# Patient Record
Sex: FEMALE | Race: OTHER | Employment: OTHER | URBAN - METROPOLITAN AREA
[De-identification: names, ages, dates, MRNs, and addresses within clinical notes are randomized per-mention and may not be internally consistent; named-entity substitution may affect disease eponyms.]

---

## 2017-01-11 ENCOUNTER — OFFICE VISIT (OUTPATIENT)
Dept: INTERNAL MEDICINE CLINIC | Age: 62
End: 2017-01-11

## 2017-01-11 VITALS
TEMPERATURE: 98.2 F | DIASTOLIC BLOOD PRESSURE: 66 MMHG | WEIGHT: 162 LBS | BODY MASS INDEX: 26.99 KG/M2 | OXYGEN SATURATION: 95 % | HEIGHT: 65 IN | SYSTOLIC BLOOD PRESSURE: 110 MMHG | RESPIRATION RATE: 16 BRPM | HEART RATE: 62 BPM

## 2017-01-11 DIAGNOSIS — R51.9 FRONTAL HEADACHE: Primary | ICD-10-CM
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
## 2017-01-11 DIAGNOSIS — R73.03 PRE-DIABETES: ICD-10-CM

## 2017-01-11 DIAGNOSIS — D50.9 IRON DEFICIENCY ANEMIA, UNSPECIFIED IRON DEFICIENCY ANEMIA TYPE: ICD-10-CM

## 2017-01-11 DIAGNOSIS — J45.20 MILD INTERMITTENT ASTHMA WITHOUT COMPLICATION: ICD-10-CM

## 2017-01-11 RX ORDER — IBUPROFEN 600 MG/1
600 TABLET ORAL
Qty: 20 TAB | Refills: 0 | Status: SHIPPED | OUTPATIENT
Start: 2017-01-11 | End: 2017-02-06 | Stop reason: SDUPTHER

## 2017-01-11 RX ORDER — ALBUTEROL SULFATE 90 UG/1
1 AEROSOL, METERED RESPIRATORY (INHALATION)
Qty: 1 INHALER | Refills: 0 | Status: SHIPPED | OUTPATIENT
Start: 2017-01-11 | End: 2017-02-16 | Stop reason: SDUPTHER

## 2017-01-11 NOTE — MR AVS SNAPSHOT
Visit Information Date & Time Provider Department Dept. Phone Encounter #  
 1/11/2017 11:45 AM Lise Darnell MD [facility] [phone] [id number] Your Appointments 1/20/2017  9:30 AM  
COMPLETE PHYSICAL with Lise Darnell MD  
[facility] [address]) Appt Note: fasting physical  
 [facility] [address] [facility] [address] [facility] [address] [facility] [address] [place] [address]  
  
    
 2/28/2017 11:00 AM  
ROUTINE CARE with Balta Bender MD  
[facility] [address] [facility]) Appt Note: 3mo fu dm  
 [address] [facility] [address]  
[phone]  
  
   
 [address] [place] [id number] Upcoming Health Maintenance Date Due INFLUENZA AGE 9 TO ADULT 8/1/2016 PAP AKA CERVICAL CYTOLOGY 6/10/2017 BREAST CANCER SCRN MAMMOGRAM 10/20/2017 GLAUCOMA SCREENING Q2Y 9/20/2018 COLONOSCOPY 6/16/2020 DTaP/Tdap/Td series (2 - Td) 7/7/2020 Allergies as of 1/11/2017  Review Complete On: 1/11/2017 By: Lise Darnell MD  
  
 Severity Noted Reaction Type Reactions Pcn [Penicillins] Medium 07/18/2011   Systemic Itching Percocet [Oxycodone-acetaminophen]  06/16/2015    Other (comments) Passe out, feels dizzy Current Immunizations  Never Reviewed Name Date TDAP Vaccine 7/7/2010 Not reviewed this visit You Were Diagnosed With   
  
 Codes Comments Frontal headache    -  Primary ICD-10-CM: K10 ICD-9-CM: 784.0 Fall, initial encounter     ICD-10-CM: W19. Court Garza ICD-9-CM: E888.9 Mild intermittent asthma without complication     EOT-80-QT: J45.20 ICD-9-CM: 493.90 Iron deficiency anemia, unspecified iron deficiency anemia type     ICD-10-CM: D50.9 ICD-9-CM: 280.9 Pre-diabetes     ICD-10-CM: R73.03 
ICD-9-CM: 790.29 Vitals BP Pulse Temp Resp Height(growth percentile) Weight(growth percentile) 110/66 (BP 1 Location: Right arm, BP Patient Position: Sitting) 62 98.2 °F (36.8 °C) (Oral) 16 5' 5\" (1.651 m) 162 lb (73.5 kg) SpO2 BMI OB Status Smoking Status 95% 26.96 kg/m2 Postmenopausal Never Smoker BMI and BSA Data Body Mass Index Body Surface Area  
 26.96 kg/m 2 1.84 m 2 Preferred Pharmacy Pharmacy Name Phone [address], [facility] [address] [phone] Your Updated Medication List  
  
   
This list is accurate as of: 1/11/17  4:33 PM.  Always use your most recent med list.  
  
  
  
  
 albuterol 90 mcg/actuation inhaler Commonly known as:  PROVENTIL HFA, VENTOLIN HFA, PROAIR HFA Take 1 Puff by inhalation every six (6) hours as needed for Wheezing for up to 30 doses. AMBIEN 10 mg tablet Generic drug:  zolpidem Take  by mouth nightly as needed for Sleep. aspirin delayed-release 81 mg tablet Take 162 mg by mouth nightly. CALCIUM 600 + D 600-125 mg-unit Tab Generic drug:  calcium-cholecalciferol (d3) Take  by mouth. chlorthalidone 25 mg tablet Commonly known as:  Nola Loupe Take 1 tablet by mouth  daily CHROMIMIN PO Take  by mouth. Chromium, Vanadium, Corosolic Acid, Alpha Lopoic Acid, etc..  
  
 clonazePAM 0.5 mg tablet Commonly known as:  Ines Kava Take 0.5 mg by mouth three (3) times daily. ENZYME DIGEST Cap Generic drug:  digestive enzymes Take  by mouth.  
  
 escitalopram oxalate 20 mg tablet Commonly known as:  [place] Andrew Take 20 mg by mouth daily. GARCINIA CAMBOGIA PO Take  by mouth. GINGER ROOT (BULK) Take 550 mg by mouth. glucose blood VI test strips strip Commonly known as:  ONETOUCH ULTRA TEST Test up to 2 times daily. Dx code E11.65 GREEN  mg Cap Generic drug:  green tea leaf extract Take  by mouth. hydroCHLOROthiazide 12.5 mg tablet Commonly known as:  HYDRODIURIL  
 Take 1 tablet by mouth  daily  
  
 ibuprofen 600 mg tablet Commonly known as:  MOTRIN Take 1 Tab by mouth every eight (8) hours as needed for Pain for up to 20 doses. lancets 33 gauge Misc Commonly known as: One Touch Fallentimber Copping Test up to 2 times daily. Dx code E11.65 LECITHIN PO Take  by mouth.  
  
 metFORMIN  mg tablet Commonly known as:  GLUCOPHAGE XR Take 1 Tab by mouth two (2) times daily (with meals). SLEEP CAPS PO Take  by mouth. VITAMIN B-12 1,000 mcg tablet Generic drug:  cyanocobalamin Take 1,000 mcg by mouth daily. VITAMIN C 500 mg tablet Generic drug:  ascorbic acid (vitamin C) Take  by mouth. VITAMIN D3 2,000 unit Tab Generic drug:  cholecalciferol (vitamin D3) Take 5,000 Units by mouth. Prescriptions Sent to Pharmacy Refills  
 albuterol (PROVENTIL HFA, VENTOLIN HFA, PROAIR HFA) 90 mcg/actuation inhaler 0 Sig: Take 1 Puff by inhalation every six (6) hours as needed for Wheezing for up to 30 doses. Class: Normal  
 [address] Ph #: [phone] Route: Inhalation  
 ibuprofen (MOTRIN) 600 mg tablet 0 Sig: Take 1 Tab by mouth every eight (8) hours as needed for Pain for up to 20 doses. Class: Normal  
 [address] Ph #: [phone] Route: Oral  
  
To-Do List   
 01/12/2017 1:30 PM  
  Appointment with [facility] 6 at [address] [place] [address] ([phone]) Shower or bathe using soap and water. Do not use deodorant, powder, perfumes, or lotion the day of your exam.  If your prior mammograms were not performed at [place] [address] please bring films with you or forward prior images 2 days before your procedure.   Check in at registration 15min before your appointment time unless you were instructed to do otherwise. A script is not necessary, but if you have one, please bring it on the day of the mammogram or have it faxed to the department. [facility] [phone] [facility]  [phone] [facility] [phone] [facility] [address] [phone] [facility] [address] [phone] [facility] [phone] Amanda Prater [phone]  
  
 01/12/2017 2:00 PM  
  Appointment with [address] at [facility] ([phone]) GENERAL INSTRUCTIONS  1. Bring outside films ([employer]) pertaining to the affected area with you on the day of appointment. 2. A written order with a valid diagnosis and Physicians signature is required for all scheduled tests. 3. Check in at registration 30 minutes before your appointment time unless you were instructed to do otherwise. 01/16/2017 Lab:  CBC W/O DIFF   
  
 01/16/2017 Lab:  HEMOGLOBIN A1C WITH EAG   
  
 01/16/2017 Lab:  LIPID PANEL   
  
 01/16/2017 Lab:  METABOLIC PANEL, COMPREHENSIVE Introducing [facility] SERVICES! Luc Goff introduces [employer] patient portal. Now you can access parts of your medical record, email your doctor's office, and request medication refills online. 1. In your internet browser, go to [email or web address] 2. Click on the First Time User? Click Here link in the Sign In box. You will see the New Member Sign Up page. 3. Enter your [employer] Access Code exactly as it appears below. You will not need to use this code after youve completed the sign-up process. If you do not sign up before the expiration date, you must request a new code. · [employer] Access Code: [email or web address] Expires: 4/10/2017 12:18 PM 
 
4. Enter the last four digits of your Social Security Number (xxxx) and Date of Birth (mm/dd/yyyy) as indicated and click Submit. You will be taken to the next sign-up page. 5. Create a [employer] ID. This will be your [employer] login ID and cannot be changed, so think of one that is secure and easy to remember. 6. Create a [facility] password. You can change your password at any time. 7. Enter your Password Reset Question and Answer. This can be used at a later time if you forget your password. 8. Enter your e-mail address. You will receive e-mail notification when new information is available in [address] 9. Click Sign Up. You can now view and download portions of your medical record. 10. Click the Download Summary menu link to download a portable copy of your medical information. If you have questions, please visit the Frequently Asked Questions section of the [employer] website. Remember, [facility] is NOT to be used for urgent needs. For medical emergencies, dial 911. Now available from your iPhone and Android! Please provide this summary of care documentation to your next provider. Your primary care clinician is listed as Rene Stone. If you have any questions after today's visit, please call [phone].

## 2017-01-11 NOTE — PROGRESS NOTES
Written by Amy Schmidt, as dictated by Dr. Parish Rogers MD.    Ciara Segura is a 59 y.o. female. HPI  The patient comes in today because she fell on Monday  and hit her head. She slipped on the ice and she landed on her back and hit the back of her head, but her head hurts in the front. Her back hurts and her side is sore. She does have blurred vision, but no loss of vision. She did not go to the ED or urgent care because she was scared to drive. She has not taken any medication for this. She has nausea, but she is unsure if it is due to the metformin. She is following with Dr. Jas Bradshaw (endo). She declines the flu shot at this time. She will wake up in the middle of the night gasping for breath. She has not gone to the cardiologist.     Patient Active Problem List   Diagnosis Code    Iron (Fe) deficiency anemia D50.9    Anxiety F41.9    Disc disorder M51.9    Seasonal allergic rhinitis J30.2    Osteopenia M85.80    Vertigo R42    ACP (advance care planning) Z71.89    Palpitation R00.2        Current Outpatient Prescriptions on File Prior to Visit   Medication Sig Dispense Refill    glucose blood VI test strips (ONETOUCH ULTRA TEST) strip Test up to 2 times daily. Dx code E11.65 100 Strip 6    lancets (ONE TOUCH DELICA) 33 gauge misc Test up to 2 times daily. Dx code E11.65 100 Lancet 6    metFORMIN ER (GLUCOPHAGE XR) 500 mg tablet Take 1 Tab by mouth two (2) times daily (with meals). 60 Tab 6    hydroCHLOROthiazide (HYDRODIURIL) 12.5 mg tablet Take 1 tablet by mouth  daily 90 Tab 0    chlorthalidone (HYGROTEN) 25 mg tablet Take 1 tablet by mouth  daily 90 Tab 0    escitalopram oxalate (LEXAPRO) 20 mg tablet Take 20 mg by mouth daily.  AMINO ACIDS/CHROMIUM (CHROMIMIN PO) Take  by mouth. Chromium, Vanadium, Corosolic Acid, Alpha Lopoic Acid, etc..      green tea leaf extract (GREEN TEA) 315 mg cap Take  by mouth.       ascorbic acid, vitamin C, (VITAMIN C) 500 mg tablet Take  by mouth.  GINGER ROOT, BULK, Take 550 mg by mouth.  CHROM KAYA/BRINDAL BERRY (GARCINIA CAMBOGIA PO) Take  by mouth.  MELATONIN/HERBAL COMPLEX #184 (SLEEP CAPS PO) Take  by mouth.  LECITHIN PO Take  by mouth.  calcium-cholecalciferol, d3, (CALCIUM 600 + D) 600-125 mg-unit tab Take  by mouth.  cholecalciferol, vitamin D3, (VITAMIN D3) 2,000 unit tab Take 5,000 Units by mouth.  cyanocobalamin (VITAMIN B-12) 1,000 mcg tablet Take 1,000 mcg by mouth daily.  digestive enzymes (ENZYME DIGEST) cap Take  by mouth.  clonazePAM (KLONOPIN) 0.5 mg tablet Take 0.5 mg by mouth three (3) times daily.  aspirin delayed-release 81 mg tablet Take 162 mg by mouth nightly.  zolpidem (AMBIEN) 10 mg tablet Take  by mouth nightly as needed for Sleep. No current facility-administered medications on file prior to visit. Allergies   Allergen Reactions    Pcn [Penicillins] Itching    Percocet [Oxycodone-Acetaminophen] Other (comments)     Passe out, feels dizzy       Past Medical History   Diagnosis Date    Anxiety 6/4/2009    BV (bacterial vaginosis)     Candidal vaginitis     Concussion 09/12    Disc disorder 6/4/2009    Iron (Fe) deficiency anemia 6/4/2009    Nausea & vomiting     Osteopenia 6/4/2009    Seasonal allergic rhinitis 6/4/2009    Vertigo        Past Surgical History   Procedure Laterality Date    Hx orthopaedic       left foot       Family History   Problem Relation Age of Onset    Arthritis-osteo Mother     Heart Disease Father     Cancer Sister        Social History     Social History    Marital status: LEGALLY      Spouse name: N/A    Number of children: N/A    Years of education: N/A     Occupational History    Not on file.      Social History Main Topics    Smoking status: Never Smoker    Smokeless tobacco: Never Used    Alcohol use No    Drug use: No    Sexual activity: Not Currently Other Topics Concern    Not on file     Social History Narrative       Review of Systems   Constitutional: Negative for malaise/fatigue. HENT: Negative for congestion. Eyes: Positive for blurred vision. Negative for discharge. Respiratory: Negative for cough and wheezing. Cardiovascular: Negative for chest pain and palpitations. Musculoskeletal: Positive for falls. Negative for joint pain and myalgias. Neurological: Positive for headaches. Negative for weakness. Visit Vitals    /66 (BP 1 Location: Right arm, BP Patient Position: Sitting)    Pulse 62    Temp 98.2 °F (36.8 °C) (Oral)    Resp 16    Ht 5' 5\" (1.651 m)    Wt 162 lb (73.5 kg)    SpO2 95%    BMI 26.96 kg/m2     Physical Exam   Constitutional: She is oriented to person, place, and time. She appears well-nourished. No distress. HENT:   Right Ear: External ear normal.   Left Ear: External ear normal.   Mouth/Throat: Oropharynx is clear and moist.   Eyes: Conjunctivae and EOM are normal. Right eye exhibits no discharge. Left eye exhibits no discharge. Neck: Normal range of motion. Neck supple. Cardiovascular: Normal rate and regular rhythm. Pulmonary/Chest: Effort normal. She has wheezes (R side). Abdominal: Soft. Bowel sounds are normal. She exhibits no distension. Musculoskeletal:   BL upper extremity strength normal   Neurological: She is alert and oriented to person, place, and time. No cranial nerve deficit. Skin: Skin is intact. Psychiatric: She has a normal mood and affect. Nursing note and vitals reviewed. ASSESSMENT and PLAN    ICD-10-CM ICD-9-CM    1. Frontal headache R51 784.0 ibuprofen (MOTRIN) 600 mg tablet sent to pharmacy. No neuro deficit on exam   2. Fall, initial encounter [address] Alen Kidd S692.8 I want her to take ibuprofen BID and I want her to take the medication with food for 4-5 days. If she gets worse then I want to send her for CT.     3. Mild intermittent asthma without complication R68.37 876.44 albuterol (PROVENTIL HFA, VENTOLIN HFA, PROAIR HFA) 90 mcg/actuation inhaler sent to pharmacy    I am going to send her an inhaler to the pharmacy. If she is using this more than 3-4 times a week then she needs to rtc and I will give her a long acting medication. 4. Iron deficiency anemia, unspecified iron deficiency anemia type D50.9 280.9 CBC W/O DIFF      METABOLIC PANEL, COMPREHENSIVE    We hugo check her levels when she comes back in to the office. 5. Pre-diabetes R73.03 790.29 HEMOGLOBIN A1C WITH EAG      LIPID PANEL    We will check these levels when she comes in next week fasting. This plan was reviewed with the patient and patient agrees. All questions were answered. This scribe documentation was reviewed by me and accurately reflects the examination and decisions made by me. This note will not be viewable in [address]

## 2017-01-11 NOTE — PROGRESS NOTES
Chief Complaint   Patient presents with   Alejandra Bonilla     states she fell monday on the ice and hit head.  states that it hurts on the front and at the left temple. but patient hit the back of the head. did not black out.

## 2017-01-12 ENCOUNTER — HOSPITAL ENCOUNTER (OUTPATIENT)
Dept: MAMMOGRAPHY | Age: 62
Discharge: HOME | End: 2017-01-12
Attending: SPECIALIST
Payer: MEDICARE

## 2017-01-12 ENCOUNTER — HOSPITAL ENCOUNTER (OUTPATIENT)
Dept: ULTRASOUND IMAGING | Age: 62
Discharge: HOME | End: 2017-01-12
Attending: INTERNAL MEDICINE
Payer: MEDICARE

## 2017-01-12 DIAGNOSIS — E04.1 THYROID NODULE: ICD-10-CM

## 2017-01-12 DIAGNOSIS — Z12.31 VISIT FOR SCREENING MAMMOGRAM: ICD-10-CM

## 2017-01-12 PROCEDURE — 76536 US EXAM OF HEAD AND NECK: CPT

## 2017-01-12 PROCEDURE — 77067 SCR MAMMO BI INCL CAD: CPT

## 2017-01-16 DIAGNOSIS — D50.9 IRON DEFICIENCY ANEMIA, UNSPECIFIED IRON DEFICIENCY ANEMIA TYPE: ICD-10-CM

## 2017-01-16 DIAGNOSIS — R73.03 PRE-DIABETES: ICD-10-CM

## 2017-01-17 LAB
ALBUMIN SERPL-MCNC: 4.6 G/DL (ref 3.6–4.8)
ALBUMIN/GLOB SERPL: 2.3 {RATIO} (ref 1.1–2.5)
ALP SERPL-CCNC: 55 IU/L (ref 39–117)
ALT SERPL-CCNC: 25 IU/L (ref 0–32)
AST SERPL-CCNC: 24 IU/L (ref 0–40)
BILIRUB SERPL-MCNC: 0.3 MG/DL (ref 0–1.2)
BUN SERPL-MCNC: 16 MG/DL (ref 8–27)
BUN/CREAT SERPL: 23 (ref 11–26)
CALCIUM SERPL-MCNC: 9.9 MG/DL (ref 8.7–10.3)
CHLORIDE SERPL-SCNC: 100 MMOL/L (ref 96–106)
CHOLEST SERPL-MCNC: 227 MG/DL (ref 100–199)
CO2 SERPL-SCNC: 27 MMOL/L (ref 18–29)
CREAT SERPL-MCNC: 0.71 MG/DL (ref 0.57–1)
ERYTHROCYTE [DISTWIDTH] IN BLOOD BY AUTOMATED COUNT: 13.1 % (ref 12.3–15.4)
EST. AVERAGE GLUCOSE BLD GHB EST-MCNC: 126 MG/DL
GLOBULIN SER CALC-MCNC: 2 G/DL (ref 1.5–4.5)
GLUCOSE SERPL-MCNC: 111 MG/DL (ref 65–99)
HBA1C MFR BLD: 6 % (ref 4.8–5.6)
HCT VFR BLD AUTO: 36 % (ref 34–46.6)
HDLC SERPL-MCNC: 77 MG/DL
HGB BLD-MCNC: 12.1 G/DL (ref 11.1–15.9)
INTERPRETATION, 910389: NORMAL
LDLC SERPL CALC-MCNC: 134 MG/DL (ref 0–99)
MCH RBC QN AUTO: 29.3 PG (ref 26.6–33)
MCHC RBC AUTO-ENTMCNC: 33.6 G/DL (ref 31.5–35.7)
MCV RBC AUTO: 87 FL (ref 79–97)
PLATELET # BLD AUTO: 246 X10E3/UL (ref 150–379)
POTASSIUM SERPL-SCNC: 4 MMOL/L (ref 3.5–5.2)
PROT SERPL-MCNC: 6.6 G/DL (ref 6–8.5)
RBC # BLD AUTO: 4.13 X10E6/UL (ref 3.77–5.28)
SODIUM SERPL-SCNC: 142 MMOL/L (ref 134–144)
TRIGL SERPL-MCNC: 79 MG/DL (ref 0–149)
VLDLC SERPL CALC-MCNC: 16 MG/DL (ref 5–40)
WBC # BLD AUTO: 2.7 X10E3/UL (ref 3.4–10.8)

## 2017-01-17 RX ORDER — LANCETS 33 GAUGE
EACH MISCELLANEOUS
Qty: 200 LANCET | Refills: 4 | Status: SHIPPED | OUTPATIENT
Start: 2017-01-17 | End: 2018-03-29 | Stop reason: SDUPTHER

## 2017-01-17 NOTE — TELEPHONE ENCOUNTER
Patient [facility] so that she can get a 90 day supply. She also said that sh got some labs drawn for her physical yesterday and said that they are the same things Dr. Emily Morales ordered; she asked if she will still need to do the labs that Dr. Emily Morales ordered for next month.  SHe said the only test that wasn't done was the urine test.

## 2017-01-20 ENCOUNTER — OFFICE VISIT (OUTPATIENT)
Dept: INTERNAL MEDICINE CLINIC | Age: 62
End: 2017-01-20

## 2017-01-20 VITALS
TEMPERATURE: 98 F | BODY MASS INDEX: 26.99 KG/M2 | HEIGHT: 65 IN | SYSTOLIC BLOOD PRESSURE: 102 MMHG | HEART RATE: 68 BPM | DIASTOLIC BLOOD PRESSURE: 78 MMHG | OXYGEN SATURATION: 96 % | WEIGHT: 162 LBS | RESPIRATION RATE: 16 BRPM

## 2017-01-20 DIAGNOSIS — D50.9 IRON DEFICIENCY ANEMIA, UNSPECIFIED IRON DEFICIENCY ANEMIA TYPE: ICD-10-CM

## 2017-01-20 DIAGNOSIS — D72.819 LEUKOPENIA, UNSPECIFIED TYPE: ICD-10-CM

## 2017-01-20 DIAGNOSIS — I10 ESSENTIAL HYPERTENSION: ICD-10-CM

## 2017-01-20 DIAGNOSIS — E11.9 CONTROLLED TYPE 2 DIABETES MELLITUS WITHOUT COMPLICATION, WITHOUT LONG-TERM CURRENT USE OF INSULIN (HCC): Primary | ICD-10-CM

## 2017-01-20 NOTE — PROGRESS NOTES
Written by Rah Martínez, as dictated by Dr. Shankar Alberto MD.    Rebecca Li is a 59 y.o. female. HPI  The patient comes in today for a follow up. She denies any dizziness and she does have headaches that are a lot better than before. Her headaches are only on the R side of the head now. The motrin helped her a lot as well. Her A1c was 6.0% which came down from 6.3%. She follows with endocrinologist. Her BS was high at 135 this morning and she had soup with chicken and white beans for dinner and she did not have any sweets. She has seen readings at 135-155. She walks 4 miles 3-4 times and she does water aerobics. She has some constipation due to eating meat. She followed with Dr. Monica Gayle (GI) for a colonoscopy in 2010 but didn`t go back in 5 years as was recommended. She has been taking Aspirin daily and her iron tablets. Her Hgb came back normal on her blood work. She has never followed with a hematologist.     Patient Active Problem List   Diagnosis Code    Iron (Fe) deficiency anemia D50.9    Anxiety F41.9    Disc disorder M51.9    Seasonal allergic rhinitis J30.2    Osteopenia M85.80    Vertigo R42    ACP (advance care planning) Z71.89    Palpitation R00.2        Current Outpatient Prescriptions on File Prior to Visit   Medication Sig Dispense Refill    glucose blood VI test strips (ONETOUCH ULTRA TEST) strip Test up to 2 times daily. Dx code E11.65 200 Strip 4    lancets (ONE TOUCH DELICA) 33 gauge misc Test up to 2 times daily. Dx code E11.65 200 Lancet 4    albuterol (PROVENTIL HFA, VENTOLIN HFA, PROAIR HFA) 90 mcg/actuation inhaler Take 1 Puff by inhalation every six (6) hours as needed for Wheezing for up to 30 doses. 1 Inhaler 0    ibuprofen (MOTRIN) 600 mg tablet Take 1 Tab by mouth every eight (8) hours as needed for Pain for up to 20 doses.  20 Tab 0    metFORMIN ER (GLUCOPHAGE XR) 500 mg tablet Take 1 Tab by mouth two (2) times daily (with meals). 60 Tab 6    hydroCHLOROthiazide (HYDRODIURIL) 12.5 mg tablet Take 1 tablet by mouth  daily 90 Tab 0    chlorthalidone (HYGROTEN) 25 mg tablet Take 1 tablet by mouth  daily 90 Tab 0    escitalopram oxalate (LEXAPRO) 20 mg tablet Take 20 mg by mouth daily.  AMINO ACIDS/CHROMIUM (CHROMIMIN PO) Take  by mouth. Chromium, Vanadium, Corosolic Acid, Alpha Lopoic Acid, etc..      green tea leaf extract (GREEN TEA) 315 mg cap Take  by mouth.  ascorbic acid, vitamin C, (VITAMIN C) 500 mg tablet Take  by mouth.  GINGER ROOT, BULK, Take 550 mg by mouth.  CHROM KAYA/BRINDAL BERRY (GARCINIA CAMBOGIA PO) Take  by mouth.  MELATONIN/HERBAL COMPLEX #184 (SLEEP CAPS PO) Take  by mouth.  LECITHIN PO Take  by mouth.  calcium-cholecalciferol, d3, (CALCIUM 600 + D) 600-125 mg-unit tab Take  by mouth.  cholecalciferol, vitamin D3, (VITAMIN D3) 2,000 unit tab Take 5,000 Units by mouth.  cyanocobalamin (VITAMIN B-12) 1,000 mcg tablet Take 1,000 mcg by mouth daily.  digestive enzymes (ENZYME DIGEST) cap Take  by mouth.  clonazePAM (KLONOPIN) 0.5 mg tablet Take 0.5 mg by mouth three (3) times daily.  aspirin delayed-release 81 mg tablet Take 162 mg by mouth nightly.  zolpidem (AMBIEN) 10 mg tablet Take  by mouth nightly as needed for Sleep. No current facility-administered medications on file prior to visit.         Allergies   Allergen Reactions    Pcn [Penicillins] Itching    Percocet [Oxycodone-Acetaminophen] Other (comments)     Passe out, feels dizzy       Past Medical History   Diagnosis Date    Anxiety 6/4/2009    BV (bacterial vaginosis)     Candidal vaginitis     Concussion 09/12    Disc disorder 6/4/2009    Iron (Fe) deficiency anemia 6/4/2009    Nausea & vomiting     Osteopenia 6/4/2009    Seasonal allergic rhinitis 6/4/2009    Vertigo        Past Surgical History   Procedure Laterality Date    Hx orthopaedic       left foot Family History   Problem Relation Age of Onset   Job [place] Arthritis-osteo Mother     Heart Disease Father     Cancer Sister     Breast Cancer Sister 48       Social History     Social History    Marital status: LEGALLY      Spouse name: N/A    Number of children: N/A    Years of education: N/A     Occupational History    Not on file.      Social History Main Topics    Smoking status: Never Smoker    Smokeless tobacco: Never Used    Alcohol use No    Drug use: No    Sexual activity: Not Currently     Other Topics Concern    Not on file     Social History Narrative       Orders Only on 01/16/2017   Component Date Value Ref Range Status    Hemoglobin A1c 01/16/2017 6.0* 4.8 - 5.6 % Final    Comment:          Pre-diabetes: 5.7 - 6.4           Diabetes: >6.4           Glycemic control for adults with diabetes: <7.0      Estimated average glucose 01/16/2017 126  mg/dL Final    WBC 01/16/2017 2.7* 3.4 - 10.8 x10E3/uL Final    RBC 01/16/2017 4.13  3.77 - 5.28 x10E6/uL Final    HGB 01/16/2017 12.1  11.1 - 15.9 g/dL Final    HCT 01/16/2017 36.0  34.0 - 46.6 % Final    MCV 01/16/2017 87  79 - 97 fL Final    MCH 01/16/2017 29.3  26.6 - 33.0 pg Final    MCHC 01/16/2017 33.6  31.5 - 35.7 g/dL Final    RDW 01/16/2017 13.1  12.3 - 15.4 % Final    PLATELET 95/41/6700 360  150 - 379 x10E3/uL Final    Glucose 01/16/2017 111* 65 - 99 mg/dL Final    BUN 01/16/2017 16  8 - 27 mg/dL Final    Creatinine 01/16/2017 0.71  0.57 - 1.00 mg/dL Final    GFR est non-AA 01/16/2017 91  >59 mL/min/1.73 Final    GFR est AA 01/16/2017 105  >59 mL/min/1.73 Final    BUN/Creatinine ratio 01/16/2017 23  11 - 26 Final    Sodium 01/16/2017 142  134 - 144 mmol/L Final    Potassium 01/16/2017 4.0  3.5 - 5.2 mmol/L Final    Chloride 01/16/2017 100  96 - 106 mmol/L Final    CO2 01/16/2017 27  18 - 29 mmol/L Final    Calcium 01/16/2017 9.9  8.7 - 10.3 mg/dL Final    Protein, total 01/16/2017 6.6  6.0 - 8.5 g/dL Final    Albumin 01/16/2017 4.6  3.6 - 4.8 g/dL Final    GLOBULIN, TOTAL 01/16/2017 2.0  1.5 - 4.5 g/dL Final    A-G Ratio 01/16/2017 2.3  1.1 - 2.5 Final    Bilirubin, total 01/16/2017 0.3  0.0 - 1.2 mg/dL Final    Alk. phosphatase 01/16/2017 55  39 - 117 IU/L Final    AST 01/16/2017 24  0 - 40 IU/L Final    ALT 01/16/2017 25  0 - 32 IU/L Final    Cholesterol, total 01/16/2017 227* 100 - 199 mg/dL Final    Triglyceride 01/16/2017 79  0 - 149 mg/dL Final    HDL Cholesterol 01/16/2017 77  >39 mg/dL Final    VLDL, calculated 01/16/2017 16  5 - 40 mg/dL Final    LDL, calculated 01/16/2017 134* 0 - 99 mg/dL Final    INTERPRETATION 01/16/2017 Note   Final    Supplement report is available. Review of Systems   Constitutional: Negative for malaise/fatigue. HENT: Negative for congestion. Eyes: Negative for blurred vision and discharge. Respiratory: Negative for cough and wheezing. Cardiovascular: Negative for chest pain and palpitations. Gastrointestinal: Negative for abdominal pain and heartburn. Genitourinary: Negative for frequency and urgency. Musculoskeletal: Negative for joint pain and myalgias. Neurological: Positive for headaches. Negative for dizziness, tingling, sensory change and weakness. Psychiatric/Behavioral: Negative for depression and suicidal ideas. The patient is not nervous/anxious. Visit Vitals    /78 (BP 1 Location: Left arm, BP Patient Position: Sitting)    Pulse 68    Temp 98 °F (36.7 °C) (Oral)    Resp 16    Ht 5' 5\" (1.651 m)    Wt 162 lb (73.5 kg)    SpO2 96%    BMI 26.96 kg/m2       Physical Exam   Constitutional: She is oriented to person, place, and time. She appears well-nourished. No distress. HENT:   Right Ear: External ear normal.   Left Ear: External ear normal.   Mouth/Throat: Oropharynx is clear and moist.   Eyes: Conjunctivae and EOM are normal. Right eye exhibits no discharge. Left eye exhibits no discharge.    Neck: Normal range of motion. Neck supple. Cardiovascular: Normal rate and regular rhythm. Pulmonary/Chest: Effort normal and breath sounds normal. She has no wheezes. Abdominal: Soft. Bowel sounds are normal. She exhibits no distension. Lymphadenopathy:     She has no cervical adenopathy. Neurological: She is alert and oriented to person, place, and time. Skin: Skin is intact. Psychiatric: She has a normal mood and affect. Nursing note and vitals reviewed. ASSESSMENT and PLAN    ICD-10-CM ICD-9-CM    1. Controlled type 2 diabetes mellitus without complication, without long-term current use of insulin (HCC) E11.9 250.00 Her A1c has come down since her last visit. Continue exercise & diabetic diet. 2. Essential hypertension I10 401.9 Her BP is under control at this time. No change to her dosage. 3. Iron deficiency anemia, unspecified iron deficiency anemia type D50.9 280.9 REFERRAL TO HEMATOLOGY    Her Hgb is normal on her blood work and she is taking her iron pills. 4. Leukopenia, unspecified type D72.819 288.50 REFERRAL TO HEMATOLOGY    I want her to follow with a hematologist due to a chronic low WBC as she never had a work up. This plan was reviewed with the patient and patient agrees. All questions were answered. This scribe documentation was reviewed by me and accurately reflects the examination and decisions made by me. This note will not be viewable in [address]

## 2017-01-20 NOTE — PROGRESS NOTES
Chief Complaint   Patient presents with    Complete Physical     already did the labs. still has slight headache and she noticed she is having problems with breathing during sleep.

## 2017-01-24 RX ORDER — HYDROCHLOROTHIAZIDE 12.5 MG/1
TABLET ORAL
Qty: 90 TAB | Refills: 1 | Status: SHIPPED | OUTPATIENT
Start: 2017-01-24 | End: 2017-07-31 | Stop reason: ALTCHOICE

## 2017-02-06 DIAGNOSIS — R51.9 FRONTAL HEADACHE: ICD-10-CM

## 2017-02-06 RX ORDER — IBUPROFEN 600 MG/1
600 TABLET ORAL
Qty: 20 TAB | Refills: 0 | Status: SHIPPED | OUTPATIENT
Start: 2017-02-06 | End: 2019-02-22 | Stop reason: SDUPTHER

## 2017-02-16 DIAGNOSIS — J45.20 MILD INTERMITTENT ASTHMA WITHOUT COMPLICATION: ICD-10-CM

## 2017-02-16 RX ORDER — ALBUTEROL SULFATE 90 UG/1
1 AEROSOL, METERED RESPIRATORY (INHALATION)
Qty: 1 INHALER | Refills: 0 | Status: SHIPPED | OUTPATIENT
Start: 2017-02-16 | End: 2017-12-22 | Stop reason: SDUPTHER

## 2017-02-28 ENCOUNTER — OFFICE VISIT (OUTPATIENT)
Dept: ENDOCRINOLOGY | Age: 62
End: 2017-02-28

## 2017-02-28 VITALS
WEIGHT: 167 LBS | DIASTOLIC BLOOD PRESSURE: 72 MMHG | TEMPERATURE: 96.4 F | SYSTOLIC BLOOD PRESSURE: 128 MMHG | HEART RATE: 59 BPM | BODY MASS INDEX: 27.82 KG/M2 | OXYGEN SATURATION: 100 % | RESPIRATION RATE: 20 BRPM | HEIGHT: 65 IN

## 2017-02-28 DIAGNOSIS — E78.2 MIXED HYPERLIPIDEMIA: ICD-10-CM

## 2017-02-28 DIAGNOSIS — I10 ESSENTIAL HYPERTENSION: ICD-10-CM

## 2017-02-28 DIAGNOSIS — R73.02 GLUCOSE INTOLERANCE (IMPAIRED GLUCOSE TOLERANCE): Primary | ICD-10-CM

## 2017-02-28 RX ORDER — MELOXICAM 15 MG/1
1 TABLET ORAL DAILY
Refills: 1 | COMMUNITY
Start: 2017-02-16 | End: 2017-10-24 | Stop reason: SDUPTHER

## 2017-02-28 NOTE — MR AVS SNAPSHOT
Visit Information Date & Time Provider Department Dept. Phone Encounter #  
 2/28/2017 11:00 AM Lora Terrazas MD [facility] [phone] [id number] Follow-up Instructions Return in about 1 year (around 2/28/2018). Upcoming Health Maintenance Date Due Pneumococcal 19-64 Highest Risk (1 of 3 - PCV13) 3[birth year]1/1974 INFLUENZA AGE 9 TO ADULT 8/1/2016 PAP AKA CERVICAL CYTOLOGY 6/10/2017 BREAST CANCER SCRN MAMMOGRAM 1/12/2019 GLAUCOMA SCREENING Q2Y 1/31/2019 COLONOSCOPY 6/16/2020 DTaP/Tdap/Td series (2 - Td) 7/7/2020 Allergies as of 2/28/2017  Review Complete On: 2/28/2017 By: Lora Terrazas MD  
  
 Severity Noted Reaction Type Reactions Pcn [Penicillins] Medium 07/18/2011   Systemic Itching Percocet [Oxycodone-acetaminophen]  06/16/2015    Other (comments) Passe out, feels dizzy Current Immunizations  Never Reviewed Name Date TDAP Vaccine 7/7/2010 Not reviewed this visit You Were Diagnosed With   
  
 Codes Comments Glucose intolerance (impaired glucose tolerance)    -  Primary ICD-10-CM: R73.02 
ICD-9-CM: 790.22 Vitals BP  
  
  
  
  
  
 128/72 BMI and BSA Data Body Mass Index Body Surface Area  
 27.79 kg/m 2 1.86 m 2 Preferred Pharmacy Pharmacy Name Phone [address], [facility] [facility] [address] [phone] Your Updated Medication List  
  
   
This list is accurate as of: 2/28/17 12:40 PM.  Always use your most recent med list.  
  
  
  
  
 albuterol 90 mcg/actuation inhaler Commonly known as:  PROVENTIL HFA, VENTOLIN HFA, PROAIR HFA Take 1 Puff by inhalation every six (6) hours as needed for Wheezing for up to 30 doses. AMBIEN 10 mg tablet Generic drug:  zolpidem Take  by mouth nightly as needed for Sleep. aspirin delayed-release 81 mg tablet Take 162 mg by mouth nightly. CALCIUM 600 + D 600-125 mg-unit Tab Generic drug:  calcium-cholecalciferol (d3) Take  by mouth. chlorthalidone 25 mg tablet Commonly known as:  Carreno Dace Take 1 tablet by mouth  daily CHROMIMIN PO Take  by mouth. Chromium, Vanadium, Corosolic Acid, Alpha Lopoic Acid, etc..  
  
 clonazePAM 0.5 mg tablet Commonly known as:  Rich Aw Take 0.5 mg by mouth three (3) times daily. ENZYME DIGEST Cap Generic drug:  digestive enzymes Take  by mouth. glucose blood VI test strips strip Commonly known as:  ONETOUCH ULTRA TEST Test up to 2 times daily. Dx code E11.65 GREEN  mg Cap Generic drug:  green tea leaf extract Take  by mouth. hydroCHLOROthiazide 12.5 mg tablet Commonly known as:  HYDRODIURIL Take 1 tablet by mouth  daily  
  
 ibuprofen 600 mg tablet Commonly known as:  MOTRIN Take 1 Tab by mouth every eight (8) hours as needed for Pain for up to 20 doses. lancets 33 gauge Misc Commonly known as: One Touch Rachel Maker Test up to 2 times daily. Dx code E11.65 LECITHIN PO Take  by mouth.  
  
 meloxicam 15 mg tablet Commonly known as:  MOBIC Take 1 Tab by mouth daily. metFORMIN  mg tablet Commonly known as:  GLUCOPHAGE XR Take 1 Tab by mouth two (2) times daily (with meals). SITagliptin-metFORMIN  mg Tm24 Commonly known as:  JANUMET XR  
1 tab twice a day with meals  Stop metformin SLEEP CAPS PO Take  by mouth. VITAMIN B-12 1,000 mcg tablet Generic drug:  cyanocobalamin Take 1,000 mcg by mouth daily. VITAMIN C 500 mg tablet Generic drug:  ascorbic acid (vitamin C) Take  by mouth. VITAMIN D3 2,000 unit Tab Generic drug:  cholecalciferol (vitamin D3) Take 5,000 Units by mouth. Prescriptions Printed Refills SITagliptin-metFORMIN (JANUMET XR)  mg TM24 6 Si tab twice a day with meals  Stop metformin Class: Print We Performed the Following HEMOGLOBIN A1C WITH EAG [36421 CPT(R)] Follow-up Instructions Return in about 1 year (around 2/28/2018). To-Do List   
 08/28/2017 Lab:  HEMOGLOBIN A1C WITH EAG Patient Instructions Stay  on  Metformin Er 500 mg She wants to try  Janumet xr   50/500   Mg    Twice a day with meals Do not skip meals Do not eat in between meals Reduce carbs- pasta, rice, potatoes, bread Do not drink juices or sodas Do not eat sugar free cookies and cakes Introducing [facility]! Martín Mcdonough introduces [employer] patient portal. Now you can access parts of your medical record, email your doctor's office, and request medication refills online. 1. In your internet browser, go to [email or web address] 2. Click on the First Time User? Click Here link in the Sign In box. You will see the New Member Sign Up page. 3. Enter your Win[employer] Access Code exactly as it appears below. You will not need to use this code after youve completed the sign-up process. If you do not sign up before the expiration date, you must request a new code. · Win[employer] Access Code: [email or web address] Expires: 4/10/2017 12:18 PM 
 
4. Enter the last four digits of your Social Security Number (xxxx) and Date of Birth (mm/dd/yyyy) as indicated and click Submit. You will be taken to the next sign-up page. 5. Create a Winster ID. This will be your Winster login ID and cannot be changed, so think of one that is secure and easy to remember. 6. Create a Win[employer] password. You can change your password at any time. 7. Enter your Password Reset Question and Answer. This can be used at a later time if you forget your password. 8. Enter your e-mail address. You will receive e-mail notification when new information is available in [address] 9. Click Sign Up. You can now view and download portions of your medical record. 10. Click the Download Summary menu link to download a portable copy of your medical information. If you have questions, please visit the Frequently Asked Questions section of the [employer] website. Remember, [employer] is NOT to be used for urgent needs. For medical emergencies, dial 911. Now available from your iPhone and Android! Please provide this summary of care documentation to your next provider. Your primary care clinician is listed as Palomo Francis. If you have any questions after today's visit, please call [phone].

## 2017-02-28 NOTE — PATIENT INSTRUCTIONS
Stay  on  Metformin Er 500 mg    She wants to try  Janumet xr   50/500   Mg    Twice a day with meals       Do not skip meals  Do not eat in between meals    Reduce carbs- pasta, rice, potatoes, bread   Do not drink juices or sodas  Do not eat sugar free cookies and cakes

## 2017-02-28 NOTE — PROGRESS NOTES
HISTORY OF PRESENT ILLNESS  Gilma Snow is a 59 y.o. female. HPI  Patient here for first f/u after  initial visit of Type 2 diabetes mellitus   From nov 28 2016     Gained 5 lbs     She is immensely upset and angry that she had to wait longer   She is again appreciative also for the care I give     She is hoping to see if she could get janumet instead of metformin er         Old history . Referred : by pcp    H/o pre diabetes for several  years     Current A1C is 6.3 %  and symptoms/problems include none     Current diabetic medications include none. Current monitoring regimen: none  Home blood sugar records: trend: unknown  Any episodes of hypoglycemia? no    Weight trend: stable  Prior visit with dietician: no  Current diet: \"healthy\" diet  in general  Current exercise: aerobics    Known diabetic complications: none  Cardiovascular risk factors: dyslipidemia, diabetes mellitus    Eye exam current (within one year): no  WILLIE: no     Past Medical History:   Diagnosis Date    Anxiety 6/4/2009    BV (bacterial vaginosis)     Candidal vaginitis     Concussion 09/12    Disc disorder 6/4/2009    Iron (Fe) deficiency anemia 6/4/2009    Nausea & vomiting     Osteopenia 6/4/2009    Seasonal allergic rhinitis 6/4/2009    Vertigo      Past Surgical History:   Procedure Laterality Date    HX ORTHOPAEDIC      left foot     Current Outpatient Prescriptions   Medication Sig    albuterol (PROVENTIL HFA, VENTOLIN HFA, PROAIR HFA) 90 mcg/actuation inhaler Take 1 Puff by inhalation every six (6) hours as needed for Wheezing for up to 30 doses.  ibuprofen (MOTRIN) 600 mg tablet Take 1 Tab by mouth every eight (8) hours as needed for Pain for up to 20 doses.  hydroCHLOROthiazide (HYDRODIURIL) 12.5 mg tablet Take 1 tablet by mouth  daily    glucose blood VI test strips (ONETOUCH ULTRA TEST) strip Test up to 2 times daily. Dx code E11.65    lancets (ONE TOUCH DELICA) 33 gauge misc Test up to 2 times daily.  Dx code E11.65    metFORMIN ER (GLUCOPHAGE XR) 500 mg tablet Take 1 Tab by mouth two (2) times daily (with meals).  chlorthalidone (HYGROTEN) 25 mg tablet Take 1 tablet by mouth  daily    AMINO ACIDS/CHROMIUM (CHROMIMIN PO) Take  by mouth. Chromium, Vanadium, Corosolic Acid, Alpha Lopoic Acid, etc..    green tea leaf extract (GREEN TEA) 315 mg cap Take  by mouth.  ascorbic acid, vitamin C, (VITAMIN C) 500 mg tablet Take  by mouth.  MELATONIN/HERBAL COMPLEX #184 (SLEEP CAPS PO) Take  by mouth.  LECITHIN PO Take  by mouth.  calcium-cholecalciferol, d3, (CALCIUM 600 + D) 600-125 mg-unit tab Take  by mouth.  cholecalciferol, vitamin D3, (VITAMIN D3) 2,000 unit tab Take 5,000 Units by mouth.  cyanocobalamin (VITAMIN B-12) 1,000 mcg tablet Take 1,000 mcg by mouth daily.  digestive enzymes (ENZYME DIGEST) cap Take  by mouth.  clonazePAM (KLONOPIN) 0.5 mg tablet Take 0.5 mg by mouth three (3) times daily.  aspirin delayed-release 81 mg tablet Take 162 mg by mouth nightly.  zolpidem (AMBIEN) 10 mg tablet Take  by mouth nightly as needed for Sleep.  meloxicam (MOBIC) 15 mg tablet Take 1 Tab by mouth daily. No current facility-administered medications for this visit. Review of Systems   Constitutional: Negative. HENT: Negative. Eyes: Negative for pain and redness. Respiratory: Negative. Cardiovascular: Negative for chest pain, palpitations and leg swelling. Gastrointestinal: Negative. Negative for constipation. Genitourinary: Negative. Musculoskeletal: Negative for myalgias. Skin: Negative. Neurological: Negative. Endo/Heme/Allergies: Negative. Psychiatric/Behavioral: Negative for depression and memory loss. The patient does not have insomnia. Physical Exam   Constitutional: She is oriented to person, place, and time. She appears well-developed and well-nourished. HENT:   Head: Normocephalic.    Eyes: Conjunctivae and EOM are normal. Pupils are equal, round, and reactive to light. Neck: Normal range of motion. Neck supple. No JVD present. No tracheal deviation present. Thyromegaly present. Cardiovascular: Normal rate, regular rhythm and normal heart sounds. No murmur heard. Pulmonary/Chest: Breath sounds normal.   Abdominal: Soft. Bowel sounds are normal.   Musculoskeletal: Normal range of motion. Lymphadenopathy:     She has no cervical adenopathy. Neurological: She is alert and oriented to person, place, and time. She has normal reflexes. Skin: Skin is warm. Psychiatric: She has a normal mood and affect. Lab Results  Component Value Date/Time   Hemoglobin A1c 6.0 01/16/2017 09:37 AM   Hemoglobin A1c 6.3 09/30/2016 10:15 AM   Hemoglobin A1c 6.3 06/07/2016 10:25 AM   Glucose 111 01/16/2017 09:37 AM   LDL, calculated 134 01/16/2017 09:37 AM   Creatinine 0.71 01/16/2017 09:37 AM      Lab Results  Component Value Date/Time   Cholesterol, total 227 01/16/2017 09:37 AM   HDL Cholesterol 77 01/16/2017 09:37 AM   LDL, calculated 134 01/16/2017 09:37 AM   Triglyceride 79 01/16/2017 09:37 AM   CHOL/HDL Ratio 3.5 07/08/2010 09:11 AM       Lab Results  Component Value Date/Time   ALT (SGPT) 25 01/16/2017 09:37 AM   AST (SGOT) 24 01/16/2017 09:37 AM   Alk. phosphatase 55 01/16/2017 09:37 AM   Bilirubin, total 0.3 01/16/2017 09:37 AM       Lab Results  Component Value Date/Time   GFR est  01/16/2017 09:37 AM   GFR est non-AA 91 01/16/2017 09:37 AM   Creatinine 0.71 01/16/2017 09:37 AM   BUN 16 01/16/2017 09:37 AM   Sodium 142 01/16/2017 09:37 AM   Potassium 4.0 01/16/2017 09:37 AM   Chloride 100 01/16/2017 09:37 AM   CO2 27 01/16/2017 09:37 AM          ASSESSMENT  and PLAN     1.  Pre diabetes  : a1c is  6 %       From today    Jan 2017  Compared to 6.3 %    From  Recent labs   She is happy out of proportion for improvement on A1c   She tried "[employer] and she felt good on it   Had no GI distress like she has now on  metformin  mg bid     I explained to her that there is no evidence of use of januvia amongst prediabetic patients     Patient is advised to check blood sugars 1-2 times daily by rotation method. reviewed medications and side effects in detail  lab results and schedule of future lab studies reviewed with patient        2. Hypoglycemia :  Educated on treating the hypoglycemia. 3. HTN : continue hydrodiuril  And hygroten . Patient is educated about importance of compliance with anti-hypertensives especially ARB/ACEI    4. Dyslipidemia : not on meds. Patient is educated about benefits and adverse effects of statins and explained how benefits outweigh risk. 5. use of aspirin to prevent MI and TIA's discussed      6.  Right thyrodi nodule - nov 2016-  usg of thyroid showed a cyst at inferior pole of 1.5 cm     She is excessively worried about diabetes and she definitely needs to relax and advised her to see a closer doc to avoid the transportation burden  She is upset for the waiting time and I explained the reason  And rationale     > 50 % of time is spent on counseling

## 2017-02-28 NOTE — PROGRESS NOTES
Wt Readings from Last 3 Encounters:   02/28/17 167 lb (75.8 kg)   01/20/17 162 lb (73.5 kg)   01/11/17 162 lb (73.5 kg)     Temp Readings from Last 3 Encounters:   02/28/17 96.4 °F (35.8 °C) (Oral)   01/20/17 98 °F (36.7 °C) (Oral)   01/11/17 98.2 °F (36.8 °C) (Oral)     BP Readings from Last 3 Encounters:   02/28/17 128/72   01/20/17 102/78   01/11/17 110/66     Pulse Readings from Last 3 Encounters:   02/28/17 (!) 59   01/20/17 68   01/11/17 62     Lab Results   Component Value Date/Time    Hemoglobin A1c 6.0 01/16/2017 09:37 AM     Last Podiatry Feb 21,2017  Last Eye exam 2016

## 2017-05-08 ENCOUNTER — OFFICE VISIT (OUTPATIENT)
Dept: INTERNAL MEDICINE CLINIC | Age: 62
End: 2017-05-08

## 2017-05-08 VITALS
TEMPERATURE: 98.5 F | DIASTOLIC BLOOD PRESSURE: 52 MMHG | OXYGEN SATURATION: 97 % | BODY MASS INDEX: 26.33 KG/M2 | HEART RATE: 64 BPM | RESPIRATION RATE: 16 BRPM | SYSTOLIC BLOOD PRESSURE: 108 MMHG | HEIGHT: 65 IN | WEIGHT: 158 LBS

## 2017-05-08 DIAGNOSIS — E11.9 CONTROLLED TYPE 2 DIABETES MELLITUS WITHOUT COMPLICATION, WITHOUT LONG-TERM CURRENT USE OF INSULIN (HCC): ICD-10-CM

## 2017-05-08 DIAGNOSIS — F41.1 GENERALIZED ANXIETY DISORDER: ICD-10-CM

## 2017-05-08 DIAGNOSIS — M79.672 HEEL PAIN, CHRONIC, LEFT: ICD-10-CM

## 2017-05-08 DIAGNOSIS — Z71.89 ACP (ADVANCE CARE PLANNING): ICD-10-CM

## 2017-05-08 DIAGNOSIS — R92.2 DENSE BREAST TISSUE ON MAMMOGRAM: Primary | ICD-10-CM

## 2017-05-08 DIAGNOSIS — G89.29 HEEL PAIN, CHRONIC, LEFT: ICD-10-CM

## 2017-05-08 DIAGNOSIS — J45.20 MILD INTERMITTENT ASTHMA WITHOUT COMPLICATION: ICD-10-CM

## 2017-05-08 RX ORDER — METFORMIN HYDROCHLORIDE 500 MG/1
TABLET, EXTENDED RELEASE ORAL
Refills: 6 | COMMUNITY
Start: 2017-02-02 | End: 2017-10-24 | Stop reason: SDUPTHER

## 2017-05-08 RX ORDER — METHYLPREDNISOLONE 4 MG/1
TABLET ORAL
Qty: 1 DOSE PACK | Refills: 0 | Status: SHIPPED | OUTPATIENT
Start: 2017-05-08 | End: 2017-07-31 | Stop reason: ALTCHOICE

## 2017-05-08 NOTE — ACP (ADVANCE CARE PLANNING)
Advance Care Planning (ACP) Provider Conversation Snapshot    Date of ACP Conversation: 05/08/17  Persons included in Conversation:  patient  Length of ACP Conversation in minutes:  16 minutes              For Patients with Decision Making Capacity:   Values/Goals: Exploration of values, goals, and preferences if recovery is not expected, even with continued medical treatment in the event of:  Imminent death    Conversation Outcomes / Follow-Up Plan:   Recommended completion of Advance Directive form after review of ACP materials and conversation with prospective healthcare agent

## 2017-05-08 NOTE — PROGRESS NOTES
Written by Shantel Diaz, as dictated by Dr. Isidro Tavares MD.    Michelle Hurtado is a 57 y.o. female. HPI  The patient comes in today for a follow up. Her last mammogram was in 01/2017 and she was told that her mammogram was normal but she has very fibrous breast tissue. Her BP is good today at 108/52. Her insurance has not been covering the [place] and she is taking Metformin which makes her stomach upset. She is compliant on Mobic for her heel pain. She is using inserts for her pain as well. She is compliant on hydrochlorothiazide. She takes [place] and klonopin as needed; she follows with psychiatrist for these medications. She has been coughing for 3 weeks. She has been wheezing and she will use her inhaler as needed . The inhaler does help to open up her lungs and she has been using frequently lately. Patient Active Problem List   Diagnosis Code    Iron (Fe) deficiency anemia D50.9    Anxiety F41.9    Disc disorder M51.9    Seasonal allergic rhinitis J30.2    Osteopenia M85.80    Vertigo R42    ACP (advance care planning) Z71.89    Palpitation R00.2        Current Outpatient Prescriptions on File Prior to Visit   Medication Sig Dispense Refill    meloxicam (MOBIC) 15 mg tablet Take 1 Tab by mouth daily. 1    albuterol (PROVENTIL HFA, VENTOLIN HFA, PROAIR HFA) 90 mcg/actuation inhaler Take 1 Puff by inhalation every six (6) hours as needed for Wheezing for up to 30 doses. 1 Inhaler 0    ibuprofen (MOTRIN) 600 mg tablet Take 1 Tab by mouth every eight (8) hours as needed for Pain for up to 20 doses. 20 Tab 0    hydroCHLOROthiazide (HYDRODIURIL) 12.5 mg tablet Take 1 tablet by mouth  daily 90 Tab 1    glucose blood VI test strips (ONETOUCH ULTRA TEST) strip Test up to 2 times daily. Dx code E11.65 200 Strip 4    lancets (ONE TOUCH DELICA) 33 gauge misc Test up to 2 times daily.  Dx code E11.65 200 Lancet 4    AMINO ACIDS/CHROMIUM (Lorrine Cali PO) Take  by mouth. Chromium, Vanadium, Corosolic Acid, Alpha Lopoic Acid, etc..      ascorbic acid, vitamin C, (VITAMIN C) 500 mg tablet Take  by mouth.  LECITHIN PO Take  by mouth.  calcium-cholecalciferol, d3, (CALCIUM 600 + D) 600-125 mg-unit tab Take  by mouth.  cholecalciferol, vitamin D3, (VITAMIN D3) 2,000 unit tab Take 5,000 Units by mouth.  cyanocobalamin (VITAMIN B-12) 1,000 mcg tablet Take 1,000 mcg by mouth daily.  digestive enzymes (ENZYME DIGEST) cap Take  by mouth.  clonazePAM (KLONOPIN) 0.5 mg tablet Take 0.5 mg by mouth three (3) times daily.  aspirin delayed-release 81 mg tablet Take 162 mg by mouth nightly.  zolpidem (AMBIEN) 10 mg tablet Take  by mouth nightly as needed for Sleep. No current facility-administered medications on file prior to visit. Allergies   Allergen Reactions    Pcn [Penicillins] Itching    Percocet [Oxycodone-Acetaminophen] Other (comments)     Passe out, feels dizzy       Past Medical History:   Diagnosis Date    Anxiety 6/4/2009    BV (bacterial vaginosis)     Candidal vaginitis     Concussion 09/12    Disc disorder 6/4/2009    Iron (Fe) deficiency anemia 6/4/2009    Nausea & vomiting     Osteopenia 6/4/2009    Seasonal allergic rhinitis 6/4/2009    Vertigo        Past Surgical History:   Procedure Laterality Date    HX ORTHOPAEDIC      left foot       Family History   Problem Relation Age of Onset    Arthritis-osteo Mother     Heart Disease Father     Cancer Sister     Breast Cancer Sister 48       Social History     Social History    Marital status: LEGALLY      Spouse name: N/A    Number of children: N/A    Years of education: N/A     Occupational History    Not on file.      Social History Main Topics    Smoking status: Never Smoker    Smokeless tobacco: Never Used    Alcohol use No    Drug use: No    Sexual activity: Not Currently     Other Topics Concern    Not on file     Social History Narrative           Review of Systems   Constitutional: Negative for malaise/fatigue. HENT: Negative for congestion. Respiratory: Positive for cough and wheezing. Cardiovascular: Negative for chest pain and palpitations. Musculoskeletal: Negative for joint pain and myalgias. Neurological: Negative for weakness and headaches. Visit Vitals    /52 (BP 1 Location: Right arm, BP Patient Position: Sitting)    Pulse 64    Temp 98.5 °F (36.9 °C) (Oral)    Resp 16    Ht 5' 5\" (1.651 m)    Wt 158 lb (71.7 kg)    SpO2 97%    BMI 26.29 kg/m2     Physical Exam   Constitutional: She is oriented to person, place, and time. She appears well-nourished. No distress. HENT:   Right Ear: External ear normal.   Left Ear: External ear normal.   Mouth/Throat: Oropharynx is clear and moist.   Eyes: Conjunctivae and EOM are normal. Right eye exhibits no discharge. Left eye exhibits no discharge. Neck: Normal range of motion. Neck supple. Cardiovascular: Normal rate and regular rhythm. Pulmonary/Chest: Effort normal. She has no wheezes. Decreased breath sounds   Abdominal: Soft. Bowel sounds are normal. She exhibits no distension. Lymphadenopathy:     She has no cervical adenopathy. Neurological: She is alert and oriented to person, place, and time. Skin: Skin is intact. Psychiatric: She has a normal mood and affect. Nursing note and vitals reviewed. ASSESSMENT and PLAN    ICD-10-CM ICD-9-CM    1. Dense breast tissue on mammogram R92.2 793.89 I discussed that her mammogram next year should be a 4D mammogram.    2. Controlled type 2 diabetes mellitus without complication, without long-term current use of insulin ([facility]) E11.9 250.00 I discussed that since her A1c is down to 6.0% then Metformin is fine for her to be on.    3. Heel pain, chronic, left M79.672 729.5 She uses mobic prn for pain and orthotics. G89.29 338.29    4.  Generalized anxiety disorder F41.1 300.02 Follows with psychiatrist.    5. ACP (advance care planning) Z71.89 V65.49 I discussed that I want her to get her living will done and then she should come make an appointment with [facility]. Handouts given to read carefully before her appointment. 6. Mild intermittent asthma without complication H33.73 653.27 methylPREDNISolone (MEDROL DOSEPACK) 4 mg tablet sent to pharmacy     I discussed if she uses this with the inhaler then it should help open up her lungs. This plan was reviewed with the patient and patient agrees. All questions were answered. This scribe documentation was reviewed by me and accurately reflects the examination and decisions made by me. This note will not be viewable in [address]

## 2017-05-08 NOTE — PROGRESS NOTES
Chief Complaint   Patient presents with    Other     would like to talk about mammagram results and still has a cough and wheezing for three weeks.

## 2017-05-08 NOTE — MR AVS SNAPSHOT
Visit Information Date & Time Provider Department Dept. Phone Encounter #  
 5/8/2017 10:45 AM Bulmaro Riggs MD [facility] Internal Medicine [phone] [id number] Upcoming Health Maintenance Date Due Pneumococcal 19-64 Highest Risk (1 of 3 - PCV13) 3/1/1972 PAP AKA CERVICAL CYTOLOGY 6/10/2017 INFLUENZA AGE 9 TO ADULT 8/1/2017 BREAST CANCER SCRN MAMMOGRAM 1/12/2019 GLAUCOMA SCREENING Q2Y 1/31/2019 COLONOSCOPY 6/16/2020 DTaP/Tdap/Td series (2 - Td) 7/7/2020 Allergies as of 5/8/2017  Review Complete On: 5/8/2017 By: Bulmaro Riggs MD  
  
 Severity Noted Reaction Type Reactions Pcn [Penicillins] Medium 07/18/2011   Systemic Itching Percocet [Oxycodone-acetaminophen]  06/16/2015    Other (comments) Passe out, feels dizzy Current Immunizations  Never Reviewed Name Date TDAP Vaccine 7/7/2010 Not reviewed this visit You Were Diagnosed With   
  
 Codes Comments Dense breast tissue on mammogram    -  Primary ICD-10-CM: R92.2 ICD-9-CM: 793.89 Controlled type 2 diabetes mellitus without complication, without long-term current use of insulin ([facility]     ICD-10-CM: E11.9 ICD-9-CM: 250.00 Heel pain, chronic, left     ICD-10-CM: M79.672, G89.29 ICD-9-CM: 729.5, 338.29 Generalized anxiety disorder     ICD-10-CM: F41.1 ICD-9-CM: 300.02   
 ACP (advance care planning)     ICD-10-CM: Z71.89 ICD-9-CM: V65.49 Mild intermittent asthma without complication     [facility]-85-VN: J45.20 ICD-9-CM: 493.90 Vitals BP Pulse Temp Resp Height(growth percentile) Weight(growth percentile) 108/52 (BP 1 Location: Right arm, BP Patient Position: Sitting) 64 98.5 °F (36.9 °C) (Oral) 16 5' 5\" (1.651 m) 158 lb (71.7 kg) SpO2 BMI OB Status Smoking Status 97% 26.29 kg/m2 Postmenopausal Never Smoker BMI and BSA Data Body Mass Index Body Surface Area  
 26.29 kg/m 2 1.81 m 2 Preferred Pharmacy Pharmacy Name Phone [address] [facility] [phone] Your Updated Medication List  
  
   
This list is accurate as of: 5/8/17 11:53 AM.  Always use your most recent med list.  
  
  
  
  
 albuterol 90 mcg/actuation inhaler Commonly known as:  PROVENTIL HFA, VENTOLIN HFA, PROAIR HFA Take 1 Puff by inhalation every six (6) hours as needed for Wheezing for up to 30 doses. AMBIEN 10 mg tablet Generic drug:  zolpidem Take  by mouth nightly as needed for Sleep. aspirin delayed-release 81 mg tablet Take 162 mg by mouth nightly. CALCIUM 600 + D 600-125 mg-unit Tab Generic drug:  calcium-cholecalciferol (d3) Take  by mouth. CHROMIMIN PO Take  by mouth. Chromium, Vanadium, Corosolic Acid, Alpha Lopoic Acid, etc..  
  
 clonazePAM 0.5 mg tablet Commonly known as:  Yahir Britney Take 0.5 mg by mouth three (3) times daily. ENZYME DIGEST Cap Generic drug:  digestive enzymes Take  by mouth. glucose blood VI test strips strip Commonly known as:  ONETOUCH ULTRA TEST Test up to 2 times daily. Dx code E11.65  
  
 hydroCHLOROthiazide 12.5 mg tablet Commonly known as:  HYDRODIURIL Take 1 tablet by mouth  daily  
  
 ibuprofen 600 mg tablet Commonly known as:  MOTRIN Take 1 Tab by mouth every eight (8) hours as needed for Pain for up to 20 doses. lancets 33 gauge Misc Commonly known as: One Touch [facility] Test up to 2 times daily. Dx code E11.65  
  
 meloxicam 15 mg tablet Commonly known as:  MOBIC Take 1 Tab by mouth daily. metFORMIN  mg tablet Commonly known as:  GLUCOPHAGE XR  
TK 1 T PO BID WITH MEALS  
  
 methylPREDNISolone 4 mg tablet Commonly known as:  Marcus Sam As directed. VITAMIN B-12 1,000 mcg tablet Generic drug:  cyanocobalamin Take 1,000 mcg by mouth daily. VITAMIN C 500 mg tablet Generic drug:  ascorbic acid (vitamin C) Take  by mouth. VITAMIN D3 2,000 unit Tab Generic drug:  cholecalciferol (vitamin D3) Take 5,000 Units by mouth. Prescriptions Sent to Pharmacy Refills  
 methylPREDNISolone (MEDROL DOSEPACK) 4 mg tablet 0 Sig: As directed. Class: Normal  
 Pharmacy: [facility] [address] [place] [address] #: [phone] Introducing [facility] & HEALTH SERVICES! [employer] introduces Rico patient portal. Now you can access parts of your medical record, email your doctor's office, and request medication refills online. 1. In your internet browser, go to [email or web address] 2. Click on the First Time User? Click Here link in the Sign In box. You will see the New Member Sign Up page. 3. Enter your Rico Access Code exactly as it appears below. You will not need to use this code after youve completed the sign-up process. If you do not sign up before the expiration date, you must request a new code. · Rico Access Code: [email or web address] Expires: 8/6/2017 11:53 AM 
 
4. Enter the last four digits of your Social Security Number (xxxx) and Date of Birth (mm/dd/yyyy) as indicated and click Submit. You will be taken to the next sign-up page. 5. Create a Rico ID. This will be your Rico login ID and cannot be changed, so think of one that is secure and easy to remember. 6. Create a Rico password. You can change your password at any time. 7. Enter your Password Reset Question and Answer. This can be used at a later time if you forget your password. 8. Enter your e-mail address. You will receive e-mail notification when new information is available in [address] 9. Click Sign Up. You can now view and download portions of your medical record. 10. Click the Download Summary menu link to download a portable copy of your medical information.  
 
If you have questions, please visit the Frequently Asked Questions section of the [employer]. Remember, [email or web address] is NOT to be used for urgent needs. For medical emergencies, dial 911. Now available from your iPhone and Android! Please provide this summary of care documentation to your next provider. Your primary care clinician is listed as Samuel Wan. If you have any questions after today's visit, please call [phone].

## 2017-07-03 RX ORDER — CHLORTHALIDONE 25 MG/1
TABLET ORAL
Qty: 90 TAB | Refills: 0 | Status: SHIPPED | OUTPATIENT
Start: 2017-07-03 | End: 2017-10-24 | Stop reason: SDUPTHER

## 2017-07-31 ENCOUNTER — OFFICE VISIT (OUTPATIENT)
Dept: INTERNAL MEDICINE CLINIC | Age: 62
End: 2017-07-31

## 2017-07-31 ENCOUNTER — HOSPITAL ENCOUNTER (OUTPATIENT)
Dept: LAB | Age: 62
Discharge: HOME | End: 2017-07-31
Payer: MEDICARE

## 2017-07-31 VITALS
RESPIRATION RATE: 18 BRPM | TEMPERATURE: 97.7 F | HEART RATE: 70 BPM | SYSTOLIC BLOOD PRESSURE: 124 MMHG | BODY MASS INDEX: 26.12 KG/M2 | DIASTOLIC BLOOD PRESSURE: 84 MMHG | WEIGHT: 156.8 LBS | HEIGHT: 65 IN | OXYGEN SATURATION: 97 %

## 2017-07-31 DIAGNOSIS — R51.9 FREQUENT HEADACHES: ICD-10-CM

## 2017-07-31 DIAGNOSIS — D72.819 LEUKOPENIA, UNSPECIFIED TYPE: ICD-10-CM

## 2017-07-31 DIAGNOSIS — R42 DIZZINESS AND GIDDINESS: Primary | ICD-10-CM

## 2017-07-31 DIAGNOSIS — J45.20 ALLERGY-INDUCED ASTHMA, MILD INTERMITTENT, UNCOMPLICATED: ICD-10-CM

## 2017-07-31 DIAGNOSIS — E11.9 CONTROLLED TYPE 2 DIABETES MELLITUS WITHOUT COMPLICATION, WITHOUT LONG-TERM CURRENT USE OF INSULIN (HCC): ICD-10-CM

## 2017-07-31 PROCEDURE — 83036 HEMOGLOBIN GLYCOSYLATED A1C: CPT

## 2017-07-31 PROCEDURE — 85027 COMPLETE CBC AUTOMATED: CPT

## 2017-07-31 PROCEDURE — 80053 COMPREHEN METABOLIC PANEL: CPT

## 2017-07-31 RX ORDER — DEXAMETHASONE 4 MG/1
2 TABLET ORAL 2 TIMES DAILY
Refills: 0 | COMMUNITY
Start: 2017-07-11 | End: 2017-07-31 | Stop reason: ALTCHOICE

## 2017-07-31 RX ORDER — FLUTICASONE PROPIONATE 220 UG/1
1 AEROSOL, METERED RESPIRATORY (INHALATION) 2 TIMES DAILY
Qty: 1 INHALER | Refills: 0 | Status: SHIPPED | OUTPATIENT
Start: 2017-07-31 | End: 2017-08-30

## 2017-07-31 NOTE — MR AVS SNAPSHOT
Visit Information Date & Time Provider Department Dept. Phone Encounter #  
 7/31/2017 12:30 PM Harini Astudillo MD [facility] Internal Medicine [phone] [id number] Your Appointments 8/9/2017 10:00 AM  
Medicare Physical with MD Flynn Hebert [facility] Internal Medicine [facility]) Appt Note: Medicare Wellness Visit -   
 [facility] [address]  
[address] [facility] [address] [place] [id number] Upcoming Health Maintenance Date Due Pneumococcal 19-64 Highest Risk (1 of 3 - PCV13) 3/1/1972 PAP AKA CERVICAL CYTOLOGY 6/10/2017 INFLUENZA AGE 9 TO ADULT 8/1/2017 BREAST CANCER SCRN MAMMOGRAM 1/12/2019 GLAUCOMA SCREENING Q2Y 1/31/2019 COLONOSCOPY 6/16/2020 DTaP/Tdap/Td series (2 - Td) 7/7/2020 Allergies as of 7/31/2017  Review Complete On: 7/31/2017 By: Harini Astudillo MD  
  
 Severity Noted Reaction Type Reactions Pcn [Penicillins] Medium 07/18/2011   Systemic Itching Percocet [Oxycodone-acetaminophen]  06/16/2015    Other (comments) Passe out, feels dizzy Current Immunizations  Never Reviewed Name Date TDAP Vaccine 7/7/2010 Not reviewed this visit You Were Diagnosed With   
  
 Codes Comments Dizziness and giddiness    -  Primary ICD-10-CM: Z42 ICD-9-CM: 780.4 Frequent headaches     ICD-10-CM: R51 ICD-9-CM: 784.0 Controlled type 2 diabetes mellitus without complication, without long-term current use of insulin ([facility]     ICD-10-CM: E11.9 ICD-9-CM: 250.00 Leukopenia, unspecified type     ICD-10-CM: D72.819 ICD-9-CM: 288.50 Allergy-induced asthma, mild intermittent, uncomplicated     VVG-69-DH: J45.20 ICD-9-CM: 493.00 Vitals BP Pulse Temp Resp Height(growth percentile) Weight(growth percentile)  124/84 (BP 1 Location: Left arm, BP Patient Position: Sitting) 70 97.7 °F (36.5 °C) (Oral) 18 5' 5\" (1.651 m) 156 lb 12.8 oz (71.1 kg) SpO2 BMI OB Status Smoking Status 97% 26.09 kg/m2 Postmenopausal Never Smoker Vitals History BMI and BSA Data Body Mass Index Body Surface Area 26.09 kg/m 2 1.81 m 2 Preferred Pharmacy Pharmacy Name Phone [facility] [facility] [address] [phone] Your Updated Medication List  
  
   
This list is accurate as of: 7/31/17 12:31 PM.  Always use your most recent med list.  
  
  
  
  
 albuterol 90 mcg/actuation inhaler Commonly known as:  PROVENTIL HFA, VENTOLIN HFA, PROAIR HFA Take 1 Puff by inhalation every six (6) hours as needed for Wheezing for up to 30 doses. AMBIEN 10 mg tablet Generic drug:  zolpidem Take  by mouth nightly as needed for Sleep. aspirin delayed-release 81 mg tablet Take 162 mg by mouth nightly. CALCIUM 600 + D 600-125 mg-unit Tab Generic drug:  calcium-cholecalciferol (d3) Take  by mouth. chlorthalidone 25 mg tablet Commonly known as:  [place] Take 1 tablet by mouth  daily  
  
 clonazePAM 0.5 mg tablet Commonly known as:  Robalfonso Alderman Take 0.5 mg by mouth three (3) times daily. ENZYME DIGEST Cap Generic drug:  digestive enzymes Take  by mouth. fluticasone 220 mcg/actuation inhaler Commonly known as:  FLOVENT HFA Take 1 Puff by inhalation two (2) times a day for 30 days. glucose blood VI test strips strip Commonly known as:  ONETOUCH ULTRA TEST Test up to 2 times daily. Dx code E11.65  
  
 ibuprofen 600 mg tablet Commonly known as:  MOTRIN Take 1 Tab by mouth every eight (8) hours as needed for Pain for up to 20 doses. lancets 33 gauge Misc Commonly known as: One Touch Nyoka Leaven Test up to 2 times daily. Dx code E11.65  
  
 meloxicam 15 mg tablet Commonly known as:  MOBIC Take 1 Tab by mouth daily. metFORMIN  mg tablet Commonly known as:  GLUCOPHAGE XR  
TK 1 T PO BID WITH MEALS  
  
 VITAMIN B-12 1,000 mcg tablet Generic drug:  cyanocobalamin Take 1,000 mcg by mouth daily. VITAMIN C 500 mg tablet Generic drug:  ascorbic acid (vitamin C) Take  by mouth. VITAMIN D3 2,000 unit Tab Generic drug:  cholecalciferol (vitamin D3) Take 5,000 Units by mouth. Prescriptions Sent to Pharmacy Refills  
 fluticasone (FLOVENT HFA) 220 mcg/actuation inhaler 0 Sig: Take 1 Puff by inhalation two (2) times a day for 30 days. Class: Normal  
 Pharmacy: [facility] [address] [place] [address] [address] Ph #: [phone] Route: Inhalation We Performed the Following CBC W/O DIFF [21738 CPT(R)] HEMOGLOBIN A1C WITH EAG [48739 CPT(R)] METABOLIC PANEL, COMPREHENSIVE [30842 CPT(R)] Introducing [facility] & HEALTH SERVICES! [employer] introduces Boutir patient portal. Now you can access parts of your medical record, email your doctor's office, and request medication refills online. 1. In your internet browser, go to [email or web address] 2. Click on the First Time User? Click Here link in the Sign In box. You will see the New Member Sign Up page. 3. Enter your Boutir Access Code exactly as it appears below. You will not need to use this code after youve completed the sign-up process. If you do not sign up before the expiration date, you must request a new code. · Boutir Access Code: [email or web address] Expires: 8/6/2017 11:53 AM 
 
4. Enter the last four digits of your Social Security Number (xxxx) and Date of Birth (mm/dd/yyyy) as indicated and click Submit. You will be taken to the next sign-up page. 5. Create a Boutir ID. This will be your Boutir login ID and cannot be changed, so think of one that is secure and easy to remember. 6. Create a [employer] password. You can change your password at any time. 7. Enter your Password Reset Question and Answer. This can be used at a later time if you forget your password. 8. Enter your e-mail address. You will receive e-mail notification when new information is available in [address] 9. Click Sign Up. You can now view and download portions of your medical record. 10. Click the Download Summary menu link to download a portable copy of your medical information. If you have questions, please visit the Frequently Asked Questions section of the [employer] website. Remember, [employer] is NOT to be used for urgent needs. For medical emergencies, dial 911. Now available from your iPhone and Android! Please provide this summary of care documentation to your next provider. Your primary care clinician is listed as Hernando Brewer. If you have any questions after today's visit, please call [phone].

## 2017-07-31 NOTE — PROGRESS NOTES
Written by Libertad Mitchell, as dictated by Dr. Maliha Arredondo MD.    Júnior May is a 57 y.o. female. HPI  The patient comes in today for a follow-up. She experienced a fall while crossing the street on 07/28 in [place]. She said she felt like her back gave out on her, did not trip and was not distracted when she fell. She did not go to the hospital while she was there. She is compliant on chlorthalidone, meloxicam, metformin, but is not taking hydrochlorothiazide or iron tablets. She has been experiencing muscle cramps lately. Her BS was normal on the day she fell. She has followed with Dr. Apolinar Arreaga (hematology) for her hx of low WBC count. She has also been experiencing SOB, for which she saw Dr. Obi Ardon (physician in [place]). She has been taking Flovent, but Dr. Obi Ardon said it was not strong enough for her, and gave her another inhaler which did help but was too expensive. She also had an allergy test done with this physician that showed few food allergies. Patient Active Problem List   Diagnosis Code    Iron (Fe) deficiency anemia D50.9    Anxiety F41.9    Disc disorder M51.9    Seasonal allergic rhinitis J30.2    Osteopenia M85.80    Vertigo R42    ACP (advance care planning) Z71.89    Palpitation R00.2        Current Outpatient Prescriptions on File Prior to Visit   Medication Sig Dispense Refill    chlorthalidone (HYGROTEN) 25 mg tablet Take 1 tablet by mouth  daily 90 Tab 0    metFORMIN ER (GLUCOPHAGE XR) 500 mg tablet TK 1 T PO BID WITH MEALS  6    meloxicam (MOBIC) 15 mg tablet Take 1 Tab by mouth daily. 1    albuterol (PROVENTIL HFA, VENTOLIN HFA, PROAIR HFA) 90 mcg/actuation inhaler Take 1 Puff by inhalation every six (6) hours as needed for Wheezing for up to 30 doses. 1 Inhaler 0    glucose blood VI test strips (ONETOUCH ULTRA TEST) strip Test up to 2 times daily.  Dx code E11.65 200 Strip 4    lancets (ONE TOUCH DELICA) 33 gauge misc Test up to 2 times daily. Dx code E11.65 200 Lancet 4    cholecalciferol, vitamin D3, (VITAMIN D3) 2,000 unit tab Take 5,000 Units by mouth.  cyanocobalamin (VITAMIN B-12) 1,000 mcg tablet Take 1,000 mcg by mouth daily.  clonazePAM (KLONOPIN) 0.5 mg tablet Take 0.5 mg by mouth three (3) times daily.  aspirin delayed-release 81 mg tablet Take 162 mg by mouth nightly.  zolpidem (AMBIEN) 10 mg tablet Take  by mouth nightly as needed for Sleep.  ibuprofen (MOTRIN) 600 mg tablet Take 1 Tab by mouth every eight (8) hours as needed for Pain for up to 20 doses. 20 Tab 0    ascorbic acid, vitamin C, (VITAMIN C) 500 mg tablet Take  by mouth.  calcium-cholecalciferol, d3, (CALCIUM 600 + D) 600-125 mg-unit tab Take  by mouth.  digestive enzymes (ENZYME DIGEST) cap Take  by mouth. No current facility-administered medications on file prior to visit. Allergies   Allergen Reactions    Pcn [Penicillins] Itching    Percocet [Oxycodone-Acetaminophen] Other (comments)     Passe out, feels dizzy       Past Medical History:   Diagnosis Date    Anxiety 6/4/2009    BV (bacterial vaginosis)     Candidal vaginitis     Concussion 09/12    Disc disorder 6/4/2009    Iron (Fe) deficiency anemia 6/4/2009    Nausea & vomiting     Osteopenia 6/4/2009    Seasonal allergic rhinitis 6/4/2009    Vertigo        Past Surgical History:   Procedure Laterality Date    HX ORTHOPAEDIC      left foot       Family History   Problem Relation Age of Onset    Arthritis-osteo Mother     Heart Disease Father     Cancer Sister     Breast Cancer Sister 48       Social History     Social History    Marital status: LEGALLY      Spouse name: N/A    Number of children: N/A    Years of education: N/A     Occupational History    Not on file.      Social History Main Topics    Smoking status: Never Smoker    Smokeless tobacco: Never Used    Alcohol use No    Drug use: No    Sexual activity: Not Currently Other Topics Concern    Not on file     Social History Narrative           Review of Systems   Constitutional: Negative for malaise/fatigue. HENT: Negative for congestion. Respiratory: Positive for shortness of breath. Negative for cough. Musculoskeletal: Positive for falls. Negative for joint pain and myalgias. Neurological: Positive for headaches. Negative for weakness. Visit Vitals    /84 (BP 1 Location: Left arm, BP Patient Position: Sitting)    Pulse 70    Temp 97.7 °F (36.5 °C) (Oral)    Resp 18    Ht 5' 5\" (1.651 m)    Wt 156 lb 12.8 oz (71.1 kg)    SpO2 97%    BMI 26.09 kg/m2       Physical Exam   Constitutional: She is oriented to person, place, and time. She appears well-developed and well-nourished. No distress. HENT:   Right Ear: External ear normal.   Left Ear: External ear normal.   Eyes: Conjunctivae and EOM are normal. Right eye exhibits no discharge. Left eye exhibits no discharge. Neck: Normal range of motion. Neck supple. Cardiovascular: Normal rate and regular rhythm. Pulmonary/Chest: Effort normal and breath sounds normal. She has no wheezes. Abdominal: Soft. Bowel sounds are normal. There is no tenderness. Lymphadenopathy:     She has no cervical adenopathy. Neurological: She is alert and oriented to person, place, and time. Skin: She is not diaphoretic. Psychiatric: She has a normal mood and affect. Her behavior is normal.   Nursing note and vitals reviewed. ASSESSMENT and PLAN    ICD-10-CM ICD-9-CM    1. Dizziness and giddiness Z28 532.6 METABOLIC PANEL, COMPREHENSIVE   2. Frequent headaches R51 784.0 Will recheck her levels today. If her labs are normal, I will refer her to neurology. 3. Controlled type 2 diabetes mellitus without complication, without long-term current use of insulin (HCC) E11.9 250.00 HEMOGLOBIN A1C WITH EAG    Will recheck her HA1c today.    4. Leukopenia, unspecified type D72.819 288.50 CBC W/O DIFF    Will recheck her levels today. Pt follows with   5. Allergy-induced asthma, mild intermittent, uncomplicated S57.97 650.11 fluticasone (FLOVENT HFA) 220 mcg/actuation inhaler sent to pharmacy    I increased her dose of Flovent. This plan was reviewed with the patient and patient agrees. All questions were answered. This scribe documentation was reviewed by me and accurately reflects the examination and decisions made by me. This note will not be viewable in [address]

## 2017-07-31 NOTE — PROGRESS NOTES
Chief Complaint   Patient presents with   [facility] Fall     patient states that she fell Friday 7/28/17 when crossing the street     1. Have you been to the ER, urgent care clinic since your last visit? Hospitalized since your last visit? No    2. Have you seen or consulted any other health care providers outside of the [address] [place] [address] since your last visit? Include any pap smears or colon screening.  Yes When: July 2017 Dr. Ross Alvarez MD [address] [place] [address] [phone]

## 2017-08-01 LAB
ALBUMIN SERPL-MCNC: 4.7 G/DL (ref 3.6–4.8)
ALBUMIN/GLOB SERPL: 2.6 {RATIO} (ref 1.2–2.2)
ALP SERPL-CCNC: 58 IU/L (ref 39–117)
ALT SERPL-CCNC: 19 IU/L (ref 0–32)
AST SERPL-CCNC: 22 IU/L (ref 0–40)
BILIRUB SERPL-MCNC: 0.4 MG/DL (ref 0–1.2)
BUN SERPL-MCNC: 14 MG/DL (ref 8–27)
BUN/CREAT SERPL: 20 (ref 12–28)
CALCIUM SERPL-MCNC: 10.1 MG/DL (ref 8.7–10.3)
CHLORIDE SERPL-SCNC: 99 MMOL/L (ref 96–106)
CO2 SERPL-SCNC: 28 MMOL/L (ref 18–29)
CREAT SERPL-MCNC: 0.69 MG/DL (ref 0.57–1)
ERYTHROCYTE [DISTWIDTH] IN BLOOD BY AUTOMATED COUNT: 14.1 % (ref 12.3–15.4)
EST. AVERAGE GLUCOSE BLD GHB EST-MCNC: 123 MG/DL
GLOBULIN SER CALC-MCNC: 1.8 G/DL (ref 1.5–4.5)
GLUCOSE SERPL-MCNC: 88 MG/DL (ref 65–99)
HBA1C MFR BLD: 5.9 % (ref 4.8–5.6)
HCT VFR BLD AUTO: 34.9 % (ref 34–46.6)
HGB BLD-MCNC: 12 G/DL (ref 11.1–15.9)
MCH RBC QN AUTO: 30.5 PG (ref 26.6–33)
MCHC RBC AUTO-ENTMCNC: 34.4 G/DL (ref 31.5–35.7)
MCV RBC AUTO: 89 FL (ref 79–97)
PLATELET # BLD AUTO: 231 X10E3/UL (ref 150–379)
POTASSIUM SERPL-SCNC: 4.3 MMOL/L (ref 3.5–5.2)
PROT SERPL-MCNC: 6.5 G/DL (ref 6–8.5)
RBC # BLD AUTO: 3.94 X10E6/UL (ref 3.77–5.28)
SODIUM SERPL-SCNC: 140 MMOL/L (ref 134–144)
WBC # BLD AUTO: 2.9 X10E3/UL (ref 3.4–10.8)

## 2017-08-07 ENCOUNTER — PATIENT OUTREACH (OUTPATIENT)
Dept: INTERNAL MEDICINE CLINIC | Age: 62
End: 2017-08-07

## 2017-08-07 NOTE — PROGRESS NOTES
NNTOCED    NN noted patient on combined census list for ED visit to [facility] on 8/3/17- NN reviewed [facility] EMR. Patient had ground level fall and hit her head. Evaluated in ED. CT L spine negative, CT C- spine showed 12x14 mm hypodense thyroid nodule but was otherwise negative. Left knee xrays negative. CT head negative. Of note, patient fell earlier this month as well. WBC count noted to be low. NN d/w PCP patients CT results. Dr Toni Mcpherson already spoke with the patient earlier today regarding making an appt to come in to discuss labs. She is scheduled to see PCP on 8/9 and results will be reviewed at that time. Will be available for resources as needed.

## 2017-08-09 ENCOUNTER — OFFICE VISIT (OUTPATIENT)
Dept: INTERNAL MEDICINE CLINIC | Age: 62
End: 2017-08-09

## 2017-08-09 VITALS
BODY MASS INDEX: 25.92 KG/M2 | TEMPERATURE: 98.2 F | WEIGHT: 155.6 LBS | HEIGHT: 65 IN | RESPIRATION RATE: 17 BRPM | DIASTOLIC BLOOD PRESSURE: 69 MMHG | OXYGEN SATURATION: 98 % | SYSTOLIC BLOOD PRESSURE: 110 MMHG | HEART RATE: 66 BPM

## 2017-08-09 DIAGNOSIS — H81.93 VESTIBULAR DIZZINESS, BILATERAL: ICD-10-CM

## 2017-08-09 DIAGNOSIS — M85.89 OSTEOPENIA OF MULTIPLE SITES: ICD-10-CM

## 2017-08-09 DIAGNOSIS — D72.819 LEUKOPENIA, UNSPECIFIED TYPE: ICD-10-CM

## 2017-08-09 DIAGNOSIS — E11.9 CONTROLLED TYPE 2 DIABETES MELLITUS WITHOUT COMPLICATION, WITHOUT LONG-TERM CURRENT USE OF INSULIN (HCC): ICD-10-CM

## 2017-08-09 DIAGNOSIS — Z71.89 ACP (ADVANCE CARE PLANNING): ICD-10-CM

## 2017-08-09 DIAGNOSIS — Z00.00 MEDICARE ANNUAL WELLNESS VISIT, SUBSEQUENT: Primary | ICD-10-CM

## 2017-08-09 LAB — MICROALBUMIN UR TEST STR-MCNC: NORMAL MG/L

## 2017-08-09 RX ORDER — BISMUTH SUBSALICYLATE 262 MG
1 TABLET,CHEWABLE ORAL DAILY
COMMUNITY

## 2017-08-09 RX ORDER — ESCITALOPRAM OXALATE 20 MG/1
20 TABLET ORAL DAILY
COMMUNITY
End: 2017-12-22 | Stop reason: ALTCHOICE

## 2017-08-09 NOTE — PROGRESS NOTES
NN Medicare Wellness Visit      Jasmin Narayanan is a 57 y.o. female and presents for Annual Medicare Wellness Visit. She went to [facility] ER with syncopal episode. CT neck showed thyroid nodule otherwise unremarkable. Her thyroid nodule has been worked up by [facility] was told it is a benign cyst.    Assessment of cognitive impairment: Alert and oriented x 4. Depression Screen:   PHQ over the last two weeks 8/9/2017   Little interest or pleasure in doing things Not at all   Feeling down, depressed or hopeless Nearly every day   Total Score PHQ 2 3       Fall Risk Assessment:    Fall Risk Assessment, last 12 mths 8/9/2017   Able to walk? Yes   Fall in past 12 months? Yes   Fall with injury? Yes   Number of falls in past 12 months 2   Fall Risk Score 3       Abuse Screen:   Abuse Screening Questionnaire 8/9/2017   Do you ever feel afraid of your partner? N   Are you in a relationship with someone who physically or mentally threatens you? N   Is it safe for you to go home? Y       Activities of Daily Living:  Self-care.    Requires assistance with: no ADLs  Patient handle his/her own medications  yes Use of pill box  yes  Activities of Daily Living:   ADL Assessment 8/9/2017   Feeding yourself No Help Needed   Getting from bed to chair No Help Needed   Getting dressed No Help Needed   Bathing or showering No Help Needed   Walk across the room (includes cane/walker) No Help Needed   Using the telphone No Help Needed   Taking your medications No Help Needed   Preparing meals No Help Needed   Managing money (expenses/bills) No Help Needed   Moderately strenuous housework (laundry) No Help Needed   Shopping for personal items (toiletries/medicines) No Help Needed   Shopping for groceries No Help Needed   Driving No Help Needed   Climbing a flight of stairs No Help Needed   Getting to places beyond walking distances No Help Needed       Health Maintenance:  Daily Aspirin: yes  Bone Density: not up to date - last 2009, new order today  Glaucoma Screening: yes,1/31/17  Immunizations:    Tetanus: up to date. 7/7/10 Influenza: Not flu season. Shingles: not up to date. Will wait for hematology appointment before give shingle vaccine   PPSV-23: not up to date - Place order today. Prevnar-13: up to date. 10/27/15    Cancer screening:    Cervical: not up to date - no referral needed. Breast: up to date. 1/12/17  Colon: up to date. Alcohol Risk Screen:   On any occasion during the past 3 months, have you had more than 3 drinks(female) or 4 drinks (male) containing alcohol in one? No  Do you average more than 7 drinks (female) or 14 drinks (male) per week? No  Type and amount:n/a    Hearing Loss:  mixed    Vision Loss:   Wears glasses, contact lenses, or have any other visual impairment  yes    Adult Nutrition Screen:  No risk factors noted. Advance Care Planning:   End of Life Planning: has NO advanced directive  - add't info provided  Jaylin Calvert ACP-Facilitator appointment no      Medications/Allergies: Reviewed with patient  Prior to Admission medications    Medication Sig Start Date End Date Taking? Authorizing Provider   escitalopram oxalate (LEXAPRO) 20 mg tablet Take 20 mg by mouth daily. Yes Historical Provider   multivitamin (ONE A DAY) tablet Take 1 Tab by mouth daily. Yes Historical Provider   FERROUS SULFATE PO Take  by mouth. Yes Historical Provider   fluticasone (FLOVENT HFA) 220 mcg/actuation inhaler Take 1 Puff by inhalation two (2) times a day for 30 days. 7/31/17 8/30/17 Yes Unique Mathew MD   chlorthalidone (HYGROTEN) 25 mg tablet Take 1 tablet by mouth  daily 7/3/17  Yes Helane Skiff, NP   metFORMIN ER (GLUCOPHAGE XR) 500 mg tablet TK 1 T PO BID WITH MEALS 2/2/17  Yes Historical Provider   meloxicam (MOBIC) 15 mg tablet Take 1 Tab by mouth daily.  2/16/17  Yes Historical Provider   ibuprofen (MOTRIN) 600 mg tablet Take 1 Tab by mouth every eight (8) hours as needed for Pain for up to 20 doses. 2/6/17  Yes Sue Gottlieb NP   glucose blood VI test strips (ONETOUCH ULTRA TEST) strip Test up to 2 times daily. Dx code E11.65 1/17/17  Yes Jennifer Mak MD   lancets (ONE TOUCH DELICA) 33 gauge misc Test up to 2 times daily. Dx code E11.65 1/17/17  Yes Jennifer Mak MD   calcium-cholecalciferol, d3, (CALCIUM 600 + D) 600-125 mg-unit tab Take  by mouth. Yes Historical Provider   cholecalciferol, vitamin D3, (VITAMIN D3) 2,000 unit tab Take 5,000 Units by mouth. Yes Historical Provider   cyanocobalamin (VITAMIN B-12) 1,000 mcg tablet Take 1,000 mcg by mouth daily. Yes Historical Provider   clonazePAM (KLONOPIN) 0.5 mg tablet Take 0.5 mg by mouth three (3) times daily. Yes Historical Provider   aspirin delayed-release 81 mg tablet Take 162 mg by mouth nightly. Yes Historical Provider   albuterol (PROVENTIL HFA, VENTOLIN HFA, PROAIR HFA) 90 mcg/actuation inhaler Take 1 Puff by inhalation every six (6) hours as needed for Wheezing for up to 30 doses. 2/16/17   Sue Gottlieb NP   ascorbic acid, vitamin C, (VITAMIN C) 500 mg tablet Take  by mouth. Historical Provider   digestive enzymes (ENZYME DIGEST) cap Take  by mouth. Historical Provider   zolpidem (AMBIEN) 10 mg tablet Take  by mouth nightly as needed for Sleep. Historical Provider       Allergies   Allergen Reactions    Pcn [Penicillins] Itching    Percocet [Oxycodone-Acetaminophen] Other (comments)     Passe out, feels dizzy       Objective:  Visit Vitals    /69    Pulse 66    Temp 98.2 °F (36.8 °C) (Oral)    Resp 17    Ht 5' 5\" (1.651 m)    Wt 155 lb 9.6 oz (70.6 kg)    SpO2 98%    BMI 25.89 kg/m2    Body mass index is 25.89 kg/(m^2). Problem List: Reviewed with patient and discussed risk factors.     Patient Active Problem List   Diagnosis Code    Iron (Fe) deficiency anemia D50.9    Anxiety F41.9    Disc disorder M51.9    Seasonal allergic rhinitis J30.2    Osteopenia M85.80    Vertigo R42    ACP (advance care planning) Z71.89    Palpitation R00.2       PSH: Reviewed with patient  Past Surgical History:   Procedure Laterality Date    HX ORTHOPAEDIC      left foot        SH: Reviewed with patient  Social History   Substance Use Topics    Smoking status: Never Smoker    Smokeless tobacco: Never Used    Alcohol use No       FH: Reviewed with patient  Family History   Problem Relation Age of Onset   [facility] Arthritis-osteo Mother     Heart Disease Father     Cancer Sister     Breast Cancer Sister 48       Current medical providers:    Patient Care Team:  Edi Miranda MD as PCP - Danis Slaughter RN as [address] [place] [address]  Dr. Flores Snyder, Endocrinology. Plan:    Diagnoses and all orders for this visit:    1. Medicare annual wellness visit, subsequent    2. ACP (advance care planning)   Advanced directive discussed with her. She will discuss with her son & will make an appointment. 3. Osteopenia of multiple sites  -     DEXA BONE DENSITY STUDY AXIAL; Future    4. Leukopenia, unspecified type     Appointment with Hematology in 2 weeks.     5. Controlled type 2 diabetes mellitus without complication, without long-term current use of insulin ([facility])  -     LIPID PANEL  -     AMB POC URINE, MICROALBUMIN, SEMIQUANT (1 RESULT)  -      DIABETES FOOT EXAM    6. Vestibular dizziness, bilateral  -     REFERRAL TO ENT-OTOLARYNGOLOGY    Other orders  -     SPECIMEN STATUS REPORT  -     CVD REPORT          Orders Placed This Encounter    escitalopram oxalate (LEXAPRO) 20 mg tablet    multivitamin (ONE A DAY) tablet    FERROUS SULFATE PO       Health Maintenance   Topic Date Due    FOOT EXAM Q1  03/01/1963    MICROALBUMIN Q1  03/01/1963    Pneumococcal 19-64 Highest Risk (1 of 3 - PCV13) 03/01/1972    PAP AKA CERVICAL CYTOLOGY  06/10/2017    INFLUENZA AGE 9 TO ADULT  08/01/2017    LIPID PANEL Q1  01/16/2018    HEMOGLOBIN A1C Q6M  01/31/2018    EYE EXAM RETINAL OR DILATED Q1  01/31/2018    BREAST CANCER SCRN MAMMOGRAM  01/12/2019    GLAUCOMA SCREENING Q2Y  01/31/2019    COLONOSCOPY  06/16/2020    DTaP/Tdap/Td series (2 - Td) 07/07/2020    Hepatitis C Screening  Addressed    ZOSTER VACCINE AGE 60>  Addressed          Urinary/ Fecal Incontinence: None    Regular physical exercise: Active, swims daily. New injury to left knee so activity restriction    Patient verbalized understanding of information presented. AVS and Medicare Part B Preventive Services Table printed and given to pt and reviewed. See table for findings under Recommendation and Scheduled. All of the patient's questions were answered.

## 2017-08-09 NOTE — PATIENT INSTRUCTIONS
Today's Date -  8/9/17  Medicare Part B Preventive Services Limitations Recommendation/Date completed if known Scheduled/ Next Due   Bone Mass Measurement  (age 72 & older, biennial) Requires diagnosis related to osteoporosis or estrogen deficiency. Biennial benefit unless patient has history of long-term glucocorticoid tx or baseline is needed because initial test was by other method Completed:      Recommended every 2 years DUE: order today   Cardiovascular Screening Blood Tests (every 5 years)  Total cholesterol, HDL, Triglycerides Order as a panel if possible Completed:      Recommended annually DUE: Lipid today   Colorectal Cancer Screening  -Fecal occult blood test (annual)  -Flexible sigmoidoscopy (5y)  -Screening colonoscopy (10y)  -Barium Enema  Completed:        Recommended every 10 years DUE: f/u with GI md   Counseling to Prevent Tobacco Use (up to 8 sessions per year)  - Counseling greater than 3 and up to 10 minutes  - Counseling greater than 10 minutes Patients must be asymptomatic of tobacco-related conditions to receive as preventive service  n/a   Prevnar 13 vaccine       done     TDAP Vaccine     2026   Shingles Vaccine         done   Influenza Vaecine        Due Every Fall   Pneumonia Vaccine       Due PNA 23 today   Diabetes Screening Tests (at least every 3 years, Medicare covers annually or at 6-month intervals for prediabetic patients)    Fasting blood sugar (FBS) or glucose tolerance test (GTT)       Patient must be diagnosed with one of the following:  -Hypertension, Dyslipidemia, obesity, previous impaired FBS or GTT  Or any two of the following: overweight, FH of diabetes, age ? 72, history of gestational diabetes, birth of baby weighing more than 9 pounds Completed:        Recommended annually DUE: every 3 months   Diabetes Self-Management Training (DSMT) (no USPSTF recommendation) Requires referral by treating physician for patient with diabetes or renal disease.  10 hours of initial DSMT session of no less than 30 minutes each in a continuous 12-month period. 2 hours of follow-up DSMT in subsequent years. Glaucoma Screening (no USPSTF recommendation) Diabetes mellitus, family history, , age 48 or over,  American, age 72 or over Completed:        Recommended annually DUE: next year 8/2018   Human Immunodeficiency Virus (HIV) Screening (annually for increased risk patients)  HIV-1 and HIV-2 by EIA, ROMAN, rapid antibody test, or oral mucosa transudate Patient must be at increased risk for HIV infection per USPSTF guidelines or pregnant. Tests covered annually for patients at increased risk. Pregnant patients may receive up to 3 test during pregnancy. Medical Nutrition Therapy (MNT) (fordiabetes or renal disease not recommended schedule) Requires referral by treating physician for patient with diabetes or renal disease. Can be provided in same year as diabetes self-management training (DSMT), and CMS recommends medical nutrition therapy take place after DSMT. Up to 3 hours for initial year and 2 hours in subsequent years. Hepatitis B Vaccinations (if medium/high risk) Medium/high risk factors:  End-stage renal disease,  Hemophiliacs who received Factor VIII or IX concentrates, Clients of institutions for the mentally retarded, Persons who live in the same house as a HepB virus carrier, Homosexual men, Illicit injectable drug abusers. Screening Mammography (biennial age 54-69)?  Annually (age 36 or over) Completed:       Recommended annually DUE: next year 1/2018   Screening Pap Tests and Pelvic Examination (up to age 79 and after 79 if unknown history or abnormal study last 10 years) Every 25 months except high risk Completed:        Recommended every 2 years DUE: will f/u with OB/GYN     Ultrasound Screening for Abdominal Aortic Aneurysm (AAA) (once)   Patient must be referred through IPPE and not have had a screening for abdominal aortic aneurysm before under Medicare. Limited to patients who meet one of the following criteria:  - Men who are 73-68 years old and have smoked more than 100 cigarettes in their lifetime.  -Anyone with a FH of AAA  -Anyone recommended for screening by USPSTF   Not covered by   Medicare as preventive. Thanks for coming in today. It was nice to spend some time with you. If you have any questions about your visit today, please call [phone] and ask to speak with Ulysses Laundry.

## 2017-08-09 NOTE — ACP (ADVANCE CARE PLANNING)
Advance Care Planning (ACP) Provider Conversation Snapshot    Date of ACP Conversation: 08/09/17  Persons included in Conversation:  patient  Length of ACP Conversation in minutes:  <16 minutes (Non-Billable)              For Patients with Decision Making Capacity:   Values/Goals: Exploration of values, goals, and preferences if recovery is not expected, even with continued medical treatment in the event of:  Imminent death    Conversation Outcomes / Follow-Up Plan:   Recommended communicating the plan and making copies for the healthcare agent, personal physician, and others as appropriate (e.g., health system)

## 2017-08-10 DIAGNOSIS — R55 SYNCOPE, UNSPECIFIED SYNCOPE TYPE: Primary | ICD-10-CM

## 2017-08-10 DIAGNOSIS — R42 VERTIGO: ICD-10-CM

## 2017-08-10 LAB
CHOLEST SERPL-MCNC: 187 MG/DL (ref 100–199)
HDLC SERPL-MCNC: 67 MG/DL
INTERPRETATION, 910389: NORMAL
LDLC SERPL CALC-MCNC: 106 MG/DL (ref 0–99)
SPECIMEN STATUS REPORT, ROLRST: NORMAL
TRIGL SERPL-MCNC: 72 MG/DL (ref 0–149)
VLDLC SERPL CALC-MCNC: 14 MG/DL (ref 5–40)

## 2017-08-10 NOTE — ACP (ADVANCE CARE PLANNING)
Advance Care Planning (ACP) Provider Conversation Snapshot    Date of ACP Conversation: 08/10/17  Persons included in Conversation:  patient  Length of ACP Conversation in minutes:  <16 minutes (Non-Billable)            For Patients with Decision Making Capacity:   Values/Goals: Exploration of values, goals, and preferences if recovery is not expected, even with continued medical treatment in the event of:  Imminent death    Conversation Outcomes / Follow-Up Plan:   Recommended completion of Advance Directive form after review of ACP materials and conversation with prospective healthcare agent

## 2017-08-11 ENCOUNTER — HOSPITAL ENCOUNTER (OUTPATIENT)
Dept: GENERAL RADIOLOGY | Age: 62
Discharge: HOME | End: 2017-08-11
Attending: INTERNAL MEDICINE
Payer: MEDICARE

## 2017-08-11 DIAGNOSIS — D47.2 MONOCLONAL PARAPROTEINEMIA: ICD-10-CM

## 2017-08-11 DIAGNOSIS — D70.9 EOSINOPENIA (HCC): ICD-10-CM

## 2017-08-11 PROCEDURE — 77075 RADEX OSSEOUS SURVEY COMPL: CPT

## 2017-08-14 RX ORDER — HYDROCHLOROTHIAZIDE 12.5 MG/1
TABLET ORAL
Qty: 90 TAB | Refills: 0 | Status: SHIPPED | OUTPATIENT
Start: 2017-08-14 | End: 2017-10-24 | Stop reason: SDUPTHER

## 2017-08-15 ENCOUNTER — TELEPHONE (OUTPATIENT)
Dept: INTERNAL MEDICINE CLINIC | Age: 62
End: 2017-08-15

## 2017-08-15 RX ORDER — MECLIZINE HYDROCHLORIDE 25 MG/1
25 TABLET ORAL
Qty: 30 TAB | Refills: 0 | Status: SHIPPED | OUTPATIENT
Start: 2017-08-15 | End: 2018-08-02 | Stop reason: SDUPTHER

## 2017-08-15 RX ORDER — ONDANSETRON 4 MG/1
4 TABLET, ORALLY DISINTEGRATING ORAL
Qty: 20 TAB | Refills: 0 | Status: SHIPPED | OUTPATIENT
Start: 2017-08-15 | End: 2017-10-31 | Stop reason: SDUPTHER

## 2017-08-15 NOTE — TELEPHONE ENCOUNTER
Pt called stating having a lot of dizziness and nausea. Last time she was here she was diagnosed with vertigo and was told to f/u with ENT. She is to see ENT end of this week/ beginning of next. She has had meclizine in the past. Can we call in meclizine and zofran ( she is having nausea when her dizzy spells happen). She is going to f/u with me in am and if no better will call ENT for pt and see if they can work her in as an emergency. I can place order if needed. Thanks.

## 2017-08-15 NOTE — TELEPHONE ENCOUNTER
Yes, zofran 4 mg ODT Q8H prn, #20, 0 refill and Meclizine 25 mg Q8H PRN, #30, 0 refill.     Thank you-

## 2017-08-22 ENCOUNTER — DOCUMENTATION ONLY (OUTPATIENT)
Dept: INTERNAL MEDICINE CLINIC | Age: 62
End: 2017-08-22

## 2017-08-22 NOTE — PROGRESS NOTES
Spoke with pt as she called and wanted phone number for neurologist.  Number provided and no other concerns at this time.

## 2017-09-12 ENCOUNTER — OFFICE VISIT (OUTPATIENT)
Dept: NEUROLOGY | Age: 62
End: 2017-09-12

## 2017-09-12 VITALS — HEART RATE: 63 BPM | SYSTOLIC BLOOD PRESSURE: 132 MMHG | DIASTOLIC BLOOD PRESSURE: 69 MMHG

## 2017-09-12 DIAGNOSIS — R55 SYNCOPE, UNSPECIFIED SYNCOPE TYPE: ICD-10-CM

## 2017-09-12 DIAGNOSIS — R42 VERTIGO: ICD-10-CM

## 2017-09-12 DIAGNOSIS — R26.89 BALANCE DISORDER: Primary | ICD-10-CM

## 2017-09-12 DIAGNOSIS — R29.6 FALLS FREQUENTLY: ICD-10-CM

## 2017-09-12 NOTE — MR AVS SNAPSHOT
Visit Information Date & Time Provider Department Dept. Phone Encounter #  
 9/12/2017  9:00 AM Jeff Meza MD [facility] Neurology [facility] [phone] [id number] Your Appointments 8/10/2018 10:30 AM  
Medicare Physical with Janis Gutierres MD  
[facility] Internal Medicine [facility]) Appt Note: [facility]   
 [facility] [address] [facility] [address]  
[address] [facility] [address] [place] [id number] Upcoming Health Maintenance Date Due Pneumococcal 19-64 Highest Risk (1 of 3 - PCV13) [birth year]/1/1974 PAP AKA CERVICAL CYTOLOGY 6/10/2017 HEMOGLOBIN A1C Q6M 1/31/2018 EYE EXAM RETINAL OR DILATED Q1 1/31/2018 FOOT EXAM Q1 8/9/2018 MICROALBUMIN Q1 8/9/2018 LIPID PANEL Q1 8/9/2018 BREAST CANCER SCRN MAMMOGRAM 1/12/2019 GLAUCOMA SCREENING Q2Y 1/31/2019 COLONOSCOPY 6/16/2020 DTaP/Tdap/Td series (2 - Td) 7/7/2020 Allergies as of 9/12/2017  Review Complete On: 9/12/2017 By: Alice Rico Severity Noted Reaction Type Reactions Pcn [Penicillins] Medium 07/18/2011   Systemic Itching Percocet [Oxycodone-acetaminophen]  06/16/2015    Other (comments) Passe out, feels dizzy Current Immunizations  Never Reviewed Name Date TDAP Vaccine 7/7/2010 Not reviewed this visit You Were Diagnosed With   
  
 Codes Comments Balance disorder    -  Primary ICD-10-CM: R26.89 
ICD-9-CM: 781.99 Vertigo     ICD-10-CM: Y79 ICD-9-CM: 780.4 Falls frequently     ICD-10-CM: R29.6 ICD-9-CM: V15.88 Syncope, unspecified syncope type     ICD-10-CM: R55 
ICD-9-CM: 780. 2 Vitals BP Pulse OB Status Smoking Status 132/69 63 Postmenopausal Never Smoker Preferred Pharmacy Pharmacy Name Phone [address], [address] [facility] [phone] Your Updated Medication List  
 This list is accurate as of: 9/12/17  9:39 AM.  Always use your most recent med list.  
  
  
  
  
 albuterol 90 mcg/actuation inhaler Commonly known as:  PROVENTIL HFA, VENTOLIN HFA, PROAIR HFA Take 1 Puff by inhalation every six (6) hours as needed for Wheezing for up to 30 doses. AMBIEN 10 mg tablet Generic drug:  zolpidem Take  by mouth nightly as needed for Sleep. aspirin delayed-release 81 mg tablet Take 162 mg by mouth nightly. CALCIUM 600 + D 600-125 mg-unit Tab Generic drug:  calcium-cholecalciferol (d3) Take  by mouth. chlorthalidone 25 mg tablet Commonly known as:  Marleni Lopez Take 1 tablet by mouth  daily  
  
 clonazePAM 0.5 mg tablet Commonly known as:  Joey Ulloa Take 0.5 mg by mouth three (3) times daily. ENZYME DIGEST Cap Generic drug:  digestive enzymes Take  by mouth.  
  
 escitalopram oxalate 20 mg tablet Commonly known as:  Krysten Citron Take 20 mg by mouth daily. FERROUS SULFATE PO Take  by mouth. glucose blood VI test strips strip Commonly known as:  ONETOUCH ULTRA TEST Test up to 2 times daily. Dx code E11.65  
  
 hydroCHLOROthiazide 12.5 mg tablet Commonly known as:  HYDRODIURIL Take 1 tablet by mouth  daily  
  
 ibuprofen 600 mg tablet Commonly known as:  MOTRIN Take 1 Tab by mouth every eight (8) hours as needed for Pain for up to 20 doses. lancets 33 gauge Misc Commonly known as: One Touch Yoana  Test up to 2 times daily. Dx code E11.65  
  
 meloxicam 15 mg tablet Commonly known as:  MOBIC Take 1 Tab by mouth daily. metFORMIN  mg tablet Commonly known as:  GLUCOPHAGE XR  
TK 1 T PO BID WITH MEALS  
  
 multivitamin tablet Commonly known as:  ONE A DAY Take 1 Tab by mouth daily. ondansetron 4 mg disintegrating tablet Commonly known as:  ZOFRAN ODT Take 1 Tab by mouth every eight (8) hours as needed for Nausea. VITAMIN B-12 1,000 mcg tablet Generic drug:  cyanocobalamin Take 1,000 mcg by mouth daily. VITAMIN C 500 mg tablet Generic drug:  ascorbic acid (vitamin C) Take  by mouth. VITAMIN D3 2,000 unit Tab Generic drug:  cholecalciferol (vitamin D3) Take 5,000 Units by mouth. We Performed the Following REFERRAL TO NEUROLOGY [LPO30 Custom] Comments:  
 Please do EMG/NCS of right arm/leg to eval for neuropathy or radiculopathy To-Do List   
 09/12/2017 1:30 PM  
  Appointment with BSI DEXA 1 at [facility] ([phone]) Please, no calcium supplements or antacids that coat the stomach (ex: Tums, Mylanta) 24 hours prior to procedure. Maintain normal diet and medications. Dairy products are allowed. Wear an outfit with an elastic waistband (no zipper or metal snaps). Check in at registration 15min before your appointment time unless you were instructed to do otherwise.  
  
 09/13/2017 Imaging:  DUPLEX CAROTID BILATERAL AMB NEURO   
  
 09/13/2017 Neurology:  EEG AMB NEURO   
  
 09/13/2017 Imaging:  MRI BRAIN W WO CONT   
  
 09/13/2017 Imaging:  MRI CERV SPINE W WO CONT Referral Information Referral ID Referred By Referred To  
  
 [id number] Stew PALMER MD   
   [place] [address] [address] [place] [address] Phone: [phone] Fax: [phone] Visits Status Start Date End Date 1 New Request 9/12/17 9/12/18 If your referral has a status of pending review or denied, additional information will be sent to support the outcome of this decision. Patient Instructions A Healthy Lifestyle: Care Instructions Your Care Instructions A healthy lifestyle can help you feel good, stay at a healthy weight, and have plenty of energy for both work and play. A healthy lifestyle is something you can share with your whole family. A healthy lifestyle also can lower your risk for serious health problems, such as high blood pressure, heart disease, and diabetes. You can follow a few steps listed below to improve your health and the health of your family. Follow-up care is a key part of your treatment and safety. Be sure to make and go to all appointments, and call your doctor if you are having problems. Its also a good idea to know your test results and keep a list of the medicines you take. How can you care for yourself at home? · Do not eat too much sugar, fat, or fast foods. You can still have dessert and treats now and then. The goal is moderation. · Start small to improve your eating habits. Pay attention to portion sizes, drink less juice and soda pop, and eat more fruits and vegetables. ¨ Eat a healthy amount of food. A 3-ounce serving of meat, for example, is about the size of a deck of cards. Fill the rest of your plate with vegetables and whole grains. ¨ Limit the amount of soda and sports drinks you have every day. Drink more water when you are thirsty. ¨ Eat at least 5 servings of fruits and vegetables every day. It may seem like a lot, but it is not hard to reach this goal. A serving or helping is 1 piece of fruit, 1 cup of vegetables, or 2 cups of leafy, raw vegetables. Have an apple or some carrot sticks as an afternoon snack instead of a candy bar. Try to have fruits and/or vegetables at every meal. 
· Make exercise part of your daily routine. You may want to start with simple activities, such as walking, bicycling, or slow swimming. Try to be active 30 to 60 minutes every day. You do not need to do all 30 to 60 minutes all at once. For example, you can exercise 3 times a day for 10 or 20 minutes. Moderate exercise is safe for most people, but it is always a good idea to talk to your doctor before starting an exercise program. 
· Keep moving. Suerkha Bertrand the lawn, work in the garden, or built.io.  Take the stairs instead of the elevator at work. · If you smoke, quit. People who smoke have an increased risk for heart attack, stroke, cancer, and other lung illnesses. Quitting is hard, but there are ways to boost your chance of quitting tobacco for good. ¨ Use nicotine gum, patches, or lozenges. ¨ Ask your doctor about stop-smoking programs and medicines. ¨ Keep trying. In addition to reducing your risk of diseases in the future, you will notice some benefits soon after you stop using tobacco. If you have shortness of breath or asthma symptoms, they will likely get better within a few weeks after you quit. · Limit how much alcohol you drink. Moderate amounts of alcohol (up to 2 drinks a day for men, 1 drink a day for women) are okay. But drinking too much can lead to liver problems, high blood pressure, and other health problems. Family health If you have a family, there are many things you can do together to improve your health. · Eat meals together as a family as often as possible. · Eat healthy foods. This includes fruits, vegetables, lean meats and dairy, and whole grains. · Include your family in your fitness plan. Most people think of activities such as jogging or tennis as the way to fitness, but there are many ways you and your family can be more active. Anything that makes you breathe hard and gets your heart pumping is exercise. Here are some tips: 
¨ Walk to do errands or to take your child to school or the bus. ¨ Go for a family bike ride after dinner instead of watching TV. Where can you learn more? Go to http://megan-usman.info/. Enter J077 in the search box to learn more about \"A Healthy Lifestyle: Care Instructions. \" Current as of: July 26, 2016 Content Version: 11.3 © 4431-1415 [employer].  Care instructions adapted under license by [employer] (which disclaims liability or warranty for this information). If you have questions about a medical condition or this instruction, always ask your healthcare professional. [address] any warranty or liability for your use of this information. PRESCRIPTION REFILL POLICY [facility] Statement to Patients April 1, 2014 In an effort to ensure the large volume of patient prescription refills is processed in the most efficient and expeditious manner, we are asking our patients to assist us by calling your Pharmacy for all prescription refills, this will include also your  Mail Order Pharmacy. The pharmacy will contact our office electronically to continue the refill process. Please do not wait until the last minute to call your pharmacy. We need at least 48 hours (2days) to fill prescriptions. We also encourage you to call your pharmacy before going to  your prescription to make sure it is ready. With regard to controlled substance prescription refill requests (narcotic refills) that need to be picked up at our office, we ask your cooperation by providing us with at least 72 hours (3days) notice that you will need a refill. We will not refill narcotic prescription refill requests after 4:00pm on any weekday, Monday through Thursday, or after 2:00pm on Fridays, or on the weekends. We encourage everyone to explore another way of getting your prescription refill request processed using [employer] Scre[employer], our patient web portal through our electronic medical record system. New Screens is an efficient and effective way to communicate your medication request directly to the office and  downloadable as an adan on your smart phone . [employer] Screens also features a review functionality that allows you to view your medication list as well as leave messages for your physician. Are you ready to get connected? If so please review the attatched instructions or speak to any of our staff to get you set up right away! Thank you so much for your cooperation. Should you have any questions please contact our Practice Administrator. The Physicians and Staff,  [facility] Introducing [facility]! [facility] introduces [employer] patient portal. Now you can access parts of your medical record, email your doctor's office, and request medication refills online. 1. In your internet browser, go to [email or web address] 2. Click on the First Time User? Click Here link in the Sign In box. You will see the New Member Sign Up page. 3. Enter your [employer] System Access Code exactly as it appears below. You will not need to use this code after youve completed the sign-up process. If you do not sign up before the expiration date, you must request a new code. · [employer] System Access Code: [email or web address] Expires: 11/8/2017 11:21 AM 
 
4. Enter the last four digits of your Social Security Number (xxxx) and Date of Birth (mm/dd/yyyy) as indicated and click Submit. You will be taken to the next sign-up page. 5. Create a IKO System ID. This will be your IKO System login ID and cannot be changed, so think of one that is secure and easy to remember. 6. Create a [employer] System password. You can change your password at any time. 7. Enter your Password Reset Question and Answer. This can be used at a later time if you forget your password. 8. Enter your e-mail address. You will receive e-mail notification when new information is available in [address] 9. Click Sign Up. You can now view and download portions of your medical record. 10. Click the Download Summary menu link to download a portable copy of your medical information. If you have questions, please visit the Frequently Asked Questions section of the [employer] System website. Remember, [employer]KO System is NOT to be used for urgent needs. For medical emergencies, dial 911. Now available from your iPhone and Android! Please provide this summary of care documentation to your next provider. Your primary care clinician is listed as Logan Mcpherson. If you have any questions after today's visit, please call [phone].

## 2017-09-12 NOTE — PROGRESS NOTES
NEUROLOGY NEW PATIENT CONSULTATION    REFERRED BY:  Jaime Venegas MD    CHIEF COMPLAINT:  Falls    HISTORY OF PRESENT ILLNESS    HISTORY PROVIDED BY:  Patient      Krysta Crowe is a 57 y.o. female who I am asked to see in consultation for falls. Patient reports that she is a history of falling for many years. She then stopped having any problems until last July. She reports in July she was in [facility] taking out some trash when she fell in the middle of the street. She denies having any issue with tripping over anything. She denied having any lightheadedness or dizziness prior to the fall. She did not lose consciousness. In August she was walking in her backyard when she fell and hit her head. She did not lose consciousness either. She was unable to get up. She called 911 and they took her to [facility].  She had a CT of the head that was negative. She was told that she did have concussion and arthritis in her neck. Following this she had nausea and dizziness. She was treated for this. Patient does report a history of vertigo. She is treated with Zofran and meclizine for this. Patient reports her falls have no warning. They are not mechanical.  In between episodes she does feel off balance. She has some mild numbness in the feet and tingling in her right hand. She can shake the sensation out. She does have back surgery. She was told to return in 5 years for surgery but never went back. She has had no recent imaging of this. She does have diabetes. Hemoglobin A1c initially was 7.6 but now is 5.5. Patient has never had a nerve study done. Patient admits to some pain in the ankle and her back. Patient does get vertigo about 1 time per month. She thinks is triggered by water in her ear when she is swimming. Other times she does not have trigger. She has not tried any balance physical therapy. She has never had any MRIs of her brain or spine.   Patient is no family history of any balance issues or ataxia. Patient denies any loss of bowel or bladder. She denies any focal weakness. Patient has had syncopal episodes in the past.  She will pass out for no apparent reason. This has not been evaluated either. PMH  Past Medical History:   Diagnosis Date    Anxiety 6/4/2009    BV (bacterial vaginosis)     Candidal vaginitis     Concussion 09/12    Diabetes ([address]     Disc disorder 6/4/2009    Iron (Fe) deficiency anemia 6/4/2009    Nausea & vomiting     Osteopenia 6/4/2009    Seasonal allergic rhinitis 6/4/2009    Vertigo        SH  Social History     Social History    Marital status:      Spouse name: N/A    Number of children: N/A    Years of education: N/A     Social History Main Topics    Smoking status: Never Smoker    Smokeless tobacco: Never Used    Alcohol use No    Drug use: No    Sexual activity: Not Currently     Other Topics Concern    None     Social History Narrative       FH  Family History   Problem Relation Age of Onset    Arthritis-osteo Mother     Heart Disease Father     Cancer Sister     Breast Cancer Sister 48       ALLERGIES  Allergies   Allergen Reactions    Pcn [Penicillins] Itching    Percocet [Oxycodone-Acetaminophen] Other (comments)     Passe out, feels dizzy       CURRENT MEDS  Current Outpatient Prescriptions   Medication Sig Dispense Refill    ondansetron (ZOFRAN ODT) 4 mg disintegrating tablet Take 1 Tab by mouth every eight (8) hours as needed for Nausea. 20 Tab 0    hydroCHLOROthiazide (HYDRODIURIL) 12.5 mg tablet Take 1 tablet by mouth  daily 90 Tab 0    escitalopram oxalate (LEXAPRO) 20 mg tablet Take 20 mg by mouth daily.  FERROUS SULFATE PO Take  by mouth.  chlorthalidone (HYGROTEN) 25 mg tablet Take 1 tablet by mouth  daily 90 Tab 0    metFORMIN ER (GLUCOPHAGE XR) 500 mg tablet TK 1 T PO BID WITH MEALS  6    meloxicam (MOBIC) 15 mg tablet Take 1 Tab by mouth daily.   1    albuterol (PROVENTIL HFA, VENTOLIN HFA, PROAIR HFA) 90 mcg/actuation inhaler Take 1 Puff by inhalation every six (6) hours as needed for Wheezing for up to 30 doses. 1 Inhaler 0    ibuprofen (MOTRIN) 600 mg tablet Take 1 Tab by mouth every eight (8) hours as needed for Pain for up to 20 doses. 20 Tab 0    glucose blood VI test strips (ONETOUCH ULTRA TEST) strip Test up to 2 times daily. Dx code E11.65 200 Strip 4    lancets (ONE TOUCH DELICA) 33 gauge misc Test up to 2 times daily. Dx code E11.65 200 Lancet 4    calcium-cholecalciferol, d3, (CALCIUM 600 + D) 600-125 mg-unit tab Take  by mouth.  cholecalciferol, vitamin D3, (VITAMIN D3) 2,000 unit tab Take 5,000 Units by mouth.  digestive enzymes (ENZYME DIGEST) cap Take  by mouth.  clonazePAM (KLONOPIN) 0.5 mg tablet Take 0.5 mg by mouth three (3) times daily.  aspirin delayed-release 81 mg tablet Take 162 mg by mouth nightly.  zolpidem (AMBIEN) 10 mg tablet Take  by mouth nightly as needed for Sleep.  multivitamin (ONE A DAY) tablet Take 1 Tab by mouth daily.  ascorbic acid, vitamin C, (VITAMIN C) 500 mg tablet Take  by mouth.  cyanocobalamin (VITAMIN B-12) 1,000 mcg tablet Take 1,000 mcg by mouth daily.          REVIEW OF SYSTEMS:     Y  N       Y  N  Y  N   Y  N  [] [x] AIDS          [] [x] Falls  [] [x] Memory Loss  [] [x]  Shortness of breath  [x] [] Anxiety          [] [x] Fatigue [] [x] Muscle Pain        [] [x]  Skipped beats  [] [x] Chest Pain   [] [x] Frequent HA [x] [] Ms Weakness     [x] []  Snoring  [] [x] Constipation [] [x]Hearing loss [] [x] Nause/Vomiting  [] [x]  Stomach Pain  [] [x] Cough          [] [x]Hepatitis [] [x] Neuropathy         [x] []  Swallowing difficulty  [x] [] Depression  [] [x]Incontinence [x] [] Poor appetite      [x] []  Vertigo  [] [x] Diarrhea       [] [x] Joint Pain [] [x] Rash                   [x] []  Visual disturbances  [x] [] Fainting        [] [x] Leg Swelling [x] [] Ringing ears       [x] []  Weight changes      []Unable to obtain  ROS due to  []mental status change  []sedated   []intubated          PREVIOUS WORKUP  IMAGING: CT head negative per patient      LABS  Results for orders placed or performed in visit on 08/09/17   LIPID PANEL   Result Value Ref Range    Cholesterol, total 187 100 - 199 mg/dL    Triglyceride 72 0 - 149 mg/dL    HDL Cholesterol 67 >39 mg/dL    VLDL, calculated 14 5 - 40 mg/dL    LDL, calculated 106 (H) 0 - 99 mg/dL   SPECIMEN STATUS REPORT   Result Value Ref Range    SPECIMEN STATUS REPORT COMMENT    CVD REPORT   Result Value Ref Range    INTERPRETATION Note    AMB POC URINE, MICROALBUMIN, SEMIQUANT (1 RESULT)   Result Value Ref Range    Microalbumin urine (POC) <30 mg MG/L       PHYSICAL EXAM  Visit Vitals    /69    Pulse 63     General:  Alert, cooperative, no distress. Head:  Normocephalic, without obvious abnormality, atraumatic. Eyes:  Conjunctivae/corneas clear. Pupils equal, round, reactive to light. Extraocular movements intact, VFF, NO papilledema   Lungs:  Heart:   Non labored breathing  Regular rate and rhythm, no carotid bruits   Abdomen:   Soft, non-distended   Extremities: Extremities normal, atraumatic, no cyanosis or edema. Pulses: 2+ and symmetric all extremities. Skin: Skin color, texture, turgor normal. No rashes or lesions.    Neurologic:  Gen: Attention normal             Language: naming, repetition, fluency normal             Memory: intact recent and remote memory  Cranial Nerves:  I: smell Not tested   II: visual fields Full to confrontation   II: pupils Equal, round, reactive to light   II: optic disc No papilledema   III,VII: ptosis none   III,IV,VI: extraocular muscles  Full ROM   V: mastication normal   V: facial light touch sensation  normal   VII: facial muscle function   symmetric   VIII: hearing symmetric   IX: soft palate elevation  normal   XI: trapezius strength  5/5   XI: sternocleidomastoid strength 5/5   XI: neck flexion strength 5/5   XII: tongue  midline     Motor: normal bulk and tone, no tremor              Strength: 5/5 all four extremities  Sensory: Decreased pinprick in feet bilaterally  Coordination: FTN intact, Rhomberg negative  Gait: normal gait including tandem   Reflexes: 1+ throughout       Erika is a 57 y.o. female who presents for evaluation of falls. Patient also has vertigo. The etiology of her falls could be central versus peripheral.  She does have diabetes and could have diabetic neuropathy. Additionally she reports a history of arthritis in the neck so could have cervical disc disease. Also given her intermittent falls this could be a central etiology related to her previous concussions. Will do MRI of the brain and neck to evaluate. Will also do EMG/NCS. Also given her syncopal episodes will check an EEG and carotids. RECOMMENDATIONS  1. MRI brain  2. MRI cervical spine  3. EMG/NCS  4.  We will check carotids and EEG  5. Discussed patient should be cautious with walking and to avoid picking in the dark  6. Discussed we may do referral for vestibular rehab at follow-up  7. No meds needed at this time. Patient has meclizine to take as needed for vertigo    FU 4 weeks    Bhupendra Acevedo MD    CC: Son Beaver MD  Fax: [phone]    This note was created using voice recognition software. Despite editing, there may be syntax errors. This note will not be viewable in [address]

## 2017-09-12 NOTE — PATIENT INSTRUCTIONS
A Healthy Lifestyle: Care Instructions  Your Care Instructions  A healthy lifestyle can help you feel good, stay at a healthy weight, and have plenty of energy for both work and play. A healthy lifestyle is something you can share with your whole family. A healthy lifestyle also can lower your risk for serious health problems, such as high blood pressure, heart disease, and diabetes. You can follow a few steps listed below to improve your health and the health of your family. Follow-up care is a key part of your treatment and safety. Be sure to make and go to all appointments, and call your doctor if you are having problems. Its also a good idea to know your test results and keep a list of the medicines you take. How can you care for yourself at home? · Do not eat too much sugar, fat, or fast foods. You can still have dessert and treats now and then. The goal is moderation. · Start small to improve your eating habits. Pay attention to portion sizes, drink less juice and soda pop, and eat more fruits and vegetables. ¨ Eat a healthy amount of food. A 3-ounce serving of meat, for example, is about the size of a deck of cards. Fill the rest of your plate with vegetables and whole grains. ¨ Limit the amount of soda and sports drinks you have every day. Drink more water when you are thirsty. ¨ Eat at least 5 servings of fruits and vegetables every day. It may seem like a lot, but it is not hard to reach this goal. A serving or helping is 1 piece of fruit, 1 cup of vegetables, or 2 cups of leafy, raw vegetables. Have an apple or some carrot sticks as an afternoon snack instead of a candy bar. Try to have fruits and/or vegetables at every meal.  · Make exercise part of your daily routine. You may want to start with simple activities, such as walking, bicycling, or slow swimming. Try to be active 30 to 60 minutes every day. You do not need to do all 30 to 60 minutes all at once.  For example, you can exercise 3 times a day for 10 or 20 minutes. Moderate exercise is safe for most people, but it is always a good idea to talk to your doctor before starting an exercise program.  · Keep moving. Yeni Banlorraine the lawn, work in the garden, or TwitChat. Take the stairs instead of the elevator at work. · If you smoke, quit. People who smoke have an increased risk for heart attack, stroke, cancer, and other lung illnesses. Quitting is hard, but there are ways to boost your chance of quitting tobacco for good. ¨ Use nicotine gum, patches, or lozenges. ¨ Ask your doctor about stop-smoking programs and medicines. ¨ Keep trying. In addition to reducing your risk of diseases in the future, you will notice some benefits soon after you stop using tobacco. If you have shortness of breath or asthma symptoms, they will likely get better within a few weeks after you quit. · Limit how much alcohol you drink. Moderate amounts of alcohol (up to 2 drinks a day for men, 1 drink a day for women) are okay. But drinking too much can lead to liver problems, high blood pressure, and other health problems. Family health  If you have a family, there are many things you can do together to improve your health. · Eat meals together as a family as often as possible. · Eat healthy foods. This includes fruits, vegetables, lean meats and dairy, and whole grains. · Include your family in your fitness plan. Most people think of activities such as jogging or tennis as the way to fitness, but there are many ways you and your family can be more active. Anything that makes you breathe hard and gets your heart pumping is exercise. Here are some tips:  ¨ Walk to do errands or to take your child to school or the bus. ¨ Go for a family bike ride after dinner instead of watching TV. Where can you learn more? Go to http://megan-usman.info/. Enter C646 in the search box to learn more about \"A Healthy Lifestyle: Care Instructions. \"  Current as of: July 26, 2016  Content Version: 11.3  © 0296-8676 [employer], [employer]. Care instructions adapted under license by [employer] (which disclaims liability or warranty for this information). If you have questions about a medical condition or this instruction, always ask your healthcare professional. [address] any warranty or liability for your use of this information. [facility]   Statement to Patients  April 1, 2014      In an effort to ensure the large volume of patient prescription refills is processed in the most efficient and expeditious manner, we are asking our patients to assist us by calling your Pharmacy for all prescription refills, this will include also your  Mail Order Pharmacy. The pharmacy will contact our office electronically to continue the refill process. Please do not wait until the last minute to call your pharmacy. We need at least 48 hours (2days) to fill prescriptions. We also encourage you to call your pharmacy before going to  your prescription to make sure it is ready. With regard to controlled substance prescription refill requests (narcotic refills) that need to be picked up at our office, we ask your cooperation by providing us with at least 72 hours (3days) notice that you will need a refill. We will not refill narcotic prescription refill requests after 4:00pm on any weekday, Monday through Thursday, or after 2:00pm on Fridays, or on the weekends. We encourage everyone to explore another way of getting your prescription refill request processed using [employer], our patient web portal through our electronic medical record system. [employer] is an efficient and effective way to communicate your medication request directly to the office and  downloadable as an adan on your smart phone .  [employer] also features a review functionality that allows you to view your medication list as well as leave messages for your physician. Are you ready to get connected? If so please review the attatched instructions or speak to any of our staff to get you set up right away! Thank you so much for your cooperation. Should you have any questions please contact our Practice Administrator.     The Physicians and Staff,  [facility]

## 2017-09-12 NOTE — LETTER
Dear Odin Asencio MD, Thank you for allowing me to see your patient, Kate Deleon for a neurological consultation. Please see my impression and recommendations as outlined in my note. Sincerely, Ragini Edmonds MD 
[facility] at [facility] 
 
Patient states about 15 years ago she had trouble with falling. It went away for a few years. It returned August and she fell twice and hit her head both times. The second time she went to the E.R. CT scan revealed arthritis. NEUROLOGY NEW PATIENT CONSULTATION 
 
REFERRED BY: 
Odin Asencio MD 
 
CHIEF COMPLAINT: 
Falls HISTORY OF PRESENT ILLNESS HISTORY PROVIDED BY: 
Patient Kate Deleon is a 57 y.o. female who I am asked to see in consultation for falls. Patient reports that she is a history of falling for many years. She then stopped having any problems until last July. She reports in July she was in [place] taking out some trash when she fell in the middle of the street. She denies having any issue with tripping over anything. She denied having any lightheadedness or dizziness prior to the fall. She did not lose consciousness. In August she was walking in her backyard when she fell and hit her head. She did not lose consciousness either. She was unable to get up. She called 911 and they took her [place] [facility]  She had a CT of the head that was negative. She was told that she did have concussion and arthritis in her neck. Following this she had nausea and dizziness. She was treated for this. Patient does report a history of vertigo. She is treated with Zofran and meclizine for this. Patient reports her falls have no warning. They are not mechanical.  In between episodes she does feel off balance. She has some mild numbness in the feet and tingling in her right hand. She can shake the sensation out. She does have back surgery.   She was told to return in 5 years for surgery but never went back. She has had no recent imaging of this. She does have diabetes. Hemoglobin A1c initially was 7.6 but now is 5.5. Patient has never had a nerve study done. Patient admits to some pain in the ankle and her back. Patient does get vertigo about 1 time per month. She thinks is triggered by water in her ear when she is swimming. Other times she does not have trigger. She has not tried any balance physical therapy. She has never had any MRIs of her brain or spine. Patient is no family history of any balance issues or ataxia. Patient denies any loss of bowel or bladder. She denies any focal weakness. Patient has had syncopal episodes in the past.  She will pass out for no apparent reason. This has not been evaluated either. [facility] Past Medical History:  
Diagnosis Date  Anxiety 6/4/2009  BV (bacterial vaginosis)  Candidal vaginitis  Concussion 09/12  Diabetes ([facility] [address]  Disc disorder 6/4/2009  Iron (Fe) deficiency anemia 6/4/2009  Nausea & vomiting  Osteopenia 6/4/2009  Seasonal allergic rhinitis 6/4/2009  Vertigo [facility] Social History Social History  Marital status:  Spouse name: N/A  
 Number of children: N/A  
 Years of education: N/A Social History Main Topics  Smoking status: Never Smoker  Smokeless tobacco: Never Used  Alcohol use No  
 Drug use: No  
 Sexual activity: Not Currently Other Topics Concern  None Social History Narrative [place] Family History Problem Relation Age of Onset Tideidra Pham Arthritis-osteo Mother  Heart Disease Father  Cancer Sister  Breast Cancer Sister 48 ALLERGIES Allergies Allergen Reactions  Pcn [Penicillins] Itching  Percocet [Oxycodone-Acetaminophen] Other (comments) Passe out, feels dizzy CURRENT MEDS Current Outpatient Prescriptions Medication Sig Dispense Refill  ondansetron (ZOFRAN ODT) 4 mg disintegrating tablet Take 1 Tab by mouth every eight (8) hours as needed for Nausea. 20 Tab 0  
 hydroCHLOROthiazide (HYDRODIURIL) 12.5 mg tablet Take 1 tablet by mouth  daily 90 Tab 0  
 escitalopram oxalate (LEXAPRO) 20 mg tablet Take 20 mg by mouth daily.  FERROUS SULFATE PO Take  by mouth.  chlorthalidone (HYGROTEN) 25 mg tablet Take 1 tablet by mouth  daily 90 Tab 0  
 metFORMIN ER (GLUCOPHAGE XR) 500 mg tablet TK 1 T PO BID WITH MEALS  6  
 meloxicam (MOBIC) 15 mg tablet Take 1 Tab by mouth daily. 1  
 albuterol (PROVENTIL HFA, VENTOLIN HFA, PROAIR HFA) 90 mcg/actuation inhaler Take 1 Puff by inhalation every six (6) hours as needed for Wheezing for up to 30 doses. 1 Inhaler 0  
 ibuprofen (MOTRIN) 600 mg tablet Take 1 Tab by mouth every eight (8) hours as needed for Pain for up to 20 doses. 20 Tab 0  
 glucose blood VI test strips (ONETOUCH ULTRA TEST) strip Test up to 2 times daily. Dx code E11.65 200 Strip 4  
 lancets (ONE TOUCH DELICA) 33 gauge misc Test up to 2 times daily. Dx code E11.65 200 Lancet 4  
 calcium-cholecalciferol, d3, (CALCIUM 600 + D) 600-125 mg-unit tab Take  by mouth.  cholecalciferol, vitamin D3, (VITAMIN D3) 2,000 unit tab Take 5,000 Units by mouth.  digestive enzymes (ENZYME DIGEST) cap Take  by mouth.  clonazePAM (KLONOPIN) 0.5 mg tablet Take 0.5 mg by mouth three (3) times daily.  aspirin delayed-release 81 mg tablet Take 162 mg by mouth nightly.  zolpidem (AMBIEN) 10 mg tablet Take  by mouth nightly as needed for Sleep.  multivitamin (ONE A DAY) tablet Take 1 Tab by mouth daily.  ascorbic acid, vitamin C, (VITAMIN C) 500 mg tablet Take  by mouth.  cyanocobalamin (VITAMIN B-12) 1,000 mcg tablet Take 1,000 mcg by mouth daily. REVIEW OF SYSTEMS:  
 
Y  N       Y  N  Y  N   Y  N 
  AIDS            Falls    Memory Loss     Shortness of breath Anxiety            Fatigue   Muscle Pain           Skipped beats Chest Pain     Frequent HA   Ms Weakness        Snoring Constipation  Hearing loss   Nause/Vomiting     Stomach Pain Cough           Hepatitis   Neuropathy            Swallowing difficulty Depression   Incontinence   Poor appetite         Vertigo Diarrhea         Joint Pain   Rash                      Visual disturbances Fainting          Leg Swelling   Ringing ears          Weight changes Unable to obtain  ROS due to  mental status change  sedated   intubated PREVIOUS WORKUP IMAGING: CT head negative per patient LABS Results for orders placed or performed in visit on 08/09/17 LIPID PANEL Result Value Ref Range Cholesterol, total 187 100 - 199 mg/dL Triglyceride 72 0 - 149 mg/dL HDL Cholesterol 67 >39 mg/dL VLDL, calculated 14 5 - 40 mg/dL LDL, calculated 106 (H) 0 - 99 mg/dL SPECIMEN STATUS REPORT Result Value Ref Range SPECIMEN STATUS REPORT COMMENT   
CVD REPORT Result Value Ref Range INTERPRETATION Note AMB POC URINE, MICROALBUMIN, SEMIQUANT (1 RESULT) Result Value Ref Range Microalbumin urine (POC) <30 mg MG/L  
 
 
PHYSICAL EXAM 
Visit Vitals  /69  Pulse 63 General:  Alert, cooperative, no distress. Head:  Normocephalic, without obvious abnormality, atraumatic. Eyes:  Conjunctivae/corneas clear. Pupils equal, round, reactive to light. Extraocular movements intact, VFF, NO papilledema Lungs: 
Heart:   Non labored breathing Regular rate and rhythm, no carotid bruits Abdomen:   Soft, non-distended Extremities: Extremities normal, atraumatic, no cyanosis or edema. Pulses: 2+ and symmetric all extremities. Skin: Skin color, texture, turgor normal. No rashes or lesions. Neurologic:  Gen: Attention normal 
           Language: naming, repetition, fluency normal 
           Memory: intact recent and remote memory Cranial Nerves: I: smell Not tested II: visual fields Full to confrontation II: pupils Equal, round, reactive to light II: optic disc No papilledema III,VII: ptosis none III,IV,VI: extraocular muscles  Full ROM V: mastication normal  
V: facial light touch sensation  normal  
VII: facial muscle function   symmetric VIII: hearing symmetric IX: soft palate elevation  normal  
XI: trapezius strength  5/5 XI: sternocleidomastoid strength 5/5 XI: neck flexion strength  5/5 XII: tongue  midline Motor: normal bulk and tone, no tremor Strength: 5/5 all four extremities Sensory: Decreased pinprick in feet bilaterally Coordination: FTN intact, Rhomberg negative Gait: normal gait including tandem Reflexes: 1+ throughout IMPRESSION Jasmin Narayanan is a 57 y.o. female who presents for evaluation of falls. Patient also has vertigo. The etiology of her falls could be central versus peripheral.  She does have diabetes and could have diabetic neuropathy. Additionally she reports a history of arthritis in the neck so could have cervical disc disease. Also given her intermittent falls this could be a central etiology related to her previous concussions. Will do MRI of the brain and neck to evaluate. Will also do EMG/NCS. Also given her syncopal episodes will check an EEG and carotids. RECOMMENDATIONS 1. MRI brain 2. MRI cervical spine 3. EMG/NCS 4.  We will check carotids and EEG 5. Discussed patient should be cautious with walking and to avoid picking in the dark 6. Discussed we may do referral for vestibular rehab at follow-up 7. No meds needed at this time. Patient has meclizine to take as needed for vertigo FU 4 weeks Cesar Owens MD 
 
CC: Kallie Collins MD 
Fax: [phone] This note was created using voice recognition software. Despite editing, there may be syntax errors. This note will not be viewable in [address]

## 2017-09-12 NOTE — PROGRESS NOTES
Patient states about 15 years ago she had trouble with falling. It went away for a few years. It returned August and she fell twice and hit her head both times. The second time she went to the E.. CT scan revealed arthritis.

## 2017-09-25 ENCOUNTER — OFFICE VISIT (OUTPATIENT)
Dept: NEUROLOGY | Age: 62
End: 2017-09-25

## 2017-09-25 DIAGNOSIS — R55 SYNCOPE, UNSPECIFIED SYNCOPE TYPE: ICD-10-CM

## 2017-09-25 DIAGNOSIS — R26.89 BALANCE DISORDER: ICD-10-CM

## 2017-09-25 DIAGNOSIS — R42 VERTIGO: ICD-10-CM

## 2017-09-25 DIAGNOSIS — R20.2 PARESTHESIA: Primary | ICD-10-CM

## 2017-09-25 DIAGNOSIS — R55 SYNCOPE, UNSPECIFIED SYNCOPE TYPE: Primary | ICD-10-CM

## 2017-09-25 DIAGNOSIS — R29.6 FALLS FREQUENTLY: ICD-10-CM

## 2017-09-25 NOTE — PROCEDURES
EMG/ NCS Report  [facility], [address]  Ph: [phone]  FAX: [phone]/ [phone]  Test Date:  2017    Patient: Mayra Trotter : 1955 Physician: Marily Nam MD   Sex: Female Height: ' \" Ref Phys: Loretta Grover   ID#: [id number] Weight:  lbs. Technician: Farheen Gomez     Patient History / Exam:  Patient is complaining of off balance sensation with intermittent paresthesia of arms and legs; (+) L lower back pain that radiates down L leg. EMG & NCV Findings:  Evaluation of the right Fibular motor nerve showed normal distal onset latency (4.5 ms), normal amplitude (2.4 mV), normal conduction velocity (B Fib-Ankle, 48 m/s), and normal conduction velocity (Poplt-B Fib, 59 m/s). The right median motor nerve showed normal distal onset latency (3.4 ms), normal amplitude (9.3 mV), and normal conduction velocity (Elbow-Wrist, 56 m/s). The right tibial motor nerve showed normal distal onset latency (5.9 ms), normal amplitude (5.6 mV), and normal conduction velocity (Knee-Ankle, 41 m/s). The right ulnar motor nerve showed normal distal onset latency (2.8 ms), normal amplitude (8.3 mV), normal conduction velocity (B Elbow-Wrist, 58 m/s), and normal conduction velocity (A Elbow-B Elbow, 63 m/s). The right median sensory, the right radial sensory, the right sural sensory, and the right ulnar sensory nerves showed normal distal peak latency (R3.3, R2.1, R3.6, R3.2 ms) and normal amplitude (R45.2, R81.9, R17.4, R36.8 µV). All F Wave latencies were within normal limits. All examined muscles (as indicated in the following table) showed no evidence of electrical instability.         Impression:    Extensive electrodiagnostic examination of the right upper and right lower extremities, with additional needle examination of the left lower extremity, is normal.    Specifically, there is no evidence of a peripheral neuropathy, right cervical motor radiculopathy, or bilateral lumbosacral motor radiculopathy.           Abraham Lan MD  Diplomate, American Board of Psychiatry and Neurology  Diplomate, Neuromuscular Medicine  Diplomate, American Board of Electrodiagnostic Medicine          Nerve Conduction Studies  Anti Sensory Summary Table     Stim Site NR Peak (ms) Norm Peak (ms) P-T Amp (µV) Norm P-T Amp Site1 Site2 Dist (cm)   Right Median Anti Sensory (2nd Digit)  33°C   Wrist    3.3 <4 45.2 >13 Wrist 2nd Digit 14.0   Right Radial Anti Sensory (Base 1st Digit)  30.9°C   Wrist    2.1 <2.8 81.9 >11 Wrist Base 1st Digit 10.0   Right Sural Anti Sensory (Lat Mall)  31.1°C   Calf    3.6 <4.5 17.4 >4.0 Calf Lat Mall 14.0   Right Ulnar Anti Sensory (5th Digit)  31.7°C   Wrist    3.2 <4.0 36.8 >9 Wrist 5th Digit 14.0     Motor Summary Table     Stim Site NR Onset (ms) Norm Onset (ms) O-P Amp (mV) Norm O-P Amp Amp (Prev) (%) Site1 Site2 Dist (cm) Dhiraj (m/s) Norm Dhiraj (m/s)   Right Fibular Motor (Ext Dig Brev)  29.7°C   Ankle    4.5 <6.5 2.4 >1.1 100.0 Ankle Ext Dig Brev 8.0     B Fib    11.1  2.1  87.5 B Fib Ankle 32.0 48 >38   Poplt    12.8  2.0  95.2 Poplt B Fib 10.0 59 >42   Right Median Motor (Abd Poll Brev)  30.5°C   Wrist    3.4 <4.5 9.3 >4.1 100.0 Wrist Abd Poll Brev 8.0     Elbow    7.5  9.2  98.9 Elbow Wrist 23.0 56 >49   Right Tibial Motor (Abd Strickland Brev)  28.8°C   Ankle    5.9 <6.1 5.6 >1.1 100.0 Ankle Abd Strickland Brev 8.0     Knee    15.7  3.9  69.6 Knee Ankle 40.0 41 >39   Right Ulnar Motor (Abd Dig Minimi)  30.6°C   Wrist    2.8 <3.1 8.3 >7.0 100.0 Wrist Abd Dig Minimi 8.0  >50   B Elbow    6.6  8.1  97.6 B Elbow Wrist 22.0 58 >50   A Elbow    8.2  7.8  96.3 A Elbow B Elbow 10.0 63 >50     F Wave Studies     NR F-Lat (ms) Lat Norm (ms) L-R F-Lat (ms) L-R Lat Norm   Right Tibial (Mrkrs) (Abd Hallucis)  28.8°C      54.99 <56  <5.7   Right Ulnar (Mrkrs) (Abd Dig Min)  29.9°C      26.65 <32  <2.5     H Reflex Studies     NR H-Lat (ms) L-R H-Lat (ms) L-R Lat Norm   Right Tibial (Gastroc)  28.6°C      36.27  <2.0     EMG     Side Muscle Nerve Root Ins Act Fibs Psw Recrt Duration Amp Poly Comment   Right Ext Dig Brev Dp Br Peron L5, S1 Nml Nml Nml Nml Nml Nml Nml    Right AbdHallucis MedPlantar S1-2 Nml Nml Nml Nml Nml Nml Nml    Right AntTibialis Dp Br Peron L4-5 Nml Nml Nml Nml Nml Nml Nml    Right MedGastroc Tibial S1-2 Nml Nml Nml Nml Nml Nml Nml    Right VastusLat Femoral L2-4 Nml Nml Nml Nml Nml Nml Nml    Right 1stDorInt Ulnar C8-T1 Nml Nml Nml Nml Nml Nml Nml    Right ExtIndicis Radial (Post Int) C7-8 Nml Nml Nml Nml Nml Nml Nml    Right Abd Poll Brev Median C8-T1 Nml Nml Nml Nml Nml Nml Nml    Right Biceps Musculocut C5-6 Nml Nml Nml Nml Nml Nml Nml    Right Triceps Radial C6-7-8 Nml Nml Nml Nml Nml Nml Nml    Right Deltoid Axillary C5-6 Nml Nml Nml Nml Nml Nml Nml    Left Ext Dig Brev Dp Br Peron L5, S1 Nml Nml Nml Nml Nml Nml Nml    Left AbdHallucis MedPlantar S1-2 Nml Nml Nml Nml Nml Nml Nml    Left AntTibialis Dp Br Peron L4-5 Nml Nml Nml Nml Nml Nml Nml    Left MedGastroc Tibial S1-2 Nml Nml Nml Nml Nml Nml Nml    Left VastusLat Femoral L2-4 Nml Nml Nml Nml Nml Nml Nml    Left GluteusMed SupGluteal L4-S1 Nml Nml Nml Nml Nml Nml Nml    Left Lower Lumb Parasp Rami L5,S1 Nml Nml Nml Nml Nml Nml Nml                Nerve Conduction Studies  Anti Sensory Left/Right Comparison     Stim Site L Lat (ms) R Lat (ms) L-R Lat (ms) L Amp (µV) R Amp (µV) L-R Amp (%) Site1 Site2 L Dhiraj (m/s) R Dhiraj (m/s) L-R Dhiraj (m/s)   Median Anti Sensory (2nd Digit)  33°C   Wrist  2.8   45.2  Wrist 2nd Digit  50    Radial Anti Sensory (Base 1st Digit)  30.9°C   Wrist  1.5   81.9  Wrist Base 1st Digit  67    Sural Anti Sensory (Lat Mall)  31.1°C   Calf  2.8   17.4  Calf Lat Mall  50    Ulnar Anti Sensory (5th Digit)  31.7°C   Wrist  2.4   36.8  Wrist 5th Digit  58      Motor Left/Right Comparison     Stim Site L Lat (ms) R Lat (ms) L-R Lat (ms) L Amp (mV) R Amp (mV) L-R Amp (%) Site1 Site2 L Dhiraj (m/s) R Dhiraj (m/s) L-R Dhiraj (m/s)   Fibular Motor (Ext Dig Brev)  29.7°C   Ankle  4.5   2.4  Ankle Ext Dig Brev      B Fib  11.1   2.1  B Fib Ankle  48    Poplt  12.8   2.0  Poplt B Fib  59    Median Motor (Abd Poll Brev)  30.5°C   Wrist  3.4   9.3  Wrist Abd Poll Brev      Elbow  7.5   9.2  Elbow Wrist  56    Tibial Motor (Abd Strickland Brev)  28.8°C   Ankle  5.9   5.6  Ankle Abd Strickland Brev      Knee  15.7   3.9  Knee Ankle  41    Ulnar Motor (Abd Dig Minimi)  30.6°C   Wrist  2.8   8.3  Wrist Abd Dig Minimi      B Elbow  6.6   8.1  B Elbow Wrist  58    A Elbow  8.2   7.8  A Elbow B Elbow  63          Waveforms:

## 2017-09-26 NOTE — PROCEDURES
Patient Name: Jeff Hoffman  : 1955  Age: 57 y.o. Ordering physician: No ref. provider found  Date of EE2017  EEG procedure number: [id number]  Diagnosis:syncope  Interpreting physician: Cheryle Hendrix MD      ELECTROENCEPHALOGRAM REPORT     PROCEDURE: EEG. CLINICAL INDICATION: The patient is a 57 y.o. female with a history of   possible seizures. EEG to rule out seizures, rule out stroke, rule out   cortical abnormality. EEG CLASSIFICATION: Essentially normal    DESCRIPTION OF THE RECORD:   The background of this recording contains a posteriorly-located occipital alpha rhythm of 11 Hz that attenuates with eye opening. Throughout the recording, there were no clear areas of focal slowing nor spike or spike-and-wave discharges seen. Hyperventilationproduced no response. Photic stimulation produced no response. During the recording, the patient did enter prolonged states of sleep with K-complexes and sleep spindles seen in the central head regions. INTERPRETATION: This is a normal electroencephalogram showing no clear focal abnormalities or epileptiform activity. A normal EEG doesn't not rule out seizures. Clinical correlation recommended.     Balta Alberto MD  2017  9:13 PM

## 2017-09-29 NOTE — PROCEDURES
Carotid Doppler:     Date:  09/25/17    Requesting Physician:  Dr. Bonnie Wan     Indication:  Syncope. B-mode imaging reveals minimal plaque at the bifurcations bilaterally. Doppler spectral analysis reveals no significant velocity shifts. Vertebral artery flow antegrade bilaterally. Interpretation:  Plaque as noted. No significant stenosis.

## 2017-10-24 ENCOUNTER — OFFICE VISIT (OUTPATIENT)
Dept: NEUROLOGY | Age: 62
End: 2017-10-24

## 2017-10-24 VITALS — OXYGEN SATURATION: 97 % | HEART RATE: 62 BPM | SYSTOLIC BLOOD PRESSURE: 109 MMHG | DIASTOLIC BLOOD PRESSURE: 70 MMHG

## 2017-10-24 DIAGNOSIS — R26.89 BALANCE DISORDER: Primary | ICD-10-CM

## 2017-10-24 DIAGNOSIS — R42 VERTIGO: ICD-10-CM

## 2017-10-24 RX ORDER — HYDROCHLOROTHIAZIDE 12.5 MG/1
TABLET ORAL
Qty: 30 TAB | Refills: 0 | Status: SHIPPED | OUTPATIENT
Start: 2017-10-24 | End: 2017-10-31 | Stop reason: SDUPTHER

## 2017-10-24 RX ORDER — IBUPROFEN 600 MG/1
600 TABLET ORAL
Qty: 30 TAB | Refills: 2 | Status: SHIPPED | OUTPATIENT
Start: 2017-10-24 | End: 2017-12-07 | Stop reason: ALTCHOICE

## 2017-10-24 RX ORDER — MELOXICAM 15 MG/1
15 TABLET ORAL DAILY
Qty: 30 TAB | Refills: 0 | Status: SHIPPED | OUTPATIENT
Start: 2017-10-24 | End: 2017-10-31 | Stop reason: SDUPTHER

## 2017-10-24 RX ORDER — METFORMIN HYDROCHLORIDE 500 MG/1
TABLET, EXTENDED RELEASE ORAL
Qty: 180 TAB | Refills: 4 | Status: SHIPPED | OUTPATIENT
Start: 2017-10-24 | End: 2018-01-12 | Stop reason: SDUPTHER

## 2017-10-24 RX ORDER — CHLORTHALIDONE 25 MG/1
TABLET ORAL
Qty: 30 TAB | Refills: 0 | Status: SHIPPED | OUTPATIENT
Start: 2017-10-24 | End: 2017-12-07 | Stop reason: ALTCHOICE

## 2017-10-24 NOTE — TELEPHONE ENCOUNTER
Refill done for 30 days. Patient unknown to me. Please advise patient when you would recommend follow up for chronic care management.

## 2017-10-24 NOTE — LETTER
October 24, 2017 Yaniv Benson [phone] Dear Meghna Villalba: 
Thank you for requesting access to NowForce. Please follow the instructions below to securely access and download your online medical record. NowForce allows you to send messages to your doctor, view your test results, renew your prescriptions, schedule appointments, and more. How Do I Sign Up? 1. In your internet browser, go to [email or web address]  
2. Click on the First Time User? Click Here link in the Sign In box. You will be redirected to the New Member Sign Up page. 3. Enter your NowForce Access Code exactly as it appears below. You will not need to use this code after youve completed the sign-up process. If you do not sign up before the expiration date, you must request a new code. NowForce Access Code: [email or web address] Expires: 11/8/2017 11:21 AM  
 
4. Enter the last four digits of your Social Security Number (xxxx) and Date of Birth (mm/dd/yyyy) as indicated and click Submit. You will be taken to the next sign-up page. 5. Create a NowForce ID. This will be your NowForce login ID and cannot be changed, so think of one that is secure and easy to remember. 6. Create a NowForce password. You can change your password at any time. 7. Enter your Password Reset Question and Answer. This can be used at a later time if you forget your password. 8. Enter your e-mail address. You will receive e-mail notification when new information is available in [address]. 9. Click Sign Up. You can now view and download portions of your medical record. 10. Click the Download Summary menu link to download a portable copy of your medical information. Additional Information If you have questions, please visit the Frequently Asked Questions section of the NowForce website at [email or web address] [email or web address]. [employer]. [email or web address] [email or web address]/. Remember, NowForce is NOT to be used for urgent needs. For medical emergencies, dial 911. Now available from your iPhone and Android! Sincerely, Lori Contreras

## 2017-10-24 NOTE — MR AVS SNAPSHOT
Visit Information Date & Time Provider Department Dept. Phone Encounter #  
 10/24/2017  3:40 PM Meet Antunez MD [facility] [phone] [id number] Your Appointments 8/10/2018 10:30 AM  
Medicare Physical with Jaya Hammonds MD  
[facility] [address]) Appt Note: [facility]   
 [facility] [address]  
[address] [facility] [address] [place] [id number] Upcoming Health Maintenance Date Due Pneumococcal 19-64 Highest Risk (1 of 3 - PCV13) 3/1/1974 PAP AKA CERVICAL CYTOLOGY 6/10/2017 HEMOGLOBIN A1C Q6M 1/31/2018 EYE EXAM RETINAL OR DILATED Q1 1/31/2018 FOOT EXAM Q1 8/9/2018 MICROALBUMIN Q1 8/9/2018 LIPID PANEL Q1 8/9/2018 BREAST CANCER SCRN MAMMOGRAM 1/12/2019 GLAUCOMA SCREENING Q2Y 1/31/2019 COLONOSCOPY 6/16/2020 DTaP/Tdap/Td series (2 - Td) 7/7/2020 Allergies as of 10/24/2017  Review Complete On: 10/24/2017 By: Meet Antunez MD  
  
 Severity Noted Reaction Type Reactions Pcn [Penicillins] Medium 07/18/2011   Systemic Itching Percocet [Oxycodone-acetaminophen]  06/16/2015    Other (comments) Passe out, feels dizzy Current Immunizations  Never Reviewed Name Date TDAP Vaccine 7/7/2010 Not reviewed this visit You Were Diagnosed With   
  
 Codes Comments Vertigo    -  Primary ICD-10-CM: R06 ICD-9-CM: 780.4 Balance disorder     ICD-10-CM: R26.89 
ICD-9-CM: 781.99 Vitals BP Pulse SpO2 OB Status Smoking Status 109/70 62 97% Postmenopausal Never Smoker Vitals History Preferred Pharmacy Pharmacy Name Phone [address], [email or web address] [facility] Sebasti[facility] Wisdom [address] [phone] Your Updated Medication List  
  
   
This list is accurate as of: 10/24/17  3:52 PM.  Always use your most recent med list.  
  
  
  
 albuterol 90 mcg/actuation inhaler Commonly known as:  PROVENTIL HFA, VENTOLIN HFA, PROAIR HFA Take 1 Puff by inhalation every six (6) hours as needed for Wheezing for up to 30 doses. AMBIEN 10 mg tablet Generic drug:  zolpidem Take  by mouth nightly as needed for Sleep. aspirin delayed-release 81 mg tablet Take 162 mg by mouth nightly. CALCIUM 600 + D 600-125 mg-unit Tab Generic drug:  calcium-cholecalciferol (d3) Take  by mouth. chlorthalidone 25 mg tablet Commonly known as:  Vonzella Sky Take 1 tablet by mouth  daily  
  
 clonazePAM 0.5 mg tablet Commonly known as:  Zetta Ja Take 0.5 mg by mouth three (3) times daily. ENZYME DIGEST Cap Generic drug:  digestive enzymes Take  by mouth.  
  
 escitalopram oxalate 20 mg tablet Commonly known as:  Denisse Eleni Take 20 mg by mouth daily. FERROUS SULFATE PO Take  by mouth. glucose blood VI test strips strip Commonly known as:  ONETOUCH ULTRA TEST Test up to 2 times daily. Dx code E11.65  
  
 hydroCHLOROthiazide 12.5 mg tablet Commonly known as:  HYDRODIURIL Take 1 tablet by mouth  daily * ibuprofen 600 mg tablet Commonly known as:  MOTRIN Take 1 Tab by mouth every eight (8) hours as needed for Pain for up to 20 doses. * ibuprofen 600 mg tablet Commonly known as:  MOTRIN Take 1 Tab by mouth every six (6) hours as needed for Pain.  
  
 lancets 33 gauge Misc Commonly known as: One Touch Wilburt Mutters Test up to 2 times daily. Dx code E11.65  
  
 meloxicam 15 mg tablet Commonly known as:  MOBIC Take 1 Tab by mouth daily. metFORMIN  mg tablet Commonly known as:  GLUCOPHAGE XR  
TK 1 Tab PO BID WITH MEALS  
  
 multivitamin tablet Commonly known as:  ONE A DAY Take 1 Tab by mouth daily. ondansetron 4 mg disintegrating tablet Commonly known as:  ZOFRAN ODT Take 1 Tab by mouth every eight (8) hours as needed for Nausea. VITAMIN B-12 1,000 mcg tablet Generic drug:  cyanocobalamin Take 1,000 mcg by mouth daily. VITAMIN C 500 mg tablet Generic drug:  ascorbic acid (vitamin C) Take  by mouth. VITAMIN D3 2,000 unit Tab Generic drug:  cholecalciferol (vitamin D3) Take 5,000 Units by mouth. * Notice: This list has 2 medication(s) that are the same as other medications prescribed for you. Read the directions carefully, and ask your doctor or other care provider to review them with you. Prescriptions Sent to Pharmacy Refills  
 ibuprofen (MOTRIN) 600 mg tablet 2 Sig: Take 1 Tab by mouth every six (6) hours as needed for Pain. Class: Normal  
 Pharmacy: [facility] [address] [place] [address] Sebast[facility] #: [phone] Route: Oral  
  
We Performed the Following REFERRAL TO PHYSICAL THERAPY [WDS90 Custom] Comments:  
 [facility] rehab and balance training Referral Information Referral ID Referred By Referred To  
  
 [id number] Raysa Butcher Not Available Visits Status Start Date End Date 1 New Request 10/24/17 10/24/18 If your referral has a status of pending review or denied, additional information will be sent to support the outcome of this decision. Patient Instructions Ivon Molina 0447 What is a living will? A living will is a legal form you use to write down the kind of care you want at the end of your life. It is used by the health professionals who will treat you if you aren't able to decide for yourself. If you put your wishes in writing, your loved ones and others will know what kind of care you want. They won't need to guess. This can ease your mind and be helpful to others. A living will is not the same as an estate or property will. An estate will explains what you want to happen with your money and property after you die. Is a living will a legal document? A living will is a legal document. Each state has its own laws about living suarez. If you move to another state, make sure that your living will is legal in the state where you now live. Or you might use a universal form that has been approved by many states. This kind of form can sometimes be completed and stored online. Your electronic copy will then be available wherever you have a connection to the Internet. In most cases, doctors will respect your wishes even if you have a form from a different state. · You don't need an  to complete a living will. But legal advice can be helpful if your state's laws are unclear, your health history is complicated, or your family can't agree on what should be in your living will. · You can change your living will at any time. Some people find that their wishes about end-of-life care change as their health changes. · In addition to making a living will, think about completing a medical power of  form. This form lets you name the person you want to make end-of-life treatment decisions for you (your \"health care agent\") if you're not able to. Many hospitals and nursing homes will give you the forms you need to complete a living will and a medical power of . · Your living will is used only if you can't make or communicate decisions for yourself anymore. If you become able to make decisions again, you can accept or refuse any treatment, no matter what you wrote in your living will. · Your state may offer an online registry. This is a place where you can store your living will online so the doctors and nurses who need to treat you can find it right away. What should you think about when creating a living will? Talk about your end-of-life wishes with your family members and your doctor. Let them know what you want. That way the people making decisions for you won't be surprised by your choices. Think about these questions as you make your living will: · Do you know enough about life support methods that might be used? If not, talk to your doctor so you know what might be done if you can't breathe on your own, your heart stops, or you're unable to swallow. · What things would you still want to be able to do after you receive life-support methods? Would you want to be able to walk? To speak? To eat on your own? To live without the help of machines? · If you have a choice, where do you want to be cared for? In your home? At a hospital or nursing home? · Do you want certain Presybeterian practices performed if you become very ill? · If you have a choice at the end of your life, where would you prefer to die? At home? In a hospital or nursing home? Somewhere else? · Would you prefer to be buried or cremated? · Do you want your organs to be donated after you die? What should you do with your living will? · Make sure that your family members and your health care agent have copies of your living will. · Give your doctor a copy of your living will to keep in your medical record. If you have more than one doctor, make sure that each one has a copy. · You may want to put a copy of your living will where it can be easily found. Where can you learn more? Go to http://megan-usman.info/. Enter A669 in the search box to learn more about \"Learning About Living Chelsy. \" Current as of: August 8, 2016 Content Version: 11.3 © 7228-9542 Ezuza. Care instructions adapted under license by RelayFoods (which disclaims liability or warranty for this information). If you have questions about a medical condition or this instruction, always ask your healthcare professional. [place] [address] any warranty or liability for your use of this information. Advance Directives: Care Instructions Your Care Instructions An advance directive is a legal way to state your wishes at the end of your life. It tells your family and your doctor what to do if you can no longer say what you want. There are two main types of advance directives. You can change them any time that your wishes change. · A living will tells your family and your doctor your wishes about life support and other treatment. · A durable power of  for health care lets you name a person to make treatment decisions for you when you can't speak for yourself. This person is called a health care agent. If you do not have an advance directive, decisions about your medical care may be made by a doctor or a  who doesn't know you. It may help to think of an advance directive as a gift to the people who care for you. If you have one, they won't have to make tough decisions by themselves. Follow-up care is a key part of your treatment and safety. Be sure to make and go to all appointments, and call your doctor if you are having problems. It's also a good idea to know your test results and keep a list of the medicines you take. How can you care for yourself at home? · Discuss your wishes with your loved ones and your doctor. This way, there are no surprises. · Many states have a unique form. Or you might use a universal form that has been approved by many states. This kind of form can sometimes be completed and stored online. Your electronic copy will then be available wherever you have a connection to the Internet. In most cases, doctors will respect your wishes even if you have a form from a different state. · You don't need a  to do an advance directive. But you may want to get legal advice. · Think about these questions when you prepare an advance directive: ¨ Who do you want to make decisions about your medical care if you are not able to? Many people choose a family member or close friend. ¨ Do you know enough about life support methods that might be used? If not, talk to your doctor so you understand. ¨ What are you most afraid of that might happen? You might be afraid of having pain, losing your independence, or being kept alive by machines. ¨ Where would you prefer to die? Choices include your home, a hospital, or a nursing home. ¨ Would you like to have information about hospice care to support you and your family? ¨ Do you want to donate organs when you die? ¨ Do you want certain Christian practices performed before you die? If so, put your wishes in the advance directive. · Read your advance directive every year, and make changes as needed. When should you call for help? Be sure to contact your doctor if you have any questions. Where can you learn more? Go to http://megan-usman.info/. Enter R264 in the search box to learn more about \"Advance Directives: Care Instructions. \" Current as of: November 17, 2016 Content Version: 11.3 © 7952-6466 Dovo. Care instructions adapted under license by [employer] Nation (which disclaims liability or warranty for this information). If you have questions about a medical condition or this instruction, always ask your healthcare professional. [place] [address] any warranty or liability for your use of this information. PRESCRIPTION REFILL POLICY [facility] Statement to Patients April 1, 2014 In an effort to ensure the large volume of patient prescription refills is processed in the most efficient and expeditious manner, we are asking our patients to assist us by calling your Pharmacy for all prescription refills, this will include also your  Mail Order Pharmacy. The pharmacy will contact our office electronically to continue the refill process. Please do not wait until the last minute to call your pharmacy. We need at least 48 hours (2days) to fill prescriptions.  We also encourage you to call your pharmacy before going to  your prescription to make sure it is ready. With regard to controlled substance prescription refill requests (narcotic refills) that need to be picked up at our office, we ask your cooperation by providing us with at least 72 hours (3days) notice that you will need a refill. We will not refill narcotic prescription refill requests after 4:00pm on any weekday, Monday through Thursday, or after 2:00pm on Fridays, or on the weekends. We encourage everyone to explore another way of getting your prescription refill request processed using bright box, our patient web portal through our electronic medical record system. bright box is an efficient and effective way to communicate your medication request directly to the office and  downloadable as an adan on your smart phone . bright box also features a review functionality that allows you to view your medication list as well as leave messages for your physician. Are you ready to get connected? If so please review the attatched instructions or speak to any of our staff to get you set up right away! Thank you so much for your cooperation. Should you have any questions please contact our Practice Administrator. The Physicians and Staff,  [facility] Introducing [facility]! Charlene Card introduces bright box patient portal. Now you can access parts of your medical record, email your doctor's office, and request medication refills online. 1. In your internet browser, go to [email or web address] 2. Click on the First Time User? Click Here link in the Sign In box. You will see the New Member Sign Up page. 3. Enter your bright box Access Code exactly as it appears below. You will not need to use this code after youve completed the sign-up process. If you do not sign up before the expiration date, you must request a new code. · bright box Access Code: [email or web address] Expires: 11/8/2017 11:21 AM 
 
 4. Enter the last four digits of your Social Security Number (xxxx) and Date of Birth (mm/dd/yyyy) as indicated and click Submit. You will be taken to the next sign-up page. 5. Create a Little Quest ID. This will be your Little Quest login ID and cannot be changed, so think of one that is secure and easy to remember. 6. Create a Little Quest password. You can change your password at any time. 7. Enter your Password Reset Question and Answer. This can be used at a later time if you forget your password. 8. Enter your e-mail address. You will receive e-mail notification when new information is available in [address] 9. Click Sign Up. You can now view and download portions of your medical record. 10. Click the Download Summary menu link to download a portable copy of your medical information. If you have questions, please visit the Frequently Asked Questions section of the Little Quest website. Remember, Little Quest is NOT to be used for urgent needs. For medical emergencies, dial 911. Now available from your iPhone and Android! Please provide this summary of care documentation to your next provider. Your primary care clinician is listed as Donavan Shetty. If you have any questions after today's visit, please call [phone].

## 2017-10-24 NOTE — PATIENT INSTRUCTIONS
Learning About Living Chelsy  What is a living will? A living will is a legal form you use to write down the kind of care you want at the end of your life. It is used by the health professionals who will treat you if you aren't able to decide for yourself. If you put your wishes in writing, your loved ones and others will know what kind of care you want. They won't need to guess. This can ease your mind and be helpful to others. A living will is not the same as an estate or property will. An estate will explains what you want to happen with your money and property after you die. Is a living will a legal document? A living will is a legal document. Each state has its own laws about living su[place]. If you move to another state, make sure that your living will is legal in the state where you now live. Or you might use a universal form that has been approved by many states. This kind of form can sometimes be completed and stored online. Your electronic copy will then be available wherever you have a connection to the Internet. In most cases, doctors will respect your wishes even if you have a form from a different state. · You don't need an  to complete a living will. But legal advice can be helpful if your state's laws are unclear, your health history is complicated, or your family can't agree on what should be in your living will. · You can change your living will at any time. Some people find that their wishes about end-of-life care change as their health changes. · In addition to making a living will, think about completing a medical power of  form. This form lets you name the person you want to make end-of-life treatment decisions for you (your \"health care agent\") if you're not able to. Many hospitals and nursing homes will give you the forms you need to complete a living will and a medical power of .   · Your living will is used only if you can't make or communicate decisions for yourself anymore. If you become able to make decisions again, you can accept or refuse any treatment, no matter what you wrote in your living will. · Your state may offer an online registry. This is a place where you can store your living will online so the doctors and nurses who need to treat you can find it right away. What should you think about when creating a living will? Talk about your end-of-life wishes with your family members and your doctor. Let them know what you want. That way the people making decisions for you won't be surprised by your choices. Think about these questions as you make your living will:  · Do you know enough about life support methods that might be used? If not, talk to your doctor so you know what might be done if you can't breathe on your own, your heart stops, or you're unable to swallow. · What things would you still want to be able to do after you receive life-support methods? Would you want to be able to walk? To speak? To eat on your own? To live without the help of machines? · If you have a choice, where do you want to be cared for? In your home? At a hospital or nursing home? · Do you want certain Episcopal practices performed if you become very ill? · If you have a choice at the end of your life, where would you prefer to die? At home? In a hospital or nursing home? Somewhere else? · Would you prefer to be buried or cremated? · Do you want your organs to be donated after you die? What should you do with your living will? · Make sure that your family members and your health care agent have copies of your living will. · Give your doctor a copy of your living will to keep in your medical record. If you have more than one doctor, make sure that each one has a copy. · You may want to put a copy of your living will where it can be easily found. Where can you learn more? Go to http://megan-usman.info/.   Enter K381 in the search box to learn more about \"Learning About Living Chelsy. \"  Current as of: August 8, 2016  Content Version: 11.3  © 2276-8522 [employer]. Care instructions adapted under license by [employer] (which disclaims liability or warranty for this information). If you have questions about a medical condition or this instruction, always ask your healthcare professional. [address] any warranty or liability for your use of this information. Advance Directives: Care Instructions  Your Care Instructions  An advance directive is a legal way to state your wishes at the end of your life. It tells your family and your doctor what to do if you can no longer say what you want. There are two main types of advance directives. You can change them any time that your wishes change. · A living will tells your family and your doctor your wishes about life support and other treatment. · A durable power of  for health care lets you name a person to make treatment decisions for you when you can't speak for yourself. This person is called a health care agent. If you do not have an advance directive, decisions about your medical care may be made by a doctor or a  who doesn't know you. It may help to think of an advance directive as a gift to the people who care for you. If you have one, they won't have to make tough decisions by themselves. Follow-up care is a key part of your treatment and safety. Be sure to make and go to all appointments, and call your doctor if you are having problems. It's also a good idea to know your test results and keep a list of the medicines you take. How can you care for yourself at home? · Discuss your wishes with your loved ones and your doctor. This way, there are no surprises. · Many states have a unique form. Or you might use a universal form that has been approved by many states. This kind of form can sometimes be completed and stored online.  Your electronic copy will then be available wherever you have a connection to the Internet. In most cases, doctors will respect your wishes even if you have a form from a different state. · You don't need a  to do an advance directive. But you may want to get legal advice. · Think about these questions when you prepare an advance directive:  ¨ Who do you want to make decisions about your medical care if you are not able to? Many people choose a family member or close friend. ¨ Do you know enough about life support methods that might be used? If not, talk to your doctor so you understand. ¨ What are you most afraid of that might happen? You might be afraid of having pain, losing your independence, or being kept alive by machines. ¨ Where would you prefer to die? Choices include your home, a hospital, or a nursing home. ¨ Would you like to have information about hospice care to support you and your family? ¨ Do you want to donate organs when you die? ¨ Do you want certain Oriental orthodox practices performed before you die? If so, put your wishes in the advance directive. · Read your advance directive every year, and make changes as needed. When should you call for help? Be sure to contact your doctor if you have any questions. Where can you learn more? Go to http://megan-usman.info/. Enter R264 in the search box to learn more about \"Advance Directives: Care Instructions. \"  Current as of: November 17, 2016  Content Version: 11.3  © 1827-0595 [employer]. Care instructions adapted under license by [employer] (which disclaims liability or warranty for this information). If you have questions about a medical condition or this instruction, always ask your healthcare professional. [place] [address] any warranty or liability for your use of this information.   [facility]   Statement to Patients  April 1, 2014      In an effort to ensure the large volume of patient prescription refills is processed in the most efficient and expeditious manner, we are asking our patients to assist us by calling your Pharmacy for all prescription refills, this will include also your  Mail Order Pharmacy. The pharmacy will contact our office electronically to continue the refill process. Please do not wait until the last minute to call your pharmacy. We need at least 48 hours (2days) to fill prescriptions. We also encourage you to call your pharmacy before going to  your prescription to make sure it is ready. With regard to controlled substance prescription refill requests (narcotic refills) that need to be picked up at our office, we ask your cooperation by providing us with at least 72 hours (3days) notice that you will need a refill. We will not refill narcotic prescription refill requests after 4:00pm on any weekday, Monday through Thursday, or after 2:00pm on Fridays, or on the weekends. We encourage everyone to explore another way of getting your prescription refill request processed using [employer], our patient web portal through our electronic medical record system. [employer] is an efficient and effective way to communicate your medication request directly to the office and  downloadable as an adan on your smart phone . [employer] also features a review functionality that allows you to view your medication list as well as leave messages for your physician. Are you ready to get connected? If so please review the attatched instructions or speak to any of our staff to get you set up right away! Thank you so much for your cooperation. Should you have any questions please contact our Practice Administrator.     The Physicians and Staff,  [facility]

## 2017-10-24 NOTE — PROGRESS NOTES
Neurology Progress Note    Patient ID:  Marycarmen Nelson  [id number]  22 y.o.  1955    HISTORY PROVIDED BY:  Patient      Chief Complaint: Falls and vertigo  Subjective:    Ms. Gena Seay is here for follow up today of falls and vertigo. Since last visit patient did have some of her workup completed. This included an EEG that was done for evaluation of syncope. This was normal.  She also had carotid Dopplers done that were normal.  She also had an EMG/NCS that was negative for any sign of neuropathy or radiculopathy. Patient has not had imaging of her brain or spine done at this point. Patient reports that she did not receive a call for this. She will call today. Patient reports that she has been staying at home because she is afraid of falling. She will try to move room to room including one room the day to avoid any overexertion. She has had a DEXA scan/PET scan since last visit and was diagnosed with osteoporosis. She is told to start calcium/vitamin D but she has not started this yet because she is not sure what dose tissues. She is also following with oncology for MGUS and has been evaluated promptly. No treatment has been initiated at this point. Patient is also having some right shoulder pain today        Objective:   ROS:  Per HPI-  Otherwise 12 point ROS was negative    Meds:  Current Outpatient Prescriptions on File Prior to Visit   Medication Sig Dispense Refill    ondansetron (ZOFRAN ODT) 4 mg disintegrating tablet Take 1 Tab by mouth every eight (8) hours as needed for Nausea. 20 Tab 0    escitalopram oxalate (LEXAPRO) 20 mg tablet Take 20 mg by mouth daily.  multivitamin (ONE A DAY) tablet Take 1 Tab by mouth daily.  FERROUS SULFATE PO Take  by mouth.  albuterol (PROVENTIL HFA, VENTOLIN HFA, PROAIR HFA) 90 mcg/actuation inhaler Take 1 Puff by inhalation every six (6) hours as needed for Wheezing for up to 30 doses.  1 Inhaler 0    ibuprofen (MOTRIN) 600 mg tablet Take 1 Tab by mouth every eight (8) hours as needed for Pain for up to 20 doses. 20 Tab 0    glucose blood VI test strips (ONETOUCH ULTRA TEST) strip Test up to 2 times daily. Dx code E11.65 200 Strip 4    lancets (ONE TOUCH DELICA) 33 gauge misc Test up to 2 times daily. Dx code E11.65 200 Lancet 4    ascorbic acid, vitamin C, (VITAMIN C) 500 mg tablet Take  by mouth.  calcium-cholecalciferol, d3, (CALCIUM 600 + D) 600-125 mg-unit tab Take  by mouth.  cholecalciferol, vitamin D3, (VITAMIN D3) 2,000 unit tab Take 5,000 Units by mouth.  cyanocobalamin (VITAMIN B-12) 1,000 mcg tablet Take 1,000 mcg by mouth daily.  digestive enzymes (ENZYME DIGEST) cap Take  by mouth.  clonazePAM (KLONOPIN) 0.5 mg tablet Take 0.5 mg by mouth three (3) times daily.  aspirin delayed-release 81 mg tablet Take 162 mg by mouth nightly.  zolpidem (AMBIEN) 10 mg tablet Take  by mouth nightly as needed for Sleep. No current facility-administered medications on file prior to visit. Imaging:  No new imaging    Carotids: Negative  EEG: Negative  EMG/NCS: Impression:     Extensive electrodiagnostic examination of the right upper and right lower extremities, with additional needle examination of the left lower extremity, is normal.     Specifically, there is no evidence of a peripheral neuropathy, right cervical motor radiculopathy, or bilateral lumbosacral motor radiculopathy.   [facility] and [employer] tab today    Lab Review   Results for orders placed or performed in visit on 08/09/17   LIPID PANEL   Result Value Ref Range    Cholesterol, total 187 100 - 199 mg/dL    Triglyceride 72 0 - 149 mg/dL    HDL Cholesterol 67 >39 mg/dL    VLDL, calculated 14 5 - 40 mg/dL    LDL, calculated 106 (H) 0 - 99 mg/dL   SPECIMEN STATUS REPORT   Result Value Ref Range    SPECIMEN STATUS REPORT COMMENT    CVD REPORT   Result Value Ref Range    INTERPRETATION Note    AMB POC URINE, MICROALBUMIN, SEMIQUANT (1 RESULT)   Result Value Ref Range    Microalbumin urine (POC) <30 mg MG/L       Exam:  Visit Vitals    /70    Pulse 62    SpO2 97%     Gen: Well developed  CV: RRR  Lungs: non labored breathing  Abd: non distending  Neuro: A&O x 3, no dysarthria or aphasia  CN II-XII: PERRL, EOMI, face symmetric, tongue/palate midline  Motor: strength 5/5 all four ext  Sensory: intact to LT  Gait: normal    Assessment:   Jalyn Palomares is a 57 y.o. female who presents for follow up of falls and vertigo. Since last visit she did have some workup. This was essentially normal. Specifically she does not have any sign of neuropathy. Will get imaging. Patient will call to schedule this. I also think she would benefit from vestibular rehab so we will order this. If imaging is negative may need to do an MRI of the lumbar spine. Plan:   1. MRI brain pending  2. MRI cervical spine pending  3. EMG/NCS normal  4. Carotids and EEG neg  5. [facility]  6. Ibuprofen her right shoulder pain  7. MRI L spine if above w/u neg  8. Encouraged pt to take os lucía    FU 3 months    Signed:  Meet Antunez MD  10/24/2017    This note was created using voice recognition software. Despite editing, there may be syntax errors. This note will not be viewable in [address]

## 2017-10-24 NOTE — LETTER
Neurology Progress Note Patient ID: Romana Situ [id number] 
57 y.o. 
1955 HISTORY PROVIDED BY: 
Patient Chief Complaint: Falls and vertigo Subjective:  
 Ms. Anabela Bowen is here for follow up today of falls and vertigo. Since last visit patient did have some of her workup completed. This included an EEG that was done for evaluation of syncope. This was normal.  She also had carotid Dopplers done that were normal.  She also had an EMG/NCS that was negative for any sign of neuropathy or radiculopathy. Patient has not had imaging of her brain or spine done at this point. Patient reports that she did not receive a call for this. She will call today. Patient reports that she has been staying at home because she is afraid of falling. She will try to move room to room including one room the day to avoid any overexertion. She has had a DEXA scan/PET scan since last visit and was diagnosed with osteoporosis. She is told to start calcium/vitamin D but she has not started this yet because she is not sure what dose tissues. She is also following with oncology for MGUS and has been evaluated promptly. No treatment has been initiated at this point. Patient is also having some right shoulder pain today Objective:  
ROS: 
Per HPI- 
Otherwise 12 point ROS was negative Meds: 
Current Outpatient Prescriptions on File Prior to Visit Medication Sig Dispense Refill  ondansetron (ZOFRAN ODT) 4 mg disintegrating tablet Take 1 Tab by mouth every eight (8) hours as needed for Nausea. 20 Tab 0  
 escitalopram oxalate (LEXAPRO) 20 mg tablet Take 20 mg by mouth daily.  multivitamin (ONE A DAY) tablet Take 1 Tab by mouth daily.  FERROUS SULFATE PO Take  by mouth.  albuterol (PROVENTIL HFA, VENTOLIN HFA, PROAIR HFA) 90 mcg/actuation inhaler Take 1 Puff by inhalation every six (6) hours as needed for Wheezing for up to 30 doses.  1 Inhaler 0  
  ibuprofen (MOTRIN) 600 mg tablet Take 1 Tab by mouth every eight (8) hours as needed for Pain for up to 20 doses. 20 Tab 0  
 glucose blood VI test strips (ONETOUCH ULTRA TEST) strip Test up to 2 times daily. Dx code E11.65 200 Strip 4  
 lancets (ONE TOUCH DELICA) 33 gauge misc Test up to 2 times daily. Dx code E11.65 200 Lancet 4  
 ascorbic acid, vitamin C, (VITAMIN C) 500 mg tablet Take  by mouth.  calcium-cholecalciferol, d3, (CALCIUM 600 + D) 600-125 mg-unit tab Take  by mouth.  cholecalciferol, vitamin D3, (VITAMIN D3) 2,000 unit tab Take 5,000 Units by mouth.  cyanocobalamin (VITAMIN B-12) 1,000 mcg tablet Take 1,000 mcg by mouth daily.  digestive enzymes (ENZYME DIGEST) cap Take  by mouth.  clonazePAM (KLONOPIN) 0.5 mg tablet Take 0.5 mg by mouth three (3) times daily.  aspirin delayed-release 81 mg tablet Take 162 mg by mouth nightly.  zolpidem (AMBIEN) 10 mg tablet Take  by mouth nightly as needed for Sleep. No current facility-administered medications on file prior to visit. Imaging: No new imaging Carotids: Negative EEG: Negative EMG/NCS: Impression: 
  
Extensive electrodiagnostic examination of the right upper and right lower extremities, with additional needle examination of the left lower extremity, is normal. 
  
Specifically, there is no evidence of a peripheral neuropathy, right cervical motor radiculopathy, or bilateral lumbosacral motor radiculopathy. Reviewed records in [facility] [facility] and [employer] tab today Lab Review Results for orders placed or performed in visit on 08/09/17 LIPID PANEL Result Value Ref Range Cholesterol, total 187 100 - 199 mg/dL Triglyceride 72 0 - 149 mg/dL HDL Cholesterol 67 >39 mg/dL VLDL, calculated 14 5 - 40 mg/dL LDL, calculated 106 (H) 0 - 99 mg/dL SPECIMEN STATUS REPORT Result Value Ref Range SPECIMEN STATUS REPORT COMMENT   
CVD REPORT Result Value Ref Range INTERPRETATION Note AMB POC URINE, MICROALBUMIN, SEMIQUANT (1 RESULT) Result Value Ref Range Microalbumin urine (POC) <30 mg MG/L Exam: 
Visit Vitals  /70  Pulse 62  SpO2 97% Gen: Well developed CV: RRR Lungs: non labored breathing Abd: non distending Neuro: A&O x 3, no dysarthria or aphasia CN II-XII: PERRL, EOMI, face symmetric, tongue/palate midline Motor: strength 5/5 all four ext Sensory: intact to LT Gait: normal 
 
Assessment:  
Guerline Reza is a 57 y.o. female who presents for follow up of falls and vertigo. Since last visit she did have some workup. This was essentially normal. Specifically she does not have any sign of neuropathy. Will get imaging. Patient will call to schedule this. I also think she would benefit from vestibular rehab so we will order this. If imaging is negative may need to do an MRI of the lumbar spine. Plan: 1. MRI brain pending 2. MRI cervical spine pending 3. EMG/NCS normal 
4. Carotids and EEG neg 
5. Referral to [facility] for vestibular rehab 6. Ibuprofen her right shoulder pain 7. MRI L spine if above w/u neg 8. Encouraged pt to take os lucía 
 
FU 3 months Signed: 
Deepika Sales MD 
10/24/2017 This note was created using voice recognition software. Despite editing, there may be syntax errors. This note will not be viewable in [address]

## 2017-10-31 RX ORDER — MELOXICAM 15 MG/1
15 TABLET ORAL AS NEEDED
Qty: 30 TAB | Refills: 0 | Status: SHIPPED | OUTPATIENT
Start: 2017-10-31 | End: 2017-11-09 | Stop reason: SDUPTHER

## 2017-10-31 RX ORDER — ONDANSETRON 4 MG/1
4 TABLET, ORALLY DISINTEGRATING ORAL
Qty: 20 TAB | Refills: 0 | Status: SHIPPED | OUTPATIENT
Start: 2017-10-31 | End: 2017-11-14 | Stop reason: SDUPTHER

## 2017-10-31 RX ORDER — HYDROCHLOROTHIAZIDE 12.5 MG/1
TABLET ORAL
Qty: 90 TAB | Refills: 0 | Status: SHIPPED | OUTPATIENT
Start: 2017-10-31 | End: 2017-12-07 | Stop reason: ALTCHOICE

## 2017-10-31 NOTE — TELEPHONE ENCOUNTER
Last refill of Hydrodiuril 10/24/17  Last refill of Zofran 8/15/17  Last office visit 8/9/17  Last labs 8/9/17

## 2017-11-03 ENCOUNTER — TELEPHONE (OUTPATIENT)
Dept: NEUROLOGY | Age: 62
End: 2017-11-03

## 2017-11-03 NOTE — TELEPHONE ENCOUNTER
----- Message from Jese Estrada sent at 11/3/2017  1:15 PM EDT -----  Regarding: Dr Peoples/telephone  Pt (p) [phone],BALTAZAR was returning the nurses call from yesterday . , regarding her PT.

## 2017-11-09 ENCOUNTER — TELEPHONE (OUTPATIENT)
Dept: NEUROLOGY | Age: 62
End: 2017-11-09

## 2017-11-09 RX ORDER — MELOXICAM 15 MG/1
15 TABLET ORAL
Qty: 30 TAB | Refills: 0 | Status: SHIPPED | OUTPATIENT
Start: 2017-11-09 | End: 2017-11-13 | Stop reason: SDUPTHER

## 2017-11-09 NOTE — TELEPHONE ENCOUNTER
----- Message from Jacquie Gatica sent at 11/8/2017  3:32 PM EST -----  Regarding: Dr. Briones Filter: [phone]  Pt is requesting a callback from the office because she missed a call from \" Adrienne\". The best contact number is [phone].

## 2017-11-13 RX ORDER — MELOXICAM 15 MG/1
15 TABLET ORAL
Qty: 90 TAB | Refills: 0 | Status: SHIPPED | OUTPATIENT
Start: 2017-11-13 | End: 2017-12-22 | Stop reason: SDUPTHER

## 2017-11-14 RX ORDER — ONDANSETRON 4 MG/1
4 TABLET, ORALLY DISINTEGRATING ORAL
Qty: 20 TAB | Refills: 0 | Status: SHIPPED | OUTPATIENT
Start: 2017-11-14 | End: 2017-12-22 | Stop reason: SDUPTHER

## 2017-11-14 NOTE — TELEPHONE ENCOUNTER
Pt is requesting a refill on Zofran. Pt was just sent a refill to [address] [place] [address] on 10/31. Pt was given 20 tabs.     Last Refill- 10/31/2017  Last Lab- 8/9/2017  Last OV- 8/9/2017

## 2017-11-19 RX ORDER — HYDROCHLOROTHIAZIDE 12.5 MG/1
TABLET ORAL
Qty: 90 TAB | Refills: 0 | Status: SHIPPED | OUTPATIENT
Start: 2017-11-19 | End: 2017-12-07 | Stop reason: ALTCHOICE

## 2017-12-04 RX ORDER — CYCLOBENZAPRINE HCL 10 MG
10 TABLET ORAL
Qty: 10 TAB | Refills: 0 | Status: SHIPPED | OUTPATIENT
Start: 2017-12-04 | End: 2018-11-06 | Stop reason: ALTCHOICE

## 2017-12-07 ENCOUNTER — OFFICE VISIT (OUTPATIENT)
Dept: INTERNAL MEDICINE CLINIC | Age: 62
End: 2017-12-07

## 2017-12-07 ENCOUNTER — HOSPITAL ENCOUNTER (OUTPATIENT)
Dept: GENERAL RADIOLOGY | Age: 62
Discharge: HOME | End: 2017-12-07
Attending: INTERNAL MEDICINE
Payer: MEDICARE

## 2017-12-07 VITALS
RESPIRATION RATE: 16 BRPM | HEIGHT: 65 IN | SYSTOLIC BLOOD PRESSURE: 122 MMHG | OXYGEN SATURATION: 98 % | WEIGHT: 155.4 LBS | TEMPERATURE: 98.5 F | HEART RATE: 71 BPM | DIASTOLIC BLOOD PRESSURE: 78 MMHG | BODY MASS INDEX: 25.89 KG/M2

## 2017-12-07 DIAGNOSIS — M54.50 ACUTE MIDLINE LOW BACK PAIN WITHOUT SCIATICA: Primary | ICD-10-CM

## 2017-12-07 DIAGNOSIS — M79.89 LEG SWELLING: ICD-10-CM

## 2017-12-07 DIAGNOSIS — M54.50 ACUTE MIDLINE LOW BACK PAIN WITHOUT SCIATICA: ICD-10-CM

## 2017-12-07 DIAGNOSIS — E66.3 OVERWEIGHT (BMI 25.0-29.9): ICD-10-CM

## 2017-12-07 PROCEDURE — 72100 X-RAY EXAM L-S SPINE 2/3 VWS: CPT

## 2017-12-07 NOTE — MR AVS SNAPSHOT
Visit Information Date & Time Provider Department Dept. Phone Encounter #  
 12/7/2017  9:15 AM [facility] [phone] [id number] Your Appointments 1/25/2018  2:40 PM  
Follow Up with Leyda Cohen MD  
[facility]) Appt Note: balance [place] [address] [address] [address] [phone]  
  
   
 [place] [address] [address] [address] 8/10/2018 10:30 AM  
Medicare Physical with MD [facility]) Appt Note: [facility]   
 [facility] [address] [place] [address] [address]  
[address] [facility] [address] [address] Upcoming Health Maintenance Date Due Pneumococcal 19-64 Highest Risk (1 of 3 - PCV13) 3/1/1974 PAP AKA CERVICAL CYTOLOGY 6/10/2017 HEMOGLOBIN A1C Q6M 1/31/2018 EYE EXAM RETINAL OR DILATED Q1 1/31/2018 FOOT EXAM Q1 8/9/2018 MICROALBUMIN Q1 8/9/2018 LIPID PANEL Q1 8/9/2018 BREAST CANCER SCRN MAMMOGRAM 1/12/2019 GLAUCOMA SCREENING Q2Y 1/31/2019 COLONOSCOPY 6/16/2020 DTaP/Tdap/Td series (2 - Td) 7/7/2020 Allergies as of 12/7/2017  Review Complete On: 12/7/2017 By: Tamra Varghese LPN Severity Noted Reaction Type Reactions Pcn [Penicillins] Medium 07/18/2011   Systemic Itching Percocet [Oxycodone-acetaminophen]  06/16/2015    Other (comments) Passe out, feels dizzy Current Immunizations  Never Reviewed Name Date TDAP Vaccine 7/7/2010 Not reviewed this visit You Were Diagnosed With   
  
 Codes Comments Acute midline low back pain without sciatica    -  Primary ICD-10-CM: M54.5 ICD-9-CM: 724.2 Overweight (BMI 25.0-29. 9)     ICD-10-CM: H29.1 ICD-9-CM: 278.02 Leg swelling     ICD-10-CM: M79.89 ICD-9-CM: 729.81 Vitals BP Pulse Temp Resp Height(growth percentile) Weight(growth percentile) 122/78 (BP 1 Location: Right arm, BP Patient Position: Sitting) 71 98.5 °F (36.9 °C) (Oral) 16 5' 5\" (1.651 m) 155 lb 6.4 oz (70.5 kg) SpO2 BMI OB Status Smoking Status 98% 25.86 kg/m2 Postmenopausal Never Smoker BMI and BSA Data Body Mass Index Body Surface Area  
 25.86 kg/m 2 1.8 m 2 Preferred Pharmacy Pharmacy Name Phone [address] [facility] [address] [facility] [phone] Your Updated Medication List  
  
   
This list is accurate as of: 12/7/17 10:05 AM.  Always use your most recent med list.  
  
  
  
  
 albuterol 90 mcg/actuation inhaler Commonly known as:  PROVENTIL HFA, VENTOLIN HFA, PROAIR HFA Take 1 Puff by inhalation every six (6) hours as needed for Wheezing for up to 30 doses. AMBIEN 10 mg tablet Generic drug:  zolpidem Take  by mouth nightly as needed for Sleep. aspirin delayed-release 81 mg tablet Take 162 mg by mouth nightly. CALCIUM 600 + D 600-125 mg-unit Tab Generic drug:  calcium-cholecalciferol (d3) Take  by mouth.  
  
 clonazePAM 0.5 mg tablet Commonly known as:  Audelia Patch Take 0.5 mg by mouth three (3) times daily. cyclobenzaprine 10 mg tablet Commonly known as:  FLEXERIL Take 1 Tab by mouth daily as needed for Muscle Spasm(s). Indications: Muscle Spasm ENZYME DIGEST Cap Generic drug:  digestive enzymes Take  by mouth.  
  
 escitalopram oxalate 20 mg tablet Commonly known as:  Arty Douglas Take 20 mg by mouth daily. FERROUS SULFATE PO Take  by mouth. glucose blood VI test strips strip Commonly known as:  ONETOUCH ULTRA TEST Test up to 2 times daily. Dx code E11.65  
  
 ibuprofen 600 mg tablet Commonly known as:  MOTRIN Take 1 Tab by mouth every eight (8) hours as needed for Pain for up to 20 doses. lancets 33 gauge Misc Commonly known as: One Touch Gerldine [place] Test up to 2 times daily. Dx code E11.65  
  
 meloxicam 15 mg tablet Commonly known as:  MOBIC Take 1 Tab by mouth daily as needed for Pain for up to 90 days. metFORMIN  mg tablet Commonly known as:  GLUCOPHAGE XR  
TK 1 Tab PO BID WITH MEALS  
  
 multivitamin tablet Commonly known as:  ONE A DAY Take 1 Tab by mouth daily. ondansetron 4 mg disintegrating tablet Commonly known as:  ZOFRAN ODT Take 1 Tab by mouth every eight (8) hours as needed for Nausea. VITAMIN B-12 1,000 mcg tablet Generic drug:  cyanocobalamin Take 1,000 mcg by mouth daily. VITAMIN C 500 mg tablet Generic drug:  ascorbic acid (vitamin C) Take  by mouth. VITAMIN D3 2,000 unit Tab Generic drug:  cholecalciferol (vitamin D3) Take 5,000 Units by mouth. We Performed the Following METABOLIC PANEL, COMPREHENSIVE [46327 CPT(R)] To-Do List   
 12/07/2017 Imaging:  XR SPINE LUMB 2 OR 3 V Introducing [facility]! [employer] introduces [employer] patient portal. Now you can access parts of your medical record, email your doctor's office, and request medication refills online. 1. In your internet browser, go to [email or web address] 2. Click on the First Time User? Click Here link in the Sign In box. You will see the New Member Sign Up page. 3. Enter your [employer] Access Code exactly as it appears below. You will not need to use this code after youve completed the sign-up process. If you do not sign up before the expiration date, you must request a new code. · [employer] Access Code: [email or web address] Expires: 2/18/2018  2:22 PM 
 
4. Enter the last four digits of your Social Security Number (xxxx) and Date of Birth (mm/dd/yyyy) as indicated and click Submit. You will be taken to the next sign-up page. 5. Create a [employer] ID.  This will be your [employer] login ID and cannot be changed, so think of one that is secure and easy to remember. 6. Create a [employer] password. You can change your password at any time. 7. Enter your Password Reset Question and Answer. This can be used at a later time if you forget your password. 8. Enter your e-mail address. You will receive e-mail notification when new information is available in [address] 9. Click Sign Up. You can now view and download portions of your medical record. 10. Click the Download Summary menu link to download a portable copy of your medical information. If you have questions, please visit the Frequently Asked Questions section of the [employer] website. Remember, [employer] is NOT to be used for urgent needs. For medical emergencies, dial 911. Now available from your iPhone and Android! Please provide this summary of care documentation to your next provider. Your primary care clinician is listed as Werner Lara. If you have any questions after today's visit, please call [phone].

## 2017-12-07 NOTE — PROGRESS NOTES
Chief Complaint   Patient presents with    Back Pain     was getting ready to go to [facility] in [address] and could not even stand up.  states that she had to crawl.   medication that was sent is not really helping. States just went to  phone and had pain shoot through back and buttucks.   States that her mother told her that she did not walk until she was 3

## 2017-12-07 NOTE — LETTER
12/8/2017 2:45 PM 
 
Ms. Marleni Ramos 
[address] 
[address] [address] Dear Marleni Ramos: 
 
Please find your most recent results below. Resulted Orders METABOLIC PANEL, COMPREHENSIVE Result Value Ref Range Glucose 100 (H) 65 - 99 mg/dL BUN 15 8 - 27 mg/dL Creatinine 0.80 0.57 - 1.00 mg/dL GFR est non-AA 78 >59 mL/min/1.73 GFR est AA 90 >59 mL/min/1.73  
 BUN/Creatinine ratio 19 12 - 28 Sodium 135 134 - 144 mmol/L Potassium 4.6 3.5 - 5.2 mmol/L Chloride 98 96 - 106 mmol/L  
 CO2 26 18 - 29 mmol/L Calcium 9.5 8.7 - 10.3 mg/dL Protein, total 6.6 6.0 - 8.5 g/dL Albumin 4.5 3.6 - 4.8 g/dL GLOBULIN, TOTAL 2.1 1.5 - 4.5 g/dL A-G Ratio 2.1 1.2 - 2.2 Bilirubin, total <0.2 0.0 - 1.2 mg/dL Alk. phosphatase 82 39 - 117 IU/L  
 AST (SGOT) 22 0 - 40 IU/L  
 ALT (SGPT) 17 0 - 32 IU/L Narrative Performed at:  [address]  [id number] : Sunny Zamora MD, Phone:  [phone] RECOMMENDATIONS: 
None. Keep up the good work! Please call me if you have any questions: [phone] Sincerely, Mitul Muro MD

## 2017-12-07 NOTE — PROGRESS NOTES
Written by Radha Rg, as dictated by Dr. Bette Perdomo MD.    Mookie Martino is a 57 y.o. female. HPI  The patient comes in today c/o lower back pain. She explained that she bent over to  her phone and her back \"gave out\" while she was in [facility], and has been in pain since. She is currently wearing a back brace and has been taking Flexeril which we called in on 12/4, but it has not been helping at all. She also took Advil as she left all her medications at home. The pain has also been radiating down both her legs, but is centered in her lower back. She denies falling or lifting anything heavy. The pain has been improving. She is osteopenic and has been taking Ca and vitamin D. She has lost weight, from 162 lbs in 01/2017 to 155 lbs today. Her BP is normal today at 122/78. She finds that chlorthalidone works better for her than hctz (which she d/c'ed), but chlorthalidone was denied by her pharmacy so she has not been taking any medication for her BP. Patient Active Problem List   Diagnosis Code    Iron (Fe) deficiency anemia D50.9    Anxiety F41.9    Disc disorder M51.9    Seasonal allergic rhinitis J30.2    Osteopenia M85.80    Vertigo R42    ACP (advance care planning) Z71.89    Palpitation R00.2        Current Outpatient Prescriptions on File Prior to Visit   Medication Sig Dispense Refill    cyclobenzaprine (FLEXERIL) 10 mg tablet Take 1 Tab by mouth daily as needed for Muscle Spasm(s). Indications: Muscle Spasm 10 Tab 0    ondansetron (ZOFRAN ODT) 4 mg disintegrating tablet Take 1 Tab by mouth every eight (8) hours as needed for Nausea. 20 Tab 0    meloxicam (MOBIC) 15 mg tablet Take 1 Tab by mouth daily as needed for Pain for up to 90 days. 90 Tab 0    metFORMIN ER (GLUCOPHAGE XR) 500 mg tablet TK 1 Tab PO BID WITH MEALS 180 Tab 4    escitalopram oxalate (LEXAPRO) 20 mg tablet Take 20 mg by mouth daily.       multivitamin (ONE A DAY) tablet Take 1 Tab by mouth daily.  FERROUS SULFATE PO Take  by mouth.  ibuprofen (MOTRIN) 600 mg tablet Take 1 Tab by mouth every eight (8) hours as needed for Pain for up to 20 doses. 20 Tab 0    glucose blood VI test strips (ONETOUCH ULTRA TEST) strip Test up to 2 times daily. Dx code E11.65 200 Strip 4    lancets (ONE TOUCH DELICA) 33 gauge misc Test up to 2 times daily. Dx code E11.65 200 Lancet 4    ascorbic acid, vitamin C, (VITAMIN C) 500 mg tablet Take  by mouth.  calcium-cholecalciferol, d3, (CALCIUM 600 + D) 600-125 mg-unit tab Take  by mouth.  cholecalciferol, vitamin D3, (VITAMIN D3) 2,000 unit tab Take 5,000 Units by mouth.  cyanocobalamin (VITAMIN B-12) 1,000 mcg tablet Take 1,000 mcg by mouth daily.  digestive enzymes (ENZYME DIGEST) cap Take  by mouth.  clonazePAM (KLONOPIN) 0.5 mg tablet Take 0.5 mg by mouth three (3) times daily.  aspirin delayed-release 81 mg tablet Take 162 mg by mouth nightly.  zolpidem (AMBIEN) 10 mg tablet Take  by mouth nightly as needed for Sleep.  albuterol (PROVENTIL HFA, VENTOLIN HFA, PROAIR HFA) 90 mcg/actuation inhaler Take 1 Puff by inhalation every six (6) hours as needed for Wheezing for up to 30 doses. 1 Inhaler 0     No current facility-administered medications on file prior to visit.         Allergies   Allergen Reactions    Pcn [Penicillins] Itching    Percocet [Oxycodone-Acetaminophen] Other (comments)     Passe out, feels dizzy       Past Medical History:   Diagnosis Date    Anxiety 6/4/2009    BV (bacterial vaginosis)     Candidal vaginitis     Concussion 09/12    Diabetes ([address]     Disc disorder 6/4/2009    Iron (Fe) deficiency anemia 6/4/2009    Nausea & vomiting     Osteopenia 6/4/2009    Seasonal allergic rhinitis 6/4/2009    Vertigo        Past Surgical History:   Procedure Laterality Date    HX ORTHOPAEDIC      left foot       Family History   Problem Relation Age of Onset    Arthritis-osteo Mother     Heart Disease Father     Cancer Sister     Breast Cancer Sister 48       Social History     Social History    Marital status:      Spouse name: N/A    Number of children: N/A    Years of education: N/A     Occupational History    Not on file. Social History Main Topics    Smoking status: Never Smoker    Smokeless tobacco: Never Used    Alcohol use No    Drug use: No    Sexual activity: Not Currently     Other Topics Concern    Not on file     Social History Narrative       Review of Systems   Constitutional: Negative for malaise/fatigue. HENT: Negative for congestion. Respiratory: Negative for cough and shortness of breath. Musculoskeletal: Positive for back pain. Negative for joint pain and myalgias. Neurological: Negative for weakness. Visit Vitals    /78 (BP 1 Location: Right arm, BP Patient Position: Sitting)    Pulse 71    Temp 98.5 °F (36.9 °C) (Oral)    Resp 16    Ht 5' 5\" (1.651 m)    Wt 155 lb 6.4 oz (70.5 kg)    SpO2 98%    BMI 25.86 kg/m2       Physical Exam   Constitutional: She is oriented to person, place, and time. She appears well-developed and well-nourished. No distress. HENT:   Right Ear: External ear normal.   Left Ear: External ear normal.   Eyes: Conjunctivae and EOM are normal.   Neck: Normal range of motion. Neck supple. Cardiovascular: Normal rate and regular rhythm. Pulmonary/Chest: Effort normal and breath sounds normal.   Abdominal: Soft. Bowel sounds are normal.   Musculoskeletal: She exhibits no edema or tenderness. Lymphadenopathy:     She has no cervical adenopathy. Neurological: She is alert and oriented to person, place, and time. Psychiatric: She has a normal mood and affect. Nursing note and vitals reviewed. ASSESSMENT and PLAN    ICD-10-CM ICD-9-CM    1.  Acute midline low back pain without sciatica M54.5 724.2 XR SPINE LUMB 2 OR 3 V    She should take meloxicam with food qd and Flexeril at night. Lumbar spine XR ordered. Continue taking vitamin D and Ca. 2. Overweight (BMI 25.0-29. 9) E66.3 278.02 Commended her on her weight loss, as it has likely been improving her BP. 3. Leg swelling J70.50 911.92 METABOLIC PANEL, COMPREHENSIVE    Will recheck K today. This plan was reviewed with the patient and patient agrees. All questions were answered. This scribe documentation was reviewed by me and accurately reflects the examination and decisions made by me. This note will not be viewable in [address]

## 2017-12-08 DIAGNOSIS — M47.9 OSTEOARTHRITIS OF SPINE, UNSPECIFIED SPINAL OSTEOARTHRITIS COMPLICATION STATUS, UNSPECIFIED SPINAL REGION: Primary | ICD-10-CM

## 2017-12-08 LAB
ALBUMIN SERPL-MCNC: 4.5 G/DL (ref 3.6–4.8)
ALBUMIN/GLOB SERPL: 2.1 {RATIO} (ref 1.2–2.2)
ALP SERPL-CCNC: 82 IU/L (ref 39–117)
ALT SERPL-CCNC: 17 IU/L (ref 0–32)
AST SERPL-CCNC: 22 IU/L (ref 0–40)
BILIRUB SERPL-MCNC: <0.2 MG/DL (ref 0–1.2)
BUN SERPL-MCNC: 15 MG/DL (ref 8–27)
BUN/CREAT SERPL: 19 (ref 12–28)
CALCIUM SERPL-MCNC: 9.5 MG/DL (ref 8.7–10.3)
CHLORIDE SERPL-SCNC: 98 MMOL/L (ref 96–106)
CO2 SERPL-SCNC: 26 MMOL/L (ref 18–29)
CREAT SERPL-MCNC: 0.8 MG/DL (ref 0.57–1)
GFR SERPLBLD CREATININE-BSD FMLA CKD-EPI: 78 ML/MIN/1.73
GFR SERPLBLD CREATININE-BSD FMLA CKD-EPI: 90 ML/MIN/1.73
GLOBULIN SER CALC-MCNC: 2.1 G/DL (ref 1.5–4.5)
GLUCOSE SERPL-MCNC: 100 MG/DL (ref 65–99)
POTASSIUM SERPL-SCNC: 4.6 MMOL/L (ref 3.5–5.2)
PROT SERPL-MCNC: 6.6 G/DL (ref 6–8.5)
SODIUM SERPL-SCNC: 135 MMOL/L (ref 134–144)

## 2017-12-08 NOTE — PROGRESS NOTES
Let her know X-ray showed spondylosis which could be congenital. I would suggest seeing Rheumatology.

## 2017-12-08 NOTE — PROGRESS NOTES
Spoke with pt and explained results with her which she voiced understanding.    Gave her Dr Ingrid Maldonado number and have placed referral.

## 2017-12-19 ENCOUNTER — PATIENT OUTREACH (OUTPATIENT)
Dept: INTERNAL MEDICINE CLINIC | Age: 62
End: 2017-12-19

## 2017-12-19 NOTE — PROGRESS NOTES
NN noted patient on combined census list. [facility] EMR reviewed. Notes per ED MD are still incomplete so full visit unable to be reviewed. It appears patient was diagnosed with closed head injury and CT of head was negative and patient was dc'd. Unable to reach patient for further detail and follow up. Left message with NN's contact info for return call.

## 2017-12-20 ENCOUNTER — TELEPHONE (OUTPATIENT)
Dept: NEUROLOGY | Age: 62
End: 2017-12-20

## 2017-12-20 NOTE — TELEPHONE ENCOUNTER
Patient was trying to give a call back about Physical Therapy. She fell again and was in the hospital [facility]) She had a head injury stated by patient. Patient would also like to schedule her MRI appt. Please give patient a call when you have a chance.  Thank you

## 2017-12-21 ENCOUNTER — PATIENT OUTREACH (OUTPATIENT)
Dept: INTERNAL MEDICINE CLINIC | Age: 62
End: 2017-12-21

## 2017-12-21 NOTE — PROGRESS NOTES
NN received voicemail from the patient left yesterday and NN returned patients call. NN unable to reach patient and left a detailed message asking that she call PCP office to schedule an ED follow up appt . NN's contact info was provided.

## 2017-12-21 NOTE — TELEPHONE ENCOUNTER
----- Message from Rosendo Metz sent at 12/21/2017 12:45 PM EST -----  Regarding: Dr Peoples/Telephone  Pt stated she is returning a call to [address]. Contact [phone] or [phone]

## 2017-12-21 NOTE — TELEPHONE ENCOUNTER
Contacted patient and informed her that she will need to contact the [facility] dept. For MRI scheduling. Phone number provided. Patient also expresses she would like to go to [facility].

## 2017-12-21 NOTE — PROGRESS NOTES
NNTOCED    NN spoke with patient for ED follow up. Patient requesting results of bone test, upset stating nobody every called her. No bone density test noted on file in results or media. NN explained that recent xray showed spondylosis and that per EMR Antonio Lucas did call her on 12/7 and suggest she call Dr Vero Zapata with rheumatology to make an appt. She did not recall this but took down his number and reports she will call him today to schedule. Patient reports she just keeps falling. She says she cannot tell when it will happen but she often loses her balance and falls. She doesn't get dizzy or faint just just loses her footing and feels \"my back cant support my body\". She has fallen 4 times this year. On most recent fall hit her head and went to [facility] ED and had head CT which was WNL. She will also see Dr Jada Barfield PCP tomorrow for follow up. Patient is tearful and says she is just \"so tired\" of falling. Provided encouragement. Patient questions and concerns answered. Goals      patient would like to stop having falls (pt-stated)            12/21/17- Patient is not homebound and therefore [facility]. Patient was referred to Dr Vero Zapata for evaluation. States she is losing her balance. Will see PCP tomorrow to discuss further / be evaluated. LN        This note will not be viewable in MyChart.

## 2017-12-22 ENCOUNTER — OFFICE VISIT (OUTPATIENT)
Dept: INTERNAL MEDICINE CLINIC | Age: 62
End: 2017-12-22

## 2017-12-22 ENCOUNTER — DOCUMENTATION ONLY (OUTPATIENT)
Dept: INTERNAL MEDICINE CLINIC | Age: 62
End: 2017-12-22

## 2017-12-22 VITALS
RESPIRATION RATE: 18 BRPM | TEMPERATURE: 98.2 F | HEIGHT: 65 IN | WEIGHT: 152 LBS | BODY MASS INDEX: 25.33 KG/M2 | DIASTOLIC BLOOD PRESSURE: 78 MMHG | SYSTOLIC BLOOD PRESSURE: 114 MMHG | HEART RATE: 70 BPM | OXYGEN SATURATION: 100 %

## 2017-12-22 DIAGNOSIS — G89.29 CHRONIC MIDLINE LOW BACK PAIN WITHOUT SCIATICA: ICD-10-CM

## 2017-12-22 DIAGNOSIS — R60.9 SWELLING: ICD-10-CM

## 2017-12-22 DIAGNOSIS — R26.89 BALANCE PROBLEM: Primary | ICD-10-CM

## 2017-12-22 DIAGNOSIS — M54.50 CHRONIC MIDLINE LOW BACK PAIN WITHOUT SCIATICA: ICD-10-CM

## 2017-12-22 DIAGNOSIS — R29.6 MULTIPLE FALLS: ICD-10-CM

## 2017-12-22 DIAGNOSIS — R11.0 NAUSEA: ICD-10-CM

## 2017-12-22 DIAGNOSIS — F41.1 GENERALIZED ANXIETY DISORDER: ICD-10-CM

## 2017-12-22 DIAGNOSIS — J45.20 MILD INTERMITTENT ASTHMA WITHOUT COMPLICATION: ICD-10-CM

## 2017-12-22 RX ORDER — ALBUTEROL SULFATE 90 UG/1
1 AEROSOL, METERED RESPIRATORY (INHALATION)
Qty: 1 INHALER | Refills: 0 | Status: SHIPPED | OUTPATIENT
Start: 2017-12-22 | End: 2018-11-06 | Stop reason: SDUPTHER

## 2017-12-22 RX ORDER — CHLORTHALIDONE 25 MG/1
25 TABLET ORAL DAILY
Qty: 90 TAB | Refills: 0 | Status: SHIPPED | OUTPATIENT
Start: 2017-12-22 | End: 2018-03-22

## 2017-12-22 RX ORDER — MELOXICAM 15 MG/1
15 TABLET ORAL
Qty: 90 TAB | Refills: 0 | Status: SHIPPED | OUTPATIENT
Start: 2017-12-22 | End: 2018-01-02 | Stop reason: SDUPTHER

## 2017-12-22 RX ORDER — ONDANSETRON 4 MG/1
4 TABLET, ORALLY DISINTEGRATING ORAL
Qty: 20 TAB | Refills: 0 | Status: SHIPPED | OUTPATIENT
Start: 2017-12-22 | End: 2018-11-06 | Stop reason: SDUPTHER

## 2017-12-22 RX ORDER — CHLORTHALIDONE 25 MG/1
TABLET ORAL
Qty: 30 TAB | Refills: 0 | Status: CANCELLED | OUTPATIENT
Start: 2017-12-22

## 2017-12-22 NOTE — PROGRESS NOTES
Called and lm for patient to return call to office. She will need to get information given to her for her referral to psychiatry.

## 2017-12-22 NOTE — PROGRESS NOTES
Pt here to be seen for hospital stay after fall she is still experiencing headaches and neck pain. She is experiencing \"black outs\" and states that she used to get signs in the past that she would pass out and now does not get any at this time. Chief Complaint   Patient presents with    Follow-up     after hospital stay from fall.  Headache     neck pain      Visit Vitals    /78 (BP 1 Location: Right arm, BP Patient Position: Sitting)    Pulse 70    Temp 98.2 °F (36.8 °C) (Oral)    Resp 18    Ht 5' 5\" (1.651 m)    Wt 152 lb (68.9 kg)    SpO2 100%    BMI 25.29 kg/m2     1. Have you been to the ER, urgent care clinic since your last visit? Hospitalized since your last visit? Yes [address] [place] [address]   2. Have you seen or consulted any other health care providers outside of the [address] [place] [address] since your last visit? Include any pap smears or colon screening.  No

## 2017-12-22 NOTE — PROGRESS NOTES
HISTORY OF PRESENT ILLNESS  Louise Salmeron is a 57 y.o. female. Patient is seen today for emergency room follow up visit. She had a fall at home, was taken to the ER, where she had a CT scan of the head done, which was unremarkable. This is her fourth fall this year, which she is also concerned about. She was seen by neurologist, Dr. Keaton Kasper, for balance problem, she  ordered an MRI and vestibular rehab for her, which she has scheduled in the next two to three weeks. She is not complaining of any headache at this time, but she was having after a fall and feeling dizzy as well. She has a history of vertigo in the past and has been evaluated for that. She was told that she had an inner ear problem  and she would need vestibular rehab. She also wants me to refill Chlorthalidone, which we had put on hold for that. Got blood work done and her potassium and creatinine and sodium were normal.  She thinks that without that medication she's not urinating well and she gets swelling in her whole body. Her back pain has improved a little bit since her fall. She is on Mobic 15 mg, which she takes as needed. She thinks that back pain got worse after the fall. Because she is scheduled for MRI of the spine, as well as MRI of the brain, she does not want to go for any imaging at this time. She is still having off and on nausea, for which she needs a Zofran rx. She was told by her psychiatrist's office that they're not going to be seeing her anymore because her insurance does not participate with them. She wants me to put the new referral for the psychiatrist in the [facility]. She has stopped taking Lexapro on her own, but she still takes Clonazepam as needed. She wants me to prescribe her long acting inhaler and she will find out from the pharmacy if she can get that as it was denied by her insurance last time. She is still wheezing off and on and has been using a rescue inhaler as needed.     Current Outpatient Prescriptions   Medication Sig Dispense Refill    ondansetron (ZOFRAN ODT) 4 mg disintegrating tablet Take 1 Tab by mouth every eight (8) hours as needed for Nausea. 20 Tab 0    meloxicam (MOBIC) 15 mg tablet Take 1 Tab by mouth daily as needed for Pain for up to 90 days. 90 Tab 0    chlorthalidone (HYGROTEN) 25 mg tablet Take 1 Tab by mouth daily for 90 days. 90 Tab 0    albuterol (PROVENTIL HFA, VENTOLIN HFA, PROAIR HFA) 90 mcg/actuation inhaler Take 1 Puff by inhalation every six (6) hours as needed for Wheezing for up to 30 doses. 1 Inhaler 0    beclomethasone (QVAR) 80 mcg/actuation aero Take 1 Puff by inhalation two (2) times a day for 30 days. 1 Inhaler 1    cyclobenzaprine (FLEXERIL) 10 mg tablet Take 1 Tab by mouth daily as needed for Muscle Spasm(s). Indications: Muscle Spasm 10 Tab 0    metFORMIN ER (GLUCOPHAGE XR) 500 mg tablet TK 1 Tab PO BID WITH MEALS 180 Tab 4    FERROUS SULFATE PO Take  by mouth.  glucose blood VI test strips (ONETOUCH ULTRA TEST) strip Test up to 2 times daily. Dx code E11.65 200 Strip 4    lancets (ONE TOUCH DELICA) 33 gauge misc Test up to 2 times daily. Dx code E11.65 200 Lancet 4    calcium-cholecalciferol, d3, (CALCIUM 600 + D) 600-125 mg-unit tab Take  by mouth.  cholecalciferol, vitamin D3, (VITAMIN D3) 2,000 unit tab Take 5,000 Units by mouth.  clonazePAM (KLONOPIN) 0.5 mg tablet Take 0.5 mg by mouth three (3) times daily.  zolpidem (AMBIEN) 10 mg tablet Take  by mouth nightly as needed for Sleep.  multivitamin (ONE A DAY) tablet Take 1 Tab by mouth daily.  ibuprofen (MOTRIN) 600 mg tablet Take 1 Tab by mouth every eight (8) hours as needed for Pain for up to 20 doses. 20 Tab 0    ascorbic acid, vitamin C, (VITAMIN C) 500 mg tablet Take  by mouth.  cyanocobalamin (VITAMIN B-12) 1,000 mcg tablet Take 1,000 mcg by mouth daily.  digestive enzymes (ENZYME DIGEST) cap Take  by mouth.       aspirin delayed-release 81 mg tablet Take 162 mg by mouth nightly. Past Medical History:   Diagnosis Date    Anxiety 6/4/2009    BV (bacterial vaginosis)     Candidal vaginitis     Concussion 09/12    Diabetes ([address]     Disc disorder 6/4/2009    Iron (Fe) deficiency anemia 6/4/2009    Nausea & vomiting     Osteopenia 6/4/2009    Seasonal allergic rhinitis 6/4/2009    Vertigo      Past Surgical History:   Procedure Laterality Date    HX ORTHOPAEDIC      left foot     Lab Results  Component Value Date/Time   ALT (SGPT) 17 12/07/2017 10:09 AM   AST (SGOT) 22 12/07/2017 10:09 AM   Alk. phosphatase 82 12/07/2017 10:09 AM   Bilirubin, total <0.2 12/07/2017 10:09 AM   Albumin 4.5 12/07/2017 10:09 AM   Protein, total 6.6 12/07/2017 10:09 AM   INR 1.0 09/25/2015 03:36 PM   Prothrombin time 10.0 09/25/2015 03:36 PM   PLATELET 076 54/23/2910 12:17 PM       Lab Results  Component Value Date/Time   GFR est non-AA 78 12/07/2017 10:09 AM   GFR est AA 90 12/07/2017 10:09 AM   Creatinine 0.80 12/07/2017 10:09 AM   BUN 15 12/07/2017 10:09 AM   Sodium 135 12/07/2017 10:09 AM   Potassium 4.6 12/07/2017 10:09 AM   Chloride 98 12/07/2017 10:09 AM   CO2 26 12/07/2017 10:09 AM     HPI  Review of Systems   Constitutional: Positive for malaise/fatigue. Negative for fever and weight loss. HENT: Negative for congestion and tinnitus. Eyes: Negative for blurred vision and double vision. Respiratory: Negative for cough and shortness of breath. Cardiovascular: Negative for chest pain and palpitations. Gastrointestinal: Negative for abdominal pain and vomiting. Genitourinary: Negative for frequency and urgency. Musculoskeletal: Positive for back pain. Negative for myalgias. Neurological: Negative for sensory change and focal weakness. Psychiatric/Behavioral: Negative for suicidal ideas. The patient is nervous/anxious and has insomnia. Physical Exam   Constitutional: She is oriented to person, place, and time. No distress.    HENT: Mouth/Throat: Oropharynx is clear and moist. No oropharyngeal exudate. Eyes: Conjunctivae and EOM are normal.   Neck: Normal range of motion. Neck supple. Cardiovascular: Normal rate, regular rhythm and normal heart sounds. Pulmonary/Chest: Breath sounds normal. No respiratory distress. She has no wheezes. Abdominal: Soft. She exhibits no distension. Musculoskeletal: She exhibits no edema or deformity. Lymphadenopathy:     She has no cervical adenopathy. Neurological: She is alert and oriented to person, place, and time. No cranial nerve deficit. Skin: She is not diaphoretic. Psychiatric: She has a normal mood and affect. Nursing note and vitals reviewed. ASSESSMENT and PLAN    ICD-10-CM ICD-9-CM    1. Balance problem R26.89 781.99 I did recommend her going for vestibular rehab for balance issues and that will prevent her falls, and she is scheduled for the MRI of the brain and spine and will follow up with Dr. Shanika Mishra afterwards. 2. Multiple falls R29.6 V15.88    3. Nausea R11.0 787.02 ondansetron (ZOFRAN ODT) 4 mg disintegrating tablet   4. Swelling R60.9 782. 3 chlorthalidone (HYGROTEN) 25 mg tablet   5. Chronic midline low back pain without sciatica M54.5 724.2 meloxicam (MOBIC) 15 mg tablet    G89.29 338.29    6.  Generalized anxiety disorder F41.1 300.02 REFERRAL TO PSYCHIATRY   7. Mild intermittent asthma without complication I08.07 426.44 albuterol (PROVENTIL HFA, VENTOLIN HFA, PROAIR HFA) 90 mcg/actuation inhaler      beclomethasone (QVAR) 80 mcg/actuation aero

## 2017-12-27 ENCOUNTER — HOSPITAL ENCOUNTER (OUTPATIENT)
Dept: MRI IMAGING | Age: 62
Discharge: HOME | End: 2017-12-27
Attending: PSYCHIATRY & NEUROLOGY
Payer: MEDICARE

## 2017-12-27 VITALS — BODY MASS INDEX: 25.29 KG/M2 | WEIGHT: 152 LBS

## 2017-12-27 DIAGNOSIS — R42 VERTIGO: ICD-10-CM

## 2017-12-27 DIAGNOSIS — R26.89 BALANCE DISORDER: ICD-10-CM

## 2017-12-27 DIAGNOSIS — R29.6 FALLS FREQUENTLY: ICD-10-CM

## 2017-12-27 DIAGNOSIS — R55 SYNCOPE, UNSPECIFIED SYNCOPE TYPE: ICD-10-CM

## 2017-12-27 PROCEDURE — 70553 MRI BRAIN STEM W/O & W/DYE: CPT

## 2017-12-27 PROCEDURE — 72156 MRI NECK SPINE W/O & W/DYE: CPT

## 2017-12-27 PROCEDURE — 74011250636 HC RX REV CODE- 250/636: Performed by: PSYCHIATRY & NEUROLOGY

## 2017-12-27 PROCEDURE — A9576 INJ PROHANCE MULTIPACK: HCPCS | Performed by: PSYCHIATRY & NEUROLOGY

## 2017-12-27 RX ADMIN — GADOTERIDOL 14 ML: 279.3 INJECTION, SOLUTION INTRAVENOUS at 13:57

## 2018-01-02 DIAGNOSIS — G89.29 CHRONIC MIDLINE LOW BACK PAIN WITHOUT SCIATICA: ICD-10-CM

## 2018-01-02 DIAGNOSIS — M54.50 CHRONIC MIDLINE LOW BACK PAIN WITHOUT SCIATICA: ICD-10-CM

## 2018-01-02 RX ORDER — MELOXICAM 15 MG/1
TABLET ORAL
Qty: 90 TAB | Refills: 0 | Status: SHIPPED | OUTPATIENT
Start: 2018-01-02 | End: 2018-03-26 | Stop reason: SDUPTHER

## 2018-01-08 ENCOUNTER — TELEPHONE (OUTPATIENT)
Dept: ENDOCRINOLOGY | Age: 63
End: 2018-01-08

## 2018-01-08 DIAGNOSIS — E04.2 MULTINODULAR GOITER: Primary | ICD-10-CM

## 2018-01-10 ENCOUNTER — TELEPHONE (OUTPATIENT)
Dept: ENDOCRINOLOGY | Age: 63
End: 2018-01-10

## 2018-01-10 ENCOUNTER — HOSPITAL ENCOUNTER (OUTPATIENT)
Dept: ULTRASOUND IMAGING | Age: 63
Discharge: HOME | End: 2018-01-10
Attending: INTERNAL MEDICINE
Payer: MEDICARE

## 2018-01-10 DIAGNOSIS — E04.2 MULTINODULAR GOITER: ICD-10-CM

## 2018-01-10 PROCEDURE — 76536 US EXAM OF HEAD AND NECK: CPT

## 2018-01-10 RX ORDER — CHLORTHALIDONE 25 MG/1
TABLET ORAL
Qty: 30 TAB
Start: 2018-01-10

## 2018-01-10 NOTE — TELEPHONE ENCOUNTER
Spoke with patient after speaking with  and informed her  stated she will discuss this with patient at appointment on 01/12/18. Patient verbalized understanding.

## 2018-01-10 NOTE — TELEPHONE ENCOUNTER
Patient is requesting to switch from Metformin to Janumet due to GI issues with medication. Please advise.

## 2018-01-11 DIAGNOSIS — R92.2 DENSE BREAST TISSUE: Primary | ICD-10-CM

## 2018-01-12 ENCOUNTER — OFFICE VISIT (OUTPATIENT)
Dept: ENDOCRINOLOGY | Age: 63
End: 2018-01-12

## 2018-01-12 VITALS
BODY MASS INDEX: 25.66 KG/M2 | HEART RATE: 64 BPM | RESPIRATION RATE: 18 BRPM | WEIGHT: 154 LBS | SYSTOLIC BLOOD PRESSURE: 121 MMHG | DIASTOLIC BLOOD PRESSURE: 79 MMHG | OXYGEN SATURATION: 99 % | HEIGHT: 65 IN | TEMPERATURE: 97.8 F

## 2018-01-12 DIAGNOSIS — M85.89 OSTEOPENIA OF MULTIPLE SITES: ICD-10-CM

## 2018-01-12 DIAGNOSIS — R73.02 GLUCOSE INTOLERANCE (IMPAIRED GLUCOSE TOLERANCE): Primary | ICD-10-CM

## 2018-01-12 DIAGNOSIS — E04.2 MULTINODULAR GOITER: ICD-10-CM

## 2018-01-12 DIAGNOSIS — R73.03 PREDIABETES: ICD-10-CM

## 2018-01-12 LAB
GLUCOSE POC: 128 MG/DL
HBA1C MFR BLD HPLC: 5.5 %

## 2018-01-12 RX ORDER — METFORMIN HYDROCHLORIDE 500 MG/1
TABLET, EXTENDED RELEASE ORAL
Qty: 90 TAB | Refills: 4 | Status: SHIPPED | OUTPATIENT
Start: 2018-01-12 | End: 2019-03-24 | Stop reason: SDUPTHER

## 2018-01-12 NOTE — PROGRESS NOTES
Chief Complaint   Patient presents with    Thyroid Problem    Diabetes     Wt Readings from Last 3 Encounters:   01/12/18 154 lb (69.9 kg)   12/27/17 152 lb (68.9 kg)   12/22/17 152 lb (68.9 kg)     Temp Readings from Last 3 Encounters:   01/12/18 97.8 °F (36.6 °C) (Oral)   12/22/17 98.2 °F (36.8 °C) (Oral)   12/07/17 98.5 °F (36.9 °C) (Oral)     BP Readings from Last 3 Encounters:   01/12/18 121/79   12/22/17 114/78   12/07/17 122/78     Pulse Readings from Last 3 Encounters:   01/12/18 64   12/22/17 70   12/07/17 71     1. Have you been to the ER, urgent care clinic since your last visit? Hospitalized since your last visit? No    2. Have you seen or consulted any other health care providers outside of the [address] [place] [address] since your last visit? Include any pap smears or colon screening.  Yes Where: [facility]     No eye exam   No foot exam  Patient did not bring  meter or blood sugars recordings

## 2018-01-12 NOTE — PATIENT INSTRUCTIONS
Decrease  Metformin Er 500 mg to   Once a day         Do not skip meals  Do not eat in between meals    Reduce carbs- pasta, rice, potatoes, bread   Do not drink juices or sodas  Do not eat sugar free cookies and cakes

## 2018-01-12 NOTE — PROGRESS NOTES
HISTORY OF PRESENT ILLNESS  Fernanda Pelayo is a 57 y.o. female. HPI  Patient here for  f/u after last visit of prediabetic    From FEB 28 2017    Lost 13 lbs in a year     She f/u with Dr. Hemalatha Quispe - for blood dyscrasias     She has several falls / collapsed, with no LOC , with no pre-monitary symptoms     She is hoping to see if she could get janumet instead of metformin er         Old history . Referred : by pcp    H/o pre diabetes for several  years     Current A1C is 6.3 %  and symptoms/problems include none     Current diabetic medications include none. Current monitoring regimen: none  Home blood sugar records: trend: unknown  Any episodes of hypoglycemia? no    Weight trend: stable  Prior visit with dietician: no  Current diet: \"healthy\" diet  in general  Current exercise: aerobics    Known diabetic complications: none  Cardiovascular risk factors: dyslipidemia, diabetes mellitus    Eye exam current (within one year): no  WILLIE: no     Past Medical History:   Diagnosis Date    Anxiety 6/4/2009    BV (bacterial vaginosis)     Candidal vaginitis     Concussion 09/12    Diabetes ([address]     Disc disorder 6/4/2009    Iron (Fe) deficiency anemia 6/4/2009    Nausea & vomiting     Osteopenia 6/4/2009    Seasonal allergic rhinitis 6/4/2009    Vertigo      Past Surgical History:   Procedure Laterality Date    HX ORTHOPAEDIC      left foot     Current Outpatient Prescriptions   Medication Sig    meloxicam (MOBIC) 15 mg tablet TAKE 1 TABLET BY MOUTH  DAILY AS NEEDED FOR PAIN    ondansetron (ZOFRAN ODT) 4 mg disintegrating tablet Take 1 Tab by mouth every eight (8) hours as needed for Nausea.  chlorthalidone (HYGROTEN) 25 mg tablet Take 1 Tab by mouth daily for 90 days.  albuterol (PROVENTIL HFA, VENTOLIN HFA, PROAIR HFA) 90 mcg/actuation inhaler Take 1 Puff by inhalation every six (6) hours as needed for Wheezing for up to 30 doses.     beclomethasone (QVAR) 80 mcg/actuation aero Take 1 Puff by inhalation two (2) times a day for 30 days.  cyclobenzaprine (FLEXERIL) 10 mg tablet Take 1 Tab by mouth daily as needed for Muscle Spasm(s). Indications: Muscle Spasm    metFORMIN ER (GLUCOPHAGE XR) 500 mg tablet TK 1 Tab PO BID WITH MEALS    multivitamin (ONE A DAY) tablet Take 1 Tab by mouth daily.  FERROUS SULFATE PO Take  by mouth.  ibuprofen (MOTRIN) 600 mg tablet Take 1 Tab by mouth every eight (8) hours as needed for Pain for up to 20 doses.  glucose blood VI test strips (ONETOUCH ULTRA TEST) strip Test up to 2 times daily. Dx code E11.65    lancets (ONE TOUCH DELICA) 33 gauge misc Test up to 2 times daily. Dx code E11.65    ascorbic acid, vitamin C, (VITAMIN C) 500 mg tablet Take  by mouth.  calcium-cholecalciferol, d3, (CALCIUM 600 + D) 600-125 mg-unit tab Take  by mouth.  cyanocobalamin (VITAMIN B-12) 1,000 mcg tablet Take 1,000 mcg by mouth daily.  digestive enzymes (ENZYME DIGEST) cap Take  by mouth.  clonazePAM (KLONOPIN) 0.5 mg tablet Take 0.5 mg by mouth three (3) times daily.  aspirin delayed-release 81 mg tablet Take 162 mg by mouth nightly.  zolpidem (AMBIEN) 10 mg tablet Take  by mouth nightly as needed for Sleep.  cholecalciferol, vitamin D3, (VITAMIN D3) 2,000 unit tab Take 5,000 Units by mouth. No current facility-administered medications for this visit. Review of Systems   Constitutional: Negative. HENT: Negative. Eyes: Negative for pain and redness. Respiratory: Negative. Cardiovascular: Negative for chest pain, palpitations and leg swelling. Gastrointestinal: Negative. Negative for constipation. Genitourinary: Negative. Musculoskeletal: Negative for myalgias. Skin: Negative. Neurological: Negative. Endo/Heme/Allergies: Negative. Psychiatric/Behavioral: Negative for depression and memory loss. The patient does not have insomnia. Physical Exam   Constitutional: She is oriented to person, place, and time.  She appears well-developed and well-nourished. HENT:   Head: Normocephalic. Eyes: Conjunctivae and EOM are normal. Pupils are equal, round, and reactive to light. Neck: Normal range of motion. Neck supple. No JVD present. No tracheal deviation present. Thyromegaly present. Cardiovascular: Normal rate, regular rhythm and normal heart sounds. No murmur heard. Pulmonary/Chest: Breath sounds normal.   Abdominal: Soft. Bowel sounds are normal.   Musculoskeletal: Normal range of motion. Lymphadenopathy:     She has no cervical adenopathy. Neurological: She is alert and oriented to person, place, and time. She has normal reflexes. Skin: Skin is warm. Psychiatric: She has a normal mood and affect. Lab Results   Component Value Date/Time    Hemoglobin A1c 5.9 07/31/2017 12:17 PM    Hemoglobin A1c 6.0 01/16/2017 09:37 AM    Hemoglobin A1c 6.3 09/30/2016 10:15 AM    Glucose 100 12/07/2017 10:09 AM    LDL, calculated 106 08/09/2017 11:23 AM    Creatinine 0.80 12/07/2017 10:09 AM      Lab Results   Component Value Date/Time    Cholesterol, total 187 08/09/2017 11:23 AM    HDL Cholesterol 67 08/09/2017 11:23 AM    LDL, calculated 106 08/09/2017 11:23 AM    Triglyceride 72 08/09/2017 11:23 AM    CHOL/HDL Ratio 3.5 07/08/2010 09:11 AM       Lab Results   Component Value Date/Time    ALT (SGPT) 17 12/07/2017 10:09 AM    AST (SGOT) 22 12/07/2017 10:09 AM    Alk. phosphatase 82 12/07/2017 10:09 AM    Bilirubin, total <0.2 12/07/2017 10:09 AM       Lab Results   Component Value Date/Time    GFR est AA 90 12/07/2017 10:09 AM    GFR est non-AA 78 12/07/2017 10:09 AM    Creatinine 0.80 12/07/2017 10:09 AM    BUN 15 12/07/2017 10:09 AM    Sodium 135 12/07/2017 10:09 AM    Potassium 4.6 12/07/2017 10:09 AM    Chloride 98 12/07/2017 10:09 AM    CO2 26 12/07/2017 10:09 AM          ASSESSMENT  and PLAN     1.  Pre diabetes  : a1c is  5.5  %    From jan 2018     Compared to    6 %   Jan 2017  Compared to 6.3 %    From Recent labs   She lost weight   She tried Naviswiss and she felt good on it, but I explained to her that there is no evidence of use of januvia amongst prediabetic patients     Decreasing  The dose down to metformin er 500 mg once a day with weight loss   She has diarrhea with it     Patient is advised to check blood sugars 1-2 times daily by rotation method. reviewed medications and side effects in detail  lab results and schedule of future lab studies reviewed with patient    2. HTN : continue chlorthalidone . Patient is educated about importance of compliance with anti-hypertensives especially ARB/ACEI    3. Dyslipidemia : LDL over 100 , slightly not on meds. Patient is educated about benefits and adverse effects of statins and explained how benefits outweigh risk. 4. use of aspirin to prevent MI and TIA's discussed      5. Right thyrodi nodule - nov 2016-  usg of thyroid showed a cyst at inferior pole of 1.5 cm   Jan 2018   The thyroid gland remains enlarged. The thyroid is mildly heterogeneous with  normal vascularity. Bilateral thyroid cysts and nodules are again demonstrated. A hypoechoic solid nodule in the isthmus measures 5 x 5 x 7 mm, previously 5 x 5  x 6 mm. An adjacent hypoechoic nodule with several echogenic foci measures 5 x 6  x 6 mm, previously 6 x 4 x 6 mm.     A cyst with mural nodule in the lower right thyroid lobe measures 0.5 x 0.6 x  0.6 cm, previously 1.5 x 0.8 x 0.2 cm.     A cyst with mural nodule in the lower left thyroid lobe measures 9 x 7 x 7 mm. Previously 8 x 6 x 4 mm.     There is no new thyroid nodule. As she has no nodules of significant size, will not perform FNA . , in fact, the dominant nodule  On Right shrunk in size   EUTHYROID     She has hoarse voice and may be from GERD- recommending ENT check       6. new onset of falls : etiology not clear       7. F/u with Dr. Kandice Haji  For blood dyscrasias   Etiology not clear       8.  Low BMD : ordered DEXA      F/u with me PRN or in 2 years         > 50 % of time is spent on counseling   Patient voiced understanding her plan of care

## 2018-01-23 ENCOUNTER — HOSPITAL ENCOUNTER (OUTPATIENT)
Dept: MAMMOGRAPHY | Age: 63
Discharge: HOME | End: 2018-01-23
Attending: INTERNAL MEDICINE
Payer: MEDICARE

## 2018-01-23 DIAGNOSIS — E04.2 MULTINODULAR GOITER: ICD-10-CM

## 2018-01-23 DIAGNOSIS — R92.2 DENSE BREAST TISSUE: ICD-10-CM

## 2018-01-23 DIAGNOSIS — R73.02 GLUCOSE INTOLERANCE (IMPAIRED GLUCOSE TOLERANCE): ICD-10-CM

## 2018-01-23 DIAGNOSIS — R73.03 PREDIABETES: ICD-10-CM

## 2018-01-23 DIAGNOSIS — M85.89 OSTEOPENIA OF MULTIPLE SITES: ICD-10-CM

## 2018-01-23 PROCEDURE — 77080 DXA BONE DENSITY AXIAL: CPT

## 2018-01-23 PROCEDURE — 77063 BREAST TOMOSYNTHESIS BI: CPT

## 2018-02-05 RX ORDER — CHLORTHALIDONE 25 MG/1
TABLET ORAL
Qty: 30 TAB
Start: 2018-02-05

## 2018-02-09 DIAGNOSIS — J45.20 MILD INTERMITTENT ASTHMA WITHOUT COMPLICATION: ICD-10-CM

## 2018-03-02 ENCOUNTER — PATIENT OUTREACH (OUTPATIENT)
Dept: INTERNAL MEDICINE CLINIC | Age: 63
End: 2018-03-02

## 2018-03-05 ENCOUNTER — DOCUMENTATION ONLY (OUTPATIENT)
Dept: INTERNAL MEDICINE CLINIC | Age: 63
End: 2018-03-05

## 2018-03-05 ENCOUNTER — TELEPHONE (OUTPATIENT)
Dept: NEUROLOGY | Age: 63
End: 2018-03-05

## 2018-03-05 ENCOUNTER — PATIENT OUTREACH (OUTPATIENT)
Dept: INTERNAL MEDICINE CLINIC | Age: 63
End: 2018-03-05

## 2018-03-05 NOTE — PROGRESS NOTES
Spoke with patient to let her know that she should contact Dr. Lois Ryan to get results of CT scan. Also let her know to schedule a follow up appt. Here at the office with Dr. Herlinda Duncan. She stated that she will contact Dr. Lois Ryan and then give us a call back to schedule a follow up appt.  With Dr. Herlinda Duncan for pain in her leg

## 2018-03-05 NOTE — TELEPHONE ENCOUNTER
----- Message from Rachna Shoemaker sent at 3/5/2018  2:24 PM EST -----  Regarding: /Telephone  Pt called requesting a call back for the results of MRI. Pt stated she is experiencing pain in hip bone all down the leg to the toes.  Pt best contact number is [phone]

## 2018-03-05 NOTE — PROGRESS NOTES
Spoke with patient who states she is experiencing pain that radiates down her leg to her toes. Provider would like for patient to schedule an appt. To be seen in regards to the pain to order any necessary testing. Pt states that she will not schedule an appt. To be seen since she has had prior testing done and was never given any information. Resulted information from this office was given to patient from previous testing done. Will call pt back per provider for her to follow up with Dr. Benny Olvera who originally ordered pt CT of the cervical spine and brain.

## 2018-03-05 NOTE — TELEPHONE ENCOUNTER
----- Message from Erica Sessions sent at 3/5/2018  2:42 PM EST -----  Regarding: Dr Peoples/telephone  Pt p) [phone],LOKI was returning the nurses call.

## 2018-03-05 NOTE — TELEPHONE ENCOUNTER
MRI brain was fine.  MRI cervical has some small discs but nothing putting pressure on her neck or spine

## 2018-03-05 NOTE — PROGRESS NOTES
NN contacted patient for follow up prior to closing DELIA episode from December for falls. Patient reports she doesn't know why our office hasn't called her with MRI results done in December. Patient seems to be getting confused as our office did not order MRI's in December, Dr. Catherine Chamorro did . NN reviewed EMR. NN will have PCP review MRI and recommend whether patient needs ortho consult? Patient reports significant pain in left lower back down left hip and leg to her toes. Her gait is weak and she has trouble walking and falls often. She also has trouble driving. She has been working with [employer] but has gotten no relief. She would like to know if she can do yoga and whether this would help? She would like to know what medicine or what we can do to help. Patient had lumbar xrays in December and our office notified her that she had spondylosis and should follow up with rheumatology Dr. Gisel Gomez and has appt scheduled for 4/26/18. She has not had lumbar MRI. NN will d/w PCP and return her call      1:15 PM- returned call to notify patient that PCP is recommending she come in for evaluation of low back and left leg and hip pain. NN also included that her prior MRI's she is stating we did not give her results from were done by Dr. Nahomi Urena office so she should contact them for results. NN return contact info provided for questions and to verify that this message was received. This note will not be viewable in [address]

## 2018-03-06 ENCOUNTER — TELEPHONE (OUTPATIENT)
Dept: NEUROLOGY | Age: 63
End: 2018-03-06

## 2018-03-06 NOTE — TELEPHONE ENCOUNTER
----- Message from Josué Flroes sent at 3/6/2018 10:52 AM EST -----  Regarding: /Telephone  Pt called requesting a call back from a miss call from Severa Gave.  Pt best contact number is [phone]

## 2018-03-07 ENCOUNTER — TELEPHONE (OUTPATIENT)
Dept: INTERNAL MEDICINE CLINIC | Age: 63
End: 2018-03-07

## 2018-03-07 NOTE — TELEPHONE ENCOUNTER
Her Bone density scan was ordered by Endocrinology. I just checked in the [facility].

## 2018-03-07 NOTE — TELEPHONE ENCOUNTER
Spoke to patient let her know bone scan was ordered by endocrinology. patient has an appt there and will get the results then.

## 2018-03-07 NOTE — TELEPHONE ENCOUNTER
Patient is calling for her bone density results. patient states that this test was ordered by this office.  Call [phone]

## 2018-03-16 ENCOUNTER — OFFICE VISIT (OUTPATIENT)
Dept: NEUROLOGY | Age: 63
End: 2018-03-16

## 2018-03-16 VITALS
WEIGHT: 161 LBS | BODY MASS INDEX: 26.82 KG/M2 | HEIGHT: 65 IN | DIASTOLIC BLOOD PRESSURE: 82 MMHG | SYSTOLIC BLOOD PRESSURE: 122 MMHG

## 2018-03-16 DIAGNOSIS — G89.29 CHRONIC MIDLINE LOW BACK PAIN WITH LEFT-SIDED SCIATICA: ICD-10-CM

## 2018-03-16 DIAGNOSIS — R26.9 GAIT ABNORMALITY: Primary | ICD-10-CM

## 2018-03-16 DIAGNOSIS — M54.42 CHRONIC MIDLINE LOW BACK PAIN WITH LEFT-SIDED SCIATICA: ICD-10-CM

## 2018-03-16 RX ORDER — GABAPENTIN 100 MG/1
100 CAPSULE ORAL 3 TIMES DAILY
Qty: 90 CAP | Refills: 5 | Status: SHIPPED | OUTPATIENT
Start: 2018-03-16 | End: 2018-11-06 | Stop reason: ALTCHOICE

## 2018-03-16 NOTE — PROGRESS NOTES
Darryl Tripathi is a 70 y.o. female who presents with the following  Chief Complaint   Patient presents with    Results     MRI, bone density       HPI   Patient comes in for a follow up for MRI brain and MRI cervical spine to evaluate for further causes of her left leg pain, numbness, tingling, weakness and continued falling and legs giving out. She is currently doing PT for her lumbar spine pain and balance abnormalities but noticed that things are still getting worse. She falls almost daily and has noticed the pain in the lower back is constantly radiating down to the hip down the leg into the toes on the left leg. She does have some symptoms in the right leg but not to the extent of the left. She has noticed she is also having neck pain and here to go over results of this also. She is frustrated as she feels like she is getting worse and the pain is more intense. Allergies   Allergen Reactions    Pcn [Penicillins] Itching    Percocet [Oxycodone-Acetaminophen] Other (comments)     Passe out, feels dizzy       Current Outpatient Prescriptions   Medication Sig    gabapentin (NEURONTIN) 100 mg capsule Take 1 Cap by mouth three (3) times daily.  metFORMIN ER (GLUCOPHAGE XR) 500 mg tablet Once  A day with dinner  NOTE THE CHANGE    meloxicam (MOBIC) 15 mg tablet TAKE 1 TABLET BY MOUTH  DAILY AS NEEDED FOR PAIN    ondansetron (ZOFRAN ODT) 4 mg disintegrating tablet Take 1 Tab by mouth every eight (8) hours as needed for Nausea.  chlorthalidone (HYGROTEN) 25 mg tablet Take 1 Tab by mouth daily for 90 days.  albuterol (PROVENTIL HFA, VENTOLIN HFA, PROAIR HFA) 90 mcg/actuation inhaler Take 1 Puff by inhalation every six (6) hours as needed for Wheezing for up to 30 doses.  multivitamin (ONE A DAY) tablet Take 1 Tab by mouth daily.  FERROUS SULFATE PO Take  by mouth.  ibuprofen (MOTRIN) 600 mg tablet Take 1 Tab by mouth every eight (8) hours as needed for Pain for up to 20 doses.     glucose blood VI test strips (ONETOUCH ULTRA TEST) strip Test up to 2 times daily. Dx code E11.65    lancets (ONE TOUCH DELICA) 33 gauge misc Test up to 2 times daily. Dx code E11.65    ascorbic acid, vitamin C, (VITAMIN C) 500 mg tablet Take  by mouth.  calcium-cholecalciferol, d3, (CALCIUM 600 + D) 600-125 mg-unit tab Take  by mouth.  cholecalciferol, vitamin D3, (VITAMIN D3) 2,000 unit tab Take 5,000 Units by mouth.  cyanocobalamin (VITAMIN B-12) 1,000 mcg tablet Take 1,000 mcg by mouth daily.  digestive enzymes (ENZYME DIGEST) cap Take  by mouth.  clonazePAM (KLONOPIN) 0.5 mg tablet Take 0.5 mg by mouth three (3) times daily.  aspirin delayed-release 81 mg tablet Take 162 mg by mouth nightly.  zolpidem (AMBIEN) 10 mg tablet Take  by mouth nightly as needed for Sleep.  cyclobenzaprine (FLEXERIL) 10 mg tablet Take 1 Tab by mouth daily as needed for Muscle Spasm(s). Indications: Muscle Spasm     No current facility-administered medications for this visit.         History   Smoking Status    Never Smoker   Smokeless Tobacco    Never Used       Past Medical History:   Diagnosis Date    Anxiety 6/4/2009    BV (bacterial vaginosis)     Candidal vaginitis     Concussion 09/12    Diabetes ([address]     Disc disorder 6/4/2009    Iron (Fe) deficiency anemia 6/4/2009    Nausea & vomiting     Osteopenia 6/4/2009    Seasonal allergic rhinitis 6/4/2009    Vertigo        Past Surgical History:   Procedure Laterality Date    HX ORTHOPAEDIC      left foot       Family History   Problem Relation Age of Onset    Arthritis-osteo Mother     Heart Disease Father     Cancer Sister     Breast Cancer Sister 48       Social History     Social History    Marital status:      Spouse name: N/A    Number of children: N/A    Years of education: N/A     Social History Main Topics    Smoking status: Never Smoker    Smokeless tobacco: Never Used    Alcohol use No    Drug use: No    Sexual activity: Not Currently     Other Topics Concern    None     Social History Narrative       Review of Systems   Respiratory: Negative for shortness of breath and wheezing. Cardiovascular: Negative for chest pain and palpitations. Musculoskeletal: Positive for back pain, falls and neck pain. Neurological: Positive for tingling, sensory change and weakness. Remainder of comprehensive review is negative. Physical Exam :    Visit Vitals    /82    Ht 5' 5\" (1.651 m)    Wt 73 kg (161 lb)    BMI 26.79 kg/m2       General: Well defined, nourished, and groomed individual in no acute distress.    Psych: Good mood and bright affect    NEUROLOGICAL EXAMINATION:    Mental Status: Alert and oriented to person, place, and time    Cranial Nerves:    II, III, IV, VI: Visual acuity grossly intact. Visual fields are normal.    Pupils are equal, round, and reactive to light and accommodation.    Extra-ocular movements are full and fluid. Fundoscopic exam was benign, no ptosis or nystagmus.    V-XII: Hearing is grossly intact. Facial features are symmetric, with normal sensation and strength. The palate rises symmetrically and the tongue protrudes midline. Sternocleidomastoids 5/5. Motor Examination: Normal tone, bulk, and strength, 5/5 muscle strength throughout. Coordination: Finger to nose was normal. No resting or intention tremor    Gait and Station: Steady while walking. Normal arm swing. No pronator drift. No muscle wasting or fasiculations noted. Reflexes: DTRs 2+ throughout right side, 4+ RLE     Neurosensory Exam:  Stocking glove sensory loss to temperature, vibration, and pinprick to the knees .         Results for orders placed or performed in visit on 01/12/18   AMB POC GLUCOSE BLOOD, BY GLUCOSE MONITORING DEVICE   Result Value Ref Range    Glucose  mg/dL   AMB POC HEMOGLOBIN A1C   Result Value Ref Range    Hemoglobin A1c (POC) 5.5 %       Orders Placed This Encounter    MRI LUMB SPINE WO CONT     Standing Status:   Future     Standing Expiration Date:   4/16/2019     Order Specific Question:   Is Patient Allergic to Contrast Dye? Answer:   No    gabapentin (NEURONTIN) 100 mg capsule     Sig: Take 1 Cap by mouth three (3) times daily. Dispense:  90 Cap     Refill:  5       1. Gait abnormality    2. Chronic midline low back pain with left-sided sciatica        Follow-up Disposition: Not on File      Hyperreflexia in the LLE would indicated lumbar spine abnormality. We have an x ray on file with DJD and stenosis and with her getting worse with physical therapy feeling that she is falling more and the pain in the left leg is worse we need to evaluate further looking at radiculopathy vs herniation vs other etiology in the lower back which could be causing her symptoms. She has noticed the pain is at times intolerable. We will try some low dose gabapentin 100 mg TID to see if this can help her symptoms relief while investigating further. Keep with PT and see if they can work with the neck as the MRI cervical spine showed DJD with mild cord indentation. No real symptoms in the arms and neck other then some situational neck pain. If the MRI lumbar spine negative may investigate further with a skin biopsy looking at small fiber neuropathy as a cause secondary to her diabetes but she does not want to pursue yet. No other questions.  MRI brain normal.       This note will not be viewable in [employer]

## 2018-03-16 NOTE — PROGRESS NOTES
Patient is here for MRI and bone density results.  She is looking for answers to why she has pain on her left side and why she has been falling

## 2018-03-16 NOTE — PATIENT INSTRUCTIONS
[facility]   Statement to Patients  April 1, 2014      In an effort to ensure the large volume of patient prescription refills is processed in the most efficient and expeditious manner, we are asking our patients to assist us by calling your Pharmacy for all prescription refills, this will include also your  Mail Order Pharmacy. The pharmacy will contact our office electronically to continue the refill process. Please do not wait until the last minute to call your pharmacy. We need at least 48 hours (2days) to fill prescriptions. We also encourage you to call your pharmacy before going to  your prescription to make sure it is ready. With regard to controlled substance prescription refill requests (narcotic refills) that need to be picked up at our office, we ask your cooperation by providing us with at least 72 hours (3days) notice that you will need a refill. We will not refill narcotic prescription refill requests after 4:00pm on any weekday, Monday through Thursday, or after 2:00pm on Fridays, or on the weekends. We encourage everyone to explore another way of getting your prescription refill request processed using E[employer], our patient web portal through our electronic medical record system. Ef[employer]ity is an efficient and effective way to communicate your medication request directly to the office and  downloadable as an adan on your smart phone . E[employer]ity also features a review functionality that allows you to view your medication list as well as leave messages for your physician. Are you ready to get connected? If so please review the attatched instructions or speak to any of our staff to get you set up right away! Thank you so much for your cooperation. Should you have any questions please contact our Practice Administrator.     The Physicians and Staff,  [facility]

## 2018-03-16 NOTE — MR AVS SNAPSHOT
[facility] 
 
 
 [facility] [address] [facility] [address] [id number] [phone] Patient: Dylan Ramey MRN: [id number] TWE:[id number] Visit Information Date & Time Provider Department Dept. Phone Encounter #  
 3/16/2018  1:30 PM SHIRLEY Marcum Neurology [facility] [phone] [id number] Your Appointments 4/26/2018 11:00 AM  
New Patient with Madiha Hernandez MD  
[address] ([address]) Appt Note: New Pt/pt has arthritis in her spine  
 [facility] [address]  
[phone]  
  
   
 [address] [facility] [address]  
  
    
 8/10/2018 10:30 AM  
Medicare Physical with Luis Rahman MD  
[facility] [address]) Appt Note: [facility]   
 [facility] [address] [facility] [address]  
[address] [facility] [address] [place] [id number] Upcoming Health Maintenance Date Due  
 PAP AKA CERVICAL CYTOLOGY 6/10/2017 Pneumococcal 65+ High/Highest Risk (1 of 2 - PCV13) 3/1/2018 HEMOGLOBIN A1C Q6M 7/12/2018 FOOT EXAM Q1 8/9/2018 MICROALBUMIN Q1 8/9/2018 LIPID PANEL Q1 8/9/2018 MEDICARE YEARLY EXAM 8/10/2018 EYE EXAM RETINAL OR DILATED Q1 1/30/2019 BREAST CANCER SCRN MAMMOGRAM 1/23/2020 GLAUCOMA SCREENING Q2Y 1/30/2020 COLONOSCOPY 6/16/2020 DTaP/Tdap/Td series (2 - Td) 7/7/2020 Allergies as of 3/16/2018  Review Complete On: 3/16/2018 By: Rocky Tranquillity Severity Noted Reaction Type Reactions Pcn [Penicillins] Medium 07/18/2011   Systemic Itching Percocet [Oxycodone-acetaminophen]  06/16/2015    Other (comments) Passe out, feels dizzy Current Immunizations  Never Reviewed Name Date TDAP Vaccine 7/7/2010 Not reviewed this visit You Were Diagnosed With   
  
 Codes Comments Gait abnormality    -  Primary ICD-10-CM: R26.9 ICD-9-CM: 702. 2 Chronic midline low back pain with left-sided sciatica     ICD-10-CM: M54.42, G89.29 ICD-9-CM: 724.2, 724.3, 338.29 Vitals BP Height(growth percentile) Weight(growth percentile) BMI OB Status Smoking Status 122/82 5' 5\" (1.651 m) 161 lb (73 kg) 26.79 kg/m2 Postmenopausal Never Smoker Vitals History BMI and BSA Data Body Mass Index Body Surface Area  
 26.79 kg/m 2 1.83 m 2 Preferred Pharmacy Pharmacy Name Phone [address] [employer] [facility] [address] [phone] Your Updated Medication List  
  
   
This list is accurate as of 3/16/18  2:12 PM.  Always use your most recent med list.  
  
  
  
  
 albuterol 90 mcg/actuation inhaler Commonly known as:  PROVENTIL HFA, VENTOLIN HFA, PROAIR HFA Take 1 Puff by inhalation every six (6) hours as needed for Wheezing for up to 30 doses. AMBIEN 10 mg tablet Generic drug:  zolpidem Take  by mouth nightly as needed for Sleep. aspirin delayed-release 81 mg tablet Take 162 mg by mouth nightly. CALCIUM 600 + D 600-125 mg-unit Tab Generic drug:  calcium-cholecalciferol (d3) Take  by mouth. chlorthalidone 25 mg tablet Commonly known as:  Jarret Queen Take 1 Tab by mouth daily for 90 days. clonazePAM 0.5 mg tablet Commonly known as:  Suann Jamal Take 0.5 mg by mouth three (3) times daily. cyclobenzaprine 10 mg tablet Commonly known as:  FLEXERIL Take 1 Tab by mouth daily as needed for Muscle Spasm(s). Indications: Muscle Spasm ENZYME DIGEST Cap Generic drug:  digestive enzymes Take  by mouth. FERROUS SULFATE PO Take  by mouth.  
  
 gabapentin 100 mg capsule Commonly known as:  NEURONTIN Take 1 Cap by mouth three (3) times daily. glucose blood VI test strips strip Commonly known as:  ONETOUCH ULTRA TEST  
 Test up to 2 times daily. Dx code E11.65  
  
 ibuprofen 600 mg tablet Commonly known as:  MOTRIN Take 1 Tab by mouth every eight (8) hours as needed for Pain for up to 20 doses. lancets 33 gauge Misc Commonly known as: One Touch Jennye Cokedale Test up to 2 times daily. Dx code E11.65  
  
 meloxicam 15 mg tablet Commonly known as:  MOBIC  
TAKE 1 TABLET BY MOUTH  DAILY AS NEEDED FOR PAIN  
  
 metFORMIN  mg tablet Commonly known as:  GLUCOPHAGE XR Once  A day with dinner  NOTE THE CHANGE  
  
 multivitamin tablet Commonly known as:  ONE A DAY Take 1 Tab by mouth daily. ondansetron 4 mg disintegrating tablet Commonly known as:  ZOFRAN ODT Take 1 Tab by mouth every eight (8) hours as needed for Nausea. VITAMIN B-12 1,000 mcg tablet Generic drug:  cyanocobalamin Take 1,000 mcg by mouth daily. VITAMIN C 500 mg tablet Generic drug:  ascorbic acid (vitamin C) Take  by mouth. VITAMIN D3 2,000 unit Tab Generic drug:  cholecalciferol (vitamin D3) Take 5,000 Units by mouth. Prescriptions Sent to Pharmacy Refills  
 gabapentin (NEURONTIN) 100 mg capsule 5 Sig: Take 1 Cap by mouth three (3) times daily. Class: Normal  
 Pharmacy: [facility] [address] [place] [address] [address] [facility] Ph #: [phone] Route: Oral  
  
To-Do List   
 03/16/2018 Imaging:  MRI LUMB SPINE WO CONT Referral Information Referral ID Referred By Referred To  
  
 [id number] Corrine Castro Not Available Visits Status Start Date End Date 1 New Request 3/16/18 3/16/19 If your referral has a status of pending review or denied, additional information will be sent to support the outcome of this decision. Patient Instructions PRESCRIPTION REFILL POLICY [facility] Statement to Patients April 1, 2014 In an effort to ensure the large volume of patient prescription refills is processed in the most efficient and expeditious manner, we are asking our patients to assist us by calling your Pharmacy for all prescription refills, this will include also your  Mail Order Pharmacy. The pharmacy will contact our office electronically to continue the refill process. Please do not wait until the last minute to call your pharmacy. We need at least 48 hours (2days) to fill prescriptions. We also encourage you to call your pharmacy before going to  your prescription to make sure it is ready. With regard to controlled substance prescription refill requests (narcotic refills) that need to be picked up at our office, we ask your cooperation by providing us with at least 72 hours (3days) notice that you will need a refill. We will not refill narcotic prescription refill requests after 4:00pm on any weekday, Monday through Thursday, or after 2:00pm on Fridays, or on the weekends. We encourage everyone to explore another way of getting your prescription refill request processed using DecideQuick, our patient web portal through our electronic medical record system. Dec[employer]Quick is an efficient and effective way to communicate your medication request directly to the office and  downloadable as an adan on your smart phone . [employer] also features a review functionality that allows you to view your medication list as well as leave messages for your physician. Are you ready to get connected? If so please review the attatched instructions or speak to any of our staff to get you set up right away! Thank you so much for your cooperation. Should you have any questions please contact our Practice Administrator. The Physicians and Staff,  [address] [place] [address] Neurology Clinic Introducing [facility] SERVICES!    
 [address] [place] [address] introduces [employer] patient portal. Now you can access parts of your medical record, email your doctor's office, and request medication refills online. 1. In your internet browser, go to [email or web address] 2. Click on the First Time User? Click Here link in the Sign In box. You will see the New Member Sign Up page. 3. Enter your [employer] Access Code exactly as it appears below. You will not need to use this code after youve completed the sign-up process. If you do not sign up before the expiration date, you must request a new code. · [employer] Access Code: [email or web address] Expires: 6/4/2018  8:41 AM 
 
4. Enter the last four digits of your Social Security Number (xxxx) and Date of Birth (mm/dd/yyyy) as indicated and click Submit. You will be taken to the next sign-up page. 5. Create a [employer] ID. This will be your [employer] login ID and cannot be changed, so think of one that is secure and easy to remember. 6. Create a [employer] password. You can change your password at any time. 7. Enter your Password Reset Question and Answer. This can be used at a later time if you forget your password. 8. Enter your e-mail address. You will receive e-mail notification when new information is available in [address] 9. Click Sign Up. You can now view and download portions of your medical record. 10. Click the Download Summary menu link to download a portable copy of your medical information. If you have questions, please visit the Frequently Asked Questions section of the [employer] website. Remember, [employer] is NOT to be used for urgent needs. For medical emergencies, dial 911. Now available from your iPhone and Android! Please provide this summary of care documentation to your next provider. Your primary care clinician is listed as Urmila Michel. If you have any questions after today's visit, please call [phone].

## 2018-03-22 NOTE — PROGRESS NOTES
Spoke to patient. Informed patient of bone quality. Patient wants to know if bone quality is good; why has she had 3 falls within the last year. Please advise.

## 2018-03-23 NOTE — PROGRESS NOTES
Her Falls had nothing to do with bone quality   DEXA  Test only indicates the quality of her bone   If bone quality is good and she had a fall accidentally, we hope the bones do not fracture , bones can fracture by chance if trauma is major

## 2018-03-23 NOTE — PROGRESS NOTES
Spoke to patient regarding DEXA test and falls. Patient verbalized understanding. No further questions or comments voiced.

## 2018-03-26 DIAGNOSIS — G89.29 CHRONIC MIDLINE LOW BACK PAIN WITHOUT SCIATICA: ICD-10-CM

## 2018-03-26 DIAGNOSIS — M54.50 CHRONIC MIDLINE LOW BACK PAIN WITHOUT SCIATICA: ICD-10-CM

## 2018-03-26 RX ORDER — MELOXICAM 15 MG/1
TABLET ORAL
Qty: 90 TAB | Refills: 0 | Status: SHIPPED | OUTPATIENT
Start: 2018-03-26 | End: 2018-05-02 | Stop reason: SDUPTHER

## 2018-03-30 RX ORDER — LANCETS 33 GAUGE
EACH MISCELLANEOUS
Qty: 200 PACKAGE | Refills: 4 | Status: SHIPPED | OUTPATIENT
Start: 2018-03-30 | End: 2019-04-18 | Stop reason: SDUPTHER

## 2018-03-30 RX ORDER — BLOOD SUGAR DIAGNOSTIC
STRIP MISCELLANEOUS
Qty: 200 STRIP | Refills: 4 | Status: SHIPPED | OUTPATIENT
Start: 2018-03-30 | End: 2019-04-18 | Stop reason: SDUPTHER

## 2018-04-26 ENCOUNTER — OFFICE VISIT (OUTPATIENT)
Dept: RHEUMATOLOGY | Age: 63
End: 2018-04-26

## 2018-04-26 VITALS
SYSTOLIC BLOOD PRESSURE: 105 MMHG | TEMPERATURE: 98.8 F | RESPIRATION RATE: 18 BRPM | WEIGHT: 158 LBS | DIASTOLIC BLOOD PRESSURE: 68 MMHG | HEART RATE: 73 BPM | BODY MASS INDEX: 26.33 KG/M2 | HEIGHT: 65 IN

## 2018-04-26 DIAGNOSIS — M54.42 CHRONIC LEFT-SIDED LOW BACK PAIN WITH LEFT-SIDED SCIATICA: ICD-10-CM

## 2018-04-26 DIAGNOSIS — M54.9 CHRONIC BACK PAIN GREATER THAN 3 MONTHS DURATION: Primary | ICD-10-CM

## 2018-04-26 DIAGNOSIS — G89.29 CHRONIC BACK PAIN GREATER THAN 3 MONTHS DURATION: Primary | ICD-10-CM

## 2018-04-26 DIAGNOSIS — G89.29 CHRONIC LEFT-SIDED LOW BACK PAIN WITH LEFT-SIDED SCIATICA: ICD-10-CM

## 2018-04-26 RX ORDER — HYDROCHLOROTHIAZIDE 12.5 MG/1
TABLET ORAL
Qty: 30 TAB | Refills: 2 | Status: SHIPPED | OUTPATIENT
Start: 2018-04-26 | End: 2018-05-26

## 2018-04-26 NOTE — MR AVS SNAPSHOT
[address] 
[phone] Patient: Arianna Castillo MRN: [id number] BOH:[id number] Visit Information Date & Time Provider Department Dept. Phone Encounter #  
 4/26/2018 11:00 AM Mary Vital, [address] [facility] [address] of [place] [id number] Your Appointments 5/25/2018  8:40 AM  
Follow Up with Og Domingo MD  
[facility] ([address]) Appt Note: 2 month f/u leathw [place] [address] [facility] [address] [id number] [phone]  
  
   
 [place] [address] [id number] [address] 8/10/2018 10:30 AM  
Medicare Physical with Sherron Mora MD  
[facility] Internal Medicine [address] [place] [address]) Appt Note: [facility]   
 [facility] [address] [facility] [address]  
[facility] [address] [facility] [address] Upcoming Health Maintenance Date Due Pneumococcal 65+ High/Highest Risk (1 of 2 - PCV13) 3/1/2018 HEMOGLOBIN A1C Q6M 7/12/2018 FOOT EXAM Q1 8/9/2018 MICROALBUMIN Q1 8/9/2018 LIPID PANEL Q1 8/9/2018 MEDICARE YEARLY EXAM 8/10/2018 EYE EXAM RETINAL OR DILATED Q1 1/30/2019 BREAST CANCER SCRN MAMMOGRAM 1/23/2020 GLAUCOMA SCREENING Q2Y 1/30/2020 COLONOSCOPY 6/16/2020 DTaP/Tdap/Td series (2 - Td) 7/7/2020 Allergies as of 4/26/2018  Review Complete On: 4/26/2018 By: Mary Vital MD  
  
 Severity Noted Reaction Type Reactions Pcn [Penicillins] Medium 07/18/2011   Systemic Itching Percocet [Oxycodone-acetaminophen]  06/16/2015    Other (comments) Passe out, feels dizzy Current Immunizations  Never Reviewed Name Date TDAP Vaccine 7/7/2010 Not reviewed this visit You Were Diagnosed With   
  
 Codes Comments Chronic back pain greater than 3 months duration    -  Primary ICD-10-CM: M54.9, G89.29 ICD-9-CM: 724.5, 338.29 Chronic left-sided low back pain with left-sided sciatica     ICD-10-CM: M54.42, G89.29 ICD-9-CM: 724.2, 724.3, 338.29 Vitals BP Pulse Temp Resp Height(growth percentile) Weight(growth percentile) 105/68 73 98.8 °F (37.1 °C) 18 5' 5\" (1.651 m) 158 lb (71.7 kg) BMI OB Status Smoking Status 26.29 kg/m2 Postmenopausal Never Smoker BMI and BSA Data Body Mass Index Body Surface Area  
 26.29 kg/m 2 1.81 m 2 Preferred Pharmacy Pharmacy Name Phone [facility], [address] [place] [address] [address] Vitor H[address]go [address] Your Updated Medication List  
  
   
This list is accurate as of 4/26/18 11:23 AM.  Always use your most recent med list.  
  
  
  
  
 albuterol 90 mcg/actuation inhaler Commonly known as:  PROVENTIL HFA, VENTOLIN HFA, PROAIR HFA Take 1 Puff by inhalation every six (6) hours as needed for Wheezing for up to 30 doses. AMBIEN 10 mg tablet Generic drug:  zolpidem Take  by mouth nightly as needed for Sleep. CALCIUM 600 + D 600-125 mg-unit Tab Generic drug:  calcium-cholecalciferol (d3) Take  by mouth.  
  
 clonazePAM 0.5 mg tablet Commonly known as:  Nicky Mortimer Take 0.5 mg by mouth three (3) times daily. cyclobenzaprine 10 mg tablet Commonly known as:  FLEXERIL Take 1 Tab by mouth daily as needed for Muscle Spasm(s). Indications: Muscle Spasm ENZYME DIGEST Cap Generic drug:  digestive enzymes Take  by mouth. FERROUS SULFATE PO Take  by mouth.  
  
 gabapentin 100 mg capsule Commonly known as:  NEURONTIN Take 1 Cap by mouth three (3) times daily. ibuprofen 600 mg tablet Commonly known as:  MOTRIN Take 1 Tab by mouth every eight (8) hours as needed for Pain for up to 20 doses. meloxicam 15 mg tablet Commonly known as:  MOBIC  
 TAKE 1 TABLET BY MOUTH  DAILY AS NEEDED FOR PAIN  
  
 metFORMIN  mg tablet Commonly known as:  GLUCOPHAGE XR Once  A day with dinner  NOTE THE CHANGE  
  
 multivitamin tablet Commonly known as:  ONE A DAY Take 1 Tab by mouth daily. ondansetron 4 mg disintegrating tablet Commonly known as:  ZOFRAN ODT Take 1 Tab by mouth every eight (8) hours as needed for Nausea. One Touch Delica 33 gauge Misc Generic drug:  lancets TEST UP TO 2 TIMES DAILY  
  
 ONETOUCH ULTRA TEST strip Generic drug:  glucose blood VI test strips TEST UP TO 2 TIMES DAILY  
  
 VITAMIN B-12 1,000 mcg tablet Generic drug:  cyanocobalamin Take 1,000 mcg by mouth daily. VITAMIN C 500 mg tablet Generic drug:  ascorbic acid (vitamin C) Take  by mouth. VITAMIN D3 2,000 unit Tab Generic drug:  cholecalciferol (vitamin D3) Take 5,000 Units by mouth. We Performed the Following REFERRAL TO PHYSICAL THERAPY [TZM73 Custom] Comments:  
 Evaluate and treat for back pain Referral Information Referral ID Referred By Referred To  
  
 [id number] Elan Blue Not Available Visits Status Start Date End Date 1 New Request 4/26/18 4/26/19 If your referral has a status of pending review or denied, additional information will be sent to support the outcome of this decision. Introducing [facility]! Charley Pascual introduces [facility] patient portal. Now you can access parts of your medical record, email your doctor's office, and request medication refills online. 1. In your internet browser, go to https://"[employer]. "[employer]"/[employer] 2. Click on the First Time User? Click Here link in the Sign In box. You will see the New Member Sign Up page. 3. Enter your [facility] Access Code exactly as it appears below. You will not need to use this code after youve completed the sign-up process.  If you do not sign up before the expiration date, you must request a new code. · [employer] Access Code: [email or web address] Expires: 6/4/2018  8:41 AM 
 
4. Enter the last four digits of your Social Security Number (xxxx) and Date of Birth (mm/dd/yyyy) as indicated and click Submit. You will be taken to the next sign-up page. 5. Create a [employer] ID. This will be your [employer] login ID and cannot be changed, so think of one that is secure and easy to remember. 6. Create a [employer] password. You can change your password at any time. 7. Enter your Password Reset Question and Answer. This can be used at a later time if you forget your password. 8. Enter your e-mail address. You will receive e-mail notification when new information is available in [address] 9. Click Sign Up. You can now view and download portions of your medical record. 10. Click the Download Summary menu link to download a portable copy of your medical information. If you have questions, please visit the Frequently Asked Questions section of the [employer] website. Remember, [employer] is NOT to be used for urgent needs. For medical emergencies, dial 911. Now available from your iPhone and Android! Please provide this summary of care documentation to your next provider. Your primary care clinician is listed as Radha Zapien. If you have any questions after today's visit, please call [phone].

## 2018-04-26 NOTE — PROGRESS NOTES
REASON FOR VISIT    This is the initial evaluation for Ms. Sheron Garza 70 y.o. Afro-Clarence female for question of an back pain. The patient is referred to the [facility] and [address] [place] [address] at the request of Dr. Alfreda Carbajal. [facility]      HISTORY OF PRESENT ILLNESS      I have reviewed and summarized old records from [employer]    In 8/2017, she developed \"excrutiating pain on the left side all the way down\". At times, it would involved the right side. In 12/07/2017, Lumbar Spine showed mild leftward curvature of the lumbar spine. No significant listhesis. Vertebral body heights are maintained. Mild spondylosis is present at L2-3 and L3-4. Moderate spondylosis is present at L5-S1. Degenerative changes are seen in the posterior facet joints lower lumbar spine. No acute fracture or subluxation. In 12/27/2017, MRI Brain with and without contrast showed No significant abnormality or acute process. MRI Cervical Spine with and without contrast showed degenerative changes in the mid to lower cervical spine and at T1-2. Minimal cord indentation at C4-5. No significant cord compression or cord signal abnormality. Today, she complains of throbbing bony pain from her left lower back to her foot. The pain is intermittent. Cold weather makes it worse. Vacuuming makes it worse. Sometimes, it is randomly hurting her. She has taken meloxicam, ibuprofen, Flexeril which help a little. She punches the painful spots and rubs Vicks. She had done physical therapy which helped but her pain came back. She also works out. An MRI was ordered but not scheduled. REVIEW OF SYSTEMS    A 15 point review of systems was performed and summarized below. The questionnaire was reviewed with the patient and scanned into the patient's medical record. General: endorses fatigue, denies recent weight gain, recent weight loss, weakness, fever, night sweats  Musculoskeletal: endorses back pain.  denies joint pain, joint swelling, morning stiffness, muscle weakness  Ears: endorses loss of hearing, denies ringing in ears, deafness  Eyes: endorses right eye pain, redness, dryness, denies loss of vision, double vision, blurred vision, foreign body sensation  Mouth: denies sore tongue, oral ulcers, bleeding gums, loss of taste, dryness, increased dental caries  Nose: denies nosebleeds, loss of smell, nasal ulcers  Throat: denies frequent sore throats, hoarseness, difficulty in swallowing, pain in jaw while chewing  Neck: denies swollen glands, tender glands  Cardiopulmonary: endorses shortness of breath, difficulty breathing at night, wheezing, denies pain in chest, irregular heart beat, sudden changes in heart beat, swollen legs or feet, cough, coughing of blood  Gastrointestinal: endorses increasing constipation, denies nausea, heartburn, stomach pain relieved by food, vomiting of blood/\"coffee grounds\", jaundice, persistent diarrhea, blood in stools, black stools  Genitourinary: denies nocturia, difficult urination, pain or burning on urination, blood in urine, cloudy urine, pus in urine, genital discharge, frequent urination, vaginal dryness, rash/ulcers, sexual difficulties   Hematologic: endorses anemia, denies bleeding tendency, blood clots  Neurologic: endorses numbness or tingling in hands/feet, muscle weakness, denies headaches, dizziness, memory loss, fainting or loss of consciousness  Psychiatric: endorses excessive worries, denies depression  Sleep: endorses, obstructive sleep apnea, daytime somnolence, difficulty falling asleep, difficulty staying asleep, denies poor sleep (7 hours), snoring    PAST MEDICAL HISTORY    She has a past medical history of Anxiety (6/4/2009); BV (bacterial vaginosis); Candidal vaginitis; Concussion (09/12); Diabetes ([facility] [address] 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  She also has no past medical history of Difficult intubation or Unspecified adverse effect of anesthesia. FAMILY HISTORY    Her family history includes Arthritis-rheumatoid in her mother; Breast Cancer (age of onset: 48) in her sister; Cancer in her sister; Heart Disease in her father; Other in her mother and son; Thyroid Disease in her sister. SOCIAL HISTORY    She reports that she has never smoked. She has never used smokeless tobacco. She reports that she does not drink alcohol or use illicit drugs. HEALTH MAINTENANCE    Immunizations  Immunization History   Administered Date(s) Administered    TDAP Vaccine 07/07/2010     MEDICATIONS    Current Outpatient Prescriptions   Medication Sig Dispense Refill    ONETOUCH ULTRA TEST strip TEST UP TO 2 TIMES DAILY 200 Strip 4    ONE TOUCH DELICA 33 gauge misc TEST UP TO 2 TIMES DAILY 200 Package 4    meloxicam (MOBIC) 15 mg tablet TAKE 1 TABLET BY MOUTH  DAILY AS NEEDED FOR PAIN 90 Tab 0    gabapentin (NEURONTIN) 100 mg capsule Take 1 Cap by mouth three (3) times daily. (Patient taking differently: Take 100 mg by mouth as needed.) 90 Cap 5    metFORMIN ER (GLUCOPHAGE XR) 500 mg tablet Once  A day with dinner  NOTE THE CHANGE 90 Tab 4    ondansetron (ZOFRAN ODT) 4 mg disintegrating tablet Take 1 Tab by mouth every eight (8) hours as needed for Nausea. 20 Tab 0    albuterol (PROVENTIL HFA, VENTOLIN HFA, PROAIR HFA) 90 mcg/actuation inhaler Take 1 Puff by inhalation every six (6) hours as needed for Wheezing for up to 30 doses. 1 Inhaler 0    cyclobenzaprine (FLEXERIL) 10 mg tablet Take 1 Tab by mouth daily as needed for Muscle Spasm(s). Indications: Muscle Spasm 10 Tab 0    multivitamin (ONE A DAY) tablet Take 1 Tab by mouth daily.  FERROUS SULFATE PO Take  by mouth.  ibuprofen (MOTRIN) 600 mg tablet Take 1 Tab by mouth every eight (8) hours as needed for Pain for up to 20 doses. 20 Tab 0    ascorbic acid, vitamin C, (VITAMIN C) 500 mg tablet Take  by mouth.       calcium-cholecalciferol, d3, (CALCIUM 600 + D) 600-125 mg-unit tab Take  by mouth.  cholecalciferol, vitamin D3, (VITAMIN D3) 2,000 unit tab Take 5,000 Units by mouth.  cyanocobalamin (VITAMIN B-12) 1,000 mcg tablet Take 1,000 mcg by mouth daily.  digestive enzymes (ENZYME DIGEST) cap Take  by mouth.  clonazePAM (KLONOPIN) 0.5 mg tablet Take 0.5 mg by mouth three (3) times daily.  zolpidem (AMBIEN) 10 mg tablet Take  by mouth nightly as needed for Sleep. ALLERGIES    Allergies   Allergen Reactions    Pcn [Penicillins] Itching    Percocet [Oxycodone-Acetaminophen] Other (comments)     Passe out, feels dizzy       PHYSICAL EXAMINATION    Visit Vitals    /68    Pulse 73    Temp 98.8 °F (37.1 °C)    Resp 18    Ht 5' 5\" (1.651 m)    Wt 158 lb (71.7 kg)    BMI 26.29 kg/m2     Body mass index is 26.29 kg/(m^2). General: Patient is alert, oriented x 3, not in acute distress    HEENT:   Conjunctiva are not injected and appear moist, there is no alopecia, neck is supple. Chest:  Breathing comfortably at room air    Extremities:  Free of clubbing, cyanosis, edema, extremities well perfused. Neurological exam:  Muscle strength is full in upper and lower extremities. Skin exam:  There are no rashes, no tophi, no psoriasis, no active Raynaud's, no livedo reticularis, no periungual erythema. DATA REVIEW    Prior medical records were reviewed and are summarized as below:    Laboratory data: summarized in the HPI    Imaging: summarized in the HPI. ASSESSMENT AND PLAN    1) Chronic Left Sided Back Pain with Sciatica. I referred her to physical therapy. I asked her to schedule her MRI and then establish care with orthopedics or pain management. I do not manage chronic back pain or sciatica.      Today, I personally spent 20 minutes in direct face to face time with the patient, of which greater than 50% of the time was spent in patient education, counseling, and coordination of care as described above. All questions asked and answered. The patient voiced understanding of the aforementioned assessment and plan. Summary of plan was provided in the After Visit Summary patient instructions. I also provided education about MyChart setup and utility.     TODAY'S ORDERS    Orders Placed This Encounter    REFERRAL TO PHYSICAL THERAPY     Future Appointments  Date Time Provider Maico Falk   5/25/2018 8:40 AM Irma Salmeron MD [address]   8/10/2018 10:30 AM Reanna Stoevr  [facility], MD, [address] [place] [address]    Adult Rheumatology   Musculoskeletal Ultrasound Certified  [address] [place] [address], [address] [facility] [place]   Phone [phone]  Fax [phone]

## 2018-05-02 ENCOUNTER — HOSPITAL ENCOUNTER (OUTPATIENT)
Dept: MRI IMAGING | Age: 63
Discharge: HOME | End: 2018-05-02
Attending: NURSE PRACTITIONER
Payer: MEDICARE

## 2018-05-02 DIAGNOSIS — G89.29 CHRONIC MIDLINE LOW BACK PAIN WITH LEFT-SIDED SCIATICA: ICD-10-CM

## 2018-05-02 DIAGNOSIS — M54.42 CHRONIC MIDLINE LOW BACK PAIN WITH LEFT-SIDED SCIATICA: ICD-10-CM

## 2018-05-02 DIAGNOSIS — G89.29 CHRONIC MIDLINE LOW BACK PAIN WITHOUT SCIATICA: ICD-10-CM

## 2018-05-02 DIAGNOSIS — R26.9 GAIT ABNORMALITY: ICD-10-CM

## 2018-05-02 DIAGNOSIS — M54.50 CHRONIC MIDLINE LOW BACK PAIN WITHOUT SCIATICA: ICD-10-CM

## 2018-05-02 PROCEDURE — 72148 MRI LUMBAR SPINE W/O DYE: CPT

## 2018-05-02 RX ORDER — MELOXICAM 15 MG/1
TABLET ORAL
Qty: 90 TAB | Refills: 0 | Status: SHIPPED | OUTPATIENT
Start: 2018-05-02 | End: 2018-09-20 | Stop reason: SDUPTHER

## 2018-05-03 NOTE — TELEPHONE ENCOUNTER
Per [facility] - Qvar 80 mcg has been replaced with Qvar 80 mcg Redihaler and [facility] would like replacement authorized. I do not see Qvar as current med.   Qvar redihaler has been pended

## 2018-05-04 ENCOUNTER — TELEPHONE (OUTPATIENT)
Dept: RHEUMATOLOGY | Age: 63
End: 2018-05-04

## 2018-05-04 ENCOUNTER — TELEPHONE (OUTPATIENT)
Dept: NEUROLOGY | Age: 63
End: 2018-05-04

## 2018-05-04 NOTE — TELEPHONE ENCOUNTER
Spoke with pt and told her that our office was not the ones who ordered her MRI, and that she will need to call the office who ordered it to go over the results. I told her that it was ordered by Shonna Holguin NP. She stated an understanding.

## 2018-05-07 DIAGNOSIS — M79.604 PAIN IN BOTH LOWER EXTREMITIES: Primary | ICD-10-CM

## 2018-05-07 DIAGNOSIS — M79.605 PAIN IN BOTH LOWER EXTREMITIES: Primary | ICD-10-CM

## 2018-05-07 DIAGNOSIS — R26.9 GAIT ABNORMALITY: ICD-10-CM

## 2018-05-07 NOTE — TELEPHONE ENCOUNTER
Attempted to contact patient. Left message on voicemail for return call. Per NP:  Her MRI actually showed significant disc bulging in the lumbar spine which is pushing into the spinal column which is interfering (pinching) with the nerves  and routes that innervate her legs. Most likely causing her symptoms. Will want to send her to get a surgical opinion to look at a lumbar spine injection vs. Other options. Can also send to PT if she is interested for more specific back therapy. Has she ever seen a surgeon?  Thanks  (Routing comment)

## 2018-05-09 NOTE — TELEPHONE ENCOUNTER
----- Message from Ksenia Caro sent at 5/9/2018 12:03 PM EDT -----  Regarding: SHIRLEY Cho/Telephone  The patient is requesting a call back with her most recent MRI results.  [phone])[phone]

## 2018-05-11 DIAGNOSIS — M54.42 CHRONIC MIDLINE LOW BACK PAIN WITH LEFT-SIDED SCIATICA: Primary | ICD-10-CM

## 2018-05-11 DIAGNOSIS — G89.29 CHRONIC MIDLINE LOW BACK PAIN WITH LEFT-SIDED SCIATICA: Primary | ICD-10-CM

## 2018-05-11 NOTE — TELEPHONE ENCOUNTER
----- Message from Rosie Arango sent at 5/11/2018 12:07 PM EDT -----  Regarding: SHIRLEY Cho/Telephone  Pt stated she would like her MRI results.     Contact [phone]

## 2018-05-11 NOTE — TELEPHONE ENCOUNTER
Called and spoke to patient. Informed patient of results.  She would like surgeon referral and PT referral.

## 2018-05-11 NOTE — TELEPHONE ENCOUNTER
----- Message from Bradley Blaek sent at 5/11/2018  1:29 PM EDT -----  Regarding: Np Lynette/Telephone   Pt stated that she needs to get her MRI results so she can take them with her to an appointment that she has on Monday. Best contact number is [phone].

## 2018-07-15 DIAGNOSIS — I10 HYPERTENSION, UNSPECIFIED TYPE: Primary | ICD-10-CM

## 2018-07-18 RX ORDER — HYDROCHLOROTHIAZIDE 12.5 MG/1
TABLET ORAL
Qty: 90 TAB | Refills: 0 | Status: SHIPPED | OUTPATIENT
Start: 2018-07-18 | End: 2018-10-06 | Stop reason: SDUPTHER

## 2018-08-02 RX ORDER — MECLIZINE HYDROCHLORIDE 25 MG/1
25 TABLET ORAL
Qty: 30 TAB | Refills: 0 | Status: SHIPPED | OUTPATIENT
Start: 2018-08-02 | End: 2018-08-12

## 2018-08-07 RX ORDER — PROCHLORPERAZINE MALEATE 5 MG
5 TABLET ORAL
Qty: 30 TAB | Refills: 0 | Status: SHIPPED | OUTPATIENT
Start: 2018-08-07 | End: 2018-08-14

## 2018-08-10 ENCOUNTER — OFFICE VISIT (OUTPATIENT)
Dept: INTERNAL MEDICINE CLINIC | Age: 63
End: 2018-08-10

## 2018-08-10 VITALS
BODY MASS INDEX: 27.16 KG/M2 | SYSTOLIC BLOOD PRESSURE: 118 MMHG | DIASTOLIC BLOOD PRESSURE: 64 MMHG | WEIGHT: 163 LBS | RESPIRATION RATE: 16 BRPM | TEMPERATURE: 98.2 F | HEIGHT: 65 IN | HEART RATE: 61 BPM | OXYGEN SATURATION: 97 %

## 2018-08-10 DIAGNOSIS — Z00.00 MEDICARE ANNUAL WELLNESS VISIT, SUBSEQUENT: Primary | ICD-10-CM

## 2018-08-10 DIAGNOSIS — F51.01 PRIMARY INSOMNIA: ICD-10-CM

## 2018-08-10 DIAGNOSIS — E11.9 DIABETES MELLITUS TYPE 2, DIET-CONTROLLED (HCC): ICD-10-CM

## 2018-08-10 DIAGNOSIS — Z23 ENCOUNTER FOR IMMUNIZATION: ICD-10-CM

## 2018-08-10 DIAGNOSIS — R42 DIZZINESS: ICD-10-CM

## 2018-08-10 DIAGNOSIS — Z71.89 ACP (ADVANCE CARE PLANNING): ICD-10-CM

## 2018-08-10 DIAGNOSIS — F41.9 ANXIETY: ICD-10-CM

## 2018-08-10 DIAGNOSIS — R42 VERTIGO: ICD-10-CM

## 2018-08-10 NOTE — PATIENT INSTRUCTIONS
Vaccine Information Statement    Pneumococcal Polysaccharide Vaccine: What You Need to Know    Many Vaccine Information Statements are available in Polish and other languages. See www.immunize.org/vis. Hojas de información Sobre Vacunas están disponibles en español y en muchos otros idiomas. Visite Laina.si. 1. Why get vaccinated? Vaccination can protect older adults (and some children and younger adults) from pneumococcal disease. Pneumococcal disease is caused by bacteria that can spread from person to person through close contact. It can cause ear infections, and it can also lead to more serious infections of the:   Lungs (pneumonia),   Blood (bacteremia), and   Covering of the brain and spinal cord (meningitis). Meningitis can cause deafness and brain damage, and it can be fatal.      Anyone can get pneumococcal disease, but children under 3years of age, people with certain medical conditions, adults over 72years of age, and cigarette smokers are at the highest risk. About 18,000 older adults die each year from pneumococcal disease in the [place]. Treatment of pneumococcal infections with penicillin and other drugs used to be more effective. But some strains of the disease have become resistant to these drugs. This makes prevention of the disease, through vaccination, even more important. 2. Pneumococcal polysaccharide vaccine (PPSV23)    Pneumococcal polysaccharide vaccine (PPSV23) protects against 23 types of pneumococcal bacteria. It will not prevent all pneumococcal disease. PPSV23 is recommended for:   All adults 72years of age and older,   Anyone 2 through 59years of age with certain long-term health problems,   Anyone 2 through 59years of age with a weakened immune system,   Adults 23 through 59years of age who smoke cigarettes or have asthma. Most people need only one dose of PPSV.   A second dose is recommended for certain high-risk groups. People 72 and older should get a dose even if they have gotten one or more doses of the vaccine before they turned 65. Your healthcare provider can give you more information about these recommendations. Most healthy adults develop protection within 2 to 3 weeks of getting the shot. 3. Some people should not get this vaccine     Anyone who has had a life-threatening allergic reaction to PPSV should not get another dose.  Anyone who has a severe allergy to any component of PPSV should not receive it. Tell your provider if you have any severe allergies.  Anyone who is moderately or severely ill when the shot is scheduled may be asked to wait until they recover before getting the vaccine. Someone with a mild illness can usually be vaccinated.  Children less than 3years of age should not receive this vaccine.  There is no evidence that PPSV is harmful to either a pregnant woman or to her fetus. However, as a precaution, women who need the vaccine should be vaccinated before becoming pregnant, if possible. 4. Risks of a vaccine reaction    With any medicine, including vaccines, there is a chance of side effects. These are usually mild and go away on their own, but serious reactions are also possible. About half of people who get PPSV have mild side effects, such as redness or pain where the shot is given, which go away within about two days. Less than 1 out of 100 people develop a fever, muscle aches, or more severe local reactions. Problems that could happen after any vaccine:     People sometimes faint after a medical procedure, including vaccination. Sitting or lying down for about 15 minutes can help prevent fainting, and injuries caused by a fall. Tell your doctor if you feel dizzy, or have vision changes or ringing in the ears.  Some people get severe pain in the shoulder and have difficulty moving the arm where a shot was given.  This happens very rarely.  Any medication can cause a severe allergic reaction. Such reactions from a vaccine are very rare, estimated at about 1 in a million doses, and would happen within a few minutes to a few hours after the vaccination. As with any medicine, there is a very remote chance of a vaccine causing a serious injury or death. The safety of vaccines is always being monitored. For more information, visit: www.cdc.gov/vaccinesafety/     5. What if there is a serious reaction? What should I look for? Look for anything that concerns you, such as signs of a severe allergic reaction, very high fever, or unusual behavior. Signs of a severe allergic reaction can include hives, swelling of the face and throat, difficulty breathing, a fast heartbeat, dizziness, and weakness. These would usually start a few minutes to a few hours after the vaccination. What should I do? If you think it is a severe allergic reaction or other emergency that cant wait, call 9-1-1 or get to the nearest hospital. Otherwise, call your doctor. Afterward, the reaction should be reported to the Vaccine Adverse Event Reporting System (VAERS). Your doctor might file this report, or you can do it yourself through the VAERS web site at www.vaers. [facility].gov, or by calling 4-132.339.8321. Fingerprint does not give medical advice. 6. How can I learn more?  Ask your doctor. He or she can give you the vaccine package insert or suggest other sources of information.  Call your local or state health department.    Contact the Centers for Disease Control and Prevention (CDC):  - Call 9-545.745.3796 (1-800-CDC-INFO) or  - Visit CDCs website at [employer] 18 04/24/2015    Critical access hospital and [facility] for Disease Control and Prevention    Office Use Only

## 2018-08-10 NOTE — PROGRESS NOTES
NN Medicare Wellness Visit      Sin Crowley is a 70 y.o. female and presents for Annual Medicare Wellness Visit. Assessment of cognitive impairment: Alert and oriented x 3     Depression Screen:   PHQ over the last two weeks 8/10/2018   Little interest or pleasure in doing things Not at all   Feeling down, depressed, irritable, or hopeless Not at all   Total Score PHQ 2 0       Fall Risk Assessment:    Fall Risk Assessment, last 12 mths 8/10/2018   Able to walk? Yes   Fall in past 12 months? No   Fall with injury? -   Number of falls in past 12 months -   Fall Risk Score -       Abuse Screen:   Abuse Screening Questionnaire 12/22/2017   Do you ever feel afraid of your partner? N   Are you in a relationship with someone who physically or mentally threatens you? N   Is it safe for you to go home? Y       Activities of Daily Living:  Self-care. Requires assistance with: no ADLs  Patient handle his/her own medications  yes Use of pill box  yes  Activities of Daily Living:   ADL Assessment 8/9/2017   Feeding yourself No Help Needed   Getting from bed to chair No Help Needed   Getting dressed No Help Needed   Bathing or showering No Help Needed   Walk across the room (includes cane/walker) No Help Needed   Using the telphone No Help Needed   Taking your medications No Help Needed   Preparing meals No Help Needed   Managing money (expenses/bills) No Help Needed   Moderately strenuous housework (laundry) No Help Needed   Shopping for personal items (toiletries/medicines) No Help Needed   Shopping for groceries No Help Needed   Driving No Help Needed   Climbing a flight of stairs No Help Needed   Getting to places beyond walking distances No Help Needed       Health Maintenance:  Daily Aspirin: yes  Bone Density: up to date  Glaucoma Screening: yes  Immunizations:    Tetanus: up to date. Influenza: declines  Shingles declines   PPSV-23: last one in 2014 , will give second one today.  Prevnar-13: N/a    Cancer screening:    Cervical: not up to date - recommended making an appointment. Breast: up to date. Colon: not up to date - will schedule . Prostate:  n/a    Alcohol Risk Screen:   On any occasion during the past 3 months, have you had more than 3 drinks(female) or 4 drinks (male) containing alcohol in one? No  Do you average more than 7 drinks (female) or 14 drinks (male) per week? No      Hearing Loss:  Hearing is good. denies any hearing loss    Vision Loss:   Wears glasses, contact lenses, or have any other visual impairment  yes    Adult Nutrition Screen:  No risk factors noted. Advance Care Planning:   End of Life Planning: has NO advanced directive  - add't info provided  Jaylin Calvert ACP-Facilitator appointment no      Medications/Allergies: Reviewed with patient  Prior to Admission medications    Medication Sig Start Date End Date Taking? Authorizing Provider   prochlorperazine (COMPAZINE) 5 mg tablet Take 1 Tab by mouth every six (6) hours as needed for Nausea for up to 7 days. 8/7/18 8/14/18 Yes Bebeto Johnson MD   meclizine (ANTIVERT) 25 mg tablet Take 1 Tab by mouth every eight (8) hours as needed for up to 10 days. 8/2/18 8/12/18 Yes Bebeto Johnson MD   hydroCHLOROthiazide (HYDRODIURIL) 12.5 mg tablet TAKE 1 TABLET BY MOUTH  DAILY 7/18/18  Yes Amber Holm NP   meloxicam (MOBIC) 15 mg tablet TAKE 1 TABLET BY MOUTH  DAILY AS NEEDED FOR PAIN 5/2/18  Yes Bebeto Johnson MD   ONETOUCH ULTRA TEST strip TEST UP TO 2 TIMES DAILY 3/30/18  Yes Balta Bender MD   ONE TOUCH DELICA 33 gauge misc TEST UP TO 2 TIMES DAILY 3/30/18  Yes Balta Bender MD   gabapentin (NEURONTIN) 100 mg capsule Take 1 Cap by mouth three (3) times daily. Patient taking differently: Take 100 mg by mouth as needed.  3/16/18  Yes Rodrick Hopkins NP   metFORMIN ER (GLUCOPHAGE XR) 500 mg tablet Once  A day with dinner  NOTE THE CHANGE 1/12/18  Yes Balta Bender MD   ondansetron (ZOFRAN ODT) 4 mg disintegrating tablet Take 1 Tab by mouth every eight (8) hours as needed for Nausea. 12/22/17  Yes Bette Perdomo MD   albuterol (PROVENTIL HFA, VENTOLIN HFA, PROAIR HFA) 90 mcg/actuation inhaler Take 1 Puff by inhalation every six (6) hours as needed for Wheezing for up to 30 doses. 12/22/17  Yes Bette Perdomo MD   cyclobenzaprine (FLEXERIL) 10 mg tablet Take 1 Tab by mouth daily as needed for Muscle Spasm(s). Indications: Muscle Spasm 12/4/17  Yes Bette Perdomo MD   multivitamin (ONE A DAY) tablet Take 1 Tab by mouth daily. Yes Historical Provider   FERROUS SULFATE PO Take  by mouth. Yes Historical Provider   ibuprofen (MOTRIN) 600 mg tablet Take 1 Tab by mouth every eight (8) hours as needed for Pain for up to 20 doses. 2/6/17  Yes Juana Mckeon NP   ascorbic acid, vitamin C, (VITAMIN C) 500 mg tablet Take  by mouth. Yes Historical Provider   calcium-cholecalciferol, d3, (CALCIUM 600 + D) 600-125 mg-unit tab Take  by mouth. Yes Historical Provider   cyanocobalamin (VITAMIN B-12) 1,000 mcg tablet Take 1,000 mcg by mouth daily. Yes Historical Provider   clonazePAM (KLONOPIN) 0.5 mg tablet Take 0.5 mg by mouth three (3) times daily. Yes Historical Provider   zolpidem (AMBIEN) 10 mg tablet Take  by mouth nightly as needed for Sleep. Yes Historical Provider   cholecalciferol, vitamin D3, (VITAMIN D3) 2,000 unit tab Take 5,000 Units by mouth. Historical Provider   digestive enzymes (ENZYME DIGEST) cap Take  by mouth. Historical Provider       Allergies   Allergen Reactions    Pcn [Penicillins] Itching    Percocet [Oxycodone-Acetaminophen] Other (comments)     Passe out, feels dizzy       Objective:  Visit Vitals    /64 (BP 1 Location: Left arm, BP Patient Position: Sitting)    Pulse 61    Temp 98.2 °F (36.8 °C) (Oral)    Resp 16    Ht 5' 5\" (1.651 m)    Wt 163 lb (73.9 kg)    SpO2 97%    BMI 27.12 kg/m2    Body mass index is 27.12 kg/(m^2).     Problem List: Reviewed with patient and discussed risk factors. Patient Active Problem List   Diagnosis Code    Iron (Fe) deficiency anemia D50.9    Anxiety F41.9    Disc disorder M51.9    Seasonal allergic rhinitis J30.2    Osteopenia M85.80    Vertigo R42    ACP (advance care planning) Z71.89    Mild intermittent asthma without complication X93.96       PSH: Reviewed with patient  Past Surgical History:   Procedure Laterality Date    HX ORTHOPAEDIC      left foot        SH: Reviewed with patient  Social History   Substance Use Topics    Smoking status: Never Smoker    Smokeless tobacco: Never Used    Alcohol use No       FH: Reviewed with patient  Family History   Problem Relation Age of Onset   [employer] Arthritis-rheumatoid Mother     Other Mother      ulcerative colitis    Heart Disease Father     Cancer Sister     Breast Cancer Sister 48    Thyroid Disease Sister     Other Son      psoriatic arthritis       Current medical providers:    Patient Care Team:  Bette Perdomo MD as PCP - General  Trish Kaminski RN as Ambulatory Care Navigator  Bhupendra Rico MD (Neurology)  Dr. Jackie Hercules ( psychiatrist)    Plan:    Diagnoses and all orders for this visit:    1. Medicare annual wellness visit, subsequent  Immunization & health screening discussed with her. She is well aware of consequences of not getting certain immunizations. 2. Dizziness  -     AMB POC EKG ROUTINE W/ 12 LEADS, INTER & REP    3. ACP (advance care planning)   Paper work given . Recommended making an appointment with [facility]. 4. Vertigo   On as needed Meclizine & going for Rehab.    5. Anxiety   Followed by psych. Stable on medications. 6. Primary insomnia   On ambien as needed. 7. Diabetes mellitus type 2, diet-controlled ([facility] [address]  -     HEMOGLOBIN A1C WITH EAG  -     METABOLIC PANEL, COMPREHENSIVE  -     LIPID PANEL  -     CBC W/O DIFF    8.  Encounter for immunization  -     Pneumococcal Polysaccharide vaccine, 23-Valent, Adult or Immunocompromised        Health Maintenance   Topic Date Due    Pneumococcal 65+ High/Highest Risk (1 of 2 - PCV13) 03/01/2018    Influenza Age 5 to Adult  08/01/2018    MEDICARE YEARLY EXAM  08/10/2018    BREAST CANCER SCRN MAMMOGRAM  01/23/2020    GLAUCOMA SCREENING Q2Y  01/30/2020    COLONOSCOPY  06/16/2020    DTaP/Tdap/Td series (2 - Td) 07/07/2020    Hepatitis C Screening  Addressed    Bone Densitometry (Dexa) Screening  Completed    ZOSTER VACCINE AGE 60>  Addressed          Urinary/ Fecal Incontinence: none. Regular physical exercise: doesn`t walk because scare of falling, does do swimming 3 times a week      Patient verbalized understanding of information presented. AVS and Medicare Part B Preventive Services Table printed and given to pt and reviewed. See table for findings under Recommendation and Scheduled. All of the patient's questions were answered.

## 2018-08-10 NOTE — ACP (ADVANCE CARE PLANNING)
Advance Care Planning (ACP) Provider Conversation Snapshot    Date of ACP Conversation: 08/10/18  Persons included in Conversation:  patient  Length of ACP Conversation in minutes:  <16 minutes (Non-Billable)            For Patients with Decision Making Capacity:   Values/Goals: Exploration of values, goals, and preferences if recovery is not expected, even with continued medical treatment in the event of:  Imminent death    Conversation Outcomes / Follow-Up Plan:   Recommended completion of Advance Directive form after review of ACP materials and conversation with prospective healthcare agent

## 2018-08-11 LAB
ALBUMIN SERPL-MCNC: 4.3 G/DL (ref 3.6–4.8)
ALBUMIN/GLOB SERPL: 2.7 {RATIO} (ref 1.2–2.2)
ALP SERPL-CCNC: 57 IU/L (ref 39–117)
ALT SERPL-CCNC: 17 IU/L (ref 0–32)
AST SERPL-CCNC: 19 IU/L (ref 0–40)
BILIRUB SERPL-MCNC: 0.2 MG/DL (ref 0–1.2)
BUN SERPL-MCNC: 16 MG/DL (ref 8–27)
BUN/CREAT SERPL: 25 (ref 12–28)
CALCIUM SERPL-MCNC: 9.1 MG/DL (ref 8.7–10.3)
CHLORIDE SERPL-SCNC: 102 MMOL/L (ref 96–106)
CHOLEST SERPL-MCNC: 192 MG/DL (ref 100–199)
CO2 SERPL-SCNC: 25 MMOL/L (ref 20–29)
CREAT SERPL-MCNC: 0.65 MG/DL (ref 0.57–1)
ERYTHROCYTE [DISTWIDTH] IN BLOOD BY AUTOMATED COUNT: 14.7 % (ref 12.3–15.4)
EST. AVERAGE GLUCOSE BLD GHB EST-MCNC: 120 MG/DL
GLOBULIN SER CALC-MCNC: 1.6 G/DL (ref 1.5–4.5)
GLUCOSE SERPL-MCNC: 87 MG/DL (ref 65–99)
HBA1C MFR BLD: 5.8 % (ref 4.8–5.6)
HCT VFR BLD AUTO: 33.5 % (ref 34–46.6)
HDLC SERPL-MCNC: 75 MG/DL
HGB BLD-MCNC: 10.9 G/DL (ref 11.1–15.9)
INTERPRETATION, 910389: NORMAL
LDLC SERPL CALC-MCNC: 107 MG/DL (ref 0–99)
Lab: NORMAL
MCH RBC QN AUTO: 29.8 PG (ref 26.6–33)
MCHC RBC AUTO-ENTMCNC: 32.5 G/DL (ref 31.5–35.7)
MCV RBC AUTO: 92 FL (ref 79–97)
PLATELET # BLD AUTO: 226 X10E3/UL (ref 150–379)
POTASSIUM SERPL-SCNC: 4 MMOL/L (ref 3.5–5.2)
PROT SERPL-MCNC: 5.9 G/DL (ref 6–8.5)
RBC # BLD AUTO: 3.66 X10E6/UL (ref 3.77–5.28)
SODIUM SERPL-SCNC: 140 MMOL/L (ref 134–144)
TRIGL SERPL-MCNC: 48 MG/DL (ref 0–149)
VLDLC SERPL CALC-MCNC: 10 MG/DL (ref 5–40)
WBC # BLD AUTO: 2.7 X10E3/UL (ref 3.4–10.8)

## 2018-08-13 DIAGNOSIS — D50.9 IRON DEFICIENCY ANEMIA, UNSPECIFIED IRON DEFICIENCY ANEMIA TYPE: Primary | ICD-10-CM

## 2018-08-13 RX ORDER — LANOLIN ALCOHOL/MO/W.PET/CERES
325 CREAM (GRAM) TOPICAL
Qty: 30 TAB | Refills: 2 | Status: SHIPPED | OUTPATIENT
Start: 2018-08-13 | End: 2018-11-06 | Stop reason: SDUPTHER

## 2018-08-13 NOTE — PROGRESS NOTES
Labs discussed ( 2 identifier used) . Iron tablet sent to the pharmacy. Please mail her blood work report.

## 2018-09-20 DIAGNOSIS — G89.29 CHRONIC MIDLINE LOW BACK PAIN WITHOUT SCIATICA: ICD-10-CM

## 2018-09-20 DIAGNOSIS — M54.50 CHRONIC MIDLINE LOW BACK PAIN WITHOUT SCIATICA: ICD-10-CM

## 2018-09-20 RX ORDER — MELOXICAM 15 MG/1
TABLET ORAL
Qty: 90 TAB | Refills: 0 | Status: SHIPPED | OUTPATIENT
Start: 2018-09-20 | End: 2018-12-09 | Stop reason: SDUPTHER

## 2018-09-28 ENCOUNTER — DOCUMENTATION ONLY (OUTPATIENT)
Dept: FAMILY MEDICINE CLINIC | Age: 63
End: 2018-09-28

## 2018-10-06 DIAGNOSIS — I10 HYPERTENSION, UNSPECIFIED TYPE: ICD-10-CM

## 2018-10-09 RX ORDER — HYDROCHLOROTHIAZIDE 12.5 MG/1
TABLET ORAL
Qty: 90 TAB | Refills: 0 | Status: SHIPPED | OUTPATIENT
Start: 2018-10-09 | End: 2018-11-06 | Stop reason: SDUPTHER

## 2018-10-29 ENCOUNTER — HOSPITAL ENCOUNTER (OUTPATIENT)
Dept: GENERAL RADIOLOGY | Age: 63
Discharge: HOME | End: 2018-10-29
Payer: MEDICARE

## 2018-10-29 DIAGNOSIS — M25.559 HIP PAIN: ICD-10-CM

## 2018-10-29 PROCEDURE — 73502 X-RAY EXAM HIP UNI 2-3 VIEWS: CPT

## 2018-11-06 ENCOUNTER — HOSPITAL ENCOUNTER (OUTPATIENT)
Dept: LAB | Age: 63
Discharge: HOME | End: 2018-11-06
Payer: MEDICARE

## 2018-11-06 ENCOUNTER — OFFICE VISIT (OUTPATIENT)
Dept: PRIMARY CARE CLINIC | Age: 63
End: 2018-11-06

## 2018-11-06 VITALS
OXYGEN SATURATION: 98 % | RESPIRATION RATE: 18 BRPM | HEART RATE: 62 BPM | TEMPERATURE: 97.8 F | BODY MASS INDEX: 27.26 KG/M2 | WEIGHT: 163.6 LBS | HEIGHT: 65 IN | SYSTOLIC BLOOD PRESSURE: 118 MMHG | DIASTOLIC BLOOD PRESSURE: 75 MMHG

## 2018-11-06 DIAGNOSIS — Z01.419 WELL WOMAN EXAM WITH ROUTINE GYNECOLOGICAL EXAM: ICD-10-CM

## 2018-11-06 DIAGNOSIS — D50.9 IRON DEFICIENCY ANEMIA, UNSPECIFIED IRON DEFICIENCY ANEMIA TYPE: Primary | ICD-10-CM

## 2018-11-06 DIAGNOSIS — N95.1 MENOPAUSAL VAGINAL DRYNESS: ICD-10-CM

## 2018-11-06 DIAGNOSIS — R11.0 NAUSEA: ICD-10-CM

## 2018-11-06 DIAGNOSIS — E85.9 MYELOMA ASSOCIATED AMYLOIDOSIS (HCC): ICD-10-CM

## 2018-11-06 DIAGNOSIS — C90.00 MYELOMA ASSOCIATED AMYLOIDOSIS (HCC): ICD-10-CM

## 2018-11-06 DIAGNOSIS — J45.20 MILD INTERMITTENT ASTHMA WITHOUT COMPLICATION: ICD-10-CM

## 2018-11-06 DIAGNOSIS — I10 HYPERTENSION, UNSPECIFIED TYPE: ICD-10-CM

## 2018-11-06 DIAGNOSIS — I10 ESSENTIAL HYPERTENSION: ICD-10-CM

## 2018-11-06 DIAGNOSIS — D50.9 IRON DEFICIENCY ANEMIA, UNSPECIFIED IRON DEFICIENCY ANEMIA TYPE: ICD-10-CM

## 2018-11-06 PROCEDURE — 87624 HPV HI-RISK TYP POOLED RSLT: CPT

## 2018-11-06 PROCEDURE — 88175 CYTOPATH C/V AUTO FLUID REDO: CPT

## 2018-11-06 RX ORDER — ALBUTEROL SULFATE 90 UG/1
1 AEROSOL, METERED RESPIRATORY (INHALATION)
Qty: 1 INHALER | Refills: 0 | Status: SHIPPED | OUTPATIENT
Start: 2018-11-06 | End: 2019-07-15 | Stop reason: SDUPTHER

## 2018-11-06 RX ORDER — LANOLIN ALCOHOL/MO/W.PET/CERES
325 CREAM (GRAM) TOPICAL
Qty: 90 TAB | Refills: 0 | Status: SHIPPED | OUTPATIENT
Start: 2018-11-06 | End: 2018-11-06 | Stop reason: SDUPTHER

## 2018-11-06 RX ORDER — LANOLIN ALCOHOL/MO/W.PET/CERES
325 CREAM (GRAM) TOPICAL
Qty: 90 TAB | Refills: 0 | Status: SHIPPED | OUTPATIENT
Start: 2018-11-06 | End: 2019-02-04 | Stop reason: SDUPTHER

## 2018-11-06 RX ORDER — LANOLIN ALCOHOL/MO/W.PET/CERES
325 CREAM (GRAM) TOPICAL
Qty: 30 TAB | Refills: 0 | Status: SHIPPED | OUTPATIENT
Start: 2018-11-06 | End: 2018-12-06

## 2018-11-06 RX ORDER — ESTRADIOL 10 UG/1
10 INSERT VAGINAL
Qty: 8 TAB | Refills: 0 | Status: SHIPPED | OUTPATIENT
Start: 2018-11-08 | End: 2018-11-06 | Stop reason: SDUPTHER

## 2018-11-06 RX ORDER — ESTRADIOL 10 UG/1
INSERT VAGINAL
Qty: 24 TAB | Refills: 0 | Status: SHIPPED | OUTPATIENT
Start: 2018-11-06 | End: 2020-03-03

## 2018-11-06 RX ORDER — ONDANSETRON 4 MG/1
4 TABLET, ORALLY DISINTEGRATING ORAL
Qty: 20 TAB | Refills: 0 | Status: SHIPPED | OUTPATIENT
Start: 2018-11-06 | End: 2019-07-15 | Stop reason: SDUPTHER

## 2018-11-06 RX ORDER — HYDROCHLOROTHIAZIDE 12.5 MG/1
TABLET ORAL
Qty: 90 TAB | Refills: 0 | Status: SHIPPED | OUTPATIENT
Start: 2018-11-06 | End: 2019-02-27 | Stop reason: SDUPTHER

## 2018-11-06 NOTE — PROGRESS NOTES
Visit Vitals /75 (BP 1 Location: Left arm, BP Patient Position: Sitting) Pulse 62 Temp 97.8 °F (36.6 °C) (Oral) Resp 18 Ht 5' 5\" (1.651 m) Wt 163 lb 9.6 oz (74.2 kg) SpO2 98% BMI 27.22 kg/m² Chief Complaint Patient presents with  Gyn Exam  
  Annual   
 
 
1. Have you been to the ER, urgent care clinic since your last visit? Hospitalized since your last visit? Denies 2. Have you seen or consulted any other health care providers outside of the [address] [place] [address] since your last visit? Include any pap smears or colon screening. Denies

## 2018-11-06 NOTE — PROGRESS NOTES
Written by Gerry Hooks, as dictated by Dr. Greer Nageotte, MD. 
[facility] Kyra Rees is a 70 y.o. female. HPI The patient presents today for follow up & for a pap smear. Patient notes that she has been feeling bone pain in her pubic area and she feels that it has become bigger. She notes that she has noticed that the bone has become bigger since she fell. Denies vaginal bleeding, hysterectomy, IUD, and hormonal therapy. She is not pregnant and she is not sexually active. Patient reports that she has been experiencing vaginal dryness and lack of libido. BP is good today at 118/75. Compliant on HCTZ 12.5 mg. Patient notes that she is still exercising regularly. She weighs 163 lbs today and her weight has been consistent. The patient notes that she is wearing a smaller pant size. The patient notes that she is no longer going to PT as it was making her back pain worse and causing it to radiate down her L leg. She is still taking Mobic 15 mg. Followed by Dr. Tova Boxer (radiology) who ordered an XR for her back pain. 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 She notes that she she still sometimes experiences dizziness, especially after she swims in the pool. Patient is taking iron tablets and notes that if she does not take it she feels cold and her hands turn white. She is taking Ambien, Klonopin, vitamin B12, Flexeril as needed. Patient takes metformin as needed. She notes that she will stop taking gabapentin. She received a flu shot in September of this year. Patient notes that she has been trying to get the Shingrix vaccine, but she has not been able to find it. Patient Active Problem List  
Diagnosis Code  Iron (Fe) deficiency anemia D50.9  Anxiety F41.9  Disc disorder M51.9  Seasonal allergic rhinitis J30.2  Osteopenia M85.80  Vertigo R42  ACP (advance care planning) Z71.89  
  Mild intermittent asthma without complication G96.54 Current Outpatient Medications on File Prior to Visit Medication Sig Dispense Refill  hydroCHLOROthiazide (HYDRODIURIL) 12.5 mg tablet TAKE 1 TABLET BY MOUTH  DAILY 90 Tab 0  
 meloxicam (MOBIC) 15 mg tablet TAKE 1 TABLET BY MOUTH  DAILY AS NEEDED FOR PAIN 90 Tab 0  
 ONETOUCH ULTRA TEST strip TEST UP TO 2 TIMES DAILY 200 Strip 4  
 ONE TOUCH DELICA 33 gauge misc TEST UP TO 2 TIMES DAILY 200 Package 4  
 metFORMIN ER (GLUCOPHAGE XR) 500 mg tablet Once  A day with dinner  NOTE THE CHANGE 90 Tab 4  
 ondansetron (ZOFRAN ODT) 4 mg disintegrating tablet Take 1 Tab by mouth every eight (8) hours as needed for Nausea. 20 Tab 0  
 albuterol (PROVENTIL HFA, VENTOLIN HFA, PROAIR HFA) 90 mcg/actuation inhaler Take 1 Puff by inhalation every six (6) hours as needed for Wheezing for up to 30 doses. 1 Inhaler 0  
 multivitamin (ONE A DAY) tablet Take 1 Tab by mouth daily.  ibuprofen (MOTRIN) 600 mg tablet Take 1 Tab by mouth every eight (8) hours as needed for Pain for up to 20 doses. 20 Tab 0  
 ascorbic acid, vitamin C, (VITAMIN C) 500 mg tablet Take  by mouth.  calcium-cholecalciferol, d3, (CALCIUM 600 + D) 600-125 mg-unit tab Take  by mouth.  cholecalciferol, vitamin D3, (VITAMIN D3) 2,000 unit tab Take 5,000 Units by mouth.  cyanocobalamin (VITAMIN B-12) 1,000 mcg tablet Take 1,000 mcg by mouth daily.  clonazePAM (KLONOPIN) 0.5 mg tablet Take 0.5 mg by mouth three (3) times daily.  zolpidem (AMBIEN) 10 mg tablet Take  by mouth nightly as needed for Sleep.  digestive enzymes (ENZYME DIGEST) cap Take  by mouth. No current facility-administered medications on file prior to visit. Allergies Allergen Reactions  Pcn [Penicillins] Itching  Percocet [Oxycodone-Acetaminophen] Other (comments) Passe out, feels dizzy Past Medical History:  
Diagnosis Date  Anxiety 6/4/2009  BV (bacterial vaginosis)  Candidal vaginitis  Concussion 09/12  Diabetes ([address]  Disc disorder 6/4/2009  Iron (Fe) deficiency anemia 6/4/2009  Nausea & vomiting  Osteopenia 6/4/2009  Seasonal allergic rhinitis 6/4/2009  Vertigo Past Surgical History:  
Procedure Laterality Date  HX ORTHOPAEDIC    
 left foot Family History Problem Relation Age of Onset  Arthritis-rheumatoid Mother  Other Mother   
     ulcerative colitis  Heart Disease Father  Cancer Sister  Breast Cancer Sister 48  Thyroid Disease Sister  Other Son   
     psoriatic arthritis Social History Socioeconomic History  Marital status:  Spouse name: Not on file  Number of children: Not on file  Years of education: Not on file  Highest education level: Not on file Social Needs  Financial resource strain: Not on file  Food insecurity - worry: Not on file  Food insecurity - inability: Not on file  Transportation needs - medical: Not on file  Transportation needs - non-medical: Not on file Occupational History  Not on file Tobacco Use  Smoking status: Never Smoker  Smokeless tobacco: Never Used Substance and Sexual Activity  Alcohol use: No  
 Drug use: No  
 Sexual activity: Not Currently Other Topics Concern  Not on file Social History Narrative  Not on file Office Visit on 08/10/2018 Component Date Value Ref Range Status  Hemoglobin A1c 08/10/2018 5.8* 4.8 - 5.6 % Final  
 Estimated average glucose 08/10/2018 120  mg/dL Final  
 Glucose 08/10/2018 87  65 - 99 mg/dL Final  
 BUN 08/10/2018 16  8 - 27 mg/dL Final  
 Creatinine 08/10/2018 0.65  0.57 - 1.00 mg/dL Final  
 GFR est non-AA 08/10/2018 93  >59 mL/min/1.73 Final  
 GFR est AA 08/10/2018 108  >59 mL/min/1.73 Final  
 BUN/Creatinine ratio 08/10/2018 25  12 - 28 Final  
 Sodium 08/10/2018 140  134 - 144 mmol/L Final  
  Potassium 08/10/2018 4.0  3.5 - 5.2 mmol/L Final  
 Chloride 08/10/2018 102  96 - 106 mmol/L Final  
 CO2 08/10/2018 25  20 - 29 mmol/L Final  
 Calcium 08/10/2018 9.1  8.7 - 10.3 mg/dL Final  
 Protein, total 08/10/2018 5.9* 6.0 - 8.5 g/dL Final  
 Albumin 08/10/2018 4.3  3.6 - 4.8 g/dL Final  
 GLOBULIN, TOTAL 08/10/2018 1.6  1.5 - 4.5 g/dL Final  
 A-G Ratio 08/10/2018 2.7* 1.2 - 2.2 Final  
 Bilirubin, total 08/10/2018 0.2  0.0 - 1.2 mg/dL Final  
 Alk. phosphatase 08/10/2018 57  39 - 117 IU/L Final  
 AST (SGOT) 08/10/2018 19  0 - 40 IU/L Final  
 ALT (SGPT) 08/10/2018 17  0 - 32 IU/L Final  
 Cholesterol, total 08/10/2018 192  100 - 199 mg/dL Final  
 Triglyceride 08/10/2018 48  0 - 149 mg/dL Final  
 HDL Cholesterol 08/10/2018 75  >39 mg/dL Final  
 VLDL, calculated 08/10/2018 10  5 - 40 mg/dL Final  
 LDL, calculated 08/10/2018 107* 0 - 99 mg/dL Final  
 WBC 08/10/2018 2.7* 3.4 - 10.8 x10E3/uL Final  
 RBC 08/10/2018 3.66* 3.77 - 5.28 x10E6/uL Final  
 HGB 08/10/2018 10.9* 11.1 - 15.9 g/dL Final  
 HCT 08/10/2018 33.5* 34.0 - 46.6 % Final  
 MCV 08/10/2018 92  79 - 97 fL Final  
 MCH 08/10/2018 29.8  26.6 - 33.0 pg Final  
 MCHC 08/10/2018 32.5  31.5 - 35.7 g/dL Final  
 RDW 08/10/2018 14.7  12.3 - 15.4 % Final  
 PLATELET 37/50/2663 976  150 - 379 x10E3/uL Final  
 
 
Review of Systems Constitutional: Negative for malaise/fatigue. HENT: Negative for congestion. Eyes: Negative for blurred vision and pain. Respiratory: Negative for cough and shortness of breath. Cardiovascular: Negative for chest pain and palpitations. Gastrointestinal: Negative for abdominal pain and heartburn. Genitourinary: Negative for frequency and urgency. Musculoskeletal: Positive for back pain and joint pain. Negative for myalgias. Neurological: Negative for dizziness, tingling, sensory change, weakness and headaches. Psychiatric/Behavioral: Negative for depression, memory loss and substance abuse. Visit Vitals /75 (BP 1 Location: Left arm, BP Patient Position: Sitting) Pulse 62 Temp 97.8 °F (36.6 °C) (Oral) Resp 18 Ht 5' 5\" (1.651 m) Wt 163 lb 9.6 oz (74.2 kg) SpO2 98% BMI 27.22 kg/m² Physical Exam  
Constitutional: She is oriented to person, place, and time. She appears well-developed and well-nourished. No distress. HENT:  
Right Ear: External ear normal.  
Left Ear: External ear normal.  
Eyes: Conjunctivae and EOM are normal. Right eye exhibits no discharge. Left eye exhibits no discharge. Neck: Normal range of motion. Neck supple. Cardiovascular: Normal rate and regular rhythm. Pulmonary/Chest: Effort normal and breath sounds normal. She has no wheezes. Abdominal: Soft. Bowel sounds are normal. There is no tenderness. Genitourinary:  
Genitourinary Comments: Vaginal dryness Lymphadenopathy:  
  She has no cervical adenopathy. Neurological: She is alert and oriented to person, place, and time. Skin: She is not diaphoretic. Psychiatric: She has a normal mood and affect. Her behavior is normal.  
Nursing note and vitals reviewed. ASSESSMENT and PLAN 
  ICD-10-CM ICD-9-CM 1. Iron deficiency anemia, unspecified iron deficiency anemia type D50.9 280.9 ferrous sulfate 325 mg (65 mg iron) tablet sent to pharmacy. Ferrous sulfate 325 mg refilled. 2. Menopausal vaginal dryness N95.1 627.2 estradiol (VAGIFEM) 10 mcg tab vaginal tablet sent to pharmacy. Vagifem prescribed. She should place in her vagina 1-2 times weekly. Potential side effect discussed with her. 3. Well woman exam with routine gynecological exam Z01.419 V72.31 PAP IG, APTIMA HPV AND RFX 16/18,45 (709707) PAP IG, APTIMA HPV AND RFX 16/18,45 ([id number]) Pap smear performed in office. Patient tolerated well.   
4. 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  
 E85.9 203.00 CBC ordered. Followed by Dr. Isabel Lemon (radiology). 5. Essential hypertension F10 460.7 METABOLIC PANEL, COMPREHENSIVE 
 
CMP ordered. BP is well-controlled on current medication. No change to dosage at this time. This plan was reviewed with the patient and patient agrees. All questions were answered. This scribe documentation was reviewed by me and accurately reflects the examination and decisions made by me. This note will not be viewable in [address]

## 2018-11-07 LAB
ALBUMIN SERPL-MCNC: 4.7 G/DL (ref 3.6–4.8)
ALBUMIN/GLOB SERPL: 2.9 {RATIO} (ref 1.2–2.2)
ALP SERPL-CCNC: 70 IU/L (ref 39–117)
ALT SERPL-CCNC: 20 IU/L (ref 0–32)
AST SERPL-CCNC: 21 IU/L (ref 0–40)
BASOPHILS # BLD AUTO: 0.1 X10E3/UL (ref 0–0.2)
BASOPHILS NFR BLD AUTO: 2 %
BILIRUB SERPL-MCNC: 0.2 MG/DL (ref 0–1.2)
BUN SERPL-MCNC: 13 MG/DL (ref 8–27)
BUN/CREAT SERPL: 22 (ref 12–28)
CALCIUM SERPL-MCNC: 9.8 MG/DL (ref 8.7–10.3)
CHLORIDE SERPL-SCNC: 101 MMOL/L (ref 96–106)
CO2 SERPL-SCNC: 25 MMOL/L (ref 20–29)
CREAT SERPL-MCNC: 0.6 MG/DL (ref 0.57–1)
EOSINOPHIL # BLD AUTO: 0 X10E3/UL (ref 0–0.4)
EOSINOPHIL NFR BLD AUTO: 1 %
ERYTHROCYTE [DISTWIDTH] IN BLOOD BY AUTOMATED COUNT: 13.7 % (ref 12.3–15.4)
GLOBULIN SER CALC-MCNC: 1.6 G/DL (ref 1.5–4.5)
GLUCOSE SERPL-MCNC: 101 MG/DL (ref 65–99)
HCT VFR BLD AUTO: 35.6 % (ref 34–46.6)
HGB BLD-MCNC: 11.7 G/DL (ref 11.1–15.9)
IMM GRANULOCYTES # BLD: 0 X10E3/UL (ref 0–0.1)
IMM GRANULOCYTES NFR BLD: 0 %
LYMPHOCYTES # BLD AUTO: 1.5 X10E3/UL (ref 0.7–3.1)
LYMPHOCYTES NFR BLD AUTO: 55 %
MCH RBC QN AUTO: 29.7 PG (ref 26.6–33)
MCHC RBC AUTO-ENTMCNC: 32.9 G/DL (ref 31.5–35.7)
MCV RBC AUTO: 90 FL (ref 79–97)
MONOCYTES # BLD AUTO: 0.2 X10E3/UL (ref 0.1–0.9)
MONOCYTES NFR BLD AUTO: 8 %
MORPHOLOGY BLD-IMP: ABNORMAL
NEUTROPHILS # BLD AUTO: 0.9 X10E3/UL (ref 1.4–7)
NEUTROPHILS NFR BLD AUTO: 34 %
PLATELET # BLD AUTO: 224 X10E3/UL (ref 150–379)
POTASSIUM SERPL-SCNC: 4.5 MMOL/L (ref 3.5–5.2)
PROT SERPL-MCNC: 6.3 G/DL (ref 6–8.5)
RBC # BLD AUTO: 3.94 X10E6/UL (ref 3.77–5.28)
SODIUM SERPL-SCNC: 140 MMOL/L (ref 134–144)
WBC # BLD AUTO: 2.7 X10E3/UL (ref 3.4–10.8)

## 2018-11-23 NOTE — TELEPHONE ENCOUNTER
----- Message from Alycia Grove sent at 5/4/2018 12:55 PM EDT -----  Regarding: Nilda Ojeda/Telephone  Pt wanted to get her MRI results. Best contact number is [phone]. PAIN SCALE 8 OF 10.

## 2018-12-09 DIAGNOSIS — M54.50 CHRONIC MIDLINE LOW BACK PAIN WITHOUT SCIATICA: ICD-10-CM

## 2018-12-09 DIAGNOSIS — G89.29 CHRONIC MIDLINE LOW BACK PAIN WITHOUT SCIATICA: ICD-10-CM

## 2018-12-10 RX ORDER — MELOXICAM 15 MG/1
TABLET ORAL
Qty: 90 TAB | Refills: 0 | Status: SHIPPED | OUTPATIENT
Start: 2018-12-10 | End: 2019-02-21 | Stop reason: SDUPTHER

## 2019-02-01 DIAGNOSIS — D50.9 IRON DEFICIENCY ANEMIA, UNSPECIFIED IRON DEFICIENCY ANEMIA TYPE: ICD-10-CM

## 2019-02-01 RX ORDER — LANOLIN ALCOHOL/MO/W.PET/CERES
CREAM (GRAM) TOPICAL
Qty: 30 TAB | Refills: 0 | Status: SHIPPED | OUTPATIENT
Start: 2019-02-01

## 2019-02-04 DIAGNOSIS — D50.9 IRON DEFICIENCY ANEMIA, UNSPECIFIED IRON DEFICIENCY ANEMIA TYPE: ICD-10-CM

## 2019-02-04 RX ORDER — LANOLIN ALCOHOL/MO/W.PET/CERES
325 CREAM (GRAM) TOPICAL
Qty: 90 TAB | Refills: 0 | Status: SHIPPED | OUTPATIENT
Start: 2019-02-04 | End: 2019-05-05

## 2019-02-13 ENCOUNTER — TELEPHONE (OUTPATIENT)
Dept: PRIMARY CARE CLINIC | Age: 64
End: 2019-02-13

## 2019-02-13 DIAGNOSIS — Z12.11 COLON CANCER SCREENING: Primary | ICD-10-CM

## 2019-02-13 LAB — CREATININE, EXTERNAL: 0.56

## 2019-02-13 NOTE — TELEPHONE ENCOUNTER
Spoke with patient after confirming patient identifiers and asked if she wanted a particular GI specialist. Patient states she would like a recommendation from Dr. Ruthie Dennison and prefers to be seen at [facility] or somewhere down near [facility]. Advised her request would be forwarded to Dr. Ruthie Dennison and someone will call her with the referral information. Patient is agreeable to this plan. End of encounter.

## 2019-02-21 DIAGNOSIS — G89.29 CHRONIC MIDLINE LOW BACK PAIN WITHOUT SCIATICA: ICD-10-CM

## 2019-02-21 DIAGNOSIS — M54.50 CHRONIC MIDLINE LOW BACK PAIN WITHOUT SCIATICA: ICD-10-CM

## 2019-02-22 ENCOUNTER — HOSPITAL ENCOUNTER (OUTPATIENT)
Dept: GENERAL RADIOLOGY | Age: 64
Discharge: HOME | End: 2019-02-22
Attending: INTERNAL MEDICINE
Payer: MEDICARE

## 2019-02-22 DIAGNOSIS — E85.9 MYELOMA ASSOCIATED AMYLOIDOSIS (HCC): ICD-10-CM

## 2019-02-22 DIAGNOSIS — C90.00 MYELOMA ASSOCIATED AMYLOIDOSIS (HCC): ICD-10-CM

## 2019-02-22 PROCEDURE — 77075 RADEX OSSEOUS SURVEY COMPL: CPT

## 2019-02-22 RX ORDER — MELOXICAM 15 MG/1
TABLET ORAL
Qty: 90 TAB | Refills: 0 | Status: SHIPPED | OUTPATIENT
Start: 2019-02-22 | End: 2019-05-04 | Stop reason: SDUPTHER

## 2019-02-27 DIAGNOSIS — I10 HYPERTENSION, UNSPECIFIED TYPE: ICD-10-CM

## 2019-02-28 RX ORDER — HYDROCHLOROTHIAZIDE 12.5 MG/1
TABLET ORAL
Qty: 90 TAB | Refills: 0 | Status: SHIPPED | OUTPATIENT
Start: 2019-02-28 | End: 2019-05-10 | Stop reason: SDUPTHER

## 2019-03-27 RX ORDER — METFORMIN HYDROCHLORIDE 500 MG/1
TABLET, EXTENDED RELEASE ORAL
Qty: 90 TAB | Refills: 4 | Status: SHIPPED | OUTPATIENT
Start: 2019-03-27 | End: 2019-12-04 | Stop reason: SDUPTHER

## 2019-04-19 RX ORDER — LANCETS 33 GAUGE
EACH MISCELLANEOUS
Qty: 200 PACKAGE | Refills: 6 | Status: SHIPPED | OUTPATIENT
Start: 2019-04-19 | End: 2021-07-13

## 2019-05-04 DIAGNOSIS — M54.50 CHRONIC MIDLINE LOW BACK PAIN WITHOUT SCIATICA: ICD-10-CM

## 2019-05-04 DIAGNOSIS — G89.29 CHRONIC MIDLINE LOW BACK PAIN WITHOUT SCIATICA: ICD-10-CM

## 2019-05-06 RX ORDER — MELOXICAM 15 MG/1
TABLET ORAL
Qty: 90 TAB | Refills: 0 | Status: SHIPPED | OUTPATIENT
Start: 2019-05-06 | End: 2019-07-15 | Stop reason: ALTCHOICE

## 2019-05-10 DIAGNOSIS — I10 HYPERTENSION, UNSPECIFIED TYPE: ICD-10-CM

## 2019-05-11 RX ORDER — HYDROCHLOROTHIAZIDE 12.5 MG/1
TABLET ORAL
Qty: 90 TAB | Refills: 0 | Status: SHIPPED | OUTPATIENT
Start: 2019-05-11 | End: 2019-07-21 | Stop reason: SDUPTHER

## 2019-07-15 ENCOUNTER — OFFICE VISIT (OUTPATIENT)
Dept: PRIMARY CARE CLINIC | Age: 64
End: 2019-07-15

## 2019-07-15 VITALS
SYSTOLIC BLOOD PRESSURE: 118 MMHG | HEIGHT: 65 IN | WEIGHT: 175.2 LBS | DIASTOLIC BLOOD PRESSURE: 75 MMHG | BODY MASS INDEX: 29.19 KG/M2 | RESPIRATION RATE: 18 BRPM | TEMPERATURE: 97.8 F | OXYGEN SATURATION: 97 % | HEART RATE: 60 BPM

## 2019-07-15 DIAGNOSIS — M54.2 NECK PAIN: Primary | ICD-10-CM

## 2019-07-15 DIAGNOSIS — R26.89 BALANCE PROBLEM: ICD-10-CM

## 2019-07-15 DIAGNOSIS — J45.20 MILD INTERMITTENT ASTHMA WITHOUT COMPLICATION: ICD-10-CM

## 2019-07-15 DIAGNOSIS — M25.512 ACUTE PAIN OF LEFT SHOULDER: ICD-10-CM

## 2019-07-15 DIAGNOSIS — R73.02 IGT (IMPAIRED GLUCOSE TOLERANCE): ICD-10-CM

## 2019-07-15 DIAGNOSIS — R11.0 NAUSEA: ICD-10-CM

## 2019-07-15 DIAGNOSIS — D47.2 MONOCLONAL PARAPROTEINEMIA: ICD-10-CM
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
## 2019-07-15 DIAGNOSIS — E78.2 MIXED HYPERLIPIDEMIA: ICD-10-CM

## 2019-07-15 RX ORDER — ALBUTEROL SULFATE 90 UG/1
1 AEROSOL, METERED RESPIRATORY (INHALATION)
Qty: 1 INHALER | Refills: 0 | Status: SHIPPED | OUTPATIENT
Start: 2019-07-15 | End: 2019-07-15 | Stop reason: SDUPTHER

## 2019-07-15 RX ORDER — ONDANSETRON 4 MG/1
4 TABLET, ORALLY DISINTEGRATING ORAL
Qty: 20 TAB | Refills: 0 | Status: SHIPPED | OUTPATIENT
Start: 2019-07-15

## 2019-07-15 RX ORDER — NAPROXEN 500 MG/1
500 TABLET ORAL 2 TIMES DAILY WITH MEALS
Qty: 30 TAB | Refills: 0 | Status: SHIPPED | OUTPATIENT
Start: 2019-07-15 | End: 2019-07-30

## 2019-07-15 RX ORDER — NAPROXEN 500 MG/1
500 TABLET ORAL 2 TIMES DAILY WITH MEALS
COMMUNITY
End: 2019-07-15 | Stop reason: SDUPTHER

## 2019-07-15 RX ORDER — ALBUTEROL SULFATE 90 UG/1
1 AEROSOL, METERED RESPIRATORY (INHALATION)
Qty: 1 INHALER | Refills: 0 | Status: SHIPPED | OUTPATIENT
Start: 2019-07-15 | End: 2020-03-03 | Stop reason: SDUPTHER
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
## 2019-07-16 ENCOUNTER — HOSPITAL ENCOUNTER (OUTPATIENT)
Dept: MAMMOGRAPHY | Age: 64
Discharge: HOME | End: 2019-07-16
Attending: INTERNAL MEDICINE
Payer: MEDICARE

## 2019-07-16 ENCOUNTER — HOSPITAL ENCOUNTER (OUTPATIENT)
Dept: GENERAL RADIOLOGY | Age: 64
Discharge: HOME | End: 2019-07-16
Attending: INTERNAL MEDICINE
Payer: MEDICARE

## 2019-07-16 DIAGNOSIS — Z12.39 BREAST SCREENING, UNSPECIFIED: ICD-10-CM

## 2019-07-16 DIAGNOSIS — C90.00 MYELOMA ASSOCIATED AMYLOIDOSIS (HCC): ICD-10-CM

## 2019-07-16 DIAGNOSIS — D70.9 EOSINOPENIA (HCC): ICD-10-CM

## 2019-07-16 DIAGNOSIS — E85.9 MYELOMA ASSOCIATED AMYLOIDOSIS (HCC): ICD-10-CM

## 2019-07-16 DIAGNOSIS — D47.2 MONOCLONAL PARAPROTEINEMIA: ICD-10-CM

## 2019-07-16 LAB
ALBUMIN SERPL-MCNC: 4.4 G/DL (ref 3.6–4.8)
ALBUMIN/GLOB SERPL: 2.9 {RATIO} (ref 1.2–2.2)
ALP SERPL-CCNC: 73 IU/L (ref 39–117)
ALT SERPL-CCNC: 26 IU/L (ref 0–32)
AST SERPL-CCNC: 22 IU/L (ref 0–40)
BASOPHILS # BLD AUTO: 0.1 X10E3/UL (ref 0–0.2)
BASOPHILS NFR BLD AUTO: 2 %
BILIRUB SERPL-MCNC: 0.3 MG/DL (ref 0–1.2)
BUN SERPL-MCNC: 13 MG/DL (ref 8–27)
BUN/CREAT SERPL: 22 (ref 12–28)
CALCIUM SERPL-MCNC: 9.8 MG/DL (ref 8.7–10.3)
CHLORIDE SERPL-SCNC: 100 MMOL/L (ref 96–106)
CHOLEST SERPL-MCNC: 199 MG/DL (ref 100–199)
CO2 SERPL-SCNC: 23 MMOL/L (ref 20–29)
CREAT SERPL-MCNC: 0.59 MG/DL (ref 0.57–1)
EOSINOPHIL # BLD AUTO: 0 X10E3/UL (ref 0–0.4)
EOSINOPHIL NFR BLD AUTO: 1 %
ERYTHROCYTE [DISTWIDTH] IN BLOOD BY AUTOMATED COUNT: 13.5 % (ref 12.3–15.4)
EST. AVERAGE GLUCOSE BLD GHB EST-MCNC: 131 MG/DL
GLOBULIN SER CALC-MCNC: 1.5 G/DL (ref 1.5–4.5)
GLUCOSE SERPL-MCNC: 99 MG/DL (ref 65–99)
HBA1C MFR BLD: 6.2 % (ref 4.8–5.6)
HCT VFR BLD AUTO: 34.9 % (ref 34–46.6)
HDLC SERPL-MCNC: 56 MG/DL
HGB BLD-MCNC: 11.4 G/DL (ref 11.1–15.9)
IMM GRANULOCYTES # BLD AUTO: 0 X10E3/UL (ref 0–0.1)
IMM GRANULOCYTES NFR BLD AUTO: 0 %
LDLC SERPL CALC-MCNC: 123 MG/DL (ref 0–99)
LYMPHOCYTES # BLD AUTO: 1.5 X10E3/UL (ref 0.7–3.1)
LYMPHOCYTES NFR BLD AUTO: 53 %
MCH RBC QN AUTO: 29.5 PG (ref 26.6–33)
MCHC RBC AUTO-ENTMCNC: 32.7 G/DL (ref 31.5–35.7)
MCV RBC AUTO: 90 FL (ref 79–97)
MONOCYTES # BLD AUTO: 0.3 X10E3/UL (ref 0.1–0.9)
MONOCYTES NFR BLD AUTO: 11 %
MORPHOLOGY BLD-IMP: ABNORMAL
NEUTROPHILS # BLD AUTO: 0.9 X10E3/UL (ref 1.4–7)
NEUTROPHILS NFR BLD AUTO: 33 %
PLATELET # BLD AUTO: 234 X10E3/UL (ref 150–450)
POTASSIUM SERPL-SCNC: 4 MMOL/L (ref 3.5–5.2)
PROT SERPL-MCNC: 5.9 G/DL (ref 6–8.5)
RBC # BLD AUTO: 3.87 X10E6/UL (ref 3.77–5.28)
SODIUM SERPL-SCNC: 137 MMOL/L (ref 134–144)
TRIGL SERPL-MCNC: 100 MG/DL (ref 0–149)
TSH SERPL DL<=0.005 MIU/L-ACNC: 2.4 UIU/ML (ref 0.45–4.5)
VLDLC SERPL CALC-MCNC: 20 MG/DL (ref 5–40)
WBC # BLD AUTO: 2.8 X10E3/UL (ref 3.4–10.8)

## 2019-07-16 PROCEDURE — 77075 RADEX OSSEOUS SURVEY COMPL: CPT

## 2019-07-16 PROCEDURE — 77063 BREAST TOMOSYNTHESIS BI: CPT

## 2019-07-17 RX ORDER — MELOXICAM 15 MG/1
TABLET ORAL
Qty: 90 TAB | Refills: 0 | Status: SHIPPED | OUTPATIENT
Start: 2019-07-17 | End: 2019-09-16 | Stop reason: SDUPTHER

## 2019-07-17 NOTE — PROGRESS NOTES
Let her know HbA1C has gone up little. Should continue Metformin daily.  Should make an appointment with Hematologist.
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
## 2019-07-21 DIAGNOSIS — I10 HYPERTENSION, UNSPECIFIED TYPE: ICD-10-CM

## 2019-07-22 ENCOUNTER — TELEPHONE (OUTPATIENT)
Dept: PRIMARY CARE CLINIC | Age: 64
End: 2019-07-22

## 2019-07-22 RX ORDER — HYDROCHLOROTHIAZIDE 12.5 MG/1
TABLET ORAL
Qty: 90 TAB | Refills: 0 | Status: SHIPPED | OUTPATIENT
Start: 2019-07-22 | End: 2019-09-15 | Stop reason: SDUPTHER

## 2019-07-23 NOTE — TELEPHONE ENCOUNTER
Called and spoke with pharmacist, \"Zuleika,\" clarified that Naproxen was discontinued and Mobic was patient's recent order. End of encounter.

## 2019-07-25 ENCOUNTER — TELEPHONE (OUTPATIENT)
Dept: ONCOLOGY | Age: 64
End: 2019-07-25

## 2019-07-25 NOTE — TELEPHONE ENCOUNTER
Voicemail left requesting a call back. Called patient to schedule a new patient appointment with Dr. Radha Henry for monoclonal paraproteinemia at the request of Dr. Janes Tucker.

## 2019-08-02 ENCOUNTER — HOSPITAL ENCOUNTER (OUTPATIENT)
Dept: MRI IMAGING | Age: 64
Discharge: HOME | End: 2019-08-02
Attending: INTERNAL MEDICINE
Payer: MEDICARE

## 2019-08-02 VITALS — BODY MASS INDEX: 28.62 KG/M2 | WEIGHT: 172 LBS

## 2019-08-02 DIAGNOSIS — D47.2 MONOCLONAL PARAPROTEINEMIA: ICD-10-CM

## 2019-08-02 DIAGNOSIS — C90.00 MYELOMA ASSOCIATED AMYLOIDOSIS (HCC): ICD-10-CM

## 2019-08-02 DIAGNOSIS — E85.9 MYELOMA ASSOCIATED AMYLOIDOSIS (HCC): ICD-10-CM

## 2019-08-02 DIAGNOSIS — D70.9 EOSINOPENIA (HCC): ICD-10-CM

## 2019-08-02 PROCEDURE — 73723 MRI JOINT LWR EXTR W/O&W/DYE: CPT

## 2019-08-02 PROCEDURE — 74011250636 HC RX REV CODE- 250/636: Performed by: INTERNAL MEDICINE

## 2019-08-02 PROCEDURE — A9575 INJ GADOTERATE MEGLUMI 0.1ML: HCPCS | Performed by: INTERNAL MEDICINE

## 2019-08-02 RX ORDER — GADOTERATE MEGLUMINE 376.9 MG/ML
15 INJECTION INTRAVENOUS
Status: COMPLETED | OUTPATIENT
Start: 2019-08-02 | End: 2019-08-02

## 2019-08-02 RX ADMIN — GADOTERATE MEGLUMINE 15 ML: 376.9 INJECTION INTRAVENOUS at 15:00

## 2019-08-03 LAB — CREATININE, EXTERNAL: 0.62

## 2019-08-20 NOTE — TELEPHONE ENCOUNTER
Voicemail left requesting a call back. Called patient to schedule a new patient appointment with Dr. Landon Ham for monoclonal paraproteinemia at the request of Dr. Hal Hdz.

## 2019-08-21 RX ORDER — NAPROXEN 500 MG/1
500 TABLET ORAL 2 TIMES DAILY WITH MEALS
Qty: 60 TAB | Refills: 0 | Status: SHIPPED | OUTPATIENT
Start: 2019-08-21 | End: 2019-09-16 | Stop reason: SDUPTHER

## 2019-08-21 NOTE — TELEPHONE ENCOUNTER
LOV: 07/15/2019  Refill: 07/15/2019  Patient is currently out of town visiting/request it be sent to [facility] in [address]: 07/15/2019  Next Appt: 08/29/2019Nathanael

## 2019-08-21 NOTE — TELEPHONE ENCOUNTER
----- Message from [place] sent at 8/19/2019  1:00 PM EDT -----  Regarding: DR HOUSER / REFILL   Medication Refill        Best contact number(s):  [phone]      NAPROSYN  Unsure MG      Is patient out of this medication (yes/no):  Tahir listed in chart            [place]

## 2019-08-29 ENCOUNTER — OFFICE VISIT (OUTPATIENT)
Dept: PRIMARY CARE CLINIC | Age: 64
End: 2019-08-29

## 2019-08-29 VITALS
BODY MASS INDEX: 28.89 KG/M2 | RESPIRATION RATE: 16 BRPM | DIASTOLIC BLOOD PRESSURE: 75 MMHG | TEMPERATURE: 98.7 F | HEART RATE: 67 BPM | WEIGHT: 173.4 LBS | OXYGEN SATURATION: 96 % | HEIGHT: 65 IN | SYSTOLIC BLOOD PRESSURE: 135 MMHG

## 2019-08-29 DIAGNOSIS — Z23 NEED FOR VACCINATION AGAINST STREPTOCOCCUS PNEUMONIAE USING PNEUMOCOCCAL CONJUGATE VACCINE 13: ICD-10-CM

## 2019-08-29 DIAGNOSIS — Z23 NEED FOR SHINGLES VACCINE: ICD-10-CM

## 2019-08-29 DIAGNOSIS — Z00.00 MEDICARE ANNUAL WELLNESS VISIT, SUBSEQUENT: Primary | ICD-10-CM

## 2019-08-29 DIAGNOSIS — Z71.89 ACP (ADVANCE CARE PLANNING): ICD-10-CM

## 2019-08-29 NOTE — PROGRESS NOTES
Beranbe Recio is a 75 y.o. female and presents for Annual Medicare Wellness Visit. Assessment of cognitive impairment: Alert and oriented x 3. Depression Screen:   3 most recent PHQ Screens 8/29/2019   Little interest or pleasure in doing things Several days   Feeling down, depressed, irritable, or hopeless Several days   Total Score PHQ 2 2       Fall Risk Assessment:    Fall Risk Assessment, last 12 mths 8/29/2019   Able to walk? Yes   Fall in past 12 months? No   Fall with injury? -   Number of falls in past 12 months -   Fall Risk Score -       Abuse Screen:   Abuse Screening Questionnaire 7/15/2019   Do you ever feel afraid of your partner? N   Are you in a relationship with someone who physically or mentally threatens you? N   Is it safe for you to go home? Y       Activities of Daily Living:  Self-care. Requires assistance with: no ADLs  Patient handle his/her own medications  yes Use of pill box  no  Activities of Daily Living:   ADL Assessment 8/29/2019   Feeding yourself No Help Needed   Getting from bed to chair No Help Needed   Getting dressed No Help Needed   Bathing or showering No Help Needed   Walk across the room (includes cane/walker) No Help Needed   Using the telphone No Help Needed   Taking your medications No Help Needed   Preparing meals No Help Needed   Managing money (expenses/bills) No Help Needed   Moderately strenuous housework (laundry) No Help Needed   Shopping for personal items (toiletries/medicines) Help Needed   Shopping for groceries Help Needed   Driving Help Needed   Climbing a flight of stairs No Help Needed   Getting to places beyond walking distances Help Needed       Health Maintenance:  Daily Aspirin: no  Bone Density: up to date 01/23/2018  Glaucoma Screening: yes and last year, patient is due again in fall, patient states that she has small cataract, no glaucoma  Immunizations:    Tetanus: 03/17/2018. Influenza: N/A at this time. Will get from [facility] . Shingles: script sent to the pharmacy   PPSV-23:  3/17/2018  Prevnar-13: will give in the office. Cancer screening:    Cervical: 11/06/2018. Breast: 07/16/2019. Colon: Due in 2020  Prostate:  N/A    Alcohol Risk Screen:   On any occasion during the past 3 months, have you had more than 3 drinks(female) or 4 drinks (male) containing alcohol in one? No  Do you average more than 7 drinks (female) or 14 drinks (male) per week? No  Type and amount: Patient does not drink alcohol    Hearing Loss:  The patient needs further evaluation. Vision Loss:   Wears glasses, contact lenses, or have any other visual impairment  yes    Adult Nutrition Screen:  No risk factors noted. Advance Care Planning:   End of Life Planning: has NO advanced directive  - add't info requested. Referral to : yes,   Jay[facility] En[facility] [address] ACP-Facilitator appointment yes      Medications/Allergies: Reviewed with patient  Prior to Admission medications    Medication Sig Start Date End Date Taking? Authorizing Provider   naproxen (NAPROSYN) 500 mg tablet Take 1 Tab by mouth two (2) times daily (with meals). 8/21/19  Yes Zeinab Renner MD   hydroCHLOROthiazide (HYDRODIURIL) 12.5 mg tablet TAKE 1 TABLET BY MOUTH  DAILY 7/22/19  Yes Zeinab Renner MD   meloxicam (MOBIC) 15 mg tablet TAKE 1 TABLET BY MOUTH  DAILY AS NEEDED FOR PAIN 7/17/19  Yes Zeny Shelton NP   ondansetron (ZOFRAN ODT) 4 mg disintegrating tablet Take 1 Tab by mouth every eight (8) hours as needed for Nausea. 7/15/19  Yes Zeinab Renner MD   albuterol (PROVENTIL HFA, VENTOLIN HFA, PROAIR HFA) 90 mcg/actuation inhaler Take 1 Puff by inhalation every six (6) hours as needed for Wheezing for up to 30 doses.  7/15/19  Yes Zeinab Renner MD   [facility] 33 gauge misc TEST UP TO 2 TIMES DAILY 4/19/19  Yes Osbaldo Diaz MD   ONETOUCH ULTRA BLUE TEST STRIP strip TEST UP TO 2 TIMES DAILY 4/19/19  Yes Osbaldo Diaz MD   metFORMIN ER (GLUCOPHAGE XR) 500 mg tablet TAKE 1 TABLET BY MOUTH ONCE A DAY WITH DINNER 3/27/19  Yes Etta Garcia MD   ferrous sulfate 325 mg (65 mg iron) tablet TAKE 1 TABLET BY MOUTH DAILY BEFORE BREAKFAST 2/1/19  Yes David Bill NP   multivitamin (ONE A DAY) tablet Take 1 Tab by mouth daily. Yes Provider, Historical   ascorbic acid, vitamin C, (VITAMIN C) 500 mg tablet Take  by mouth. Yes Provider, Historical   calcium-cholecalciferol, d3, (CALCIUM 600 + D) 600-125 mg-unit tab Take  by mouth. Yes Provider, Historical   cholecalciferol, vitamin D3, (VITAMIN D3) 2,000 unit tab Take 5,000 Units by mouth. Yes Provider, Historical   cyanocobalamin (VITAMIN B-12) 1,000 mcg tablet Take 1,000 mcg by mouth daily. Yes Provider, Historical   digestive enzymes (ENZYME DIGEST) cap Take  by mouth. Yes Provider, Historical   clonazePAM (KLONOPIN) 0.5 mg tablet Take 0.5 mg by mouth three (3) times daily. Yes Provider, Historical   zolpidem (AMBIEN) 10 mg tablet Take  by mouth nightly as needed for Sleep. Yes Provider, Historical   estradiol (VAGIFEM) 10 mcg tab vaginal tablet INSERT 1 TABLET VAGINALLY EVERY MONDAY AND THURSDAY FOR 30 DAYS 11/6/18   Tru Delgado MD       Allergies   Allergen Reactions    Pcn [Penicillins] Itching    Percocet [Oxycodone-Acetaminophen] Other (comments)     Passe out, feels dizzy       Objective:  Visit Vitals  /75 (BP 1 Location: Left arm, BP Patient Position: Sitting)   Pulse 67   Temp 98.7 °F (37.1 °C) (Oral)   Resp 16   Ht 5' 5\" (1.651 m)   Wt 173 lb 6.4 oz (78.7 kg)   SpO2 96%   BMI 28.86 kg/m²    Body mass index is 28.86 kg/m². Problem List: Reviewed with patient and discussed risk factors.     Patient Active Problem List   Diagnosis Code    Iron (Fe) deficiency anemia D50.9    Anxiety F41.9    Disc disorder M51.9    Seasonal allergic rhinitis J30.2    Osteopenia M85.80    Vertigo R42    ACP (advance care planning) Z71.89    Mild intermittent asthma without complication E21.26       PSH: Reviewed with patient  Past Surgical History:   Procedure Laterality Date    HX ORTHOPAEDIC      left foot        SH: Reviewed with patient  Social History     Tobacco Use    Smoking status: Never Smoker    Smokeless tobacco: Never Used   Substance Use Topics    Alcohol use: No    Drug use: No       FH: Reviewed with patient  Family History   Problem Relation Age of Onset   [address] [facility] [address] Arthritis-rheumatoid Mother     Other Mother         ulcerative colitis    Heart Disease Father     Cancer Sister     Breast Cancer Sister 48    Thyroid Disease Sister     Other Son         psoriatic arthritis       Current medical providers:    Patient Care Team:  Janell Acevedo MD as PCP - General  Kimberly Lowery RN as Kosta Goins ( Endocrinology)   Dr. Blanca Scott ( psychiatrist)    Plan:      Diagnoses and all orders for this visit:    Medicare annual wellness visit, subsequent  Immunization & Health screening discussed wit her. ACP (advance care planning)  Advanced directive discussed with her. Referral placed. Need for vaccination against Streptococcus pneumoniae using pneumococcal conjugate vaccine 13  Vaccine given in the office.      Need for shingles vaccine  -     varicella-zoster recombinant, PF, (SHINGRIX, PF,) 50 mcg/0.5 mL susr injection; 0.5 mL by IntraMUSCular route once for 1 dose., Normal, Disp-0.5 mL, R-0        Health Maintenance   Topic Date Due    Shingrix Vaccine Age 49> (1 of 2) 03/01/2003    Influenza Age 5 to Adult  08/01/2019    Pneumococcal 65+ years (2 of 2 - PCV13) 08/10/2019    GLAUCOMA SCREENING Q2Y  01/30/2020    COLONOSCOPY  06/16/2020    MEDICARE YEARLY EXAM  08/29/2020    BREAST CANCER SCRN MAMMOGRAM  07/16/2021    DTaP/Tdap/Td series (3 - Td) 03/17/2028    Bone Densitometry (Dexa) Screening  Completed    Hepatitis C Screening  Addressed          Urinary/ Fecal Incontinence: Patient denies either    Regular physical exercise: Patient states that she walks daily     Patient verbalized understanding of information presented. AVS and Medicare Part B Preventive Services Table printed and given to pt and reviewed. See table for findings under Recommendation and Scheduled. All of the patient's questions were answered.

## 2019-09-03 DIAGNOSIS — J45.20 MILD INTERMITTENT ASTHMA WITHOUT COMPLICATION: ICD-10-CM

## 2019-09-03 RX ORDER — ALBUTEROL SULFATE 90 UG/1
AEROSOL, METERED RESPIRATORY (INHALATION)
Qty: 18 G | Refills: 0 | Status: SHIPPED | OUTPATIENT
Start: 2019-09-03 | End: 2019-12-04 | Stop reason: SDUPTHER

## 2019-09-15 DIAGNOSIS — I10 HYPERTENSION, UNSPECIFIED TYPE: ICD-10-CM

## 2019-09-15 RX ORDER — HYDROCHLOROTHIAZIDE 12.5 MG/1
TABLET ORAL
Qty: 90 TAB | Refills: 0 | Status: SHIPPED | OUTPATIENT
Start: 2019-09-15 | End: 2019-12-10 | Stop reason: SDUPTHER

## 2019-09-17 RX ORDER — NAPROXEN 500 MG/1
TABLET ORAL
Qty: 60 TAB | Refills: 0 | Status: SHIPPED | OUTPATIENT
Start: 2019-09-17 | End: 2019-12-10 | Stop reason: ALTCHOICE

## 2019-09-17 RX ORDER — MELOXICAM 15 MG/1
TABLET ORAL
Qty: 90 TAB | Refills: 0 | Status: SHIPPED | OUTPATIENT
Start: 2019-09-17 | End: 2019-12-10 | Stop reason: SDUPTHER

## 2019-10-02 ENCOUNTER — OFFICE VISIT (OUTPATIENT)
Dept: ONCOLOGY | Age: 64
End: 2019-10-02

## 2019-10-02 VITALS
OXYGEN SATURATION: 94 % | HEART RATE: 61 BPM | SYSTOLIC BLOOD PRESSURE: 116 MMHG | RESPIRATION RATE: 18 BRPM | TEMPERATURE: 98.4 F | HEIGHT: 65 IN | BODY MASS INDEX: 28.34 KG/M2 | WEIGHT: 170.1 LBS | DIASTOLIC BLOOD PRESSURE: 75 MMHG

## 2019-10-02 DIAGNOSIS — E08.69 DIABETES MELLITUS DUE TO UNDERLYING CONDITION WITH OTHER SPECIFIED COMPLICATION, WITHOUT LONG-TERM CURRENT USE OF INSULIN (HCC): ICD-10-CM

## 2019-10-02 DIAGNOSIS — D70.8 OTHER NEUTROPENIA (HCC): ICD-10-CM

## 2019-10-02 DIAGNOSIS — D47.2 SMOLDERING MULTIPLE MYELOMA (SMM): Primary | ICD-10-CM

## 2019-10-02 PROBLEM — E11.9 DIABETES MELLITUS (HCC): Status: ACTIVE | Noted: 2019-10-02

## 2019-10-02 NOTE — PROGRESS NOTES
Martha Cortez is a 75 y.o. female  Chief Complaint   Patient presents with    New Patient     Referral by Dr. Blayne Lazo for monoclonal paraproteinemia     1. Have you been to the ER, urgent care clinic since your last visit? Hospitalized since your last visit? No    2. Have you seen or consulted any other health care providers outside of the [address] [place] [address] since your last visit? Include any pap smears or colon screening.   [facility] - Dr. Js Sood
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
## 2019-11-29 ENCOUNTER — TELEPHONE (OUTPATIENT)
Dept: ENDOCRINOLOGY | Age: 64
End: 2019-11-29

## 2019-11-29 NOTE — TELEPHONE ENCOUNTER
----- Message from Tani York sent at 11/29/2019 10:33 AM EST -----  Regarding: Dr. Concepcion Brown Message/Vendor Calls    Caller's first and last name: Viki Drew      Reason for call:  requesting a call back in regards to her December 4,2019 appointment. Pt is wanting to know if labs for her Thyroid test and A1C can be done while she's at the office.       Callback required yes/no and why: yes      Best contact number(s): [phone]      Details to clarify the request:      Tani York

## 2019-12-04 ENCOUNTER — OFFICE VISIT (OUTPATIENT)
Dept: ENDOCRINOLOGY | Age: 64
End: 2019-12-04

## 2019-12-04 VITALS
HEART RATE: 61 BPM | BODY MASS INDEX: 28.37 KG/M2 | DIASTOLIC BLOOD PRESSURE: 66 MMHG | TEMPERATURE: 96.8 F | OXYGEN SATURATION: 96 % | WEIGHT: 170.3 LBS | SYSTOLIC BLOOD PRESSURE: 140 MMHG | RESPIRATION RATE: 18 BRPM | HEIGHT: 65 IN

## 2019-12-04 DIAGNOSIS — R73.03 PREDIABETES: Primary | ICD-10-CM

## 2019-12-04 DIAGNOSIS — E04.2 MULTINODULAR GOITER: ICD-10-CM

## 2019-12-04 LAB — HBA1C MFR BLD HPLC: 5.5 %

## 2019-12-04 RX ORDER — METFORMIN HYDROCHLORIDE 500 MG/1
TABLET, EXTENDED RELEASE ORAL
Qty: 180 TAB | Refills: 4 | Status: SHIPPED | OUTPATIENT
Start: 2019-12-04 | End: 2020-10-31

## 2019-12-04 NOTE — LETTER
12/4/19 Patient: Shelia Diaz YOB: 1955 Date of Visit: 12/4/2019 Rahul Negro MD 
[address] [place] [phone] VIA In Basket Dear Rahul Negro MD, Thank you for referring Ms. Virginia Lomeli to [facility] for evaluation. My notes for this consultation are attached. If you have questions, please do not hesitate to call me. I look forward to following your patient along with you. Sincerely, Elizabeth Sy MD
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
## 2019-12-04 NOTE — PATIENT INSTRUCTIONS
Increase metformin er 500 mg twice a day  
 
----------------------------------------------------------------------------------------------------- Start on belviq xr 20 mg a day  
 
 
----------------------------------------------------------------------------------------- Low carbs Low natural food

## 2019-12-04 NOTE — PROGRESS NOTES
1. Have you been to the ER, urgent care clinic since your last visit? Fall/[facility]/August 2018  Hospitalized since your last visit? No    2. Have you seen or consulted any other health care providers outside of the [address] [place] [address] since your last visit? Include any pap smears or colon screening.  No    Wt Readings from Last 3 Encounters:   12/04/19 170 lb 4.8 oz (77.2 kg)   10/02/19 170 lb 1.6 oz (77.2 kg)   08/29/19 173 lb 6.4 oz (78.7 kg)     Temp Readings from Last 3 Encounters:   12/04/19 96.8 °F (36 °C) (Oral)   10/02/19 98.4 °F (36.9 °C) (Oral)   08/29/19 98.7 °F (37.1 °C) (Oral)     BP Readings from Last 3 Encounters:   12/04/19 140/66   10/02/19 116/75   08/29/19 135/75     Pulse Readings from Last 3 Encounters:   12/04/19 61   10/02/19 61   08/29/19 67     Lab Results   Component Value Date/Time    Hemoglobin A1c 6.2 (H) 07/15/2019 10:05 AM    Hemoglobin A1c (POC) 5.5 01/12/2018 03:10 PM

## 2019-12-10 DIAGNOSIS — I10 HYPERTENSION, UNSPECIFIED TYPE: ICD-10-CM

## 2019-12-10 RX ORDER — MELOXICAM 15 MG/1
TABLET ORAL
Qty: 90 TAB | Refills: 0 | Status: SHIPPED | OUTPATIENT
Start: 2019-12-10 | End: 2020-03-05

## 2019-12-10 RX ORDER — HYDROCHLOROTHIAZIDE 12.5 MG/1
TABLET ORAL
Qty: 90 TAB | Refills: 0 | Status: SHIPPED | OUTPATIENT
Start: 2019-12-10 | End: 2020-03-05

## 2019-12-31 ENCOUNTER — OFFICE VISIT (OUTPATIENT)
Dept: PRIMARY CARE CLINIC | Age: 64
End: 2019-12-31

## 2019-12-31 VITALS
HEART RATE: 67 BPM | BODY MASS INDEX: 26.82 KG/M2 | TEMPERATURE: 97.9 F | OXYGEN SATURATION: 97 % | WEIGHT: 161 LBS | HEIGHT: 65 IN | RESPIRATION RATE: 16 BRPM | SYSTOLIC BLOOD PRESSURE: 101 MMHG | DIASTOLIC BLOOD PRESSURE: 67 MMHG

## 2019-12-31 DIAGNOSIS — N39.3 STRESS INCONTINENCE OF URINE: ICD-10-CM

## 2019-12-31 DIAGNOSIS — J45.31 MILD PERSISTENT ASTHMA WITH EXACERBATION: Primary | ICD-10-CM

## 2019-12-31 DIAGNOSIS — R05.9 COUGH: ICD-10-CM

## 2019-12-31 RX ORDER — METHYLPREDNISOLONE 4 MG/1
TABLET ORAL
Qty: 1 DOSE PACK | Refills: 0 | Status: SHIPPED | OUTPATIENT
Start: 2019-12-31 | End: 2020-03-03 | Stop reason: ALTCHOICE
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
## 2019-12-31 NOTE — PROGRESS NOTES
Identified pt with two pt identifiers(name and ). Reviewed record in preparation for visit and have obtained necessary documentation. Chief Complaint   Patient presents with    Cough     intermittent x 1 month         Health Maintenance Due   Topic    FOOT EXAM Q1     MICROALBUMIN Q1     Influenza Age 5 to Adult     GLAUCOMA SCREENING Q2Y     EYE EXAM RETINAL OR DILATED     COLONOSCOPY         Visit Vitals  /67 (BP 1 Location: Left arm, BP Patient Position: Sitting)   Pulse 67   Temp 97.9 °F (36.6 °C) (Oral)   Resp 16   Ht 5' 5\" (1.651 m)   Wt 161 lb (73 kg)   SpO2 97%   BMI 26.79 kg/m²     Pain Scale: 0 - No pain/10    Coordination of Care Questionnaire:  :   1. Have you been to the ER, urgent care clinic since your last visit? Hospitalized since your last visit? Yes Where: Urgent care in [facility] 2019    2. Have you seen or consulted any other health care providers outside of the [address] [place] [address] since your last visit? Include any pap smears or colon screening.  yes

## 2020-01-20 DIAGNOSIS — J40 BRONCHITIS: Primary | ICD-10-CM

## 2020-01-20 RX ORDER — AZITHROMYCIN 250 MG/1
TABLET, FILM COATED ORAL
Qty: 6 TAB | Refills: 0 | Status: SHIPPED | OUTPATIENT
Start: 2020-01-20 | End: 2020-01-25

## 2020-01-23 ENCOUNTER — HOSPITAL ENCOUNTER (OUTPATIENT)
Dept: GENERAL RADIOLOGY | Age: 65
Discharge: HOME | End: 2020-01-23
Attending: INTERNAL MEDICINE
Payer: MEDICARE

## 2020-01-23 DIAGNOSIS — R06.02 SHORTNESS OF BREATH: ICD-10-CM

## 2020-01-23 PROCEDURE — 71046 X-RAY EXAM CHEST 2 VIEWS: CPT

## 2020-01-27 ENCOUNTER — OFFICE VISIT (OUTPATIENT)
Dept: PRIMARY CARE CLINIC | Age: 65
End: 2020-01-27

## 2020-01-27 VITALS
DIASTOLIC BLOOD PRESSURE: 74 MMHG | RESPIRATION RATE: 16 BRPM | HEIGHT: 65 IN | OXYGEN SATURATION: 98 % | SYSTOLIC BLOOD PRESSURE: 115 MMHG | TEMPERATURE: 99.8 F | WEIGHT: 164 LBS | BODY MASS INDEX: 27.32 KG/M2 | HEART RATE: 82 BPM

## 2020-01-27 DIAGNOSIS — D47.2 SMOLDERING MULTIPLE MYELOMA (SMM): ICD-10-CM

## 2020-01-27 DIAGNOSIS — R05.3 COUGH, PERSISTENT: ICD-10-CM

## 2020-01-27 DIAGNOSIS — R50.9 FEVER, UNSPECIFIED FEVER CAUSE: ICD-10-CM

## 2020-01-27 DIAGNOSIS — J40 BRONCHITIS: Primary | ICD-10-CM

## 2020-01-27 DIAGNOSIS — J02.9 SORE THROAT: ICD-10-CM

## 2020-01-27 DIAGNOSIS — D50.9 IRON DEFICIENCY ANEMIA, UNSPECIFIED IRON DEFICIENCY ANEMIA TYPE: ICD-10-CM

## 2020-01-27 DIAGNOSIS — R42 VERTIGO: ICD-10-CM

## 2020-01-27 DIAGNOSIS — J45.20 MILD INTERMITTENT ASTHMA WITHOUT COMPLICATION: ICD-10-CM

## 2020-01-27 DIAGNOSIS — H66.90 EAR INFECTION: ICD-10-CM

## 2020-01-27 PROBLEM — E11.9 DIABETES MELLITUS (HCC): Status: RESOLVED | Noted: 2019-10-02 | Resolved: 2020-01-27

## 2020-01-27 PROBLEM — D70.8 OTHER NEUTROPENIA (HCC): Status: RESOLVED | Noted: 2019-10-02 | Resolved: 2020-01-27

## 2020-01-27 LAB
FLUAV+FLUBV AG NOSE QL IA.RAPID: NEGATIVE POS/NEG
FLUAV+FLUBV AG NOSE QL IA.RAPID: NEGATIVE POS/NEG
S PYO AG THROAT QL: NEGATIVE
VALID INTERNAL CONTROL?: YES
VALID INTERNAL CONTROL?: YES

## 2020-01-27 RX ORDER — AZITHROMYCIN 500 MG/1
500 TABLET, FILM COATED ORAL DAILY
Qty: 7 TAB | Refills: 0 | Status: SHIPPED | OUTPATIENT
Start: 2020-01-27 | End: 2020-02-03

## 2020-01-27 RX ORDER — BENZONATATE 200 MG/1
200 CAPSULE ORAL
Qty: 21 CAP | Refills: 0 | Status: SHIPPED | OUTPATIENT
Start: 2020-01-27 | End: 2020-02-03

## 2020-01-27 RX ORDER — MECLIZINE HYDROCHLORIDE 25 MG/1
25 TABLET ORAL
Qty: 30 TAB | Refills: 0 | Status: SHIPPED | OUTPATIENT
Start: 2020-01-27 | End: 2020-03-20 | Stop reason: SDUPTHER

## 2020-01-27 RX ORDER — CIPROFLOXACIN AND DEXAMETHASONE 3; 1 MG/ML; MG/ML
4 SUSPENSION/ DROPS AURICULAR (OTIC) 2 TIMES DAILY
Qty: 7.5 ML | Refills: 0 | Status: SHIPPED | OUTPATIENT
Start: 2020-01-27 | End: 2020-02-01

## 2020-01-27 RX ORDER — ONDANSETRON 8 MG/1
8 TABLET, ORALLY DISINTEGRATING ORAL
Qty: 10 TAB | Refills: 0 | Status: SHIPPED | OUTPATIENT
Start: 2020-01-27 | End: 2020-02-06

## 2020-01-27 NOTE — LETTER
NOTIFICATION RETURN TO WORK / SCHOOL 
 
1/27/2020 12:30 PM 
 
Ms. Rocky Lopez 
[address] 
[address] [id number] To Whom It May Concern: Rocky Lopez is currently under the care of Kadeem Portillo. Patient was seen today for bronchitis and ear infection. Medication was prescribed. Bed rest recommended. She will return to work/school on: 02/03/2020. If there are questions or concerns please have the patient contact our office. Sincerely, Dori Lloyd MD

## 2020-01-27 NOTE — PROGRESS NOTES
1. Have you been to the ER, urgent care clinic since your last visit? Hospitalized since your last visit? No    2. Have you seen or consulted any other health care providers outside of the [address] [place] [address] since your last visit? Include any pap smears or colon screening. No   Chief Complaint   Patient presents with    Cough     Patient reports pain in chest and back when coughing.     Sore Throat    Nasal Congestion     Patient states mostly chest congestion       Visit Vitals  /74 (BP 1 Location: Left arm, BP Patient Position: Sitting)   Pulse 82   Temp 99.8 °F (37.7 °C) (Oral)   Resp 16   Ht 5' 5\" (1.651 m)   Wt 164 lb (74.4 kg)   SpO2 98%   BMI 27.29 kg/m²
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
## 2020-01-29 LAB
ALBUMIN SERPL-MCNC: 4.3 G/DL (ref 3.8–4.8)
ALBUMIN/GLOB SERPL: 2.2 {RATIO} (ref 1.2–2.2)
ALP SERPL-CCNC: 64 IU/L (ref 39–117)
ALT SERPL-CCNC: 25 IU/L (ref 0–32)
AST SERPL-CCNC: 36 IU/L (ref 0–40)
BASOPHILS # BLD AUTO: 0 X10E3/UL (ref 0–0.2)
BASOPHILS NFR BLD AUTO: 1 %
BILIRUB SERPL-MCNC: <0.2 MG/DL (ref 0–1.2)
BUN SERPL-MCNC: 8 MG/DL (ref 8–27)
BUN/CREAT SERPL: 9 (ref 12–28)
CALCIUM SERPL-MCNC: 9 MG/DL (ref 8.7–10.3)
CHLORIDE SERPL-SCNC: 100 MMOL/L (ref 96–106)
CO2 SERPL-SCNC: 19 MMOL/L (ref 20–29)
CREAT SERPL-MCNC: 0.85 MG/DL (ref 0.57–1)
EOSINOPHIL # BLD AUTO: 0 X10E3/UL (ref 0–0.4)
EOSINOPHIL NFR BLD AUTO: 1 %
ERYTHROCYTE [DISTWIDTH] IN BLOOD BY AUTOMATED COUNT: 12.9 % (ref 11.7–15.4)
GLOBULIN SER CALC-MCNC: 2 G/DL (ref 1.5–4.5)
GLUCOSE SERPL-MCNC: 120 MG/DL (ref 65–99)
HCT VFR BLD AUTO: 35.7 % (ref 34–46.6)
HGB BLD-MCNC: 12.3 G/DL (ref 11.1–15.9)
IMM GRANULOCYTES # BLD AUTO: 0 X10E3/UL (ref 0–0.1)
IMM GRANULOCYTES NFR BLD AUTO: 0 %
LYMPHOCYTES # BLD AUTO: 1.8 X10E3/UL (ref 0.7–3.1)
LYMPHOCYTES NFR BLD AUTO: 76 %
MCH RBC QN AUTO: 30 PG (ref 26.6–33)
MCHC RBC AUTO-ENTMCNC: 34.5 G/DL (ref 31.5–35.7)
MCV RBC AUTO: 87 FL (ref 79–97)
MONOCYTES # BLD AUTO: 0.2 X10E3/UL (ref 0.1–0.9)
MONOCYTES NFR BLD AUTO: 7 %
MORPHOLOGY BLD-IMP: ABNORMAL
NEUTROPHILS # BLD AUTO: 0.4 X10E3/UL (ref 1.4–7)
NEUTROPHILS NFR BLD AUTO: 15 %
PLATELET # BLD AUTO: 225 X10E3/UL (ref 150–450)
POTASSIUM SERPL-SCNC: 4.3 MMOL/L (ref 3.5–5.2)
PROT SERPL-MCNC: 6.3 G/DL (ref 6–8.5)
RBC # BLD AUTO: 4.1 X10E6/UL (ref 3.77–5.28)
SODIUM SERPL-SCNC: 136 MMOL/L (ref 134–144)
WBC # BLD AUTO: 2.3 X10E3/UL (ref 3.4–10.8)

## 2020-01-30 NOTE — PROGRESS NOTES
Called and discussed above results and recommendations with patient. Lab letter and My Chart Brochure mailed per patient request. Address on file confirmed. End of encounter.

## 2020-02-19 ENCOUNTER — DOCUMENTATION ONLY (OUTPATIENT)
Dept: ENDOCRINOLOGY | Age: 65
End: 2020-02-19

## 2020-02-21 ENCOUNTER — TELEPHONE (OUTPATIENT)
Dept: PRIMARY CARE CLINIC | Age: 65
End: 2020-02-21

## 2020-02-21 DIAGNOSIS — H66.90 EAR INFECTION: Primary | ICD-10-CM

## 2020-02-21 RX ORDER — OFLOXACIN 3 MG/ML
5 SOLUTION AURICULAR (OTIC) DAILY
Qty: 5 ML | Refills: 0 | Status: SHIPPED | OUTPATIENT
Start: 2020-02-21 | End: 2020-05-20

## 2020-02-21 NOTE — TELEPHONE ENCOUNTER
Called and spoke with patient and she states that she is having a recurrence of s/s she was seen and treated for in the office in January. Patient offered and office visit, she states, \" I have no one to bring me, it is so cold and my ear (left ear) hurts so bad. Can Dr. Jeannette Fu please call in at least an ear drop? No the one from last time though, it was too expensive. \" Advised patient that an office visit may be necessary, but Dr. Jeannette Fu would be made aware. Patient is agreeable to this. End of encounter.

## 2020-02-21 NOTE — TELEPHONE ENCOUNTER
Patient is having same issues with bronchitis and ear infection, would like a refill on the meds she was given.

## 2020-03-02 ENCOUNTER — TELEPHONE (OUTPATIENT)
Dept: PRIMARY CARE CLINIC | Age: 65
End: 2020-03-02

## 2020-03-02 NOTE — TELEPHONE ENCOUNTER
Called and spoke with patient and she continues to complain of chronic cough, hoarseness and URI s/s, unresolved, recurring over last month. Scheduled for 03/03/2020 at 0930. End of encounter.

## 2020-03-02 NOTE — TELEPHONE ENCOUNTER
Patient has been having some on going issues for a few weeks and they keep coming back. Stated she has been coughing and now has lost her voice and nothing is clearing this up.  Would like to know what to do

## 2020-03-03 ENCOUNTER — OFFICE VISIT (OUTPATIENT)
Dept: PRIMARY CARE CLINIC | Age: 65
End: 2020-03-03

## 2020-03-03 VITALS
HEIGHT: 65 IN | BODY MASS INDEX: 27.36 KG/M2 | OXYGEN SATURATION: 99 % | HEART RATE: 79 BPM | TEMPERATURE: 98.5 F | RESPIRATION RATE: 16 BRPM | DIASTOLIC BLOOD PRESSURE: 79 MMHG | WEIGHT: 164.2 LBS | SYSTOLIC BLOOD PRESSURE: 142 MMHG

## 2020-03-03 DIAGNOSIS — J45.20 MILD INTERMITTENT ASTHMA WITHOUT COMPLICATION: ICD-10-CM

## 2020-03-03 DIAGNOSIS — R05.9 COUGH: ICD-10-CM

## 2020-03-03 DIAGNOSIS — J20.9 ACUTE BRONCHITIS WITH BRONCHOSPASM: Primary | ICD-10-CM

## 2020-03-03 DIAGNOSIS — I51.7 MILD CARDIOMEGALY: ICD-10-CM

## 2020-03-03 RX ORDER — METHYLPREDNISOLONE 4 MG/1
TABLET ORAL
Qty: 1 DOSE PACK | Refills: 0 | Status: SHIPPED | OUTPATIENT
Start: 2020-03-03 | End: 2020-06-03 | Stop reason: ALTCHOICE

## 2020-03-03 RX ORDER — AZITHROMYCIN 250 MG/1
TABLET, FILM COATED ORAL
Qty: 6 TAB | Refills: 0 | Status: SHIPPED | OUTPATIENT
Start: 2020-03-03 | End: 2020-03-08

## 2020-03-03 RX ORDER — ALBUTEROL SULFATE 90 UG/1
1 AEROSOL, METERED RESPIRATORY (INHALATION)
Qty: 1 INHALER | Refills: 0 | Status: SHIPPED | OUTPATIENT
Start: 2020-03-03 | End: 2020-03-19

## 2020-03-03 RX ORDER — BENZONATATE 200 MG/1
200 CAPSULE ORAL
Qty: 21 CAP | Refills: 0 | Status: SHIPPED | OUTPATIENT
Start: 2020-03-03 | End: 2020-03-10
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
## 2020-03-03 NOTE — PROGRESS NOTES
Visit Vitals  /79 (BP 1 Location: Left arm, BP Patient Position: Sitting)   Pulse 79   Temp 98.5 °F (36.9 °C) (Oral)   Resp 16   Ht 5' 5\" (1.651 m)   Wt 164 lb 3.2 oz (74.5 kg)   SpO2 99%   BMI 27.32 kg/m²             Chief Complaint   Patient presents with    URI     Congestion on-going/intermittent since October 2019    Fatigue     Low energy level since mid-January, \"feels wiped out. \"     Cough     productive on-going/intermittent cough since October 2019    Other     Hoarseness of the voice since Sunday, patient attributes to chronic cough; denies sore throat              HM Due reviewed. Will address at a later appointment as patient is feeling unwell. 1. Have you been to the ER, urgent care clinic since your last visit? Hospitalized since your last visit? Denies     2. Have you seen or consulted any other health care providers outside of the [address] [place] [address] since your last visit? Include any pap smears or colon screening.  Denies

## 2020-03-04 DIAGNOSIS — I10 HYPERTENSION, UNSPECIFIED TYPE: ICD-10-CM

## 2020-03-05 RX ORDER — MELOXICAM 15 MG/1
TABLET ORAL
Qty: 90 TAB | Refills: 0 | Status: SHIPPED | OUTPATIENT
Start: 2020-03-05 | End: 2020-05-28

## 2020-03-05 RX ORDER — HYDROCHLOROTHIAZIDE 12.5 MG/1
TABLET ORAL
Qty: 90 TAB | Refills: 0 | Status: SHIPPED | OUTPATIENT
Start: 2020-03-05 | End: 2020-05-28

## 2020-03-13 ENCOUNTER — HOSPITAL ENCOUNTER (OUTPATIENT)
Dept: NON INVASIVE DIAGNOSTICS | Age: 65
Discharge: HOME | End: 2020-03-13
Attending: INTERNAL MEDICINE
Payer: MEDICARE

## 2020-03-13 VITALS — BODY MASS INDEX: 27.32 KG/M2 | HEIGHT: 65 IN | WEIGHT: 164 LBS

## 2020-03-13 DIAGNOSIS — D47.2 MONOCLONAL PARAPROTEINEMIA: ICD-10-CM

## 2020-03-13 DIAGNOSIS — D70.9 EOSINOPENIA (HCC): ICD-10-CM

## 2020-03-13 DIAGNOSIS — I51.7 CARDIOMEGALY: ICD-10-CM

## 2020-03-13 LAB
AV VELOCITY RATIO: 0.8
ECHO AO ROOT DIAM: 3.17 CM
ECHO AV AREA PEAK VELOCITY: 2.1 CM2
ECHO AV PEAK GRADIENT: 8.3 MMHG
ECHO AV PEAK VELOCITY: 144.4 CM/S
ECHO EST RA PRESSURE: 5 MMHG
ECHO LA AREA 4C: 14.4 CM2
ECHO LA MAJOR AXIS: 2.74 CM
ECHO LA TO AORTIC ROOT RATIO: 0.87
ECHO LA VOL 2C: 37.36 ML (ref 22–52)
ECHO LA VOL 4C: 30.32 ML (ref 22–52)
ECHO LA VOL BP: 35.94 ML (ref 22–52)
ECHO LA VOL/BSA BIPLANE: 19.77 ML/M2 (ref 16–28)
ECHO LA VOLUME INDEX A2C: 20.55 ML/M2 (ref 16–28)
ECHO LA VOLUME INDEX A4C: 16.68 ML/M2 (ref 16–28)
ECHO LV E' LATERAL VELOCITY: 5.05 CM/S
ECHO LV E' SEPTAL VELOCITY: 11.91 CM/S
ECHO LV INTERNAL DIMENSION DIASTOLIC: 4.2 CM (ref 3.9–5.3)
ECHO LV INTERNAL DIMENSION SYSTOLIC: 2.85 CM
ECHO LV IVSD: 0.89 CM (ref 0.6–0.9)
ECHO LV MASS 2D: 140.1 G (ref 67–162)
ECHO LV MASS INDEX 2D: 77.1 G/M2 (ref 43–95)
ECHO LV POSTERIOR WALL DIASTOLIC: 0.96 CM (ref 0.6–0.9)
ECHO LVOT DIAM: 1.84 CM
ECHO LVOT PEAK GRADIENT: 5.3 MMHG
ECHO LVOT PEAK VELOCITY: 115.3 CM/S
ECHO MV A VELOCITY: 82.47 CM/S
ECHO MV AREA PHT: 5.2 CM2
ECHO MV E DECELERATION TIME (DT): 146.3 MS
ECHO MV E VELOCITY: 53.75 CM/S
ECHO MV E/A RATIO: 0.65
ECHO MV E/E' LATERAL: 10.64
ECHO MV E/E' RATIO (AVERAGED): 7.58
ECHO MV E/E' SEPTAL: 4.51
ECHO MV PRESSURE HALF TIME (PHT): 42.4 MS
ECHO PULMONARY ARTERY SYSTOLIC PRESSURE (PASP): 29.6 MMHG
ECHO PV MAX VELOCITY: 77.38 CM/S
ECHO PV PEAK GRADIENT: 2.4 MMHG
ECHO RIGHT VENTRICULAR SYSTOLIC PRESSURE (RVSP): 29.6 MMHG
ECHO RV INTERNAL DIMENSION: 3.34 CM
ECHO RV TAPSE: 1.64 CM (ref 1.5–2)
ECHO TV REGURGITANT MAX VELOCITY: 248.23 CM/S
ECHO TV REGURGITANT PEAK GRADIENT: 24.6 MMHG
LVFS 2D: 32.17 %
MV DEC SLOPE: 3.67

## 2020-03-13 PROCEDURE — 93306 TTE W/DOPPLER COMPLETE: CPT

## 2020-03-19 ENCOUNTER — TELEPHONE (OUTPATIENT)
Dept: PRIMARY CARE CLINIC | Age: 65
End: 2020-03-19

## 2020-03-19 DIAGNOSIS — J45.20 MILD INTERMITTENT ASTHMA WITHOUT COMPLICATION: ICD-10-CM

## 2020-03-19 RX ORDER — ALBUTEROL SULFATE 90 UG/1
AEROSOL, METERED RESPIRATORY (INHALATION)
Qty: 8.5 G | Refills: 0 | Status: CANCELLED | OUTPATIENT
Start: 2020-03-19

## 2020-03-19 RX ORDER — ALBUTEROL SULFATE 90 UG/1
AEROSOL, METERED RESPIRATORY (INHALATION)
Qty: 8.5 G | Refills: 0 | Status: SHIPPED | OUTPATIENT
Start: 2020-03-19 | End: 2020-07-20

## 2020-03-19 NOTE — TELEPHONE ENCOUNTER
Last office visit 3/3/2020  Last med refill refill for albuterol done today with confirmed receipt by pharmacy

## 2020-03-20 DIAGNOSIS — R42 VERTIGO: ICD-10-CM

## 2020-03-20 RX ORDER — MECLIZINE HYDROCHLORIDE 25 MG/1
25 TABLET ORAL
Qty: 30 TAB | Refills: 0 | Status: SHIPPED | OUTPATIENT
Start: 2020-03-20 | End: 2020-04-19

## 2020-04-08 ENCOUNTER — TELEPHONE (OUTPATIENT)
Dept: ENDOCRINOLOGY | Age: 65
End: 2020-04-08

## 2020-04-08 NOTE — TELEPHONE ENCOUNTER
----- Message from Andres Campos sent at 4/8/2020  3:57 PM EDT -----  Regarding: Dr. White Message/Vendor Calls    Caller's first and last name:  pt    Reason for call:  Issues with medication    Callback required yes/no and why:  yes    Best contact number(s):  [phone]    Details to clarify the request:  Pt stated the medication prescribed in regards to weight is not available any longer. Requesting for something close to affect of this medication to be prescribed.      Andres Campos

## 2020-05-20 DIAGNOSIS — H66.90 EAR INFECTION: ICD-10-CM

## 2020-05-20 RX ORDER — OFLOXACIN 3 MG/ML
SOLUTION AURICULAR (OTIC)
Qty: 5 ML | Refills: 0 | Status: SHIPPED | OUTPATIENT
Start: 2020-05-20 | End: 2022-03-10

## 2020-05-26 ENCOUNTER — HOSPITAL ENCOUNTER (OUTPATIENT)
Dept: ULTRASOUND IMAGING | Age: 65
Discharge: HOME | End: 2020-05-26
Attending: INTERNAL MEDICINE
Payer: MEDICARE

## 2020-05-26 DIAGNOSIS — R73.03 PREDIABETES: ICD-10-CM

## 2020-05-26 DIAGNOSIS — E04.2 MULTINODULAR GOITER: ICD-10-CM

## 2020-05-26 PROCEDURE — 76536 US EXAM OF HEAD AND NECK: CPT

## 2020-05-28 DIAGNOSIS — I10 HYPERTENSION, UNSPECIFIED TYPE: ICD-10-CM

## 2020-05-28 RX ORDER — HYDROCHLOROTHIAZIDE 12.5 MG/1
TABLET ORAL
Qty: 90 TAB | Refills: 0 | Status: SHIPPED | OUTPATIENT
Start: 2020-05-28 | End: 2020-07-20

## 2020-05-28 RX ORDER — MELOXICAM 15 MG/1
TABLET ORAL
Qty: 90 TAB | Refills: 0 | Status: SHIPPED | OUTPATIENT
Start: 2020-05-28 | End: 2020-07-20

## 2020-06-01 ENCOUNTER — TELEPHONE (OUTPATIENT)
Dept: PRIMARY CARE CLINIC | Age: 65
End: 2020-06-01

## 2020-06-01 DIAGNOSIS — E10.65 HYPERGLYCEMIA DUE TO TYPE 1 DIABETES MELLITUS (HCC): ICD-10-CM

## 2020-06-01 DIAGNOSIS — I10 HYPERTENSION, UNSPECIFIED TYPE: Primary | ICD-10-CM

## 2020-06-01 DIAGNOSIS — E78.2 MIXED HYPERLIPIDEMIA: ICD-10-CM

## 2020-06-01 RX ORDER — LANCING DEVICE/LANCETS
KIT MISCELLANEOUS
Qty: 200 KIT | Refills: 4 | Status: SHIPPED | OUTPATIENT
Start: 2020-06-01 | End: 2020-06-16 | Stop reason: SDUPTHER

## 2020-06-01 NOTE — TELEPHONE ENCOUNTER
Patient is scheduled for labs for Thursday. She needs lab orders for complete set of labs, blood sugar.

## 2020-06-03 ENCOUNTER — VIRTUAL VISIT (OUTPATIENT)
Dept: PRIMARY CARE CLINIC | Age: 65
End: 2020-06-03

## 2020-06-03 DIAGNOSIS — M25.512 CHRONIC LEFT SHOULDER PAIN: Primary | ICD-10-CM

## 2020-06-03 DIAGNOSIS — G89.29 CHRONIC LEFT SHOULDER PAIN: Primary | ICD-10-CM

## 2020-06-03 DIAGNOSIS — R42 VERTIGO: ICD-10-CM

## 2020-06-03 RX ORDER — CYCLOBENZAPRINE HCL 10 MG
10 TABLET ORAL
Qty: 15 TAB | Refills: 0 | Status: SHIPPED | OUTPATIENT
Start: 2020-06-03 | End: 2020-06-18

## 2020-06-03 RX ORDER — MECLIZINE HCL 12.5 MG 12.5 MG/1
12.5 TABLET ORAL
Qty: 30 TAB | Refills: 0 | Status: SHIPPED | OUTPATIENT
Start: 2020-06-03 | End: 2020-06-13
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
## 2020-06-05 ENCOUNTER — TELEPHONE (OUTPATIENT)
Dept: PRIMARY CARE CLINIC | Age: 65
End: 2020-06-05

## 2020-06-05 LAB
ALBUMIN SERPL-MCNC: 4.3 G/DL (ref 3.8–4.8)
ALBUMIN/GLOB SERPL: 2.4 {RATIO} (ref 1.2–2.2)
ALP SERPL-CCNC: 76 IU/L (ref 39–117)
ALT SERPL-CCNC: 18 IU/L (ref 0–32)
AST SERPL-CCNC: 19 IU/L (ref 0–40)
BASOPHILS # BLD AUTO: 0.1 X10E3/UL (ref 0–0.2)
BASOPHILS NFR BLD AUTO: 2 %
BILIRUB SERPL-MCNC: <0.2 MG/DL (ref 0–1.2)
BUN SERPL-MCNC: 15 MG/DL (ref 8–27)
BUN/CREAT SERPL: 17 (ref 12–28)
CALCIUM SERPL-MCNC: 9.6 MG/DL (ref 8.7–10.3)
CHLORIDE SERPL-SCNC: 101 MMOL/L (ref 96–106)
CO2 SERPL-SCNC: 23 MMOL/L (ref 20–29)
CREAT SERPL-MCNC: 0.87 MG/DL (ref 0.57–1)
EOSINOPHIL # BLD AUTO: 0 X10E3/UL (ref 0–0.4)
EOSINOPHIL NFR BLD AUTO: 1 %
ERYTHROCYTE [DISTWIDTH] IN BLOOD BY AUTOMATED COUNT: 13 % (ref 11.7–15.4)
EST. AVERAGE GLUCOSE BLD GHB EST-MCNC: 120 MG/DL
GLOBULIN SER CALC-MCNC: 1.8 G/DL (ref 1.5–4.5)
GLUCOSE SERPL-MCNC: 113 MG/DL (ref 65–99)
HBA1C MFR BLD: 5.8 % (ref 4.8–5.6)
HCT VFR BLD AUTO: 36 % (ref 34–46.6)
HGB BLD-MCNC: 11.6 G/DL (ref 11.1–15.9)
IMM GRANULOCYTES # BLD AUTO: 0 X10E3/UL (ref 0–0.1)
IMM GRANULOCYTES NFR BLD AUTO: 0 %
LYMPHOCYTES # BLD AUTO: 1.8 X10E3/UL (ref 0.7–3.1)
LYMPHOCYTES NFR BLD AUTO: 59 %
MCH RBC QN AUTO: 29.8 PG (ref 26.6–33)
MCHC RBC AUTO-ENTMCNC: 32.2 G/DL (ref 31.5–35.7)
MCV RBC AUTO: 93 FL (ref 79–97)
MONOCYTES # BLD AUTO: 0.2 X10E3/UL (ref 0.1–0.9)
MONOCYTES NFR BLD AUTO: 7 %
MORPHOLOGY BLD-IMP: ABNORMAL
NEUTROPHILS # BLD AUTO: 0.9 X10E3/UL (ref 1.4–7)
NEUTROPHILS NFR BLD AUTO: 31 %
PLATELET # BLD AUTO: 231 X10E3/UL (ref 150–450)
POTASSIUM SERPL-SCNC: 4.7 MMOL/L (ref 3.5–5.2)
PROT SERPL-MCNC: 6.1 G/DL (ref 6–8.5)
RBC # BLD AUTO: 3.89 X10E6/UL (ref 3.77–5.28)
SODIUM SERPL-SCNC: 139 MMOL/L (ref 134–144)
TSH SERPL DL<=0.005 MIU/L-ACNC: 2.55 UIU/ML (ref 0.45–4.5)
WBC # BLD AUTO: 3 X10E3/UL (ref 3.4–10.8)

## 2020-06-05 NOTE — TELEPHONE ENCOUNTER
----- Message from Haylie Garber MD sent at 6/5/2020  4:29 PM EDT -----  Needs to see an ortho then. If she does not have a preference then we can refer her to [facility].  ----- Message -----  From: Jonny Brown LPN  Sent: 3/3/2288  11:19 AM EDT  To: Haylie Garber MD    Call placed to patient to inform of lab results. Spoke with patient regarding shoulder pain and she stated that the medication prescribed is not working. Patient stated that she took the medication twice yesterday once after her appointment and then again last night, then took another dose this morning with not relief. Advised patient that the medication is ordered to take at bedtime and that I will inform Dr Juanjo Sesay that it is not working for her.

## 2020-06-05 NOTE — PROGRESS NOTES
Call placed to patient to inform of lab results. Spoke with patient regarding shoulder pain and she stated that the medication prescribed is not working. Patient stated that she took the medication twice yesterday once after her appointment and then again last night, then took another dose this morning with not relief. Advised patient that the medication is ordered to take at bedtime and that I will inform Dr Naty Graham that it is not working for her.

## 2020-06-05 NOTE — TELEPHONE ENCOUNTER
Contacted patient informed of Dr Patel's suggestion for her to go to ortho. Asked if she had a particular ortho place in mind.  She said that she thinks so but she couldn't think of the name, said she will call back and give us the name

## 2020-06-16 RX ORDER — LANCING DEVICE/LANCETS
KIT MISCELLANEOUS
Qty: 200 KIT | Refills: 4 | Status: SHIPPED | OUTPATIENT
Start: 2020-06-16

## 2020-06-17 ENCOUNTER — VIRTUAL VISIT (OUTPATIENT)
Dept: ENDOCRINOLOGY | Age: 65
End: 2020-06-17

## 2020-06-17 DIAGNOSIS — E04.2 MULTINODULAR GOITER: ICD-10-CM

## 2020-06-17 DIAGNOSIS — R73.03 PREDIABETES: Primary | ICD-10-CM

## 2020-06-17 DIAGNOSIS — E55.9 VITAMIN D DEFICIENCY: ICD-10-CM

## 2020-06-17 NOTE — PATIENT INSTRUCTIONS
stop metformin er if recalled Might need metformin  500 mg twice a day with meals  
 
----------------------------------------------------------------------------------------------------- Low carbs Low natural food
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
## 2020-07-09 ENCOUNTER — HOSPITAL ENCOUNTER (OUTPATIENT)
Dept: GENERAL RADIOLOGY | Age: 65
Discharge: HOME | End: 2020-07-09
Attending: INTERNAL MEDICINE
Payer: MEDICARE

## 2020-07-09 DIAGNOSIS — D70.9 EOSINOPENIA (HCC): ICD-10-CM

## 2020-07-09 DIAGNOSIS — D47.2 MONOCLONAL PARAPROTEINEMIA: ICD-10-CM

## 2020-07-09 DIAGNOSIS — C90.00 MYELOMA ASSOCIATED AMYLOIDOSIS (HCC): ICD-10-CM

## 2020-07-09 DIAGNOSIS — E85.9 MYELOMA ASSOCIATED AMYLOIDOSIS (HCC): ICD-10-CM

## 2020-07-09 PROCEDURE — 77075 RADEX OSSEOUS SURVEY COMPL: CPT

## 2020-07-20 DIAGNOSIS — I10 HYPERTENSION, UNSPECIFIED TYPE: ICD-10-CM

## 2020-07-20 DIAGNOSIS — J45.20 MILD INTERMITTENT ASTHMA WITHOUT COMPLICATION: ICD-10-CM

## 2020-07-20 RX ORDER — ALBUTEROL SULFATE 90 UG/1
AEROSOL, METERED RESPIRATORY (INHALATION)
Qty: 8.5 G | Refills: 0 | Status: SHIPPED | OUTPATIENT
Start: 2020-07-20 | End: 2020-10-22 | Stop reason: SDUPTHER

## 2020-07-20 RX ORDER — HYDROCHLOROTHIAZIDE 12.5 MG/1
TABLET ORAL
Qty: 90 TAB | Refills: 0 | Status: SHIPPED | OUTPATIENT
Start: 2020-07-20 | End: 2020-10-20

## 2020-07-20 RX ORDER — MELOXICAM 15 MG/1
TABLET ORAL
Qty: 90 TAB | Refills: 0 | Status: SHIPPED | OUTPATIENT
Start: 2020-07-20 | End: 2020-10-20

## 2020-07-22 DIAGNOSIS — Z12.11 COLON CANCER SCREENING: Primary | ICD-10-CM

## 2020-08-14 ENCOUNTER — TELEPHONE (OUTPATIENT)
Dept: PRIMARY CARE CLINIC | Age: 65
End: 2020-08-14

## 2020-08-21 ENCOUNTER — OFFICE VISIT (OUTPATIENT)
Dept: PRIMARY CARE CLINIC | Age: 65
End: 2020-08-21
Payer: MEDICARE

## 2020-08-21 VITALS
SYSTOLIC BLOOD PRESSURE: 132 MMHG | TEMPERATURE: 98.1 F | HEART RATE: 68 BPM | HEIGHT: 65 IN | RESPIRATION RATE: 18 BRPM | DIASTOLIC BLOOD PRESSURE: 85 MMHG | WEIGHT: 160.8 LBS | BODY MASS INDEX: 26.79 KG/M2 | OXYGEN SATURATION: 98 %

## 2020-08-21 DIAGNOSIS — M25.512 CHRONIC LEFT SHOULDER PAIN: ICD-10-CM

## 2020-08-21 DIAGNOSIS — K11.20 PAROTIDITIS: Primary | ICD-10-CM

## 2020-08-21 DIAGNOSIS — G89.29 CHRONIC LEFT SHOULDER PAIN: ICD-10-CM

## 2020-08-21 PROCEDURE — G8417 CALC BMI ABV UP PARAM F/U: HCPCS | Performed by: INTERNAL MEDICINE

## 2020-08-21 PROCEDURE — G8432 DEP SCR NOT DOC, RNG: HCPCS | Performed by: INTERNAL MEDICINE

## 2020-08-21 PROCEDURE — G8754 DIAS BP LESS 90: HCPCS | Performed by: INTERNAL MEDICINE

## 2020-08-21 PROCEDURE — 99213 OFFICE O/P EST LOW 20 MIN: CPT | Performed by: INTERNAL MEDICINE

## 2020-08-21 PROCEDURE — G8399 PT W/DXA RESULTS DOCUMENT: HCPCS | Performed by: INTERNAL MEDICINE

## 2020-08-21 PROCEDURE — 1090F PRES/ABSN URINE INCON ASSESS: CPT | Performed by: INTERNAL MEDICINE

## 2020-08-21 PROCEDURE — G8752 SYS BP LESS 140: HCPCS | Performed by: INTERNAL MEDICINE

## 2020-08-21 PROCEDURE — G9899 SCRN MAM PERF RSLTS DOC: HCPCS | Performed by: INTERNAL MEDICINE

## 2020-08-21 PROCEDURE — 3017F COLORECTAL CA SCREEN DOC REV: CPT | Performed by: INTERNAL MEDICINE

## 2020-08-21 PROCEDURE — G8427 DOCREV CUR MEDS BY ELIG CLIN: HCPCS | Performed by: INTERNAL MEDICINE

## 2020-08-21 PROCEDURE — G8536 NO DOC ELDER MAL SCRN: HCPCS | Performed by: INTERNAL MEDICINE

## 2020-08-21 PROCEDURE — 1101F PT FALLS ASSESS-DOCD LE1/YR: CPT | Performed by: INTERNAL MEDICINE

## 2020-08-21 RX ORDER — LEVOFLOXACIN 500 MG/1
500 TABLET, FILM COATED ORAL DAILY
Qty: 7 TAB | Refills: 0 | Status: SHIPPED | OUTPATIENT
Start: 2020-08-21 | End: 2020-08-28
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
## 2020-08-25 ENCOUNTER — TELEPHONE (OUTPATIENT)
Dept: PRIMARY CARE CLINIC | Age: 65
End: 2020-08-25

## 2020-08-25 DIAGNOSIS — R22.0 LEFT FACIAL SWELLING: Primary | ICD-10-CM

## 2020-09-10 ENCOUNTER — OFFICE VISIT (OUTPATIENT)
Dept: PRIMARY CARE CLINIC | Age: 65
End: 2020-09-10
Payer: MEDICARE

## 2020-09-10 VITALS
DIASTOLIC BLOOD PRESSURE: 75 MMHG | HEIGHT: 65 IN | BODY MASS INDEX: 26.49 KG/M2 | HEART RATE: 68 BPM | WEIGHT: 159 LBS | OXYGEN SATURATION: 97 % | RESPIRATION RATE: 16 BRPM | TEMPERATURE: 97.5 F | SYSTOLIC BLOOD PRESSURE: 114 MMHG

## 2020-09-10 DIAGNOSIS — F32.A ANXIETY AND DEPRESSION: ICD-10-CM

## 2020-09-10 DIAGNOSIS — E78.2 MIXED HYPERLIPIDEMIA: ICD-10-CM

## 2020-09-10 DIAGNOSIS — I10 ESSENTIAL HYPERTENSION: ICD-10-CM

## 2020-09-10 DIAGNOSIS — E11.9 WELL CONTROLLED DIABETES MELLITUS (HCC): ICD-10-CM

## 2020-09-10 DIAGNOSIS — F41.9 ANXIETY AND DEPRESSION: ICD-10-CM

## 2020-09-10 DIAGNOSIS — Z23 ENCOUNTER FOR IMMUNIZATION: ICD-10-CM

## 2020-09-10 DIAGNOSIS — D47.2 SMOLDERING MULTIPLE MYELOMA (SMM): ICD-10-CM

## 2020-09-10 DIAGNOSIS — Z00.00 MEDICARE ANNUAL WELLNESS VISIT, SUBSEQUENT: Primary | ICD-10-CM

## 2020-09-10 DIAGNOSIS — Z71.89 ACP (ADVANCE CARE PLANNING): ICD-10-CM

## 2020-09-10 DIAGNOSIS — R42 VERTIGO: ICD-10-CM

## 2020-09-10 LAB
ALBUMIN SERPL-MCNC: 4 G/DL (ref 3.5–5)
ALBUMIN UR QL STRIP: 150 MG/L
ALBUMIN/GLOB SERPL: 1.6 {RATIO} (ref 1.1–2.2)
ALP SERPL-CCNC: 68 U/L (ref 45–117)
ALT SERPL-CCNC: 24 U/L (ref 12–78)
ANION GAP SERPL CALC-SCNC: 3 MMOL/L (ref 5–15)
AST SERPL-CCNC: 16 U/L (ref 15–37)
BILIRUB SERPL-MCNC: 0.3 MG/DL (ref 0.2–1)
BUN SERPL-MCNC: 10 MG/DL (ref 6–20)
BUN/CREAT SERPL: 14 (ref 12–20)
CALCIUM SERPL-MCNC: 9.5 MG/DL (ref 8.5–10.1)
CHLORIDE SERPL-SCNC: 104 MMOL/L (ref 97–108)
CHOLEST SERPL-MCNC: 213 MG/DL
CO2 SERPL-SCNC: 32 MMOL/L (ref 21–32)
CREAT SERPL-MCNC: 0.73 MG/DL (ref 0.55–1.02)
CREATININE, URINE POC: 300 MG/DL
GLOBULIN SER CALC-MCNC: 2.5 G/DL (ref 2–4)
GLUCOSE SERPL-MCNC: 101 MG/DL (ref 65–100)
HDLC SERPL-MCNC: 80 MG/DL
HDLC SERPL: 2.7 {RATIO} (ref 0–5)
LDLC SERPL CALC-MCNC: 121.6 MG/DL (ref 0–100)
LIPID PROFILE,FLP: ABNORMAL
MICROALBUMIN/CREAT RATIO POC: ABNORMAL MG/G
POTASSIUM SERPL-SCNC: 4.2 MMOL/L (ref 3.5–5.1)
PROT SERPL-MCNC: 6.5 G/DL (ref 6.4–8.2)
SODIUM SERPL-SCNC: 139 MMOL/L (ref 136–145)
TRIGL SERPL-MCNC: 57 MG/DL (ref ?–150)
VLDLC SERPL CALC-MCNC: 11.4 MG/DL

## 2020-09-10 PROCEDURE — G0439 PPPS, SUBSEQ VISIT: HCPCS | Performed by: INTERNAL MEDICINE

## 2020-09-10 PROCEDURE — 82044 UR ALBUMIN SEMIQUANTITATIVE: CPT | Performed by: INTERNAL MEDICINE

## 2020-09-10 PROCEDURE — 3044F HG A1C LEVEL LT 7.0%: CPT | Performed by: INTERNAL MEDICINE

## 2020-09-10 PROCEDURE — G8417 CALC BMI ABV UP PARAM F/U: HCPCS | Performed by: INTERNAL MEDICINE

## 2020-09-10 PROCEDURE — G8427 DOCREV CUR MEDS BY ELIG CLIN: HCPCS | Performed by: INTERNAL MEDICINE

## 2020-09-10 PROCEDURE — G8752 SYS BP LESS 140: HCPCS | Performed by: INTERNAL MEDICINE

## 2020-09-10 PROCEDURE — 1101F PT FALLS ASSESS-DOCD LE1/YR: CPT | Performed by: INTERNAL MEDICINE

## 2020-09-10 PROCEDURE — G8536 NO DOC ELDER MAL SCRN: HCPCS | Performed by: INTERNAL MEDICINE

## 2020-09-10 PROCEDURE — G0008 ADMIN INFLUENZA VIRUS VAC: HCPCS | Performed by: INTERNAL MEDICINE

## 2020-09-10 PROCEDURE — G8754 DIAS BP LESS 90: HCPCS | Performed by: INTERNAL MEDICINE

## 2020-09-10 PROCEDURE — 90653 IIV ADJUVANT VACCINE IM: CPT | Performed by: INTERNAL MEDICINE

## 2020-09-10 PROCEDURE — G9899 SCRN MAM PERF RSLTS DOC: HCPCS | Performed by: INTERNAL MEDICINE

## 2020-09-10 PROCEDURE — 99214 OFFICE O/P EST MOD 30 MIN: CPT | Performed by: INTERNAL MEDICINE

## 2020-09-10 PROCEDURE — 3017F COLORECTAL CA SCREEN DOC REV: CPT | Performed by: INTERNAL MEDICINE

## 2020-09-10 PROCEDURE — 1090F PRES/ABSN URINE INCON ASSESS: CPT | Performed by: INTERNAL MEDICINE

## 2020-09-10 PROCEDURE — G8510 SCR DEP NEG, NO PLAN REQD: HCPCS | Performed by: INTERNAL MEDICINE

## 2020-09-10 PROCEDURE — 2022F DILAT RTA XM EVC RTNOPTHY: CPT | Performed by: INTERNAL MEDICINE

## 2020-09-10 PROCEDURE — G8399 PT W/DXA RESULTS DOCUMENT: HCPCS | Performed by: INTERNAL MEDICINE
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
## 2020-09-11 ENCOUNTER — HOSPITAL ENCOUNTER (OUTPATIENT)
Dept: PREADMISSION TESTING | Age: 65
Discharge: HOME | End: 2020-09-11
Payer: MEDICARE

## 2020-09-11 DIAGNOSIS — Z01.812 PRE-PROCEDURE LAB EXAM: ICD-10-CM

## 2020-09-11 PROCEDURE — 87635 SARS-COV-2 COVID-19 AMP PRB: CPT

## 2020-09-12 LAB — SARS-COV-2, COV2NT: NOT DETECTED

## 2020-09-13 NOTE — PROGRESS NOTES
Let her know cholesterol number has gone up but she does not have to worry about it as HDL( good cholesterol) has gone up as well. I would recommend continuing exercise & health diet. Will repeat in 6 months to see if numbers are improving.

## 2020-09-14 NOTE — PROGRESS NOTES
Confirmed patient id and advised of results and recommendations.   Patient gave verbal understanding

## 2020-09-15 ENCOUNTER — HOSPITAL ENCOUNTER (OUTPATIENT)
Age: 65
Setting detail: OUTPATIENT SURGERY
Discharge: HOME | End: 2020-09-15
Attending: SPECIALIST | Admitting: SPECIALIST
Payer: MEDICARE

## 2020-09-15 ENCOUNTER — ANESTHESIA (OUTPATIENT)
Dept: ENDOSCOPY | Age: 65
End: 2020-09-15
Payer: MEDICARE

## 2020-09-15 ENCOUNTER — ANESTHESIA EVENT (OUTPATIENT)
Dept: ENDOSCOPY | Age: 65
End: 2020-09-15
Payer: MEDICARE

## 2020-09-15 VITALS
WEIGHT: 159 LBS | RESPIRATION RATE: 12 BRPM | DIASTOLIC BLOOD PRESSURE: 67 MMHG | SYSTOLIC BLOOD PRESSURE: 108 MMHG | BODY MASS INDEX: 25.55 KG/M2 | HEIGHT: 66 IN | OXYGEN SATURATION: 98 % | TEMPERATURE: 97.8 F | HEART RATE: 56 BPM

## 2020-09-15 PROCEDURE — 74011000250 HC RX REV CODE- 250: Performed by: NURSE ANESTHETIST, CERTIFIED REGISTERED

## 2020-09-15 PROCEDURE — 76060000031 HC ANESTHESIA FIRST 0.5 HR: Performed by: SPECIALIST

## 2020-09-15 PROCEDURE — 76040000019: Performed by: SPECIALIST

## 2020-09-15 PROCEDURE — 74011250636 HC RX REV CODE- 250/636: Performed by: NURSE ANESTHETIST, CERTIFIED REGISTERED

## 2020-09-15 RX ORDER — SODIUM CHLORIDE 9 MG/ML
50 INJECTION, SOLUTION INTRAVENOUS CONTINUOUS
Status: DISCONTINUED | OUTPATIENT
Start: 2020-09-15 | End: 2020-09-15 | Stop reason: HOSPADM

## 2020-09-15 RX ORDER — SODIUM CHLORIDE 0.9 % (FLUSH) 0.9 %
5-40 SYRINGE (ML) INJECTION AS NEEDED
Status: DISCONTINUED | OUTPATIENT
Start: 2020-09-15 | End: 2020-09-15 | Stop reason: HOSPADM

## 2020-09-15 RX ORDER — EPINEPHRINE 0.1 MG/ML
1 INJECTION INTRACARDIAC; INTRAVENOUS
Status: DISCONTINUED | OUTPATIENT
Start: 2020-09-15 | End: 2020-09-15 | Stop reason: HOSPADM

## 2020-09-15 RX ORDER — SODIUM CHLORIDE 0.9 % (FLUSH) 0.9 %
5-40 SYRINGE (ML) INJECTION EVERY 8 HOURS
Status: DISCONTINUED | OUTPATIENT
Start: 2020-09-15 | End: 2020-09-15 | Stop reason: HOSPADM

## 2020-09-15 RX ORDER — SODIUM CHLORIDE 9 MG/ML
INJECTION, SOLUTION INTRAVENOUS
Status: DISCONTINUED | OUTPATIENT
Start: 2020-09-15 | End: 2020-09-15 | Stop reason: HOSPADM

## 2020-09-15 RX ORDER — PROPOFOL 10 MG/ML
INJECTION, EMULSION INTRAVENOUS AS NEEDED
Status: DISCONTINUED | OUTPATIENT
Start: 2020-09-15 | End: 2020-09-15 | Stop reason: HOSPADM

## 2020-09-15 RX ORDER — DEXTROMETHORPHAN/PSEUDOEPHED 2.5-7.5/.8
1.2 DROPS ORAL
Status: DISCONTINUED | OUTPATIENT
Start: 2020-09-15 | End: 2020-09-15 | Stop reason: HOSPADM

## 2020-09-15 RX ORDER — FLUMAZENIL 0.1 MG/ML
0.2 INJECTION INTRAVENOUS
Status: DISCONTINUED | OUTPATIENT
Start: 2020-09-15 | End: 2020-09-15 | Stop reason: HOSPADM

## 2020-09-15 RX ORDER — LIDOCAINE HYDROCHLORIDE 20 MG/ML
INJECTION, SOLUTION EPIDURAL; INFILTRATION; INTRACAUDAL; PERINEURAL AS NEEDED
Status: DISCONTINUED | OUTPATIENT
Start: 2020-09-15 | End: 2020-09-15 | Stop reason: HOSPADM

## 2020-09-15 RX ORDER — ATROPINE SULFATE 0.1 MG/ML
0.5 INJECTION INTRAVENOUS
Status: DISCONTINUED | OUTPATIENT
Start: 2020-09-15 | End: 2020-09-15 | Stop reason: HOSPADM

## 2020-09-15 RX ORDER — NALOXONE HYDROCHLORIDE 0.4 MG/ML
0.4 INJECTION, SOLUTION INTRAMUSCULAR; INTRAVENOUS; SUBCUTANEOUS
Status: DISCONTINUED | OUTPATIENT
Start: 2020-09-15 | End: 2020-09-15 | Stop reason: HOSPADM

## 2020-09-15 RX ORDER — ONDANSETRON 2 MG/ML
INJECTION INTRAMUSCULAR; INTRAVENOUS AS NEEDED
Status: DISCONTINUED | OUTPATIENT
Start: 2020-09-15 | End: 2020-09-15 | Stop reason: HOSPADM

## 2020-09-15 RX ADMIN — ONDANSETRON HYDROCHLORIDE 4 MG: 2 INJECTION, SOLUTION INTRAMUSCULAR; INTRAVENOUS at 11:25

## 2020-09-15 RX ADMIN — PROPOFOL 20 MG: 10 INJECTION, EMULSION INTRAVENOUS at 11:32

## 2020-09-15 RX ADMIN — PROPOFOL 80 MG: 10 INJECTION, EMULSION INTRAVENOUS at 11:25

## 2020-09-15 RX ADMIN — PROPOFOL 20 MG: 10 INJECTION, EMULSION INTRAVENOUS at 11:29

## 2020-09-15 RX ADMIN — PROPOFOL 20 MG: 10 INJECTION, EMULSION INTRAVENOUS at 11:27

## 2020-09-15 RX ADMIN — LIDOCAINE HYDROCHLORIDE 40 MG: 20 INJECTION, SOLUTION EPIDURAL; INFILTRATION; INTRACAUDAL; PERINEURAL at 11:25

## 2020-09-15 RX ADMIN — SODIUM CHLORIDE: 900 INJECTION, SOLUTION INTRAVENOUS at 11:16

## 2020-09-15 NOTE — PROGRESS NOTES

## 2020-09-15 NOTE — ANESTHESIA POSTPROCEDURE EVALUATION
Post-Anesthesia Evaluation and Assessment    Patient: Anibal Carlin MRN: [id number]  SSN: [id number]    YOB: 1955  Age: 77 y.o. Sex: female      I have evaluated the patient and they are stable and ready for discharge from the PACU. Cardiovascular Function/Vital Signs  Visit Vitals  /67   Pulse (!) 56   Temp 36.6 °C (97.8 °F)   Resp 12   Ht 5' 5.5\" (1.664 m)   Wt 72.1 kg (159 lb)   SpO2 98%   Breastfeeding No   BMI 26.06 kg/m²       Patient is status post MAC anesthesia for Procedure(s):  SIGMOIDOSCOPY FLEXIBLE. Nausea/Vomiting: None    Postoperative hydration reviewed and adequate. Pain:  Pain Scale 1: Numeric (0 - 10) (09/15/20 1158)  Pain Intensity 1: 0 (09/15/20 1158)   Managed    Neurological Status: At baseline    Mental Status, Level of Consciousness: Alert and  oriented to person, place, and time    Pulmonary Status:   O2 Device: Room air (09/15/20 1158)   Adequate oxygenation and airway patent    Complications related to anesthesia: None    Post-anesthesia assessment completed. No concerns    Signed By: Cale Nolasco MD     September 15, 2020              Procedure(s):  SIGMOIDOSCOPY FLEXIBLE.     MAC    <BSHSIANPOST>    INITIAL Post-op Vital signs:   Vitals Value Taken Time   /67 9/15/2020 11:58 AM   Temp 36.6 °C (97.8 °F) 9/15/2020 11:44 AM   Pulse 56 9/15/2020 11:58 AM   Resp 12 9/15/2020 11:58 AM   SpO2 98 % 9/15/2020 11:58 AM
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
## 2020-09-15 NOTE — DISCHARGE INSTRUCTIONS
Reinaldo Ganser  [id number]  1955    COLON DISCHARGE INSTRUCTIONS  Discomfort:  Redness at IV site- apply warm compress to area; if redness or soreness persist- contact your physician  There may be a slight amount of blood passed from the rectum  Gaseous discomfort- walking, belching will help relieve any discomfort    DIET:   High fiber diet. - however -  remember your colon is empty and a heavy meal will produce gas. Avoid these foods:  vegetables, fried / greasy foods, carbonated drinks for today. You may not drink alcoholic beverages for at least 12 hours    MEDICATIONS:   Regarding Aspirin or Nonsteroidal medications, please see below. ACTIVITY:  You may resume your normal daily activities it is recommended that you spend the remainder of the day resting -  avoid any strenuous activity. You may not operate a vehicle for 12 hours  You may not engage in an occupation involving machinery or appliances for rest of today  Avoid making any critical decisions for at least 24 hour    CALL M.D. ANY SIGN OF:  Increasing pain, nausea, vomiting  Abdominal distension (swelling)  New increased bleeding (oral or rectal)  Fever (chills)  Pain in chest area  Bloody discharge from nose or mouth  Shortness of breath    You may  take any Advil, Aspirin, Ibuprofen, Motrin, Aleve, or Goodys for 10 days, ONLY  Tylenol as needed for pain. Post procedure diagnosis: 1. inadequate prep, procedure not completed      Follow-up Instructions:   Call Dr. Abbie Kasper to schedule colon with 2 day prep  Telephone #  [phone]      DISCHARGE SUMMARY from Nurse    The following personal items collected during your admission are returned to you:   Dental Appliance: Dental Appliances: None  Vision: Visual Aid: At bedside, Glasses  Hearing Aid:    Jewelry:    Clothing:    Other Valuables:    Valuables sent to safe:      Learning About Coronavirus (COVID-19)  Coronavirus (COVID-19): Overview  What is coronavirus ([id number])?   The coronavirus disease (COVID-19) is caused by a virus. It is an illness that was [facility] [place] [facility] [place]. It has since spread worldwide. The virus can cause fever, cough, and trouble breathing. In severe cases, it can cause pneumonia and make it hard to breathe without help. It can cause death. Coronaviruses are a large group of viruses. They cause the common cold. They also cause more serious illnesses like Middle East respiratory syndrome (MERS) and severe acute respiratory syndrome (SARS). COVID-19 is caused by a novel coronavirus. That means it's a new type that has not been seen in people before. This virus spreads person-to-person through droplets from coughing and sneezing. It can also spread when you are close to someone who is infected. And it can spread when you touch something that has the virus on it, such as a doorknob or a tabletop. What can you do to protect yourself from coronavirus (COVID-19)? The best way to protect yourself from getting sick is to:  · Avoid areas where there is an outbreak. · Avoid contact with people who may be infected. · Wash your hands often with soap or alcohol-based hand sanitizers. · Avoid crowds and try to stay at least 6 feet away from other people. · Wash your hands often, especially after you cough or sneeze. Use soap and water, and scrub for at least 20 seconds. If soap and water aren't available, use an alcohol-based hand . · Avoid touching your mouth, nose, and eyes. What can you do to avoid spreading the virus to others? To help avoid spreading the virus to others:  · Cover your mouth with a tissue when you cough or sneeze. Then throw the tissue in the trash. · Use a disinfectant to clean things that you touch often. · Stay home if you are sick or have been exposed to the virus. Don't go to school, work, or public areas. And don't use public transportation.   · If you are sick:  ? Leave your home only if you need to get medical care. But call the doctor's office first so they know you're coming. And wear a face mask, if you have one.  ? If you have a face mask, wear it whenever you're around other people. It can help stop the spread of the virus when you cough or sneeze. ? Clean and disinfect your home every day. Use household  and disinfectant wipes or sprays. Take special care to clean things that you grab with your hands. These include doorknobs, remote controls, phones, and handles on your refrigerator and microwave. And don't forget countertops, tabletops, bathrooms, and computer keyboards. When to call for help  Call 911 anytime you think you may need emergency care. For example, call if:  · You have severe trouble breathing. (You can't talk at all.)  · You have constant chest pain or pressure. · You are severely dizzy or lightheaded. · You are confused or can't think clearly. · Your face and lips have a blue color. · You pass out (lose consciousness) or are very hard to wake up. Call your doctor now if you develop symptoms such as:  · Shortness of breath. · Fever. · Cough. If you need to get care, call ahead to the doctor's office for instructions before you go. Make sure you wear a face mask, if you have one, to prevent exposing other people to the virus. Where can you get the latest information? The following health organizations are tracking and studying this virus. Their websites contain the most up-to-date information. Viki Shi also learn what to do if you think you may have been exposed to the virus. · U.S. Centers for Disease Control and Prevention (CDC): The CDC provides updated news about the disease and travel advice. The website also tells you how to prevent the spread of infection. www.cdc.gov  · World Health Organization [place]): WHO offers information about the virus outbreaks.  WHO also has travel advice. www.who.int  Current as of: April 1, 2020               Content Version: 12.4  © 8988-1237 [employer]. Care instructions adapted under license by your healthcare professional. If you have questions about a medical condition or this instruction, always ask your healthcare professional. [address] any warranty or liability for your use of this information. DO NOT TAKE SLEEPING MEDICATIONS OR ANTIANXIETY MEDICATIONS WHILE TAKING NARCOTIC PAIN MEDICATIONS,  ESPECIALLY THE NIGHT OF ANESTHESIA. CPAP PATIENTS BE SURE TO WEAR MACHINE WHENEVER NAPPING OR SLEEPING. DISCHARGE SUMMARY from Nurse    The following personal items collected during your admission are returned to you:   Dental Appliance: Dental Appliances: None  Vision: Visual Aid: At bedside, Glasses  Hearing Aid:    Jewelry:    Clothing:    Other Valuables:    Valuables sent to safe:        PATIENT INSTRUCTIONS:    Anesthesia Discharge Instructions for Procedural Area requiring Sedation (MAC Anesthesia, Cath Lab, Endo and Radiology): You have been given medications during your procedure that may affect your memory and mental judgement for the next 24 hours. During this time frame for your safety, please follow the instructions listed below :    Have a responsible adult to drive you home and be with you for at least 12 hours.  Rest today and resume normal activities tomorrow.  Start with a soft bland diet and advance as tolerated to your recommended diet.  Do not drive any motor vehicle or operate mechanical or electrical equipment prior to [employer].  Avoid making critical decisions or signing legal documents prior to 6am tomorrow.  Do not drink alcohol prior to 6am tomorrow.  If you have sleep apnea and you plan to go home and take a nap, please use your CPAP machine not only at bedtime, but also while napping for 24 hours.     If you notice any redness or swelling on parts of your body where IV medications were given, place a warm wet washcloth over the area for 20 minutes at a time until the redness or swelling goes away. If you still have redness or swelling after 2-3 days, please call us. · You will receive a Post Operative Call from one of the Recovery Room Nurses on the day after your surgery to check on you. It is very important for us to know how you are recovering after your surgery. If you have an issue or need to speak with someone, please call your surgeon, do not wait for the post operative call. · You may receive an e-mail or letter in the mail from [employer] regarding your experience with us in the Ambulatory Surgery Unit. Your feedback is valuable to us and we appreciate your participation in the survey. · We wish you a speedy recovery ? What to do at Home:      *  Please give a list of your current medications to your Primary Care Provider. *  Please update this list whenever your medications are discontinued, doses are      changed, or new medications (including over-the-counter products) are added. *  Please carry medication information at all times in case of emergency situations. If you have not received your influenza and/or pneumococcal vaccine, please follow up with your primary care physician. The discharge information has been reviewed with the patient and spouse. The patient and spouse verbalized understanding.

## 2020-09-15 NOTE — ROUTINE PROCESS
Shelia Diaz 1955 [id number] Situation: 
Verbal report given from: MARIE Dinh RN Procedure: Procedure(s): SIGMOIDOSCOPY FLEXIBLE Background: 
 
Preoperative diagnosis: FAMILY HISTORY OF COLON POLYPS Postoperative diagnosis: 1. inadequate prep, procedure not completed :  Dr. Cliff England Assistant(s): Endoscopy Technician-1: Amy Carlson Endoscopy RN-1: Meghna Walker RN Specimens: * No specimens in log * Assessment: 
Intra-procedure medications Propofol  mg Anesthesia gave intra-procedure sedation and medications, see anesthesia flow sheet Intravenous fluids: Jennett Gio Vital signs stable Abdominal assessment: round and soft Recommendation: 
 
Permission to share finding with  yes All side rails up, bed in low position, wheels locked. Nurse at bedside.

## 2020-09-15 NOTE — PROCEDURES
[address]  [address]                 Colonoscopy Procedure Note    Indications:   See Preoperative Diagnosis above  Referring Physician: Francisco Salguero MD  Anesthesia/Sedation: MAC anesthesia Propofol  Endoscopist:  Dr. Anais Delvalle  Assistant:  Endoscopy Technician-1: Ahsan Molina  Endoscopy RN-1: Jessenia Phillips RN  Preoperative diagnosis: FAMILY HISTORY OF COLON POLYPS  Postoperative diagnosis: 1. inadequate prep, procedure not completed    Procedure in Detail:  Informed consent was obtained for the procedure, including sedation. Risks of perforation, hemorrhage, adverse drug reaction, and aspiration were discussed. The patient was placed in the left lateral decubitus position. Based on the pre-procedure assessment, including review of the patient's medical history, medications, allergies, and review of systems, she had been deemed to be an appropriate candidate for moderate sedation; she was therefore sedated with the medications listed above. The patient was monitored continuously with ECG tracing, pulse oximetry, blood pressure monitoring, and direct observations. A rectal examination was performed. The [id number] was inserted into the rectum and advanced under direct vision to the sigmoid colon. The quality of the colonic preparation was poor. A careful inspection was made as the colonoscope was withdrawn, including a retroflexed view of the rectum; findings and interventions are described below. Appropriate photodocumentation was not obtained. Findings:  Rectum: poor prep  Sigmoid: poor prep, procedure terminated in sigmoid colon      Specimens:    none    EBL: None    Complications: None; patient tolerated the procedure well.     Recommendations:      - Repeat colon with 2 day prep with Mag Citrate and Trilyte      Signed By: Anais Delvalle MD                        September 15, 2020

## 2020-09-15 NOTE — ANESTHESIA PREPROCEDURE EVALUATION
Relevant Problems   No relevant active problems       Anesthetic History   No history of anesthetic complications            Review of Systems / Medical History  Patient summary reviewed, nursing notes reviewed and pertinent labs reviewed    Pulmonary            Asthma        Neuro/Psych         Psychiatric history     Cardiovascular  Within defined limits                     GI/Hepatic/Renal  Within defined limits              Endo/Other    Diabetes         Other Findings              Physical Exam    Airway  Mallampati: II  TM Distance: > 6 cm  Neck ROM: normal range of motion   Mouth opening: Normal     Cardiovascular  Regular rate and rhythm,  S1 and S2 normal,  no murmur, click, rub, or gallop             Dental  No notable dental hx       Pulmonary  Breath sounds clear to auscultation               Abdominal  GI exam deferred       Other Findings            Anesthetic Plan    ASA: 2  Anesthesia type: MAC            Anesthetic plan and risks discussed with: Patient

## 2020-09-18 ENCOUNTER — HOSPITAL ENCOUNTER (OUTPATIENT)
Dept: PREADMISSION TESTING | Age: 65
Discharge: HOME | End: 2020-09-18
Payer: MEDICARE

## 2020-09-18 DIAGNOSIS — Z01.812 PRE-PROCEDURE LAB EXAM: ICD-10-CM

## 2020-09-18 PROCEDURE — 87635 SARS-COV-2 COVID-19 AMP PRB: CPT

## 2020-09-20 LAB — SARS-COV-2, COV2NT: NOT DETECTED

## 2020-09-22 ENCOUNTER — ANESTHESIA EVENT (OUTPATIENT)
Dept: ENDOSCOPY | Age: 65
End: 2020-09-22
Payer: MEDICARE

## 2020-09-22 ENCOUNTER — HOSPITAL ENCOUNTER (OUTPATIENT)
Age: 65
Setting detail: OUTPATIENT SURGERY
Discharge: HOME | End: 2020-09-22
Attending: SPECIALIST | Admitting: SPECIALIST
Payer: MEDICARE

## 2020-09-22 ENCOUNTER — ANESTHESIA (OUTPATIENT)
Dept: ENDOSCOPY | Age: 65
End: 2020-09-22
Payer: MEDICARE

## 2020-09-22 VITALS
OXYGEN SATURATION: 100 % | RESPIRATION RATE: 15 BRPM | HEIGHT: 66 IN | HEART RATE: 59 BPM | BODY MASS INDEX: 25.55 KG/M2 | TEMPERATURE: 96.8 F | DIASTOLIC BLOOD PRESSURE: 68 MMHG | SYSTOLIC BLOOD PRESSURE: 129 MMHG | WEIGHT: 159 LBS

## 2020-09-22 PROCEDURE — 74011250636 HC RX REV CODE- 250/636: Performed by: SPECIALIST

## 2020-09-22 PROCEDURE — 76060000031 HC ANESTHESIA FIRST 0.5 HR: Performed by: SPECIALIST

## 2020-09-22 PROCEDURE — 76040000019: Performed by: SPECIALIST

## 2020-09-22 PROCEDURE — 74011250636 HC RX REV CODE- 250/636: Performed by: NURSE ANESTHETIST, CERTIFIED REGISTERED

## 2020-09-22 PROCEDURE — 74011250637 HC RX REV CODE- 250/637: Performed by: SPECIALIST

## 2020-09-22 PROCEDURE — 74011000250 HC RX REV CODE- 250: Performed by: NURSE ANESTHETIST, CERTIFIED REGISTERED

## 2020-09-22 RX ORDER — SODIUM CHLORIDE 0.9 % (FLUSH) 0.9 %
5-40 SYRINGE (ML) INJECTION EVERY 8 HOURS
Status: DISCONTINUED | OUTPATIENT
Start: 2020-09-22 | End: 2020-09-22 | Stop reason: HOSPADM

## 2020-09-22 RX ORDER — DEXTROMETHORPHAN/PSEUDOEPHED 2.5-7.5/.8
1.2 DROPS ORAL
Status: DISCONTINUED | OUTPATIENT
Start: 2020-09-22 | End: 2020-09-22 | Stop reason: HOSPADM

## 2020-09-22 RX ORDER — LIDOCAINE HYDROCHLORIDE 20 MG/ML
INJECTION, SOLUTION EPIDURAL; INFILTRATION; INTRACAUDAL; PERINEURAL AS NEEDED
Status: DISCONTINUED | OUTPATIENT
Start: 2020-09-22 | End: 2020-09-22 | Stop reason: HOSPADM

## 2020-09-22 RX ORDER — FLUMAZENIL 0.1 MG/ML
0.2 INJECTION INTRAVENOUS
Status: DISCONTINUED | OUTPATIENT
Start: 2020-09-22 | End: 2020-09-22 | Stop reason: HOSPADM

## 2020-09-22 RX ORDER — ONDANSETRON 2 MG/ML
INJECTION INTRAMUSCULAR; INTRAVENOUS AS NEEDED
Status: DISCONTINUED | OUTPATIENT
Start: 2020-09-22 | End: 2020-09-22 | Stop reason: HOSPADM

## 2020-09-22 RX ORDER — NALOXONE HYDROCHLORIDE 0.4 MG/ML
0.4 INJECTION, SOLUTION INTRAMUSCULAR; INTRAVENOUS; SUBCUTANEOUS
Status: DISCONTINUED | OUTPATIENT
Start: 2020-09-22 | End: 2020-09-22 | Stop reason: HOSPADM

## 2020-09-22 RX ORDER — SODIUM CHLORIDE 9 MG/ML
50 INJECTION, SOLUTION INTRAVENOUS CONTINUOUS
Status: DISCONTINUED | OUTPATIENT
Start: 2020-09-22 | End: 2020-09-22 | Stop reason: HOSPADM

## 2020-09-22 RX ORDER — EPINEPHRINE 0.1 MG/ML
1 INJECTION INTRACARDIAC; INTRAVENOUS
Status: DISCONTINUED | OUTPATIENT
Start: 2020-09-22 | End: 2020-09-22 | Stop reason: HOSPADM

## 2020-09-22 RX ORDER — ESCITALOPRAM OXALATE 10 MG/1
10 TABLET ORAL DAILY
COMMUNITY
End: 2022-03-23 | Stop reason: SDUPTHER

## 2020-09-22 RX ORDER — PROPOFOL 10 MG/ML
INJECTION, EMULSION INTRAVENOUS AS NEEDED
Status: DISCONTINUED | OUTPATIENT
Start: 2020-09-22 | End: 2020-09-22 | Stop reason: HOSPADM

## 2020-09-22 RX ORDER — ATROPINE SULFATE 0.1 MG/ML
0.5 INJECTION INTRAVENOUS
Status: DISCONTINUED | OUTPATIENT
Start: 2020-09-22 | End: 2020-09-22 | Stop reason: HOSPADM

## 2020-09-22 RX ORDER — SODIUM CHLORIDE 0.9 % (FLUSH) 0.9 %
5-40 SYRINGE (ML) INJECTION AS NEEDED
Status: DISCONTINUED | OUTPATIENT
Start: 2020-09-22 | End: 2020-09-22 | Stop reason: HOSPADM

## 2020-09-22 RX ADMIN — ONDANSETRON HYDROCHLORIDE 4 MG: 2 INJECTION, SOLUTION INTRAMUSCULAR; INTRAVENOUS at 13:12

## 2020-09-22 RX ADMIN — PROPOFOL 40 MG: 10 INJECTION, EMULSION INTRAVENOUS at 13:16

## 2020-09-22 RX ADMIN — PROPOFOL 40 MG: 10 INJECTION, EMULSION INTRAVENOUS at 13:19

## 2020-09-22 RX ADMIN — PROPOFOL 30 MG: 10 INJECTION, EMULSION INTRAVENOUS at 13:25

## 2020-09-22 RX ADMIN — PROPOFOL 40 MG: 10 INJECTION, EMULSION INTRAVENOUS at 13:14

## 2020-09-22 RX ADMIN — SODIUM CHLORIDE: 900 INJECTION, SOLUTION INTRAVENOUS at 12:30

## 2020-09-22 RX ADMIN — PROPOFOL 60 MG: 10 INJECTION, EMULSION INTRAVENOUS at 13:13

## 2020-09-22 RX ADMIN — PROPOFOL 30 MG: 10 INJECTION, EMULSION INTRAVENOUS at 13:34

## 2020-09-22 RX ADMIN — PROPOFOL 30 MG: 10 INJECTION, EMULSION INTRAVENOUS at 13:28

## 2020-09-22 RX ADMIN — LIDOCAINE HYDROCHLORIDE 50 MG: 20 INJECTION, SOLUTION EPIDURAL; INFILTRATION; INTRACAUDAL; PERINEURAL at 13:13

## 2020-09-22 RX ADMIN — PROPOFOL 30 MG: 10 INJECTION, EMULSION INTRAVENOUS at 13:31

## 2020-09-22 RX ADMIN — PROPOFOL 30 MG: 10 INJECTION, EMULSION INTRAVENOUS at 13:22
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
## 2020-09-22 NOTE — DISCHARGE INSTRUCTIONS
Andrey Stover  [id number]  1955    COLON DISCHARGE INSTRUCTIONS  Discomfort:  Redness at IV site- apply warm compress to area; if redness or soreness persist- contact your physician  There may be a slight amount of blood passed from the rectum  Gaseous discomfort- walking, belching will help relieve any discomfort    DIET:   High fiber diet. - however -  remember your colon is empty and a heavy meal will produce gas. Avoid these foods:  vegetables, fried / greasy foods, carbonated drinks for today. You may not drink alcoholic beverages for at least 12 hours    MEDICATIONS:   Regarding Aspirin or Nonsteroidal medications, please see below. ACTIVITY:  You may resume your normal daily activities it is recommended that you spend the remainder of the day resting -  avoid any strenuous activity. You may not operate a vehicle for 12 hours  You may not engage in an occupation involving machinery or appliances for rest of today  Avoid making any critical decisions for at least 24 hour    CALL M.D. ANY SIGN OF:  Increasing pain, nausea, vomiting  Abdominal distension (swelling)  New increased bleeding (oral or rectal)  Fever (chills)  Pain in chest area  Bloody discharge from nose or mouth  Shortness of breath    You may  take any Advil, Aspirin, Ibuprofen, Motrin, Aleve, or   Tylenol as needed for pain. Post procedure diagnosis: 1. Diverticulosisi      Follow-up Instructions:   Call Dr. Yanely Hoffman office if you develop severe pain or bloody stools  Telephone #  [phone]      DISCHARGE SUMMARY from Nurse    The following personal items collected during your admission are returned to you:   Dental Appliance: Dental Appliances: None  Vision: Visual Aid: At bedside, Glasses  Hearing Aid:    Jewelry:    Clothing:    Other Valuables:    Valuables sent to safe:      Learning About Coronavirus (COVID-19)  Coronavirus (COVID-19): Overview  What is coronavirus ([id number])?   The coronavirus disease (COVID-19) is caused by a virus. It is an illness that was first found in [place], [place], in December 2019. It has since spread worldwide. The virus can cause fever, cough, and trouble breathing. In severe cases, it can cause pneumonia and make it hard to breathe without help. It can cause death. Coronaviruses are a large group of viruses. They cause the common cold. They also cause more serious illnesses like Middle East respiratory syndrome (MERS) and severe acute respiratory syndrome (SARS). COVID-19 is caused by a novel coronavirus. That means it's a new type that has not been seen in people before. This virus spreads person-to-person through droplets from coughing and sneezing. It can also spread when you are close to someone who is infected. And it can spread when you touch something that has the virus on it, such as a doorknob or a tabletop. What can you do to protect yourself from coronavirus (COVID-19)? The best way to protect yourself from getting sick is to:  · Avoid areas where there is an outbreak. · Avoid contact with people who may be infected. · Wash your hands often with soap or alcohol-based hand sanitizers. · Avoid crowds and try to stay at least 6 feet away from other people. · Wash your hands often, especially after you cough or sneeze. Use soap and water, and scrub for at least 20 seconds. If soap and water aren't available, use an alcohol-based hand . · Avoid touching your mouth, nose, and eyes. What can you do to avoid spreading the virus to others? To help avoid spreading the virus to others:  · Cover your mouth with a tissue when you cough or sneeze. Then throw the tissue in the trash. · Use a disinfectant to clean things that you touch often. · Stay home if you are sick or have been exposed to the virus. Don't go to school, work, or public areas. And don't use public transportation. · If you are sick:  ? Leave your home only if you need to get medical care.  But call the doctor's office first so they know you're coming. And wear a face mask, if you have one.  ? If you have a face mask, wear it whenever you're around other people. It can help stop the spread of the virus when you cough or sneeze. ? Clean and disinfect your home every day. Use household  and disinfectant wipes or sprays. Take special care to clean things that you grab with your hands. These include doorknobs, remote controls, phones, and handles on your refrigerator and microwave. And don't forget countertops, tabletops, bathrooms, and computer keyboards. When to call for help  Call 911 anytime you think you may need emergency care. For example, call if:  · You have severe trouble breathing. (You can't talk at all.)  · You have constant chest pain or pressure. · You are severely dizzy or lightheaded. · You are confused or can't think clearly. · Your face and lips have a blue color. · You pass out (lose consciousness) or are very hard to wake up. Call your doctor now if you develop symptoms such as:  · Shortness of breath. · Fever. · Cough. If you need to get care, call ahead to the doctor's office for instructions before you go. Make sure you wear a face mask, if you have one, to prevent exposing other people to the virus. Where can you get the latest information? The following health organizations are tracking and studying this virus. Their websites contain the most up-to-date information. [place] also learn what to do if you think you may have been exposed to the virus. · U.S. Centers for Disease Control and Prevention (CDC): The CDC provides updated news about the disease and travel advice. The website also tells you how to prevent the spread of infection. www.cdc.gov  · World Health Organization [facility]): WHO offers information about the virus outbreaks. WHO also has travel advice. www.who.int  Current as of: April 1, 2020               Content Version: 12.4  © 1052-6008 [employer], [employer].    Care instructions adapted under license by your healthcare professional. If you have questions about a medical condition or this instruction, always ask your healthcare professional. [place] [address] any warranty or liability for your use of this information. Patient Education        Diverticulosis: Care Instructions  Your Care Instructions  In diverticulosis, pouches called diverticula form in the wall of the large intestine (colon). The pouches do not cause any pain or other symptoms. Most people who have diverticulosis do not know they have it. But the pouches sometimes bleed, and if they become infected, they can cause pain and other symptoms. When this happens, it is called diverticulitis. Diverticula form when pressure pushes the wall of the colon outward at certain weak points. A diet that is too low in fiber can cause diverticula. Follow-up care is a key part of your treatment and safety. Be sure to make and go to all appointments, and call your doctor if you are having problems. It's also a good idea to know your test results and keep a list of the medicines you take. How can you care for yourself at home? · Include fruits, leafy green vegetables, beans, and whole grains in your diet each day. These foods are high in fiber. · Take a fiber supplement, such as Citrucel or Metamucil, every day if needed. Read and follow all instructions on the label. · Drink plenty of fluids, enough so that your urine is light yellow or clear like water. If you have kidney, heart, or liver disease and have to limit fluids, talk with your doctor before you increase the amount of fluids you drink. · Get at least 30 minutes of exercise on most days of the week. Walking is a good choice. You also may want to do other activities, such as running, swimming, cycling, or playing tennis or team sports. · Cut out foods that cause gas, pain, or other symptoms. When should you call for help?    Call your doctor now or seek immediate medical care if:    · You have belly pain.     · You pass maroon or very bloody stools.     · You have a fever.     · You have nausea and vomiting.     · You have unusual changes in your bowel movements or abdominal swelling.     · You have burning pain when you urinate.     · You have abnormal vaginal discharge.     · You have shoulder pain.     · You have cramping pain that does not get better when you have a bowel movement or pass gas.     · You pass gas or stool from your urethra while urinating. Watch closely for changes in your health, and be sure to contact your doctor if you have any problems. Where can you learn more? Go to http://www.gray.com/  Enter [id number] in the search box to learn more about \"Diverticulosis: Care Instructions. \"  Current as of: April 15, 2020               Content Version: 12.6  © 4851-9916 [employer]. Care instructions adapted under license by [employer] (which disclaims liability or warranty for this information). If you have questions about a medical condition or this instruction, always ask your healthcare professional. [address] any warranty or liability for your use of this information.

## 2020-09-22 NOTE — PROCEDURES
[address]  [address] [facility], [facility] [address]                 Colonoscopy Procedure Note    Indications:   See Preoperative Diagnosis above  Referring Physician: Aliyah Teran MD  Anesthesia/Sedation: MAC anesthesia Propofol  Endoscopist:  Dr. Wilson Seen  Assistant:  Endoscopy Technician-1: Moose Carr  Endoscopy RN-1: Criselda Madrid RN  Preoperative diagnosis: FAMILY HISTORY OF COLON POLYPS  Postoperative diagnosis: 1. Diverticulosisi    Procedure in Detail:  Informed consent was obtained for the procedure, including sedation. Risks of perforation, hemorrhage, adverse drug reaction, and aspiration were discussed. The patient was placed in the left lateral decubitus position. Based on the pre-procedure assessment, including review of the patient's medical history, medications, allergies, and review of systems, she had been deemed to be an appropriate candidate for moderate sedation; she was therefore sedated with the medications listed above. The patient was monitored continuously with ECG tracing, pulse oximetry, blood pressure monitoring, and direct observations. A rectal examination was performed. The [id number] was inserted into the rectum and advanced under direct vision to the cecum, which was identified by the ileocecal valve and appendiceal orifice. The quality of the colonic preparation was good. A careful inspection was made as the colonoscope was withdrawn, including a retroflexed view of the rectum; findings and interventions are described below. Appropriate photodocumentation was obtained. Findings:  Rectum: normal  Sigmoid: moderate diverticulosis; Descending Colon: moderate diverticulosis;  Transverse Colon: mild diverticulosis; Ascending Colon: mild diverticulosis; Cecum: normal      Specimens:    none    EBL: None    Complications: None; patient tolerated the procedure well.     Recommendations:     - For colon cancer screening in this average-risk patient, colonoscopy may be repeated in 10 years. - High fiber diet.         Signed By: Eriberto dEen MD                        September 22, 2020

## 2020-09-22 NOTE — ANESTHESIA PREPROCEDURE EVALUATION
Relevant Problems   No relevant active problems       Anesthetic History   No history of anesthetic complications  PONV          Review of Systems / Medical History  Patient summary reviewed, nursing notes reviewed and pertinent labs reviewed    Pulmonary  Within defined limits          Asthma        Neuro/Psych   Within defined limits           Cardiovascular  Within defined limits                     GI/Hepatic/Renal  Within defined limits              Endo/Other  Within defined limits  Diabetes: type 2         Other Findings              Physical Exam    Airway  Mallampati: II  TM Distance: > 6 cm  Neck ROM: normal range of motion   Mouth opening: Normal     Cardiovascular  Regular rate and rhythm,  S1 and S2 normal,  no murmur, click, rub, or gallop             Dental  No notable dental hx       Pulmonary  Breath sounds clear to auscultation               Abdominal  GI exam deferred       Other Findings            Anesthetic Plan    ASA: 2  Anesthesia type: MAC            Anesthetic plan and risks discussed with: Patient

## 2020-09-22 NOTE — ROUTINE PROCESS
Andrey Halo  1955  [id number]    Situation:  Verbal report received from: MARIE Hwang, RN  Procedure: Procedure(s):  COLONOSCOPY     :-    Background:    Preoperative diagnosis: FAMILY HISTORY OF COLON POLYPS  Postoperative diagnosis: 1. Diverticulosisi    :  Dr. Queta Moreno  Assistant(s): Endoscopy Technician-1: Belia Goss  Endoscopy RN-1: Edin Willis RN    Specimens: * No specimens in log *  H. Pylori  no    Assessment:  Intra-procedure medications   Anesthesia gave intra-procedure sedation and medications, see anesthesia flow sheet yes    Intravenous fluids: NS@ KVO     Vital signs stable     Abdominal assessment: round and soft     Recommendation:  Discharge patient per MD order.     Family or Friend   Permission to share finding with family or friend yes

## 2020-09-22 NOTE — ANESTHESIA POSTPROCEDURE EVALUATION
Procedure(s):  COLONOSCOPY     :-.    MAC    Anesthesia Post Evaluation      Multimodal analgesia: multimodal analgesia not used between 6 hours prior to anesthesia start to PACU discharge  Patient location during evaluation: PACU  Patient participation: complete - patient participated  Level of consciousness: awake  Pain score: 0  Pain management: adequate  Airway patency: patent  Anesthetic complications: no  Cardiovascular status: acceptable  Respiratory status: acceptable  Hydration status: acceptable  Comments: I have evaluated the patient and meets criteria for discharge from PACU. Lynn Burns MD        INITIAL Post-op Vital signs:   Vitals Value Taken Time   /66 9/22/2020  2:12 PM   Temp     Pulse 58 9/22/2020  2:13 PM   Resp 16 9/22/2020  2:13 PM   SpO2 99 % 9/22/2020  2:13 PM   Vitals shown include unvalidated device data.

## 2020-09-22 NOTE — PROGRESS NOTES

## 2020-09-25 ENCOUNTER — TELEPHONE (OUTPATIENT)
Dept: FAMILY MEDICINE CLINIC | Age: 65
End: 2020-09-25

## 2020-09-25 NOTE — TELEPHONE ENCOUNTER
ACP Specialist reached out to pt via telephone contact to initiate conversation re: Advance Care Planning and the [place] for [employer]. No answer. This is the 1st attempt to reach patient. ACP Specialist provided contact information and encouraged a call back at her convenience.      Jack Stevens, JUAN ALBERTO, LCSW  ACP Specialist  Advance Care Planning Team  (R) [phone]

## 2020-09-30 ENCOUNTER — TELEPHONE (OUTPATIENT)
Dept: FAMILY MEDICINE CLINIC | Age: 65
End: 2020-09-30

## 2020-09-30 NOTE — TELEPHONE ENCOUNTER
ACP Specialist reached out to pt via telephone contact to initiate conversation re: Advance Care Planning and the [place] for [employer]. No answer. This is the 2nd attempt to reach patient. ACP Specialist provided contact information and encouraged a call back at her convenience.      JUAN ALBERTO Barajas, LCSW  ACP Specialist  Advance Care Planning Team  (N) [phone]

## 2020-10-08 ENCOUNTER — TELEPHONE (OUTPATIENT)
Dept: FAMILY MEDICINE CLINIC | Age: 65
End: 2020-10-08

## 2020-10-08 NOTE — TELEPHONE ENCOUNTER
ACP Specialist reached out to pt via telephone contact ([phone]) to initiate conversation re: Advance Care Planning and the [place] for [employer]. No answer. This is the 3rd attempt to reach patient. ACP Specialist unable to leave a voicemail due to mail box being full. Referral closed.      JUAN ALBERTO Rojas, LCSW  ACP Specialist  Advance Care Planning Team  (M) [phone]

## 2020-10-19 DIAGNOSIS — I10 HYPERTENSION, UNSPECIFIED TYPE: ICD-10-CM

## 2020-10-20 RX ORDER — MELOXICAM 15 MG/1
TABLET ORAL
Qty: 90 TAB | Refills: 3 | Status: SHIPPED | OUTPATIENT
Start: 2020-10-20 | End: 2021-11-05

## 2020-10-20 RX ORDER — HYDROCHLOROTHIAZIDE 12.5 MG/1
TABLET ORAL
Qty: 90 TAB | Refills: 3 | Status: SHIPPED | OUTPATIENT
Start: 2020-10-20 | End: 2021-04-16 | Stop reason: SDUPTHER

## 2020-10-22 DIAGNOSIS — J45.20 MILD INTERMITTENT ASTHMA WITHOUT COMPLICATION: ICD-10-CM

## 2020-10-22 DIAGNOSIS — F32.A ANXIETY AND DEPRESSION: Primary | ICD-10-CM

## 2020-10-22 DIAGNOSIS — F41.9 ANXIETY AND DEPRESSION: Primary | ICD-10-CM

## 2020-10-26 RX ORDER — CLONAZEPAM 0.5 MG/1
0.5 TABLET ORAL 3 TIMES DAILY
Qty: 270 TAB | Refills: 0 | OUTPATIENT
Start: 2020-10-26 | End: 2021-01-24

## 2020-10-26 RX ORDER — ALBUTEROL SULFATE 90 UG/1
AEROSOL, METERED RESPIRATORY (INHALATION)
Qty: 8.5 G | Refills: 0 | Status: SHIPPED | OUTPATIENT
Start: 2020-10-26 | End: 2020-11-01

## 2020-10-27 ENCOUNTER — TELEPHONE (OUTPATIENT)
Dept: PRIMARY CARE CLINIC | Age: 65
End: 2020-10-27

## 2020-10-31 DIAGNOSIS — J45.20 MILD INTERMITTENT ASTHMA WITHOUT COMPLICATION: ICD-10-CM

## 2020-10-31 RX ORDER — METFORMIN HYDROCHLORIDE 500 MG/1
TABLET, EXTENDED RELEASE ORAL
Qty: 180 TAB | Refills: 3 | Status: SHIPPED | OUTPATIENT
Start: 2020-10-31 | End: 2021-07-22

## 2020-11-01 RX ORDER — ALBUTEROL SULFATE 90 UG/1
AEROSOL, METERED RESPIRATORY (INHALATION)
Qty: 8.5 G | Refills: 0 | Status: SHIPPED | OUTPATIENT
Start: 2020-11-01

## 2020-11-25 ENCOUNTER — TELEPHONE (OUTPATIENT)
Dept: PRIMARY CARE CLINIC | Age: 65
End: 2020-11-25

## 2020-11-25 NOTE — TELEPHONE ENCOUNTER
----- Message from Pepe Guillen sent at 11/25/2020  2:16 PM EST -----  Regarding: Dr. Sahara Shannon first and last name: Karen Love with [facility]       Reason for call: report      Callback required yes/no and why: no, unless needed      Best contact number(s): [phone]      Details to clarify the request: Karen Love  called pt and nobody was home and  she will resume as usual next.     Pepe Guillen

## 2020-12-23 ENCOUNTER — VIRTUAL VISIT (OUTPATIENT)
Dept: PRIMARY CARE CLINIC | Age: 65
End: 2020-12-23
Payer: MEDICARE

## 2020-12-23 DIAGNOSIS — K86.9 PANCREATIC LESION: ICD-10-CM

## 2020-12-23 DIAGNOSIS — J68.9 INHALATION INJURY DUE TO CHEMICAL (HCC): Primary | ICD-10-CM

## 2020-12-23 DIAGNOSIS — J45.20 MILD INTERMITTENT ASTHMA WITHOUT COMPLICATION: ICD-10-CM

## 2020-12-23 DIAGNOSIS — S27.309S: ICD-10-CM

## 2020-12-23 PROCEDURE — 99214 OFFICE O/P EST MOD 30 MIN: CPT | Performed by: INTERNAL MEDICINE
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
## 2021-01-22 ENCOUNTER — TELEPHONE (OUTPATIENT)
Dept: PRIMARY CARE CLINIC | Age: 66
End: 2021-01-22

## 2021-01-22 NOTE — TELEPHONE ENCOUNTER
Pt wants a referral for a diabetes, her doctor is too far and currently in [facility].     Please call pt back at [phone]

## 2021-01-25 DIAGNOSIS — E11.9 CONTROLLED TYPE 2 DIABETES MELLITUS WITHOUT COMPLICATION, WITHOUT LONG-TERM CURRENT USE OF INSULIN (HCC): Primary | ICD-10-CM

## 2021-01-26 NOTE — TELEPHONE ENCOUNTER
Confirmed patient id and advised of referral.  Patient states that she would like the information sent to her thru my chart.   done

## 2021-02-04 ENCOUNTER — TRANSCRIBE ORDER (OUTPATIENT)
Dept: SCHEDULING | Age: 66
End: 2021-02-04

## 2021-02-04 DIAGNOSIS — K21.9 ESOPHAGEAL REFLUX: ICD-10-CM

## 2021-02-04 DIAGNOSIS — D34 THYROID ADENOMA: Primary | ICD-10-CM

## 2021-02-08 NOTE — PROGRESS NOTES
Please call her & find out how is her shoulder pain? Her diabetes numbers look good. Have improved from 6.2 to 5.8 range. White count has improved as well. Clofazimine Counseling:  I discussed with the patient the risks of clofazimine including but not limited to skin and eye pigmentation, liver damage, nausea/vomiting, gastrointestinal bleeding and allergy.

## 2021-02-11 ENCOUNTER — HOSPITAL ENCOUNTER (OUTPATIENT)
Dept: GENERAL RADIOLOGY | Age: 66
Discharge: HOME | End: 2021-02-11
Attending: OTOLARYNGOLOGY
Payer: MEDICARE

## 2021-02-11 ENCOUNTER — HOSPITAL ENCOUNTER (OUTPATIENT)
Dept: ULTRASOUND IMAGING | Age: 66
Discharge: HOME | End: 2021-02-11
Attending: OTOLARYNGOLOGY
Payer: MEDICARE

## 2021-02-11 DIAGNOSIS — K21.9 ESOPHAGEAL REFLUX: ICD-10-CM

## 2021-02-11 DIAGNOSIS — D34 THYROID ADENOMA: ICD-10-CM

## 2021-02-11 PROCEDURE — 74220 X-RAY XM ESOPHAGUS 1CNTRST: CPT

## 2021-02-11 PROCEDURE — 76536 US EXAM OF HEAD AND NECK: CPT

## 2021-02-26 ENCOUNTER — VIRTUAL VISIT (OUTPATIENT)
Dept: PRIMARY CARE CLINIC | Age: 66
End: 2021-02-26
Payer: MEDICARE

## 2021-02-26 DIAGNOSIS — H54.3 DECREASED VISION IN BOTH EYES: ICD-10-CM

## 2021-02-26 DIAGNOSIS — S27.309S: ICD-10-CM

## 2021-02-26 DIAGNOSIS — K86.9 PANCREATIC LESION: ICD-10-CM

## 2021-02-26 DIAGNOSIS — D47.2 SMOLDERING MULTIPLE MYELOMA (SMM): ICD-10-CM

## 2021-02-26 DIAGNOSIS — I10 ESSENTIAL HYPERTENSION: Primary | ICD-10-CM

## 2021-02-26 PROCEDURE — 99214 OFFICE O/P EST MOD 30 MIN: CPT | Performed by: INTERNAL MEDICINE
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
## 2021-03-03 ENCOUNTER — TRANSCRIBE ORDER (OUTPATIENT)
Dept: SCHEDULING | Age: 66
End: 2021-03-03

## 2021-03-03 DIAGNOSIS — K86.89 MASS OF PANCREAS: Primary | ICD-10-CM

## 2021-03-09 ENCOUNTER — HOSPITAL ENCOUNTER (OUTPATIENT)
Dept: CT IMAGING | Age: 66
Discharge: HOME | End: 2021-03-09
Attending: INTERNAL MEDICINE
Payer: MEDICARE

## 2021-03-09 ENCOUNTER — TRANSCRIBE ORDER (OUTPATIENT)
Dept: GENERAL RADIOLOGY | Age: 66
End: 2021-03-09

## 2021-03-09 ENCOUNTER — HOSPITAL ENCOUNTER (OUTPATIENT)
Dept: GENERAL RADIOLOGY | Age: 66
Discharge: HOME | End: 2021-03-09
Attending: INTERNAL MEDICINE
Payer: MEDICARE

## 2021-03-09 DIAGNOSIS — R06.02 SHORTNESS OF BREATH: ICD-10-CM

## 2021-03-09 DIAGNOSIS — K86.89 MASS OF PANCREAS: ICD-10-CM

## 2021-03-09 DIAGNOSIS — R06.02 SHORTNESS OF BREATH: Primary | ICD-10-CM

## 2021-03-09 LAB — CREAT BLD-MCNC: 0.7 MG/DL (ref 0.6–1.3)

## 2021-03-09 PROCEDURE — 74170 CT ABD WO CNTRST FLWD CNTRST: CPT

## 2021-03-09 PROCEDURE — 74011000636 HC RX REV CODE- 636: Performed by: INTERNAL MEDICINE

## 2021-03-09 PROCEDURE — 82565 ASSAY OF CREATININE: CPT

## 2021-03-09 PROCEDURE — 71046 X-RAY EXAM CHEST 2 VIEWS: CPT

## 2021-03-09 RX ADMIN — IOPAMIDOL 100 ML: 755 INJECTION, SOLUTION INTRAVENOUS at 09:38

## 2021-03-23 ENCOUNTER — TRANSCRIBE ORDER (OUTPATIENT)
Dept: REGISTRATION | Age: 66
End: 2021-03-23

## 2021-03-23 ENCOUNTER — HOSPITAL ENCOUNTER (OUTPATIENT)
Dept: PREADMISSION TESTING | Age: 66
Discharge: HOME | End: 2021-03-23
Payer: MEDICARE

## 2021-03-23 DIAGNOSIS — Z01.812 PRE-PROCEDURE LAB EXAM: Primary | ICD-10-CM

## 2021-03-23 DIAGNOSIS — Z01.812 PRE-PROCEDURE LAB EXAM: ICD-10-CM

## 2021-03-23 PROCEDURE — U0003 INFECTIOUS AGENT DETECTION BY NUCLEIC ACID (DNA OR RNA); SEVERE ACUTE RESPIRATORY SYNDROME CORONAVIRUS 2 (SARS-COV-2) (CORONAVIRUS DISEASE [COVID-19]), AMPLIFIED PROBE TECHNIQUE, MAKING USE OF HIGH THROUGHPUT TECHNOLOGIES AS DESCRIBED BY CMS-2020-01-R: HCPCS

## 2021-03-24 LAB — SARS-COV-2, COV2NT: NOT DETECTED

## 2021-03-26 ENCOUNTER — HOSPITAL ENCOUNTER (OUTPATIENT)
Age: 66
Setting detail: OUTPATIENT SURGERY
Discharge: HOME | End: 2021-03-26
Attending: INTERNAL MEDICINE | Admitting: INTERNAL MEDICINE
Payer: MEDICARE

## 2021-03-26 ENCOUNTER — ANESTHESIA EVENT (OUTPATIENT)
Dept: ENDOSCOPY | Age: 66
End: 2021-03-26
Payer: MEDICARE

## 2021-03-26 ENCOUNTER — ANESTHESIA (OUTPATIENT)
Dept: ENDOSCOPY | Age: 66
End: 2021-03-26
Payer: MEDICARE

## 2021-03-26 ENCOUNTER — APPOINTMENT (OUTPATIENT)
Dept: ULTRASOUND IMAGING | Age: 66
End: 2021-03-26
Attending: INTERNAL MEDICINE
Payer: MEDICARE

## 2021-03-26 VITALS
BODY MASS INDEX: 26.66 KG/M2 | SYSTOLIC BLOOD PRESSURE: 149 MMHG | HEIGHT: 65 IN | WEIGHT: 160 LBS | OXYGEN SATURATION: 98 % | TEMPERATURE: 96.4 F | HEART RATE: 67 BPM | RESPIRATION RATE: 20 BRPM | DIASTOLIC BLOOD PRESSURE: 81 MMHG

## 2021-03-26 LAB
CEA FLD-MCNC: 0.4 NG/ML
SPECIMEN SOURCE FLD: NORMAL

## 2021-03-26 PROCEDURE — 74011000250 HC RX REV CODE- 250: Performed by: NURSE ANESTHETIST, CERTIFIED REGISTERED

## 2021-03-26 PROCEDURE — 74011000250 HC RX REV CODE- 250: Performed by: INTERNAL MEDICINE

## 2021-03-26 PROCEDURE — 76040000008: Performed by: INTERNAL MEDICINE

## 2021-03-26 PROCEDURE — 74011250636 HC RX REV CODE- 250/636: Performed by: INTERNAL MEDICINE

## 2021-03-26 PROCEDURE — 2709999900 HC NON-CHARGEABLE SUPPLY: Performed by: INTERNAL MEDICINE

## 2021-03-26 PROCEDURE — 74011250636 HC RX REV CODE- 250/636: Performed by: NURSE ANESTHETIST, CERTIFIED REGISTERED

## 2021-03-26 PROCEDURE — 77030028690 HC NDL ASPIR ULTRSND BSC -E: Performed by: INTERNAL MEDICINE

## 2021-03-26 PROCEDURE — 82378 CARCINOEMBRYONIC ANTIGEN: CPT

## 2021-03-26 PROCEDURE — 88172 CYTP DX EVAL FNA 1ST EA SITE: CPT

## 2021-03-26 PROCEDURE — 88173 CYTOPATH EVAL FNA REPORT: CPT

## 2021-03-26 PROCEDURE — 88305 TISSUE EXAM BY PATHOLOGIST: CPT

## 2021-03-26 PROCEDURE — 76060000033 HC ANESTHESIA 1 TO 1.5 HR: Performed by: INTERNAL MEDICINE

## 2021-03-26 RX ORDER — SODIUM CHLORIDE 9 MG/ML
50 INJECTION, SOLUTION INTRAVENOUS CONTINUOUS
Status: DISCONTINUED | OUTPATIENT
Start: 2021-03-26 | End: 2021-03-26 | Stop reason: HOSPADM

## 2021-03-26 RX ORDER — DEXTROMETHORPHAN/PSEUDOEPHED 2.5-7.5/.8
1.2 DROPS ORAL
Status: DISCONTINUED | OUTPATIENT
Start: 2021-03-26 | End: 2021-03-26 | Stop reason: HOSPADM

## 2021-03-26 RX ORDER — SODIUM CHLORIDE 9 MG/ML
INJECTION, SOLUTION INTRAVENOUS
Status: DISCONTINUED | OUTPATIENT
Start: 2021-03-26 | End: 2021-03-26 | Stop reason: HOSPADM

## 2021-03-26 RX ORDER — FLUMAZENIL 0.1 MG/ML
0.2 INJECTION INTRAVENOUS
Status: DISCONTINUED | OUTPATIENT
Start: 2021-03-26 | End: 2021-03-26 | Stop reason: HOSPADM

## 2021-03-26 RX ORDER — CEFAZOLIN SODIUM 1 G/3ML
INJECTION, POWDER, FOR SOLUTION INTRAMUSCULAR; INTRAVENOUS
Status: DISCONTINUED
Start: 2021-03-26 | End: 2021-03-26 | Stop reason: HOSPADM

## 2021-03-26 RX ORDER — ATROPINE SULFATE 0.1 MG/ML
0.5 INJECTION INTRAVENOUS
Status: DISCONTINUED | OUTPATIENT
Start: 2021-03-26 | End: 2021-03-26 | Stop reason: HOSPADM

## 2021-03-26 RX ORDER — MONTELUKAST SODIUM 10 MG/1
10 TABLET ORAL DAILY
COMMUNITY
End: 2022-06-21 | Stop reason: SDUPTHER

## 2021-03-26 RX ORDER — PROPOFOL 10 MG/ML
INJECTION, EMULSION INTRAVENOUS AS NEEDED
Status: DISCONTINUED | OUTPATIENT
Start: 2021-03-26 | End: 2021-03-26 | Stop reason: HOSPADM

## 2021-03-26 RX ORDER — GLYCOPYRROLATE 0.2 MG/ML
INJECTION INTRAMUSCULAR; INTRAVENOUS AS NEEDED
Status: DISCONTINUED | OUTPATIENT
Start: 2021-03-26 | End: 2021-03-26 | Stop reason: HOSPADM

## 2021-03-26 RX ORDER — ONDANSETRON 2 MG/ML
INJECTION INTRAMUSCULAR; INTRAVENOUS AS NEEDED
Status: DISCONTINUED | OUTPATIENT
Start: 2021-03-26 | End: 2021-03-26 | Stop reason: HOSPADM

## 2021-03-26 RX ORDER — LIDOCAINE HYDROCHLORIDE 20 MG/ML
INJECTION, SOLUTION EPIDURAL; INFILTRATION; INTRACAUDAL; PERINEURAL AS NEEDED
Status: DISCONTINUED | OUTPATIENT
Start: 2021-03-26 | End: 2021-03-26 | Stop reason: HOSPADM

## 2021-03-26 RX ORDER — EPINEPHRINE 0.1 MG/ML
1 INJECTION INTRACARDIAC; INTRAVENOUS
Status: DISCONTINUED | OUTPATIENT
Start: 2021-03-26 | End: 2021-03-26 | Stop reason: HOSPADM

## 2021-03-26 RX ORDER — ASPIRIN 81 MG/1
81 TABLET ORAL DAILY
COMMUNITY

## 2021-03-26 RX ORDER — SODIUM CHLORIDE 0.9 % (FLUSH) 0.9 %
5-40 SYRINGE (ML) INJECTION AS NEEDED
Status: DISCONTINUED | OUTPATIENT
Start: 2021-03-26 | End: 2021-03-26 | Stop reason: HOSPADM

## 2021-03-26 RX ORDER — NALOXONE HYDROCHLORIDE 0.4 MG/ML
0.4 INJECTION, SOLUTION INTRAMUSCULAR; INTRAVENOUS; SUBCUTANEOUS
Status: DISCONTINUED | OUTPATIENT
Start: 2021-03-26 | End: 2021-03-26 | Stop reason: HOSPADM

## 2021-03-26 RX ORDER — BUDESONIDE AND FORMOTEROL FUMARATE DIHYDRATE 160; 4.5 UG/1; UG/1
2 AEROSOL RESPIRATORY (INHALATION) DAILY
COMMUNITY

## 2021-03-26 RX ADMIN — LIDOCAINE HYDROCHLORIDE 100 MG: 20 INJECTION, SOLUTION EPIDURAL; INFILTRATION; INTRACAUDAL; PERINEURAL at 12:12

## 2021-03-26 RX ADMIN — GLYCOPYRROLATE 0.2 MG: 0.2 INJECTION, SOLUTION INTRAMUSCULAR; INTRAVENOUS at 12:01

## 2021-03-26 RX ADMIN — PROPOFOL 50 MG: 10 INJECTION, EMULSION INTRAVENOUS at 12:15

## 2021-03-26 RX ADMIN — PROPOFOL 50 MG: 10 INJECTION, EMULSION INTRAVENOUS at 12:18

## 2021-03-26 RX ADMIN — SODIUM CHLORIDE: 900 INJECTION, SOLUTION INTRAVENOUS at 11:57

## 2021-03-26 RX ADMIN — ONDANSETRON HYDROCHLORIDE 4 MG: 2 INJECTION, SOLUTION INTRAMUSCULAR; INTRAVENOUS at 12:00

## 2021-03-26 RX ADMIN — PROPOFOL 50 MG: 10 INJECTION, EMULSION INTRAVENOUS at 12:20

## 2021-03-26 RX ADMIN — PROPOFOL 50 MG: 10 INJECTION, EMULSION INTRAVENOUS at 12:23

## 2021-03-26 RX ADMIN — PROPOFOL 50 MG: 10 INJECTION, EMULSION INTRAVENOUS at 12:38

## 2021-03-26 RX ADMIN — PROPOFOL 50 MG: 10 INJECTION, EMULSION INTRAVENOUS at 12:25

## 2021-03-26 RX ADMIN — SODIUM CHLORIDE: 900 INJECTION, SOLUTION INTRAVENOUS at 12:27

## 2021-03-26 RX ADMIN — WATER 2 G: 1 INJECTION INTRAMUSCULAR; INTRAVENOUS; SUBCUTANEOUS at 12:30

## 2021-03-26 RX ADMIN — PROPOFOL 100 MG: 10 INJECTION, EMULSION INTRAVENOUS at 12:14

## 2021-03-26 RX ADMIN — PROPOFOL 50 MG: 10 INJECTION, EMULSION INTRAVENOUS at 12:28

## 2021-03-26 RX ADMIN — PROPOFOL 50 MG: 10 INJECTION, EMULSION INTRAVENOUS at 12:42

## 2021-03-26 RX ADMIN — PROPOFOL 50 MG: 10 INJECTION, EMULSION INTRAVENOUS at 12:31

## 2021-03-26 RX ADMIN — PROPOFOL 50 MG: 10 INJECTION, EMULSION INTRAVENOUS at 12:46

## 2021-03-26 RX ADMIN — PROPOFOL 50 MG: 10 INJECTION, EMULSION INTRAVENOUS at 12:35
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
## 2021-03-26 NOTE — PERIOP NOTES
.Patient has been evaluated by anesthesia pre-procedure. Patient alert and oriented. Vital signs will not be charted by the Endoscopy nurse. All vitals, airway, and loc are monitored by anesthesia staff throughout procedure. .Annailse Lama will be pre-cleaned at bedside immediately following procedure by Steph Elmore.

## 2021-03-26 NOTE — DISCHARGE INSTRUCTIONS
[address] [facility] [place].  [address] [facility]  [id number]  1955    DISCOMFORT:  Sore throat- throat lozenges or warm salt water gargle  redness at IV site- apply warm compress to area; if redness or soreness persist- contact your physician  Gaseous discomfort- walking, belching will help relieve any discomfort    DIET  You may eat and drink after you leave. You may resume your regular diet - however -  remember your colon is empty and a heavy meal will produce gas. Avoid these foods:  vegetables, fried / greasy foods, carbonated drinks   You may not drink alcoholic beverages for at least 12 hours    ACTIVITY  You may resume your normal daily activities   Spend the remainder of the day resting -  avoid any strenuous activity. You may not operate a vehicle for 12 hours  You may not engage in an occupation involving machinery or appliances for rest of today  Avoid making any critical decisions for at least 24 hour    CALL M.D. ANY SIGN OF   Increasing pain, nausea, vomiting  Abdominal distension (swelling)  New increased bleeding (oral or rectal)  Fever (chills)  Pain in chest area  Bloody discharge from nose or mouth  Shortness of breath    Follow-up Instructions:   Call Dr. Sabas Ku for any questions or problems. If we took a biopsy please call the office within 2 weeks to discuss your pathology results. Telephone # [phone]       ENDOSCOPY FINDINGS:   sm hiatal hernia, pancreatic tail cyst         Learning About Coronavirus (COVID-19)  Coronavirus (COVID-19): Overview  What is coronavirus (COVID-19)? The coronavirus disease (COVID-19) is caused by a virus. It is an illness that was first found in [place], [place], in December 2019. It has since spread worldwide. The virus can cause fever, cough, and trouble breathing. In severe cases, it can cause pneumonia and make it hard to breathe without help. It can cause death. Coronaviruses are a large group of viruses. They cause the common cold. They also cause more serious illnesses like Middle East respiratory syndrome (MERS) and severe acute respiratory syndrome (SARS). COVID-19 is caused by a novel coronavirus. That means it's a new type that has not been seen in people before. This virus spreads person-to-person through droplets from coughing and sneezing. It can also spread when you are close to someone who is infected. And it can spread when you touch something that has the virus on it, such as a doorknob or a tabletop. What can you do to protect yourself from coronavirus (COVID-19)? The best way to protect yourself from getting sick is to:  · Avoid areas where there is an outbreak. · Avoid contact with people who may be infected. · Wash your hands often with soap or alcohol-based hand sanitizers. · Avoid crowds and try to stay at least 6 feet away from other people. · Wash your hands often, especially after you cough or sneeze. Use soap and water, and scrub for at least 20 seconds. If soap and water aren't available, use an alcohol-based hand . · Avoid touching your mouth, nose, and eyes. What can you do to avoid spreading the virus to others? To help avoid spreading the virus to others:  · Cover your mouth with a tissue when you cough or sneeze. Then throw the tissue in the trash. · Use a disinfectant to clean things that you touch often. · Stay home if you are sick or have been exposed to the virus. Don't go to school, work, or public areas. And don't use public transportation. · If you are sick:  ? Leave your home only if you need to get medical care. But call the doctor's office first so they know you're coming. And wear a face mask, if you have one.  ? If you have a face mask, wear it whenever you're around other people. It can help stop the spread of the virus when you cough or sneeze. ? Clean and disinfect your home every day. Use household  and disinfectant wipes or sprays. Take special care to clean things that you grab with your hands. These include doorknobs, remote controls, phones, and handles on your refrigerator and microwave. And don't forget countertops, tabletops, bathrooms, and computer keyboards. When to call for help  Call 911 anytime you think you may need emergency care. For example, call if:  · You have severe trouble breathing. (You can't talk at all.)  · You have constant chest pain or pressure. · You are severely dizzy or lightheaded. · You are confused or can't think clearly. · Your face and lips have a blue color. · You pass out (lose consciousness) or are very hard to wake up. Call your doctor now if you develop symptoms such as:  · Shortness of breath. · Fever. · Cough. If you need to get care, call ahead to the doctor's office for instructions before you go. Make sure you wear a face mask, if you have one, to prevent exposing other people to the virus. Where can you get the latest information? The following health organizations are tracking and studying this virus. Their websites contain the most up-to-date information. Esdraslayla Lawson also learn what to do if you think you may have been exposed to the virus. · U.S. Centers for Disease Control and Prevention (CDC): The CDC provides updated news about the disease and travel advice. The website also tells you how to prevent the spread of infection. www.cdc.gov  · World Health Organization [facility]): WHO offers information about the virus outbreaks. WHO also has travel advice. www.who.int  Current as of: April 1, 2020               Content Version: 12.4  © 1131-9246 [employer].    Care instructions adapted under license by your healthcare professional. If you have questions about a medical condition or this instruction, always ask your healthcare professional. [address] any warranty or liability for your use of this information.

## 2021-03-26 NOTE — ANESTHESIA POSTPROCEDURE EVALUATION
Post-Anesthesia Evaluation and Assessment    Patient: Petra Winters MRN: [id number]  SSN: [id number]    YOB: 1955  Age: 74 y.o. Sex: female       Cardiovascular Function/Vital Signs  Visit Vitals  BP (!) 149/81   Pulse 67   Temp (!) 35.8 °C (96.4 °F)   Resp 20   Ht 5' 5\" (1.651 m)   Wt 72.6 kg (160 lb)   SpO2 98%   Breastfeeding No   BMI 26.63 kg/m²       Patient is status post MAC anesthesia for Procedure(s):  . ENDOSCOPIC ULTRASOUND (EUS) Linear with Path :-  ESOPHAGOGASTRODUODENOSCOPY (EGD)  FINE NEEDLE ASPIRATION. Nausea/Vomiting: None    Postoperative hydration reviewed and adequate. Pain:  Pain Scale 1: Visual (03/26/21 1320)  Pain Intensity 1: 0 (03/26/21 1320)   Managed    Neurological Status: At baseline    Mental Status and Level of Consciousness: Alert and oriented to person, place, and time    Pulmonary Status:   O2 Device: Room air (03/26/21 1320)   Adequate oxygenation and airway patent    Complications related to anesthesia: None    Post-anesthesia assessment completed. No concerns    Signed By: Allison Chapman MD     March 26, 2021              Procedure(s):  . ENDOSCOPIC ULTRASOUND (EUS) Linear with Path :-  ESOPHAGOGASTRODUODENOSCOPY (EGD)  FINE NEEDLE ASPIRATION. MAC    <[email or web address]>    INITIAL Post-op Vital signs:   Vitals Value Taken Time   /75 03/26/21 1330   Temp 35.8 °C (96.4 °F) 03/26/21 1300   Pulse 80 03/26/21 1408   Resp 24 03/26/21 1408   SpO2 100 % 03/26/21 1408   Vitals shown include unvalidated device data.

## 2021-03-26 NOTE — ANESTHESIA PREPROCEDURE EVALUATION
Relevant Problems   RESPIRATORY SYSTEM   (+) Mild intermittent asthma without complication      HEMATOLOGY   (+) Iron (Fe) deficiency anemia      PERSONAL HX & FAMILY HX OF CANCER   (+) Smoldering multiple myeloma (SMM) (HCC)       Anesthetic History   No history of anesthetic complications  PONV          Review of Systems / Medical History  Patient summary reviewed, nursing notes reviewed and pertinent labs reviewed    Pulmonary  Within defined limits          Asthma        Neuro/Psych   Within defined limits           Cardiovascular  Within defined limits                     GI/Hepatic/Renal  Within defined limits              Endo/Other  Within defined limits  Diabetes: type 2         Other Findings              Physical Exam    Airway  Mallampati: II  TM Distance: > 6 cm  Neck ROM: normal range of motion   Mouth opening: Normal     Cardiovascular  Regular rate and rhythm,  S1 and S2 normal,  no murmur, click, rub, or gallop             Dental  No notable dental hx       Pulmonary  Breath sounds clear to auscultation               Abdominal  GI exam deferred       Other Findings            Anesthetic Plan    ASA: 2  Anesthesia type: MAC            Anesthetic plan and risks discussed with: Patient

## 2021-03-26 NOTE — PROGRESS NOTES
MD at bedside to speak with pt. D/C instructions completed with friend Tammy Liu in the [address] [place] [address].

## 2021-03-26 NOTE — PROCEDURES
[address] [facility].  [address] [place], [place] [address]Mukul Funez [address], [address]  [phone]                                Endoscopic Ultrasound    NAME:  Derick Rios   :   1955   MRN:   [id number]       Date/Time:  3/26/2021   Procedure Type: Linear Upper EUS with FNB      Indications: Pancreatic cyst    Pre-operative Diagnosis: see indication above    Post-operative Diagnosis:  See findings below    : Gayle Kelly MD    Surgical Assistant: Endoscopy Technician-1: Osmany Aguilar  Endoscopy RN-1: Blanca Cross RN    Implants: none    Referring Provider: Stefania Quesada MD    Anethesia/Sedation:  MAC anesthesia      Procedure Details   After infom consent was obtained for the procedure, with all risks and benefits of procedure explained the patient was taken to the endoscopy suite and placed in the left lateral decubitus position. Following sequential administration of sedation as per above, the linear echoendoscope was inserted into the mouth and advanced under direct vision to second portion of the duodenum. A careful inspection was made as the gastroscope was withdrawn, including a retroflexed view of the proximal stomach; findings and interventions are described below. Findings:     Endoscopic:   Esophagus: Small sliding hernia was noted from 38 cm to 40 cm (diaphragmatic pinch). Normal mucosa. Stomach: normal    Duodenum/jejunum: normal    Ultrasound:   Esophagus: not examined   Stomach: not examined   Pancreas:     Areas examined: the entire gland    Parenchyma: -A multi-loculated solid cystic honeycomb lesion was noted in the tail of pancreas. Lesion measured about 55 mm X 56 mm in size. Pancreas was otherwise normal in entirety. Normal pancreatic duct was noted in the entire pancreas.     Liver:     Parenchyma: normal    Gallbladder: gallstones present    Bile Duct: the common bile duct was normal               Lymph Node: no adenopathy        Specimen Removed:  Biopsy of  the tail of the pancreas    Complications: None. EBL:  None.     Interventions: Fine needle aspirate for biopsy of  pancreas tail using a 22 gauge needle with 4 of passes with preliminary results suggesting indetermined      Recommendations:   -Await cytology and CEA results  -Continue current medications  -Refer to Dr Lou Slater for consideration of distal pancreatectomy    Diallo Mcpherson MD  3/26/2021  12:54 PM

## 2021-03-30 ENCOUNTER — TELEPHONE (OUTPATIENT)
Dept: SURGERY | Age: 66
End: 2021-03-30

## 2021-03-30 NOTE — TELEPHONE ENCOUNTER
I called patient to schedule new patient appointment with Dr. Caty Diallo. No answer, left voicemail. Pt referred by Dr. Lakshmi Collins, eval tail of pancreas cystic mass.

## 2021-04-06 ENCOUNTER — DOCUMENTATION ONLY (OUTPATIENT)
Dept: SURGERY | Age: 66
End: 2021-04-06

## 2021-04-06 NOTE — PROGRESS NOTES
Received office notes from [facility]. Forwarded to Dr. Flavio Turner for review.

## 2021-04-07 ENCOUNTER — OFFICE VISIT (OUTPATIENT)
Dept: PRIMARY CARE CLINIC | Age: 66
End: 2021-04-07
Payer: MEDICARE

## 2021-04-07 VITALS
HEART RATE: 65 BPM | DIASTOLIC BLOOD PRESSURE: 80 MMHG | WEIGHT: 163.6 LBS | TEMPERATURE: 97.5 F | SYSTOLIC BLOOD PRESSURE: 131 MMHG | RESPIRATION RATE: 16 BRPM | BODY MASS INDEX: 26.29 KG/M2 | OXYGEN SATURATION: 98 % | HEIGHT: 66 IN

## 2021-04-07 DIAGNOSIS — I10 ESSENTIAL HYPERTENSION: Primary | ICD-10-CM

## 2021-04-07 DIAGNOSIS — J45.20 MILD INTERMITTENT ASTHMA WITHOUT COMPLICATION: ICD-10-CM

## 2021-04-07 DIAGNOSIS — K86.2 PANCREATIC CYST: ICD-10-CM

## 2021-04-07 DIAGNOSIS — E78.2 MIXED HYPERLIPIDEMIA: ICD-10-CM

## 2021-04-07 DIAGNOSIS — Z12.31 ENCOUNTER FOR SCREENING MAMMOGRAM FOR MALIGNANT NEOPLASM OF BREAST: ICD-10-CM

## 2021-04-07 DIAGNOSIS — E11.9 WELL CONTROLLED DIABETES MELLITUS (HCC): ICD-10-CM

## 2021-04-07 DIAGNOSIS — I51.7 CARDIOMEGALY: ICD-10-CM

## 2021-04-07 PROCEDURE — G8427 DOCREV CUR MEDS BY ELIG CLIN: HCPCS | Performed by: INTERNAL MEDICINE

## 2021-04-07 PROCEDURE — G8536 NO DOC ELDER MAL SCRN: HCPCS | Performed by: INTERNAL MEDICINE

## 2021-04-07 PROCEDURE — G9899 SCRN MAM PERF RSLTS DOC: HCPCS | Performed by: INTERNAL MEDICINE

## 2021-04-07 PROCEDURE — G8510 SCR DEP NEG, NO PLAN REQD: HCPCS | Performed by: INTERNAL MEDICINE

## 2021-04-07 PROCEDURE — 3017F COLORECTAL CA SCREEN DOC REV: CPT | Performed by: INTERNAL MEDICINE

## 2021-04-07 PROCEDURE — 1101F PT FALLS ASSESS-DOCD LE1/YR: CPT | Performed by: INTERNAL MEDICINE

## 2021-04-07 PROCEDURE — 2022F DILAT RTA XM EVC RTNOPTHY: CPT | Performed by: INTERNAL MEDICINE

## 2021-04-07 PROCEDURE — G8419 CALC BMI OUT NRM PARAM NOF/U: HCPCS | Performed by: INTERNAL MEDICINE

## 2021-04-07 PROCEDURE — G8399 PT W/DXA RESULTS DOCUMENT: HCPCS | Performed by: INTERNAL MEDICINE

## 2021-04-07 PROCEDURE — 1090F PRES/ABSN URINE INCON ASSESS: CPT | Performed by: INTERNAL MEDICINE

## 2021-04-07 PROCEDURE — 99214 OFFICE O/P EST MOD 30 MIN: CPT | Performed by: INTERNAL MEDICINE

## 2021-04-07 PROCEDURE — G8754 DIAS BP LESS 90: HCPCS | Performed by: INTERNAL MEDICINE

## 2021-04-07 PROCEDURE — 3046F HEMOGLOBIN A1C LEVEL >9.0%: CPT | Performed by: INTERNAL MEDICINE

## 2021-04-07 PROCEDURE — G8752 SYS BP LESS 140: HCPCS | Performed by: INTERNAL MEDICINE

## 2021-04-07 NOTE — PROGRESS NOTES
Patient had cyst on pancreas, biopsied, 2 weeks ago. Patient said that she had some xrays done recently, by Dr. Grace Francisco. Chief Complaint   Patient presents with    Follow Up Chronic Condition     A1C and High Blood Pressure     1. Have you been to the ER, urgent care clinic since your last visit? Hospitalized since your last visit? No    2. Have you seen or consulted any other health care providers outside of the [address] [place] [address] since your last visit? Include any pap smears or colon screening.  No   Visit Vitals  /80 (BP 1 Location: Right upper arm, BP Patient Position: Sitting, BP Cuff Size: Adult)   Pulse 65   Temp 97.5 °F (36.4 °C) (Temporal)   Resp 16   Ht 5' 5.67\" (1.668 m)   Wt 163 lb 9.6 oz (74.2 kg)   SpO2 98%   BMI 26.67 kg/m²
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
## 2021-04-08 LAB
CHOLEST SERPL-MCNC: 210 MG/DL
ERYTHROCYTE [DISTWIDTH] IN BLOOD BY AUTOMATED COUNT: 13.3 % (ref 11.5–14.5)
EST. AVERAGE GLUCOSE BLD GHB EST-MCNC: 126 MG/DL
HBA1C MFR BLD: 6 % (ref 4–5.6)
HCT VFR BLD AUTO: 36.4 % (ref 35–47)
HDLC SERPL-MCNC: 88 MG/DL
HDLC SERPL: 2.4 {RATIO} (ref 0–5)
HGB BLD-MCNC: 11.5 G/DL (ref 11.5–16)
LDLC SERPL CALC-MCNC: 108.4 MG/DL (ref 0–100)
LIPID PROFILE,FLP: ABNORMAL
MCH RBC QN AUTO: 29.4 PG (ref 26–34)
MCHC RBC AUTO-ENTMCNC: 31.6 G/DL (ref 30–36.5)
MCV RBC AUTO: 93.1 FL (ref 80–99)
NRBC # BLD: 0 K/UL (ref 0–0.01)
NRBC BLD-RTO: 0 PER 100 WBC
PLATELET # BLD AUTO: 246 K/UL (ref 150–400)
PMV BLD AUTO: 11.1 FL (ref 8.9–12.9)
RBC # BLD AUTO: 3.91 M/UL (ref 3.8–5.2)
TRIGL SERPL-MCNC: 68 MG/DL (ref ?–150)
VLDLC SERPL CALC-MCNC: 13.6 MG/DL
WBC # BLD AUTO: 2.6 K/UL (ref 3.6–11)

## 2021-04-08 NOTE — TELEPHONE ENCOUNTER
I attempted to call patient to schedule appointment with Dr. Timur Cox. No answer, left voicemail. Patient referred by Dr. Jayne Sanchez, eval tail of pancreas cystic mass.

## 2021-04-09 ENCOUNTER — TELEPHONE (OUTPATIENT)
Dept: SURGERY | Age: 66
End: 2021-04-09

## 2021-04-11 LAB — IGE SERPL-ACNC: <2 IU/ML (ref 6–495)

## 2021-04-11 NOTE — PROGRESS NOTES
Jony Monroy, hope you are doing well. Your blood report is back and please share these results with Hematologist as well. I am happy to see cholesterol numbers are improving. Exercise and low carb diet should help. Ask the cardiologist on your upcoming visit if Lipitor or crestor would be a good choice for you?

## 2021-04-16 DIAGNOSIS — I10 HYPERTENSION, UNSPECIFIED TYPE: ICD-10-CM

## 2021-04-16 RX ORDER — HYDROCHLOROTHIAZIDE 12.5 MG/1
TABLET ORAL
Qty: 90 TAB | Refills: 3 | Status: SHIPPED | OUTPATIENT
Start: 2021-04-16 | End: 2021-04-26 | Stop reason: ALTCHOICE

## 2021-04-26 ENCOUNTER — OFFICE VISIT (OUTPATIENT)
Dept: CARDIOLOGY CLINIC | Age: 66
End: 2021-04-26
Payer: MEDICARE

## 2021-04-26 VITALS
BODY MASS INDEX: 27.74 KG/M2 | HEART RATE: 68 BPM | WEIGHT: 172.6 LBS | SYSTOLIC BLOOD PRESSURE: 110 MMHG | HEIGHT: 66 IN | OXYGEN SATURATION: 97 % | RESPIRATION RATE: 13 BRPM | DIASTOLIC BLOOD PRESSURE: 80 MMHG

## 2021-04-26 DIAGNOSIS — I51.7 CARDIOMEGALY: Primary | ICD-10-CM

## 2021-04-26 DIAGNOSIS — R07.9 CHEST PAIN, UNSPECIFIED TYPE: ICD-10-CM

## 2021-04-26 DIAGNOSIS — R06.02 SHORTNESS OF BREATH: ICD-10-CM

## 2021-04-26 DIAGNOSIS — I49.9 IRREGULAR HEARTBEAT: ICD-10-CM

## 2021-04-26 DIAGNOSIS — R60.9 SWELLING: ICD-10-CM

## 2021-04-26 PROCEDURE — G8754 DIAS BP LESS 90: HCPCS | Performed by: INTERNAL MEDICINE

## 2021-04-26 PROCEDURE — 99204 OFFICE O/P NEW MOD 45 MIN: CPT | Performed by: INTERNAL MEDICINE

## 2021-04-26 PROCEDURE — G8752 SYS BP LESS 140: HCPCS | Performed by: INTERNAL MEDICINE

## 2021-04-26 PROCEDURE — 1090F PRES/ABSN URINE INCON ASSESS: CPT | Performed by: INTERNAL MEDICINE

## 2021-04-26 PROCEDURE — 93000 ELECTROCARDIOGRAM COMPLETE: CPT | Performed by: INTERNAL MEDICINE

## 2021-04-26 RX ORDER — ROSUVASTATIN CALCIUM 5 MG/1
5 TABLET, COATED ORAL
Qty: 90 TAB | Refills: 3 | Status: SHIPPED | OUTPATIENT
Start: 2021-04-26 | End: 2022-06-17 | Stop reason: SDUPTHER

## 2021-04-26 RX ORDER — SPIRONOLACTONE 25 MG/1
25 TABLET ORAL DAILY
Qty: 90 TAB | Refills: 3 | Status: SHIPPED | OUTPATIENT
Start: 2021-04-26 | End: 2022-04-15 | Stop reason: SDUPTHER
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
## 2021-04-27 ENCOUNTER — TELEPHONE (OUTPATIENT)
Dept: CARDIOLOGY CLINIC | Age: 66
End: 2021-04-27

## 2021-04-27 NOTE — TELEPHONE ENCOUNTER
----- Message from Madina Webster sent at 2021  4:42 PM EDT -----  Regardin wk loop  Per Dr. Gracia Valle two week loop for palpitations.      Thank you,     Paniagua Bare

## 2021-04-28 ENCOUNTER — ANCILLARY PROCEDURE (OUTPATIENT)
Dept: CARDIOLOGY CLINIC | Age: 66
End: 2021-04-28
Payer: MEDICARE

## 2021-04-28 VITALS
HEIGHT: 65 IN | SYSTOLIC BLOOD PRESSURE: 110 MMHG | BODY MASS INDEX: 28.66 KG/M2 | DIASTOLIC BLOOD PRESSURE: 76 MMHG | WEIGHT: 172 LBS

## 2021-04-28 DIAGNOSIS — R06.02 SHORTNESS OF BREATH: ICD-10-CM

## 2021-04-28 DIAGNOSIS — I51.7 CARDIOMEGALY: ICD-10-CM

## 2021-04-28 DIAGNOSIS — R60.9 SWELLING: ICD-10-CM

## 2021-04-28 DIAGNOSIS — I49.9 IRREGULAR HEARTBEAT: ICD-10-CM

## 2021-04-28 DIAGNOSIS — R07.9 CHEST PAIN, UNSPECIFIED TYPE: ICD-10-CM

## 2021-04-28 PROCEDURE — 93351 STRESS TTE COMPLETE: CPT | Performed by: INTERNAL MEDICINE

## 2021-04-29 LAB
ECHO AO ASC DIAM: 2.77 CM
ECHO AO ROOT DIAM: 3.25 CM
STRESS ANGINA INDEX: 0
STRESS BASELINE DIAS BP: 76 MMHG
STRESS BASELINE HR: 75 BPM
STRESS BASELINE SYS BP: 110 MMHG
STRESS ESTIMATED WORKLOAD: 7 METS
STRESS EXERCISE DUR MIN: NORMAL
STRESS O2 SAT PEAK: 92 %
STRESS PEAK DIAS BP: 80 MMHG
STRESS PEAK SYS BP: 180 MMHG
STRESS PERCENT HR ACHIEVED: 88 %
STRESS POST PEAK HR: 134 BPM
STRESS RATE PRESSURE PRODUCT: NORMAL BPM*MMHG
STRESS ST DEPRESSION: 0 MM
STRESS ST ELEVATION: 0 MM
STRESS TARGET HR: 152 BPM

## 2021-04-29 NOTE — PROGRESS NOTES
Great news, your stress test is normal! You may follow up as needed. [facility] sent.

## 2021-05-11 ENCOUNTER — OFFICE VISIT (OUTPATIENT)
Dept: SURGERY | Age: 66
End: 2021-05-11
Payer: MEDICARE

## 2021-05-11 VITALS
WEIGHT: 169.2 LBS | HEIGHT: 65 IN | DIASTOLIC BLOOD PRESSURE: 77 MMHG | OXYGEN SATURATION: 98 % | HEART RATE: 64 BPM | BODY MASS INDEX: 28.19 KG/M2 | TEMPERATURE: 98.1 F | RESPIRATION RATE: 18 BRPM | SYSTOLIC BLOOD PRESSURE: 132 MMHG

## 2021-05-11 DIAGNOSIS — D13.6 SEROUS CYSTADENOMA OF PANCREAS: Primary | ICD-10-CM

## 2021-05-11 PROCEDURE — G8427 DOCREV CUR MEDS BY ELIG CLIN: HCPCS | Performed by: SURGERY

## 2021-05-11 PROCEDURE — G8399 PT W/DXA RESULTS DOCUMENT: HCPCS | Performed by: SURGERY

## 2021-05-11 PROCEDURE — 3017F COLORECTAL CA SCREEN DOC REV: CPT | Performed by: SURGERY

## 2021-05-11 PROCEDURE — G8752 SYS BP LESS 140: HCPCS | Performed by: SURGERY

## 2021-05-11 PROCEDURE — G8419 CALC BMI OUT NRM PARAM NOF/U: HCPCS | Performed by: SURGERY

## 2021-05-11 PROCEDURE — G9899 SCRN MAM PERF RSLTS DOC: HCPCS | Performed by: SURGERY

## 2021-05-11 PROCEDURE — 1090F PRES/ABSN URINE INCON ASSESS: CPT | Performed by: SURGERY

## 2021-05-11 PROCEDURE — G8432 DEP SCR NOT DOC, RNG: HCPCS | Performed by: SURGERY

## 2021-05-11 PROCEDURE — 99204 OFFICE O/P NEW MOD 45 MIN: CPT | Performed by: SURGERY

## 2021-05-11 PROCEDURE — G8754 DIAS BP LESS 90: HCPCS | Performed by: SURGERY

## 2021-05-11 PROCEDURE — 1101F PT FALLS ASSESS-DOCD LE1/YR: CPT | Performed by: SURGERY

## 2021-05-11 PROCEDURE — G8536 NO DOC ELDER MAL SCRN: HCPCS | Performed by: SURGERY

## 2021-05-11 NOTE — PROGRESS NOTES
New Patient. Pancreatic Mass. 1. Have you been to the ER, urgent care clinic since your last visit? Hospitalized since your last visit? No    2. Have you seen or consulted any other health care providers outside of the [address] [place] [address] since your last visit? Include any pap smears or colon screening.  No

## 2021-05-11 NOTE — LETTER
5/14/2021    Patient: Meet Aguilar   YOB: 1955   Date of Visit: 5/11/2021     Hilliard Meckel, MD  [address] [place] [address]  [place] [employer]    Dear Hilliard Meckel, MD,      Thank you for referring Ms. Gomes Angry to [facility] for evaluation. My notes for this consultation are attached. If you have questions, please do not hesitate to call me. I look forward to following your patient along with you.       Sincerely,    Kary Gasca MD

## 2021-05-14 PROBLEM — D13.6 SEROUS CYSTADENOMA OF PANCREAS: Status: ACTIVE | Noted: 2021-05-14
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
## 2021-05-18 ENCOUNTER — HOSPITAL ENCOUNTER (OUTPATIENT)
Dept: MAMMOGRAPHY | Age: 66
Discharge: HOME | End: 2021-05-18
Attending: INTERNAL MEDICINE
Payer: MEDICARE

## 2021-05-18 DIAGNOSIS — N63.0 BREAST LUMP OR MASS: ICD-10-CM

## 2021-05-18 DIAGNOSIS — N63.0 BREAST LUMP: Primary | ICD-10-CM

## 2021-05-18 DIAGNOSIS — Z12.31 ENCOUNTER FOR SCREENING MAMMOGRAM FOR MALIGNANT NEOPLASM OF BREAST: ICD-10-CM

## 2021-05-18 PROCEDURE — 77062 BREAST TOMOSYNTHESIS BI: CPT

## 2021-05-18 PROCEDURE — 76642 ULTRASOUND BREAST LIMITED: CPT

## 2021-07-21 ENCOUNTER — OFFICE VISIT (OUTPATIENT)
Dept: ENDOCRINOLOGY | Age: 66
End: 2021-07-21
Payer: MEDICARE

## 2021-07-21 VITALS
HEIGHT: 66 IN | HEART RATE: 66 BPM | DIASTOLIC BLOOD PRESSURE: 75 MMHG | BODY MASS INDEX: 28.32 KG/M2 | WEIGHT: 176.2 LBS | SYSTOLIC BLOOD PRESSURE: 114 MMHG

## 2021-07-21 DIAGNOSIS — E78.5 HYPERLIPIDEMIA LDL GOAL <100: ICD-10-CM

## 2021-07-21 DIAGNOSIS — E04.2 MULTINODULAR GOITER: ICD-10-CM

## 2021-07-21 DIAGNOSIS — E55.9 VITAMIN D DEFICIENCY: ICD-10-CM

## 2021-07-21 DIAGNOSIS — R73.02 IMPAIRED GLUCOSE TOLERANCE: Primary | ICD-10-CM

## 2021-07-21 PROCEDURE — 99214 OFFICE O/P EST MOD 30 MIN: CPT | Performed by: INTERNAL MEDICINE

## 2021-07-21 PROCEDURE — G8752 SYS BP LESS 140: HCPCS | Performed by: INTERNAL MEDICINE

## 2021-07-21 PROCEDURE — 1090F PRES/ABSN URINE INCON ASSESS: CPT | Performed by: INTERNAL MEDICINE

## 2021-07-21 PROCEDURE — G8754 DIAS BP LESS 90: HCPCS | Performed by: INTERNAL MEDICINE

## 2021-07-21 PROCEDURE — G9899 SCRN MAM PERF RSLTS DOC: HCPCS | Performed by: INTERNAL MEDICINE

## 2021-07-21 PROCEDURE — G8427 DOCREV CUR MEDS BY ELIG CLIN: HCPCS | Performed by: INTERNAL MEDICINE

## 2021-07-21 PROCEDURE — 1101F PT FALLS ASSESS-DOCD LE1/YR: CPT | Performed by: INTERNAL MEDICINE

## 2021-07-21 PROCEDURE — G8536 NO DOC ELDER MAL SCRN: HCPCS | Performed by: INTERNAL MEDICINE

## 2021-07-21 PROCEDURE — 3017F COLORECTAL CA SCREEN DOC REV: CPT | Performed by: INTERNAL MEDICINE

## 2021-07-21 PROCEDURE — G8432 DEP SCR NOT DOC, RNG: HCPCS | Performed by: INTERNAL MEDICINE

## 2021-07-21 PROCEDURE — G8399 PT W/DXA RESULTS DOCUMENT: HCPCS | Performed by: INTERNAL MEDICINE

## 2021-07-21 PROCEDURE — G8419 CALC BMI OUT NRM PARAM NOF/U: HCPCS | Performed by: INTERNAL MEDICINE

## 2021-07-21 NOTE — PATIENT INSTRUCTIONS
1) Hemoglobin A1c is a 3 month marker of your blood sugar control. Normal is less than 5.7% and diabetes is greater than 6.4%. Your Hemoglobin A1c was 6% in April which means your blood sugar was still in the borderline diabetic range as all of your values since 2016 have been under 6.5%. We will repeat your value today. Continue to work on your diet and exercise and take all your medications as directed. 2) I will check your TSH (thyroid test) to see if there is any reason for your weight gain and slow metabolism. 3) I will check your cholesterol to see the effect of the rosuvastatin (crestor) that Dr. Sara Hamilton started. 4) I will send you a message through [employer] with your lab results and a plan.

## 2021-07-22 LAB
25(OH)D3+25(OH)D2 SERPL-MCNC: 39.8 NG/ML (ref 30–100)
ALBUMIN SERPL-MCNC: 4.5 G/DL (ref 3.8–4.8)
ALBUMIN/GLOB SERPL: 2.6 {RATIO} (ref 1.2–2.2)
ALP SERPL-CCNC: 81 IU/L (ref 48–121)
ALT SERPL-CCNC: 28 IU/L (ref 0–32)
AST SERPL-CCNC: 24 IU/L (ref 0–40)
BILIRUB SERPL-MCNC: <0.2 MG/DL (ref 0–1.2)
BUN SERPL-MCNC: 12 MG/DL (ref 8–27)
BUN/CREAT SERPL: 18 (ref 12–28)
CALCIUM SERPL-MCNC: 9.4 MG/DL (ref 8.7–10.3)
CHLORIDE SERPL-SCNC: 103 MMOL/L (ref 96–106)
CHOLEST SERPL-MCNC: 174 MG/DL (ref 100–199)
CO2 SERPL-SCNC: 24 MMOL/L (ref 20–29)
CREAT SERPL-MCNC: 0.65 MG/DL (ref 0.57–1)
EST. AVERAGE GLUCOSE BLD GHB EST-MCNC: 134 MG/DL
GLOBULIN SER CALC-MCNC: 1.7 G/DL (ref 1.5–4.5)
GLUCOSE SERPL-MCNC: 112 MG/DL (ref 65–99)
HBA1C MFR BLD: 6.3 % (ref 4.8–5.6)
HDLC SERPL-MCNC: 69 MG/DL
LDLC SERPL CALC-MCNC: 95 MG/DL (ref 0–99)
POTASSIUM SERPL-SCNC: 4.7 MMOL/L (ref 3.5–5.2)
PROT SERPL-MCNC: 6.2 G/DL (ref 6–8.5)
SODIUM SERPL-SCNC: 139 MMOL/L (ref 134–144)
T4 FREE SERPL-MCNC: 1.32 NG/DL (ref 0.82–1.77)
THYROGLOB AB SERPL-ACNC: <1 IU/ML (ref 0–0.9)
THYROPEROXIDASE AB SERPL-ACNC: <8 IU/ML (ref 0–34)
TRIGL SERPL-MCNC: 51 MG/DL (ref 0–149)
TSH SERPL DL<=0.005 MIU/L-ACNC: 1.37 UIU/ML (ref 0.45–4.5)
VLDLC SERPL CALC-MCNC: 10 MG/DL (ref 5–40)

## 2021-07-22 RX ORDER — METFORMIN HYDROCHLORIDE 500 MG/1
TABLET, EXTENDED RELEASE ORAL
Qty: 270 TABLET | Refills: 3 | Status: SHIPPED | OUTPATIENT
Start: 2021-07-22 | End: 2021-07-22 | Stop reason: SDUPTHER

## 2021-07-22 RX ORDER — METFORMIN HYDROCHLORIDE 500 MG/1
TABLET, EXTENDED RELEASE ORAL
Qty: 270 TABLET | Refills: 3 | Status: SHIPPED | OUTPATIENT
Start: 2021-07-22 | End: 2021-07-23 | Stop reason: SDUPTHER

## 2021-07-22 RX ORDER — METFORMIN HYDROCHLORIDE 500 MG/1
TABLET, EXTENDED RELEASE ORAL
Qty: 270 TABLET | Refills: 3 | Status: SHIPPED | OUTPATIENT
Start: 2021-07-22 | End: 2021-07-22

## 2021-07-23 RX ORDER — METFORMIN HYDROCHLORIDE 500 MG/1
TABLET, EXTENDED RELEASE ORAL
Qty: 270 TABLET | Refills: 3 | OUTPATIENT
Start: 2021-07-23 | End: 2022-03-10

## 2021-07-23 RX ORDER — METFORMIN HYDROCHLORIDE 500 MG/1
TABLET, EXTENDED RELEASE ORAL
Qty: 270 TABLET | Refills: 3 | Status: SHIPPED | OUTPATIENT
Start: 2021-07-23 | End: 2021-07-23 | Stop reason: SDUPTHER

## 2021-07-23 NOTE — PROGRESS NOTES
Could not get her metformin to electronically send to [facility] after multiple attempts so called and spoke with the pharmacist, Meryle Gerlach, who verbally took the prescription over the phone.

## 2021-10-18 ENCOUNTER — VIRTUAL VISIT (OUTPATIENT)
Dept: PRIMARY CARE CLINIC | Age: 66
End: 2021-10-18
Payer: MEDICARE

## 2021-10-18 ENCOUNTER — NURSE TRIAGE (OUTPATIENT)
Dept: OTHER | Facility: CLINIC | Age: 66
End: 2021-10-18

## 2021-10-18 DIAGNOSIS — H66.90 EAR INFECTION: Primary | ICD-10-CM

## 2021-10-18 DIAGNOSIS — R22.0 SWELLING OF RIGHT SIDE OF FACE: ICD-10-CM

## 2021-10-18 PROCEDURE — 3017F COLORECTAL CA SCREEN DOC REV: CPT | Performed by: INTERNAL MEDICINE

## 2021-10-18 PROCEDURE — G9899 SCRN MAM PERF RSLTS DOC: HCPCS | Performed by: INTERNAL MEDICINE

## 2021-10-18 PROCEDURE — G8536 NO DOC ELDER MAL SCRN: HCPCS | Performed by: INTERNAL MEDICINE

## 2021-10-18 PROCEDURE — G8427 DOCREV CUR MEDS BY ELIG CLIN: HCPCS | Performed by: INTERNAL MEDICINE

## 2021-10-18 PROCEDURE — G8432 DEP SCR NOT DOC, RNG: HCPCS | Performed by: INTERNAL MEDICINE

## 2021-10-18 PROCEDURE — 1101F PT FALLS ASSESS-DOCD LE1/YR: CPT | Performed by: INTERNAL MEDICINE

## 2021-10-18 PROCEDURE — G8399 PT W/DXA RESULTS DOCUMENT: HCPCS | Performed by: INTERNAL MEDICINE

## 2021-10-18 PROCEDURE — 1090F PRES/ABSN URINE INCON ASSESS: CPT | Performed by: INTERNAL MEDICINE

## 2021-10-18 PROCEDURE — G8419 CALC BMI OUT NRM PARAM NOF/U: HCPCS | Performed by: INTERNAL MEDICINE

## 2021-10-18 PROCEDURE — 99213 OFFICE O/P EST LOW 20 MIN: CPT | Performed by: INTERNAL MEDICINE

## 2021-10-18 PROCEDURE — G8756 NO BP MEASURE DOC: HCPCS | Performed by: INTERNAL MEDICINE

## 2021-10-18 RX ORDER — DOXYCYCLINE 100 MG/1
100 CAPSULE ORAL 2 TIMES DAILY
Qty: 20 CAPSULE | Refills: 0 | Status: SHIPPED | OUTPATIENT
Start: 2021-10-18 | End: 2021-10-18 | Stop reason: SDUPTHER

## 2021-10-18 RX ORDER — PREDNISONE 20 MG/1
TABLET ORAL
Qty: 9 TABLET | Refills: 0 | Status: SHIPPED | OUTPATIENT
Start: 2021-10-18 | End: 2021-10-18 | Stop reason: SDUPTHER

## 2021-10-18 RX ORDER — DOXYCYCLINE 100 MG/1
100 CAPSULE ORAL 2 TIMES DAILY
Qty: 20 CAPSULE | Refills: 0 | Status: SHIPPED | OUTPATIENT
Start: 2021-10-18 | End: 2021-10-22 | Stop reason: SDUPTHER

## 2021-10-18 RX ORDER — PREDNISONE 20 MG/1
TABLET ORAL
Qty: 9 TABLET | Refills: 0 | Status: SHIPPED | OUTPATIENT
Start: 2021-10-18 | End: 2022-03-10
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
## 2021-10-18 NOTE — TELEPHONE ENCOUNTER
Received call from Joon May at [facility] with Red Flag Complaint. Brief description of triage: right ear pain and facial swelling. Triage indicates for patient to see today    Care advice provided, patient verbalizes understanding; denies any other questions or concerns; instructed to call back for any new or worsening symptoms. Writer provided warm transfer to at [facility] for appointment scheduling. Patient disconnected before transfer complete. Message sent in Teams to call patient back. Attention Provider: Thank you for allowing me to participate in the care of your patient. The patient was connected to triage in response to information provided to the [facility]. Please do not respond through this encounter as the response is not directed to a shared pool. Reason for Disposition   Diabetes mellitus or a weak immune system (e.g., HIV positive, cancer chemotherapy, transplant patient)    Answer Assessment - Initial Assessment Questions  1. LOCATION: \"Which ear is involved? \"      Right ear pain and facial swelling on that side from under ear to jaw    2. ONSET: \"When did the ear start hurting\"       10/16/21    3. SEVERITY: \"How bad is the pain? \"  (Scale 1-10; mild, moderate or severe)    - MILD (1-3): doesn't interfere with normal activities     - MODERATE (4-7): interferes with normal activities or awakens from sleep     - SEVERE (8-10): excruciating pain, unable to do any normal activities       Occasional shooting pain, started with itching. Pain is mild    4. URI SYMPTOMS: \"Do you have a runny nose or cough? \"      Denies    5. FEVER: \"Do you have a fever? \" If so, ask: \"What is your temperature, how was it measured, and when did it start? \"      Denies fever    6. CAUSE: \"Have you been swimming recently? \", \"How often do you use Q-TIPS? \", \"Have you had any recent air travel or scuba diving? \"      \"I think my throat started swelling, on the right side. \"    7.  OTHER SYMPTOMS: \"Do you have any other symptoms? \" (e.g., headache, stiff neck, dizziness, vomiting, runny nose, decreased hearing)      Right facial swelling. Ear hurts. Headache on that side off and on.     8. PREGNANCY: \"Is there any chance you are pregnant? \" \"When was your last menstrual period? \"      N/a    Protocols used: EARACHE-ADULT-[place]

## 2021-10-18 NOTE — TELEPHONE ENCOUNTER
Called patient- she has been having ear pain, and one sided facial swelling, patient did have an appointment for tomorrow but made earlier appointment via Ivon Parnell [phone]

## 2021-10-19 DIAGNOSIS — H66.90 EAR INFECTION: ICD-10-CM

## 2021-10-20 RX ORDER — DOXYCYCLINE 100 MG/1
100 CAPSULE ORAL 2 TIMES DAILY
Qty: 20 CAPSULE | Refills: 0 | OUTPATIENT
Start: 2021-10-20 | End: 2021-10-30

## 2021-10-21 DIAGNOSIS — H66.90 EAR INFECTION: ICD-10-CM

## 2021-10-21 RX ORDER — DOXYCYCLINE 100 MG/1
100 CAPSULE ORAL 2 TIMES DAILY
Qty: 20 CAPSULE | Refills: 0 | Status: CANCELLED | OUTPATIENT
Start: 2021-10-21 | End: 2021-10-31

## 2021-10-21 NOTE — TELEPHONE ENCOUNTER
Pt stats the only medication she was able to  from pharmacy was the steroid. She is asking that the other medication be called in also. Per connect care it seems the print option was selected in which it didn't go to pharmacy. Pt wants to be contacted to inform once down.

## 2021-10-22 DIAGNOSIS — H66.90 EAR INFECTION: ICD-10-CM

## 2021-10-22 RX ORDER — DOXYCYCLINE 100 MG/1
100 CAPSULE ORAL 2 TIMES DAILY
Qty: 20 CAPSULE | Refills: 0 | Status: SHIPPED | OUTPATIENT
Start: 2021-10-22 | End: 2021-11-01

## 2021-10-22 RX ORDER — DOXYCYCLINE 100 MG/1
100 CAPSULE ORAL 2 TIMES DAILY
Qty: 20 CAPSULE | Refills: 0 | Status: SHIPPED | OUTPATIENT
Start: 2021-10-22 | End: 2021-10-22 | Stop reason: SDUPTHER

## 2021-10-22 NOTE — TELEPHONE ENCOUNTER
Requested Prescriptions     Pending Prescriptions Disp Refills    doxycycline (VIBRAMYCIN) 100 mg capsule 20 Capsule 0     Sig: Take 1 Capsule by mouth two (2) times a day for 10 days. Refused Prescriptions Disp Refills    doxycycline (VIBRAMYCIN) 100 mg capsule 20 Capsule 0     Sig: Take 1 Capsule by mouth two (2) times a day for 10 days.      Refused By: Ivis Ty     Reason for Refusal: Request already responded to by other means (e.g. phone or fax)        Last Visit 10/18/21  Last Refill 10/18/21

## 2021-10-22 NOTE — TELEPHONE ENCOUNTER
Called pharmacy and gave verbal order for Doxy, called patient to notify that prescription will be ready

## 2021-11-02 RX ORDER — LANCETS 33 GAUGE
EACH MISCELLANEOUS
Qty: 200 LANCET | Refills: 3 | Status: SHIPPED | OUTPATIENT
Start: 2021-11-02 | End: 2022-06-10 | Stop reason: SDUPTHER

## 2021-11-03 RX ORDER — HYDROCHLOROTHIAZIDE 12.5 MG/1
TABLET ORAL
Qty: 90 TABLET | Refills: 3 | Status: SHIPPED | OUTPATIENT
Start: 2021-11-03 | End: 2022-10-09

## 2021-11-05 RX ORDER — MELOXICAM 15 MG/1
TABLET ORAL
Qty: 90 TABLET | Refills: 3 | Status: SHIPPED | OUTPATIENT
Start: 2021-11-05 | End: 2022-09-13

## 2021-11-16 ENCOUNTER — DOCUMENTATION ONLY (OUTPATIENT)
Dept: SURGERY | Age: 66
End: 2021-11-16

## 2021-11-16 NOTE — PROGRESS NOTES
Contacted patient to make her aware that she missed her appointment with Dr. Derick Leblanc today. Patient has been rescheduled to November 30 at 1:40pm. Will send a no show letter. Patient states she wrote it wrong on her calendar.

## 2021-11-19 ENCOUNTER — HOSPITAL ENCOUNTER (OUTPATIENT)
Dept: CT IMAGING | Age: 66
Discharge: HOME | End: 2021-11-19
Attending: SURGERY
Payer: MEDICARE

## 2021-11-19 DIAGNOSIS — D13.6 SEROUS CYSTADENOMA OF PANCREAS: ICD-10-CM

## 2021-11-19 LAB — CREAT BLD-MCNC: 0.6 MG/DL (ref 0.6–1.3)

## 2021-11-19 PROCEDURE — 74160 CT ABDOMEN W/CONTRAST: CPT

## 2021-11-19 PROCEDURE — 74011000636 HC RX REV CODE- 636: Performed by: SURGERY

## 2021-11-19 PROCEDURE — 82565 ASSAY OF CREATININE: CPT

## 2021-11-19 RX ADMIN — IOPAMIDOL 100 ML: 755 INJECTION, SOLUTION INTRAVENOUS at 08:30

## 2021-11-30 ENCOUNTER — OFFICE VISIT (OUTPATIENT)
Dept: SURGERY | Age: 66
End: 2021-11-30
Payer: MEDICARE

## 2021-11-30 VITALS
DIASTOLIC BLOOD PRESSURE: 79 MMHG | WEIGHT: 168.8 LBS | SYSTOLIC BLOOD PRESSURE: 139 MMHG | HEART RATE: 68 BPM | TEMPERATURE: 99.2 F | OXYGEN SATURATION: 95 % | HEIGHT: 66 IN | BODY MASS INDEX: 27.13 KG/M2 | RESPIRATION RATE: 18 BRPM

## 2021-11-30 DIAGNOSIS — D13.6 SEROUS CYSTADENOMA OF PANCREAS: ICD-10-CM

## 2021-11-30 DIAGNOSIS — I51.7 CARDIOMEGALY: Primary | ICD-10-CM

## 2021-11-30 PROCEDURE — G8754 DIAS BP LESS 90: HCPCS | Performed by: SURGERY

## 2021-11-30 PROCEDURE — G8536 NO DOC ELDER MAL SCRN: HCPCS | Performed by: SURGERY

## 2021-11-30 PROCEDURE — 1101F PT FALLS ASSESS-DOCD LE1/YR: CPT | Performed by: SURGERY

## 2021-11-30 PROCEDURE — G8427 DOCREV CUR MEDS BY ELIG CLIN: HCPCS | Performed by: SURGERY

## 2021-11-30 PROCEDURE — 1090F PRES/ABSN URINE INCON ASSESS: CPT | Performed by: SURGERY

## 2021-11-30 PROCEDURE — G8399 PT W/DXA RESULTS DOCUMENT: HCPCS | Performed by: SURGERY

## 2021-11-30 PROCEDURE — G8510 SCR DEP NEG, NO PLAN REQD: HCPCS | Performed by: SURGERY

## 2021-11-30 PROCEDURE — G9899 SCRN MAM PERF RSLTS DOC: HCPCS | Performed by: SURGERY

## 2021-11-30 PROCEDURE — G8419 CALC BMI OUT NRM PARAM NOF/U: HCPCS | Performed by: SURGERY

## 2021-11-30 PROCEDURE — 3017F COLORECTAL CA SCREEN DOC REV: CPT | Performed by: SURGERY

## 2021-11-30 PROCEDURE — 99214 OFFICE O/P EST MOD 30 MIN: CPT | Performed by: SURGERY

## 2021-11-30 PROCEDURE — G8752 SYS BP LESS 140: HCPCS | Performed by: SURGERY

## 2021-11-30 NOTE — LETTER
12/1/2021 3:24 PM    Patient: Jordis Goldberg   YOB: 1955  Date of Visit: 11/30/2021      Dear Asim Negrete MD  [address] [place] [address] [address]: Thank you for referring Ms. Jonel Morales to me for evaluation/treatment. Below are the relevant portions of my assessment and plan of care. If you have questions, please do not hesitate to call me. I look forward to following Ms. Alena Castellanos along with you.         Sincerely,      Ridge Avila MD

## 2021-11-30 NOTE — PROGRESS NOTES
1. Have you been to the ER, urgent care clinic since your last visit? Hospitalized since your last visit? No    2. Have you seen or consulted any other health care providers outside of the [address] [place] [address] since your last visit? Include any pap smears or colon screening.  No

## 2021-11-30 NOTE — LETTER
12/1/2021    Patient: Josue Chavarria   YOB: 1955   Date of Visit: 11/30/2021     Janes Tucker MD  [address] [place] [address] In [employer]    Dear Janes Tucker MD,      Thank you for [facility] for evaluation. My notes for this consultation are attached. If you have questions, please do not hesitate to call me. I look forward to following your patient along with you.       Sincerely,    Estelle Myrick MD

## 2021-12-01 NOTE — PROGRESS NOTES
[facility]      Clinic Note - Follow up    [address] returns for scheduled follow up today. She is known to me for a large, asymptomatic microcystic serous cystadenoma in the tail of the pancreas. The patient returns today after 6-month follow-up imaging to assess for interval growth/change. The patient states that she is doing fairly well. She does have some musculoskeletal pain on the left side that started 4 to 5 days ago. This has point tenderness overlying the ribs on the left side and is movement related. She denies any abdominal pain. She is eating well and denies any nausea/vomiting. No unintentional weight loss. No episodes of pancreatitis or other pancreatic issues. Her sister recently passed away from breast cancer. She does have diabetes and is followed by endocrinology for this. The patient denies any changes to the [facility], PSHx, SHx or FHx since their last visit except as mentioned above. ROS is negative except as mentioned in the HPI. Objective     Visit Vitals  /79 (BP 1 Location: Left upper arm, BP Patient Position: Sitting, BP Cuff Size: Adult)   Pulse 68   Temp 99.2 °F (37.3 °C) (Oral)   Resp 18   Ht 5' 5.5\" (1.664 m)   Wt 168 lb 12.8 oz (76.6 kg)   SpO2 95%   BMI 27.66 kg/m²         PE  GEN - Awake, alert, communicating appropriately. NAD  Pulm - CTAB  CV - RRR  Abd - soft, NT, ND. No palpable masses or organomegaly. Ext - warm, well perfused, no edema. All other systems negative unless indicated above. Labs  None      Imaging  CT Results (most recent):  Results from Hospital Encounter encounter on 11/19/21    CT ABD W CONT    Narrative  EXAM: CT ABD W CONT    INDICATION: serous cystadenoma of pancreas - evaluate interval change    COMPARISON: CT 3/9/2021. CONTRAST: 100 mL of Isovue-370.     TECHNIQUE: CT abdomen with contrast.  Multislice helical CT was performed from the diaphragm to the iliac crest during  uneventful rapid bolus intravenous contrast administration. Oral contrast was  not administered. Contiguous 5 mm axial images were reconstructed and lung and  soft tissue windows were generated. Coronal and sagittal reformations were  generated. CT dose reduction was achieved through use of a standardized  protocol tailored for this examination and automatic exposure control for dose  modulation. FINDINGS:    LOWER THORAX: Mild dilated cardiomegaly redemonstrated. Otherwise unremarkable. LIVER: No significant change in 8 mm cyst inferior right lobe (6/85). Otherwise  unremarkable. BILIARY TREE: Gallbladder is within normal limits. CBD is not dilated. SPLEEN: within normal limits. PANCREAS: In the tail the pancreas there is a 5.6 x 3.7 x 6.0 cm multilocular  cystic mass with coarse central calcifications and thin internal septations,  previously 5.3 x 3.6 x 5.6 cm. There is no significant change from prior with no  suspicious increasing nodular enhancing tissue. Remainder the pancreas is  unremarkable with no ductal dilation or other mass. ADRENALS: Unremarkable. KIDNEYS: No mass, calculus, or hydronephrosis. STOMACH: Unremarkable. SMALL BOWEL: No dilatation or wall thickening. COLON: No dilatation or wall thickening. APPENDIX: Unremarkable. PERITONEUM: No ascites or pneumoperitoneum. RETROPERITONEUM: No lymphadenopathy or aortic aneurysm. BONES: No destructive bone lesion. Degenerative spine change. ABDOMINAL WALL: No mass or hernia. ADDITIONAL COMMENTS: N/A    Impression  1. Slight interval enlargement of biopsy proven benign serous cystic neoplasm of  the pancreas with no CT evidence of malignant transformation. 2. Incidentals as above. Assessment     Jm Gandhi is a 74 y. o.yr old female with a large, asymptomatic serous cystadenoma of the tail of the pancreas. This appears completely benign by needle biopsy and cyst fluid analysis.     Plan     The serocystadenoma in the tail the pancreas appears essentially unchanged over interval imaging. I again discussed that these have no malignant potential and I would only recommend surgical resection if she was symptomatic from the lesion. We discussed the option of following up in the setting of symptoms or scheduling a follow-up imaging study in 1 to 2 years. She is in agreement with just following up if she develops future symptoms. I will plan to see her back as needed in the future. The patient states that she has follow-up with endocrinology who has been managing her diabetes and thyroid nodule work-up. There was a suggestion of mild dilated cardiomegaly on her CT scan and the patient has requested a cardiology referral due to this. I will help get her set up for cardiology evaluation. 30 mins of time was spent with the patient of which > 50% of the time involved face-to-face counseling of the patient regarding the proposed treatment plan. Zehra Sewell MD  11/30/2021    CC:  MD Dr. Cassidy Zhang

## 2022-01-05 DIAGNOSIS — E04.2 MULTINODULAR GOITER: ICD-10-CM

## 2022-01-05 DIAGNOSIS — R73.02 IMPAIRED GLUCOSE TOLERANCE: ICD-10-CM

## 2022-01-05 DIAGNOSIS — E55.9 VITAMIN D DEFICIENCY: ICD-10-CM

## 2022-01-05 DIAGNOSIS — E78.5 HYPERLIPIDEMIA LDL GOAL <100: ICD-10-CM

## 2022-01-13 ENCOUNTER — TELEPHONE (OUTPATIENT)
Dept: PRIMARY CARE CLINIC | Age: 67
End: 2022-01-13

## 2022-01-13 NOTE — TELEPHONE ENCOUNTER
Per call from pt, Dr. Yuki Baum (  Neuro Psych)) is no longer accepting [facility]. States that she was told by her insurance that you would have to place the referral for her.

## 2022-01-18 ENCOUNTER — OFFICE VISIT (OUTPATIENT)
Dept: CARDIOLOGY CLINIC | Age: 67
End: 2022-01-18
Payer: MEDICARE

## 2022-01-18 VITALS
BODY MASS INDEX: 27.32 KG/M2 | HEART RATE: 63 BPM | WEIGHT: 170 LBS | OXYGEN SATURATION: 97 % | RESPIRATION RATE: 16 BRPM | HEIGHT: 66 IN | DIASTOLIC BLOOD PRESSURE: 60 MMHG | SYSTOLIC BLOOD PRESSURE: 120 MMHG

## 2022-01-18 DIAGNOSIS — R06.02 SHORTNESS OF BREATH: ICD-10-CM

## 2022-01-18 DIAGNOSIS — R60.9 SWELLING: ICD-10-CM

## 2022-01-18 DIAGNOSIS — I51.7 CARDIOMEGALY: Primary | ICD-10-CM

## 2022-01-18 DIAGNOSIS — I49.9 IRREGULAR HEARTBEAT: ICD-10-CM

## 2022-01-18 PROBLEM — E11.9 TYPE 2 DIABETES MELLITUS (HCC): Status: ACTIVE | Noted: 2022-01-18

## 2022-01-18 PROCEDURE — G8536 NO DOC ELDER MAL SCRN: HCPCS | Performed by: SPECIALIST

## 2022-01-18 PROCEDURE — G8399 PT W/DXA RESULTS DOCUMENT: HCPCS | Performed by: SPECIALIST

## 2022-01-18 PROCEDURE — 3017F COLORECTAL CA SCREEN DOC REV: CPT | Performed by: SPECIALIST

## 2022-01-18 PROCEDURE — 1101F PT FALLS ASSESS-DOCD LE1/YR: CPT | Performed by: SPECIALIST

## 2022-01-18 PROCEDURE — G8419 CALC BMI OUT NRM PARAM NOF/U: HCPCS | Performed by: SPECIALIST

## 2022-01-18 PROCEDURE — 99214 OFFICE O/P EST MOD 30 MIN: CPT | Performed by: SPECIALIST

## 2022-01-18 PROCEDURE — 1090F PRES/ABSN URINE INCON ASSESS: CPT | Performed by: SPECIALIST

## 2022-01-18 PROCEDURE — G9899 SCRN MAM PERF RSLTS DOC: HCPCS | Performed by: SPECIALIST

## 2022-01-18 PROCEDURE — G8510 SCR DEP NEG, NO PLAN REQD: HCPCS | Performed by: SPECIALIST

## 2022-01-18 PROCEDURE — 93000 ELECTROCARDIOGRAM COMPLETE: CPT | Performed by: SPECIALIST

## 2022-01-18 PROCEDURE — G8427 DOCREV CUR MEDS BY ELIG CLIN: HCPCS | Performed by: SPECIALIST

## 2022-01-18 NOTE — TELEPHONE ENCOUNTER
Called and left a VM for patient to call the office- needing more information regarding referral request
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
## 2022-01-18 NOTE — PATIENT INSTRUCTIONS
You have been scheduled for an echocardiogram. We will call with results and see you back in 6 months.

## 2022-01-31 ENCOUNTER — TELEPHONE (OUTPATIENT)
Dept: PRIMARY CARE CLINIC | Age: 67
End: 2022-01-31

## 2022-01-31 DIAGNOSIS — R41.3 MEMORY IMPAIRMENT: Primary | ICD-10-CM

## 2022-01-31 NOTE — TELEPHONE ENCOUNTER
Spoke with patient on the phone- patient is not seeing Dr Miriam Heimlich anymore- he is no longer working. Patient is asking for a new referral for Neuro Psych. Told her that we can give a referral but do not know if they are covered under insurance.  Patient understood

## 2022-02-01 ENCOUNTER — TELEPHONE (OUTPATIENT)
Dept: ENDOCRINOLOGY | Age: 67
End: 2022-02-01

## 2022-02-01 NOTE — TELEPHONE ENCOUNTER
Please call pt to let her know she has an unread message in [employer]:    I will be happy to reschedule you for a virtual visit or in person visit once your labs are back so just go when you can and we'll reschedule the visit for 1-2 weeks after they are back.

## 2022-02-25 ENCOUNTER — ANCILLARY PROCEDURE (OUTPATIENT)
Dept: CARDIOLOGY CLINIC | Age: 67
End: 2022-02-25
Payer: MEDICARE

## 2022-02-25 VITALS — BODY MASS INDEX: 28.32 KG/M2 | WEIGHT: 170 LBS | HEIGHT: 65 IN

## 2022-02-25 DIAGNOSIS — R60.9 SWELLING: ICD-10-CM

## 2022-02-25 DIAGNOSIS — I51.7 CARDIOMEGALY: ICD-10-CM

## 2022-02-25 DIAGNOSIS — R06.02 SHORTNESS OF BREATH: ICD-10-CM

## 2022-02-25 DIAGNOSIS — I49.9 IRREGULAR HEARTBEAT: ICD-10-CM

## 2022-02-25 PROCEDURE — 93306 TTE W/DOPPLER COMPLETE: CPT | Performed by: SPECIALIST

## 2022-02-26 LAB
ECHO AO ASC DIAM: 3.3 CM
ECHO AO ASCENDING AORTA INDEX: 1.78 CM/M2
ECHO AO ROOT DIAM: 3.3 CM
ECHO AO ROOT INDEX: 1.78 CM/M2
ECHO AV AREA PEAK VELOCITY: 1.8 CM2
ECHO AV AREA VTI: 1.8 CM2
ECHO AV AREA/BSA PEAK VELOCITY: 1 CM2/M2
ECHO AV AREA/BSA VTI: 1 CM2/M2
ECHO AV MEAN GRADIENT: 4 MMHG
ECHO AV MEAN VELOCITY: 0.9 M/S
ECHO AV PEAK GRADIENT: 6 MMHG
ECHO AV PEAK VELOCITY: 1.3 M/S
ECHO AV VELOCITY RATIO: 0.62
ECHO AV VTI: 28.7 CM
ECHO EST RA PRESSURE: 3 MMHG
ECHO LA DIAMETER INDEX: 1.89 CM/M2
ECHO LA DIAMETER: 3.5 CM
ECHO LA TO AORTIC ROOT RATIO: 1.06
ECHO LA VOL 2C: 57 ML (ref 22–52)
ECHO LA VOL 4C: 57 ML (ref 22–52)
ECHO LA VOL BP: 57 ML (ref 22–52)
ECHO LA VOL/BSA BIPLANE: 31 ML/M2 (ref 16–34)
ECHO LA VOLUME AREA LENGTH: 60 ML
ECHO LA VOLUME INDEX A2C: 31 ML/M2 (ref 16–34)
ECHO LA VOLUME INDEX A4C: 31 ML/M2 (ref 16–34)
ECHO LA VOLUME INDEX AREA LENGTH: 32 ML/M2 (ref 16–34)
ECHO LV E' LATERAL VELOCITY: 8 CM/S
ECHO LV E' SEPTAL VELOCITY: 6 CM/S
ECHO LV EDV A2C: 61 ML
ECHO LV EDV A4C: 72 ML
ECHO LV EDV BP: 66 ML (ref 56–104)
ECHO LV EDV INDEX A4C: 39 ML/M2
ECHO LV EDV INDEX BP: 36 ML/M2
ECHO LV EDV NDEX A2C: 33 ML/M2
ECHO LV EJECTION FRACTION A2C: 57 %
ECHO LV EJECTION FRACTION A4C: 55 %
ECHO LV EJECTION FRACTION BIPLANE: 54 % (ref 55–100)
ECHO LV ESV A2C: 26 ML
ECHO LV ESV A4C: 32 ML
ECHO LV ESV BP: 31 ML (ref 19–49)
ECHO LV ESV INDEX A2C: 14 ML/M2
ECHO LV ESV INDEX A4C: 17 ML/M2
ECHO LV ESV INDEX BP: 17 ML/M2
ECHO LV FRACTIONAL SHORTENING: 30 % (ref 28–44)
ECHO LV INTERNAL DIMENSION DIASTOLE INDEX: 2.16 CM/M2
ECHO LV INTERNAL DIMENSION DIASTOLIC: 4 CM (ref 3.9–5.3)
ECHO LV INTERNAL DIMENSION SYSTOLIC INDEX: 1.51 CM/M2
ECHO LV INTERNAL DIMENSION SYSTOLIC: 2.8 CM
ECHO LV IVSD: 1.2 CM (ref 0.6–0.9)
ECHO LV MASS 2D: 175.8 G (ref 67–162)
ECHO LV MASS INDEX 2D: 95 G/M2 (ref 43–95)
ECHO LV POSTERIOR WALL DIASTOLIC: 1.3 CM (ref 0.6–0.9)
ECHO LV RELATIVE WALL THICKNESS RATIO: 0.65
ECHO LVOT AREA: 3.1 CM2
ECHO LVOT AV VTI INDEX: 0.6
ECHO LVOT DIAM: 2 CM
ECHO LVOT MEAN GRADIENT: 1 MMHG
ECHO LVOT PEAK GRADIENT: 2 MMHG
ECHO LVOT PEAK VELOCITY: 0.8 M/S
ECHO LVOT STROKE VOLUME INDEX: 29.2 ML/M2
ECHO LVOT SV: 54 ML
ECHO LVOT VTI: 17.2 CM
ECHO MV A VELOCITY: 0.65 M/S
ECHO MV E DECELERATION TIME (DT): 197.5 MS
ECHO MV E VELOCITY: 0.57 M/S
ECHO MV E/A RATIO: 0.88
ECHO MV E/E' LATERAL: 7.13
ECHO MV E/E' RATIO (AVERAGED): 8.31
ECHO MV E/E' SEPTAL: 9.5
ECHO PULMONARY ARTERY END DIASTOLIC PRESSURE: 5 MMHG
ECHO PV MAX VELOCITY: 0.8 M/S
ECHO PV PEAK GRADIENT: 2 MMHG
ECHO PV REGURGITANT MAX VELOCITY: 1.1 M/S
ECHO RIGHT VENTRICULAR SYSTOLIC PRESSURE (RVSP): 35 MMHG
ECHO RV TAPSE: 1.9 CM (ref 1.5–2)
ECHO TV REGURGITANT MAX VELOCITY: 2.81 M/S
ECHO TV REGURGITANT PEAK GRADIENT: 32 MMHG

## 2022-03-02 LAB
ALBUMIN SERPL-MCNC: 4.6 G/DL (ref 3.8–4.8)
ALBUMIN/GLOB SERPL: 3.3 {RATIO} (ref 1.2–2.2)
ALP SERPL-CCNC: 89 IU/L (ref 44–121)
ALT SERPL-CCNC: 21 IU/L (ref 0–32)
AST SERPL-CCNC: 19 IU/L (ref 0–40)
BILIRUB SERPL-MCNC: <0.2 MG/DL (ref 0–1.2)
BUN SERPL-MCNC: 18 MG/DL (ref 8–27)
BUN/CREAT SERPL: 30 (ref 12–28)
CALCIUM SERPL-MCNC: 9.5 MG/DL (ref 8.7–10.3)
CHLORIDE SERPL-SCNC: 102 MMOL/L (ref 96–106)
CHOLEST SERPL-MCNC: 210 MG/DL (ref 100–199)
CO2 SERPL-SCNC: 22 MMOL/L (ref 20–29)
CREAT SERPL-MCNC: 0.6 MG/DL (ref 0.57–1)
EGFR: 97 ML/MIN/1.73
EST. AVERAGE GLUCOSE BLD GHB EST-MCNC: 140 MG/DL
GLOBULIN SER CALC-MCNC: 1.4 G/DL (ref 1.5–4.5)
GLUCOSE SERPL-MCNC: 94 MG/DL (ref 65–99)
HBA1C MFR BLD: 6.5 % (ref 4.8–5.6)
HDLC SERPL-MCNC: 67 MG/DL
LDLC SERPL CALC-MCNC: 115 MG/DL (ref 0–99)
POTASSIUM SERPL-SCNC: 4.6 MMOL/L (ref 3.5–5.2)
PROT SERPL-MCNC: 6 G/DL (ref 6–8.5)
SODIUM SERPL-SCNC: 140 MMOL/L (ref 134–144)
TRIGL SERPL-MCNC: 162 MG/DL (ref 0–149)
TSH SERPL DL<=0.005 MIU/L-ACNC: 1.24 UIU/ML (ref 0.45–4.5)
VLDLC SERPL CALC-MCNC: 28 MG/DL (ref 5–40)

## 2022-03-04 ENCOUNTER — TRANSCRIBE ORDER (OUTPATIENT)
Dept: SCHEDULING | Age: 67
End: 2022-03-04

## 2022-03-04 DIAGNOSIS — D34 THYROID ADENOMA: Primary | ICD-10-CM

## 2022-03-07 ENCOUNTER — TELEPHONE (OUTPATIENT)
Dept: ENDOCRINOLOGY | Age: 67
End: 2022-03-07

## 2022-03-07 NOTE — TELEPHONE ENCOUNTER
Can you call her and see if she can come for a visit on Thursday 3/10/22 at 12:10 pm either in person or virtually?

## 2022-03-07 NOTE — TELEPHONE ENCOUNTER
Please call pt to let her know she has an unread message in [employer]:    From   Peace Welsh MD To   [address] [place] [address] and Delivered   3/3/2022  8:31 AM   Your A1c is 6.5% and is available in the lab section. You just didn't scroll through all the results.  Now that you have had your labs done, can you come for a visit on Thursday 3/10/22 at 12:10 pm either in person or virtually?  Let me know when you have a chance. Previous Messages    ----- Message -----        From: [facility]   Sent:3/2/2022  9:20 PM EST          To:Gaurav White MD     Subject:Question regarding METABOLIC PANEL, COMPREHENSIVE     Based on my test. Rachna Henriquez is my A1C result

## 2022-03-08 NOTE — TELEPHONE ENCOUNTER
I called and spoke with her and she accepted a VIRTUAL VISIT on 3/10/22 at 12:10pm so please put her down and verify her information. Thanks.

## 2022-03-09 NOTE — TELEPHONE ENCOUNTER
3/9/2022  10:36 AM    Good Morning, Dr. Irma Rodriguez,     I called to confirm Ms. Read appt and her information. I didn't receive an answer. I left a detailed vm. I have her scheduled for 3/10 at 12:10 pm VV.      Thanks,   Miguel Khan

## 2022-03-10 ENCOUNTER — VIRTUAL VISIT (OUTPATIENT)
Dept: ENDOCRINOLOGY | Age: 67
End: 2022-03-10
Payer: MEDICARE

## 2022-03-10 DIAGNOSIS — R73.02 IMPAIRED GLUCOSE TOLERANCE: Primary | ICD-10-CM

## 2022-03-10 DIAGNOSIS — E78.5 HYPERLIPIDEMIA LDL GOAL <100: ICD-10-CM

## 2022-03-10 DIAGNOSIS — E04.2 MULTINODULAR GOITER: ICD-10-CM

## 2022-03-10 DIAGNOSIS — E55.9 VITAMIN D DEFICIENCY: ICD-10-CM

## 2022-03-10 PROCEDURE — G9899 SCRN MAM PERF RSLTS DOC: HCPCS | Performed by: INTERNAL MEDICINE

## 2022-03-10 PROCEDURE — G8427 DOCREV CUR MEDS BY ELIG CLIN: HCPCS | Performed by: INTERNAL MEDICINE

## 2022-03-10 PROCEDURE — 1101F PT FALLS ASSESS-DOCD LE1/YR: CPT | Performed by: INTERNAL MEDICINE

## 2022-03-10 PROCEDURE — 99214 OFFICE O/P EST MOD 30 MIN: CPT | Performed by: INTERNAL MEDICINE

## 2022-03-10 PROCEDURE — G8432 DEP SCR NOT DOC, RNG: HCPCS | Performed by: INTERNAL MEDICINE

## 2022-03-10 PROCEDURE — G8399 PT W/DXA RESULTS DOCUMENT: HCPCS | Performed by: INTERNAL MEDICINE

## 2022-03-10 PROCEDURE — 1090F PRES/ABSN URINE INCON ASSESS: CPT | Performed by: INTERNAL MEDICINE

## 2022-03-10 PROCEDURE — 3017F COLORECTAL CA SCREEN DOC REV: CPT | Performed by: INTERNAL MEDICINE

## 2022-03-10 RX ORDER — METFORMIN HYDROCHLORIDE 500 MG/1
TABLET, EXTENDED RELEASE ORAL
Qty: 270 TABLET | Refills: 3
Start: 2022-03-10 | End: 2022-06-17 | Stop reason: SDUPTHER

## 2022-03-10 NOTE — PATIENT INSTRUCTIONS
1) Hemoglobin A1c is a 3 month marker of your blood sugar control. Normal is less than 5.7% and diabetes is greater than 6.4%. Your Hemoglobin A1c is 6.5% which means your blood sugar is technically in the full blown diabetic range and under worse control than last check when your value was 6.3%. Continue to work on your diet and exercise and take all your medications as directed. 2) Split the metformin to 1 tab three times daily instead of trying to break a pill in half as this will decrease the potency of the extended release tabs. 3) BUN and creatinine are markers of kidney function. Your values are normal.    4) ALT and AST are markers of liver function. Your values are normal.    5) TSH is a thyroid test.  Your level is normal so you don't have any problem with your thyroid function at this time that needs further evaluation or treatment. 6) Your LDL (bad cholesterol) was 115 up from 95 and goal is under 100 due to less exercise. Try to limit the amount of fried foods, fatty foods, butter, gravy, red meat, ice cream, cheese and eggs in your diet, which are all high in cholesterol. 7) Please come for a follow up visit on 9/13/22 at 9:50am in our [place] office. 8) I put an order directly into the [employer] system to repeat your labs in 3 months and in the 1-2 weeks prior to your next visit so just ask for the order under my name and you will receive a courtesy reminder through [email or web address] to have these drawn.

## 2022-03-10 NOTE — PROGRESS NOTES
Chief Complaint   Patient presents with   Geoff Frias Diabetes     [phone]    Other     pcp and pharmacy confirmed       **THIS IS A VIRTUAL VISIT VIA A VIDEO SYNCHRONOUS DISCUSSION. PATIENT AGREED TO HAVE THEIR CARE DELIVERED OVER A DOXY. ME VIDEO VISIT IN PLACE OF THEIR REGULARLY SCHEDULED OFFICE VISIT**    History of Present Illness: Monie Seay is a 69 y.o. female here for follow up of diabetes. Did try taking 1 tab of metformin with breakfast and 2 tabs at dinner but found this was upsetting her stomach more so she went back to 1 tab twice daily and better tolerated this. Once she saw her labs last week she started trying to go back to 3 tabs per day by breaking tabs in half but I told her this decreases the potency of the pill so she should instead try taking 3 tabs spread out throughout the day. Hasn't been walking as much during the cold but does her yoga. Has not been missing her crestor aside from on rare occasion. Not having any fried foods or red meat. Current Outpatient Medications   Medication Sig    meloxicam (MOBIC) 15 mg tablet TAKE 1 TABLET BY MOUTH  DAILY AS NEEDED FOR PAIN    hydroCHLOROthiazide (HYDRODIURIL) 12.5 mg tablet TAKE 1 TABLET BY MOUTH  DAILY    lancets (One Touch Delica) 33 gauge misc Use to check blood sugars twice daily. Dx. Code E11.65    metFORMIN ER (GLUCOPHAGE XR) 500 mg tablet TAKE 1 TABLET BY MOUTH WITH BREAKFAST AND 2 TABS WITH DINNER    OneTouch Delica Plus Lancet 33 gauge misc TEST TWICE DAILY    OneTouch Ultra Blue Test Strip strip USE WITH METER TO TEST UP  TO 2 TIMES DAILY    spironolactone (ALDACTONE) 25 mg tablet Take 1 Tab by mouth daily.  rosuvastatin (CRESTOR) 5 mg tablet Take 1 Tab by mouth nightly.  budesonide-formoteroL (SYMBICORT) 160-4.5 mcg/actuation HFAA Take 2 Puffs by inhalation daily.  montelukast (SINGULAIR) 10 mg tablet Take 10 mg by mouth daily.  aspirin delayed-release 81 mg tablet Take 81 mg by mouth daily.     albuterol (PROVENTIL HFA, VENTOLIN HFA, PROAIR HFA) 90 mcg/actuation inhaler USE 1 INHALATION BY MOUTH  EVERY 6 HOURS AS NEEDED FOR WHEEZING (Patient taking differently: every four (4) hours as needed. USE 1 INHALATION BY MOUTH  EVERY 6 HOURS AS NEEDED FOR WHEEZING)    escitalopram oxalate (LEXAPRO) 10 mg tablet Take 10 mg by mouth daily.  Lancing Device with Lancets (OneTouch Delica Plus Lanc Dev) kit Test 2 times daily. Dx code E11.65    ondansetron (ZOFRAN ODT) 4 mg disintegrating tablet Take 1 Tab by mouth every eight (8) hours as needed for Nausea.  ferrous sulfate 325 mg (65 mg iron) tablet TAKE 1 TABLET BY MOUTH DAILY BEFORE BREAKFAST    multivitamin (ONE A DAY) tablet Take 1 Tab by mouth daily.  calcium-cholecalciferol, d3, (CALCIUM 600 + D) 600-125 mg-unit tab Take  by mouth.  cholecalciferol, vitamin D3, (VITAMIN D3) 2,000 unit tab Take 1 Tablet by mouth daily. In the winter    cyanocobalamin (Vitamin B-12) 1,000 mcg tablet Take 1,000 mcg by mouth every seven (7) days.  clonazePAM (KLONOPIN) 0.5 mg tablet Take 0.5 mg by mouth three (3) times daily.  zolpidem (AMBIEN) 10 mg tablet Take  by mouth nightly as needed for Sleep. No current facility-administered medications for this visit.      Allergies   Allergen Reactions    Pcn [Penicillins] Itching    Boniva [Ibandronate] Other (comments)     Severe bone pain    Percocet [Oxycodone-Acetaminophen] Other (comments)     Passe out, feels dizzy     Review of Systems: PER HPI    Physical Examination:  - GENERAL: NCAT, Appears well nourished   - EYES: EOMI, non-icteric, no proptosis   - Ear/Nose/Throat: NCAT, no visible inflammation or masses   - CARDIOVASCULAR: no cyanosis, no visible JVD   - RESPIRATORY: respiratory effort normal without any distress or labored breathing   - MUSCULOSKELETAL: Normal ROM of neck and upper extremities observed   - SKIN: No rash on face  - NEUROLOGIC:  No facial asymmetry (Cranial nerve 7 motor function), No gaze palsy   - PSYCHIATRIC: Normal affect, Normal insight and judgement       Data Reviewed:   Component      Latest Ref Rng & Units 3/1/2022 3/1/2022 3/1/2022 3/1/2022           2:37 PM  2:37 PM  2:37 PM  2:37 PM   Glucose      65 - 99 mg/dL  94     BUN      8 - 27 mg/dL  18     Creatinine      0.57 - 1.00 mg/dL  0.60     eGFR      >59 mL/min/1.73  97     BUN/Creatinine ratio      12 - 28  30 (H)     Sodium      134 - 144 mmol/L  140     Potassium      3.5 - 5.2 mmol/L  4.6     Chloride      96 - 106 mmol/L  102     CO2      20 - 29 mmol/L  22     Calcium      8.7 - 10.3 mg/dL  9.5     Protein, total      6.0 - 8.5 g/dL  6.0     Albumin      3.8 - 4.8 g/dL  4.6     GLOBULIN, TOTAL      1.5 - 4.5 g/dL  1.4 (L)     A-G Ratio      1.2 - 2.2  3.3 (H)     Bilirubin, total      0.0 - 1.2 mg/dL  <0.2     Alk. phosphatase      44 - 121 IU/L  89     AST      0 - 40 IU/L  19     ALT      0 - 32 IU/L  21     Cholesterol, total      100 - 199 mg/dL 210 (H)      Triglyceride      0 - 149 mg/dL 162 (H)      HDL Cholesterol      >39 mg/dL 67      VLDL, calculated      5 - 40 mg/dL 28      LDL, calculated      0 - 99 mg/dL 115 (H)      Hemoglobin A1c, (calculated)      4.8 - 5.6 %   6.5 (H)    Estimated average glucose      mg/dL   140    TSH      0.450 - 4.500 uIU/mL    1.240       Assessment/Plan:     1. Impaired glucose tolerance: her most recent Hgb A1c was 6.5% in 3/22 up from 6.3% in 7/21 up from 6% in 4/21 and all of her values since 2016 have been between 5.5-6.3%. I explained that technically this was in the full blown diabetic range but we'll see if going back to 3 tabs per day spread out will be better tolerated along with more exercise to get her value back down. - take metformin  mg tid  - check A1c and cmp prior to next visit  - check A1c in 3 months        2.  Multinodular goiter: She is followed by Dr. Chantal Claire for some nodules and cysts and her TSH values have been normal in our system but have trended up from 1.2 in 7/10 to 3.5 in 6/16 and was 2.55 in 6/20 and 1.37 in 7/21 and TPO ab <8 and TG ab < 1.0 and TSH 1.24 in 3/22.  - check TSH and free T4 and thyroid ab panel today  - check TSH in 3/23       3. Vitamin D deficiency:  Did have a low vitamin D of 19 in 7/10 and was 35 in 6/16 and hdsn't been checked since and was 39 in 7/21. Doesn't tend to take vitamin D in the summer but does take 2000 units in the winter.  - check Vitamin D 25-OH level prior to next visit        4. Hyperlipidemia LDL goal <100:  in 9/20 and 108 in 4/21 and started on crestor 5 mg daily by Dr. Lissette Street and down to 95 in 7/21. Up to 115 in 3/22 with less exercise. - cont crestor 5 mg daily  - check lipids in 3 months and prior to next visit              Patient Instructions   1) Hemoglobin A1c is a 3 month marker of your blood sugar control. Normal is less than 5.7% and diabetes is greater than 6.4%. Your Hemoglobin A1c is 6.5% which means your blood sugar is technically in the full blown diabetic range and under worse control than last check when your value was 6.3%. Continue to work on your diet and exercise and take all your medications as directed. 2) Split the metformin to 1 tab three times daily instead of trying to break a pill in half as this will decrease the potency of the extended release tabs. 3) BUN and creatinine are markers of kidney function. Your values are normal.    4) ALT and AST are markers of liver function. Your values are normal.    5) TSH is a thyroid test.  Your level is normal so you don't have any problem with your thyroid function at this time that needs further evaluation or treatment. 6) Your LDL (bad cholesterol) was 115 up from 95 and goal is under 100 due to less exercise. Try to limit the amount of fried foods, fatty foods, butter, gravy, red meat, ice cream, cheese and eggs in your diet, which are all high in cholesterol.     7) Please come for a follow up visit on 9/13/22 at 9:50am in our [place] office. 8) I put an order directly into the [facility] system to repeat your labs in 3 months and in the 1-2 weeks prior to your next visit so just ask for the order under my name and you will receive a courtesy reminder through [employer] to have these drawn. Follow-up and Dispositions    · Return 9/13/22 at 9:50am.             Alma Aguayo, was evaluated through a synchronous (real-time) audio-video encounter. The patient (or guardian if applicable) is aware that this is a billable service, which includes applicable co-pays. Verbal consent to proceed has been obtained. The visit was conducted pursuant to the emergency declaration under the [address] [place] [address] authority and the [employer] Act. Patient identification was verified, and a caregiver was present when appropriate. The patient was located at home in a state where the provider was licensed to provide care. --Anjali Bowman MD on 3/10/2022 at 1:15 PM    An electronic signature was used to authenticate this note.       Copy sent to:  Hira Israel MD as PCP - [facility] Empaneled Provider  Laury Ojeda MD (Otolaryngology)  Iris Huggins MD (Hematology and Oncology)  David Rosenthal MD (General Surgery)  Wellington Hagen MD as Consulting Provider (Cardiology)

## 2022-03-18 PROBLEM — J45.20 MILD INTERMITTENT ASTHMA WITHOUT COMPLICATION: Status: ACTIVE | Noted: 2017-12-22

## 2022-03-18 PROBLEM — E11.9 TYPE 2 DIABETES MELLITUS (HCC): Status: ACTIVE | Noted: 2022-01-18

## 2022-03-19 PROBLEM — K86.2 PANCREATIC CYST: Status: ACTIVE | Noted: 2021-04-07

## 2022-03-19 PROBLEM — D47.2 SMOLDERING MULTIPLE MYELOMA (SMM): Status: ACTIVE | Noted: 2019-10-02

## 2022-03-20 PROBLEM — D13.6 SEROUS CYSTADENOMA OF PANCREAS: Status: ACTIVE | Noted: 2021-05-14

## 2022-03-23 DIAGNOSIS — F41.9 ANXIETY: Primary | ICD-10-CM

## 2022-03-23 RX ORDER — ESCITALOPRAM OXALATE 10 MG/1
10 TABLET ORAL DAILY
Qty: 90 TABLET | Refills: 0 | Status: SHIPPED | OUTPATIENT
Start: 2022-03-23 | End: 2022-06-21

## 2022-04-15 ENCOUNTER — TELEPHONE (OUTPATIENT)
Dept: CARDIOLOGY CLINIC | Age: 67
End: 2022-04-15

## 2022-04-15 RX ORDER — SPIRONOLACTONE 25 MG/1
25 TABLET ORAL DAILY
Qty: 90 TABLET | Refills: 1 | Status: SHIPPED | OUTPATIENT
Start: 2022-04-15 | End: 2022-04-15 | Stop reason: SDUPTHER

## 2022-04-15 RX ORDER — SPIRONOLACTONE 25 MG/1
25 TABLET ORAL DAILY
Qty: 90 TABLET | Refills: 1 | Status: SHIPPED | OUTPATIENT
Start: 2022-04-15

## 2022-04-15 RX ORDER — SPIRONOLACTONE 25 MG/1
25 TABLET ORAL DAILY
Qty: 90 TABLET | Refills: 1 | Status: SHIPPED | OUTPATIENT
Start: 2022-04-15 | End: 2022-04-15

## 2022-04-15 NOTE — TELEPHONE ENCOUNTER
Attempted to refill aldactone via [employer]. Transmission failed. Faxed refill to pharmacy @ [phone]. Confirmation received.

## 2022-04-15 NOTE — TELEPHONE ENCOUNTER
Requested Prescriptions     Signed Prescriptions Disp Refills    spironolactone (ALDACTONE) 25 mg tablet 90 Tablet 1     Sig: Take 1 Tablet by mouth daily.      Authorizing Provider: Mariluz Macias     Ordering User: Jarocho Ansari     Verbal order per Dr. Christie Perez.    Future Appointments   Date Time Provider Maico Falk   4/26/2022 10:30 AM Beryle Poncho, MD [facility] BS AMB   7/18/2022 10:20 AM MD YANELIS Rocha BS AMB   9/13/2022  9:50 AM Hermelinda Melton MD [facility] BS AMB

## 2022-04-26 ENCOUNTER — OFFICE VISIT (OUTPATIENT)
Dept: PRIMARY CARE CLINIC | Age: 67
End: 2022-04-26
Payer: MEDICARE

## 2022-04-26 VITALS
DIASTOLIC BLOOD PRESSURE: 74 MMHG | WEIGHT: 169 LBS | HEART RATE: 61 BPM | OXYGEN SATURATION: 97 % | SYSTOLIC BLOOD PRESSURE: 118 MMHG | BODY MASS INDEX: 28.16 KG/M2 | TEMPERATURE: 97.8 F | RESPIRATION RATE: 15 BRPM | HEIGHT: 65 IN

## 2022-04-26 DIAGNOSIS — Z71.89 ACP (ADVANCE CARE PLANNING): ICD-10-CM

## 2022-04-26 DIAGNOSIS — C90.00 MULTIPLE MYELOMA NOT HAVING ACHIEVED REMISSION (HCC): ICD-10-CM

## 2022-04-26 DIAGNOSIS — Z00.00 MEDICARE ANNUAL WELLNESS VISIT, SUBSEQUENT: Primary | ICD-10-CM

## 2022-04-26 DIAGNOSIS — M85.89 OSTEOPENIA OF MULTIPLE SITES: ICD-10-CM

## 2022-04-26 DIAGNOSIS — E78.2 MIXED HYPERLIPIDEMIA: ICD-10-CM

## 2022-04-26 DIAGNOSIS — E11.9 WELL CONTROLLED DIABETES MELLITUS (HCC): ICD-10-CM

## 2022-04-26 LAB
ALBUMIN UR QL STRIP: 150 MG/L
CREATININE, URINE POC: 300 MG/DL
MICROALBUMIN/CREAT RATIO POC: NORMAL MG/G

## 2022-04-26 PROCEDURE — G9899 SCRN MAM PERF RSLTS DOC: HCPCS | Performed by: INTERNAL MEDICINE

## 2022-04-26 PROCEDURE — 99214 OFFICE O/P EST MOD 30 MIN: CPT | Performed by: INTERNAL MEDICINE

## 2022-04-26 PROCEDURE — G8427 DOCREV CUR MEDS BY ELIG CLIN: HCPCS | Performed by: INTERNAL MEDICINE

## 2022-04-26 PROCEDURE — 82044 UR ALBUMIN SEMIQUANTITATIVE: CPT | Performed by: INTERNAL MEDICINE

## 2022-04-26 PROCEDURE — 2022F DILAT RTA XM EVC RTNOPTHY: CPT | Performed by: INTERNAL MEDICINE

## 2022-04-26 PROCEDURE — G8510 SCR DEP NEG, NO PLAN REQD: HCPCS | Performed by: INTERNAL MEDICINE

## 2022-04-26 PROCEDURE — 3017F COLORECTAL CA SCREEN DOC REV: CPT | Performed by: INTERNAL MEDICINE

## 2022-04-26 PROCEDURE — 3044F HG A1C LEVEL LT 7.0%: CPT | Performed by: INTERNAL MEDICINE

## 2022-04-26 PROCEDURE — 1101F PT FALLS ASSESS-DOCD LE1/YR: CPT | Performed by: INTERNAL MEDICINE

## 2022-04-26 PROCEDURE — G8536 NO DOC ELDER MAL SCRN: HCPCS | Performed by: INTERNAL MEDICINE

## 2022-04-26 PROCEDURE — G8419 CALC BMI OUT NRM PARAM NOF/U: HCPCS | Performed by: INTERNAL MEDICINE

## 2022-04-26 PROCEDURE — G0439 PPPS, SUBSEQ VISIT: HCPCS | Performed by: INTERNAL MEDICINE

## 2022-04-26 PROCEDURE — 1090F PRES/ABSN URINE INCON ASSESS: CPT | Performed by: INTERNAL MEDICINE

## 2022-04-26 PROCEDURE — G8399 PT W/DXA RESULTS DOCUMENT: HCPCS | Performed by: INTERNAL MEDICINE

## 2022-04-26 NOTE — PROGRESS NOTES
Chief Complaint   Patient presents with    Annual Wellness Visit       Visit Vitals  /74 (BP 1 Location: Right arm)   Pulse 61   Temp 97.8 °F (36.6 °C)   Resp 15   Ht 5' 5\" (1.651 m)   Wt 169 lb (76.7 kg)   SpO2 97%   BMI 28.12 kg/m²       1. Have you been to the ER, urgent care clinic since your last visit? Hospitalized since your last visit? No    2. Have you seen or consulted any other health care providers outside of the [address] [place] [address] since your last visit? Include any pap smears or colon screening. Yes [facility]      3. For patients aged 39-70: Has the patient had a colonoscopy / FIT/ Cologuard? Yes - no Care Gap present      If the patient is female:    4. For patients aged 41-77: Has the patient had a mammogram within the past 2 years? Yes - no Care Gap present      5. For patients aged 21-65: Has the patient had a pap smear? Yes - no Care Gap present         Herlinda Tello is a 69 y.o. female and presents for Annual Medicare Wellness Visit. Assessment of cognitive impairment: Alert and oriented x 4. Depression Screen:   3 most recent PHQ Screens 4/26/2022   Little interest or pleasure in doing things Not at all   Feeling down, depressed, irritable, or hopeless Several days   Total Score PHQ 2 1   Trouble falling or staying asleep, or sleeping too much -   Feeling tired or having little energy -   Poor appetite, weight loss, or overeating -   Feeling bad about yourself - or that you are a failure or have let yourself or your family down -   Trouble concentrating on things such as school, work, reading, or watching TV -   Moving or speaking so slowly that other people could have noticed; or the opposite being so fidgety that others notice -   Thoughts of being better off dead, or hurting yourself in some way -   PHQ 9 Score -       Fall Risk Assessment:    Fall Risk Assessment, last 12 mths 4/26/2022   Able to walk? Yes   Fall in past 12 months? 1   Do you feel unsteady?  0 Are you worried about falling 0   Is TUG test greater than 12 seconds? 0   Is the gait abnormal? 0   Number of falls in past 12 months 2   Fall with injury? 0       Abuse Screen:   Abuse Screening Questionnaire 4/26/2022   Do you ever feel afraid of your partner? N   Are you in a relationship with someone who physically or mentally threatens you? N   Is it safe for you to go home? Y       Activities of Daily Living:  Self-care. Requires assistance with: no ADLs  Patient handle his/her own medications  yes Use of pill box  no  Activities of Daily Living:   ADL Assessment 4/26/2022   Feeding yourself No Help Needed   Getting from bed to chair No Help Needed   Getting dressed No Help Needed   Bathing or showering No Help Needed   Walk across the room (includes cane/walker) No Help Needed   Using the telphone No Help Needed   Taking your medications No Help Needed   Preparing meals No Help Needed   Managing money (expenses/bills) No Help Needed   Moderately strenuous housework (laundry) No Help Needed   Shopping for personal items (toiletries/medicines) No Help Needed   Shopping for groceries No Help Needed   Driving No Help Needed   Climbing a flight of stairs No Help Needed   Getting to places beyond walking distances No Help Needed       Health Maintenance:  Daily Aspirin: yes  Bone Density: 2020  Glaucoma Screening: yes  Immunizations:    Tetanus: up to date. Influenza: not up to date - missed this year . Shingles: up to date. PPSV-23: up to date. Prevnar-13: up to date. Covid19: not up to date - J&J and does not want to get booster. Cancer screening:    Cervical: up to date. Breast: up to date. Colon: up to date. Alcohol Risk Screen:   On any occasion during the past 3 months, have you had more than 3 drinks(female) or 4 drinks (male) containing alcohol in one? No  Do you average more than 7 drinks (female) or 14 drinks (male) per week?   No  Type and amount:none      Hearing Loss:  denies any hearing loss    Vision Loss:   Wears glasses, contact lenses, or have any other visual impairment  yes    Adult Nutrition Screen:  No risk factors noted. Advance Care Planning:   End of Life Planning: has NO advanced directive  - add't info requested. Referral to : yes  KimmyMukul Rickettsjonny Calvert ACP-Facilitator appointment yes      Medications/Allergies: Reviewed with patient  Prior to Admission medications    Medication Sig Start Date End Date Taking? Authorizing Provider   spironolactone (ALDACTONE) 25 mg tablet Take 1 Tablet by mouth daily. 4/15/22  Yes Dayanna Griffin MD   escitalopram oxalate (LEXAPRO) 10 mg tablet Take 1 Tablet by mouth daily for 90 days. 3/23/22 6/21/22 Yes Bayron Campa MD   metFORMIN ER (GLUCOPHAGE XR) 500 mg tablet TAKE 1 TABLET BY MOUTH 3 TIMES DAILY--Dose change 03/10/22--updated med list--did not send prescription to the pharmacy 3/10/22  Yes Kathia Knowles MD   meloxicam (MOBIC) 15 mg tablet TAKE 1 TABLET BY MOUTH  DAILY AS NEEDED FOR PAIN 11/5/21  Yes Bayron Campa MD   hydroCHLOROthiazide (HYDRODIURIL) 12.5 mg tablet TAKE 1 TABLET BY MOUTH  DAILY 11/3/21  Yes Bayron Campa MD   lancets (One Touch Delica) 33 gauge misc Use to check blood sugars twice daily. Dx. Code E11.65 11/2/21  Yes Ky Quick MD   OneTouch Ultra Blue Test Strip strip USE WITH METER TO TEST UP  TO 2 TIMES DAILY 7/5/21  Yes Ky Quick MD   rosuvastatin (CRESTOR) 5 mg tablet Take 1 Tab by mouth nightly. 4/26/21  Yes Luís Johnson MD   budesonide-formoteroL [facility]) 160-4.5 mcg/actuation HFAA Take 2 Puffs by inhalation daily. Yes Provider, Historical   montelukast (SINGULAIR) 10 mg tablet Take 10 mg by mouth daily. Yes Provider, Historical   aspirin delayed-release 81 mg tablet Take 81 mg by mouth daily.    Yes Provider, Historical   albuterol (PROVENTIL HFA, VENTOLIN HFA, PROAIR HFA) 90 mcg/actuation inhaler USE 1 INHALATION BY MOUTH  EVERY 6 HOURS AS NEEDED FOR WHEEZING  Patient taking differently: every four (4) hours as needed. USE 1 INHALATION BY MOUTH  EVERY 6 HOURS AS NEEDED FOR WHEEZING 11/1/20  Yes Gary Trivedi MD   Lancing Device with Lancets (OneTouch Delica Plus Lanc Dev) kit Test 2 times daily. Dx code E11.65 6/16/20  Yes Gaurav Ivory MD   ondansetron (ZOFRAN ODT) 4 mg disintegrating tablet Take 1 Tab by mouth every eight (8) hours as needed for Nausea. 7/15/19  Yes Gary Trivedi MD   ferrous sulfate 325 mg (65 mg iron) tablet TAKE 1 TABLET BY MOUTH DAILY BEFORE BREAKFAST 2/1/19  Yes Eugenie Bill NP   multivitamin (ONE A DAY) tablet Take 1 Tab by mouth daily. Yes Provider, Historical   calcium-cholecalciferol, d3, (CALCIUM 600 + D) 600-125 mg-unit tab Take  by mouth. Yes Provider, Historical   cholecalciferol, vitamin D3, (VITAMIN D3) 2,000 unit tab Take 1 Tablet by mouth daily. In the winter   Yes Provider, Historical   cyanocobalamin (Vitamin B-12) 1,000 mcg tablet Take 1,000 mcg by mouth every seven (7) days. Yes Provider, Historical   clonazePAM (KLONOPIN) 0.5 mg tablet Take 0.5 mg by mouth three (3) times daily. Yes Provider, Historical   zolpidem (AMBIEN) 10 mg tablet Take  by mouth nightly as needed for Sleep. Yes Provider, Historical   OneTouch Delica Plus Lancet 33 gauge misc TEST TWICE DAILY 7/13/21   Gaurav Ivory MD       Allergies   Allergen Reactions    Pcn [Penicillins] Itching    Boniva [Ibandronate] Other (comments)     Severe bone pain    Percocet [Oxycodone-Acetaminophen] Other (comments)     Passe out, feels dizzy       Objective:  Visit Vitals  /74 (BP 1 Location: Right arm)   Pulse 61   Temp 97.8 °F (36.6 °C)   Resp 15   Ht 5' 5\" (1.651 m)   Wt 169 lb (76.7 kg)   SpO2 97%   BMI 28.12 kg/m²    Body mass index is 28.12 kg/m². Problem List: Reviewed with patient and discussed risk factors.     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    Pancreatic cyst K86.2    Serous cystadenoma of pancreas D13.6    Type 2 diabetes mellitus E11.9       PSH: Reviewed with patient  Past Surgical History:   Procedure Laterality Date    COLONOSCOPY N/A 9/22/2020    COLONOSCOPY     :- performed by Toño Hobson MD at [place] [address] N/A 9/15/2020    SIGMOIDOSCOPY FLEXIBLE performed by Toño Hobson MD at [facility] ENDOSCOPY    HX CATARACT REMOVAL      HX [address]      left foot        SH: Reviewed with patient  Social History     Tobacco Use    Smoking status: Never Smoker    Smokeless tobacco: Never Used   Vaping Use    Vaping Use: Never used   Substance Use Topics    Alcohol use: Yes     Comment: glass of wine once every 2 months    Drug use: No       FH: Reviewed with patient  Family History   Problem Relation Age of Onset   Yaneth Orts Arthritis-rheumatoid Mother     Other Mother         colon polyps    Heart Disease Father     Breast Cancer Sister 48    Other Son         psoriatic arthritis       Current medical providers:    Patient Care Team:  Gary Trivedi MD as PCP - General (Internal Medicine)  Gary Trivedi MD as PCP - [address] [place] [address] Provider  Janak Raya, [address] [place] [address] as Staff Nurse  Vivian Lion MD (Otolaryngology)  Ravin Weeks MD (Hematology and Oncology)  Jennifer Zendejas MD (General Surgery)  Mirtha Lora MD as Consulting Provider (Endocrinology)  Roverto Ruiz MD as Consulting Provider (Cardiology)    Plan:    Diagnoses and all orders for this visit:    Medicare annual wellness visit, subsequent  . Age appropriate Health screening and immunization discussed with patient.    ACP (advance care planning)  -     REFERRAL TO [address] [place] [address] Maintenance   Topic Date Due    COVID-19 Vaccine (2 - Jose risk 3-dose series) 04/30/2021    Foot Exam Q1  09/10/2021    MICROALBUMIN Q1  09/10/2021    Medicare Yearly Exam  09/11/2021    Breast Cancer Screen Mammogram  05/18/2022    Flu Vaccine (Season Ended) 09/01/2022    A1C test (Diabetic or Prediabetic)  09/01/2022    Lipid Screen  03/01/2023    Depression Monitoring  04/12/2023    Eye Exam Retinal or Dilated  12/23/2023    Colorectal Cancer Screening Combo  09/22/2025    DTaP/Tdap/Td series (3 - Td or Tdap) 03/17/2028    Bone Densitometry (Dexa) Screening  Completed    Shingrix Vaccine Age 50>  Completed    Pneumococcal 65+ years  Completed    Hepatitis C Screening  Addressed          Urinary/ Fecal Incontinence: No    Regular physical exercise: Walking daily. Patient verbalized understanding of information presented. AVS and Medicare Part B Preventive Services Table printed and given to pt and reviewed. See table for findings under Recommendation and Scheduled. All of the patient's questions were answered.
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
## 2022-04-27 ENCOUNTER — PATIENT OUTREACH (OUTPATIENT)
Dept: CASE MANAGEMENT | Age: 67
End: 2022-04-27

## 2022-04-27 NOTE — ACP (ADVANCE CARE PLANNING)
Advance Care Planning   Ambulatory ACP Specialist Patient Outreach    Date:  4/27/2022    ACP Specialist:  Rock Dumont LPN    Outreach call to patient in follow-up to ACP Specialist referral from:    [x] PCP  [] Provider   [] Ambulatory Care Management [] Other     For:                  [x] Advance Directive Assistance              [] Complete Portable DNR order              [] Complete POST/MOST              [] Code Status Discussion             [] Discuss Goals of Care             [] Early ACP Decision-Making              [] Other (Specify)    Date Referral Received: 4/27/22    Today's Outreach:  [x] First   [] Second  [] Third       Third outreach made by: [] Phone  [] Email / mail    [] [email or web address]     Intervention:  [] Spoke with Patient   [x] Left VM requesting return call      Outcome: The first attempt was made to contact pt regarding the ACP referral. No answer on mobile phone. VM left requesting a return call. Will attempt second outreach within one week. Next Step:   [] ACP scheduled conversation  [x] Outreach again in one week               [] Email / Mail ACP Info Sheets  [] Email / Mail Advance Directive   [] Closing referral.  Routing closure to referring provider/staff and to ACP Specialist . [] Closure letter mailed to patient with invitation to contact ACP Specialist if / when ready.   Thank you for this referral.

## 2022-05-11 ENCOUNTER — PATIENT OUTREACH (OUTPATIENT)
Dept: CASE MANAGEMENT | Age: 67
End: 2022-05-11

## 2022-05-11 NOTE — ACP (ADVANCE CARE PLANNING)
Advance Care Planning   Ambulatory ACP Specialist Patient Outreach    Date:  5/11/2022    ACP Specialist:  Elma Rendon LPN    Outreach call to patient in follow-up to ACP Specialist referral from:    [x] PCP  [] Provider   [] Ambulatory Care Management [] Other     For:                  [x] Advance Directive Assistance              [] Complete Portable DNR order              [] Complete POST/MOST              [] Code Status Discussion             [] Discuss Goals of Care             [] Early ACP Decision-Making              [] Other (Specify)    Date Referral Received: 4/27/22    Today's Outreach:  [] First   [x] Second  [] Third       Third outreach made by: [] Phone  [] Email / mail    [] [employer]     Intervention:  [] Spoke with Patient   [x] Left VM requesting return call      Outcome: The second attempt was made to contact pt regarding ACP referral. No answer on mobile phone. VM left requesting a return call. Will outreach again within one week. Next Step:   [] ACP scheduled conversation  [x] Outreach again in one week               [] Email / Mail ACP Info Sheets  [] Email / Mail Advance Directive   [] Closing referral.  Routing closure to referring provider/staff and to ACP Specialist . [] Closure letter mailed to patient with invitation to contact ACP Specialist if / when ready.   Thank you for this referral.

## 2022-05-16 ENCOUNTER — PATIENT OUTREACH (OUTPATIENT)
Dept: CASE MANAGEMENT | Age: 67
End: 2022-05-16

## 2022-05-23 ENCOUNTER — TELEPHONE (OUTPATIENT)
Dept: PRIMARY CARE CLINIC | Age: 67
End: 2022-05-23

## 2022-05-23 NOTE — TELEPHONE ENCOUNTER
Called and spoke with the patient, patient reports to have a cough nonproductive, sore throat, she is tired, and does have a headache, patient traveled to [address] [place] [address]. Returned last week, reports that she has taken an at home test and it was negative. She reports that she was also having so SOB and had to use inhaler several times. I advise the patient to go to Urgent care to possibly to a PCR, also advised to use warm salt water gargle for the sore throat- no fever at this time.  Told her to continue to monitor

## 2022-05-23 NOTE — TELEPHONE ENCOUNTER
Patient requested they speak to a nurse regarding a cough and a potential COVID exposure. Staff was unable to assist at the time, so patient was told we'd return their call when we could. Patient voiced understanding.     CB: [phone]

## 2022-05-27 ENCOUNTER — TELEPHONE (OUTPATIENT)
Dept: PRIMARY CARE CLINIC | Age: 67
End: 2022-05-27

## 2022-05-27 ENCOUNTER — VIRTUAL VISIT (OUTPATIENT)
Dept: PRIMARY CARE CLINIC | Age: 67
End: 2022-05-27
Payer: MEDICARE

## 2022-05-27 DIAGNOSIS — R05.8 PRODUCTIVE COUGH: ICD-10-CM

## 2022-05-27 DIAGNOSIS — J45.20 MILD INTERMITTENT ASTHMA WITHOUT COMPLICATION: ICD-10-CM

## 2022-05-27 DIAGNOSIS — J06.9 VIRAL UPPER RESPIRATORY TRACT INFECTION: Primary | ICD-10-CM

## 2022-05-27 DIAGNOSIS — C90.00 MULTIPLE MYELOMA NOT HAVING ACHIEVED REMISSION (HCC): ICD-10-CM

## 2022-05-27 DIAGNOSIS — E11.9 WELL CONTROLLED TYPE 2 DIABETES MELLITUS (HCC): ICD-10-CM

## 2022-05-27 PROCEDURE — 1123F ACP DISCUSS/DSCN MKR DOCD: CPT | Performed by: NURSE PRACTITIONER

## 2022-05-27 PROCEDURE — 3044F HG A1C LEVEL LT 7.0%: CPT | Performed by: NURSE PRACTITIONER

## 2022-05-27 PROCEDURE — 99214 OFFICE O/P EST MOD 30 MIN: CPT | Performed by: NURSE PRACTITIONER

## 2022-05-27 RX ORDER — BENZONATATE 100 MG/1
100 CAPSULE ORAL
Qty: 30 CAPSULE | Refills: 0 | Status: SHIPPED | OUTPATIENT
Start: 2022-05-27 | End: 2022-06-06

## 2022-05-27 RX ORDER — METHYLPREDNISOLONE 4 MG/1
TABLET ORAL
Qty: 1 DOSE PACK | Refills: 0 | Status: SHIPPED | OUTPATIENT
Start: 2022-05-27 | End: 2022-06-02

## 2022-05-27 RX ORDER — AZITHROMYCIN 250 MG/1
TABLET, FILM COATED ORAL
Qty: 6 TABLET | Refills: 0 | Status: SHIPPED | OUTPATIENT
Start: 2022-05-27 | End: 2022-06-01

## 2022-05-27 NOTE — TELEPHONE ENCOUNTER
Patient seen NP Peterson this morning medication wasn't sent to pharmacy. Pharmacy is asking for them to be resent to the pharmacy.

## 2022-05-27 NOTE — PROGRESS NOTES
Chief Complaint   Patient presents with    Cold Symptoms     5/24/22- pt states she took two at home COVID test both neg- no fever- no body aches- pt states that she has a cough and sneezing- thick clear and brown mucus-     Nasal Congestion    Sore Throat       Health Maintenance Due   Topic    Breast Cancer Screen Mammogram         1. Have you been to the ER, urgent care clinic since your last visit? Hospitalized since your last visit? No    2. Have you seen or consulted any other health care providers outside of the [address] [place] [address] since your last visit? Include any pap smears or colon screening. No    There were no vitals taken for this visit.
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
## 2022-06-10 ENCOUNTER — HOSPITAL ENCOUNTER (OUTPATIENT)
Dept: GENERAL RADIOLOGY | Age: 67
Discharge: HOME | End: 2022-06-10
Attending: INTERNAL MEDICINE
Payer: MEDICARE

## 2022-06-10 ENCOUNTER — PATIENT MESSAGE (OUTPATIENT)
Dept: PRIMARY CARE CLINIC | Age: 67
End: 2022-06-10

## 2022-06-10 DIAGNOSIS — R73.02 IMPAIRED GLUCOSE TOLERANCE: ICD-10-CM

## 2022-06-10 DIAGNOSIS — R05.3 COUGH, PERSISTENT: Primary | ICD-10-CM

## 2022-06-10 DIAGNOSIS — E04.2 MULTINODULAR GOITER: ICD-10-CM

## 2022-06-10 DIAGNOSIS — E55.9 VITAMIN D DEFICIENCY: ICD-10-CM

## 2022-06-10 DIAGNOSIS — R05.3 COUGH, PERSISTENT: ICD-10-CM

## 2022-06-10 DIAGNOSIS — E78.5 HYPERLIPIDEMIA LDL GOAL <100: ICD-10-CM

## 2022-06-10 PROCEDURE — 71046 X-RAY EXAM CHEST 2 VIEWS: CPT

## 2022-06-10 RX ORDER — LANCETS 33 GAUGE
EACH MISCELLANEOUS
Qty: 200 LANCET | Refills: 3 | Status: SHIPPED | OUTPATIENT
Start: 2022-06-10 | End: 2022-09-13 | Stop reason: ALTCHOICE

## 2022-06-12 NOTE — PROGRESS NOTES
Results reviewed. Release via [employer], your CXR did not show pneumonia so it`s a good news.  Have you seen a Gastroenterologist recently regarding your pancreatic cyst.

## 2022-06-14 NOTE — TELEPHONE ENCOUNTER
[facility] CLINICAL PHARMACY: ADHERENCE REVIEW  Identified care gap per [employer] fills at [facility]: Statin adherence    Last Visit: 04/26/2022    NO LIS    Patient also appears to be prescribed:   Metformin 500mg ER    Patient not found in Outcomes MTM    [place] [address]    Per [employer]:   Metformin 500mg ER sent over 7/23/2021 never picked     Lab Results   Component Value Date/Time    Hemoglobin A1c 6.5 (H) 03/01/2022 02:37 PM    Hemoglobin A1c 6.3 (H) 07/21/2021 10:31 AM    Hemoglobin A1c 6.0 (H) 04/07/2021 01:09 PM    Hemoglobin A1c (POC) 5.5 12/04/2019 11:40 AM    Hemoglobin A1c (POC) 5.5 01/12/2018 03:10 PM     NOTE A1c <9%    [address] [address] Records claims through 05/15/2022 (2021 [place] = n/a%; YTD [facility] = Filled only once; Potential Fail Date: 06/21/2022):   Rosuvastatin 5mg last filled on 01/13/2022 for 90 day supply. Next refill due: 04/13/2022    Per [facility] Portal:   Rosuvastatin 5mg last filled on 01/13/2022 for 90 day supply. Per [facility]:   Rosuvastatin 5mg last picked up on 01/18/2022 for 90 day supply. 0 refills remaining.  Billed through [facility] Value Date/Time    Cholesterol, total 210 (H) 03/01/2022 02:37 PM    HDL Cholesterol 67 03/01/2022 02:37 PM    LDL, calculated 115 (H) 03/01/2022 02:37 PM    LDL, calculated 108.4 (H) 04/07/2021 01:09 PM    VLDL, calculated 28 03/01/2022 02:37 PM    VLDL, calculated 13.6 04/07/2021 01:09 PM    Triglyceride 162 (H) 03/01/2022 02:37 PM    CHOL/HDL Ratio 2.4 04/07/2021 01:09 PM     ALT (SGPT)   Date Value Ref Range Status   03/01/2022 21 0 - 32 IU/L Final     AST (SGOT)   Date Value Ref Range Status   03/01/2022 19 0 - 40 IU/L Final     The 10-year ASCVD risk score (Adan Schmidt, et al., 2013) is: 17.5%    Values used to calculate the score:      Age: 69 years      Sex: Female      Is Non- : No      Diabetic: Yes      Tobacco smoker: No      Systolic Blood Pressure: 118 mmHg      Is BP treated: Yes      HDL Cholesterol: 67 mg/dL      Total Cholesterol: 210 mg/dL     PLAN  The following are interventions that have been identified:  - Patient overdue refilling Metformin and rosuvatatin  and active on home medication list  - Patient needs refills for Metformin and rosuvastatin    Attempting to reach patient to review.  Left message asking for return call. Called to inquire if patient had supply on hand. Per pharmacy she is out of refills. Script for Metformin has never been filled at pharmacy and is set to  next month. Left message with family member for her to call back. Please reach out to MD to determine if patient is still to be on medications, if so have new scripts written for both medications as more than likely she is out of doses.         Future Appointments   Date Time Provider Maico Falk   2022 10:20 AM MD YANELIS Rodriguez AMB   2022  9:50 AM Baltazar Cox MD [address] AMB       [address]  Direct: [phone]  Department, toll free:[phone], Option 2

## 2022-06-17 ENCOUNTER — TELEPHONE (OUTPATIENT)
Dept: PRIMARY CARE CLINIC | Age: 67
End: 2022-06-17

## 2022-06-17 DIAGNOSIS — Z12.31 ENCOUNTER FOR SCREENING MAMMOGRAM FOR MALIGNANT NEOPLASM OF BREAST: Primary | ICD-10-CM

## 2022-06-17 RX ORDER — METFORMIN HYDROCHLORIDE 500 MG/1
TABLET, EXTENDED RELEASE ORAL
Qty: 270 TABLET | Refills: 3 | Status: SHIPPED | OUTPATIENT
Start: 2022-06-17

## 2022-06-17 RX ORDER — ROSUVASTATIN CALCIUM 5 MG/1
5 TABLET, COATED ORAL
Qty: 90 TABLET | Refills: 3 | Status: SHIPPED | OUTPATIENT
Start: 2022-06-17

## 2022-06-17 NOTE — TELEPHONE ENCOUNTER
----- Message from Brenda Conley sent at 6/17/2022  3:47 PM EDT -----  Subject: Referral Request    QUESTIONS   Reason for referral request? Needs Mammogram   Has the physician seen you for this condition before? No   Preferred Specialist (if applicable)? Do you already have an appointment scheduled? No  Additional Information for Provider? Patient states that she gets a 3d   mammogram or a diagnostic mammogram and is not sure which one. Please call   to discuss and let patient know when she can come in to complete.   ---------------------------------------------------------------------------  --------------  CALL BACK INFO  What is the best way for the office to contact you? OK to leave message on   voicemail  Preferred Call Back Phone Number? [phone]  ---------------------------------------------------------------------------  --------------  SCRIPT ANSWERS  Relationship to Patient?  Self

## 2022-06-17 NOTE — TELEPHONE ENCOUNTER
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 Her prescriptions for Rosuvastatin and metformin has . I have pended a refill for you for her mail order [facility]. Please approve. Thank you! Misty Friend, PharmD, [address] [facility]   Department, toll free: [phone] Option 2    Requested Prescriptions     Pending Prescriptions Disp Refills    rosuvastatin (CRESTOR) 5 mg tablet 90 Tablet 3     Sig: Take 1 Tablet by mouth nightly.  metFORMIN ER (GLUCOPHAGE XR) 500 mg tablet 270 Tablet 3     Sig: TAKE 1 TABLET BY MOUTH 3 TIMES DAILY--Dose change 03/10/22--updated med list--did not send prescription to the pharmacy         On her last OV 2022  Metformin was listed as \"taking\"  According to [facility]- the prescription has never been filled there. Per [facility]  They did not receive a new rx on 3/10/2022- Not sure why this was not sent? We have tried to reach the patient and are unable to verify if she is to still be taking this medication (Metformin) Last A1c 6.5 3/1/2022  ================================================================================  For Pharmacy [id number] [place] [address] Road in place: No   Recommendation Provided To: Provider: 2 via Note to Provider  and Pharmacy: 2   Intervention Detail: Adherence Monitorin and New Rx: 2, reason: Improve Adherence   Gap Closed?: Yes   Intervention Accepted By: Provider: 2 and Pharmacy: 2   Time Spent (min): 15    Transmission failed will call into the pharmacy -[facility]  Requested Prescriptions     Signed Prescriptions Disp Refills    rosuvastatin (CRESTOR) 5 mg tablet 90 Tablet 3     Sig: Take 1 Tablet by mouth nightly.      Authorizing Provider: Alonzo Felix    metFORMIN ER (GLUCOPHAGE XR) 500 mg tablet 270 Tablet 3     Sig: TAKE 1 TABLET BY MOUTH 3 TIMES DAILY--Dose change 03/10/22--updated med list--did not send prescription to the pharmacy     Authorizing Provider: Arturo Melchor     [facility] at [facility] x2 repeat Phylicia VELEZ

## 2022-06-21 RX ORDER — MONTELUKAST SODIUM 10 MG/1
10 TABLET ORAL DAILY
Qty: 30 TABLET | Refills: 0 | Status: SHIPPED | OUTPATIENT
Start: 2022-06-21 | End: 2022-09-13

## 2022-06-21 NOTE — TELEPHONE ENCOUNTER
Requested Prescriptions     Pending Prescriptions Disp Refills    montelukast (SINGULAIR) 10 mg tablet 30 Tablet 0     Sig: Take 1 Tablet by mouth daily.         Last Visit 5/27/22 VV  Last Refill historical

## 2022-06-28 ENCOUNTER — HOSPITAL ENCOUNTER (OUTPATIENT)
Dept: MAMMOGRAPHY | Age: 67
Discharge: HOME | End: 2022-06-28
Attending: INTERNAL MEDICINE

## 2022-06-28 DIAGNOSIS — Z12.31 ENCOUNTER FOR SCREENING MAMMOGRAM FOR MALIGNANT NEOPLASM OF BREAST: ICD-10-CM

## 2022-06-29 DIAGNOSIS — Z12.31 ENCOUNTER FOR SCREENING MAMMOGRAM FOR MALIGNANT NEOPLASM OF BREAST: Primary | ICD-10-CM

## 2022-07-14 ENCOUNTER — TELEPHONE (OUTPATIENT)
Dept: PRIMARY CARE CLINIC | Age: 67
End: 2022-07-14

## 2022-07-14 NOTE — TELEPHONE ENCOUNTER
Called and spoke with the patient, patient received her J&J. But due to patient having health issues, she is concerned over receiving the first booster. She is wanting to check with Dr Chase Mariscal to make sure that she approves of her doing this/ that it is safe for her.  I told her I would be more than happy to send a message to Dr Chase Mariscal- however she is out of the office on Thursday's

## 2022-08-29 DIAGNOSIS — E55.9 VITAMIN D DEFICIENCY: ICD-10-CM

## 2022-08-29 DIAGNOSIS — E04.2 MULTINODULAR GOITER: ICD-10-CM

## 2022-08-29 DIAGNOSIS — E78.5 HYPERLIPIDEMIA LDL GOAL <100: ICD-10-CM

## 2022-08-29 DIAGNOSIS — R73.02 IMPAIRED GLUCOSE TOLERANCE: ICD-10-CM

## 2022-08-30 LAB
25(OH)D3+25(OH)D2 SERPL-MCNC: 53.4 NG/ML (ref 30–100)
ALBUMIN SERPL-MCNC: 4.9 G/DL (ref 3.8–4.8)
ALBUMIN/GLOB SERPL: 3.3 {RATIO} (ref 1.2–2.2)
ALP SERPL-CCNC: 73 IU/L (ref 44–121)
ALT SERPL-CCNC: 20 IU/L (ref 0–32)
AST SERPL-CCNC: 21 IU/L (ref 0–40)
BILIRUB SERPL-MCNC: 0.2 MG/DL (ref 0–1.2)
BUN SERPL-MCNC: 12 MG/DL (ref 8–27)
BUN/CREAT SERPL: 18 (ref 12–28)
CALCIUM SERPL-MCNC: 9.2 MG/DL (ref 8.7–10.3)
CHLORIDE SERPL-SCNC: 103 MMOL/L (ref 96–106)
CHOLEST SERPL-MCNC: 204 MG/DL (ref 100–199)
CO2 SERPL-SCNC: 24 MMOL/L (ref 20–29)
CREAT SERPL-MCNC: 0.65 MG/DL (ref 0.57–1)
EGFR: 95 ML/MIN/1.73
EST. AVERAGE GLUCOSE BLD GHB EST-MCNC: 128 MG/DL
GLOBULIN SER CALC-MCNC: 1.5 G/DL (ref 1.5–4.5)
GLUCOSE SERPL-MCNC: 90 MG/DL (ref 65–99)
HBA1C MFR BLD: 6.1 % (ref 4.8–5.6)
HDLC SERPL-MCNC: 71 MG/DL
IMP & REVIEW OF LAB RESULTS: NORMAL
LDLC SERPL CALC-MCNC: 121 MG/DL (ref 0–99)
POTASSIUM SERPL-SCNC: 4.3 MMOL/L (ref 3.5–5.2)
PROT SERPL-MCNC: 6.4 G/DL (ref 6–8.5)
SODIUM SERPL-SCNC: 140 MMOL/L (ref 134–144)
TRIGL SERPL-MCNC: 69 MG/DL (ref 0–149)
VLDLC SERPL CALC-MCNC: 12 MG/DL (ref 5–40)

## 2022-09-13 ENCOUNTER — TELEPHONE (OUTPATIENT)
Dept: ENDOCRINOLOGY | Age: 67
End: 2022-09-13

## 2022-09-13 ENCOUNTER — VIRTUAL VISIT (OUTPATIENT)
Dept: ENDOCRINOLOGY | Age: 67
End: 2022-09-13
Payer: MEDICARE

## 2022-09-13 DIAGNOSIS — R73.02 IMPAIRED GLUCOSE TOLERANCE: Primary | ICD-10-CM

## 2022-09-13 DIAGNOSIS — E55.9 VITAMIN D DEFICIENCY: ICD-10-CM

## 2022-09-13 DIAGNOSIS — E04.2 MULTINODULAR GOITER: ICD-10-CM

## 2022-09-13 DIAGNOSIS — E78.5 HYPERLIPIDEMIA LDL GOAL <100: ICD-10-CM

## 2022-09-13 PROCEDURE — 3017F COLORECTAL CA SCREEN DOC REV: CPT | Performed by: INTERNAL MEDICINE

## 2022-09-13 PROCEDURE — G8427 DOCREV CUR MEDS BY ELIG CLIN: HCPCS | Performed by: INTERNAL MEDICINE

## 2022-09-13 PROCEDURE — G8432 DEP SCR NOT DOC, RNG: HCPCS | Performed by: INTERNAL MEDICINE

## 2022-09-13 PROCEDURE — 99214 OFFICE O/P EST MOD 30 MIN: CPT | Performed by: INTERNAL MEDICINE

## 2022-09-13 PROCEDURE — G9899 SCRN MAM PERF RSLTS DOC: HCPCS | Performed by: INTERNAL MEDICINE

## 2022-09-13 PROCEDURE — 1123F ACP DISCUSS/DSCN MKR DOCD: CPT | Performed by: INTERNAL MEDICINE

## 2022-09-13 PROCEDURE — 1101F PT FALLS ASSESS-DOCD LE1/YR: CPT | Performed by: INTERNAL MEDICINE

## 2022-09-13 PROCEDURE — G8399 PT W/DXA RESULTS DOCUMENT: HCPCS | Performed by: INTERNAL MEDICINE

## 2022-09-13 PROCEDURE — 1090F PRES/ABSN URINE INCON ASSESS: CPT | Performed by: INTERNAL MEDICINE

## 2022-09-13 RX ORDER — BLOOD SUGAR DIAGNOSTIC
STRIP MISCELLANEOUS
Qty: 100 STRIP | Refills: 3 | Status: SHIPPED | OUTPATIENT
Start: 2022-09-13 | End: 2022-09-13 | Stop reason: SDUPTHER

## 2022-09-13 RX ORDER — LANCETS 33 GAUGE
EACH MISCELLANEOUS
Qty: 100 LANCET | Refills: 3 | Status: SHIPPED | OUTPATIENT
Start: 2022-09-13

## 2022-09-13 RX ORDER — BLOOD-GLUCOSE METER
EACH MISCELLANEOUS
Qty: 1 EACH | Refills: 0 | Status: SHIPPED | OUTPATIENT
Start: 2022-09-13

## 2022-09-13 RX ORDER — BLOOD SUGAR DIAGNOSTIC
STRIP MISCELLANEOUS
Qty: 100 STRIP | Refills: 3 | Status: SHIPPED | OUTPATIENT
Start: 2022-09-13

## 2022-09-13 RX ORDER — LANCETS 33 GAUGE
EACH MISCELLANEOUS
Qty: 100 LANCET | Refills: 3 | Status: SHIPPED | OUTPATIENT
Start: 2022-09-13 | End: 2022-09-13 | Stop reason: SDUPTHER

## 2022-09-13 RX ORDER — BLOOD-GLUCOSE METER
EACH MISCELLANEOUS
Qty: 1 EACH | Refills: 0 | Status: SHIPPED | OUTPATIENT
Start: 2022-09-13 | End: 2022-09-13 | Stop reason: SDUPTHER

## 2022-09-13 NOTE — PROGRESS NOTES
Chief Complaint   Patient presents with    Diabetes     [phone]    Other     PCP and pharmacy confirmed        **THIS IS A VIRTUAL VISIT VIA A VIDEO SYNCHRONOUS DISCUSSION. PATIENT AGREED TO HAVE THEIR CARE DELIVERED OVER A DOXY. ME VIDEO VISIT IN PLACE OF THEIR REGULARLY SCHEDULED OFFICE VISIT**    History of Present Illness: Nimisha Goodrich is a 69 y.o. female here for follow up of diabetes. Has been tolerating 1 tab tid of metformin. Walks for 45 min 4-5 times a week and eating more veggies and watching her portions of carbs more closely. Has been drinking a lot of water. Her weight was 176 lbs in 7/21 at her last in person visit and states she is 162 lbs currently. Compliant with BP regimen. Admits to only taking her crestor about once a week and I told her she needs to get this everyday. Current Outpatient Medications   Medication Sig    rosuvastatin (CRESTOR) 5 mg tablet Take 1 Tablet by mouth nightly. metFORMIN ER (GLUCOPHAGE XR) 500 mg tablet TAKE 1 TABLET BY MOUTH 3 TIMES DAILY--Dose change 03/10/22--updated med list--did not send prescription to the pharmacy    glucose blood VI test strips (OneTouch Ultra Blue Test Strip) strip Test BID Dx Code: E11.65    lancets (One Touch Delica) 33 gauge misc Use to check blood sugars twice daily. Dx. Code E11.65    spironolactone (ALDACTONE) 25 mg tablet Take 1 Tablet by mouth daily. hydroCHLOROthiazide (HYDRODIURIL) 12.5 mg tablet TAKE 1 TABLET BY MOUTH  DAILY    OneTouch Delica Plus Lancet 33 gauge misc TEST TWICE DAILY    budesonide-formoteroL (SYMBICORT) 160-4.5 mcg/actuation HFAA Take 2 Puffs by inhalation daily. aspirin delayed-release 81 mg tablet Take 81 mg by mouth daily. albuterol (PROVENTIL HFA, VENTOLIN HFA, PROAIR HFA) 90 mcg/actuation inhaler USE 1 INHALATION BY MOUTH  EVERY 6 HOURS AS NEEDED FOR WHEEZING (Patient taking differently: every four (4) hours as needed.  USE 1 INHALATION BY MOUTH  EVERY 6 HOURS AS NEEDED FOR WHEEZING) Lancing Device with Lancets (OneTouch Delica Plus Lanc Dev) kit Test 2 times daily. Dx code E11.65    ondansetron (ZOFRAN ODT) 4 mg disintegrating tablet Take 1 Tab by mouth every eight (8) hours as needed for Nausea. ferrous sulfate 325 mg (65 mg iron) tablet TAKE 1 TABLET BY MOUTH DAILY BEFORE BREAKFAST    multivitamin (ONE A DAY) tablet Take 1 Tab by mouth daily. calcium-cholecalciferol, d3, (CALCIUM 600 + D) 600-125 mg-unit tab Take  by mouth. cholecalciferol, vitamin D3, 50 mcg (2,000 unit) tab Take 1 Tablet by mouth daily. In the winter    cyanocobalamin (Vitamin B-12) 1,000 mcg tablet Take 1,000 mcg by mouth every seven (7) days. clonazePAM (KLONOPIN) 0.5 mg tablet Take 0.5 mg by mouth three (3) times daily. zolpidem (AMBIEN) 10 mg tablet Take  by mouth nightly as needed for Sleep. No current facility-administered medications for this visit.      Allergies   Allergen Reactions    Pcn [Penicillins] Itching    Boniva [Ibandronate] Other (comments)     Severe bone pain    Percocet [Oxycodone-Acetaminophen] Other (comments)     Passe out, feels dizzy     Review of Systems: PER HPI    Physical Examination:  - GENERAL: NCAT, Appears well nourished   - EYES: EOMI, non-icteric, no proptosis   - Ear/Nose/Throat: NCAT, no visible inflammation or masses   - CARDIOVASCULAR: no cyanosis, no visible JVD   - RESPIRATORY: respiratory effort normal without any distress or labored breathing   - MUSCULOSKELETAL: Normal ROM of neck and upper extremities observed   - SKIN: No rash on face  - NEUROLOGIC:  No facial asymmetry (Cranial nerve 7 motor function), No gaze palsy   - PSYCHIATRIC: Normal affect, Normal insight and judgement       Data Reviewed:   Component      Latest Ref Rng & Units 8/29/2022 8/29/2022 8/29/2022 8/29/2022          10:52 AM 10:52 AM 10:52 AM 10:52 AM   Glucose      65 - 99 mg/dL  90     BUN      8 - 27 mg/dL  12     Creatinine      0.57 - 1.00 mg/dL  0.65     eGFR      >59 mL/min/1.73  95 BUN/Creatinine ratio      12 - 28  18     Sodium      134 - 144 mmol/L  140     Potassium      3.5 - 5.2 mmol/L  4.3     Chloride      96 - 106 mmol/L  103     CO2      20 - 29 mmol/L  24     Calcium      8.7 - 10.3 mg/dL  9.2     Protein, total      6.0 - 8.5 g/dL  6.4     Albumin      3.8 - 4.8 g/dL  4.9 (H)     GLOBULIN, TOTAL      1.5 - 4.5 g/dL  1.5     A-G Ratio      1.2 - 2.2  3.3 (H)     Bilirubin, total      0.0 - 1.2 mg/dL  0.2     Alk. phosphatase      44 - 121 IU/L  73     AST      0 - 40 IU/L  21     ALT      0 - 32 IU/L  20     Cholesterol, total      100 - 199 mg/dL   204 (H)    Triglyceride      0 - 149 mg/dL   69    HDL Cholesterol      >39 mg/dL   71    VLDL, calculated      5 - 40 mg/dL   12    LDL, calculated      0 - 99 mg/dL   121 (H)    Hemoglobin A1c, (calculated)      4.8 - 5.6 % 6.1 (H)      Estimated average glucose      mg/dL 128      VITAMIN D, 25-HYDROXY      30.0 - 100.0 ng/mL    53.4       Assessment/Plan:     1. Impaired glucose tolerance: her most recent Hgb A1c was 6.1% in 8/22 down from 6.5% in 3/22 up from 6.3% in 7/21 up from 6% in 4/21 and all of her values since 2016 have been between 5.5-6.3%. Her A1c has come back down with increasing metformin and weight loss. - check bs once daily at alternating times (under DX E11.9)  - cont metformin  mg tid  - check A1c and cmp and microalbumin prior to next visit          2. Multinodular goiter: She is followed by Dr. Paul Zhong for some nodules and cysts and her TSH values have been normal in our system but have trended up from 1.2 in 7/10 to 3.5 in 6/16 and was 2.55 in 6/20 and 1.37 in 7/21 and TPO ab <8 and TG ab < 1.0 and TSH 1.24 in 3/22.  - check TSH prior to next visit       3. Vitamin D deficiency:  Did have a low vitamin D of 19 in 7/10 and was 35 in 6/16 and hdsn't been checked since and was 39 in 7/21 and 53 in 8/22.   Doesn't tend to take vitamin D in the summer but does take 2000 units in the winter.  - check Vitamin D 25-OH level in 8/23        4. Hyperlipidemia LDL goal <100:  in 9/20 and 108 in 4/21 and started on crestor 5 mg daily by Dr. Jose Bautista and down to 95 in 7/21. Up to 115 in 3/22 with less exercise and 121 in 8/22 with not taking but one day a week so will strive for compliance. - cont crestor 5 mg daily  - check lipids prior to next visit          Patient Instructions   1) Your Hemoglobin A1c (3 month test of blood sugar) has improved from 6.5% to 6.1%. Try to get your weight back under 155 lbs which will be in the normal range. 2) I sent a new meter and strips and lancets to [facility]. Check blood sugars once daily either fasting (goal is ) or 2 hours after a meal (goal is less than 160). Alternate one meal a day to check (example: one day check after breakfast, one day after lunch, one day after dinner). Write down what you ate if your blood sugar is more than 160 so you can know to cut back or cut out this food from your diet. 3) Your LDL (bad cholesterol) is 121 up from 115 and goal is under 100 because you are not taking the rosuvastatin (crestor) everyday so please start taking 1 tab every night at bedtime to get your LDL down to prevent heart attack and stroke. 4) Your liver and kidney and vitamin D are normal.    5) Please come for a follow up visit on 3/15/23 at 10:10am in our [facility] office. The address is [address] [place] [address] 202. [address] [place] [address] in the [facility] ([address] [facility])     6) I will mail you a lab slip to bring to [facility] to use to have your labs drawn in the 1-2 weeks prior to your next visit. Please make a note on your calendar at least 3-4 days prior to your visit to have your labs drawn that also reminds you to bring the lab slip in case [facility] does not have the order on file. Thanks. Follow-up and Dispositions    Return 3/15/23 at 10:10am.             Hilario Lazaro, was evaluated through a synchronous (real-time) audio-video encounter.  The patient (or guardian if applicable) is aware that this is a billable service, which includes applicable co-pays. This Virtual Visit was conducted with patient's (and/or legal guardian's) consent. The visit was conducted pursuant to the emergency declaration under the [address] [facility], [address] [place] [address] authority and the [employer] General Act. Patient identification was verified, and a caregiver was present when appropriate. The patient was located at: Home: [address]  [address] [facility] [address] [facility] [address] [facility]  The provider was located at: Facility (Appt Department): [facility] [address] [facility]        --Marisue Seip, MD on 9/13/2022 at 9:54 AM    An electronic signature was used to authenticate this note.       Copy sent to:  Holley Chambers MD as PCP - [address] [place] [address] Dr Ramirez Provider  Darren Quarles MD (Otolaryngology)  Juany Escudero MD (Hematology and Oncology)  Irina Calhoun MD (General Surgery)  Navya Bell MD as Consulting Provider (Cardiology)

## 2022-09-13 NOTE — TELEPHONE ENCOUNTER
Please fax her prescription for Testing supplies to [facility] as her electronic prescription transmission failed twice:  Fax:  [phone]

## 2022-09-13 NOTE — PATIENT INSTRUCTIONS
1) Your Hemoglobin A1c (3 month test of blood sugar) has improved from 6.5% to 6.1%. Try to get your weight back under 155 lbs which will be in the normal range. 2) I sent a new meter and strips and lancets to [facility]. Check blood sugars once daily either fasting (goal is ) or 2 hours after a meal (goal is less than 160). Alternate one meal a day to check (example: one day check after breakfast, one day after lunch, one day after dinner). Write down what you ate if your blood sugar is more than 160 so you can know to cut back or cut out this food from your diet. 3) Your LDL (bad cholesterol) is 121 up from 115 and goal is under 100 because you are not taking the rosuvastatin (crestor) everyday so please start taking 1 tab every night at bedtime to get your LDL down to prevent heart attack and stroke. 4) Your liver and kidney and vitamin D are normal.    5) Please come for a follow up visit on 3/15/23 at 10:10am in our [facility] office. The address is [address] [place] [address] in the [address] ([address] [facility])     6) I will mail you a lab slip to bring to [facility] to use to have your labs drawn in the 1-2 weeks prior to your next visit. Please make a note on your calendar at least 3-4 days prior to your visit to have your labs drawn that also reminds you to bring the lab slip in case [facility] does not have the order on file. Thanks.

## 2022-09-16 NOTE — TELEPHONE ENCOUNTER
Pharmacy called in refill. PCP: Radha Cody MD    Last appt: 5/27/2022  Future Appointments   Date Time Provider Maico Falk   3/15/2023 10:10 AM Sarah Woods MD [facility] AMB       Requested Prescriptions     Pending Prescriptions Disp Refills    montelukast (SINGULAIR) 10 mg tablet 30 Tablet 0     Sig: Take 1 Tablet by mouth daily.        Prior labs and Blood pressures:  BP Readings from Last 3 Encounters:   04/26/22 118/74   01/18/22 120/60   11/30/21 139/79     Lab Results   Component Value Date/Time    Sodium 140 08/29/2022 10:52 AM    Potassium 4.3 08/29/2022 10:52 AM    Chloride 103 08/29/2022 10:52 AM    CO2 24 08/29/2022 10:52 AM    Anion gap 3 (L) 09/10/2020 09:53 AM    Glucose 90 08/29/2022 10:52 AM    BUN 12 08/29/2022 10:52 AM    Creatinine 0.65 08/29/2022 10:52 AM    BUN/Creatinine ratio 18 08/29/2022 10:52 AM    GFR est  07/21/2021 10:31 AM    GFR est non-AA 92 07/21/2021 10:31 AM    Calcium 9.2 08/29/2022 10:52 AM

## 2022-09-19 RX ORDER — MONTELUKAST SODIUM 10 MG/1
10 TABLET ORAL DAILY
Qty: 30 TABLET | Refills: 0 | OUTPATIENT
Start: 2022-09-19

## 2022-09-28 ENCOUNTER — PATIENT MESSAGE (OUTPATIENT)
Dept: PRIMARY CARE CLINIC | Age: 67
End: 2022-09-28

## 2022-09-29 ENCOUNTER — HOSPITAL ENCOUNTER (OUTPATIENT)
Dept: MAMMOGRAPHY | Age: 67
Discharge: HOME | End: 2022-09-29
Attending: INTERNAL MEDICINE
Payer: MEDICARE

## 2022-09-29 DIAGNOSIS — Z12.31 ENCOUNTER FOR SCREENING MAMMOGRAM FOR MALIGNANT NEOPLASM OF BREAST: ICD-10-CM

## 2022-09-29 PROCEDURE — 77063 BREAST TOMOSYNTHESIS BI: CPT

## 2022-10-06 RX ORDER — MONTELUKAST SODIUM 10 MG/1
10 TABLET ORAL DAILY
Qty: 30 TABLET | Refills: 0 | Status: SHIPPED | OUTPATIENT
Start: 2022-10-06 | End: 2022-10-28 | Stop reason: SDUPTHER

## 2022-10-31 RX ORDER — MONTELUKAST SODIUM 10 MG/1
10 TABLET ORAL DAILY
Qty: 30 TABLET | Refills: 0 | Status: SHIPPED | OUTPATIENT
Start: 2022-10-31 | End: 2022-11-07 | Stop reason: SDUPTHER

## 2022-11-07 DIAGNOSIS — J45.20 MILD INTERMITTENT ASTHMA WITHOUT COMPLICATION: ICD-10-CM

## 2022-11-07 RX ORDER — ALBUTEROL SULFATE 90 UG/1
1 AEROSOL, METERED RESPIRATORY (INHALATION)
Qty: 1 EACH | Refills: 2 | Status: SHIPPED | OUTPATIENT
Start: 2022-11-07 | End: 2023-02-05

## 2022-11-07 RX ORDER — MONTELUKAST SODIUM 10 MG/1
10 TABLET ORAL DAILY
Qty: 30 TABLET | Refills: 0 | Status: SHIPPED | OUTPATIENT
Start: 2022-11-07 | End: 2022-11-21 | Stop reason: SDUPTHER

## 2022-11-07 NOTE — TELEPHONE ENCOUNTER
Requested Prescriptions     Pending Prescriptions Disp Refills    albuterol (PROVENTIL HFA, VENTOLIN HFA, PROAIR HFA) 90 mcg/actuation inhaler 8.5 g 0    montelukast (SINGULAIR) 10 mg tablet 30 Tablet 0     Sig: Take 1 Tablet by mouth daily.         Last Visit 5/27/22  Last Refill  11/1/2020  10/31/22

## 2022-11-21 RX ORDER — MONTELUKAST SODIUM 10 MG/1
10 TABLET ORAL DAILY
Qty: 30 TABLET | Refills: 0 | Status: SHIPPED | OUTPATIENT
Start: 2022-11-21

## 2022-12-02 ENCOUNTER — OFFICE VISIT (OUTPATIENT)
Dept: PRIMARY CARE CLINIC | Age: 67
End: 2022-12-02
Payer: MEDICARE

## 2022-12-02 VITALS
OXYGEN SATURATION: 99 % | RESPIRATION RATE: 16 BRPM | TEMPERATURE: 97.8 F | BODY MASS INDEX: 27.12 KG/M2 | SYSTOLIC BLOOD PRESSURE: 131 MMHG | WEIGHT: 162.8 LBS | HEART RATE: 73 BPM | HEIGHT: 65 IN | DIASTOLIC BLOOD PRESSURE: 76 MMHG

## 2022-12-02 DIAGNOSIS — D47.2 SMOLDERING MULTIPLE MYELOMA (SMM): ICD-10-CM

## 2022-12-02 DIAGNOSIS — K59.09 OTHER CONSTIPATION: ICD-10-CM

## 2022-12-02 DIAGNOSIS — H93.13 TINNITUS OF BOTH EARS: ICD-10-CM

## 2022-12-02 DIAGNOSIS — E11.9 CONTROLLED TYPE 2 DIABETES MELLITUS WITHOUT COMPLICATION, WITHOUT LONG-TERM CURRENT USE OF INSULIN (HCC): Primary | ICD-10-CM

## 2022-12-02 DIAGNOSIS — E04.1 THYROID CYST: ICD-10-CM

## 2022-12-02 DIAGNOSIS — J45.40 MODERATE PERSISTENT ASTHMA WITHOUT COMPLICATION: ICD-10-CM

## 2022-12-02 DIAGNOSIS — R10.9 RIGHT FLANK PAIN: ICD-10-CM

## 2022-12-02 DIAGNOSIS — I10 PRIMARY HYPERTENSION: ICD-10-CM

## 2022-12-02 RX ORDER — AMOXICILLIN 250 MG
2 CAPSULE ORAL DAILY
Qty: 60 TABLET | Refills: 2 | Status: SHIPPED | OUTPATIENT
Start: 2022-12-02 | End: 2023-03-02

## 2022-12-02 RX ORDER — BUDESONIDE AND FORMOTEROL FUMARATE DIHYDRATE 160; 4.5 UG/1; UG/1
1 AEROSOL RESPIRATORY (INHALATION) 2 TIMES DAILY
Qty: 1 EACH | Refills: 2 | Status: SHIPPED | OUTPATIENT
Start: 2022-12-02 | End: 2022-12-02 | Stop reason: SDUPTHER

## 2022-12-02 RX ORDER — BUDESONIDE AND FORMOTEROL FUMARATE DIHYDRATE 160; 4.5 UG/1; UG/1
1 AEROSOL RESPIRATORY (INHALATION) 2 TIMES DAILY
Qty: 1 EACH | Refills: 2 | Status: SHIPPED | OUTPATIENT
Start: 2022-12-02 | End: 2023-03-02

## 2022-12-02 NOTE — PROGRESS NOTES
Kraig Ojeda (: 1955) is a 69 y.o. female, established patient, here for evaluation of the following chief complaint(s):  Ringing in Ear and Other (Says foam is in urine)       ASSESSMENT/PLAN:  Below is the assessment and plan developed based on review of pertinent history, physical exam, labs, studies, and medications. 1. Controlled type 2 diabetes mellitus without complication, without long-term current use of insulin (HCC)  -     HEMOGLOBIN A1C WITH EAG; Future  -     METABOLIC PANEL, COMPREHENSIVE; Future  -     LIPID PANEL; Future  I ordered a CMP, lipid panel, and blood work to check her Hgb A1c. I recommend that she continue taking metformin 500 mg TID. 2. Smoldering multiple myeloma (SMM)  -     CBC W/O DIFF; Future  I ordered a CBC. 3. Thyroid cyst  -     TSH 3RD GENERATION; Future  She had a thyroid ultrasound last year which indicated that the cyst in her thyroid has shrunk. I ordered lab work to check her TSH. 4. Right flank pain  -     URINALYSIS W/ REFLEX CULTURE; Future  I ordered a urine culture. 5. Tinnitus of both ears  -     REFERRAL TO ENT-OTOLARYNGOLOGY  I referred to ENT. 6. Moderate persistent asthma without complication  -     budesonide-formoteroL (SYMBICORT) 160-4.5 mcg/actuation HFAA; Take 1 Puff by inhalation two (2) times a day for 90 days. , Normal, Disp-1 Each, R-2 sent to pharmacy. I recommend that she use Singulair 10 mg daily, Symbicort BID and uses albuterol rescue inhaler PRN. 7. Other constipation  -     senna-docusate (PERICOLACE) 8.6-50 mg per tablet; Take 2 Tablets by mouth daily for 90 days. , Normal, Disp-60 Tablet, R-2 sent to pharmacy. I prescribed  Pericolace 9.6-50 mg. She can take up to 2 tablets. Potential side effects were discussed. 8. Primary hypertension  I recommend that she try stopping HCTZ and see if she has any edema. She should continue with spironolactone 25 mg daily.          SUBJECTIVE/OBJECTIVE:  HPI  Patient presents today for an office visit. She is fasting today. She reports that her urine is foaming. She denies burning or urinary frequency. She has pain in her right mid back/flank. She also reports ringing in her ears. She states that she has been getting constipated. She is taking metformin 500 mg TID. She takes spironolactone 25 mg at night and HCTZ 12.5 mg daily. She wonders if she need both medication. She takes Ambien every night and still does not sleep well. She talked to Dr. Margaret Sumner on a virtual visit who recommended continuing Metformin. She has been using Albuterol inhaler more often lately. Taking Symbicort daily. She saw hematology last year and will make an appointment soon. She is followed by Cardiology. She plans to see Dr. Margaret Sumner (Endocrinology) in person in  January. Patient Active Problem List   Diagnosis Code    Iron (Fe) deficiency anemia D50.9    Anxiety F41.9    Disc disorder M51.9    Seasonal allergic rhinitis J30.2    Osteopenia M85.80    ACP (advance care planning) Z71.89    Smoldering multiple myeloma (SMM) D47.2    Pancreatic cyst K86.2    Serous cystadenoma of pancreas D13.6    Type 2 diabetes mellitus E11.9        Current Outpatient Medications on File Prior to Visit   Medication Sig Dispense Refill    montelukast (SINGULAIR) 10 mg tablet Take 1 Tablet by mouth daily. 30 Tablet 0    albuterol (PROVENTIL HFA, VENTOLIN HFA, PROAIR HFA) 90 mcg/actuation inhaler Take 1 Puff by inhalation every six (6) hours as needed (as needed) for up to 90 days. USE 1 INHALATION BY MOUTH  EVERY 6 HOURS AS NEEDED FOR WHEEZING 1 Each 2    OneTouch Verio test strips strip Use as directed to test once daily. DX E11.9 100 Strip 3    OneTouch Verio Flex meter misc Use as directed to test once daily. DX E11.9 1 Each 0    One Touch Delica 33 gauge misc Use as directed to test once daily. DX E11.9 100 Lancet 3    rosuvastatin (CRESTOR) 5 mg tablet Take 1 Tablet by mouth nightly.  90 Tablet 3    metFORMIN ER (GLUCOPHAGE XR) 500 mg tablet TAKE 1 TABLET BY MOUTH 3 TIMES DAILY--Dose change 03/10/22--updated med list--did not send prescription to the pharmacy 270 Tablet 3    spironolactone (ALDACTONE) 25 mg tablet Take 1 Tablet by mouth daily. 90 Tablet 1    aspirin delayed-release 81 mg tablet Take 81 mg by mouth daily. Lancing Device with Lancets (OneTouch Delica Plus Lanc Dev) kit Test 2 times daily. Dx code E11.65 200 Kit 4    ondansetron (ZOFRAN ODT) 4 mg disintegrating tablet Take 1 Tab by mouth every eight (8) hours as needed for Nausea. 20 Tab 0    ferrous sulfate 325 mg (65 mg iron) tablet TAKE 1 TABLET BY MOUTH DAILY BEFORE BREAKFAST 30 Tab 0    multivitamin (ONE A DAY) tablet Take 1 Tab by mouth daily. calcium-cholecalciferol, d3, (CALCIUM 600 + D) 600-125 mg-unit tab Take  by mouth. cholecalciferol, vitamin D3, 50 mcg (2,000 unit) tab Take 1 Tablet by mouth daily. In the winter      cyanocobalamin (Vitamin B-12) 1,000 mcg tablet Take 1,000 mcg by mouth every seven (7) days. clonazePAM (KLONOPIN) 0.5 mg tablet Take 0.5 mg by mouth three (3) times daily. zolpidem (AMBIEN) 10 mg tablet Take  by mouth nightly as needed for Sleep.      meloxicam (MOBIC) 15 mg tablet TAKE 1 TABLET BY MOUTH  DAILY AS NEEDED FOR PAIN 90 Tablet 1    [DISCONTINUED] hydroCHLOROthiazide (HYDRODIURIL) 12.5 mg tablet TAKE 1 TABLET BY MOUTH  DAILY 90 Tablet 1    [DISCONTINUED] budesonide-formoteroL (SYMBICORT) 160-4.5 mcg/actuation HFAA Take 2 Puffs by inhalation daily. No current facility-administered medications on file prior to visit.        Allergies   Allergen Reactions    Pcn [Penicillins] Itching    Boniva [Ibandronate] Other (comments)     Severe bone pain    Percocet [Oxycodone-Acetaminophen] Other (comments)     Passe out, feels dizzy       Past Medical History:   Diagnosis Date    Anxiety 6/4/2009    Blood dyscrasia     followed by Dr. Skye iGll 09/12    Disc disorder 6/4/2009 High cholesterol     Iron (Fe) deficiency anemia 6/4/2009    Multinodular goiter     Osteopenia 6/4/2009    Seasonal allergic rhinitis 6/4/2009    Vertigo        Past Surgical History:   Procedure Laterality Date    COLONOSCOPY N/A 9/22/2020    COLONOSCOPY     :- performed by Guille Amin MD at [place] [id number] N/A 9/15/2020    SIGMOIDOSCOPY FLEXIBLE performed by Guille Amin MD at [facility] ENDOSCOPY    HX CATARACT REMOVAL      HX [address]      left foot       Family History   Problem Relation Age of Onset    Arthritis-rheumatoid Mother     Other Mother         colon polyps    Heart Disease Father     Breast Cancer Sister 48    Other Son         psoriatic arthritis       Social History     Socioeconomic History    Marital status:      Spouse name: Not on file    Number of children: Not on file    Years of education: Not on file    Highest education level: Not on file   Occupational History    Not on file   Tobacco Use    Smoking status: Never    Smokeless tobacco: Never   Vaping Use    Vaping Use: Never used   Substance and Sexual Activity    Alcohol use: Yes     Comment: glass of wine once every 2 months    Drug use: No    Sexual activity: Not Currently   Other Topics Concern    Not on file   Social History Narrative    Lives in [facility] with her 27 yo son. Also has a daughter who lives in [place].  Currently . Used to work for the [employer] and then worked for [employer] as an . Originally from [place]. Social Determinants of Health     Financial Resource Strain: Not on file   Food Insecurity: Not on file   Transportation Needs: Not on file   Physical Activity: Not on file   Stress: Not on file   Social Connections: Not on file   Intimate Partner Violence: Not on file   Housing Stability: Not on file       No visits with results within 3 Month(s) from this visit.    Latest known visit with results is:   Orders Only on 08/29/2022 Component Date Value Ref Range Status    VITAMIN D, 25-HYDROXY 08/29/2022 53.4  30.0 - 100.0 ng/mL Final    Comment: Vitamin D deficiency has been defined by the [address] practice guideline as a  level of serum 25-OH vitamin D less than 20 ng/mL (1,2). The Endocrine Society went on to further define vitamin D  insufficiency as a level between 21 and 29 ng/mL (2). 1. IOM ([place] of Medicine). 2010. Dietary reference     intakes for calcium and D. [facility]: The     [employer]. 2. Pepe MF, Yenifer GARCIA, Karen CORDOVA, et al.     Evaluation, treatment, and prevention of vitamin D     deficiency: an Endocrine Society clinical practice     guideline. JCEM. 2011 Jul; 96(7):1911-30. Cholesterol, total 08/29/2022 204 (A)  100 - 199 mg/dL Final    Triglyceride 08/29/2022 69  0 - 149 mg/dL Final    HDL Cholesterol 08/29/2022 71  >39 mg/dL Final    VLDL, calculated 08/29/2022 12  5 - 40 mg/dL Final    LDL, calculated 08/29/2022 121 (A)  0 - 99 mg/dL Final    Glucose 08/29/2022 90  65 - 99 mg/dL Final    BUN 08/29/2022 12  8 - 27 mg/dL Final    Creatinine 08/29/2022 0.65  0.57 - 1.00 mg/dL Final    eGFR 08/29/2022 95  >59 mL/min/1.73 Final    BUN/Creatinine ratio 08/29/2022 18  12 - 28 Final    Sodium 08/29/2022 140  134 - 144 mmol/L Final    Potassium 08/29/2022 4.3  3.5 - 5.2 mmol/L Final    Chloride 08/29/2022 103  96 - 106 mmol/L Final    CO2 08/29/2022 24  20 - 29 mmol/L Final    Calcium 08/29/2022 9.2  8.7 - 10.3 mg/dL Final    Protein, total 08/29/2022 6.4  6.0 - 8.5 g/dL Final    Albumin 08/29/2022 4.9 (A)  3.8 - 4.8 g/dL Final    GLOBULIN, TOTAL 08/29/2022 1.5  1.5 - 4.5 g/dL Final    A-G Ratio 08/29/2022 3.3 (A)  1.2 - 2.2 Final    Bilirubin, total 08/29/2022 0.2  0.0 - 1.2 mg/dL Final    Alk.  phosphatase 08/29/2022 73  44 - 121 IU/L Final    AST (SGOT) 08/29/2022 21  0 - 40 IU/L Final    ALT (SGPT) 08/29/2022 20  0 - 32 IU/L Final Hemoglobin A1c 08/29/2022 6.1 (A)  4.8 - 5.6 % Final    Comment:          Prediabetes: 5.7 - 6.4           Diabetes: >6.4           Glycemic control for adults with diabetes: <7.0      Estimated average glucose 08/29/2022 128  mg/dL Final    INTERPRETATION 08/29/2022 Note   Final    Supplemental report is available. Review of Systems   Constitutional:  Negative for activity change, fatigue and unexpected weight change. HENT:  Positive for tinnitus. Negative for congestion, hearing loss, rhinorrhea and sore throat. Eyes:  Negative for discharge. Respiratory:  Negative for cough, chest tightness and shortness of breath. Cardiovascular:  Negative for leg swelling. Gastrointestinal:  Positive for constipation. Negative for abdominal pain and diarrhea. Genitourinary:  Negative for dysuria, flank pain, frequency and urgency. Musculoskeletal:  Negative for arthralgias, back pain and myalgias. Skin:  Negative for color change and rash. Neurological:  Negative for dizziness, light-headedness and headaches. Psychiatric/Behavioral:  Negative for dysphoric mood and sleep disturbance. The patient is not nervous/anxious. Visit Vitals  /76 (BP 1 Location: Left upper arm)   Pulse 73   Temp 97.8 °F (36.6 °C)   Resp 16   Ht 5' 5\" (1.651 m)   Wt 162 lb 12.8 oz (73.8 kg)   SpO2 99%   BMI 27.09 kg/m²       Physical Exam  Vitals and nursing note reviewed. Constitutional:       General: She is not in acute distress. Appearance: Normal appearance. She is well-developed. She is not diaphoretic. HENT:      Right Ear: External ear normal.      Left Ear: External ear normal.   Eyes:      General: No scleral icterus. Right eye: No discharge. Left eye: No discharge. Extraocular Movements: Extraocular movements intact. Conjunctiva/sclera: Conjunctivae normal.   Cardiovascular:      Rate and Rhythm: Normal rate and regular rhythm.    Pulmonary:      Effort: Pulmonary effort is normal.      Breath sounds: Normal breath sounds. No wheezing. Abdominal:      General: Bowel sounds are normal.      Palpations: Abdomen is soft. Tenderness: There is no abdominal tenderness. Musculoskeletal:      Cervical back: Normal range of motion and neck supple. Lymphadenopathy:      Cervical: No cervical adenopathy. Neurological:      Mental Status: She is alert and oriented to person, place, and time. Psychiatric:         Mood and Affect: Mood and affect normal.         I, Jakob Allen MD, personally performed the services described in this documentation as scribed by Azar Harmon in my presence, and it is both accurate and complete. An electronic signature was used to authenticate this note.   -- Azar Harmon

## 2022-12-02 NOTE — PROGRESS NOTES
Chief Complaint   Patient presents with    Ringing in Ear    Other     Says foam is in urine       Visit Vitals  /76 (BP 1 Location: Left upper arm)   Pulse 73   Temp 97.8 °F (36.6 °C)   Resp 16   Ht 5' 5\" (1.651 m)   Wt 162 lb 12.8 oz (73.8 kg)   SpO2 99%   BMI 27.09 kg/m²       1. Have you been to the ER, urgent care clinic since your last visit? Hospitalized since your last visit? No    2. Have you seen or consulted any other health care providers outside of the [address] [place] [address] since your last visit? Include any pap smears or colon screening.  No

## 2023-01-05 DIAGNOSIS — J30.89 SEASONAL ALLERGIC RHINITIS DUE TO OTHER ALLERGIC TRIGGER: Primary | ICD-10-CM

## 2023-01-05 RX ORDER — MONTELUKAST SODIUM 10 MG/1
10 TABLET ORAL DAILY
Qty: 90 TABLET | Refills: 0 | Status: SHIPPED | OUTPATIENT
Start: 2023-01-05 | End: 2023-01-05 | Stop reason: SDUPTHER

## 2023-01-05 RX ORDER — MONTELUKAST SODIUM 10 MG/1
10 TABLET ORAL DAILY
Qty: 90 TABLET | Refills: 0 | Status: SHIPPED | OUTPATIENT
Start: 2023-01-05 | End: 2023-04-05

## 2023-01-05 NOTE — TELEPHONE ENCOUNTER
Requested Prescriptions     Pending Prescriptions Disp Refills    montelukast (SINGULAIR) 10 mg tablet 30 Tablet 1     Sig: Take 1 Tablet by mouth daily.         Last Visit 12/2/22  Last Refill 12/14/22

## 2023-03-01 DIAGNOSIS — E55.9 VITAMIN D DEFICIENCY: ICD-10-CM

## 2023-03-01 DIAGNOSIS — R73.02 IMPAIRED GLUCOSE TOLERANCE: ICD-10-CM

## 2023-03-01 DIAGNOSIS — E04.2 MULTINODULAR GOITER: ICD-10-CM

## 2023-03-01 DIAGNOSIS — E78.5 HYPERLIPIDEMIA LDL GOAL <100: ICD-10-CM

## 2023-03-13 ENCOUNTER — TELEPHONE (OUTPATIENT)
Dept: ENDOCRINOLOGY | Age: 68
End: 2023-03-13

## 2023-03-13 NOTE — TELEPHONE ENCOUNTER
Pt LVM at 9:46 AM asking if Dr Quirino Fontenot had placed orders in [facility]'s system for her upcoming visit. Informed pt via voice message that there are orders in [facility]'s system and she can have them drawn before her visit.

## 2023-03-14 DIAGNOSIS — J30.89 SEASONAL ALLERGIC RHINITIS DUE TO OTHER ALLERGIC TRIGGER: ICD-10-CM

## 2023-03-14 RX ORDER — MONTELUKAST SODIUM 10 MG/1
10 TABLET ORAL DAILY
Qty: 90 TABLET | Refills: 0 | Status: SHIPPED | OUTPATIENT
Start: 2023-03-14 | End: 2023-06-12

## 2023-03-15 ENCOUNTER — OFFICE VISIT (OUTPATIENT)
Dept: ENDOCRINOLOGY | Age: 68
End: 2023-03-15
Payer: MEDICARE

## 2023-03-15 VITALS
HEIGHT: 65 IN | SYSTOLIC BLOOD PRESSURE: 112 MMHG | BODY MASS INDEX: 26.19 KG/M2 | DIASTOLIC BLOOD PRESSURE: 49 MMHG | HEART RATE: 87 BPM | WEIGHT: 157.2 LBS

## 2023-03-15 DIAGNOSIS — E04.2 MULTINODULAR GOITER: ICD-10-CM

## 2023-03-15 DIAGNOSIS — E55.9 VITAMIN D DEFICIENCY: ICD-10-CM

## 2023-03-15 DIAGNOSIS — E78.5 HYPERLIPIDEMIA LDL GOAL <100: ICD-10-CM

## 2023-03-15 DIAGNOSIS — R73.02 IMPAIRED GLUCOSE TOLERANCE: Primary | ICD-10-CM

## 2023-03-15 LAB — HBA1C MFR BLD HPLC: 6.3 %

## 2023-03-15 PROCEDURE — 1090F PRES/ABSN URINE INCON ASSESS: CPT | Performed by: INTERNAL MEDICINE

## 2023-03-15 PROCEDURE — G8432 DEP SCR NOT DOC, RNG: HCPCS | Performed by: INTERNAL MEDICINE

## 2023-03-15 PROCEDURE — 1101F PT FALLS ASSESS-DOCD LE1/YR: CPT | Performed by: INTERNAL MEDICINE

## 2023-03-15 PROCEDURE — G9899 SCRN MAM PERF RSLTS DOC: HCPCS | Performed by: INTERNAL MEDICINE

## 2023-03-15 PROCEDURE — G8536 NO DOC ELDER MAL SCRN: HCPCS | Performed by: INTERNAL MEDICINE

## 2023-03-15 PROCEDURE — 99214 OFFICE O/P EST MOD 30 MIN: CPT | Performed by: INTERNAL MEDICINE

## 2023-03-15 PROCEDURE — 1123F ACP DISCUSS/DSCN MKR DOCD: CPT | Performed by: INTERNAL MEDICINE

## 2023-03-15 PROCEDURE — 3017F COLORECTAL CA SCREEN DOC REV: CPT | Performed by: INTERNAL MEDICINE

## 2023-03-15 PROCEDURE — 83036 HEMOGLOBIN GLYCOSYLATED A1C: CPT | Performed by: INTERNAL MEDICINE

## 2023-03-15 PROCEDURE — G8399 PT W/DXA RESULTS DOCUMENT: HCPCS | Performed by: INTERNAL MEDICINE

## 2023-03-15 PROCEDURE — G8417 CALC BMI ABV UP PARAM F/U: HCPCS | Performed by: INTERNAL MEDICINE

## 2023-03-15 PROCEDURE — G8427 DOCREV CUR MEDS BY ELIG CLIN: HCPCS | Performed by: INTERNAL MEDICINE

## 2023-03-15 NOTE — PATIENT INSTRUCTIONS
1) Your Hemoglobin A1c (3 month test of blood sugar) was 6.3% up from 6% in 12/22 and we want to keep this under 6.5% which is in the full blown diabetic range and as low as possible. 2) I will send you a message through [employer] with your lab results. 3) Your urine test was normal yesterday and your blood pressure is controlled. 4) Going forward I will have you just follow up with your PCP and if anything worsens in the future, I'm happy to see you back. Feel free to send me a message through [employer] or call me at [phone] if you have any questions or concerns in the future.

## 2023-03-15 NOTE — PROGRESS NOTES
Chief Complaint   Patient presents with    Diabetes     PCP and pharmacy confirmed      History of Present Illness: Caryle Groom is a 77 y.o. female here for follow up of diabetes. Weight down 5 lbs since last visit in 9/22. Has been checking sugars in the morning and most are in the low 100s and only up to 122 at the highest.  Did see in the 70s in the afternoon once. Has been trying to take her crestor more consistently. Compliant with metformin and vitamin D. Willing to just follow up with PCP going forward. Current Outpatient Medications   Medication Sig    montelukast (SINGULAIR) 10 mg tablet Take 1 Tablet by mouth daily for 90 days. OneTouch Verio test strips strip Use as directed to test once daily. DX E11.9    OneTouch Verio Flex meter misc Use as directed to test once daily. DX E74.0    One Touch Delica 33 gauge misc Use as directed to test once daily. DX E11.9    rosuvastatin (CRESTOR) 5 mg tablet Take 1 Tablet by mouth nightly. metFORMIN ER (GLUCOPHAGE XR) 500 mg tablet TAKE 1 TABLET BY MOUTH 3 TIMES DAILY--Dose change 03/10/22--updated med list--did not send prescription to the pharmacy    spironolactone (ALDACTONE) 25 mg tablet Take 1 Tablet by mouth daily. aspirin delayed-release 81 mg tablet Take 81 mg by mouth daily. Lancing Device with Lancets (OneTouch Delica Plus Lanc Dev) kit Test 2 times daily. Dx code E11.65    ondansetron (ZOFRAN ODT) 4 mg disintegrating tablet Take 1 Tab by mouth every eight (8) hours as needed for Nausea. ferrous sulfate 325 mg (65 mg iron) tablet TAKE 1 TABLET BY MOUTH DAILY BEFORE BREAKFAST    multivitamin (ONE A DAY) tablet Take 1 Tab by mouth daily. calcium-cholecalciferol, d3, (CALCIUM 600 + D) 600-125 mg-unit tab Take  by mouth. cholecalciferol, vitamin D3, 50 mcg (2,000 unit) tab Take 1 Tablet by mouth daily. In the winter    cyanocobalamin (Vitamin B-12) 1,000 mcg tablet Take 1,000 mcg by mouth every seven (7) days.     clonazePAM (KLONOPIN) 0.5 mg tablet Take 0.5 mg by mouth three (3) times daily. zolpidem (AMBIEN) 10 mg tablet Take  by mouth nightly as needed for Sleep. No current facility-administered medications for this visit. Allergies   Allergen Reactions    Pcn [Penicillins] Itching    Boniva [Ibandronate] Other (comments)     Severe bone pain    Percocet [Oxycodone-Acetaminophen] Other (comments)     Passe out, feels dizzy     Review of Systems: PER HPI    Physical Examination:  Blood pressure (!) 112/49, pulse 87, height 5' 5\" (1.651 m), weight 157 lb 3.2 oz (71.3 kg). General: pleasant, no distress, good eye contact   Neck: no carotid bruits  Cardiovascular: regular, normal rate, nl s1 and s2, no m/r/g,   Respiratory: clear bilaterally  Integumentary: no edema,   Psychiatric: normal mood and affect    Data Reviewed:   Component      Latest Ref Rng & Units 3/14/2023 12/2/2022   Sodium      136 - 145 mmol/L  141   Potassium      3.5 - 5.1 mmol/L  4.9   Chloride      97 - 108 mmol/L  107   CO2      21 - 32 mmol/L  31   Anion gap      5 - 15 mmol/L  3 (L)   Glucose      65 - 100 mg/dL  106 (H)   BUN      6 - 20 MG/DL  12   Creatinine      0.55 - 1.02 MG/DL  0.79   BUN/Creatinine ratio      12 - 20    15   eGFR      >60 ml/min/1.73m2  >60   Calcium      8.5 - 10.1 MG/DL  9.6   Bilirubin, total      0.2 - 1.0 MG/DL  0.3   ALT      12 - 78 U/L  35   AST      15 - 37 U/L  17   Alk. phosphatase      45 - 117 U/L  81   Protein, total      6.4 - 8.2 g/dL  6.5   Albumin      3.5 - 5.0 g/dL  4.4   Globulin      2.0 - 4.0 g/dL  2.1   A-G Ratio      1.1 - 2.2    2.1   Cholesterol, total      <200 MG/DL  203 (H)   Triglyceride      <150 MG/DL  101   HDL Cholesterol      MG/DL  75   LDL, calculated      0 - 100 MG/DL  107.8 (H)   VLDL, calculated      MG/DL  20.2   CHOL/HDL Ratio      0.0 - 5.0    2.7   Microalbumin,urine random      MG/DL 0.78    Creatinine, urine random      mg/dL 133.00    Microalbumin/Creat.  Ratio      0 - 30 mg/g 6 Hemoglobin A1c, (calculated)      4.0 - 5.6 %  6.0 (H)   Est. average glucose      mg/dL  126   TSH      0.36 - 3.74 uIU/mL  3.44     Component      Latest Ref Rng & Units 3/15/2023   Hemoglobin A1c (POC)      % 6.3       Assessment/Plan:     1. Impaired glucose tolerance: her most recent Hgb A1c was 6.3% in 3/23 up from 6% in 12/22 down from 6.1% in 8/22 down from 6.5% in 3/22 up from 6.3% in 7/21 up from 6% in 4/21 and all of her values since 2016 have been between 5.5-6.3%. Her A1c is slightly higher but still in the borderline diabetic range so can just follow up with PCP going forward  - check bs once daily at alternating times (under DX E11.9)  - cont metformin  mg tid  - microalbumin nl 3/23        2. Multinodular goiter: She is followed by Dr. Faviola Trujillo for some nodules and cysts and her TSH values have been normal in our system but have trended up from 1.2 in 7/10 to 3.5 in 6/16 and was 2.55 in 6/20 and 1.37 in 7/21 and TPO ab <8 and TG ab < 1.0 and TSH 1.24 in 3/22 and 3.44 in 12/22  - check TSH annually       3. Vitamin D deficiency:  Did have a low vitamin D of 19 in 7/10 and was 35 in 6/16 and hdsn't been checked since and was 39 in 7/21 and 53 in 8/22. Doesn't tend to take vitamin D in the summer but does take 2000 units in the winter.  - check Vitamin D 25-OH level annually        4. Hyperlipidemia LDL goal <100:  in 9/20 and 108 in 4/21 and started on crestor 5 mg daily by Dr. Pretty Gasca and down to 95 in 7/21. Up to 115 in 3/22 with less exercise and 121 in 8/22 with not taking but one day a week and down to 107 in 12/22   - cont crestor 5 mg daily  - check lipids and cmp today          Patient Instructions   1) Your Hemoglobin A1c (3 month test of blood sugar) was 6.3% up from 6% in 12/22 and we want to keep this under 6.5% which is in the full blown diabetic range and as low as possible. 2) I will send you a message through [employer] with your lab results.     3) Your urine test was normal yesterday and your blood pressure is controlled. 4) Going forward I will have you just follow up with your PCP and if anything worsens in the future, I'm happy to see you back. Feel free to send me a message through [address] or call me at [phone] if you have any questions or concerns in the future. Follow-up and Dispositions    Return if symptoms worsen or fail to improve. Copy sent to:  aBrb De La Paz MD as PCP - [facility] Provider  Kelly Monique MD (Otolaryngology)  Abundio Bhat MD (Hematology and Oncology)  Peyton Oscar MD (General Surgery)  Sandrine Liz MD as Consulting Provider (Cardiology)      Lab follow up: 03/17/23  Component      Latest Ref Rng & Units 3/15/2023 3/15/2023          10:59 AM 10:59 AM   Glucose      70 - 99 mg/dL  93   BUN      8 - 27 mg/dL  11   Creatinine      0.57 - 1.00 mg/dL  0.82   eGFR      >59 mL/min/1.73  77   BUN/Creatinine ratio      12 - 28  13   Sodium      134 - 144 mmol/L  140   Potassium      3.5 - 5.2 mmol/L  5.2   Chloride      96 - 106 mmol/L  102   CO2      20 - 29 mmol/L  24   Calcium      8.7 - 10.3 mg/dL  10.1   Protein, total      6.0 - 8.5 g/dL  6.5   Albumin      3.8 - 4.8 g/dL  4.9 (H)   GLOBULIN, TOTAL      1.5 - 4.5 g/dL  1.6   A-G Ratio      1.2 - 2.2  3.1 (H)   Bilirubin, total      0.0 - 1.2 mg/dL  0.2   Alk. phosphatase      44 - 121 IU/L  80   AST      0 - 40 IU/L  20   ALT      0 - 32 IU/L  21   Cholesterol, total      100 - 199 mg/dL 166    Triglyceride      0 - 149 mg/dL 76    HDL Cholesterol      >39 mg/dL 76    VLDL, calculated      5 - 40 mg/dL 14    LDL, calculated      0 - 99 mg/dL 76      Sent her the following message through [employer]:    Total Cholesterol is the total number of cholesterol particles in your blood. Goal is less than 200. Triglycerides are the short term fats in your blood. Goal is less than 150. HDL is the good cholesterol in your blood.   Goal is more than 50 if you are a woman and 40 if you are a man. LDL is the bad cholesterol in your blood. Goal is less than 100 unless you have a history of heart disease or stroke and then goal is under 70. Your cholesterol is at goal.    Continue to follow a low cholesterol diet. Try to limit the amount of fried foods, fatty foods, butter, gravy, red meat, ice cream, cheese, and eggs in your diet, which are all high in cholesterol.  -------------------------------------------------------------------------------------------------------------------  BUN and creatinine are markers of kidney function. Your values are normal.  -------------------------------------------------------------------------------------------------------------------  ALT and AST are markers of liver function.   Your values are normal.

## 2023-03-27 RX ORDER — ROSUVASTATIN CALCIUM 5 MG/1
TABLET, COATED ORAL
Qty: 90 TABLET | Refills: 3 | Status: SHIPPED | OUTPATIENT
Start: 2023-03-27

## 2023-03-27 RX ORDER — METFORMIN HYDROCHLORIDE 500 MG/1
TABLET, EXTENDED RELEASE ORAL
Qty: 270 TABLET | Refills: 3 | Status: SHIPPED | OUTPATIENT
Start: 2023-03-27

## 2023-03-27 RX ORDER — HYDROCHLOROTHIAZIDE 12.5 MG/1
TABLET ORAL
Qty: 90 TABLET | Refills: 3 | Status: SHIPPED | OUTPATIENT
Start: 2023-03-27

## 2023-05-04 RX ORDER — SPIRONOLACTONE 25 MG/1
25 TABLET ORAL DAILY
Qty: 90 TABLET | Refills: 1 | Status: SHIPPED | OUTPATIENT
Start: 2023-05-04

## 2023-06-20 ENCOUNTER — TELEPHONE (OUTPATIENT)
Dept: PRIMARY CARE CLINIC | Facility: CLINIC | Age: 68
End: 2023-06-20

## 2023-06-26 ENCOUNTER — OFFICE VISIT (OUTPATIENT)
Dept: PRIMARY CARE CLINIC | Facility: CLINIC | Age: 68
End: 2023-06-26
Payer: MEDICARE

## 2023-06-26 VITALS
WEIGHT: 163.2 LBS | HEIGHT: 65 IN | BODY MASS INDEX: 27.19 KG/M2 | RESPIRATION RATE: 17 BRPM | OXYGEN SATURATION: 97 % | DIASTOLIC BLOOD PRESSURE: 78 MMHG | HEART RATE: 72 BPM | TEMPERATURE: 97.5 F | SYSTOLIC BLOOD PRESSURE: 138 MMHG

## 2023-06-26 DIAGNOSIS — G93.9 BRAIN LESION: ICD-10-CM

## 2023-06-26 DIAGNOSIS — J98.01 COUGH DUE TO BRONCHOSPASM: ICD-10-CM

## 2023-06-26 DIAGNOSIS — E11.9 TYPE 2 DIABETES MELLITUS WITHOUT COMPLICATION, WITHOUT LONG-TERM CURRENT USE OF INSULIN (HCC): Primary | ICD-10-CM

## 2023-06-26 DIAGNOSIS — E85.81 LIGHT CHAIN (AL) AMYLOIDOSIS (HCC): ICD-10-CM

## 2023-06-26 PROCEDURE — G8427 DOCREV CUR MEDS BY ELIG CLIN: HCPCS | Performed by: INTERNAL MEDICINE

## 2023-06-26 PROCEDURE — 1036F TOBACCO NON-USER: CPT | Performed by: INTERNAL MEDICINE

## 2023-06-26 PROCEDURE — 3046F HEMOGLOBIN A1C LEVEL >9.0%: CPT | Performed by: INTERNAL MEDICINE

## 2023-06-26 PROCEDURE — 1090F PRES/ABSN URINE INCON ASSESS: CPT | Performed by: INTERNAL MEDICINE

## 2023-06-26 PROCEDURE — 1123F ACP DISCUSS/DSCN MKR DOCD: CPT | Performed by: INTERNAL MEDICINE

## 2023-06-26 PROCEDURE — 3017F COLORECTAL CA SCREEN DOC REV: CPT | Performed by: INTERNAL MEDICINE

## 2023-06-26 PROCEDURE — 2022F DILAT RTA XM EVC RTNOPTHY: CPT | Performed by: INTERNAL MEDICINE

## 2023-06-26 PROCEDURE — 99214 OFFICE O/P EST MOD 30 MIN: CPT | Performed by: INTERNAL MEDICINE

## 2023-06-26 PROCEDURE — G8419 CALC BMI OUT NRM PARAM NOF/U: HCPCS | Performed by: INTERNAL MEDICINE

## 2023-06-26 PROCEDURE — G8399 PT W/DXA RESULTS DOCUMENT: HCPCS | Performed by: INTERNAL MEDICINE

## 2023-06-26 RX ORDER — ALBUTEROL SULFATE 90 UG/1
AEROSOL, METERED RESPIRATORY (INHALATION)
COMMUNITY
Start: 2020-06-17

## 2023-06-26 RX ORDER — FLUTICASONE PROPIONATE AND SALMETEROL 250; 50 UG/1; UG/1
1 POWDER RESPIRATORY (INHALATION) EVERY 12 HOURS
Qty: 60 EACH | Refills: 3 | Status: SHIPPED | OUTPATIENT
Start: 2023-06-26

## 2023-06-26 RX ORDER — ALBUTEROL SULFATE 2.5 MG/3ML
2.5 SOLUTION RESPIRATORY (INHALATION) 4 TIMES DAILY PRN
Qty: 120 EACH | Refills: 3 | Status: SHIPPED | OUTPATIENT
Start: 2023-06-26

## 2023-06-26 ASSESSMENT — ENCOUNTER SYMPTOMS
COLOR CHANGE: 0
ABDOMINAL PAIN: 0
CHEST TIGHTNESS: 1
CONSTIPATION: 0
SORE THROAT: 0
BACK PAIN: 0
DIARRHEA: 0
SHORTNESS OF BREATH: 1
EYE DISCHARGE: 0
COUGH: 0
RHINORRHEA: 0

## 2023-06-26 ASSESSMENT — PATIENT HEALTH QUESTIONNAIRE - PHQ9
SUM OF ALL RESPONSES TO PHQ QUESTIONS 1-9: 0
SUM OF ALL RESPONSES TO PHQ9 QUESTIONS 1 & 2: 0
SUM OF ALL RESPONSES TO PHQ QUESTIONS 1-9: 0
2. FEELING DOWN, DEPRESSED OR HOPELESS: 0
1. LITTLE INTEREST OR PLEASURE IN DOING THINGS: 0

## 2023-06-26 ASSESSMENT — LIFESTYLE VARIABLES
HOW OFTEN DO YOU HAVE A DRINK CONTAINING ALCOHOL: NEVER
HOW MANY STANDARD DRINKS CONTAINING ALCOHOL DO YOU HAVE ON A TYPICAL DAY: PATIENT DOES NOT DRINK

## 2023-06-29 RX ORDER — BLOOD SUGAR DIAGNOSTIC
STRIP MISCELLANEOUS
Qty: 100 STRIP | Refills: 3 | Status: SHIPPED | OUTPATIENT
Start: 2023-06-29

## 2023-07-05 ENCOUNTER — TELEPHONE (OUTPATIENT)
Dept: PRIMARY CARE CLINIC | Facility: CLINIC | Age: 68
End: 2023-07-05

## 2023-07-05 ENCOUNTER — OFFICE VISIT (OUTPATIENT)
Dept: PRIMARY CARE CLINIC | Facility: CLINIC | Age: 68
End: 2023-07-05
Payer: MEDICARE

## 2023-07-05 ENCOUNTER — HOSPITAL ENCOUNTER (OUTPATIENT)
Facility: HOSPITAL | Age: 68
Discharge: HOME | End: 2023-07-08
Attending: INTERNAL MEDICINE
Payer: MEDICARE

## 2023-07-05 VITALS
BODY MASS INDEX: 26.16 KG/M2 | RESPIRATION RATE: 17 BRPM | OXYGEN SATURATION: 97 % | DIASTOLIC BLOOD PRESSURE: 69 MMHG | HEART RATE: 71 BPM | TEMPERATURE: 97.5 F | SYSTOLIC BLOOD PRESSURE: 104 MMHG | HEIGHT: 65 IN | WEIGHT: 157 LBS

## 2023-07-05 DIAGNOSIS — Z11.59 NEED FOR HEPATITIS C SCREENING TEST: ICD-10-CM

## 2023-07-05 DIAGNOSIS — R07.89 PRESSURE IN CHEST: ICD-10-CM

## 2023-07-05 DIAGNOSIS — C90.01 MULTIPLE MYELOMA IN REMISSION (HCC): ICD-10-CM

## 2023-07-05 DIAGNOSIS — J40 BRONCHITIS: ICD-10-CM

## 2023-07-05 DIAGNOSIS — Z00.00 MEDICARE ANNUAL WELLNESS VISIT, SUBSEQUENT: Primary | ICD-10-CM

## 2023-07-05 DIAGNOSIS — E85.81 LIGHT CHAIN (AL) AMYLOIDOSIS (HCC): ICD-10-CM

## 2023-07-05 DIAGNOSIS — Z71.89 ACP (ADVANCE CARE PLANNING): ICD-10-CM

## 2023-07-05 DIAGNOSIS — Z91.81 AT HIGH RISK FOR FALLS: ICD-10-CM

## 2023-07-05 LAB
ERYTHROCYTE [DISTWIDTH] IN BLOOD BY AUTOMATED COUNT: 14.9 % (ref 11.5–14.5)
HCT VFR BLD AUTO: 30.7 % (ref 35–47)
HCV AB SERPL QL IA: NONREACTIVE
HGB BLD-MCNC: 9.6 G/DL (ref 11.5–16)
MCH RBC QN AUTO: 30.5 PG (ref 26–34)
MCHC RBC AUTO-ENTMCNC: 31.3 G/DL (ref 30–36.5)
MCV RBC AUTO: 97.5 FL (ref 80–99)
NRBC # BLD: 0 K/UL (ref 0–0.01)
NRBC BLD-RTO: 0 PER 100 WBC
PLATELET # BLD AUTO: 174 K/UL (ref 150–400)
PMV BLD AUTO: 12.3 FL (ref 8.9–12.9)
RBC # BLD AUTO: 3.15 M/UL (ref 3.8–5.2)
WBC # BLD AUTO: 7.2 K/UL (ref 3.6–11)

## 2023-07-05 PROCEDURE — G8419 CALC BMI OUT NRM PARAM NOF/U: HCPCS | Performed by: INTERNAL MEDICINE

## 2023-07-05 PROCEDURE — 1123F ACP DISCUSS/DSCN MKR DOCD: CPT | Performed by: INTERNAL MEDICINE

## 2023-07-05 PROCEDURE — G8399 PT W/DXA RESULTS DOCUMENT: HCPCS | Performed by: INTERNAL MEDICINE

## 2023-07-05 PROCEDURE — G0439 PPPS, SUBSEQ VISIT: HCPCS | Performed by: INTERNAL MEDICINE

## 2023-07-05 PROCEDURE — 1090F PRES/ABSN URINE INCON ASSESS: CPT | Performed by: INTERNAL MEDICINE

## 2023-07-05 PROCEDURE — 99214 OFFICE O/P EST MOD 30 MIN: CPT | Performed by: INTERNAL MEDICINE

## 2023-07-05 PROCEDURE — 71046 X-RAY EXAM CHEST 2 VIEWS: CPT

## 2023-07-05 PROCEDURE — 3017F COLORECTAL CA SCREEN DOC REV: CPT | Performed by: INTERNAL MEDICINE

## 2023-07-05 PROCEDURE — G8427 DOCREV CUR MEDS BY ELIG CLIN: HCPCS | Performed by: INTERNAL MEDICINE

## 2023-07-05 PROCEDURE — 1036F TOBACCO NON-USER: CPT | Performed by: INTERNAL MEDICINE

## 2023-07-05 RX ORDER — METHYLPREDNISOLONE 4 MG/1
TABLET ORAL
Qty: 1 KIT | Refills: 0 | Status: SHIPPED | OUTPATIENT
Start: 2023-07-05 | End: 2023-07-11

## 2023-07-05 SDOH — ECONOMIC STABILITY: HOUSING INSECURITY
IN THE LAST 12 MONTHS, WAS THERE A TIME WHEN YOU DID NOT HAVE A STEADY PLACE TO SLEEP OR SLEPT IN A SHELTER (INCLUDING NOW)?: PATIENT REFUSED

## 2023-07-05 SDOH — ECONOMIC STABILITY: FOOD INSECURITY: WITHIN THE PAST 12 MONTHS, YOU WORRIED THAT YOUR FOOD WOULD RUN OUT BEFORE YOU GOT MONEY TO BUY MORE.: PATIENT DECLINED

## 2023-07-05 SDOH — ECONOMIC STABILITY: INCOME INSECURITY: HOW HARD IS IT FOR YOU TO PAY FOR THE VERY BASICS LIKE FOOD, HOUSING, MEDICAL CARE, AND HEATING?: PATIENT DECLINED

## 2023-07-05 SDOH — ECONOMIC STABILITY: FOOD INSECURITY: WITHIN THE PAST 12 MONTHS, THE FOOD YOU BOUGHT JUST DIDN'T LAST AND YOU DIDN'T HAVE MONEY TO GET MORE.: PATIENT DECLINED

## 2023-07-05 ASSESSMENT — PATIENT HEALTH QUESTIONNAIRE - PHQ9
SUM OF ALL RESPONSES TO PHQ QUESTIONS 1-9: 2
SUM OF ALL RESPONSES TO PHQ QUESTIONS 1-9: 2
2. FEELING DOWN, DEPRESSED OR HOPELESS: 1
1. LITTLE INTEREST OR PLEASURE IN DOING THINGS: 1
SUM OF ALL RESPONSES TO PHQ9 QUESTIONS 1 & 2: 2
SUM OF ALL RESPONSES TO PHQ QUESTIONS 1-9: 2
SUM OF ALL RESPONSES TO PHQ QUESTIONS 1-9: 2

## 2023-07-05 ASSESSMENT — ENCOUNTER SYMPTOMS
COLOR CHANGE: 0
RHINORRHEA: 0
CHEST TIGHTNESS: 1
EYE DISCHARGE: 0
CONSTIPATION: 0
BACK PAIN: 0
COUGH: 1
SORE THROAT: 0
SHORTNESS OF BREATH: 1
DIARRHEA: 0
ABDOMINAL PAIN: 0

## 2023-07-06 ENCOUNTER — CLINICAL DOCUMENTATION (OUTPATIENT)
Facility: HOSPITAL | Age: 68
End: 2023-07-06

## 2023-07-06 NOTE — ACP (ADVANCE CARE PLANNING)
Advance Care Planning   Ambulatory ACP Specialist Patient Outreach    Date:  7/6/2023    ACP Specialist:  Colletta Beal    Outreach call to patient in follow-up to ACP Specialist referral Sae Geiger MD    [x] PCP  [] Provider   [] Ambulatory Care Management [] Other     For:                  [x] Advance Directive Assistance              [] Complete Portable DNR order              [] Complete POST/POLST/MOST              [] Code Status Discussion             [] Discuss Goals of Care             [] Early ACP Decision-Making              [] Other (Specify)    Date Referral Received: 7/5/2023    Next Step:   [] ACP scheduled conversation  [x] Outreach again in one week               [] Email / Mail [address]  [] Email / Mail Advance Directive   [] Closing referral.  Routing closure to referring provider/staff and to ACP Specialist . [] Closure letter mailed to patient with invitation to contact ACP Specialist if / when ready. [] Other (Specify here): Outreaches:       [x] 1st -  Date:  7/6/2023               Intervention:  [] Spoke with Patient   [x] Left Voice mail [] Email / Mail    [] [facility]  [] Other [id number]) : Outcomes: Attempted ACP outreach - no answer. Left VM requesting return call regarding referral received by ACP team from physician's office. Will attempt outreach again in one week if return call not received. [] 2nd -  Date:                 Intervention:  [] Spoke with Patient  [] Left Voice mail [] Email / Mail    [] [facility]  [] Other [id number]) : Outcomes:                [] 3rd -  Date:                Intervention:  [] Spoke with Patient   [] Left Voice mail [] Email / Mail    [] [facility]  [] Other [id number]) : Outcomes:           []  Additional Outreach -  Date:     (Specify Dates & special circumstances): Outcomes:          Thank you for this referral.

## 2023-07-10 ENCOUNTER — TELEPHONE (OUTPATIENT)
Dept: PRIMARY CARE CLINIC | Facility: CLINIC | Age: 68
End: 2023-07-10

## 2023-07-10 NOTE — TELEPHONE ENCOUNTER
Patient called back- she was asking about labs, the GAMMOPATHY is not back yet, called and spoke with Miracle Tamez for the Lab Dept. This has to go through [address] [place] [address] and takes about a week for the results to come back. She has been notified. Asked if she has made an appointment for the Hematologist. She said no, that she has not. That the doctor is out of office and they would not let her schedule until she is back, stated she will call again tomorrow because that is when the Dr returns. Chris Pitt

## 2023-07-14 LAB
ALBUMIN SERPL ELPH-MCNC: 4.2 G/DL (ref 2.9–4.4)
ALBUMIN/GLOB SERPL: 1.7 (ref 0.7–1.7)
ALPHA1 GLOB SERPL ELPH-MCNC: 0.3 G/DL (ref 0–0.4)
ALPHA2 GLOB SERPL ELPH-MCNC: 0.9 G/DL (ref 0.4–1)
B-GLOBULIN SERPL ELPH-MCNC: 1 G/DL (ref 0.7–1.3)
GAMMA GLOB SERPL ELPH-MCNC: 0.3 G/DL (ref 0.4–1.8)
GLOBULIN SER-MCNC: 2.5 G/DL (ref 2.2–3.9)
IGA SERPL-MCNC: 7 MG/DL (ref 87–352)
IGG SERPL-MCNC: 380 MG/DL (ref 586–1602)
IGM SERPL-MCNC: <5 MG/DL (ref 26–217)
INTERPRETATION SERPL IEP-IMP: ABNORMAL
KAPPA LC FREE SER-MCNC: 1593.1 MG/L (ref 3.3–19.4)
KAPPA LC FREE/LAMBDA FREE SER: 157.73 (ref 0.26–1.65)
LAMBDA LC FREE SERPL-MCNC: 10.1 MG/L (ref 5.7–26.3)
M PROTEIN SERPL ELPH-MCNC: ABNORMAL G/DL
PROT SERPL-MCNC: 6.7 G/DL (ref 6–8.5)

## 2023-07-18 RX ORDER — LANCETS 33 GAUGE
EACH MISCELLANEOUS
Qty: 100 EACH | Refills: 3 | Status: SHIPPED | OUTPATIENT
Start: 2023-07-18

## 2023-08-23 ENCOUNTER — OFFICE VISIT (OUTPATIENT)
Dept: PRIMARY CARE CLINIC | Facility: CLINIC | Age: 68
End: 2023-08-23
Payer: MEDICARE

## 2023-08-23 VITALS
OXYGEN SATURATION: 99 % | SYSTOLIC BLOOD PRESSURE: 123 MMHG | HEART RATE: 69 BPM | DIASTOLIC BLOOD PRESSURE: 74 MMHG | RESPIRATION RATE: 17 BRPM | TEMPERATURE: 97.3 F | HEIGHT: 65 IN | BODY MASS INDEX: 26.59 KG/M2 | WEIGHT: 159.6 LBS

## 2023-08-23 DIAGNOSIS — G93.9 BRAIN LESION: ICD-10-CM

## 2023-08-23 DIAGNOSIS — G56.01 CARPAL TUNNEL SYNDROME OF RIGHT WRIST: ICD-10-CM

## 2023-08-23 DIAGNOSIS — J45.40 MODERATE PERSISTENT ASTHMA WITHOUT COMPLICATION: ICD-10-CM

## 2023-08-23 DIAGNOSIS — M25.641 FINGER STIFFNESS, RIGHT: Primary | ICD-10-CM

## 2023-08-23 PROCEDURE — G8427 DOCREV CUR MEDS BY ELIG CLIN: HCPCS | Performed by: INTERNAL MEDICINE

## 2023-08-23 PROCEDURE — 99213 OFFICE O/P EST LOW 20 MIN: CPT | Performed by: INTERNAL MEDICINE

## 2023-08-23 PROCEDURE — 1090F PRES/ABSN URINE INCON ASSESS: CPT | Performed by: INTERNAL MEDICINE

## 2023-08-23 PROCEDURE — 1036F TOBACCO NON-USER: CPT | Performed by: INTERNAL MEDICINE

## 2023-08-23 PROCEDURE — 1123F ACP DISCUSS/DSCN MKR DOCD: CPT | Performed by: INTERNAL MEDICINE

## 2023-08-23 PROCEDURE — G8399 PT W/DXA RESULTS DOCUMENT: HCPCS | Performed by: INTERNAL MEDICINE

## 2023-08-23 PROCEDURE — 3017F COLORECTAL CA SCREEN DOC REV: CPT | Performed by: INTERNAL MEDICINE

## 2023-08-23 PROCEDURE — G8419 CALC BMI OUT NRM PARAM NOF/U: HCPCS | Performed by: INTERNAL MEDICINE

## 2023-08-23 RX ORDER — ALBUTEROL SULFATE 90 UG/1
AEROSOL, METERED RESPIRATORY (INHALATION)
Qty: 18 G | Status: CANCELLED | OUTPATIENT
Start: 2023-08-23

## 2023-08-23 RX ORDER — FLUTICASONE PROPIONATE AND SALMETEROL 250; 50 UG/1; UG/1
1 POWDER RESPIRATORY (INHALATION) EVERY 12 HOURS
Qty: 60 EACH | Refills: 3 | Status: SHIPPED | OUTPATIENT
Start: 2023-08-23

## 2023-08-23 RX ORDER — MONTELUKAST SODIUM 10 MG/1
10 TABLET ORAL DAILY
Qty: 30 TABLET | Refills: 0 | Status: CANCELLED | OUTPATIENT
Start: 2023-08-23 | End: 2024-04-09

## 2023-08-23 RX ORDER — MONTELUKAST SODIUM 10 MG/1
10 TABLET ORAL DAILY
Qty: 30 TABLET | Refills: 3 | Status: SHIPPED | OUTPATIENT
Start: 2023-08-23

## 2023-08-23 ASSESSMENT — ENCOUNTER SYMPTOMS
CONSTIPATION: 0
SORE THROAT: 0
COLOR CHANGE: 0
RHINORRHEA: 0
EYE DISCHARGE: 0
CHEST TIGHTNESS: 0
BACK PAIN: 0
COUGH: 0
ABDOMINAL PAIN: 0
DIARRHEA: 0
SHORTNESS OF BREATH: 0

## 2023-08-23 NOTE — PROGRESS NOTES
Shawn Hoffman (: 1955) is a 77 y.o. female, established patient, here for evaluation of the following chief complaint(s):  Follow-up and Finger Pain (Hurt mostly at night cannot close hand)    ASSESSMENT/PLAN:  Below is the assessment and plan developed based on review of pertinent history, physical exam, labs, studies, and medications. 1. Finger stiffness, right  -     diclofenac sodium (VOLTAREN) 1 % GEL; Apply 2 g topically 4 times daily, Topical, 4 TIMES DAILY Starting 2023, Until 2023, For 30 days, Disp-100 g, R-0, Normal sent to pharmacy. I prescribed Voltaren 1% gel to start applying 2 g on the affected areas QID PRN. Potential side effects were discussed. 2. Carpal tunnel syndrome of right wrist  I advised her to wear a wrist splint at night to prevent swelling and pain. 3. Brain lesion  . She has an appointment with Oncology on .    4. Moderate persistent asthma without complication  -     fluticasone-salmeterol (ADVAIR) 250-50 MCG/ACT AEPB diskus inhaler; Inhale 1 puff into the lungs in the morning and 1 puff in the evening., Disp-60 each, R-3Normal sent to pharmacy. -     montelukast (SINGULAIR) 10 MG tablet; Take 1 tablet by mouth daily, Disp-30 tablet, R-3Normal sent to pharmacy. I refilled her Advair diskus inhaler to continue using BID and Singulair 10 mg to continue taking daily. SUBJECTIVE/OBJECTIVE:  HPI  The patient presents today for swollen fingers. Her fingers on her right hand are becoming swollen and painful especially at night. She cannot close hands when they are swollen and painful in the morning but as the day goes on, it improves. She just noticed that it is slowly starting to affect her left hand as well. She was having trouble breathing last night and she suspects that she had an asthmatic flare up. Her appointment is next Thursday with Dr. Hyun Fuentes (Oncology).      Patient Active Problem List   Diagnosis    Anxiety    Type 2

## 2023-08-31 ENCOUNTER — OFFICE VISIT (OUTPATIENT)
Age: 68
End: 2023-08-31
Payer: MEDICARE

## 2023-08-31 VITALS
HEART RATE: 55 BPM | WEIGHT: 161.4 LBS | SYSTOLIC BLOOD PRESSURE: 115 MMHG | DIASTOLIC BLOOD PRESSURE: 73 MMHG | TEMPERATURE: 97.3 F | BODY MASS INDEX: 26.89 KG/M2 | HEIGHT: 65 IN | OXYGEN SATURATION: 93 %

## 2023-08-31 DIAGNOSIS — C90.00 MULTIPLE MYELOMA NOT HAVING ACHIEVED REMISSION (HCC): Primary | ICD-10-CM

## 2023-08-31 DIAGNOSIS — R06.02 SOB (SHORTNESS OF BREATH): ICD-10-CM

## 2023-08-31 PROCEDURE — G8427 DOCREV CUR MEDS BY ELIG CLIN: HCPCS | Performed by: INTERNAL MEDICINE

## 2023-08-31 PROCEDURE — 99205 OFFICE O/P NEW HI 60 MIN: CPT | Performed by: INTERNAL MEDICINE

## 2023-08-31 PROCEDURE — 1090F PRES/ABSN URINE INCON ASSESS: CPT | Performed by: INTERNAL MEDICINE

## 2023-08-31 PROCEDURE — 1036F TOBACCO NON-USER: CPT | Performed by: INTERNAL MEDICINE

## 2023-08-31 PROCEDURE — G8399 PT W/DXA RESULTS DOCUMENT: HCPCS | Performed by: INTERNAL MEDICINE

## 2023-08-31 PROCEDURE — G8419 CALC BMI OUT NRM PARAM NOF/U: HCPCS | Performed by: INTERNAL MEDICINE

## 2023-08-31 PROCEDURE — 1123F ACP DISCUSS/DSCN MKR DOCD: CPT | Performed by: INTERNAL MEDICINE

## 2023-08-31 PROCEDURE — 3017F COLORECTAL CA SCREEN DOC REV: CPT | Performed by: INTERNAL MEDICINE

## 2023-08-31 NOTE — PROGRESS NOTES
[facility] [place] at [address], [facility], [address] [place], [place] [address]way: [phone]  F: [phone]    Reason for Visit:   Vikas Bobby is a 77 y.o. female who is seen in consultation at the request of Dr. Santhosh Clayton for evaluation of Paraproteinemia. Treatment History:   None yet from us    History of Present Illness:   Patient is a 77 y.o. female who was dx with Smoldering Myeloma in 2017 and has been seeing [facility] Dr. Adrian Fung. A BM bx at that time showed 40% plasma cells. No end organ damage. Noted to have worsening anemia 7/5/2023 with a Hb of 9.6 g/dl. This led to further evaluation that included a gammopathy eval that showed an M spike of 0.3 g/dl. She had a Kappa LC level of 1593 g/L with a K:L ratio of 157. Sent for evaluation now. She was in the ER June 2023 with some SOB. Had a CT head and this showed lytic lesions. She states that she has some carpal tunnel. She has no headaches. She likes cold water. No infections.      Past Medical History:   Diagnosis Date    Anxiety 6/4/2009    Blood dyscrasia     followed by Dr. Opal Palomares 09/12    Disc disorder 6/4/2009    High cholesterol     Iron (Fe) deficiency anemia 6/4/2009    Multinodular goiter     Osteopenia 6/4/2009    Seasonal allergic rhinitis 6/4/2009    Vertigo       Past Surgical History:   Procedure Laterality Date    CATARACT REMOVAL      COLONOSCOPY N/A 9/22/2020    COLONOSCOPY     :- performed by Sherrie Toussaint MD at [address] [place] [address] N/A 9/15/2020    SIGMOIDOSCOPY FLEXIBLE performed by Sherrie Toussaint MD at [address] [place] [address] Rd      left foot      Social History     Tobacco Use    Smoking status: Never    Smokeless tobacco: Never   Substance Use Topics    Alcohol use: Yes      Family History   Problem Relation Age of Onset    Breast Cancer Sister 48    Other Son         psoriatic arthritis    Rheum Arthritis Mother     Other Mother         colon polyps    Heart Disease Father

## 2023-09-01 ENCOUNTER — TELEPHONE (OUTPATIENT)
Dept: PRIMARY CARE CLINIC | Facility: CLINIC | Age: 68
End: 2023-09-01

## 2023-09-01 DIAGNOSIS — M79.89 SWELLING OF BOTH HANDS: Primary | ICD-10-CM

## 2023-09-01 RX ORDER — METHYLPREDNISOLONE 4 MG/1
TABLET ORAL
Qty: 1 KIT | Refills: 0 | Status: SHIPPED | OUTPATIENT
Start: 2023-09-01 | End: 2023-09-07

## 2023-09-01 NOTE — TELEPHONE ENCOUNTER
Patient stated the pain in her wrist and fingers is getting worse. Starting to have swelling now. Pain was really intense last night. States she is using the Cream she was given but does not seem to be helping.

## 2023-09-06 DIAGNOSIS — C90.00 MULTIPLE MYELOMA NOT HAVING ACHIEVED REMISSION (HCC): ICD-10-CM

## 2023-09-06 LAB
ALBUMIN SERPL-MCNC: 4 G/DL (ref 3.5–5)
ALBUMIN/GLOB SERPL: 1.5 (ref 1.1–2.2)
ALP SERPL-CCNC: 74 U/L (ref 45–117)
ALT SERPL-CCNC: 26 U/L (ref 12–78)
ANION GAP SERPL CALC-SCNC: 6 MMOL/L (ref 5–15)
AST SERPL-CCNC: 8 U/L (ref 15–37)
BILIRUB SERPL-MCNC: 0.3 MG/DL (ref 0.2–1)
BUN SERPL-MCNC: 14 MG/DL (ref 6–20)
BUN/CREAT SERPL: 19 (ref 12–20)
CALCIUM SERPL-MCNC: 9.8 MG/DL (ref 8.5–10.1)
CHLORIDE SERPL-SCNC: 104 MMOL/L (ref 97–108)
CO2 SERPL-SCNC: 27 MMOL/L (ref 21–32)
CREAT SERPL-MCNC: 0.74 MG/DL (ref 0.55–1.02)
FERRITIN SERPL-MCNC: 81 NG/ML (ref 8–252)
GLOBULIN SER CALC-MCNC: 2.6 G/DL (ref 2–4)
GLUCOSE SERPL-MCNC: 93 MG/DL (ref 65–100)
IRON SATN MFR SERPL: 21 % (ref 20–50)
IRON SERPL-MCNC: 71 UG/DL (ref 35–150)
POTASSIUM SERPL-SCNC: 4.3 MMOL/L (ref 3.5–5.1)
PROT SERPL-MCNC: 6.6 G/DL (ref 6.4–8.2)
SODIUM SERPL-SCNC: 137 MMOL/L (ref 136–145)
TIBC SERPL-MCNC: 341 UG/DL (ref 250–450)

## 2023-09-07 LAB
BASOPHILS # BLD: 0 K/UL (ref 0–0.1)
BASOPHILS NFR BLD: 0 % (ref 0–1)
DIFFERENTIAL METHOD BLD: ABNORMAL
EOSINOPHIL # BLD: 0 K/UL (ref 0–0.4)
EOSINOPHIL NFR BLD: 0 % (ref 0–7)
ERYTHROCYTE [DISTWIDTH] IN BLOOD BY AUTOMATED COUNT: 15 % (ref 11.5–14.5)
HCT VFR BLD AUTO: 29.5 % (ref 35–47)
HGB BLD-MCNC: 9 G/DL (ref 11.5–16)
IMM GRANULOCYTES # BLD AUTO: 0 K/UL
IMM GRANULOCYTES NFR BLD AUTO: 0 %
LYMPHOCYTES # BLD: 1.4 K/UL (ref 0.8–3.5)
LYMPHOCYTES NFR BLD: 37 % (ref 12–49)
MCH RBC QN AUTO: 30.7 PG (ref 26–34)
MCHC RBC AUTO-ENTMCNC: 30.5 G/DL (ref 30–36.5)
MCV RBC AUTO: 100.7 FL (ref 80–99)
MONOCYTES # BLD: 1 K/UL (ref 0–1)
MONOCYTES NFR BLD: 27 % (ref 5–13)
NEUTS BAND NFR BLD MANUAL: 1 % (ref 0–6)
NEUTS SEG # BLD: 1.4 K/UL (ref 1.8–8)
NEUTS SEG NFR BLD: 35 % (ref 32–75)
NRBC # BLD: 0 K/UL (ref 0–0.01)
NRBC BLD-RTO: 0 PER 100 WBC
PLATELET # BLD AUTO: 194 K/UL (ref 150–400)
RBC # BLD AUTO: 2.93 M/UL (ref 3.8–5.2)
RBC MORPH BLD: ABNORMAL
WBC # BLD AUTO: 3.8 K/UL (ref 3.6–11)

## 2023-09-11 ENCOUNTER — HOSPITAL ENCOUNTER (OUTPATIENT)
Facility: HOSPITAL | Age: 68
Discharge: HOME | End: 2023-09-13
Attending: INTERNAL MEDICINE
Payer: MEDICARE

## 2023-09-11 VITALS
WEIGHT: 161 LBS | DIASTOLIC BLOOD PRESSURE: 73 MMHG | SYSTOLIC BLOOD PRESSURE: 115 MMHG | BODY MASS INDEX: 26.82 KG/M2 | HEIGHT: 65 IN

## 2023-09-11 DIAGNOSIS — R06.02 SOB (SHORTNESS OF BREATH): ICD-10-CM

## 2023-09-11 DIAGNOSIS — C90.00 MULTIPLE MYELOMA NOT HAVING ACHIEVED REMISSION (HCC): ICD-10-CM

## 2023-09-11 PROCEDURE — 93306 TTE W/DOPPLER COMPLETE: CPT

## 2023-09-12 ENCOUNTER — TELEPHONE (OUTPATIENT)
Age: 68
End: 2023-09-12

## 2023-09-12 LAB
ALBUMIN MFR UR ELPH: 1.6 %
ALPHA1 GLOB MFR UR ELPH: 1.1 %
ALPHA2 GLOB 24H MFR UR ELPH: 5.4 %
B-GLOBULIN 24H MFR UR ELPH: 90.2 %
GAMMA GLOB 24H MFR UR ELPH: 1.7 %
LABORATORY COMMENT REPORT: ABNORMAL
M PROTEIN MFR UR ELPH: 78.3 %
PROT UR-MCNC: 276.1 MG/DL

## 2023-09-12 NOTE — TELEPHONE ENCOUNTER
Returned pt's vm left requesting a call back; pt wanted to schedule CT scan. Provided pt Central Scheduling's phone number.

## 2023-09-13 LAB
ALBUMIN SERPL ELPH-MCNC: 4.1 G/DL (ref 2.9–4.4)
ALBUMIN/GLOB SERPL: 2 (ref 0.7–1.7)
ALPHA1 GLOB SERPL ELPH-MCNC: 0.2 G/DL (ref 0–0.4)
ALPHA2 GLOB SERPL ELPH-MCNC: 0.6 G/DL (ref 0.4–1)
B-GLOBULIN SERPL ELPH-MCNC: 0.9 G/DL (ref 0.7–1.3)
ECHO AV AREA PEAK VELOCITY: 2.8 CM2
ECHO AV AREA VTI: 3.1 CM2
ECHO AV AREA/BSA PEAK VELOCITY: 1.6 CM2/M2
ECHO AV AREA/BSA VTI: 1.7 CM2/M2
ECHO AV MEAN GRADIENT: 5 MMHG
ECHO AV MEAN VELOCITY: 1 M/S
ECHO AV PEAK GRADIENT: 9 MMHG
ECHO AV PEAK VELOCITY: 1.5 M/S
ECHO AV VELOCITY RATIO: 1
ECHO AV VTI: 27.4 CM
ECHO BSA: 1.83 M2
ECHO LA DIAMETER INDEX: 1.83 CM/M2
ECHO LA DIAMETER: 3.3 CM
ECHO LV E' LATERAL VELOCITY: 10 CM/S
ECHO LV E' SEPTAL VELOCITY: 8 CM/S
ECHO LV EDV A4C: 68 ML
ECHO LV EDV INDEX A4C: 38 ML/M2
ECHO LV EF PHYSICIAN: 60 %
ECHO LV EJECTION FRACTION A4C: 58 %
ECHO LV ESV A4C: 29 ML
ECHO LV ESV INDEX A4C: 16 ML/M2
ECHO LV FRACTIONAL SHORTENING: 37 % (ref 28–44)
ECHO LV GLOBAL LONGITUDINAL STRAIN (GLS): -15.5 %
ECHO LV GLOBAL LONGITUDINAL STRAIN (GLS): -16.4 %
ECHO LV GLOBAL LONGITUDINAL STRAIN (GLS): -16.8 %
ECHO LV GLOBAL LONGITUDINAL STRAIN (GLS): -18.7 %
ECHO LV INTERNAL DIMENSION DIASTOLE INDEX: 2.39 CM/M2
ECHO LV INTERNAL DIMENSION DIASTOLIC: 4.3 CM (ref 3.9–5.3)
ECHO LV INTERNAL DIMENSION SYSTOLIC INDEX: 1.5 CM/M2
ECHO LV INTERNAL DIMENSION SYSTOLIC: 2.7 CM
ECHO LV IVSD: 1 CM (ref 0.6–0.9)
ECHO LV MASS 2D: 162.9 G (ref 67–162)
ECHO LV MASS INDEX 2D: 90.5 G/M2 (ref 43–95)
ECHO LV POSTERIOR WALL DIASTOLIC: 1.2 CM (ref 0.6–0.9)
ECHO LV RELATIVE WALL THICKNESS RATIO: 0.56
ECHO LVOT AREA: 2.8 CM2
ECHO LVOT AV VTI INDEX: 1.08
ECHO LVOT DIAM: 1.9 CM
ECHO LVOT MEAN GRADIENT: 4 MMHG
ECHO LVOT PEAK GRADIENT: 9 MMHG
ECHO LVOT PEAK VELOCITY: 1.5 M/S
ECHO LVOT STROKE VOLUME INDEX: 46.6 ML/M2
ECHO LVOT SV: 83.9 ML
ECHO LVOT VTI: 29.6 CM
ECHO MV A VELOCITY: 0.82 M/S
ECHO MV AREA VTI: 4.1 CM2
ECHO MV E DECELERATION TIME (DT): 215.8 MS
ECHO MV E VELOCITY: 0.77 M/S
ECHO MV E/A RATIO: 0.94
ECHO MV E/E' LATERAL: 7.7
ECHO MV E/E' RATIO (AVERAGED): 8.66
ECHO MV E/E' SEPTAL: 9.63
ECHO MV LVOT VTI INDEX: 0.69
ECHO MV MAX VELOCITY: 1 M/S
ECHO MV MEAN GRADIENT: 2 MMHG
ECHO MV MEAN VELOCITY: 0.6 M/S
ECHO MV PEAK GRADIENT: 4 MMHG
ECHO MV VTI: 20.3 CM
ECHO PV MAX VELOCITY: 0.9 M/S
ECHO PV PEAK GRADIENT: 3 MMHG
ECHO RV FREE WALL PEAK S': 14 CM/S
ECHO RV TAPSE: 1.9 CM (ref 1.7–?)
GAMMA GLOB SERPL ELPH-MCNC: 0.3 G/DL (ref 0.4–1.8)
GLOBULIN SER-MCNC: 2.1 G/DL (ref 2.2–3.9)
IGA SERPL-MCNC: 6 MG/DL (ref 87–352)
IGG SERPL-MCNC: 353 MG/DL (ref 586–1602)
IGM SERPL-MCNC: <5 MG/DL (ref 26–217)
INTERPRETATION SERPL IEP-IMP: ABNORMAL
KAPPA LC FREE SER-MCNC: 1378.7 MG/L (ref 3.3–19.4)
KAPPA LC FREE/LAMBDA FREE SER: 626.68 (ref 0.26–1.65)
LAMBDA LC FREE SERPL-MCNC: 2.2 MG/L (ref 5.7–26.3)
M PROTEIN SERPL ELPH-MCNC: ABNORMAL G/DL
PROT SERPL-MCNC: 6.2 G/DL (ref 6–8.5)

## 2023-09-20 ENCOUNTER — TELEPHONE (OUTPATIENT)
Dept: PRIMARY CARE CLINIC | Facility: CLINIC | Age: 68
End: 2023-09-20

## 2023-09-20 DIAGNOSIS — M79.643 NON-ARTICULAR HAND PAIN, UNSPECIFIED LATERALITY: Primary | ICD-10-CM

## 2023-09-20 NOTE — TELEPHONE ENCOUNTER
Spoke to pt and relayed message. Phone number and name of doctor was given She asked for Dr. Graeme Almaraz to let them know a new pt is coming and I said you will have to call yourself. She said ok, thank you.

## 2023-09-25 ENCOUNTER — TELEPHONE (OUTPATIENT)
Age: 68
End: 2023-09-25

## 2023-09-25 NOTE — TELEPHONE ENCOUNTER
Mary Mcallister with the Authorization dept called regarding patient PET Scan. Caller states \"P2P needed [phone], Option 3, Ref [id number].

## 2023-09-25 NOTE — TELEPHONE ENCOUNTER
Call placed to [address] [place] [address] for P2P [phone], Option 1 then 3 , Ref # [id number]. Patient needs PET scan for new diagnosis MM staging. Per NCCN guidelines this is standard of care for initial staging.   Spoke with Robert Pastrana MD, approved #[id number]   Valid through 11/9/23

## 2023-09-29 ENCOUNTER — HOSPITAL ENCOUNTER (OUTPATIENT)
Facility: HOSPITAL | Age: 68
End: 2023-09-29
Attending: INTERNAL MEDICINE
Payer: MEDICARE

## 2023-09-29 ENCOUNTER — HOSPITAL ENCOUNTER (OUTPATIENT)
Facility: HOSPITAL | Age: 68
Discharge: HOME | End: 2023-09-29
Attending: INTERNAL MEDICINE
Payer: MEDICARE

## 2023-09-29 DIAGNOSIS — C90.00 MULTIPLE MYELOMA NOT HAVING ACHIEVED REMISSION (HCC): ICD-10-CM

## 2023-09-29 LAB
GLUCOSE BLD STRIP.AUTO-MCNC: 103 MG/DL (ref 65–117)
SERVICE CMNT-IMP: NORMAL

## 2023-09-29 PROCEDURE — 82962 GLUCOSE BLOOD TEST: CPT

## 2023-09-29 PROCEDURE — 3430000000 HC RX DIAGNOSTIC RADIOPHARMACEUTICAL: Performed by: INTERNAL MEDICINE

## 2023-09-29 PROCEDURE — A9552 F18 FDG: HCPCS | Performed by: INTERNAL MEDICINE

## 2023-09-29 PROCEDURE — 78816 PET IMAGE W/CT FULL BODY: CPT

## 2023-09-29 RX ORDER — FLUDEOXYGLUCOSE F-18 500 MCI/ML
10 INJECTION INTRAVENOUS ONCE
Status: COMPLETED | OUTPATIENT
Start: 2023-09-29 | End: 2023-09-29

## 2023-09-29 RX ADMIN — FLUDEOXYGLUCOSE F-18 10 MILLICURIE: 500 INJECTION INTRAVENOUS at 08:23

## 2023-10-04 ENCOUNTER — CLINICAL DOCUMENTATION (OUTPATIENT)
Age: 68
End: 2023-10-04

## 2023-10-04 ENCOUNTER — OFFICE VISIT (OUTPATIENT)
Age: 68
End: 2023-10-04
Payer: MEDICARE

## 2023-10-04 VITALS
TEMPERATURE: 98.3 F | RESPIRATION RATE: 18 BRPM | OXYGEN SATURATION: 94 % | BODY MASS INDEX: 25.46 KG/M2 | SYSTOLIC BLOOD PRESSURE: 110 MMHG | DIASTOLIC BLOOD PRESSURE: 66 MMHG | WEIGHT: 153 LBS | HEART RATE: 69 BPM

## 2023-10-04 DIAGNOSIS — C90.00 MULTIPLE MYELOMA NOT HAVING ACHIEVED REMISSION (HCC): Primary | ICD-10-CM

## 2023-10-04 DIAGNOSIS — Z11.59 ENCOUNTER FOR SCREENING FOR OTHER VIRAL DISEASES: ICD-10-CM

## 2023-10-04 PROCEDURE — G8484 FLU IMMUNIZE NO ADMIN: HCPCS | Performed by: INTERNAL MEDICINE

## 2023-10-04 PROCEDURE — 99215 OFFICE O/P EST HI 40 MIN: CPT | Performed by: INTERNAL MEDICINE

## 2023-10-04 PROCEDURE — 3017F COLORECTAL CA SCREEN DOC REV: CPT | Performed by: INTERNAL MEDICINE

## 2023-10-04 PROCEDURE — G8419 CALC BMI OUT NRM PARAM NOF/U: HCPCS | Performed by: INTERNAL MEDICINE

## 2023-10-04 PROCEDURE — 1090F PRES/ABSN URINE INCON ASSESS: CPT | Performed by: INTERNAL MEDICINE

## 2023-10-04 PROCEDURE — 1123F ACP DISCUSS/DSCN MKR DOCD: CPT | Performed by: INTERNAL MEDICINE

## 2023-10-04 PROCEDURE — G8428 CUR MEDS NOT DOCUMENT: HCPCS | Performed by: INTERNAL MEDICINE

## 2023-10-04 PROCEDURE — 1036F TOBACCO NON-USER: CPT | Performed by: INTERNAL MEDICINE

## 2023-10-04 PROCEDURE — G8399 PT W/DXA RESULTS DOCUMENT: HCPCS | Performed by: INTERNAL MEDICINE

## 2023-10-04 RX ORDER — ACYCLOVIR 400 MG/1
400 TABLET ORAL 2 TIMES DAILY
Qty: 60 TABLET | Refills: 5 | Status: SHIPPED | OUTPATIENT
Start: 2023-10-04

## 2023-10-04 RX ORDER — ONDANSETRON 8 MG/1
8 TABLET, ORALLY DISINTEGRATING ORAL EVERY 8 HOURS PRN
Qty: 30 TABLET | Refills: 2 | Status: SHIPPED | OUTPATIENT
Start: 2023-10-04

## 2023-10-04 RX ORDER — DEXAMETHASONE 4 MG/1
20 TABLET ORAL
Qty: 30 TABLET | Refills: 6 | Status: SHIPPED | OUTPATIENT
Start: 2023-10-04

## 2023-10-04 RX ORDER — LENALIDOMIDE 10 MG/1
10 CAPSULE ORAL DAILY
Qty: 14 CAPSULE | Refills: 0 | Status: ACTIVE | OUTPATIENT
Start: 2023-10-04

## 2023-10-04 NOTE — PROGRESS NOTES
Pharmacy Note- Chemotherapy Education    Tavia Aquino is a  77 y. o.female  diagnosed with multiple myeloma here today for  chemotherapy counseling and consent. Ms. Domenic Inman is being treated with Kayli-VRd. Provided education on daratumumab, bortezomib, lenalidomide, dexamethasone and premedications - acetaminophen, diphenhydramine, famotidine (C1D1) and montelukast (C1D1). Prescription sent for lenalidomide, acyclovir and dexamethasone. Patient enrolled in the [facility]lim[facility] REMS program and patient REMS guide and medication guide provided to patient. Medication name: lenalidomide  Regimen: Kayli-VRd  Dose:  10 mg   Frequency: daily  Administration schedule: for 14 days followed by 7 days off every 21 days  Ordering provider: Corby Dominguez MD  Start date: pending     REMS auth # [id number]        Side effects of chemotherapy reviewed included s/s infection, anemia, appetite changes, thrombocytopenia, fatigue, hair loss/alopecia, bone pain, skin and nail changes, diarrhea/constipation, blood clots and infusion reactions. Patient given ways to manage these side effects and when to contact office. [employer] Handout of medications provided to patient. Ms. Domenic Inman verbalized understanding of the information presented and all of the patient's questions were answered.     Shirley Mesa, PharmD, [facility], [address]    Program: Medical Group  CPA in place:  Yes  Recommendation Provided To: Patient/Caregiver: 2 via In person  Intervention Detail: New Rx: 4, reason: Needs Additional Therapy  Intervention Accepted By: Patient/Caregiver: 1    Time Spent (min): 45

## 2023-10-10 ENCOUNTER — TELEPHONE (OUTPATIENT)
Age: 68
End: 2023-10-10

## 2023-10-12 ENCOUNTER — HOSPITAL ENCOUNTER (OUTPATIENT)
Facility: HOSPITAL | Age: 68
Discharge: HOME | End: 2023-10-12
Attending: INTERNAL MEDICINE
Payer: MEDICARE

## 2023-10-12 VITALS
HEART RATE: 76 BPM | HEIGHT: 65 IN | SYSTOLIC BLOOD PRESSURE: 119 MMHG | WEIGHT: 150 LBS | TEMPERATURE: 98.5 F | OXYGEN SATURATION: 97 % | DIASTOLIC BLOOD PRESSURE: 54 MMHG | RESPIRATION RATE: 18 BRPM | BODY MASS INDEX: 24.99 KG/M2

## 2023-10-12 DIAGNOSIS — C90.00 MULTIPLE MYELOMA NOT HAVING ACHIEVED REMISSION (HCC): ICD-10-CM

## 2023-10-12 LAB
BASOPHILS # BLD: 0 K/UL (ref 0–0.1)
BASOPHILS NFR BLD: 0 % (ref 0–1)
DIFFERENTIAL METHOD BLD: ABNORMAL
EOSINOPHIL # BLD: 0 K/UL (ref 0–0.4)
EOSINOPHIL NFR BLD: 0 % (ref 0–7)
ERYTHROCYTE [DISTWIDTH] IN BLOOD BY AUTOMATED COUNT: 15 % (ref 11.5–14.5)
HCT VFR BLD AUTO: 26.5 % (ref 35–47)
HGB BLD-MCNC: 8.3 G/DL (ref 11.5–16)
IMM GRANULOCYTES # BLD AUTO: 0 K/UL
IMM GRANULOCYTES NFR BLD AUTO: 0 %
LYMPHOCYTES # BLD: 3.2 K/UL (ref 0.8–3.5)
LYMPHOCYTES NFR BLD: 73 % (ref 12–49)
MCH RBC QN AUTO: 31.3 PG (ref 26–34)
MCHC RBC AUTO-ENTMCNC: 31.3 G/DL (ref 30–36.5)
MCV RBC AUTO: 100 FL (ref 80–99)
MONOCYTES # BLD: 0.4 K/UL (ref 0–1)
MONOCYTES NFR BLD: 8 % (ref 5–13)
NEUTS SEG # BLD: 0.8 K/UL (ref 1.8–8)
NEUTS SEG NFR BLD: 19 % (ref 32–75)
NRBC # BLD: 0 K/UL (ref 0–0.01)
NRBC BLD-RTO: 0 PER 100 WBC
PLATELET # BLD AUTO: 158 K/UL (ref 150–400)
PMV BLD AUTO: 11.7 FL (ref 8.9–12.9)
RBC # BLD AUTO: 2.65 M/UL (ref 3.8–5.2)
RBC MORPH BLD: ABNORMAL
WBC # BLD AUTO: 4.4 K/UL (ref 3.6–11)
WBC MORPH BLD: ABNORMAL

## 2023-10-12 PROCEDURE — 88185 FLOWCYTOMETRY/TC ADD-ON: CPT

## 2023-10-12 PROCEDURE — 88184 FLOWCYTOMETRY/ TC 1 MARKER: CPT

## 2023-10-12 PROCEDURE — 88360 TUMOR IMMUNOHISTOCHEM/MANUAL: CPT

## 2023-10-12 PROCEDURE — 88364 INSITU HYBRIDIZATION (FISH): CPT

## 2023-10-12 PROCEDURE — 88341 IMHCHEM/IMCYTCHM EA ADD ANTB: CPT

## 2023-10-12 PROCEDURE — 38222 DX BONE MARROW BX & ASPIR: CPT

## 2023-10-12 PROCEDURE — 36415 COLL VENOUS BLD VENIPUNCTURE: CPT

## 2023-10-12 PROCEDURE — 88305 TISSUE EXAM BY PATHOLOGIST: CPT

## 2023-10-12 PROCEDURE — 88311 DECALCIFY TISSUE: CPT

## 2023-10-12 PROCEDURE — 88342 IMHCHEM/IMCYTCHM 1ST ANTB: CPT

## 2023-10-12 PROCEDURE — 88365 INSITU HYBRIDIZATION (FISH): CPT

## 2023-10-12 PROCEDURE — 85025 COMPLETE CBC W/AUTO DIFF WBC: CPT

## 2023-10-12 PROCEDURE — 6360000002 HC RX W HCPCS: Performed by: PHYSICIAN ASSISTANT

## 2023-10-12 PROCEDURE — 88313 SPECIAL STAINS GROUP 2: CPT

## 2023-10-12 RX ORDER — FENTANYL CITRATE 50 UG/ML
INJECTION, SOLUTION INTRAMUSCULAR; INTRAVENOUS PRN
Status: COMPLETED | OUTPATIENT
Start: 2023-10-12 | End: 2023-10-12

## 2023-10-12 RX ORDER — ONDANSETRON 2 MG/ML
INJECTION INTRAMUSCULAR; INTRAVENOUS PRN
Status: COMPLETED | OUTPATIENT
Start: 2023-10-12 | End: 2023-10-12

## 2023-10-12 RX ORDER — MIDAZOLAM HYDROCHLORIDE 2 MG/2ML
INJECTION, SOLUTION INTRAMUSCULAR; INTRAVENOUS PRN
Status: COMPLETED | OUTPATIENT
Start: 2023-10-12 | End: 2023-10-12

## 2023-10-12 RX ADMIN — FENTANYL CITRATE 50 MCG: 0.05 INJECTION, SOLUTION INTRAMUSCULAR; INTRAVENOUS at 13:35

## 2023-10-12 RX ADMIN — MIDAZOLAM HYDROCHLORIDE 2 MG: 1 INJECTION, SOLUTION INTRAMUSCULAR; INTRAVENOUS at 13:34

## 2023-10-12 RX ADMIN — MIDAZOLAM HYDROCHLORIDE 0.5 MG: 1 INJECTION, SOLUTION INTRAMUSCULAR; INTRAVENOUS at 13:35

## 2023-10-12 RX ADMIN — ONDANSETRON HYDROCHLORIDE 4 MG: 2 INJECTION, SOLUTION INTRAMUSCULAR; INTRAVENOUS at 11:59

## 2023-10-12 RX ADMIN — FENTANYL CITRATE 25 MCG: 0.05 INJECTION, SOLUTION INTRAMUSCULAR; INTRAVENOUS at 13:27

## 2023-10-12 RX ADMIN — FENTANYL CITRATE 50 MCG: 0.05 INJECTION, SOLUTION INTRAMUSCULAR; INTRAVENOUS at 13:15

## 2023-10-12 RX ADMIN — MIDAZOLAM HYDROCHLORIDE 0.5 MG: 1 INJECTION, SOLUTION INTRAMUSCULAR; INTRAVENOUS at 13:21

## 2023-10-12 RX ADMIN — MIDAZOLAM HYDROCHLORIDE 1 MG: 1 INJECTION, SOLUTION INTRAMUSCULAR; INTRAVENOUS at 13:15

## 2023-10-12 RX ADMIN — FENTANYL CITRATE 25 MCG: 0.05 INJECTION, SOLUTION INTRAMUSCULAR; INTRAVENOUS at 13:22

## 2023-10-12 ASSESSMENT — PAIN - FUNCTIONAL ASSESSMENT: PAIN_FUNCTIONAL_ASSESSMENT: NONE - DENIES PAIN

## 2023-10-12 NOTE — H&P
Interventional Radiology History and Physical      Patient: Tony Avila 77 y.o. female  [id number]    Consult Requested by:  Cherri Benjamin MD    Chief Complaint: multiple myeloma having not achieved remission    History of Present Illness: 78 yo female with the above cc presents for image-guided bone marrow biopsy. History:  Past Medical History:   Diagnosis Date    Anxiety 6/4/2009    Blood dyscrasia     followed by Dr. Igor Lozada 09/12    Disc disorder 6/4/2009    High cholesterol     Iron (Fe) deficiency anemia 6/4/2009    Multinodular goiter     Osteopenia 6/4/2009    Seasonal allergic rhinitis 6/4/2009    Vertigo      Family History   Problem Relation Age of Onset    Breast Cancer Sister 48    Other Son         psoriatic arthritis    Rheum Arthritis Mother     Other Mother         colon polyps    Heart Disease Father      Social History     Socioeconomic History    Marital status:      Spouse name: Not on file    Number of children: Not on file    Years of education: Not on file    Highest education level: Not on file   Occupational History    Not on file   Tobacco Use    Smoking status: Never    Smokeless tobacco: Never   Substance and Sexual Activity    Alcohol use: Yes    Drug use: No    Sexual activity: Not Currently   Other Topics Concern    Not on file   Social History Narrative    Lives in [facility] [place] [facility] with her 29 yo son. Also has a daughter who lives in [place].  Currently . Used to work for the [employer] and then worked for [employer] as an . [place] [place]        Social Determinants of Health     Financial Resource Strain: Unknown (7/5/2023)    Overall Financial Resource Strain (CARDIA)     Difficulty of Paying Living Expenses: Patient refused   Food Insecurity: Unknown (7/5/2023)    Hunger Vital Sign     Worried About Running Out of Food in the Last Year: Patient refused     [address] in the Last Year: Patient refused

## 2023-10-12 NOTE — PROGRESS NOTES
Discharge instructions explained to pt, all questions answered, and patient verbalized understanding. Copy of discharge instructions given to patient. Patient taken out via wheelchair. Patient discharged to the care of . At time of discharge pt in no acute distress, back to baseline mentation, denies any pain, and vital signs stable. PIV removed without incident, cath tip intact. Post proc site clean and dry, no evidence of bleeding or hematoma noted by this RN. See MAR/Flowsheet for post proc vitals/assessment.

## 2023-10-12 NOTE — PROGRESS NOTES
Pt arrives ambulatory, via wheelchair, via stretcher, and via bed to angio department accompanied by  for BMBX procedure. All assessments completed and consent was reviewed. Education given was regarding procedure, moderate sedation local anesthetic anesthesia, post-procedure care and  management/follow-up. Opportunity for questions was provided and all questions and concerns were addressed.

## 2023-10-17 ENCOUNTER — TELEPHONE (OUTPATIENT)
Age: 68
End: 2023-10-17

## 2023-10-17 ENCOUNTER — CLINICAL DOCUMENTATION (OUTPATIENT)
Age: 68
End: 2023-10-17

## 2023-10-17 DIAGNOSIS — Z11.59 ENCOUNTER FOR SCREENING FOR OTHER VIRAL DISEASES: Primary | ICD-10-CM

## 2023-10-17 DIAGNOSIS — C90.00 MULTIPLE MYELOMA NOT HAVING ACHIEVED REMISSION (HCC): ICD-10-CM

## 2023-10-17 RX ORDER — MONTELUKAST SODIUM 10 MG/1
10 TABLET ORAL ONCE
OUTPATIENT
Start: 2023-10-25 | End: 2023-10-18

## 2023-10-17 RX ORDER — SODIUM CHLORIDE 0.9 % (FLUSH) 0.9 %
5-40 SYRINGE (ML) INJECTION PRN
OUTPATIENT
Start: 2023-10-25

## 2023-10-17 RX ORDER — SODIUM CHLORIDE 9 MG/ML
5-250 INJECTION, SOLUTION INTRAVENOUS PRN
OUTPATIENT
Start: 2023-11-08

## 2023-10-17 RX ORDER — FAMOTIDINE 20 MG/1
20 TABLET, FILM COATED ORAL ONCE
OUTPATIENT
Start: 2023-10-25 | End: 2023-10-18

## 2023-10-17 RX ORDER — HEPARIN SODIUM (PORCINE) LOCK FLUSH IV SOLN 100 UNIT/ML 100 UNIT/ML
500 SOLUTION INTRAVENOUS PRN
OUTPATIENT
Start: 2023-11-01

## 2023-10-17 RX ORDER — ACETAMINOPHEN 325 MG/1
650 TABLET ORAL
OUTPATIENT
Start: 2023-11-08

## 2023-10-17 RX ORDER — ACETAMINOPHEN 325 MG/1
650 TABLET ORAL ONCE
OUTPATIENT
Start: 2023-10-25 | End: 2023-10-18

## 2023-10-17 RX ORDER — ONDANSETRON 2 MG/ML
8 INJECTION INTRAMUSCULAR; INTRAVENOUS
OUTPATIENT
Start: 2023-10-25

## 2023-10-17 RX ORDER — EPINEPHRINE 1 MG/ML
0.3 INJECTION, SOLUTION, CONCENTRATE INTRAVENOUS PRN
OUTPATIENT
Start: 2023-11-01

## 2023-10-17 RX ORDER — ACETAMINOPHEN 325 MG/1
650 TABLET ORAL ONCE
OUTPATIENT
Start: 2023-11-01 | End: 2023-10-25

## 2023-10-17 RX ORDER — ALBUTEROL SULFATE 90 UG/1
4 AEROSOL, METERED RESPIRATORY (INHALATION) PRN
OUTPATIENT
Start: 2023-11-08

## 2023-10-17 RX ORDER — ONDANSETRON 4 MG/1
8 TABLET, ORALLY DISINTEGRATING ORAL
OUTPATIENT
Start: 2023-11-01

## 2023-10-17 RX ORDER — EPINEPHRINE 1 MG/ML
0.3 INJECTION, SOLUTION, CONCENTRATE INTRAVENOUS PRN
OUTPATIENT
Start: 2023-11-08

## 2023-10-17 RX ORDER — HEPARIN SODIUM (PORCINE) LOCK FLUSH IV SOLN 100 UNIT/ML 100 UNIT/ML
500 SOLUTION INTRAVENOUS PRN
OUTPATIENT
Start: 2023-10-25

## 2023-10-17 RX ORDER — DIPHENHYDRAMINE HCL 25 MG
25 TABLET ORAL ONCE
OUTPATIENT
Start: 2023-11-01 | End: 2023-10-25

## 2023-10-17 RX ORDER — ONDANSETRON 4 MG/1
8 TABLET, ORALLY DISINTEGRATING ORAL
OUTPATIENT
Start: 2023-10-25

## 2023-10-17 RX ORDER — DEXAMETHASONE 0.5 MG/1
20 TABLET ORAL ONCE
OUTPATIENT
Start: 2023-11-01 | End: 2023-10-25

## 2023-10-17 RX ORDER — SODIUM CHLORIDE 9 MG/ML
INJECTION, SOLUTION INTRAVENOUS CONTINUOUS
OUTPATIENT
Start: 2023-10-25

## 2023-10-17 RX ORDER — DEXAMETHASONE 0.5 MG/1
20 TABLET ORAL ONCE
OUTPATIENT
Start: 2023-10-25 | End: 2023-10-18

## 2023-10-17 RX ORDER — DIPHENHYDRAMINE HYDROCHLORIDE 50 MG/ML
50 INJECTION INTRAMUSCULAR; INTRAVENOUS
OUTPATIENT
Start: 2023-10-25

## 2023-10-17 RX ORDER — ACETAMINOPHEN 325 MG/1
650 TABLET ORAL ONCE
OUTPATIENT
Start: 2023-11-08 | End: 2023-11-01

## 2023-10-17 RX ORDER — MEPERIDINE HYDROCHLORIDE 50 MG/ML
12.5 INJECTION INTRAMUSCULAR; INTRAVENOUS; SUBCUTANEOUS PRN
OUTPATIENT
Start: 2023-10-25

## 2023-10-17 RX ORDER — SODIUM CHLORIDE 9 MG/ML
5-250 INJECTION, SOLUTION INTRAVENOUS PRN
OUTPATIENT
Start: 2023-11-01

## 2023-10-17 RX ORDER — ACETAMINOPHEN 325 MG/1
650 TABLET ORAL
OUTPATIENT
Start: 2023-10-25

## 2023-10-17 RX ORDER — HEPARIN SODIUM (PORCINE) LOCK FLUSH IV SOLN 100 UNIT/ML 100 UNIT/ML
500 SOLUTION INTRAVENOUS PRN
OUTPATIENT
Start: 2023-11-08

## 2023-10-17 RX ORDER — FAMOTIDINE 10 MG/ML
20 INJECTION, SOLUTION INTRAVENOUS
OUTPATIENT
Start: 2023-11-08

## 2023-10-17 RX ORDER — SODIUM CHLORIDE 9 MG/ML
INJECTION, SOLUTION INTRAVENOUS CONTINUOUS
OUTPATIENT
Start: 2023-11-08

## 2023-10-17 RX ORDER — DIPHENHYDRAMINE HYDROCHLORIDE 50 MG/ML
50 INJECTION INTRAMUSCULAR; INTRAVENOUS
OUTPATIENT
Start: 2023-11-01

## 2023-10-17 RX ORDER — DIPHENHYDRAMINE HYDROCHLORIDE 50 MG/ML
50 INJECTION INTRAMUSCULAR; INTRAVENOUS
OUTPATIENT
Start: 2023-11-08

## 2023-10-17 RX ORDER — SODIUM CHLORIDE 0.9 % (FLUSH) 0.9 %
5-40 SYRINGE (ML) INJECTION PRN
OUTPATIENT
Start: 2023-11-08

## 2023-10-17 RX ORDER — DIPHENHYDRAMINE HCL 25 MG
25 TABLET ORAL ONCE
OUTPATIENT
Start: 2023-11-08 | End: 2023-11-01

## 2023-10-17 RX ORDER — ONDANSETRON 4 MG/1
8 TABLET, ORALLY DISINTEGRATING ORAL
OUTPATIENT
Start: 2023-11-08

## 2023-10-17 RX ORDER — SODIUM CHLORIDE 9 MG/ML
5-250 INJECTION, SOLUTION INTRAVENOUS PRN
OUTPATIENT
Start: 2023-10-25

## 2023-10-17 RX ORDER — ALBUTEROL SULFATE 90 UG/1
4 AEROSOL, METERED RESPIRATORY (INHALATION) PRN
OUTPATIENT
Start: 2023-10-25

## 2023-10-17 RX ORDER — DIPHENHYDRAMINE HCL 25 MG
25 TABLET ORAL ONCE
OUTPATIENT
Start: 2023-10-25 | End: 2023-10-18

## 2023-10-17 RX ORDER — FAMOTIDINE 10 MG/ML
20 INJECTION, SOLUTION INTRAVENOUS
OUTPATIENT
Start: 2023-11-01

## 2023-10-17 RX ORDER — ONDANSETRON 2 MG/ML
8 INJECTION INTRAMUSCULAR; INTRAVENOUS
OUTPATIENT
Start: 2023-11-08

## 2023-10-17 RX ORDER — SODIUM CHLORIDE 0.9 % (FLUSH) 0.9 %
5-40 SYRINGE (ML) INJECTION PRN
OUTPATIENT
Start: 2023-11-01

## 2023-10-17 RX ORDER — EPINEPHRINE 1 MG/ML
0.3 INJECTION, SOLUTION, CONCENTRATE INTRAVENOUS PRN
OUTPATIENT
Start: 2023-10-25

## 2023-10-17 RX ORDER — FAMOTIDINE 10 MG/ML
20 INJECTION, SOLUTION INTRAVENOUS
OUTPATIENT
Start: 2023-10-25

## 2023-10-17 RX ORDER — MEPERIDINE HYDROCHLORIDE 50 MG/ML
12.5 INJECTION INTRAMUSCULAR; INTRAVENOUS; SUBCUTANEOUS PRN
OUTPATIENT
Start: 2023-11-08

## 2023-10-17 RX ORDER — ALBUTEROL SULFATE 90 UG/1
4 AEROSOL, METERED RESPIRATORY (INHALATION) PRN
OUTPATIENT
Start: 2023-11-01

## 2023-10-17 RX ORDER — DEXAMETHASONE 0.5 MG/1
20 TABLET ORAL ONCE
OUTPATIENT
Start: 2023-11-08 | End: 2023-11-01

## 2023-10-17 RX ORDER — ONDANSETRON 2 MG/ML
8 INJECTION INTRAMUSCULAR; INTRAVENOUS
OUTPATIENT
Start: 2023-11-01

## 2023-10-17 RX ORDER — SODIUM CHLORIDE 9 MG/ML
INJECTION, SOLUTION INTRAVENOUS CONTINUOUS
OUTPATIENT
Start: 2023-11-01

## 2023-10-17 RX ORDER — MEPERIDINE HYDROCHLORIDE 50 MG/ML
12.5 INJECTION INTRAMUSCULAR; INTRAVENOUS; SUBCUTANEOUS PRN
OUTPATIENT
Start: 2023-11-01

## 2023-10-17 RX ORDER — ACETAMINOPHEN 325 MG/1
650 TABLET ORAL
OUTPATIENT
Start: 2023-11-01

## 2023-10-17 NOTE — PROGRESS NOTES
[facility] [place] at [address] , [facility]: [phone]  F: [phone]    Reason for Visit:   Erin Peres is a 77 y.o. female who is seen for Multiple Myeloma   Treatment History:   10/18/2023: Whit Wright    History of Present Illness:   Patient is a 77 y.o. female who was dx with Smoldering Myeloma in 2017 and has been seeing [facility] Dr. Shanae Naranjo. A BM bx at that time showed 40% plasma cells. No end organ damage. Noted to have worsening anemia 7/5/2023 with a Hb of 9.6 g/dl. This led to further evaluation that included a gammopathy eval that showed an M spike of 0.3 g/dl. She had a Kappa LC level of 1593 g/L with a K:L ratio of 157. Sent for evaluation now. She was in the ER June 2023 with some SOB. Had a CT head and this showed lytic lesions. She states that she has some carpal tunnel. Comes with labs and scans.  Comes after a BM bx and is diagnosed with Myeloma      Past Medical History:   Diagnosis Date    Anxiety 6/4/2009    Blood dyscrasia     followed by Dr. Caleb Esparza 09/12    Disc disorder 6/4/2009    High cholesterol     Iron (Fe) deficiency anemia 6/4/2009    Multinodular goiter     Osteopenia 6/4/2009    Seasonal allergic rhinitis 6/4/2009    Vertigo       Past Surgical History:   Procedure Laterality Date    CATARACT REMOVAL      COLONOSCOPY N/A 9/22/2020    COLONOSCOPY     :- performed by Colleen Gutierrez MD at [address] [place] [address] N/A 9/15/2020    SIGMOIDOSCOPY FLEXIBLE performed by Colleen Gutierrez MD at [address] [place] [address] Rd      left foot      Social History     Tobacco Use    Smoking status: Never    Smokeless tobacco: Never   Substance Use Topics    Alcohol use: Yes      Family History   Problem Relation Age of Onset    Breast Cancer Sister 48    Other Son         psoriatic arthritis    Rheum Arthritis Mother     Other Mother         colon polyps    Heart Disease Father      Current Outpatient Medications

## 2023-10-17 NOTE — TELEPHONE ENCOUNTER
Patient called and wants to talk with someone in reference to her Bone Marrow Scan. She wants someone to called her before her treatment tomorrow.          # [phone]

## 2023-10-18 ENCOUNTER — CLINICAL DOCUMENTATION (OUTPATIENT)
Age: 68
End: 2023-10-18

## 2023-10-18 ENCOUNTER — HOSPITAL ENCOUNTER (OUTPATIENT)
Facility: HOSPITAL | Age: 68
Setting detail: INFUSION SERIES
End: 2023-10-18
Payer: MEDICARE

## 2023-10-18 ENCOUNTER — OFFICE VISIT (OUTPATIENT)
Age: 68
End: 2023-10-18
Payer: MEDICARE

## 2023-10-18 VITALS
SYSTOLIC BLOOD PRESSURE: 140 MMHG | HEIGHT: 65 IN | HEART RATE: 85 BPM | RESPIRATION RATE: 20 BRPM | WEIGHT: 156 LBS | BODY MASS INDEX: 25.99 KG/M2 | DIASTOLIC BLOOD PRESSURE: 63 MMHG | TEMPERATURE: 97.6 F

## 2023-10-18 VITALS
HEART RATE: 85 BPM | TEMPERATURE: 97.6 F | SYSTOLIC BLOOD PRESSURE: 140 MMHG | OXYGEN SATURATION: 93 % | RESPIRATION RATE: 20 BRPM | BODY MASS INDEX: 25.96 KG/M2 | DIASTOLIC BLOOD PRESSURE: 63 MMHG | WEIGHT: 156 LBS

## 2023-10-18 DIAGNOSIS — C90.00 MULTIPLE MYELOMA NOT HAVING ACHIEVED REMISSION (HCC): ICD-10-CM

## 2023-10-18 DIAGNOSIS — C90.00 MULTIPLE MYELOMA NOT HAVING ACHIEVED REMISSION (HCC): Primary | ICD-10-CM

## 2023-10-18 DIAGNOSIS — Z11.59 ENCOUNTER FOR SCREENING FOR OTHER VIRAL DISEASES: ICD-10-CM

## 2023-10-18 DIAGNOSIS — Z11.59 ENCOUNTER FOR SCREENING FOR OTHER VIRAL DISEASES: Primary | ICD-10-CM

## 2023-10-18 LAB
ALBUMIN SERPL-MCNC: 3.9 G/DL (ref 3.5–5)
ALBUMIN/GLOB SERPL: 1.4 (ref 1.1–2.2)
ALP SERPL-CCNC: 59 U/L (ref 45–117)
ALT SERPL-CCNC: 24 U/L (ref 12–78)
ANION GAP SERPL CALC-SCNC: 4 MMOL/L (ref 5–15)
AST SERPL-CCNC: 7 U/L (ref 15–37)
BASOPHILS # BLD: 0 K/UL (ref 0–0.1)
BASOPHILS NFR BLD: 1 % (ref 0–1)
BILIRUB SERPL-MCNC: 0.3 MG/DL (ref 0.2–1)
BUN SERPL-MCNC: 10 MG/DL (ref 6–20)
BUN/CREAT SERPL: 12 (ref 12–20)
CALCIUM SERPL-MCNC: 10.9 MG/DL (ref 8.5–10.1)
CHLORIDE SERPL-SCNC: 106 MMOL/L (ref 97–108)
CO2 SERPL-SCNC: 28 MMOL/L (ref 21–32)
CREAT SERPL-MCNC: 0.83 MG/DL (ref 0.55–1.02)
DIFFERENTIAL METHOD BLD: ABNORMAL
EOSINOPHIL # BLD: 0.1 K/UL (ref 0–0.4)
EOSINOPHIL NFR BLD: 2 % (ref 0–7)
ERYTHROCYTE [DISTWIDTH] IN BLOOD BY AUTOMATED COUNT: 14.9 % (ref 11.5–14.5)
GLOBULIN SER CALC-MCNC: 2.7 G/DL (ref 2–4)
GLUCOSE SERPL-MCNC: 106 MG/DL (ref 65–100)
HBV SURFACE AB SER QL: NONREACTIVE
HBV SURFACE AB SER-ACNC: <3.1 MIU/ML
HBV SURFACE AG SER QL: 0.22 INDEX
HBV SURFACE AG SER QL: NEGATIVE
HCT VFR BLD AUTO: 27.3 % (ref 35–47)
HGB BLD-MCNC: 8.7 G/DL (ref 11.5–16)
IMM GRANULOCYTES # BLD AUTO: 0 K/UL
IMM GRANULOCYTES NFR BLD AUTO: 0 %
LYMPHOCYTES # BLD: 3.2 K/UL (ref 0.8–3.5)
LYMPHOCYTES NFR BLD: 74 % (ref 12–49)
MCH RBC QN AUTO: 31.5 PG (ref 26–34)
MCHC RBC AUTO-ENTMCNC: 31.9 G/DL (ref 30–36.5)
MCV RBC AUTO: 98.9 FL (ref 80–99)
MONOCYTES # BLD: 0.2 K/UL (ref 0–1)
MONOCYTES NFR BLD: 4 % (ref 5–13)
NEUTS SEG # BLD: 0.8 K/UL (ref 1.8–8)
NEUTS SEG NFR BLD: 19 % (ref 32–75)
NRBC # BLD: 0 K/UL (ref 0–0.01)
NRBC BLD-RTO: 0 PER 100 WBC
PLATELET # BLD AUTO: 159 K/UL (ref 150–400)
PLATELET COMMENT: ABNORMAL
PMV BLD AUTO: 11 FL (ref 8.9–12.9)
POTASSIUM SERPL-SCNC: 4.1 MMOL/L (ref 3.5–5.1)
PROT SERPL-MCNC: 6.6 G/DL (ref 6.4–8.2)
RBC # BLD AUTO: 2.76 M/UL (ref 3.8–5.2)
RBC MORPH BLD: ABNORMAL
SODIUM SERPL-SCNC: 138 MMOL/L (ref 136–145)
WBC # BLD AUTO: 4.3 K/UL (ref 3.6–11)
WBC MORPH BLD: ABNORMAL

## 2023-10-18 PROCEDURE — 86704 HEP B CORE ANTIBODY TOTAL: CPT

## 2023-10-18 PROCEDURE — 3017F COLORECTAL CA SCREEN DOC REV: CPT | Performed by: INTERNAL MEDICINE

## 2023-10-18 PROCEDURE — 86905 BLOOD TYPING RBC ANTIGENS: CPT

## 2023-10-18 PROCEDURE — 86901 BLOOD TYPING SEROLOGIC RH(D): CPT

## 2023-10-18 PROCEDURE — 85025 COMPLETE CBC W/AUTO DIFF WBC: CPT

## 2023-10-18 PROCEDURE — 1036F TOBACCO NON-USER: CPT | Performed by: INTERNAL MEDICINE

## 2023-10-18 PROCEDURE — 86850 RBC ANTIBODY SCREEN: CPT

## 2023-10-18 PROCEDURE — G8399 PT W/DXA RESULTS DOCUMENT: HCPCS | Performed by: INTERNAL MEDICINE

## 2023-10-18 PROCEDURE — 99215 OFFICE O/P EST HI 40 MIN: CPT | Performed by: INTERNAL MEDICINE

## 2023-10-18 PROCEDURE — 87340 HEPATITIS B SURFACE AG IA: CPT

## 2023-10-18 PROCEDURE — 36415 COLL VENOUS BLD VENIPUNCTURE: CPT

## 2023-10-18 PROCEDURE — 86900 BLOOD TYPING SEROLOGIC ABO: CPT

## 2023-10-18 PROCEDURE — 1090F PRES/ABSN URINE INCON ASSESS: CPT | Performed by: INTERNAL MEDICINE

## 2023-10-18 PROCEDURE — G8428 CUR MEDS NOT DOCUMENT: HCPCS | Performed by: INTERNAL MEDICINE

## 2023-10-18 PROCEDURE — G8484 FLU IMMUNIZE NO ADMIN: HCPCS | Performed by: INTERNAL MEDICINE

## 2023-10-18 PROCEDURE — G8419 CALC BMI OUT NRM PARAM NOF/U: HCPCS | Performed by: INTERNAL MEDICINE

## 2023-10-18 PROCEDURE — 80053 COMPREHEN METABOLIC PANEL: CPT

## 2023-10-18 PROCEDURE — 86706 HEP B SURFACE ANTIBODY: CPT

## 2023-10-18 PROCEDURE — 1123F ACP DISCUSS/DSCN MKR DOCD: CPT | Performed by: INTERNAL MEDICINE

## 2023-10-18 ASSESSMENT — PAIN SCALES - GENERAL: PAINLEVEL_OUTOF10: 0

## 2023-10-18 NOTE — PROGRESS NOTES
Shawn Hoffman is a 77 y.o. female    Chief Complaint   Patient presents with    Follow-up     paraproteinemia- Multiple Myeloma        1. Have you been to the ER, urgent care clinic since your last visit? Hospitalized since your last visit? No    2. Have you seen or consulted any other health care providers outside of the [address] [place] [address] Avenue since your last visit? Include any pap smears or colon screening.  No

## 2023-10-18 NOTE — PROGRESS NOTES
Pharmacy Note- Chemotherapy Education     Daryle Benjamin is a  77 y. o.female  diagnosed with multiple myeloma here today for  chemotherapy counseling. Ms. Denisha Mckay is being treated with Kayli-VRd. Provided education on daratumumab, bortezomib, lenalidomide, dexamethasone and premedications - acetaminophen, diphenhydramine, famotidine (C1D1) and montelukast (C1D1). Provided Ms. Denisha Mckay with a handout/calendar and reviewed home supportive care regimen and lenalidomide. Ms. Denisha Mckay is electing to delay start of cycle to 10/25/23. Medication name: lenalidomide  Regimen: Kayli-VRd  Dose:  10 mg   Frequency: daily  Administration schedule: for 14 days followed by 7 days off every 21 days  Ordering provider: Marcelino Razo MD  Start date: 10/25/23              Side effects of chemotherapy reviewed included s/s infection, anemia, appetite changes, thrombocytopenia, fatigue, hair loss/alopecia, bone pain, skin and nail changes, diarrhea/constipation, blood clots and infusion reactions. Patient given ways to manage these side effects and when to contact office. [employer] Handout of medications provided to patient. Ms. Denisha Mckay verbalized understanding of the information presented and all of the patient's questions were answered.      Leslee Ames, PharmD, [facility], [facility]    Program: Medical Group  CPA in place:  Yes  Recommendation Provided To: Patient/Caregiver: 1 via In person    Intervention Accepted By: Patient/Caregiver: 1    Time Spent (min): 20

## 2023-10-20 LAB
ABO + RH BLD: NORMAL
ANTIGENS PRESENT BLD: NORMAL
BLOOD GROUP ANTIBODIES SERPL: NORMAL
HBV CORE AB SERPL QL IA: NEGATIVE
SPECIMEN EXP DATE BLD: NORMAL

## 2023-10-25 ENCOUNTER — HOSPITAL ENCOUNTER (OUTPATIENT)
Facility: HOSPITAL | Age: 68
Setting detail: INFUSION SERIES
Discharge: HOME | End: 2023-10-25
Payer: MEDICARE

## 2023-10-25 ENCOUNTER — TELEPHONE (OUTPATIENT)
Age: 68
End: 2023-10-25

## 2023-10-25 ENCOUNTER — CLINICAL DOCUMENTATION (OUTPATIENT)
Age: 68
End: 2023-10-25

## 2023-10-25 VITALS
DIASTOLIC BLOOD PRESSURE: 65 MMHG | BODY MASS INDEX: 26.23 KG/M2 | SYSTOLIC BLOOD PRESSURE: 126 MMHG | HEIGHT: 65 IN | HEART RATE: 72 BPM | TEMPERATURE: 97.5 F | RESPIRATION RATE: 18 BRPM | WEIGHT: 157.4 LBS | OXYGEN SATURATION: 91 %

## 2023-10-25 DIAGNOSIS — C90.00 MULTIPLE MYELOMA NOT HAVING ACHIEVED REMISSION (HCC): Primary | ICD-10-CM

## 2023-10-25 DIAGNOSIS — Z11.59 ENCOUNTER FOR SCREENING FOR OTHER VIRAL DISEASES: ICD-10-CM

## 2023-10-25 LAB
ALBUMIN SERPL-MCNC: 3.9 G/DL (ref 3.5–5)
ALBUMIN/GLOB SERPL: 1.6 (ref 1.1–2.2)
ALP SERPL-CCNC: 61 U/L (ref 45–117)
ALT SERPL-CCNC: 24 U/L (ref 12–78)
ANION GAP SERPL CALC-SCNC: 5 MMOL/L (ref 5–15)
AST SERPL-CCNC: 11 U/L (ref 15–37)
BASOPHILS # BLD: 0 K/UL (ref 0–0.1)
BASOPHILS NFR BLD: 1 % (ref 0–1)
BILIRUB SERPL-MCNC: 0.3 MG/DL (ref 0.2–1)
BUN SERPL-MCNC: 11 MG/DL (ref 6–20)
BUN/CREAT SERPL: 15 (ref 12–20)
CALCIUM SERPL-MCNC: 10.7 MG/DL (ref 8.5–10.1)
CHLORIDE SERPL-SCNC: 105 MMOL/L (ref 97–108)
CO2 SERPL-SCNC: 28 MMOL/L (ref 21–32)
CREAT SERPL-MCNC: 0.73 MG/DL (ref 0.55–1.02)
DIFFERENTIAL METHOD BLD: ABNORMAL
EOSINOPHIL # BLD: 0 K/UL (ref 0–0.4)
EOSINOPHIL NFR BLD: 0 % (ref 0–7)
ERYTHROCYTE [DISTWIDTH] IN BLOOD BY AUTOMATED COUNT: 15.2 % (ref 11.5–14.5)
GLOBULIN SER CALC-MCNC: 2.5 G/DL (ref 2–4)
GLUCOSE SERPL-MCNC: 107 MG/DL (ref 65–100)
HBV SURFACE AB SER QL: NONREACTIVE
HBV SURFACE AB SER-ACNC: <3.1 MIU/ML
HBV SURFACE AG SER QL: <0.1 INDEX
HBV SURFACE AG SER QL: NEGATIVE
HCT VFR BLD AUTO: 27.2 % (ref 35–47)
HGB BLD-MCNC: 8.6 G/DL (ref 11.5–16)
IMM GRANULOCYTES # BLD AUTO: 0 K/UL
IMM GRANULOCYTES NFR BLD AUTO: 0 %
LYMPHOCYTES # BLD: 2.9 K/UL (ref 0.8–3.5)
LYMPHOCYTES NFR BLD: 66 % (ref 12–49)
MCH RBC QN AUTO: 31.2 PG (ref 26–34)
MCHC RBC AUTO-ENTMCNC: 31.6 G/DL (ref 30–36.5)
MCV RBC AUTO: 98.6 FL (ref 80–99)
MONOCYTES # BLD: 0.3 K/UL (ref 0–1)
MONOCYTES NFR BLD: 8 % (ref 5–13)
NEUTS SEG # BLD: 1.1 K/UL (ref 1.8–8)
NEUTS SEG NFR BLD: 25 % (ref 32–75)
NRBC # BLD: 0 K/UL (ref 0–0.01)
NRBC BLD-RTO: 0 PER 100 WBC
PLATELET # BLD AUTO: 170 K/UL (ref 150–400)
PMV BLD AUTO: 10.9 FL (ref 8.9–12.9)
POTASSIUM SERPL-SCNC: 4.2 MMOL/L (ref 3.5–5.1)
PROT SERPL-MCNC: 6.4 G/DL (ref 6.4–8.2)
RBC # BLD AUTO: 2.76 M/UL (ref 3.8–5.2)
RBC MORPH BLD: ABNORMAL
SODIUM SERPL-SCNC: 138 MMOL/L (ref 136–145)
WBC # BLD AUTO: 4.3 K/UL (ref 3.6–11)
WBC MORPH BLD: ABNORMAL

## 2023-10-25 PROCEDURE — 87340 HEPATITIS B SURFACE AG IA: CPT

## 2023-10-25 PROCEDURE — 85025 COMPLETE CBC W/AUTO DIFF WBC: CPT

## 2023-10-25 PROCEDURE — 6370000000 HC RX 637 (ALT 250 FOR IP): Performed by: INTERNAL MEDICINE

## 2023-10-25 PROCEDURE — 96401 CHEMO ANTI-NEOPL SQ/IM: CPT

## 2023-10-25 PROCEDURE — 80053 COMPREHEN METABOLIC PANEL: CPT

## 2023-10-25 PROCEDURE — 86706 HEP B SURFACE ANTIBODY: CPT

## 2023-10-25 PROCEDURE — 6360000002 HC RX W HCPCS: Performed by: INTERNAL MEDICINE

## 2023-10-25 PROCEDURE — 36415 COLL VENOUS BLD VENIPUNCTURE: CPT

## 2023-10-25 PROCEDURE — 86704 HEP B CORE ANTIBODY TOTAL: CPT

## 2023-10-25 PROCEDURE — 2580000003 HC RX 258: Performed by: INTERNAL MEDICINE

## 2023-10-25 PROCEDURE — A4216 STERILE WATER/SALINE, 10 ML: HCPCS | Performed by: INTERNAL MEDICINE

## 2023-10-25 RX ORDER — ONDANSETRON 2 MG/ML
8 INJECTION INTRAMUSCULAR; INTRAVENOUS
Status: DISCONTINUED | OUTPATIENT
Start: 2023-10-25 | End: 2023-10-26 | Stop reason: HOSPADM

## 2023-10-25 RX ORDER — ACETAMINOPHEN 325 MG/1
650 TABLET ORAL
Status: DISCONTINUED | OUTPATIENT
Start: 2023-10-25 | End: 2023-10-26 | Stop reason: HOSPADM

## 2023-10-25 RX ORDER — DIPHENHYDRAMINE HCL 25 MG
25 CAPSULE ORAL ONCE
Status: COMPLETED | OUTPATIENT
Start: 2023-10-25 | End: 2023-10-25

## 2023-10-25 RX ORDER — ALBUTEROL SULFATE 90 UG/1
4 AEROSOL, METERED RESPIRATORY (INHALATION) PRN
Status: DISCONTINUED | OUTPATIENT
Start: 2023-10-25 | End: 2023-10-26 | Stop reason: HOSPADM

## 2023-10-25 RX ORDER — EPINEPHRINE 1 MG/ML
0.3 INJECTION, SOLUTION, CONCENTRATE INTRAVENOUS PRN
Status: DISCONTINUED | OUTPATIENT
Start: 2023-10-25 | End: 2023-10-26 | Stop reason: HOSPADM

## 2023-10-25 RX ORDER — MONTELUKAST SODIUM 10 MG/1
10 TABLET ORAL ONCE
Status: COMPLETED | OUTPATIENT
Start: 2023-10-25 | End: 2023-10-25

## 2023-10-25 RX ORDER — DEXAMETHASONE 4 MG/1
20 TABLET ORAL ONCE
Status: COMPLETED | OUTPATIENT
Start: 2023-10-25 | End: 2023-10-25

## 2023-10-25 RX ORDER — MEPERIDINE HYDROCHLORIDE 25 MG/ML
12.5 INJECTION INTRAMUSCULAR; INTRAVENOUS; SUBCUTANEOUS PRN
Status: DISCONTINUED | OUTPATIENT
Start: 2023-10-25 | End: 2023-10-26 | Stop reason: HOSPADM

## 2023-10-25 RX ORDER — DIPHENHYDRAMINE HYDROCHLORIDE 50 MG/ML
50 INJECTION INTRAMUSCULAR; INTRAVENOUS
Status: DISCONTINUED | OUTPATIENT
Start: 2023-10-25 | End: 2023-10-26 | Stop reason: HOSPADM

## 2023-10-25 RX ORDER — ACETAMINOPHEN 325 MG/1
650 TABLET ORAL ONCE
Status: COMPLETED | OUTPATIENT
Start: 2023-10-25 | End: 2023-10-25

## 2023-10-25 RX ORDER — ONDANSETRON 4 MG/1
8 TABLET, ORALLY DISINTEGRATING ORAL
Status: COMPLETED | OUTPATIENT
Start: 2023-10-25 | End: 2023-10-25

## 2023-10-25 RX ORDER — FAMOTIDINE 20 MG/1
20 TABLET, FILM COATED ORAL ONCE
Status: COMPLETED | OUTPATIENT
Start: 2023-10-25 | End: 2023-10-25

## 2023-10-25 RX ORDER — SODIUM CHLORIDE 9 MG/ML
INJECTION, SOLUTION INTRAVENOUS CONTINUOUS
Status: DISCONTINUED | OUTPATIENT
Start: 2023-10-25 | End: 2023-10-26 | Stop reason: HOSPADM

## 2023-10-25 RX ORDER — SODIUM CHLORIDE 0.9 % (FLUSH) 0.9 %
5-40 SYRINGE (ML) INJECTION PRN
Status: DISCONTINUED | OUTPATIENT
Start: 2023-10-25 | End: 2023-10-26 | Stop reason: HOSPADM

## 2023-10-25 RX ADMIN — MONTELUKAST 10 MG: 10 TABLET, FILM COATED ORAL at 13:46

## 2023-10-25 RX ADMIN — DEXAMETHASONE 20 MG: 4 TABLET ORAL at 13:45

## 2023-10-25 RX ADMIN — FAMOTIDINE 20 MG: 20 TABLET, FILM COATED ORAL at 13:46

## 2023-10-25 RX ADMIN — ONDANSETRON 8 MG: 4 TABLET, ORALLY DISINTEGRATING ORAL at 13:49

## 2023-10-25 RX ADMIN — SODIUM CHLORIDE 2.25 MG: 9 INJECTION INTRAMUSCULAR; INTRAVENOUS; SUBCUTANEOUS at 15:11

## 2023-10-25 RX ADMIN — ACETAMINOPHEN 650 MG: 325 TABLET ORAL at 13:45

## 2023-10-25 RX ADMIN — DIPHENHYDRAMINE HYDROCHLORIDE 25 MG: 25 CAPSULE ORAL at 13:46

## 2023-10-25 RX ADMIN — DARATUMUMAB AND HYALURONIDASE-FIHJ (HUMAN RECOMBINANT) 1800 MG: 1800; 30000 INJECTION SUBCUTANEOUS at 15:13

## 2023-10-25 ASSESSMENT — PAIN - FUNCTIONAL ASSESSMENT: PAIN_FUNCTIONAL_ASSESSMENT: ACTIVITIES ARE NOT PREVENTED

## 2023-10-25 ASSESSMENT — PAIN DESCRIPTION - ORIENTATION: ORIENTATION: LEFT;RIGHT

## 2023-10-25 ASSESSMENT — PAIN DESCRIPTION - LOCATION: LOCATION: HAND

## 2023-10-25 ASSESSMENT — PAIN DESCRIPTION - DESCRIPTORS: DESCRIPTORS: NUMBNESS;TINGLING

## 2023-10-25 ASSESSMENT — PAIN SCALES - GENERAL: PAINLEVEL_OUTOF10: 8

## 2023-10-25 NOTE — PROGRESS NOTES
Pharmacy Note- Chemotherapy Education     Claire Xiong is a  77 y. o.female  diagnosed with multiple myeloma here today for  chemotherapy counseling. Ms. Mary Goyal is being treated with Kayli-VRd. Provided education on daratumumab, bortezomib, lenalidomide, dexamethasone and premedications - acetaminophen, diphenhydramine, famotidine (C1D1) and montelukast (C1D1). Reviewed with  Ms. Mary Goyal calendar and  home supportive care regimen and lenalidomide. Medication name: lenalidomide  Regimen: Kayli-VRd  Dose:  10 mg   Frequency: daily  Administration schedule: for 14 days followed by 7 days off every 21 days  Ordering provider: Elham Ng MD  Start date: 10/25/23              Reviewed side effects of chemotherapy to included s/s infection, anemia, appetite changes, thrombocytopenia, fatigue, hair loss/alopecia, bone pain, skin and nail changes, diarrhea/constipation, blood clots and infusion reactions. Patient given ways to manage these side effects and when to contact office. Ms. Mary Goyal verbalized understanding of the information presented and all of the patient's questions were answered.      Nancy GarD, [facility], [address]    Program: Medical Group  CPA in place:  Yes  Recommendation Provided To: Patient/Caregiver: 1 via In person    Intervention Accepted By: Patient/Caregiver: 1    Time Spent (min): 20

## 2023-10-25 NOTE — TELEPHONE ENCOUNTER
[address] called wanting a refill on her medication Revlimid     Fax:  [phone]        Her next cycle start on 28th       # [phone]

## 2023-10-26 DIAGNOSIS — G62.9 NEUROPATHY: ICD-10-CM

## 2023-10-26 DIAGNOSIS — C90.00 MULTIPLE MYELOMA NOT HAVING ACHIEVED REMISSION (HCC): Primary | ICD-10-CM

## 2023-10-26 RX ORDER — GABAPENTIN 100 MG/1
100 CAPSULE ORAL 3 TIMES DAILY
Qty: 90 CAPSULE | Refills: 0 | Status: SHIPPED | OUTPATIENT
Start: 2023-10-26 | End: 2023-11-25

## 2023-10-31 LAB — HBV CORE AB SERPL QL IA: NEGATIVE

## 2023-11-01 ENCOUNTER — HOSPITAL ENCOUNTER (OUTPATIENT)
Facility: HOSPITAL | Age: 68
Setting detail: INFUSION SERIES
Discharge: HOME | End: 2023-11-01
Payer: MEDICARE

## 2023-11-01 ENCOUNTER — CLINICAL DOCUMENTATION (OUTPATIENT)
Age: 68
End: 2023-11-01

## 2023-11-01 VITALS
OXYGEN SATURATION: 96 % | SYSTOLIC BLOOD PRESSURE: 106 MMHG | DIASTOLIC BLOOD PRESSURE: 43 MMHG | RESPIRATION RATE: 18 BRPM | BODY MASS INDEX: 26.12 KG/M2 | WEIGHT: 156.8 LBS | HEIGHT: 65 IN | TEMPERATURE: 97.9 F | HEART RATE: 67 BPM

## 2023-11-01 DIAGNOSIS — Z11.59 ENCOUNTER FOR SCREENING FOR OTHER VIRAL DISEASES: ICD-10-CM

## 2023-11-01 DIAGNOSIS — D70.1 NEUTROPENIA DUE TO AND NOT CONCURRENT WITH CHEMOTHERAPY (HCC): ICD-10-CM

## 2023-11-01 DIAGNOSIS — T45.1X5S NEUTROPENIA DUE TO AND NOT CONCURRENT WITH CHEMOTHERAPY (HCC): ICD-10-CM

## 2023-11-01 DIAGNOSIS — C90.00 MULTIPLE MYELOMA NOT HAVING ACHIEVED REMISSION (HCC): Primary | ICD-10-CM

## 2023-11-01 LAB
BASOPHILS # BLD: 0 K/UL (ref 0–0.1)
BASOPHILS NFR BLD: 1 % (ref 0–1)
DIFFERENTIAL METHOD BLD: ABNORMAL
EOSINOPHIL # BLD: 0.1 K/UL (ref 0–0.4)
EOSINOPHIL NFR BLD: 5 % (ref 0–7)
ERYTHROCYTE [DISTWIDTH] IN BLOOD BY AUTOMATED COUNT: 14.5 % (ref 11.5–14.5)
HCT VFR BLD AUTO: 25.3 % (ref 35–47)
HGB BLD-MCNC: 7.9 G/DL (ref 11.5–16)
IMM GRANULOCYTES # BLD AUTO: 0 K/UL (ref 0–0.04)
IMM GRANULOCYTES NFR BLD AUTO: 0 % (ref 0–0.5)
LYMPHOCYTES # BLD: 0.4 K/UL (ref 0.8–3.5)
LYMPHOCYTES NFR BLD: 40 % (ref 12–49)
MCH RBC QN AUTO: 31.6 PG (ref 26–34)
MCHC RBC AUTO-ENTMCNC: 31.2 G/DL (ref 30–36.5)
MCV RBC AUTO: 101.2 FL (ref 80–99)
MONOCYTES # BLD: 0.1 K/UL (ref 0–1)
MONOCYTES NFR BLD: 6 % (ref 5–13)
NEUTS SEG # BLD: 0.5 K/UL (ref 1.8–8)
NEUTS SEG NFR BLD: 48 % (ref 32–75)
NRBC # BLD: 0 K/UL (ref 0–0.01)
NRBC BLD-RTO: 0 PER 100 WBC
PLATELET # BLD AUTO: 137 K/UL (ref 150–400)
PLATELET COMMENT: ABNORMAL
PMV BLD AUTO: 11.9 FL (ref 8.9–12.9)
RBC # BLD AUTO: 2.5 M/UL (ref 3.8–5.2)
RBC MORPH BLD: ABNORMAL
WBC # BLD AUTO: 1.1 K/UL (ref 3.6–11)

## 2023-11-01 PROCEDURE — 96372 THER/PROPH/DIAG INJ SC/IM: CPT

## 2023-11-01 PROCEDURE — 36415 COLL VENOUS BLD VENIPUNCTURE: CPT

## 2023-11-01 PROCEDURE — 85025 COMPLETE CBC W/AUTO DIFF WBC: CPT

## 2023-11-01 PROCEDURE — 6360000002 HC RX W HCPCS: Performed by: INTERNAL MEDICINE

## 2023-11-01 RX ORDER — SODIUM CHLORIDE 9 MG/ML
INJECTION, SOLUTION INTRAVENOUS CONTINUOUS
Status: CANCELLED | OUTPATIENT
Start: 2023-11-01

## 2023-11-01 RX ORDER — ALBUTEROL SULFATE 90 UG/1
4 AEROSOL, METERED RESPIRATORY (INHALATION) PRN
Status: CANCELLED | OUTPATIENT
Start: 2023-11-02

## 2023-11-01 RX ORDER — DIPHENHYDRAMINE HYDROCHLORIDE 50 MG/ML
50 INJECTION INTRAMUSCULAR; INTRAVENOUS
Status: CANCELLED | OUTPATIENT
Start: 2023-11-02

## 2023-11-01 RX ORDER — EPINEPHRINE 1 MG/ML
0.3 INJECTION, SOLUTION, CONCENTRATE INTRAVENOUS PRN
Status: CANCELLED | OUTPATIENT
Start: 2023-11-01

## 2023-11-01 RX ORDER — DIPHENHYDRAMINE HYDROCHLORIDE 50 MG/ML
50 INJECTION INTRAMUSCULAR; INTRAVENOUS
Status: CANCELLED | OUTPATIENT
Start: 2023-11-01

## 2023-11-01 RX ORDER — ONDANSETRON 2 MG/ML
8 INJECTION INTRAMUSCULAR; INTRAVENOUS
Status: CANCELLED | OUTPATIENT
Start: 2023-11-01

## 2023-11-01 RX ORDER — ACETAMINOPHEN 325 MG/1
650 TABLET ORAL
Status: CANCELLED | OUTPATIENT
Start: 2023-11-01

## 2023-11-01 RX ORDER — EPINEPHRINE 1 MG/ML
0.3 INJECTION, SOLUTION, CONCENTRATE INTRAVENOUS PRN
Status: CANCELLED | OUTPATIENT
Start: 2023-11-02

## 2023-11-01 RX ORDER — SODIUM CHLORIDE 9 MG/ML
INJECTION, SOLUTION INTRAVENOUS CONTINUOUS
Status: CANCELLED | OUTPATIENT
Start: 2023-11-02

## 2023-11-01 RX ORDER — ALBUTEROL SULFATE 90 UG/1
4 AEROSOL, METERED RESPIRATORY (INHALATION) PRN
Status: CANCELLED | OUTPATIENT
Start: 2023-11-01

## 2023-11-01 RX ORDER — ACETAMINOPHEN 325 MG/1
650 TABLET ORAL
Status: CANCELLED | OUTPATIENT
Start: 2023-11-02

## 2023-11-01 RX ORDER — ONDANSETRON 2 MG/ML
8 INJECTION INTRAMUSCULAR; INTRAVENOUS
Status: CANCELLED | OUTPATIENT
Start: 2023-11-02

## 2023-11-01 RX ORDER — FAMOTIDINE 10 MG/ML
20 INJECTION, SOLUTION INTRAVENOUS
Status: CANCELLED | OUTPATIENT
Start: 2023-11-01

## 2023-11-01 RX ADMIN — FILGRASTIM-SNDZ 480 MCG: 480 INJECTION, SOLUTION INTRAVENOUS; SUBCUTANEOUS at 14:15

## 2023-11-01 ASSESSMENT — PAIN SCALES - GENERAL: PAINLEVEL_OUTOF10: 0

## 2023-11-01 NOTE — PROGRESS NOTES
Oral Chemotherapy Note    Nava Mendiola is a  77 y. o.female  diagnosed with multiple myeloma. Ms. Alexander Slade is being treated with Kayli-VRd. Medication name: lenalidomide  Regimen: Kayli-VRd  Dose:  10 mg   Frequency: daily  Administration schedule: for 14 days followed by 7 days off every 21 days  Ordering provider: Dean Stinson MD  Start date: 10/25/23    Day 8 - ANC 0.5 - all treatment held. Ms. Alexander Slade instructed to hold lenalidomide for rest of cycle. Started filgrastim daily for count recovery. Ms. Alexander Slade was instructed to follow-up with PCP regarding blood pressure. Ms. Alexander Slade verbalized understanding of the information presented and all of the patient's questions were answered.      Vanda Avelar, PharmD, [facility], [address]    Program: Medical Group  CPA in place:  Yes  Recommendation Provided To: Patient/Caregiver: 2 via In person  Intervention Detail: New Rx: 1, reason: Needs Additional Therapy  Intervention Accepted By: Patient/Caregiver: 2    Time Spent (min): 15

## 2023-11-02 ENCOUNTER — HOSPITAL ENCOUNTER (OUTPATIENT)
Facility: HOSPITAL | Age: 68
Setting detail: INFUSION SERIES
Discharge: HOME | End: 2023-11-02
Payer: MEDICARE

## 2023-11-02 VITALS
RESPIRATION RATE: 18 BRPM | HEART RATE: 80 BPM | TEMPERATURE: 97.9 F | SYSTOLIC BLOOD PRESSURE: 112 MMHG | DIASTOLIC BLOOD PRESSURE: 47 MMHG

## 2023-11-02 DIAGNOSIS — C90.00 MULTIPLE MYELOMA NOT HAVING ACHIEVED REMISSION (HCC): ICD-10-CM

## 2023-11-02 DIAGNOSIS — D70.1 NEUTROPENIA DUE TO AND NOT CONCURRENT WITH CHEMOTHERAPY (HCC): Primary | ICD-10-CM

## 2023-11-02 DIAGNOSIS — T45.1X5S NEUTROPENIA DUE TO AND NOT CONCURRENT WITH CHEMOTHERAPY (HCC): Primary | ICD-10-CM

## 2023-11-02 LAB
BASOPHILS # BLD: 0 K/UL (ref 0–0.1)
BASOPHILS NFR BLD: 0 % (ref 0–1)
DIFFERENTIAL METHOD BLD: ABNORMAL
EOSINOPHIL # BLD: 0.1 K/UL (ref 0–0.4)
EOSINOPHIL NFR BLD: 3 % (ref 0–7)
ERYTHROCYTE [DISTWIDTH] IN BLOOD BY AUTOMATED COUNT: 14.6 % (ref 11.5–14.5)
HCT VFR BLD AUTO: 27.2 % (ref 35–47)
HGB BLD-MCNC: 8.5 G/DL (ref 11.5–16)
IMM GRANULOCYTES # BLD AUTO: 0 K/UL
IMM GRANULOCYTES NFR BLD AUTO: 0 %
LYMPHOCYTES # BLD: 0.4 K/UL (ref 0.8–3.5)
LYMPHOCYTES NFR BLD: 11 % (ref 12–49)
MCH RBC QN AUTO: 31.1 PG (ref 26–34)
MCHC RBC AUTO-ENTMCNC: 31.3 G/DL (ref 30–36.5)
MCV RBC AUTO: 99.6 FL (ref 80–99)
MONOCYTES # BLD: 0.1 K/UL (ref 0–1)
MONOCYTES NFR BLD: 2 % (ref 5–13)
NEUTS SEG # BLD: 2.8 K/UL (ref 1.8–8)
NEUTS SEG NFR BLD: 84 % (ref 32–75)
NRBC # BLD: 0 K/UL (ref 0–0.01)
NRBC BLD-RTO: 0 PER 100 WBC
PLATELET # BLD AUTO: 174 K/UL (ref 150–400)
PLATELET COMMENT: ABNORMAL
PMV BLD AUTO: 12.1 FL (ref 8.9–12.9)
RBC # BLD AUTO: 2.73 M/UL (ref 3.8–5.2)
RBC MORPH BLD: ABNORMAL
WBC # BLD AUTO: 3.4 K/UL (ref 3.6–11)

## 2023-11-02 PROCEDURE — 96372 THER/PROPH/DIAG INJ SC/IM: CPT

## 2023-11-02 PROCEDURE — 36415 COLL VENOUS BLD VENIPUNCTURE: CPT

## 2023-11-02 PROCEDURE — 6360000002 HC RX W HCPCS: Performed by: INTERNAL MEDICINE

## 2023-11-02 PROCEDURE — 85025 COMPLETE CBC W/AUTO DIFF WBC: CPT

## 2023-11-02 RX ORDER — EPINEPHRINE 1 MG/ML
0.3 INJECTION, SOLUTION, CONCENTRATE INTRAVENOUS PRN
OUTPATIENT
Start: 2023-11-03

## 2023-11-02 RX ORDER — DIPHENHYDRAMINE HYDROCHLORIDE 50 MG/ML
50 INJECTION INTRAMUSCULAR; INTRAVENOUS
OUTPATIENT
Start: 2023-11-03

## 2023-11-02 RX ORDER — ALBUTEROL SULFATE 90 UG/1
4 AEROSOL, METERED RESPIRATORY (INHALATION) PRN
OUTPATIENT
Start: 2023-11-03

## 2023-11-02 RX ORDER — ONDANSETRON 2 MG/ML
8 INJECTION INTRAMUSCULAR; INTRAVENOUS
OUTPATIENT
Start: 2023-11-03

## 2023-11-02 RX ORDER — SODIUM CHLORIDE 9 MG/ML
INJECTION, SOLUTION INTRAVENOUS CONTINUOUS
OUTPATIENT
Start: 2023-11-03

## 2023-11-02 RX ORDER — ACETAMINOPHEN 325 MG/1
650 TABLET ORAL
OUTPATIENT
Start: 2023-11-03

## 2023-11-02 RX ADMIN — FILGRASTIM-SNDZ 480 MCG: 480 INJECTION, SOLUTION INTRAVENOUS; SUBCUTANEOUS at 14:48

## 2023-11-02 ASSESSMENT — PAIN SCALES - GENERAL: PAINLEVEL_OUTOF10: 1

## 2023-11-06 ENCOUNTER — TELEPHONE (OUTPATIENT)
Facility: HOSPITAL | Age: 68
End: 2023-11-06

## 2023-11-08 ENCOUNTER — CLINICAL DOCUMENTATION (OUTPATIENT)
Age: 68
End: 2023-11-08

## 2023-11-08 ENCOUNTER — HOSPITAL ENCOUNTER (OUTPATIENT)
Facility: HOSPITAL | Age: 68
Setting detail: INFUSION SERIES
End: 2023-11-08
Payer: MEDICARE

## 2023-11-08 ENCOUNTER — HOSPITAL ENCOUNTER (OUTPATIENT)
Facility: HOSPITAL | Age: 68
Setting detail: INFUSION SERIES
Discharge: HOME | End: 2023-11-08
Payer: MEDICARE

## 2023-11-08 VITALS
HEIGHT: 65 IN | BODY MASS INDEX: 25.39 KG/M2 | RESPIRATION RATE: 16 BRPM | WEIGHT: 152.4 LBS | TEMPERATURE: 97.5 F | SYSTOLIC BLOOD PRESSURE: 109 MMHG | HEART RATE: 70 BPM | DIASTOLIC BLOOD PRESSURE: 58 MMHG

## 2023-11-08 DIAGNOSIS — C90.00 MULTIPLE MYELOMA NOT HAVING ACHIEVED REMISSION (HCC): Primary | ICD-10-CM

## 2023-11-08 DIAGNOSIS — Z11.59 ENCOUNTER FOR SCREENING FOR OTHER VIRAL DISEASES: ICD-10-CM

## 2023-11-08 LAB
BASOPHILS # BLD: 0 K/UL (ref 0–0.1)
BASOPHILS NFR BLD: 1 % (ref 0–1)
DIFFERENTIAL METHOD BLD: ABNORMAL
EOSINOPHIL # BLD: 0 K/UL (ref 0–0.4)
EOSINOPHIL NFR BLD: 1 % (ref 0–7)
ERYTHROCYTE [DISTWIDTH] IN BLOOD BY AUTOMATED COUNT: 14.6 % (ref 11.5–14.5)
HCT VFR BLD AUTO: 25.7 % (ref 35–47)
HGB BLD-MCNC: 8.1 G/DL (ref 11.5–16)
IMM GRANULOCYTES # BLD AUTO: 0 K/UL (ref 0–0.04)
IMM GRANULOCYTES NFR BLD AUTO: 2 % (ref 0–0.5)
LYMPHOCYTES # BLD: 0.7 K/UL (ref 0.8–3.5)
LYMPHOCYTES NFR BLD: 38 % (ref 12–49)
MCH RBC QN AUTO: 31.5 PG (ref 26–34)
MCHC RBC AUTO-ENTMCNC: 31.5 G/DL (ref 30–36.5)
MCV RBC AUTO: 100 FL (ref 80–99)
MONOCYTES # BLD: 0.3 K/UL (ref 0–1)
MONOCYTES NFR BLD: 17 % (ref 5–13)
NEUTS SEG # BLD: 0.8 K/UL (ref 1.8–8)
NEUTS SEG NFR BLD: 41 % (ref 32–75)
NRBC # BLD: 0 K/UL (ref 0–0.01)
NRBC BLD-RTO: 0 PER 100 WBC
PLATELET # BLD AUTO: 188 K/UL (ref 150–400)
PMV BLD AUTO: 11 FL (ref 8.9–12.9)
RBC # BLD AUTO: 2.57 M/UL (ref 3.8–5.2)
RBC MORPH BLD: ABNORMAL
WBC # BLD AUTO: 1.8 K/UL (ref 3.6–11)

## 2023-11-08 PROCEDURE — 85025 COMPLETE CBC W/AUTO DIFF WBC: CPT

## 2023-11-08 PROCEDURE — 6360000002 HC RX W HCPCS: Performed by: INTERNAL MEDICINE

## 2023-11-08 PROCEDURE — 36415 COLL VENOUS BLD VENIPUNCTURE: CPT

## 2023-11-08 PROCEDURE — A4216 STERILE WATER/SALINE, 10 ML: HCPCS | Performed by: INTERNAL MEDICINE

## 2023-11-08 PROCEDURE — 6370000000 HC RX 637 (ALT 250 FOR IP): Performed by: INTERNAL MEDICINE

## 2023-11-08 PROCEDURE — 2580000003 HC RX 258: Performed by: INTERNAL MEDICINE

## 2023-11-08 PROCEDURE — 96401 CHEMO ANTI-NEOPL SQ/IM: CPT

## 2023-11-08 RX ORDER — ONDANSETRON 4 MG/1
8 TABLET, ORALLY DISINTEGRATING ORAL
Status: CANCELLED | OUTPATIENT
Start: 2023-11-15

## 2023-11-08 RX ORDER — HEPARIN 100 UNIT/ML
500 SYRINGE INTRAVENOUS PRN
Status: CANCELLED | OUTPATIENT
Start: 2023-11-22

## 2023-11-08 RX ORDER — MEPERIDINE HYDROCHLORIDE 25 MG/ML
12.5 INJECTION INTRAMUSCULAR; INTRAVENOUS; SUBCUTANEOUS PRN
Status: CANCELLED | OUTPATIENT
Start: 2023-11-15

## 2023-11-08 RX ORDER — DIPHENHYDRAMINE HYDROCHLORIDE 50 MG/ML
50 INJECTION INTRAMUSCULAR; INTRAVENOUS
OUTPATIENT
Start: 2023-11-29

## 2023-11-08 RX ORDER — SODIUM CHLORIDE 9 MG/ML
INJECTION, SOLUTION INTRAVENOUS CONTINUOUS
Status: CANCELLED | OUTPATIENT
Start: 2023-11-22

## 2023-11-08 RX ORDER — ONDANSETRON 2 MG/ML
8 INJECTION INTRAMUSCULAR; INTRAVENOUS
OUTPATIENT
Start: 2023-11-29

## 2023-11-08 RX ORDER — ONDANSETRON 4 MG/1
8 TABLET, ORALLY DISINTEGRATING ORAL
Status: CANCELLED | OUTPATIENT
Start: 2023-11-22

## 2023-11-08 RX ORDER — SODIUM CHLORIDE 0.9 % (FLUSH) 0.9 %
5-40 SYRINGE (ML) INJECTION PRN
Status: CANCELLED | OUTPATIENT
Start: 2023-11-22

## 2023-11-08 RX ORDER — DEXAMETHASONE 4 MG/1
20 TABLET ORAL ONCE
Status: COMPLETED | OUTPATIENT
Start: 2023-11-08 | End: 2023-11-08

## 2023-11-08 RX ORDER — ACETAMINOPHEN 325 MG/1
650 TABLET ORAL ONCE
OUTPATIENT
Start: 2023-11-29 | End: 2023-11-29

## 2023-11-08 RX ORDER — ACETAMINOPHEN 325 MG/1
650 TABLET ORAL
Status: CANCELLED | OUTPATIENT
Start: 2023-11-15

## 2023-11-08 RX ORDER — MEPERIDINE HYDROCHLORIDE 25 MG/ML
12.5 INJECTION INTRAMUSCULAR; INTRAVENOUS; SUBCUTANEOUS PRN
Status: CANCELLED | OUTPATIENT
Start: 2023-11-22

## 2023-11-08 RX ORDER — ONDANSETRON 2 MG/ML
8 INJECTION INTRAMUSCULAR; INTRAVENOUS
Status: CANCELLED | OUTPATIENT
Start: 2023-11-15

## 2023-11-08 RX ORDER — ALBUTEROL SULFATE 90 UG/1
4 AEROSOL, METERED RESPIRATORY (INHALATION) PRN
Status: CANCELLED | OUTPATIENT
Start: 2023-11-22

## 2023-11-08 RX ORDER — HEPARIN 100 UNIT/ML
500 SYRINGE INTRAVENOUS PRN
Status: CANCELLED | OUTPATIENT
Start: 2023-11-15

## 2023-11-08 RX ORDER — EPINEPHRINE 1 MG/ML
0.3 INJECTION, SOLUTION, CONCENTRATE INTRAVENOUS PRN
OUTPATIENT
Start: 2023-11-29

## 2023-11-08 RX ORDER — SODIUM CHLORIDE 9 MG/ML
5-250 INJECTION, SOLUTION INTRAVENOUS PRN
Status: CANCELLED | OUTPATIENT
Start: 2023-11-15

## 2023-11-08 RX ORDER — MEPERIDINE HYDROCHLORIDE 25 MG/ML
12.5 INJECTION INTRAMUSCULAR; INTRAVENOUS; SUBCUTANEOUS PRN
OUTPATIENT
Start: 2023-11-29

## 2023-11-08 RX ORDER — DIPHENHYDRAMINE HYDROCHLORIDE 50 MG/ML
50 INJECTION INTRAMUSCULAR; INTRAVENOUS
Status: CANCELLED | OUTPATIENT
Start: 2023-11-22

## 2023-11-08 RX ORDER — ACETAMINOPHEN 325 MG/1
650 TABLET ORAL
Status: CANCELLED | OUTPATIENT
Start: 2023-11-22

## 2023-11-08 RX ORDER — ONDANSETRON 2 MG/ML
8 INJECTION INTRAMUSCULAR; INTRAVENOUS
Status: CANCELLED | OUTPATIENT
Start: 2023-11-22

## 2023-11-08 RX ORDER — HEPARIN 100 UNIT/ML
500 SYRINGE INTRAVENOUS PRN
OUTPATIENT
Start: 2023-11-29

## 2023-11-08 RX ORDER — ALBUTEROL SULFATE 90 UG/1
4 AEROSOL, METERED RESPIRATORY (INHALATION) PRN
OUTPATIENT
Start: 2023-11-29

## 2023-11-08 RX ORDER — SODIUM CHLORIDE 0.9 % (FLUSH) 0.9 %
5-40 SYRINGE (ML) INJECTION PRN
OUTPATIENT
Start: 2023-11-29

## 2023-11-08 RX ORDER — EPINEPHRINE 1 MG/ML
0.3 INJECTION, SOLUTION, CONCENTRATE INTRAVENOUS PRN
Status: CANCELLED | OUTPATIENT
Start: 2023-11-22

## 2023-11-08 RX ORDER — DIPHENHYDRAMINE HCL 25 MG
25 CAPSULE ORAL ONCE
Status: COMPLETED | OUTPATIENT
Start: 2023-11-08 | End: 2023-11-08

## 2023-11-08 RX ORDER — ONDANSETRON 4 MG/1
8 TABLET, ORALLY DISINTEGRATING ORAL
OUTPATIENT
Start: 2023-11-29

## 2023-11-08 RX ORDER — ACETAMINOPHEN 325 MG/1
650 TABLET ORAL ONCE
Status: COMPLETED | OUTPATIENT
Start: 2023-11-08 | End: 2023-11-08

## 2023-11-08 RX ORDER — SODIUM CHLORIDE 9 MG/ML
5-250 INJECTION, SOLUTION INTRAVENOUS PRN
Status: CANCELLED | OUTPATIENT
Start: 2023-11-22

## 2023-11-08 RX ORDER — DEXAMETHASONE 4 MG/1
20 TABLET ORAL ONCE
OUTPATIENT
Start: 2023-11-29 | End: 2023-11-29

## 2023-11-08 RX ORDER — DIPHENHYDRAMINE HCL 25 MG
25 CAPSULE ORAL ONCE
Status: CANCELLED | OUTPATIENT
Start: 2023-11-22 | End: 2023-11-22

## 2023-11-08 RX ORDER — SODIUM CHLORIDE 9 MG/ML
INJECTION, SOLUTION INTRAVENOUS CONTINUOUS
Status: CANCELLED | OUTPATIENT
Start: 2023-11-15

## 2023-11-08 RX ORDER — ACETAMINOPHEN 325 MG/1
650 TABLET ORAL
OUTPATIENT
Start: 2023-11-29

## 2023-11-08 RX ORDER — SODIUM CHLORIDE 9 MG/ML
5-250 INJECTION, SOLUTION INTRAVENOUS PRN
OUTPATIENT
Start: 2023-11-29

## 2023-11-08 RX ORDER — DIPHENHYDRAMINE HYDROCHLORIDE 50 MG/ML
50 INJECTION INTRAMUSCULAR; INTRAVENOUS
Status: CANCELLED | OUTPATIENT
Start: 2023-11-15

## 2023-11-08 RX ORDER — DIPHENHYDRAMINE HCL 25 MG
25 CAPSULE ORAL ONCE
OUTPATIENT
Start: 2023-11-29 | End: 2023-11-29

## 2023-11-08 RX ORDER — SODIUM CHLORIDE 9 MG/ML
INJECTION, SOLUTION INTRAVENOUS CONTINUOUS
OUTPATIENT
Start: 2023-11-29

## 2023-11-08 RX ORDER — SODIUM CHLORIDE 0.9 % (FLUSH) 0.9 %
5-40 SYRINGE (ML) INJECTION PRN
Status: CANCELLED | OUTPATIENT
Start: 2023-11-15

## 2023-11-08 RX ORDER — EPINEPHRINE 1 MG/ML
0.3 INJECTION, SOLUTION, CONCENTRATE INTRAVENOUS PRN
Status: CANCELLED | OUTPATIENT
Start: 2023-11-15

## 2023-11-08 RX ORDER — ACETAMINOPHEN 325 MG/1
650 TABLET ORAL ONCE
Status: CANCELLED | OUTPATIENT
Start: 2023-11-22 | End: 2023-11-22

## 2023-11-08 RX ORDER — ALBUTEROL SULFATE 90 UG/1
4 AEROSOL, METERED RESPIRATORY (INHALATION) PRN
Status: CANCELLED | OUTPATIENT
Start: 2023-11-15

## 2023-11-08 RX ORDER — DEXAMETHASONE 4 MG/1
20 TABLET ORAL ONCE
Status: CANCELLED | OUTPATIENT
Start: 2023-11-22 | End: 2023-11-22

## 2023-11-08 RX ADMIN — SODIUM CHLORIDE 1.75 MG: 9 INJECTION INTRAMUSCULAR; INTRAVENOUS; SUBCUTANEOUS at 13:13

## 2023-11-08 RX ADMIN — ACETAMINOPHEN 650 MG: 325 TABLET ORAL at 11:53

## 2023-11-08 RX ADMIN — DIPHENHYDRAMINE HYDROCHLORIDE 25 MG: 25 CAPSULE ORAL at 11:51

## 2023-11-08 RX ADMIN — DEXAMETHASONE 20 MG: 4 TABLET ORAL at 11:51

## 2023-11-08 RX ADMIN — DARATUMUMAB AND HYALURONIDASE-FIHJ (HUMAN RECOMBINANT) 1800 MG: 1800; 30000 INJECTION SUBCUTANEOUS at 13:18

## 2023-11-08 ASSESSMENT — PAIN SCALES - GENERAL: PAINLEVEL_OUTOF10: 0

## 2023-11-08 NOTE — PROGRESS NOTES
Oral Chemotherapy Note     Karlene Good is a  77 y. o.female  diagnosed with multiple myeloma. Ms. Chet Betts is being treated with Kayli-VRd. Medication name: lenalidomide  Regimen: Kayli-VRd  Dose:  10 mg   Frequency: daily  Administration schedule: for 14 days followed by 7 days off every 21 days  Ordering provider: Rodri Tao MD  Start date: 10/25/23    ANC 0.8 today - [facility] to treat given by Dr. Isabela Razo for Cycle 1, Day 15 - daratumumab and bortezomib with a dose reduction of the bortezomib to 1 mg/m2 - beacon orders updated.      Fatima Najjar, PharmD, [facility], [address]    Program: Medical Group  CPA in place:  Yes  Recommendation Provided To: Provider: 1 via Verbally to provider  Intervention Detail: Dose Adjustment: 1, reason: Therapy Optimization  Intervention Accepted By: Provider: 1    Time Spent (min): 10

## 2023-11-10 ENCOUNTER — HOSPITAL ENCOUNTER (OUTPATIENT)
Facility: HOSPITAL | Age: 68
Setting detail: INFUSION SERIES
End: 2023-11-10

## 2023-11-15 ENCOUNTER — OFFICE VISIT (OUTPATIENT)
Age: 68
End: 2023-11-15
Payer: MEDICARE

## 2023-11-15 ENCOUNTER — CLINICAL DOCUMENTATION (OUTPATIENT)
Age: 68
End: 2023-11-15

## 2023-11-15 ENCOUNTER — HOSPITAL ENCOUNTER (OUTPATIENT)
Facility: HOSPITAL | Age: 68
Setting detail: INFUSION SERIES
Discharge: HOME | End: 2023-11-15
Payer: MEDICARE

## 2023-11-15 VITALS
RESPIRATION RATE: 18 BRPM | BODY MASS INDEX: 25.79 KG/M2 | DIASTOLIC BLOOD PRESSURE: 53 MMHG | SYSTOLIC BLOOD PRESSURE: 122 MMHG | OXYGEN SATURATION: 98 % | HEART RATE: 71 BPM | WEIGHT: 155 LBS | TEMPERATURE: 97.7 F

## 2023-11-15 VITALS
TEMPERATURE: 97.7 F | SYSTOLIC BLOOD PRESSURE: 122 MMHG | HEART RATE: 71 BPM | BODY MASS INDEX: 25.96 KG/M2 | WEIGHT: 155.8 LBS | DIASTOLIC BLOOD PRESSURE: 53 MMHG | HEIGHT: 65 IN | OXYGEN SATURATION: 98 %

## 2023-11-15 DIAGNOSIS — T45.1X5A CHEMOTHERAPY-INDUCED NAUSEA: ICD-10-CM

## 2023-11-15 DIAGNOSIS — D70.1 CHEMOTHERAPY INDUCED NEUTROPENIA (HCC): ICD-10-CM

## 2023-11-15 DIAGNOSIS — C90.00 MULTIPLE MYELOMA NOT HAVING ACHIEVED REMISSION (HCC): Primary | ICD-10-CM

## 2023-11-15 DIAGNOSIS — R11.0 CHEMOTHERAPY-INDUCED NAUSEA: ICD-10-CM

## 2023-11-15 DIAGNOSIS — F41.9 ANXIETY: ICD-10-CM

## 2023-11-15 DIAGNOSIS — T45.1X5A CHEMOTHERAPY INDUCED NEUTROPENIA (HCC): ICD-10-CM

## 2023-11-15 DIAGNOSIS — Z11.59 ENCOUNTER FOR SCREENING FOR OTHER VIRAL DISEASES: Primary | ICD-10-CM

## 2023-11-15 DIAGNOSIS — C90.00 MULTIPLE MYELOMA NOT HAVING ACHIEVED REMISSION (HCC): ICD-10-CM

## 2023-11-15 LAB
ALBUMIN SERPL-MCNC: 3.5 G/DL (ref 3.5–5)
ALBUMIN/GLOB SERPL: 1.7 (ref 1.1–2.2)
ALP SERPL-CCNC: 73 U/L (ref 45–117)
ALT SERPL-CCNC: 17 U/L (ref 12–78)
ANION GAP SERPL CALC-SCNC: 4 MMOL/L (ref 5–15)
AST SERPL-CCNC: <3 U/L (ref 15–37)
BASO+EOS+MONOS # BLD AUTO: 0.1 K/UL (ref 0.2–1.2)
BASO+EOS+MONOS NFR BLD AUTO: 4 % (ref 3.2–16.9)
BILIRUB SERPL-MCNC: 0.3 MG/DL (ref 0.2–1)
BUN SERPL-MCNC: 14 MG/DL (ref 6–20)
BUN/CREAT SERPL: 19 (ref 12–20)
CALCIUM SERPL-MCNC: 8.9 MG/DL (ref 8.5–10.1)
CHLORIDE SERPL-SCNC: 106 MMOL/L (ref 97–108)
CO2 SERPL-SCNC: 28 MMOL/L (ref 21–32)
CREAT SERPL-MCNC: 0.75 MG/DL (ref 0.55–1.02)
DIFFERENTIAL METHOD BLD: ABNORMAL
ERYTHROCYTE [DISTWIDTH] IN BLOOD BY AUTOMATED COUNT: 14.6 % (ref 11.8–15.8)
GLOBULIN SER CALC-MCNC: 2.1 G/DL (ref 2–4)
GLUCOSE SERPL-MCNC: 103 MG/DL (ref 65–100)
HCT VFR BLD AUTO: 25.8 % (ref 35–47)
HGB BLD-MCNC: 8.2 G/DL (ref 11.5–16)
LYMPHOCYTES # BLD: 0.8 K/UL (ref 0.8–3.5)
LYMPHOCYTES NFR BLD: 41 % (ref 12–49)
MCH RBC QN AUTO: 30.9 PG (ref 26–34)
MCHC RBC AUTO-ENTMCNC: 31.8 G/DL (ref 30–36.5)
MCV RBC AUTO: 97.4 FL (ref 80–99)
NEUTS SEG # BLD: 1.1 K/UL (ref 1.8–8)
NEUTS SEG NFR BLD: 55 % (ref 32–75)
PLATELET # BLD AUTO: 170 K/UL (ref 150–400)
POTASSIUM SERPL-SCNC: 4.3 MMOL/L (ref 3.5–5.1)
PROT SERPL-MCNC: 5.6 G/DL (ref 6.4–8.2)
RBC # BLD AUTO: 2.65 M/UL (ref 3.8–5.2)
SODIUM SERPL-SCNC: 138 MMOL/L (ref 136–145)
WBC # BLD AUTO: 2 K/UL (ref 3.6–11)

## 2023-11-15 PROCEDURE — 99215 OFFICE O/P EST HI 40 MIN: CPT | Performed by: INTERNAL MEDICINE

## 2023-11-15 PROCEDURE — G8419 CALC BMI OUT NRM PARAM NOF/U: HCPCS | Performed by: INTERNAL MEDICINE

## 2023-11-15 PROCEDURE — 6370000000 HC RX 637 (ALT 250 FOR IP): Performed by: INTERNAL MEDICINE

## 2023-11-15 PROCEDURE — A4216 STERILE WATER/SALINE, 10 ML: HCPCS | Performed by: INTERNAL MEDICINE

## 2023-11-15 PROCEDURE — G8428 CUR MEDS NOT DOCUMENT: HCPCS | Performed by: INTERNAL MEDICINE

## 2023-11-15 PROCEDURE — 1090F PRES/ABSN URINE INCON ASSESS: CPT | Performed by: INTERNAL MEDICINE

## 2023-11-15 PROCEDURE — 36415 COLL VENOUS BLD VENIPUNCTURE: CPT

## 2023-11-15 PROCEDURE — 6360000002 HC RX W HCPCS: Performed by: INTERNAL MEDICINE

## 2023-11-15 PROCEDURE — G8399 PT W/DXA RESULTS DOCUMENT: HCPCS | Performed by: INTERNAL MEDICINE

## 2023-11-15 PROCEDURE — 1036F TOBACCO NON-USER: CPT | Performed by: INTERNAL MEDICINE

## 2023-11-15 PROCEDURE — 1123F ACP DISCUSS/DSCN MKR DOCD: CPT | Performed by: INTERNAL MEDICINE

## 2023-11-15 PROCEDURE — 3017F COLORECTAL CA SCREEN DOC REV: CPT | Performed by: INTERNAL MEDICINE

## 2023-11-15 PROCEDURE — 2580000003 HC RX 258: Performed by: INTERNAL MEDICINE

## 2023-11-15 PROCEDURE — 80053 COMPREHEN METABOLIC PANEL: CPT

## 2023-11-15 PROCEDURE — 85025 COMPLETE CBC W/AUTO DIFF WBC: CPT

## 2023-11-15 PROCEDURE — G8484 FLU IMMUNIZE NO ADMIN: HCPCS | Performed by: INTERNAL MEDICINE

## 2023-11-15 PROCEDURE — 96401 CHEMO ANTI-NEOPL SQ/IM: CPT

## 2023-11-15 RX ORDER — DEXAMETHASONE 4 MG/1
20 TABLET ORAL ONCE
Status: COMPLETED | OUTPATIENT
Start: 2023-11-15 | End: 2023-11-15

## 2023-11-15 RX ORDER — ACETAMINOPHEN 325 MG/1
650 TABLET ORAL ONCE
Status: COMPLETED | OUTPATIENT
Start: 2023-11-15 | End: 2023-11-15

## 2023-11-15 RX ORDER — DIPHENHYDRAMINE HCL 25 MG
25 CAPSULE ORAL ONCE
Status: COMPLETED | OUTPATIENT
Start: 2023-11-15 | End: 2023-11-15

## 2023-11-15 RX ADMIN — DARATUMUMAB AND HYALURONIDASE-FIHJ (HUMAN RECOMBINANT) 1800 MG: 1800; 30000 INJECTION SUBCUTANEOUS at 13:38

## 2023-11-15 RX ADMIN — SODIUM CHLORIDE 1.75 MG: 9 INJECTION INTRAMUSCULAR; INTRAVENOUS; SUBCUTANEOUS at 13:37

## 2023-11-15 RX ADMIN — ACETAMINOPHEN 650 MG: 325 TABLET ORAL at 12:20

## 2023-11-15 RX ADMIN — DEXAMETHASONE 20 MG: 4 TABLET ORAL at 12:20

## 2023-11-15 RX ADMIN — DIPHENHYDRAMINE HYDROCHLORIDE 25 MG: 25 CAPSULE ORAL at 12:20

## 2023-11-15 ASSESSMENT — PAIN SCALES - GENERAL: PAINLEVEL_OUTOF10: 0

## 2023-11-15 NOTE — PROGRESS NOTES
Oral Chemotherapy Note     Julia Hutchinson is a  77 y. o.female  diagnosed with multiple myeloma. Ms. Iva Shah is being treated with Kayli-VRd. Medication name: lenalidomide  Regimen: Kayli-VRd  Dose:  10 mg   Frequency: daily  Administration schedule: for 14 days followed by 7 days off every 21 days  Ordering provider: Madhavi Goncalves MD  Start date: 11/15/23     Lenalidomide is being held with Cycle 2. Patient instructed not to take. Lab Results   Component Value Date    WBC 2.0 (L) 11/15/2023    HGB 8.2 (L) 11/15/2023    HCT 25.8 (L) 11/15/2023    MCV 97.4 11/15/2023     11/15/2023    LYMPHOPCT 41 11/15/2023    RBC 2.65 (L) 11/15/2023    MCH 30.9 11/15/2023    MCHC 31.8 11/15/2023    RDW 14.6 11/15/2023     Lab Results   Component Value Date    NEUTROABS 1.1 (L) 11/15/2023              Expected follow up date: Labs/office visit each cycle. Next cycle start on 12/6/23     Patient provided with an Oral Chemotherapy Journal.             Ms. Iva Shah verbalized understanding of the information presented and all of the patient's questions were answered.      Charla Parra, PharmD, [facility], [address]    Program: Medical Group  CPA in place:  Yes  Recommendation Provided To: Patient/Caregiver: 1 via In person    Intervention Accepted By: Patient/Caregiver: 1    Time Spent (min): 15

## 2023-11-15 NOTE — PROGRESS NOTES
Dejah Palencia is a 77 y.o. female    Chief Complaint   Patient presents with    Follow-up      Multiple Myeloma        1. Have you been to the ER, urgent care clinic since your last visit? Hospitalized since your last visit? No    2. Have you seen or consulted any other health care providers outside of the [address] [place] [address] Avenue since your last visit? Include any pap smears or colon screening.  No

## 2023-11-15 NOTE — PROGRESS NOTES
acetaminophen (TYLENOL) tablet 650 mg Admin Date  11/15/2023 Action  Given Dose  650 mg Route  Oral Administered By  Nita Juan RN        bortezomib (VELCADE) 1.75 mg in sodium chloride (PF) 0.9 % 0.7 mL chemo subcutaneous syringe Admin Date  11/15/2023 Action  Given Dose  1.75 mg Route  SubCUTAneous Administered By  Nita Juan RN        daratumumab-hyaluronidase-[facility],THE FASPRO) chemo syringe 1,800 mg Admin Date  11/15/2023 Action  Given Dose  1,800 mg Route  SubCUTAneous Administered By  Nita Juan RN        dexamethasone (DECADRON) tablet 20 mg Admin Date  11/15/2023 Action  Given Dose  20 mg Route  Oral Administered By  Nita Juan RN        diphenhydrAMINE (BENADRYL) capsule 25 mg Admin Date  11/15/2023 Action  Given Dose  25 mg Route  Oral Administered By  Nita Juan RN        Darzalex Faspro given SC RLQ  Velcade given SC LLQ    Prior to chemotherapy/immunotherapy administration the following were verified with a second chemotherapy/immunotherapy certified nurse: Patient name, Patient  or CSN, Drug name, Drug dose, Infusion/drug volume, Rate of administration, Route of administration, Expiration dates/times, Appearance and physical integrity of the drug(s)    Please see MAR for specific drug names and time of administration. Patient was monitored appropriately, and vital signs were obtained. Tolerated infusion well without issue. Pt aware of next appointment scheduled.      Future Appointments   Date Time Provider [address] [place] [address]   2023 11:30 AM B3 [facility] [place] [facility] [address]   2023 10:00 AM C1 [facility] [place] [facility] [address]   2023 10:00 AM A1 [facility] [place] [facility] [address]   2023 10:15 AM Wang Arboleda MD [address] [facility]   2023 10:00 AM E3 [facility] [place] [facility] [address]   2023 10:00 AM C1 [facility] [place] [facility] [address]   2023 10:00 AM A1 [facility] [place] [facility] [address]   1/3/2024 10:00 AM A1 [facility] [address]   1/10/2024 10:00 AM A1 [facility] [address]   2024 10:00

## 2023-11-22 ENCOUNTER — HOSPITAL ENCOUNTER (OUTPATIENT)
Facility: HOSPITAL | Age: 68
Setting detail: INFUSION SERIES
End: 2023-11-22

## 2023-11-29 ENCOUNTER — HOSPITAL ENCOUNTER (OUTPATIENT)
Facility: HOSPITAL | Age: 68
Setting detail: INFUSION SERIES
Discharge: HOME | End: 2023-11-29
Payer: MEDICARE

## 2023-11-29 VITALS
HEART RATE: 80 BPM | OXYGEN SATURATION: 93 % | TEMPERATURE: 98.2 F | BODY MASS INDEX: 26.66 KG/M2 | SYSTOLIC BLOOD PRESSURE: 127 MMHG | WEIGHT: 160 LBS | DIASTOLIC BLOOD PRESSURE: 61 MMHG | HEIGHT: 65 IN | RESPIRATION RATE: 18 BRPM

## 2023-11-29 DIAGNOSIS — C90.00 MULTIPLE MYELOMA NOT HAVING ACHIEVED REMISSION (HCC): Primary | ICD-10-CM

## 2023-11-29 DIAGNOSIS — D70.1 NEUTROPENIA DUE TO AND NOT CONCURRENT WITH CHEMOTHERAPY (HCC): ICD-10-CM

## 2023-11-29 DIAGNOSIS — C90.00 MULTIPLE MYELOMA NOT HAVING ACHIEVED REMISSION (HCC): ICD-10-CM

## 2023-11-29 DIAGNOSIS — T45.1X5S NEUTROPENIA DUE TO AND NOT CONCURRENT WITH CHEMOTHERAPY (HCC): ICD-10-CM

## 2023-11-29 LAB
BASOPHILS # BLD: 0 K/UL (ref 0–0.1)
BASOPHILS NFR BLD: 0 % (ref 0–1)
DIFFERENTIAL METHOD BLD: ABNORMAL
EOSINOPHIL # BLD: 0.1 K/UL (ref 0–0.4)
EOSINOPHIL NFR BLD: 4 % (ref 0–7)
ERYTHROCYTE [DISTWIDTH] IN BLOOD BY AUTOMATED COUNT: 15.5 % (ref 11.5–14.5)
HCT VFR BLD AUTO: 23.4 % (ref 35–47)
HGB BLD-MCNC: 7.4 G/DL (ref 11.5–16)
IMM GRANULOCYTES # BLD AUTO: 0 K/UL (ref 0–0.04)
IMM GRANULOCYTES NFR BLD AUTO: 0 % (ref 0–0.5)
LYMPHOCYTES # BLD: 0.7 K/UL (ref 0.8–3.5)
LYMPHOCYTES NFR BLD: 41 % (ref 12–49)
MCH RBC QN AUTO: 30.7 PG (ref 26–34)
MCHC RBC AUTO-ENTMCNC: 31.6 G/DL (ref 30–36.5)
MCV RBC AUTO: 97.1 FL (ref 80–99)
MONOCYTES # BLD: 0.1 K/UL (ref 0–1)
MONOCYTES NFR BLD: 6 % (ref 5–13)
NEUTS SEG # BLD: 0.7 K/UL (ref 1.8–8)
NEUTS SEG NFR BLD: 49 % (ref 32–75)
NRBC # BLD: 0 K/UL (ref 0–0.01)
NRBC BLD-RTO: 0 PER 100 WBC
PLATELET # BLD AUTO: 189 K/UL (ref 150–400)
PMV BLD AUTO: 10.6 FL (ref 8.9–12.9)
RBC # BLD AUTO: 2.41 M/UL (ref 3.8–5.2)
RBC MORPH BLD: ABNORMAL
RBC MORPH BLD: ABNORMAL
WBC # BLD AUTO: 1.6 K/UL (ref 3.6–11)

## 2023-11-29 PROCEDURE — 36415 COLL VENOUS BLD VENIPUNCTURE: CPT

## 2023-11-29 PROCEDURE — 6360000002 HC RX W HCPCS: Performed by: INTERNAL MEDICINE

## 2023-11-29 PROCEDURE — 85025 COMPLETE CBC W/AUTO DIFF WBC: CPT

## 2023-11-29 PROCEDURE — 96372 THER/PROPH/DIAG INJ SC/IM: CPT

## 2023-11-29 RX ORDER — EPINEPHRINE 1 MG/ML
0.3 INJECTION, SOLUTION INTRAMUSCULAR; SUBCUTANEOUS PRN
Status: CANCELLED | OUTPATIENT
Start: 2023-11-30

## 2023-11-29 RX ORDER — ACETAMINOPHEN 325 MG/1
650 TABLET ORAL
Status: CANCELLED | OUTPATIENT
Start: 2023-11-30

## 2023-11-29 RX ORDER — LENALIDOMIDE 10 MG/1
CAPSULE ORAL
Qty: 14 CAPSULE | Refills: 0 | OUTPATIENT
Start: 2023-11-29

## 2023-11-29 RX ORDER — SODIUM CHLORIDE 9 MG/ML
INJECTION, SOLUTION INTRAVENOUS CONTINUOUS
Status: CANCELLED | OUTPATIENT
Start: 2023-11-30

## 2023-11-29 RX ORDER — DIPHENHYDRAMINE HYDROCHLORIDE 50 MG/ML
50 INJECTION INTRAMUSCULAR; INTRAVENOUS
Status: CANCELLED | OUTPATIENT
Start: 2023-11-30

## 2023-11-29 RX ORDER — ONDANSETRON 2 MG/ML
8 INJECTION INTRAMUSCULAR; INTRAVENOUS
Status: CANCELLED | OUTPATIENT
Start: 2023-11-30

## 2023-11-29 RX ORDER — ALBUTEROL SULFATE 90 UG/1
4 AEROSOL, METERED RESPIRATORY (INHALATION) PRN
Status: CANCELLED | OUTPATIENT
Start: 2023-11-30

## 2023-11-29 RX ADMIN — FILGRASTIM-SNDZ 480 MCG: 480 INJECTION, SOLUTION INTRAVENOUS; SUBCUTANEOUS at 12:51

## 2023-11-29 ASSESSMENT — PAIN DESCRIPTION - ORIENTATION: ORIENTATION: LOWER;LEFT

## 2023-11-29 ASSESSMENT — PAIN DESCRIPTION - LOCATION: LOCATION: HIP;BACK

## 2023-11-29 ASSESSMENT — PAIN SCALES - GENERAL: PAINLEVEL_OUTOF10: 8

## 2023-11-29 NOTE — PLAN OF CARE
Patient presented ambulatory in no distress to [facility] for C2 D15 mVRD. Chemotherapy treatment held today per Dr. Wyatt Cameron. Labs not within treatment parameters. Filgrastim ordered. Medications Administered         filgrastim-sndz [facility]) injection 480 mcg Admin Date  11/29/2023 Action  Given Dose  480 mcg Route  SubCUTAneous Administered By  Eliz Bowie RN          Patient aware of change in treatment plan and educated on filgrastim. Pt verbalized understanding of information and discharged in stable condition. Patient is aware of future appointments.         Problem: Chronic Conditions and Co-morbidities  Goal: Patient's chronic conditions and co-morbidity symptoms are monitored and maintained or improved  Outcome: Progressing     Problem: Pain  Goal: Verbalizes/displays adequate comfort level or baseline comfort level  Outcome: Progressing

## 2023-11-30 ENCOUNTER — HOSPITAL ENCOUNTER (OUTPATIENT)
Facility: HOSPITAL | Age: 68
Setting detail: INFUSION SERIES
Discharge: HOME | End: 2023-11-30
Payer: MEDICARE

## 2023-11-30 VITALS
HEART RATE: 83 BPM | SYSTOLIC BLOOD PRESSURE: 119 MMHG | TEMPERATURE: 99.2 F | DIASTOLIC BLOOD PRESSURE: 56 MMHG | RESPIRATION RATE: 18 BRPM

## 2023-11-30 DIAGNOSIS — D70.1 NEUTROPENIA DUE TO AND NOT CONCURRENT WITH CHEMOTHERAPY (HCC): Primary | ICD-10-CM

## 2023-11-30 DIAGNOSIS — C90.00 MULTIPLE MYELOMA NOT HAVING ACHIEVED REMISSION (HCC): ICD-10-CM

## 2023-11-30 DIAGNOSIS — Z11.59 ENCOUNTER FOR SCREENING FOR OTHER VIRAL DISEASES: Primary | ICD-10-CM

## 2023-11-30 DIAGNOSIS — T45.1X5S NEUTROPENIA DUE TO AND NOT CONCURRENT WITH CHEMOTHERAPY (HCC): Primary | ICD-10-CM

## 2023-11-30 LAB
BASOPHILS # BLD: 0 K/UL (ref 0–0.1)
BASOPHILS NFR BLD: 0 % (ref 0–1)
DIFFERENTIAL METHOD BLD: ABNORMAL
EOSINOPHIL # BLD: 0.1 K/UL (ref 0–0.4)
EOSINOPHIL NFR BLD: 2 % (ref 0–7)
ERYTHROCYTE [DISTWIDTH] IN BLOOD BY AUTOMATED COUNT: 15.5 % (ref 11.5–14.5)
HCT VFR BLD AUTO: 23.2 % (ref 35–47)
HGB BLD-MCNC: 7.6 G/DL (ref 11.5–16)
IMM GRANULOCYTES # BLD AUTO: 0 K/UL
IMM GRANULOCYTES NFR BLD AUTO: 0 %
LYMPHOCYTES # BLD: 0.5 K/UL (ref 0.8–3.5)
LYMPHOCYTES NFR BLD: 21 % (ref 12–49)
MCH RBC QN AUTO: 31.5 PG (ref 26–34)
MCHC RBC AUTO-ENTMCNC: 32.8 G/DL (ref 30–36.5)
MCV RBC AUTO: 96.3 FL (ref 80–99)
MONOCYTES # BLD: 0.2 K/UL (ref 0–1)
MONOCYTES NFR BLD: 6 % (ref 5–13)
NEUTS BAND NFR BLD MANUAL: 1 % (ref 0–6)
NEUTS SEG # BLD: 1.8 K/UL (ref 1.8–8)
NEUTS SEG NFR BLD: 70 % (ref 32–75)
NRBC # BLD: 0 K/UL (ref 0–0.01)
NRBC BLD-RTO: 0 PER 100 WBC
PLATELET # BLD AUTO: 202 K/UL (ref 150–400)
PMV BLD AUTO: 11.8 FL (ref 8.9–12.9)
RBC # BLD AUTO: 2.41 M/UL (ref 3.8–5.2)
RBC MORPH BLD: ABNORMAL
WBC # BLD AUTO: 2.6 K/UL (ref 3.6–11)

## 2023-11-30 PROCEDURE — 85025 COMPLETE CBC W/AUTO DIFF WBC: CPT

## 2023-11-30 PROCEDURE — 36415 COLL VENOUS BLD VENIPUNCTURE: CPT

## 2023-11-30 PROCEDURE — 96372 THER/PROPH/DIAG INJ SC/IM: CPT

## 2023-11-30 PROCEDURE — 6360000002 HC RX W HCPCS: Performed by: INTERNAL MEDICINE

## 2023-11-30 RX ORDER — SODIUM CHLORIDE 9 MG/ML
5-250 INJECTION, SOLUTION INTRAVENOUS PRN
Status: CANCELLED | OUTPATIENT
Start: 2023-12-15

## 2023-11-30 RX ORDER — MEPERIDINE HYDROCHLORIDE 25 MG/ML
12.5 INJECTION INTRAMUSCULAR; INTRAVENOUS; SUBCUTANEOUS PRN
Status: CANCELLED | OUTPATIENT
Start: 2023-12-15

## 2023-11-30 RX ORDER — ALBUTEROL SULFATE 90 UG/1
4 AEROSOL, METERED RESPIRATORY (INHALATION) PRN
OUTPATIENT
Start: 2023-12-01

## 2023-11-30 RX ORDER — SODIUM CHLORIDE 0.9 % (FLUSH) 0.9 %
5-40 SYRINGE (ML) INJECTION PRN
Status: CANCELLED | OUTPATIENT
Start: 2023-12-15

## 2023-11-30 RX ORDER — ONDANSETRON 4 MG/1
8 TABLET, ORALLY DISINTEGRATING ORAL
Status: CANCELLED | OUTPATIENT
Start: 2023-12-15

## 2023-11-30 RX ORDER — DIPHENHYDRAMINE HYDROCHLORIDE 50 MG/ML
50 INJECTION INTRAMUSCULAR; INTRAVENOUS
Status: CANCELLED | OUTPATIENT
Start: 2023-12-06

## 2023-11-30 RX ORDER — SODIUM CHLORIDE 9 MG/ML
INJECTION, SOLUTION INTRAVENOUS CONTINUOUS
Status: CANCELLED | OUTPATIENT
Start: 2023-12-22

## 2023-11-30 RX ORDER — ACETAMINOPHEN 325 MG/1
650 TABLET ORAL
OUTPATIENT
Start: 2023-12-01

## 2023-11-30 RX ORDER — SODIUM CHLORIDE 9 MG/ML
INJECTION, SOLUTION INTRAVENOUS CONTINUOUS
OUTPATIENT
Start: 2023-12-01

## 2023-11-30 RX ORDER — ACETAMINOPHEN 325 MG/1
650 TABLET ORAL
Status: CANCELLED | OUTPATIENT
Start: 2023-12-15

## 2023-11-30 RX ORDER — EPINEPHRINE 1 MG/ML
0.3 INJECTION, SOLUTION, CONCENTRATE INTRAVENOUS PRN
Status: CANCELLED | OUTPATIENT
Start: 2023-12-06

## 2023-11-30 RX ORDER — SODIUM CHLORIDE 9 MG/ML
INJECTION, SOLUTION INTRAVENOUS CONTINUOUS
Status: CANCELLED | OUTPATIENT
Start: 2023-12-06

## 2023-11-30 RX ORDER — EPINEPHRINE 1 MG/ML
0.3 INJECTION, SOLUTION INTRAMUSCULAR; SUBCUTANEOUS PRN
OUTPATIENT
Start: 2023-12-01

## 2023-11-30 RX ORDER — ONDANSETRON 2 MG/ML
8 INJECTION INTRAMUSCULAR; INTRAVENOUS
OUTPATIENT
Start: 2023-12-01

## 2023-11-30 RX ORDER — ACETAMINOPHEN 325 MG/1
650 TABLET ORAL
Status: CANCELLED | OUTPATIENT
Start: 2023-12-22

## 2023-11-30 RX ORDER — SODIUM CHLORIDE 9 MG/ML
INJECTION, SOLUTION INTRAVENOUS CONTINUOUS
Status: CANCELLED | OUTPATIENT
Start: 2023-12-15

## 2023-11-30 RX ORDER — HEPARIN 100 UNIT/ML
500 SYRINGE INTRAVENOUS PRN
Status: CANCELLED | OUTPATIENT
Start: 2023-12-15

## 2023-11-30 RX ORDER — ONDANSETRON 2 MG/ML
8 INJECTION INTRAMUSCULAR; INTRAVENOUS
Status: CANCELLED | OUTPATIENT
Start: 2023-12-15

## 2023-11-30 RX ORDER — ONDANSETRON 2 MG/ML
8 INJECTION INTRAMUSCULAR; INTRAVENOUS
Status: CANCELLED | OUTPATIENT
Start: 2023-12-22

## 2023-11-30 RX ORDER — DEXAMETHASONE 4 MG/1
20 TABLET ORAL ONCE
Status: CANCELLED | OUTPATIENT
Start: 2023-12-06 | End: 2023-12-06

## 2023-11-30 RX ORDER — ACETAMINOPHEN 325 MG/1
650 TABLET ORAL ONCE
Status: CANCELLED | OUTPATIENT
Start: 2023-12-15 | End: 2023-12-13

## 2023-11-30 RX ORDER — MEPERIDINE HYDROCHLORIDE 25 MG/ML
12.5 INJECTION INTRAMUSCULAR; INTRAVENOUS; SUBCUTANEOUS PRN
Status: CANCELLED | OUTPATIENT
Start: 2023-12-06

## 2023-11-30 RX ORDER — SODIUM CHLORIDE 9 MG/ML
5-250 INJECTION, SOLUTION INTRAVENOUS PRN
Status: CANCELLED | OUTPATIENT
Start: 2023-12-22

## 2023-11-30 RX ORDER — ACETAMINOPHEN 325 MG/1
650 TABLET ORAL
Status: CANCELLED | OUTPATIENT
Start: 2023-12-06

## 2023-11-30 RX ORDER — SODIUM CHLORIDE 0.9 % (FLUSH) 0.9 %
5-40 SYRINGE (ML) INJECTION PRN
Status: CANCELLED | OUTPATIENT
Start: 2023-12-22

## 2023-11-30 RX ORDER — DIPHENHYDRAMINE HCL 25 MG
25 CAPSULE ORAL ONCE
Status: CANCELLED | OUTPATIENT
Start: 2023-12-22 | End: 2023-12-20

## 2023-11-30 RX ORDER — ONDANSETRON 4 MG/1
8 TABLET, ORALLY DISINTEGRATING ORAL
Status: CANCELLED | OUTPATIENT
Start: 2023-12-06

## 2023-11-30 RX ORDER — DIPHENHYDRAMINE HCL 25 MG
25 CAPSULE ORAL ONCE
Status: CANCELLED | OUTPATIENT
Start: 2023-12-15 | End: 2023-12-13

## 2023-11-30 RX ORDER — ONDANSETRON 2 MG/ML
8 INJECTION INTRAMUSCULAR; INTRAVENOUS
Status: CANCELLED | OUTPATIENT
Start: 2023-12-06

## 2023-11-30 RX ORDER — SODIUM CHLORIDE 9 MG/ML
5-250 INJECTION, SOLUTION INTRAVENOUS PRN
Status: CANCELLED | OUTPATIENT
Start: 2023-12-06

## 2023-11-30 RX ORDER — ACETAMINOPHEN 325 MG/1
650 TABLET ORAL ONCE
Status: CANCELLED | OUTPATIENT
Start: 2023-12-06 | End: 2023-12-06

## 2023-11-30 RX ORDER — MEPERIDINE HYDROCHLORIDE 25 MG/ML
12.5 INJECTION INTRAMUSCULAR; INTRAVENOUS; SUBCUTANEOUS PRN
Status: CANCELLED | OUTPATIENT
Start: 2023-12-22

## 2023-11-30 RX ORDER — DIPHENHYDRAMINE HCL 25 MG
25 CAPSULE ORAL ONCE
Status: CANCELLED | OUTPATIENT
Start: 2023-12-06 | End: 2023-12-06

## 2023-11-30 RX ORDER — ALBUTEROL SULFATE 90 UG/1
4 AEROSOL, METERED RESPIRATORY (INHALATION) PRN
Status: CANCELLED | OUTPATIENT
Start: 2023-12-06

## 2023-11-30 RX ORDER — DIPHENHYDRAMINE HYDROCHLORIDE 50 MG/ML
50 INJECTION INTRAMUSCULAR; INTRAVENOUS
Status: CANCELLED | OUTPATIENT
Start: 2023-12-15

## 2023-11-30 RX ORDER — SODIUM CHLORIDE 0.9 % (FLUSH) 0.9 %
5-40 SYRINGE (ML) INJECTION PRN
Status: CANCELLED | OUTPATIENT
Start: 2023-12-06

## 2023-11-30 RX ORDER — EPINEPHRINE 1 MG/ML
0.3 INJECTION, SOLUTION, CONCENTRATE INTRAVENOUS PRN
Status: CANCELLED | OUTPATIENT
Start: 2023-12-22

## 2023-11-30 RX ORDER — DIPHENHYDRAMINE HYDROCHLORIDE 50 MG/ML
50 INJECTION INTRAMUSCULAR; INTRAVENOUS
OUTPATIENT
Start: 2023-12-01

## 2023-11-30 RX ORDER — ALBUTEROL SULFATE 90 UG/1
4 AEROSOL, METERED RESPIRATORY (INHALATION) PRN
Status: CANCELLED | OUTPATIENT
Start: 2023-12-15

## 2023-11-30 RX ORDER — ACETAMINOPHEN 325 MG/1
650 TABLET ORAL ONCE
Status: CANCELLED | OUTPATIENT
Start: 2023-12-22 | End: 2023-12-20

## 2023-11-30 RX ORDER — EPINEPHRINE 1 MG/ML
0.3 INJECTION, SOLUTION, CONCENTRATE INTRAVENOUS PRN
Status: CANCELLED | OUTPATIENT
Start: 2023-12-15

## 2023-11-30 RX ORDER — HEPARIN 100 UNIT/ML
500 SYRINGE INTRAVENOUS PRN
Status: CANCELLED | OUTPATIENT
Start: 2023-12-06

## 2023-11-30 RX ORDER — ONDANSETRON 4 MG/1
8 TABLET, ORALLY DISINTEGRATING ORAL
Status: CANCELLED | OUTPATIENT
Start: 2023-12-22

## 2023-11-30 RX ORDER — HEPARIN 100 UNIT/ML
500 SYRINGE INTRAVENOUS PRN
Status: CANCELLED | OUTPATIENT
Start: 2023-12-22

## 2023-11-30 RX ORDER — DEXAMETHASONE 4 MG/1
20 TABLET ORAL ONCE
Status: CANCELLED | OUTPATIENT
Start: 2023-12-15 | End: 2023-12-13

## 2023-11-30 RX ORDER — DIPHENHYDRAMINE HYDROCHLORIDE 50 MG/ML
50 INJECTION INTRAMUSCULAR; INTRAVENOUS
Status: CANCELLED | OUTPATIENT
Start: 2023-12-22

## 2023-11-30 RX ORDER — ALBUTEROL SULFATE 90 UG/1
4 AEROSOL, METERED RESPIRATORY (INHALATION) PRN
Status: CANCELLED | OUTPATIENT
Start: 2023-12-22

## 2023-11-30 RX ORDER — DEXAMETHASONE 4 MG/1
20 TABLET ORAL ONCE
Status: CANCELLED | OUTPATIENT
Start: 2023-12-22 | End: 2023-12-20

## 2023-11-30 RX ADMIN — FILGRASTIM-SNDZ 480 MCG: 480 INJECTION, SOLUTION INTRAVENOUS; SUBCUTANEOUS at 13:25

## 2023-11-30 ASSESSMENT — PAIN SCALES - GENERAL: PAINLEVEL_OUTOF10: 6

## 2023-11-30 ASSESSMENT — PAIN DESCRIPTION - LOCATION: LOCATION: GENERALIZED

## 2023-11-30 NOTE — PLAN OF CARE
Patient presented ambulatory in no distress to [facility] for Zarxio subq Left arm. Medications Administered         filgrastim-sndz [facility]) injection 480 mcg Admin Date  11/30/2023 Action  Given Dose  480 mcg Route  SubCUTAneous Administered By  Vicky Khanna RN         Pt discharged in stable condition. Patient is aware of future appointments.

## 2023-12-06 ENCOUNTER — HOSPITAL ENCOUNTER (OUTPATIENT)
Facility: HOSPITAL | Age: 68
Setting detail: INFUSION SERIES
Discharge: HOME | End: 2023-12-06
Payer: MEDICARE

## 2023-12-06 ENCOUNTER — TELEPHONE (OUTPATIENT)
Facility: HOSPITAL | Age: 68
End: 2023-12-06

## 2023-12-06 ENCOUNTER — OFFICE VISIT (OUTPATIENT)
Age: 68
End: 2023-12-06
Payer: MEDICARE

## 2023-12-06 ENCOUNTER — CLINICAL DOCUMENTATION (OUTPATIENT)
Age: 68
End: 2023-12-06

## 2023-12-06 VITALS
WEIGHT: 153 LBS | DIASTOLIC BLOOD PRESSURE: 61 MMHG | TEMPERATURE: 97.7 F | RESPIRATION RATE: 18 BRPM | BODY MASS INDEX: 25.46 KG/M2 | SYSTOLIC BLOOD PRESSURE: 130 MMHG | HEART RATE: 91 BPM | OXYGEN SATURATION: 96 %

## 2023-12-06 VITALS
HEIGHT: 65 IN | TEMPERATURE: 97.7 F | SYSTOLIC BLOOD PRESSURE: 130 MMHG | HEART RATE: 91 BPM | RESPIRATION RATE: 18 BRPM | DIASTOLIC BLOOD PRESSURE: 61 MMHG | BODY MASS INDEX: 25.59 KG/M2 | OXYGEN SATURATION: 96 % | WEIGHT: 153.6 LBS

## 2023-12-06 DIAGNOSIS — C90.00 MULTIPLE MYELOMA NOT HAVING ACHIEVED REMISSION (HCC): Primary | ICD-10-CM

## 2023-12-06 DIAGNOSIS — I50.20 SYSTOLIC CONGESTIVE HEART FAILURE, UNSPECIFIED HF CHRONICITY (HCC): ICD-10-CM

## 2023-12-06 DIAGNOSIS — Z11.59 ENCOUNTER FOR SCREENING FOR OTHER VIRAL DISEASES: Primary | ICD-10-CM

## 2023-12-06 DIAGNOSIS — Z11.59 ENCOUNTER FOR SCREENING FOR OTHER VIRAL DISEASES: ICD-10-CM

## 2023-12-06 DIAGNOSIS — G62.9 NEUROPATHY: ICD-10-CM

## 2023-12-06 DIAGNOSIS — C90.00 MULTIPLE MYELOMA NOT HAVING ACHIEVED REMISSION (HCC): ICD-10-CM

## 2023-12-06 DIAGNOSIS — T45.1X5A CHEMOTHERAPY INDUCED NEUTROPENIA (HCC): ICD-10-CM

## 2023-12-06 DIAGNOSIS — D70.1 CHEMOTHERAPY INDUCED NEUTROPENIA (HCC): ICD-10-CM

## 2023-12-06 LAB
ALBUMIN SERPL-MCNC: 3.7 G/DL (ref 3.5–5)
ALBUMIN/GLOB SERPL: 1.5 (ref 1.1–2.2)
ALP SERPL-CCNC: 90 U/L (ref 45–117)
ALT SERPL-CCNC: 17 U/L (ref 12–78)
ANION GAP SERPL CALC-SCNC: 6 MMOL/L (ref 5–15)
AST SERPL-CCNC: 9 U/L (ref 15–37)
BASO+EOS+MONOS # BLD AUTO: 0.3 K/UL (ref 0.2–1.2)
BASO+EOS+MONOS NFR BLD AUTO: 9 % (ref 3.2–16.9)
BILIRUB SERPL-MCNC: 0.2 MG/DL (ref 0.2–1)
BUN SERPL-MCNC: 14 MG/DL (ref 6–20)
BUN/CREAT SERPL: 16 (ref 12–20)
CALCIUM SERPL-MCNC: 9.2 MG/DL (ref 8.5–10.1)
CHLORIDE SERPL-SCNC: 103 MMOL/L (ref 97–108)
CO2 SERPL-SCNC: 25 MMOL/L (ref 21–32)
CREAT SERPL-MCNC: 0.85 MG/DL (ref 0.55–1.02)
DIFFERENTIAL METHOD BLD: ABNORMAL
ERYTHROCYTE [DISTWIDTH] IN BLOOD BY AUTOMATED COUNT: 15.8 % (ref 11.8–15.8)
GLOBULIN SER CALC-MCNC: 2.5 G/DL (ref 2–4)
GLUCOSE SERPL-MCNC: 116 MG/DL (ref 65–100)
HCT VFR BLD AUTO: 25.5 % (ref 35–47)
HGB BLD-MCNC: 8 G/DL (ref 11.5–16)
LYMPHOCYTES # BLD: 1.3 K/UL (ref 0.8–3.5)
LYMPHOCYTES NFR BLD: 43 % (ref 12–49)
MCH RBC QN AUTO: 30.5 PG (ref 26–34)
MCHC RBC AUTO-ENTMCNC: 31.4 G/DL (ref 30–36.5)
MCV RBC AUTO: 97.3 FL (ref 80–99)
NEUTS SEG # BLD: 1.4 K/UL (ref 1.8–8)
NEUTS SEG NFR BLD: 48 % (ref 32–75)
PLATELET # BLD AUTO: 227 K/UL (ref 150–400)
POTASSIUM SERPL-SCNC: 4.4 MMOL/L (ref 3.5–5.1)
PROT SERPL-MCNC: 6.2 G/DL (ref 6.4–8.2)
RBC # BLD AUTO: 2.62 M/UL (ref 3.8–5.2)
SODIUM SERPL-SCNC: 134 MMOL/L (ref 136–145)
WBC # BLD AUTO: 3 K/UL (ref 3.6–11)

## 2023-12-06 PROCEDURE — 96401 CHEMO ANTI-NEOPL SQ/IM: CPT

## 2023-12-06 PROCEDURE — G8484 FLU IMMUNIZE NO ADMIN: HCPCS | Performed by: INTERNAL MEDICINE

## 2023-12-06 PROCEDURE — G8419 CALC BMI OUT NRM PARAM NOF/U: HCPCS | Performed by: INTERNAL MEDICINE

## 2023-12-06 PROCEDURE — 80053 COMPREHEN METABOLIC PANEL: CPT

## 2023-12-06 PROCEDURE — 36415 COLL VENOUS BLD VENIPUNCTURE: CPT

## 2023-12-06 PROCEDURE — 82784 ASSAY IGA/IGD/IGG/IGM EACH: CPT

## 2023-12-06 PROCEDURE — 6370000000 HC RX 637 (ALT 250 FOR IP): Performed by: INTERNAL MEDICINE

## 2023-12-06 PROCEDURE — A4216 STERILE WATER/SALINE, 10 ML: HCPCS | Performed by: INTERNAL MEDICINE

## 2023-12-06 PROCEDURE — 99215 OFFICE O/P EST HI 40 MIN: CPT | Performed by: INTERNAL MEDICINE

## 2023-12-06 PROCEDURE — 1123F ACP DISCUSS/DSCN MKR DOCD: CPT | Performed by: INTERNAL MEDICINE

## 2023-12-06 PROCEDURE — 1090F PRES/ABSN URINE INCON ASSESS: CPT | Performed by: INTERNAL MEDICINE

## 2023-12-06 PROCEDURE — 83521 IG LIGHT CHAINS FREE EACH: CPT

## 2023-12-06 PROCEDURE — 2580000003 HC RX 258: Performed by: INTERNAL MEDICINE

## 2023-12-06 PROCEDURE — 1036F TOBACCO NON-USER: CPT | Performed by: INTERNAL MEDICINE

## 2023-12-06 PROCEDURE — G8427 DOCREV CUR MEDS BY ELIG CLIN: HCPCS | Performed by: INTERNAL MEDICINE

## 2023-12-06 PROCEDURE — 84165 PROTEIN E-PHORESIS SERUM: CPT

## 2023-12-06 PROCEDURE — 6360000002 HC RX W HCPCS: Performed by: INTERNAL MEDICINE

## 2023-12-06 PROCEDURE — 85025 COMPLETE CBC W/AUTO DIFF WBC: CPT

## 2023-12-06 PROCEDURE — G8399 PT W/DXA RESULTS DOCUMENT: HCPCS | Performed by: INTERNAL MEDICINE

## 2023-12-06 PROCEDURE — 86334 IMMUNOFIX E-PHORESIS SERUM: CPT

## 2023-12-06 PROCEDURE — 3017F COLORECTAL CA SCREEN DOC REV: CPT | Performed by: INTERNAL MEDICINE

## 2023-12-06 RX ORDER — SODIUM CHLORIDE 0.9 % (FLUSH) 0.9 %
5-40 SYRINGE (ML) INJECTION PRN
Status: DISCONTINUED | OUTPATIENT
Start: 2023-12-06 | End: 2023-12-07 | Stop reason: HOSPADM

## 2023-12-06 RX ORDER — DEXAMETHASONE 4 MG/1
20 TABLET ORAL ONCE
Status: COMPLETED | OUTPATIENT
Start: 2023-12-06 | End: 2023-12-06

## 2023-12-06 RX ORDER — DULOXETIN HYDROCHLORIDE 30 MG/1
30 CAPSULE, DELAYED RELEASE ORAL DAILY
Qty: 30 CAPSULE | Refills: 5 | Status: SHIPPED | OUTPATIENT
Start: 2023-12-06

## 2023-12-06 RX ORDER — SODIUM CHLORIDE 9 MG/ML
5-250 INJECTION, SOLUTION INTRAVENOUS PRN
Status: DISCONTINUED | OUTPATIENT
Start: 2023-12-06 | End: 2023-12-07 | Stop reason: HOSPADM

## 2023-12-06 RX ORDER — ACETAMINOPHEN 325 MG/1
650 TABLET ORAL ONCE
Status: COMPLETED | OUTPATIENT
Start: 2023-12-06 | End: 2023-12-06

## 2023-12-06 RX ORDER — HEPARIN 100 UNIT/ML
500 SYRINGE INTRAVENOUS PRN
Status: DISCONTINUED | OUTPATIENT
Start: 2023-12-06 | End: 2023-12-07 | Stop reason: HOSPADM

## 2023-12-06 RX ORDER — DIPHENHYDRAMINE HCL 25 MG
25 CAPSULE ORAL ONCE
Status: COMPLETED | OUTPATIENT
Start: 2023-12-06 | End: 2023-12-06

## 2023-12-06 RX ADMIN — DARATUMUMAB AND HYALURONIDASE-FIHJ (HUMAN RECOMBINANT) 1800 MG: 1800; 30000 INJECTION SUBCUTANEOUS at 12:54

## 2023-12-06 RX ADMIN — DIPHENHYDRAMINE HYDROCHLORIDE 25 MG: 25 CAPSULE ORAL at 12:11

## 2023-12-06 RX ADMIN — SODIUM CHLORIDE 1.75 MG: 9 INJECTION INTRAMUSCULAR; INTRAVENOUS; SUBCUTANEOUS at 12:52

## 2023-12-06 RX ADMIN — ACETAMINOPHEN 650 MG: 325 TABLET ORAL at 12:11

## 2023-12-06 RX ADMIN — DEXAMETHASONE 20 MG: 4 TABLET ORAL at 12:11

## 2023-12-06 ASSESSMENT — PAIN DESCRIPTION - LOCATION: LOCATION: BACK;HIP

## 2023-12-06 ASSESSMENT — PAIN SCALES - GENERAL: PAINLEVEL_OUTOF10: 6

## 2023-12-06 NOTE — PROGRESS NOTES
Nishi Wilde is a 77 y.o. female    Chief Complaint   Patient presents with    Follow-up     Multiple Myeloma        1. Have you been to the ER, urgent care clinic since your last visit? Hospitalized since your last visit? No    2. Have you seen or consulted any other health care providers outside of the [address] [place] [address] Avenue since your last visit? Include any pap smears or colon screening.  No

## 2023-12-06 NOTE — PROGRESS NOTES
Oral Chemotherapy Note     Tin Aguilar is a  77 y. o.female  diagnosed with multiple myeloma. Ms. Trace Joseph is being treated with Kayli-VRd. Medication name: lenalidomide (ON HOLD)  Regimen: Kayli-VRd  Dose:  10 mg   Frequency: daily  Administration schedule: for 14 days followed by 7 days off every 21 days  Ordering provider: Starling Gilford, MD  Start date: 12/6/23     Lenalidomide is still being held with Cycle 3. Patient instructed not to take. Lab Results   Component Value Date    WBC 3.0 (L) 12/06/2023    HGB 8.0 (L) 12/06/2023    HCT 25.5 (L) 12/06/2023    MCV 97.3 12/06/2023     12/06/2023    LYMPHOPCT 43 12/06/2023    RBC 2.62 (L) 12/06/2023    MCH 30.5 12/06/2023    MCHC 31.4 12/06/2023    RDW 15.8 12/06/2023     Lab Results   Component Value Date    NEUTROABS 1.4 (L) 12/06/2023       Expected follow up date: Labs/office visit each cycle. Next cycle start on 12/27/23     Patient provided with an Oral Chemotherapy Journal.      Message sent to  to shift appts to Fridays when able per patient request.            Ms. Trace Joseph verbalized understanding of the information presented and all of the patient's questions were answered.      Jose Liao, PharmD, [facility], [address]    Program: Medical Group  CPA in place:  Yes  Recommendation Provided To: Patient/Caregiver: 1 via In person    Intervention Accepted By: Patient/Caregiver: 1    Time Spent (min): 15

## 2023-12-06 NOTE — PROGRESS NOTES
[facility] [place] at [address] , [address] [facility]: [phone]  F: [phone]    Reason for Visit:   Nickie Harvey is a 77 y.o. female who is seen for Multiple Myeloma   Treatment History:   10/25/2023: Justine Lobo VRD, Rev lite    History of Present Illness:   Patient is a 77 y.o. female who was dx with Smoldering Myeloma in 2017 and has been seeing [facility] Dr. Lore Rodriguez. A BM bx at that time showed 40% plasma cells. No end organ damage. Noted to have worsening anemia 7/5/2023 with a Hb of 9.6 g/dl. This led to further evaluation that included a gammopathy eval that showed an M spike of 0.3 g/dl. She had a Kappa LC level of 1593 g/L with a K:L ratio of 157. Sent for evaluation now. She was in the ER June 2023 with some SOB. Had a CT head and this showed lytic lesions. She states that she has some carpal tunnel. BM bx showed Myeloma. Here today for follow up and next cycle of Kayli-Vrd, holding Rev due to neutropenia. She reports having new onset neuropathy to hands. She also has ongoing left hip and lower back pain. She takes Tylenol as needed to help with pain relief.      Presents alone        Past Medical History:   Diagnosis Date    Anxiety 6/4/2009    Blood dyscrasia     followed by Dr. Isabelle Coto 09/12    Disc disorder 6/4/2009    High cholesterol     Iron (Fe) deficiency anemia 6/4/2009    Multinodular goiter     Osteopenia 6/4/2009    Seasonal allergic rhinitis 6/4/2009    Vertigo       Past Surgical History:   Procedure Laterality Date    CATARACT REMOVAL      COLONOSCOPY N/A 9/22/2020    COLONOSCOPY     :- performed by Lisa Ornelas MD at [facility] N/A 9/15/2020    SIGMOIDOSCOPY FLEXIBLE performed by Lisa Ornelas MD at [address] [place] [address] Rd      left foot      Social History     Tobacco Use    Smoking status: Never    Smokeless tobacco: Never   Substance Use Topics    Alcohol use: Yes      Family History

## 2023-12-11 LAB
ALBUMIN SERPL ELPH-MCNC: 3.7 G/DL (ref 2.9–4.4)
ALBUMIN/GLOB SERPL: 1.8 (ref 0.7–1.7)
ALPHA1 GLOB SERPL ELPH-MCNC: 0.3 G/DL (ref 0–0.4)
ALPHA2 GLOB SERPL ELPH-MCNC: 0.7 G/DL (ref 0.4–1)
B-GLOBULIN SERPL ELPH-MCNC: 0.9 G/DL (ref 0.7–1.3)
GAMMA GLOB SERPL ELPH-MCNC: 0.2 G/DL (ref 0.4–1.8)
GLOBULIN SER-MCNC: 2.1 G/DL (ref 2.2–3.9)
IGA SERPL-MCNC: <5 MG/DL (ref 87–352)
IGG SERPL-MCNC: 297 MG/DL (ref 586–1602)
IGM SERPL-MCNC: <5 MG/DL (ref 26–217)
INTERPRETATION SERPL IEP-IMP: ABNORMAL
KAPPA LC FREE SER-MCNC: 2178.2 MG/L (ref 3.3–19.4)
KAPPA LC FREE/LAMBDA FREE SER: 1361.38 (ref 0.26–1.65)
LAMBDA LC FREE SERPL-MCNC: 1.6 MG/L (ref 5.7–26.3)
M PROTEIN SERPL ELPH-MCNC: ABNORMAL G/DL
PROT SERPL-MCNC: 5.8 G/DL (ref 6–8.5)

## 2023-12-15 ENCOUNTER — HOSPITAL ENCOUNTER (OUTPATIENT)
Facility: HOSPITAL | Age: 68
Setting detail: INFUSION SERIES
Discharge: HOME | End: 2023-12-15
Payer: MEDICARE

## 2023-12-15 VITALS
HEART RATE: 89 BPM | HEIGHT: 65 IN | OXYGEN SATURATION: 94 % | DIASTOLIC BLOOD PRESSURE: 52 MMHG | RESPIRATION RATE: 18 BRPM | WEIGHT: 156 LBS | SYSTOLIC BLOOD PRESSURE: 130 MMHG | TEMPERATURE: 97.8 F | BODY MASS INDEX: 25.99 KG/M2

## 2023-12-15 DIAGNOSIS — C90.00 MULTIPLE MYELOMA NOT HAVING ACHIEVED REMISSION (HCC): ICD-10-CM

## 2023-12-15 DIAGNOSIS — Z11.59 ENCOUNTER FOR SCREENING FOR OTHER VIRAL DISEASES: Primary | ICD-10-CM

## 2023-12-15 LAB
BASO+EOS+MONOS # BLD AUTO: 0.1 K/UL (ref 0.2–1.2)
BASO+EOS+MONOS NFR BLD AUTO: 6 % (ref 3.2–16.9)
DIFFERENTIAL METHOD BLD: ABNORMAL
ERYTHROCYTE [DISTWIDTH] IN BLOOD BY AUTOMATED COUNT: 15.9 % (ref 11.8–15.8)
HCT VFR BLD AUTO: 24.8 % (ref 35–47)
HGB BLD-MCNC: 7.7 G/DL (ref 11.5–16)
LYMPHOCYTES # BLD: 0.8 K/UL (ref 0.8–3.5)
LYMPHOCYTES NFR BLD: 34 % (ref 12–49)
MCH RBC QN AUTO: 31 PG (ref 26–34)
MCHC RBC AUTO-ENTMCNC: 31 G/DL (ref 30–36.5)
MCV RBC AUTO: 100 FL (ref 80–99)
NEUTS SEG # BLD: 1.3 K/UL (ref 1.8–8)
NEUTS SEG NFR BLD: 60 % (ref 32–75)
PLATELET # BLD AUTO: 292 K/UL (ref 150–400)
RBC # BLD AUTO: 2.48 M/UL (ref 3.8–5.2)
WBC # BLD AUTO: 2.2 K/UL (ref 3.6–11)

## 2023-12-15 PROCEDURE — 96401 CHEMO ANTI-NEOPL SQ/IM: CPT

## 2023-12-15 PROCEDURE — 85025 COMPLETE CBC W/AUTO DIFF WBC: CPT

## 2023-12-15 PROCEDURE — 2580000003 HC RX 258: Performed by: INTERNAL MEDICINE

## 2023-12-15 PROCEDURE — A4216 STERILE WATER/SALINE, 10 ML: HCPCS | Performed by: INTERNAL MEDICINE

## 2023-12-15 PROCEDURE — 6370000000 HC RX 637 (ALT 250 FOR IP): Performed by: INTERNAL MEDICINE

## 2023-12-15 PROCEDURE — 6360000002 HC RX W HCPCS: Performed by: INTERNAL MEDICINE

## 2023-12-15 RX ORDER — DEXAMETHASONE 4 MG/1
20 TABLET ORAL ONCE
Status: COMPLETED | OUTPATIENT
Start: 2023-12-15 | End: 2023-12-15

## 2023-12-15 RX ORDER — ACETAMINOPHEN 325 MG/1
650 TABLET ORAL ONCE
Status: COMPLETED | OUTPATIENT
Start: 2023-12-15 | End: 2023-12-15

## 2023-12-15 RX ORDER — SODIUM CHLORIDE 0.9 % (FLUSH) 0.9 %
5-40 SYRINGE (ML) INJECTION PRN
Status: DISCONTINUED | OUTPATIENT
Start: 2023-12-15 | End: 2023-12-16 | Stop reason: HOSPADM

## 2023-12-15 RX ORDER — DIPHENHYDRAMINE HCL 25 MG
25 CAPSULE ORAL ONCE
Status: COMPLETED | OUTPATIENT
Start: 2023-12-15 | End: 2023-12-15

## 2023-12-15 RX ADMIN — SODIUM CHLORIDE 1.75 MG: 9 INJECTION INTRAMUSCULAR; INTRAVENOUS; SUBCUTANEOUS at 13:26

## 2023-12-15 RX ADMIN — DIPHENHYDRAMINE HYDROCHLORIDE 25 MG: 25 CAPSULE ORAL at 12:14

## 2023-12-15 RX ADMIN — DARATUMUMAB AND HYALURONIDASE-FIHJ (HUMAN RECOMBINANT) 1800 MG: 1800; 30000 INJECTION SUBCUTANEOUS at 13:30

## 2023-12-15 RX ADMIN — ACETAMINOPHEN 650 MG: 325 TABLET ORAL at 12:14

## 2023-12-15 RX ADMIN — DEXAMETHASONE 20 MG: 4 TABLET ORAL at 12:14

## 2023-12-20 ENCOUNTER — APPOINTMENT (OUTPATIENT)
Facility: HOSPITAL | Age: 68
End: 2023-12-20
Payer: MEDICARE

## 2023-12-21 ENCOUNTER — TELEPHONE (OUTPATIENT)
Age: 68
End: 2023-12-21

## 2023-12-21 RX ORDER — SODIUM CHLORIDE 9 MG/ML
5-250 INJECTION, SOLUTION INTRAVENOUS PRN
Status: CANCELLED | OUTPATIENT
Start: 2024-01-05

## 2023-12-21 RX ORDER — ACETAMINOPHEN 325 MG/1
650 TABLET ORAL ONCE
Status: CANCELLED | OUTPATIENT
Start: 2024-01-05 | End: 2024-01-05

## 2023-12-21 RX ORDER — SODIUM CHLORIDE 0.9 % (FLUSH) 0.9 %
5-40 SYRINGE (ML) INJECTION PRN
Status: CANCELLED | OUTPATIENT
Start: 2023-12-29

## 2023-12-21 RX ORDER — DEXAMETHASONE 4 MG/1
20 TABLET ORAL ONCE
Status: CANCELLED | OUTPATIENT
Start: 2024-01-05 | End: 2024-01-05

## 2023-12-21 RX ORDER — EPINEPHRINE 1 MG/ML
0.3 INJECTION, SOLUTION INTRAMUSCULAR; SUBCUTANEOUS PRN
Status: CANCELLED | OUTPATIENT
Start: 2023-12-29

## 2023-12-21 RX ORDER — DIPHENHYDRAMINE HYDROCHLORIDE 50 MG/ML
50 INJECTION INTRAMUSCULAR; INTRAVENOUS
Status: CANCELLED | OUTPATIENT
Start: 2023-12-29

## 2023-12-21 RX ORDER — EPINEPHRINE 1 MG/ML
0.3 INJECTION, SOLUTION INTRAMUSCULAR; SUBCUTANEOUS PRN
Status: CANCELLED | OUTPATIENT
Start: 2024-01-05

## 2023-12-21 RX ORDER — DIPHENHYDRAMINE HCL 25 MG
25 CAPSULE ORAL ONCE
OUTPATIENT
Start: 2024-01-12 | End: 2024-01-12

## 2023-12-21 RX ORDER — DIPHENHYDRAMINE HCL 25 MG
25 CAPSULE ORAL ONCE
Status: CANCELLED | OUTPATIENT
Start: 2024-01-05 | End: 2024-01-05

## 2023-12-21 RX ORDER — SODIUM CHLORIDE 9 MG/ML
INJECTION, SOLUTION INTRAVENOUS CONTINUOUS
Status: CANCELLED | OUTPATIENT
Start: 2024-01-05

## 2023-12-21 RX ORDER — ALBUTEROL SULFATE 90 UG/1
4 AEROSOL, METERED RESPIRATORY (INHALATION) PRN
OUTPATIENT
Start: 2024-01-12

## 2023-12-21 RX ORDER — HEPARIN 100 UNIT/ML
500 SYRINGE INTRAVENOUS PRN
OUTPATIENT
Start: 2024-01-12

## 2023-12-21 RX ORDER — ALBUTEROL SULFATE 90 UG/1
4 AEROSOL, METERED RESPIRATORY (INHALATION) PRN
Status: CANCELLED | OUTPATIENT
Start: 2023-12-29

## 2023-12-21 RX ORDER — SODIUM CHLORIDE 9 MG/ML
5-250 INJECTION, SOLUTION INTRAVENOUS PRN
Status: CANCELLED | OUTPATIENT
Start: 2023-12-29

## 2023-12-21 RX ORDER — HEPARIN 100 UNIT/ML
500 SYRINGE INTRAVENOUS PRN
Status: CANCELLED | OUTPATIENT
Start: 2023-12-29

## 2023-12-21 RX ORDER — DIPHENHYDRAMINE HYDROCHLORIDE 50 MG/ML
50 INJECTION INTRAMUSCULAR; INTRAVENOUS
OUTPATIENT
Start: 2024-01-12

## 2023-12-21 RX ORDER — ONDANSETRON 4 MG/1
8 TABLET, ORALLY DISINTEGRATING ORAL
OUTPATIENT
Start: 2024-01-12

## 2023-12-21 RX ORDER — ACETAMINOPHEN 325 MG/1
650 TABLET ORAL
OUTPATIENT
Start: 2024-01-12

## 2023-12-21 RX ORDER — HEPARIN 100 UNIT/ML
500 SYRINGE INTRAVENOUS PRN
Status: CANCELLED | OUTPATIENT
Start: 2024-01-05

## 2023-12-21 RX ORDER — ACETAMINOPHEN 325 MG/1
650 TABLET ORAL
Status: CANCELLED | OUTPATIENT
Start: 2023-12-29

## 2023-12-21 RX ORDER — EPINEPHRINE 1 MG/ML
0.3 INJECTION, SOLUTION INTRAMUSCULAR; SUBCUTANEOUS PRN
OUTPATIENT
Start: 2024-01-12

## 2023-12-21 RX ORDER — ONDANSETRON 2 MG/ML
8 INJECTION INTRAMUSCULAR; INTRAVENOUS
Status: CANCELLED | OUTPATIENT
Start: 2023-12-29

## 2023-12-21 RX ORDER — DIPHENHYDRAMINE HYDROCHLORIDE 50 MG/ML
50 INJECTION INTRAMUSCULAR; INTRAVENOUS
Status: CANCELLED | OUTPATIENT
Start: 2024-01-05

## 2023-12-21 RX ORDER — MEPERIDINE HYDROCHLORIDE 25 MG/ML
12.5 INJECTION INTRAMUSCULAR; INTRAVENOUS; SUBCUTANEOUS PRN
Status: CANCELLED | OUTPATIENT
Start: 2024-01-05

## 2023-12-21 RX ORDER — ONDANSETRON 4 MG/1
8 TABLET, ORALLY DISINTEGRATING ORAL
Status: CANCELLED | OUTPATIENT
Start: 2024-01-05

## 2023-12-21 RX ORDER — ONDANSETRON 2 MG/ML
8 INJECTION INTRAMUSCULAR; INTRAVENOUS
Status: CANCELLED | OUTPATIENT
Start: 2024-01-05

## 2023-12-21 RX ORDER — MEPERIDINE HYDROCHLORIDE 25 MG/ML
12.5 INJECTION INTRAMUSCULAR; INTRAVENOUS; SUBCUTANEOUS PRN
Status: CANCELLED | OUTPATIENT
Start: 2023-12-29

## 2023-12-21 RX ORDER — SODIUM CHLORIDE 9 MG/ML
INJECTION, SOLUTION INTRAVENOUS CONTINUOUS
Status: CANCELLED | OUTPATIENT
Start: 2023-12-29

## 2023-12-21 RX ORDER — DEXAMETHASONE 4 MG/1
20 TABLET ORAL ONCE
OUTPATIENT
Start: 2024-01-12 | End: 2024-01-12

## 2023-12-21 RX ORDER — SODIUM CHLORIDE 0.9 % (FLUSH) 0.9 %
5-40 SYRINGE (ML) INJECTION PRN
OUTPATIENT
Start: 2024-01-12

## 2023-12-21 RX ORDER — SODIUM CHLORIDE 9 MG/ML
INJECTION, SOLUTION INTRAVENOUS CONTINUOUS
OUTPATIENT
Start: 2024-01-12

## 2023-12-21 RX ORDER — MEPERIDINE HYDROCHLORIDE 25 MG/ML
12.5 INJECTION INTRAMUSCULAR; INTRAVENOUS; SUBCUTANEOUS PRN
OUTPATIENT
Start: 2024-01-12

## 2023-12-21 RX ORDER — SODIUM CHLORIDE 0.9 % (FLUSH) 0.9 %
5-40 SYRINGE (ML) INJECTION PRN
Status: CANCELLED | OUTPATIENT
Start: 2024-01-05

## 2023-12-21 RX ORDER — ONDANSETRON 2 MG/ML
8 INJECTION INTRAMUSCULAR; INTRAVENOUS
OUTPATIENT
Start: 2024-01-12

## 2023-12-21 RX ORDER — ONDANSETRON 4 MG/1
8 TABLET, ORALLY DISINTEGRATING ORAL
Status: CANCELLED | OUTPATIENT
Start: 2023-12-29

## 2023-12-21 RX ORDER — ACETAMINOPHEN 325 MG/1
650 TABLET ORAL ONCE
OUTPATIENT
Start: 2024-01-12 | End: 2024-01-12

## 2023-12-21 RX ORDER — ACETAMINOPHEN 325 MG/1
650 TABLET ORAL
Status: CANCELLED | OUTPATIENT
Start: 2024-01-05

## 2023-12-21 RX ORDER — ALBUTEROL SULFATE 90 UG/1
4 AEROSOL, METERED RESPIRATORY (INHALATION) PRN
Status: CANCELLED | OUTPATIENT
Start: 2024-01-05

## 2023-12-21 RX ORDER — SODIUM CHLORIDE 9 MG/ML
5-250 INJECTION, SOLUTION INTRAVENOUS PRN
OUTPATIENT
Start: 2024-01-12

## 2023-12-21 NOTE — TELEPHONE ENCOUNTER
Patient called and is looking to schedule an EMG at [facility]. She stated she should have a referral sent in by Dr. Aguilar. Please give her a call to schedule. Thank you!

## 2023-12-22 ENCOUNTER — HOSPITAL ENCOUNTER (OUTPATIENT)
Facility: HOSPITAL | Age: 68
Setting detail: INFUSION SERIES
Discharge: HOME | End: 2023-12-22
Payer: MEDICARE

## 2023-12-22 VITALS
HEART RATE: 74 BPM | RESPIRATION RATE: 16 BRPM | DIASTOLIC BLOOD PRESSURE: 57 MMHG | SYSTOLIC BLOOD PRESSURE: 124 MMHG | HEIGHT: 65 IN | TEMPERATURE: 98.8 F | WEIGHT: 158.3 LBS | BODY MASS INDEX: 26.37 KG/M2

## 2023-12-22 DIAGNOSIS — Z11.59 ENCOUNTER FOR SCREENING FOR OTHER VIRAL DISEASES: ICD-10-CM

## 2023-12-22 DIAGNOSIS — C90.00 MULTIPLE MYELOMA NOT HAVING ACHIEVED REMISSION (HCC): Primary | ICD-10-CM

## 2023-12-22 LAB
BASOPHILS # BLD: 0 K/UL (ref 0–0.1)
BASOPHILS NFR BLD: 1 % (ref 0–1)
DIFFERENTIAL METHOD BLD: ABNORMAL
EOSINOPHIL # BLD: 0 K/UL (ref 0–0.4)
EOSINOPHIL NFR BLD: 2 % (ref 0–7)
ERYTHROCYTE [DISTWIDTH] IN BLOOD BY AUTOMATED COUNT: 16.5 % (ref 11.5–14.5)
HCT VFR BLD AUTO: 21.7 % (ref 35–47)
HGB BLD-MCNC: 7 G/DL (ref 11.5–16)
IMM GRANULOCYTES # BLD AUTO: 0 K/UL (ref 0–0.04)
IMM GRANULOCYTES NFR BLD AUTO: 0 % (ref 0–0.5)
LYMPHOCYTES # BLD: 0.8 K/UL (ref 0.8–3.5)
LYMPHOCYTES NFR BLD: 45 % (ref 12–49)
MCH RBC QN AUTO: 32.3 PG (ref 26–34)
MCHC RBC AUTO-ENTMCNC: 32.3 G/DL (ref 30–36.5)
MCV RBC AUTO: 100 FL (ref 80–99)
MONOCYTES # BLD: 0.2 K/UL (ref 0–1)
MONOCYTES NFR BLD: 9 % (ref 5–13)
NEUTS SEG # BLD: 0.8 K/UL (ref 1.8–8)
NEUTS SEG NFR BLD: 43 % (ref 32–75)
NRBC # BLD: 0 K/UL (ref 0–0.01)
NRBC BLD-RTO: 0 PER 100 WBC
PLATELET # BLD AUTO: 223 K/UL (ref 150–400)
PMV BLD AUTO: 12.1 FL (ref 8.9–12.9)
RBC # BLD AUTO: 2.17 M/UL (ref 3.8–5.2)
RBC MORPH BLD: ABNORMAL
WBC # BLD AUTO: 1.8 K/UL (ref 3.6–11)

## 2023-12-22 PROCEDURE — 85025 COMPLETE CBC W/AUTO DIFF WBC: CPT

## 2023-12-22 PROCEDURE — 96401 CHEMO ANTI-NEOPL SQ/IM: CPT

## 2023-12-22 PROCEDURE — 6370000000 HC RX 637 (ALT 250 FOR IP): Performed by: INTERNAL MEDICINE

## 2023-12-22 PROCEDURE — A4216 STERILE WATER/SALINE, 10 ML: HCPCS | Performed by: INTERNAL MEDICINE

## 2023-12-22 PROCEDURE — 36415 COLL VENOUS BLD VENIPUNCTURE: CPT

## 2023-12-22 PROCEDURE — 6360000002 HC RX W HCPCS: Performed by: INTERNAL MEDICINE

## 2023-12-22 PROCEDURE — 2580000003 HC RX 258: Performed by: INTERNAL MEDICINE

## 2023-12-22 RX ORDER — ACETAMINOPHEN 325 MG/1
650 TABLET ORAL ONCE
Status: COMPLETED | OUTPATIENT
Start: 2023-12-22 | End: 2023-12-22

## 2023-12-22 RX ORDER — DEXAMETHASONE 4 MG/1
20 TABLET ORAL ONCE
Status: COMPLETED | OUTPATIENT
Start: 2023-12-22 | End: 2023-12-22

## 2023-12-22 RX ORDER — DIPHENHYDRAMINE HCL 25 MG
25 CAPSULE ORAL ONCE
Status: COMPLETED | OUTPATIENT
Start: 2023-12-22 | End: 2023-12-22

## 2023-12-22 RX ADMIN — SODIUM CHLORIDE 1.75 MG: 9 INJECTION INTRAMUSCULAR; INTRAVENOUS; SUBCUTANEOUS at 12:30

## 2023-12-22 RX ADMIN — DARATUMUMAB AND HYALURONIDASE-FIHJ (HUMAN RECOMBINANT) 1800 MG: 1800; 30000 INJECTION SUBCUTANEOUS at 13:08

## 2023-12-22 RX ADMIN — DIPHENHYDRAMINE HYDROCHLORIDE 25 MG: 25 CAPSULE ORAL at 12:04

## 2023-12-22 RX ADMIN — DEXAMETHASONE 20 MG: 4 TABLET ORAL at 12:04

## 2023-12-22 RX ADMIN — ACETAMINOPHEN 650 MG: 325 TABLET ORAL at 12:04

## 2023-12-27 ENCOUNTER — APPOINTMENT (OUTPATIENT)
Facility: HOSPITAL | Age: 68
End: 2023-12-27
Payer: MEDICARE

## 2023-12-28 DIAGNOSIS — C90.00 MULTIPLE MYELOMA NOT HAVING ACHIEVED REMISSION (HCC): Primary | ICD-10-CM

## 2023-12-28 RX ORDER — SODIUM CHLORIDE 9 MG/ML
5-250 INJECTION, SOLUTION INTRAVENOUS PRN
Status: CANCELLED | OUTPATIENT
Start: 2023-12-29

## 2023-12-28 RX ORDER — ACETAMINOPHEN 325 MG/1
650 TABLET ORAL
Status: CANCELLED | OUTPATIENT
Start: 2023-12-29

## 2023-12-28 RX ORDER — ALBUTEROL SULFATE 90 UG/1
4 AEROSOL, METERED RESPIRATORY (INHALATION) PRN
Status: CANCELLED | OUTPATIENT
Start: 2023-12-29

## 2023-12-28 RX ORDER — SODIUM CHLORIDE 9 MG/ML
INJECTION, SOLUTION INTRAVENOUS CONTINUOUS
Status: CANCELLED | OUTPATIENT
Start: 2023-12-29

## 2023-12-28 RX ORDER — ZOLEDRONIC ACID 0.04 MG/ML
4 INJECTION, SOLUTION INTRAVENOUS ONCE
Status: CANCELLED | OUTPATIENT
Start: 2023-12-29 | End: 2023-12-29

## 2023-12-28 RX ORDER — DIPHENHYDRAMINE HYDROCHLORIDE 50 MG/ML
50 INJECTION INTRAMUSCULAR; INTRAVENOUS
Status: CANCELLED | OUTPATIENT
Start: 2023-12-29

## 2023-12-28 RX ORDER — EPINEPHRINE 1 MG/ML
0.3 INJECTION, SOLUTION, CONCENTRATE INTRAVENOUS PRN
Status: CANCELLED | OUTPATIENT
Start: 2023-12-29

## 2023-12-28 RX ORDER — HEPARIN 100 UNIT/ML
500 SYRINGE INTRAVENOUS PRN
Status: CANCELLED | OUTPATIENT
Start: 2023-12-29

## 2023-12-28 RX ORDER — SODIUM CHLORIDE 0.9 % (FLUSH) 0.9 %
5-40 SYRINGE (ML) INJECTION PRN
Status: CANCELLED | OUTPATIENT
Start: 2023-12-29

## 2023-12-28 RX ORDER — ONDANSETRON 2 MG/ML
8 INJECTION INTRAMUSCULAR; INTRAVENOUS
Status: CANCELLED | OUTPATIENT
Start: 2023-12-29

## 2023-12-29 ENCOUNTER — HOSPITAL ENCOUNTER (OUTPATIENT)
Facility: HOSPITAL | Age: 68
Setting detail: INFUSION SERIES
Discharge: HOME | End: 2023-12-29
Payer: MEDICARE

## 2023-12-29 ENCOUNTER — CLINICAL DOCUMENTATION (OUTPATIENT)
Age: 68
End: 2023-12-29

## 2023-12-29 VITALS
TEMPERATURE: 98.4 F | SYSTOLIC BLOOD PRESSURE: 113 MMHG | HEIGHT: 65 IN | RESPIRATION RATE: 16 BRPM | HEART RATE: 88 BPM | WEIGHT: 159 LBS | BODY MASS INDEX: 26.49 KG/M2 | DIASTOLIC BLOOD PRESSURE: 62 MMHG

## 2023-12-29 DIAGNOSIS — D70.1 CHEMOTHERAPY INDUCED NEUTROPENIA (HCC): ICD-10-CM

## 2023-12-29 DIAGNOSIS — T45.1X5A CHEMOTHERAPY INDUCED NEUTROPENIA (HCC): ICD-10-CM

## 2023-12-29 DIAGNOSIS — C90.00 MULTIPLE MYELOMA NOT HAVING ACHIEVED REMISSION (HCC): Primary | ICD-10-CM

## 2023-12-29 DIAGNOSIS — Z11.59 ENCOUNTER FOR SCREENING FOR OTHER VIRAL DISEASES: ICD-10-CM

## 2023-12-29 LAB
ALBUMIN SERPL-MCNC: 3.1 G/DL (ref 3.5–5)
ALBUMIN/GLOB SERPL: 1.4 (ref 1.1–2.2)
ALP SERPL-CCNC: 75 U/L (ref 45–117)
ALT SERPL-CCNC: 17 U/L (ref 12–78)
ANION GAP SERPL CALC-SCNC: 3 MMOL/L (ref 5–15)
AST SERPL-CCNC: 8 U/L (ref 15–37)
BASO+EOS+MONOS # BLD AUTO: 0.1 K/UL (ref 0.2–1.2)
BASO+EOS+MONOS NFR BLD AUTO: 6 % (ref 3.2–16.9)
BILIRUB SERPL-MCNC: 0.2 MG/DL (ref 0.2–1)
BUN SERPL-MCNC: 12 MG/DL (ref 6–20)
BUN/CREAT SERPL: 18 (ref 12–20)
CALCIUM SERPL-MCNC: 8.2 MG/DL (ref 8.5–10.1)
CHLORIDE SERPL-SCNC: 109 MMOL/L (ref 97–108)
CO2 SERPL-SCNC: 27 MMOL/L (ref 21–32)
CREAT SERPL-MCNC: 0.67 MG/DL (ref 0.55–1.02)
DIFFERENTIAL METHOD BLD: ABNORMAL
ERYTHROCYTE [DISTWIDTH] IN BLOOD BY AUTOMATED COUNT: 17.1 % (ref 11.8–15.8)
FERRITIN SERPL-MCNC: 169 NG/ML (ref 8–252)
GLOBULIN SER CALC-MCNC: 2.2 G/DL (ref 2–4)
GLUCOSE SERPL-MCNC: 98 MG/DL (ref 65–100)
HCT VFR BLD AUTO: 22.9 % (ref 35–47)
HGB BLD-MCNC: 7 G/DL (ref 11.5–16)
IRON SATN MFR SERPL: 24 % (ref 20–50)
IRON SERPL-MCNC: 58 UG/DL (ref 35–150)
LYMPHOCYTES # BLD: 0.6 K/UL (ref 0.8–3.5)
LYMPHOCYTES NFR BLD: 34 % (ref 12–49)
MCH RBC QN AUTO: 30.8 PG (ref 26–34)
MCHC RBC AUTO-ENTMCNC: 30.6 G/DL (ref 30–36.5)
MCV RBC AUTO: 100.9 FL (ref 80–99)
NEUTS SEG # BLD: 1.2 K/UL (ref 1.8–8)
NEUTS SEG NFR BLD: 60 % (ref 32–75)
PLATELET # BLD AUTO: 164 K/UL (ref 150–400)
POTASSIUM SERPL-SCNC: 4.2 MMOL/L (ref 3.5–5.1)
PROT SERPL-MCNC: 5.3 G/DL (ref 6.4–8.2)
RBC # BLD AUTO: 2.27 M/UL (ref 3.8–5.2)
SODIUM SERPL-SCNC: 139 MMOL/L (ref 136–145)
TIBC SERPL-MCNC: 238 UG/DL (ref 250–450)
WBC # BLD AUTO: 1.9 K/UL (ref 3.6–11)

## 2023-12-29 PROCEDURE — 84165 PROTEIN E-PHORESIS SERUM: CPT

## 2023-12-29 PROCEDURE — 6360000002 HC RX W HCPCS

## 2023-12-29 PROCEDURE — 6370000000 HC RX 637 (ALT 250 FOR IP): Performed by: INTERNAL MEDICINE

## 2023-12-29 PROCEDURE — 2580000003 HC RX 258

## 2023-12-29 PROCEDURE — 83540 ASSAY OF IRON: CPT

## 2023-12-29 PROCEDURE — 36415 COLL VENOUS BLD VENIPUNCTURE: CPT

## 2023-12-29 PROCEDURE — 83550 IRON BINDING TEST: CPT

## 2023-12-29 PROCEDURE — A4216 STERILE WATER/SALINE, 10 ML: HCPCS | Performed by: INTERNAL MEDICINE

## 2023-12-29 PROCEDURE — 83521 IG LIGHT CHAINS FREE EACH: CPT

## 2023-12-29 PROCEDURE — 82728 ASSAY OF FERRITIN: CPT

## 2023-12-29 PROCEDURE — 6360000002 HC RX W HCPCS: Performed by: INTERNAL MEDICINE

## 2023-12-29 PROCEDURE — 85025 COMPLETE CBC W/AUTO DIFF WBC: CPT

## 2023-12-29 PROCEDURE — 80053 COMPREHEN METABOLIC PANEL: CPT

## 2023-12-29 PROCEDURE — 82784 ASSAY IGA/IGD/IGG/IGM EACH: CPT

## 2023-12-29 PROCEDURE — 86334 IMMUNOFIX E-PHORESIS SERUM: CPT

## 2023-12-29 PROCEDURE — 2580000003 HC RX 258: Performed by: INTERNAL MEDICINE

## 2023-12-29 PROCEDURE — 96401 CHEMO ANTI-NEOPL SQ/IM: CPT

## 2023-12-29 RX ORDER — EPINEPHRINE 1 MG/ML
0.3 INJECTION, SOLUTION INTRAMUSCULAR; SUBCUTANEOUS PRN
OUTPATIENT
Start: 2024-01-21

## 2023-12-29 RX ORDER — ZOLEDRONIC ACID 0.04 MG/ML
4 INJECTION, SOLUTION INTRAVENOUS ONCE
Status: COMPLETED | OUTPATIENT
Start: 2023-12-29 | End: 2023-12-29

## 2023-12-29 RX ORDER — SODIUM CHLORIDE 9 MG/ML
5-250 INJECTION, SOLUTION INTRAVENOUS PRN
OUTPATIENT
Start: 2024-01-21

## 2023-12-29 RX ORDER — SODIUM CHLORIDE 0.9 % (FLUSH) 0.9 %
5-40 SYRINGE (ML) INJECTION PRN
OUTPATIENT
Start: 2024-01-21

## 2023-12-29 RX ORDER — DEXAMETHASONE 4 MG/1
20 TABLET ORAL ONCE
Status: COMPLETED | OUTPATIENT
Start: 2023-12-29 | End: 2023-12-29

## 2023-12-29 RX ORDER — ACETAMINOPHEN 325 MG/1
650 TABLET ORAL ONCE
Status: COMPLETED | OUTPATIENT
Start: 2023-12-29 | End: 2023-12-29

## 2023-12-29 RX ORDER — DIPHENHYDRAMINE HCL 25 MG
25 CAPSULE ORAL ONCE
Status: COMPLETED | OUTPATIENT
Start: 2023-12-29 | End: 2023-12-29

## 2023-12-29 RX ORDER — ZOLEDRONIC ACID 0.04 MG/ML
4 INJECTION, SOLUTION INTRAVENOUS ONCE
OUTPATIENT
Start: 2024-01-21 | End: 2024-01-21

## 2023-12-29 RX ORDER — SODIUM CHLORIDE 9 MG/ML
INJECTION, SOLUTION INTRAVENOUS CONTINUOUS
OUTPATIENT
Start: 2024-01-21

## 2023-12-29 RX ORDER — SODIUM CHLORIDE 9 MG/ML
5-250 INJECTION, SOLUTION INTRAVENOUS PRN
Status: DISCONTINUED | OUTPATIENT
Start: 2023-12-29 | End: 2023-12-30 | Stop reason: HOSPADM

## 2023-12-29 RX ORDER — ONDANSETRON 2 MG/ML
8 INJECTION INTRAMUSCULAR; INTRAVENOUS
OUTPATIENT
Start: 2024-01-21

## 2023-12-29 RX ORDER — HEPARIN 100 UNIT/ML
500 SYRINGE INTRAVENOUS PRN
OUTPATIENT
Start: 2024-01-21

## 2023-12-29 RX ORDER — ALBUTEROL SULFATE 90 UG/1
4 AEROSOL, METERED RESPIRATORY (INHALATION) PRN
OUTPATIENT
Start: 2024-01-21

## 2023-12-29 RX ORDER — ACETAMINOPHEN 325 MG/1
650 TABLET ORAL
OUTPATIENT
Start: 2024-01-21

## 2023-12-29 RX ORDER — DIPHENHYDRAMINE HYDROCHLORIDE 50 MG/ML
50 INJECTION INTRAMUSCULAR; INTRAVENOUS
OUTPATIENT
Start: 2024-01-21

## 2023-12-29 RX ADMIN — SODIUM CHLORIDE 50 ML/HR: 9 INJECTION, SOLUTION INTRAVENOUS at 12:33

## 2023-12-29 RX ADMIN — DARATUMUMAB AND HYALURONIDASE-FIHJ (HUMAN RECOMBINANT) 1800 MG: 1800; 30000 INJECTION SUBCUTANEOUS at 13:26

## 2023-12-29 RX ADMIN — SODIUM CHLORIDE 1.75 MG: 9 INJECTION INTRAMUSCULAR; INTRAVENOUS; SUBCUTANEOUS at 13:26

## 2023-12-29 RX ADMIN — DEXAMETHASONE 20 MG: 4 TABLET ORAL at 12:24

## 2023-12-29 RX ADMIN — ZOLEDRONIC ACID 4 MG: 0.04 INJECTION, SOLUTION INTRAVENOUS at 12:35

## 2023-12-29 RX ADMIN — ACETAMINOPHEN 650 MG: 325 TABLET ORAL at 12:24

## 2023-12-29 RX ADMIN — DIPHENHYDRAMINE HYDROCHLORIDE 25 MG: 25 CAPSULE ORAL at 12:24

## 2023-12-29 ASSESSMENT — PAIN SCALES - GENERAL: PAINLEVEL_OUTOF10: 8

## 2023-12-29 ASSESSMENT — PAIN DESCRIPTION - LOCATION: LOCATION: BACK

## 2023-12-29 NOTE — PROGRESS NOTES
Oral Chemotherapy Note     Mike Adamson is a  77 y. o.female  diagnosed with multiple myeloma. Ms. Mariel Murillo is being treated with Kayli-VRd. Medication name: lenalidomide   Regimen: Kayli-VRd  Dose:  10 mg   Frequency:  every other day  Administration schedule: for 14 days followed by 7 days off every 21 days  Ordering provider: Alexander Nina MD  Start date: 12/29/23     Lenalidomide is restarted with Cycle 4 on 12/29/23 - every other day for the 14 days. Lab Results   Component Value Date    WBC 1.9 (L) 12/29/2023    HGB 7.0 (L) 12/29/2023    HCT 22.9 (L) 12/29/2023    .9 (H) 12/29/2023     12/29/2023    LYMPHOPCT 34 12/29/2023    RBC 2.27 (L) 12/29/2023    MCH 30.8 12/29/2023    MCHC 30.6 12/29/2023    RDW 17.1 (H) 12/29/2023     Lab Results   Component Value Date    NEUTROABS 1.2 (L) 12/29/2023                Expected follow up date: Labs/office visit each cycle. Next cycle start on 1/19/24     Patient provided with an Oral Chemotherapy Journal.            Ms. Mariel Murillo verbalized understanding of the information presented and all of the patient's questions were answered.      Edvin Rogers, PharmD, [facility], [address]    Program: Medical Group  CPA in place:  Yes  Recommendation Provided To: Patient/Caregiver: 1 via In person    Intervention Accepted By: Patient/Caregiver: 1    Time Spent (min): 10

## 2023-12-29 NOTE — PROGRESS NOTES
of drug administration. Ms. Daryl Coelho tolerated treatment well and patient was discharged from [address] [place] [address] in stable condition.      Future Appointments   Date Time Provider [address]  [facility]   1/5/2024 11:00 AM D4 [facility] [place] [facility]   1/12/2024 10:00 AM A1 [facility] [place] [facility]   1/12/2024 10:15 AM Raman, Julia Mohs, MD [facility] BS AMB   1/13/2024  8:30 AM [facility] MRI 2 SMHRMRI [facility]   1/17/2024 11:00 AM EMG CLINIC [facility] BS AMB   1/19/2024  9:00 AM F4 [facility] [place] [facility]   1/26/2024 10:30 AM C1 [facility] [place] [facility]   2/2/2024 10:00 AM E3 [facility] [place] [facility]   2/9/2024 10:30 AM B3 [facility] [place] [facility]   2/16/2024 10:30 AM D4 [facility] [place] [facility]         Marin Lennon RN  December 29, 2023

## 2024-01-03 ENCOUNTER — APPOINTMENT (OUTPATIENT)
Facility: HOSPITAL | Age: 69
End: 2024-01-03
Payer: MEDICARE

## 2024-01-05 ENCOUNTER — HOSPITAL ENCOUNTER (OUTPATIENT)
Facility: HOSPITAL | Age: 69
Setting detail: INFUSION SERIES
Discharge: HOME | End: 2024-01-05
Payer: MEDICARE

## 2024-01-05 VITALS
HEIGHT: 65 IN | SYSTOLIC BLOOD PRESSURE: 110 MMHG | TEMPERATURE: 97.7 F | RESPIRATION RATE: 18 BRPM | DIASTOLIC BLOOD PRESSURE: 59 MMHG | WEIGHT: 157.4 LBS | HEART RATE: 87 BPM | BODY MASS INDEX: 26.23 KG/M2

## 2024-01-05 DIAGNOSIS — T45.1X5A CHEMOTHERAPY INDUCED NEUTROPENIA (HCC): ICD-10-CM

## 2024-01-05 DIAGNOSIS — Z11.59 ENCOUNTER FOR SCREENING FOR OTHER VIRAL DISEASES: ICD-10-CM

## 2024-01-05 DIAGNOSIS — D70.1 CHEMOTHERAPY INDUCED NEUTROPENIA (HCC): ICD-10-CM

## 2024-01-05 DIAGNOSIS — C90.00 MULTIPLE MYELOMA NOT HAVING ACHIEVED REMISSION (HCC): Primary | ICD-10-CM

## 2024-01-05 LAB
ABO + RH BLD: NORMAL
ALBUMIN SERPL ELPH-MCNC: 3.1 G/DL (ref 2.9–4.4)
ALBUMIN/GLOB SERPL: 1.8 (ref 0.7–1.7)
ALPHA1 GLOB SERPL ELPH-MCNC: 0.2 G/DL (ref 0–0.4)
ALPHA2 GLOB SERPL ELPH-MCNC: 0.6 G/DL (ref 0.4–1)
B-GLOBULIN SERPL ELPH-MCNC: 0.8 G/DL (ref 0.7–1.3)
BASOPHILS # BLD: 0 K/UL (ref 0–0.1)
BASOPHILS NFR BLD: 0 % (ref 0–1)
BLOOD GROUP ANTIBODIES SERPL: NORMAL
BLOOD GROUP ANTIBODIES SERPL: NORMAL
DIFFERENTIAL METHOD BLD: ABNORMAL
EOSINOPHIL # BLD: 0 K/UL (ref 0–0.4)
EOSINOPHIL NFR BLD: 1 % (ref 0–7)
ERYTHROCYTE [DISTWIDTH] IN BLOOD BY AUTOMATED COUNT: 17.3 % (ref 11.5–14.5)
GAMMA GLOB SERPL ELPH-MCNC: 0.2 G/DL (ref 0.4–1.8)
GLOBULIN SER-MCNC: 1.8 G/DL (ref 2.2–3.9)
HCT VFR BLD AUTO: 21 % (ref 35–47)
HGB BLD-MCNC: 6.8 G/DL (ref 11.5–16)
HISTORY CHECK: NORMAL
IGA SERPL-MCNC: <5 MG/DL (ref 87–352)
IGG SERPL-MCNC: 277 MG/DL (ref 586–1602)
IGM SERPL-MCNC: <5 MG/DL (ref 26–217)
IMM GRANULOCYTES # BLD AUTO: 0 K/UL (ref 0–0.04)
IMM GRANULOCYTES NFR BLD AUTO: 0 % (ref 0–0.5)
INTERPRETATION SERPL IEP-IMP: ABNORMAL
KAPPA LC FREE SER-MCNC: 1378.9 MG/L (ref 3.3–19.4)
KAPPA LC FREE/LAMBDA FREE SER: 919.27 (ref 0.26–1.65)
LAMBDA LC FREE SERPL-MCNC: 1.5 MG/L (ref 5.7–26.3)
LYMPHOCYTES # BLD: 0.7 K/UL (ref 0.8–3.5)
LYMPHOCYTES NFR BLD: 29 % (ref 12–49)
M PROTEIN SERPL ELPH-MCNC: 0.1 G/DL
MCH RBC QN AUTO: 32.7 PG (ref 26–34)
MCHC RBC AUTO-ENTMCNC: 32.4 G/DL (ref 30–36.5)
MCV RBC AUTO: 101 FL (ref 80–99)
MONOCYTES # BLD: 0.1 K/UL (ref 0–1)
MONOCYTES NFR BLD: 6 % (ref 5–13)
NEUTS SEG # BLD: 1.5 K/UL (ref 1.8–8)
NEUTS SEG NFR BLD: 64 % (ref 32–75)
NRBC # BLD: 0 K/UL (ref 0–0.01)
NRBC BLD-RTO: 0 PER 100 WBC
PLATELET # BLD AUTO: 200 K/UL (ref 150–400)
PMV BLD AUTO: 12.2 FL (ref 8.9–12.9)
PROT SERPL-MCNC: 4.9 G/DL (ref 6–8.5)
RBC # BLD AUTO: 2.08 M/UL (ref 3.8–5.2)
RBC MORPH BLD: ABNORMAL
SPECIMEN EXP DATE BLD: NORMAL
WBC # BLD AUTO: 2.3 K/UL (ref 3.6–11)

## 2024-01-05 PROCEDURE — A4216 STERILE WATER/SALINE, 10 ML: HCPCS | Performed by: INTERNAL MEDICINE

## 2024-01-05 PROCEDURE — 6370000000 HC RX 637 (ALT 250 FOR IP): Performed by: INTERNAL MEDICINE

## 2024-01-05 PROCEDURE — 83521 IG LIGHT CHAINS FREE EACH: CPT

## 2024-01-05 PROCEDURE — 86920 COMPATIBILITY TEST SPIN: CPT

## 2024-01-05 PROCEDURE — 86850 RBC ANTIBODY SCREEN: CPT

## 2024-01-05 PROCEDURE — 6360000002 HC RX W HCPCS: Performed by: INTERNAL MEDICINE

## 2024-01-05 PROCEDURE — 86970 RBC PRETX INCUBATJ W/CHEMICL: CPT

## 2024-01-05 PROCEDURE — 86870 RBC ANTIBODY IDENTIFICATION: CPT

## 2024-01-05 PROCEDURE — 82784 ASSAY IGA/IGD/IGG/IGM EACH: CPT

## 2024-01-05 PROCEDURE — 2580000003 HC RX 258: Performed by: INTERNAL MEDICINE

## 2024-01-05 PROCEDURE — 84165 PROTEIN E-PHORESIS SERUM: CPT

## 2024-01-05 PROCEDURE — 86901 BLOOD TYPING SEROLOGIC RH(D): CPT

## 2024-01-05 PROCEDURE — 86880 COOMBS TEST DIRECT: CPT

## 2024-01-05 PROCEDURE — 84155 ASSAY OF PROTEIN SERUM: CPT

## 2024-01-05 PROCEDURE — 86900 BLOOD TYPING SEROLOGIC ABO: CPT

## 2024-01-05 PROCEDURE — 86334 IMMUNOFIX E-PHORESIS SERUM: CPT

## 2024-01-05 PROCEDURE — 85025 COMPLETE CBC W/AUTO DIFF WBC: CPT

## 2024-01-05 PROCEDURE — 96401 CHEMO ANTI-NEOPL SQ/IM: CPT

## 2024-01-05 PROCEDURE — 36415 COLL VENOUS BLD VENIPUNCTURE: CPT

## 2024-01-05 RX ORDER — ONDANSETRON 2 MG/ML
8 INJECTION INTRAMUSCULAR; INTRAVENOUS
OUTPATIENT
Start: 2024-01-05

## 2024-01-05 RX ORDER — SODIUM CHLORIDE 9 MG/ML
25 INJECTION, SOLUTION INTRAVENOUS PRN
Status: DISCONTINUED | OUTPATIENT
Start: 2024-01-05 | End: 2024-01-06 | Stop reason: HOSPADM

## 2024-01-05 RX ORDER — ALBUTEROL SULFATE 90 UG/1
4 AEROSOL, METERED RESPIRATORY (INHALATION) PRN
OUTPATIENT
Start: 2024-01-05

## 2024-01-05 RX ORDER — DIPHENHYDRAMINE HYDROCHLORIDE 50 MG/ML
50 INJECTION INTRAMUSCULAR; INTRAVENOUS
OUTPATIENT
Start: 2024-01-05

## 2024-01-05 RX ORDER — SODIUM CHLORIDE 0.9 % (FLUSH) 0.9 %
5-40 SYRINGE (ML) INJECTION PRN
Status: DISCONTINUED | OUTPATIENT
Start: 2024-01-05 | End: 2024-01-06 | Stop reason: HOSPADM

## 2024-01-05 RX ORDER — ACETAMINOPHEN 325 MG/1
650 TABLET ORAL
OUTPATIENT
Start: 2024-01-05

## 2024-01-05 RX ORDER — SODIUM CHLORIDE 9 MG/ML
25 INJECTION, SOLUTION INTRAVENOUS PRN
Status: CANCELLED | OUTPATIENT
Start: 2024-01-05

## 2024-01-05 RX ORDER — SODIUM CHLORIDE 9 MG/ML
20 INJECTION, SOLUTION INTRAVENOUS CONTINUOUS
Status: CANCELLED | OUTPATIENT
Start: 2024-01-05

## 2024-01-05 RX ORDER — SODIUM CHLORIDE 9 MG/ML
INJECTION, SOLUTION INTRAVENOUS CONTINUOUS
OUTPATIENT
Start: 2024-01-05

## 2024-01-05 RX ORDER — SODIUM CHLORIDE 9 MG/ML
20 INJECTION, SOLUTION INTRAVENOUS CONTINUOUS
Status: DISCONTINUED | OUTPATIENT
Start: 2024-01-05 | End: 2024-01-06 | Stop reason: HOSPADM

## 2024-01-05 RX ORDER — DEXAMETHASONE 4 MG/1
20 TABLET ORAL ONCE
Status: COMPLETED | OUTPATIENT
Start: 2024-01-05 | End: 2024-01-05

## 2024-01-05 RX ORDER — DIPHENHYDRAMINE HCL 25 MG
25 CAPSULE ORAL ONCE
Status: COMPLETED | OUTPATIENT
Start: 2024-01-05 | End: 2024-01-05

## 2024-01-05 RX ORDER — EPINEPHRINE 1 MG/ML
0.3 INJECTION, SOLUTION INTRAMUSCULAR; SUBCUTANEOUS PRN
OUTPATIENT
Start: 2024-01-05

## 2024-01-05 RX ORDER — SODIUM CHLORIDE 0.9 % (FLUSH) 0.9 %
5-40 SYRINGE (ML) INJECTION PRN
Status: CANCELLED | OUTPATIENT
Start: 2024-01-05

## 2024-01-05 RX ORDER — ACETAMINOPHEN 325 MG/1
650 TABLET ORAL ONCE
Status: COMPLETED | OUTPATIENT
Start: 2024-01-05 | End: 2024-01-05

## 2024-01-05 RX ADMIN — ACETAMINOPHEN 650 MG: 325 TABLET ORAL at 13:40

## 2024-01-05 RX ADMIN — DEXAMETHASONE 20 MG: 4 TABLET ORAL at 13:40

## 2024-01-05 RX ADMIN — DARATUMUMAB AND HYALURONIDASE-FIHJ (HUMAN RECOMBINANT) 1800 MG: 1800; 30000 INJECTION SUBCUTANEOUS at 14:45

## 2024-01-05 RX ADMIN — SODIUM CHLORIDE 1.75 MG: 9 INJECTION INTRAMUSCULAR; INTRAVENOUS; SUBCUTANEOUS at 14:40

## 2024-01-05 RX ADMIN — DIPHENHYDRAMINE HYDROCHLORIDE 25 MG: 25 CAPSULE ORAL at 13:40

## 2024-01-05 ASSESSMENT — PAIN SCALES - GENERAL: PAINLEVEL_OUTOF10: 0

## 2024-01-05 NOTE — CONSENT
I have discussed with the patient the rationale for blood component transfusion; its benefits in treating or preventing fatigue, organ damage, or death; and its risk which includes mild transfusion reactions, rare risk of blood borne infection, or more serious but rare reactions. I have discussed the alternatives to transfusion, including the risk and consequences of not receiving transfusion. The patient had an opportunity to ask questions and had agreed to proceed with transfusion of blood components.

## 2024-01-05 NOTE — PROGRESS NOTES
[facility] Chemo Progress Note    Date: January 5, 2024      1100 Ms. Weaver Arrived to [facility] for  C4 D8 Darzalex and Velcade injections ambulatory in stable condition.  Assessment was completed, no acute issues at this time, no new complaints voiced. Labs drawn peripherally from right ac and sent for processing. .  : Labs reviewed. Criteria for treatment was met.  Hgb was 6.8 and one unit of blood is on order for Monday 1/8/24 Patient was found to have antibodies and additional pink tubes were sent to blood bank for [facility] to type her blood.  Patient aware and will come back on Monday for transfusion    Ms. Weaver's vitals were reviewed.  Vitals:    01/05/24 1445   BP: (!) 110/59   Pulse: 87   Resp:    Temp:           Lab results were obtained and reviewed.      Pre-medications  were administered as ordered and chemotherapy was initiated.  Medications Administered         acetaminophen (TYLENOL) tablet 650 mg Admin Date  01/05/2024 Action  Given Dose  650 mg Route  Oral Administered By  Debra Fragoso RN        bortezomib (VELCADE) 1.75 mg in sodium chloride (PF) 0.9 % 0.7 mL chemo subcutaneous syringe Admin Date  01/05/2024 Action  Given Dose  1.75 mg Route  SubCUTAneous Administered By  Debra Fragoso RN        daratumumab-hyaluronidase-fihj (DARZALEX FASPRO) chemo syringe 1,800 mg Admin Date  01/05/2024 Action  Given Dose  1,800 mg Route  SubCUTAneous Administered By  Debra Fragoso RN        dexAMETHasone (DECADRON) tablet 20 mg Admin Date  01/05/2024 Action  Given Dose  20 mg Route  Oral Administered By  Debra Fragoso RN        diphenhydrAMINE (BENADRYL) capsule 25 mg Admin Date  01/05/2024 Action  Given Dose  25 mg Route  Oral Administered By  Debra Fragoso RN         Darzalex given sc in RLQ, Velcade given SC in LLQ    Given      Two nurses verified prior to administering: Drug name, Drug dose, Infusion volume or drug volume when prepared in a syringe, Rate of administration, Route of  administration, Expiration dates and/or times, Appearance and physical integrity of the drugs, Rate set on infusion pump, when used, and Sequencing of drug administration.    1506 Patient tolerated treatment well.  Patient was discharged in stable condition. [facility] .    Future Appointments   Date Time Provider Department Center   1/8/2024 11:30 AM B2 [facility] [place] [facility]   1/12/2024 10:00 AM A1 [facility] [place] [facility]   1/12/2024 10:15 AM Nany Arboleda MD [facility] BS AMB   1/13/2024  8:30 AM [facility] MRI 2 HRMRI [facility]   1/17/2024 11:00 AM EMG CLINIC [facility]   1/19/2024  9:00 AM F4 [facility] [place] [facility]   1/26/2024 10:30 AM C1 [facility] [place] [facility]   2/2/2024 10:00 AM E3 [facility] [place] [facility]   2/9/2024 10:30 AM B3 [facility] [place] [facility]   2/16/2024 10:30 AM D4 [facility] [place] [facility]         MÓNICA KIMBALL, RN, RN  January 5, 2024

## 2024-01-08 ENCOUNTER — HOSPITAL ENCOUNTER (OUTPATIENT)
Facility: HOSPITAL | Age: 69
Setting detail: INFUSION SERIES
Discharge: HOME | End: 2024-01-08
Payer: MEDICARE

## 2024-01-08 VITALS
DIASTOLIC BLOOD PRESSURE: 57 MMHG | TEMPERATURE: 97.1 F | HEART RATE: 80 BPM | RESPIRATION RATE: 16 BRPM | SYSTOLIC BLOOD PRESSURE: 123 MMHG

## 2024-01-08 DIAGNOSIS — C90.00 MULTIPLE MYELOMA NOT HAVING ACHIEVED REMISSION (HCC): Primary | ICD-10-CM

## 2024-01-08 PROCEDURE — P9040 RBC LEUKOREDUCED IRRADIATED: HCPCS

## 2024-01-08 PROCEDURE — 36430 TRANSFUSION BLD/BLD COMPNT: CPT

## 2024-01-08 PROCEDURE — 2580000003 HC RX 258

## 2024-01-08 PROCEDURE — 86922 COMPATIBILITY TEST ANTIGLOB: CPT

## 2024-01-08 PROCEDURE — P9016 RBC LEUKOCYTES REDUCED: HCPCS

## 2024-01-08 PROCEDURE — 86921 COMPATIBILITY TEST INCUBATE: CPT

## 2024-01-08 RX ORDER — EPINEPHRINE 1 MG/ML
0.3 INJECTION, SOLUTION INTRAMUSCULAR; SUBCUTANEOUS PRN
Status: DISCONTINUED | OUTPATIENT
Start: 2024-01-08 | End: 2024-01-09 | Stop reason: HOSPADM

## 2024-01-08 RX ORDER — ALBUTEROL SULFATE 90 UG/1
4 AEROSOL, METERED RESPIRATORY (INHALATION) PRN
Status: DISCONTINUED | OUTPATIENT
Start: 2024-01-08 | End: 2024-01-09 | Stop reason: HOSPADM

## 2024-01-08 RX ORDER — EPINEPHRINE 1 MG/ML
0.3 INJECTION, SOLUTION INTRAMUSCULAR; SUBCUTANEOUS PRN
OUTPATIENT
Start: 2024-01-08

## 2024-01-08 RX ORDER — ACETAMINOPHEN 325 MG/1
650 TABLET ORAL
OUTPATIENT
Start: 2024-01-08

## 2024-01-08 RX ORDER — SODIUM CHLORIDE 0.9 % (FLUSH) 0.9 %
5-40 SYRINGE (ML) INJECTION PRN
Status: CANCELLED | OUTPATIENT
Start: 2024-01-08

## 2024-01-08 RX ORDER — DIPHENHYDRAMINE HYDROCHLORIDE 50 MG/ML
50 INJECTION INTRAMUSCULAR; INTRAVENOUS
OUTPATIENT
Start: 2024-01-08

## 2024-01-08 RX ORDER — ACETAMINOPHEN 325 MG/1
650 TABLET ORAL
Status: DISCONTINUED | OUTPATIENT
Start: 2024-01-08 | End: 2024-01-09 | Stop reason: HOSPADM

## 2024-01-08 RX ORDER — ONDANSETRON 2 MG/ML
8 INJECTION INTRAMUSCULAR; INTRAVENOUS
Status: DISCONTINUED | OUTPATIENT
Start: 2024-01-08 | End: 2024-01-09 | Stop reason: HOSPADM

## 2024-01-08 RX ORDER — SODIUM CHLORIDE 9 MG/ML
25 INJECTION, SOLUTION INTRAVENOUS PRN
Status: DISCONTINUED | OUTPATIENT
Start: 2024-01-08 | End: 2024-01-09 | Stop reason: HOSPADM

## 2024-01-08 RX ORDER — SODIUM CHLORIDE 9 MG/ML
25 INJECTION, SOLUTION INTRAVENOUS PRN
Status: CANCELLED | OUTPATIENT
Start: 2024-01-08

## 2024-01-08 RX ORDER — ONDANSETRON 2 MG/ML
8 INJECTION INTRAMUSCULAR; INTRAVENOUS
OUTPATIENT
Start: 2024-01-08

## 2024-01-08 RX ORDER — SODIUM CHLORIDE 9 MG/ML
INJECTION, SOLUTION INTRAVENOUS CONTINUOUS
Status: DISCONTINUED | OUTPATIENT
Start: 2024-01-08 | End: 2024-01-09 | Stop reason: HOSPADM

## 2024-01-08 RX ORDER — SODIUM CHLORIDE 9 MG/ML
20 INJECTION, SOLUTION INTRAVENOUS CONTINUOUS
Status: CANCELLED | OUTPATIENT
Start: 2024-01-08

## 2024-01-08 RX ORDER — ALBUTEROL SULFATE 90 UG/1
4 AEROSOL, METERED RESPIRATORY (INHALATION) PRN
OUTPATIENT
Start: 2024-01-08

## 2024-01-08 RX ORDER — SODIUM CHLORIDE 0.9 % (FLUSH) 0.9 %
5-40 SYRINGE (ML) INJECTION PRN
Status: DISCONTINUED | OUTPATIENT
Start: 2024-01-08 | End: 2024-01-09 | Stop reason: HOSPADM

## 2024-01-08 RX ORDER — SODIUM CHLORIDE 9 MG/ML
INJECTION, SOLUTION INTRAVENOUS CONTINUOUS
OUTPATIENT
Start: 2024-01-08

## 2024-01-08 RX ORDER — DIPHENHYDRAMINE HYDROCHLORIDE 50 MG/ML
50 INJECTION INTRAMUSCULAR; INTRAVENOUS
Status: DISCONTINUED | OUTPATIENT
Start: 2024-01-08 | End: 2024-01-09 | Stop reason: HOSPADM

## 2024-01-08 RX ORDER — SODIUM CHLORIDE 9 MG/ML
20 INJECTION, SOLUTION INTRAVENOUS CONTINUOUS
Status: DISCONTINUED | OUTPATIENT
Start: 2024-01-08 | End: 2024-01-09 | Stop reason: HOSPADM

## 2024-01-08 RX ADMIN — SODIUM CHLORIDE: 9 INJECTION, SOLUTION INTRAVENOUS at 12:00

## 2024-01-08 NOTE — PROGRESS NOTES
[facility] - Chemotherapy Progress Note    1115 Pt admit to [facility] for blood transfusion (1 unit) ambulatory in stable condition. Assessment completed. No new concerns voiced. PIV access established in R arm with positive blood return. Line flushed, NS infusing.    Please see Blood Flowsheet for documentation    BP (!) 117/50   Pulse 81   Temp 97.5 °F (36.4 °C)   Resp 16     Medications Administered         0.9 % sodium chloride infusion Admin Date  01/08/2024 Action  New Bag Dose   Rate  100 mL/hr Route  IntraVENous Administered By  Brenda Zavala, RN            1510 Pt tolerated treatment well. Pt declined to remain in [facility] x1 hour post transfusion for observation. Patient education printed and given to patient; all questions answered and concerns addressed. PIV removed at discharge. D/c home ambulatory in no distress. Pt aware of next [facility] appointment scheduled for 01/12/2024.

## 2024-01-09 LAB
ABO + RH BLD: NORMAL
BLD PROD TYP BPU: NORMAL
BLOOD BANK DISPENSE STATUS: NORMAL
BLOOD GROUP ANTIBODIES SERPL: NORMAL
BLOOD GROUP ANTIBODIES SERPL: NORMAL
BPU ID: NORMAL
CROSSMATCH RESULT: NORMAL
SPECIMEN EXP DATE BLD: NORMAL
UNIT DIVISION: 0

## 2024-01-10 ENCOUNTER — APPOINTMENT (OUTPATIENT)
Facility: HOSPITAL | Age: 69
End: 2024-01-10
Payer: MEDICARE

## 2024-01-10 LAB
ALBUMIN SERPL ELPH-MCNC: 3.1 G/DL (ref 2.9–4.4)
ALBUMIN/GLOB SERPL: 1.5 (ref 0.7–1.7)
ALPHA1 GLOB SERPL ELPH-MCNC: 0.3 G/DL (ref 0–0.4)
ALPHA2 GLOB SERPL ELPH-MCNC: 0.8 G/DL (ref 0.4–1)
B-GLOBULIN SERPL ELPH-MCNC: 0.9 G/DL (ref 0.7–1.3)
GAMMA GLOB SERPL ELPH-MCNC: 0.3 G/DL (ref 0.4–1.8)
GLOBULIN SER-MCNC: 2.2 G/DL (ref 2.2–3.9)
IGA SERPL-MCNC: <5 MG/DL (ref 87–352)
IGG SERPL-MCNC: 313 MG/DL (ref 586–1602)
IGM SERPL-MCNC: 6 MG/DL (ref 26–217)
INTERPRETATION SERPL IEP-IMP: ABNORMAL
KAPPA LC FREE SER-MCNC: 1319.3 MG/L (ref 3.3–19.4)
KAPPA LC FREE/LAMBDA FREE SER: 454.93 (ref 0.26–1.65)
LAMBDA LC FREE SERPL-MCNC: 2.9 MG/L (ref 5.7–26.3)
M PROTEIN SERPL ELPH-MCNC: 0.1 G/DL
PROT SERPL-MCNC: 5.3 G/DL (ref 6–8.5)

## 2024-01-12 ENCOUNTER — CLINICAL DOCUMENTATION (OUTPATIENT)
Age: 69
End: 2024-01-12

## 2024-01-12 ENCOUNTER — OFFICE VISIT (OUTPATIENT)
Age: 69
End: 2024-01-12
Payer: MEDICARE

## 2024-01-12 ENCOUNTER — HOSPITAL ENCOUNTER (OUTPATIENT)
Facility: HOSPITAL | Age: 69
Setting detail: INFUSION SERIES
Discharge: HOME | End: 2024-01-12
Payer: MEDICARE

## 2024-01-12 VITALS
HEART RATE: 79 BPM | WEIGHT: 157 LBS | BODY MASS INDEX: 26.13 KG/M2 | TEMPERATURE: 98 F | SYSTOLIC BLOOD PRESSURE: 124 MMHG | DIASTOLIC BLOOD PRESSURE: 55 MMHG | OXYGEN SATURATION: 98 % | RESPIRATION RATE: 17 BRPM

## 2024-01-12 VITALS
HEART RATE: 79 BPM | HEIGHT: 65 IN | RESPIRATION RATE: 17 BRPM | SYSTOLIC BLOOD PRESSURE: 124 MMHG | WEIGHT: 157.8 LBS | BODY MASS INDEX: 26.29 KG/M2 | TEMPERATURE: 98 F | DIASTOLIC BLOOD PRESSURE: 55 MMHG

## 2024-01-12 DIAGNOSIS — Z11.59 ENCOUNTER FOR SCREENING FOR OTHER VIRAL DISEASES: ICD-10-CM

## 2024-01-12 DIAGNOSIS — C90.00 MULTIPLE MYELOMA NOT HAVING ACHIEVED REMISSION (HCC): Primary | ICD-10-CM

## 2024-01-12 DIAGNOSIS — C90.00 MULTIPLE MYELOMA NOT HAVING ACHIEVED REMISSION (HCC): ICD-10-CM

## 2024-01-12 LAB
BASOPHILS # BLD: 0 K/UL (ref 0–0.1)
BASOPHILS NFR BLD: 0 % (ref 0–1)
DIFFERENTIAL METHOD BLD: ABNORMAL
EOSINOPHIL # BLD: 0 K/UL (ref 0–0.4)
EOSINOPHIL NFR BLD: 2 % (ref 0–7)
ERYTHROCYTE [DISTWIDTH] IN BLOOD BY AUTOMATED COUNT: 19.4 % (ref 11.5–14.5)
HCT VFR BLD AUTO: 24.4 % (ref 35–47)
HGB BLD-MCNC: 7.8 G/DL (ref 11.5–16)
IMM GRANULOCYTES # BLD AUTO: 0 K/UL (ref 0–0.04)
IMM GRANULOCYTES NFR BLD AUTO: 0 % (ref 0–0.5)
LYMPHOCYTES # BLD: 0.8 K/UL (ref 0.8–3.5)
LYMPHOCYTES NFR BLD: 40 % (ref 12–49)
MCH RBC QN AUTO: 31.1 PG (ref 26–34)
MCHC RBC AUTO-ENTMCNC: 32 G/DL (ref 30–36.5)
MCV RBC AUTO: 97.2 FL (ref 80–99)
MONOCYTES # BLD: 0.2 K/UL (ref 0–1)
MONOCYTES NFR BLD: 9 % (ref 5–13)
NEUTS SEG # BLD: 1 K/UL (ref 1.8–8)
NEUTS SEG NFR BLD: 49 % (ref 32–75)
NRBC # BLD: 0 K/UL (ref 0–0.01)
NRBC BLD-RTO: 0 PER 100 WBC
PLATELET # BLD AUTO: 229 K/UL (ref 150–400)
PMV BLD AUTO: 11.4 FL (ref 8.9–12.9)
RBC # BLD AUTO: 2.51 M/UL (ref 3.8–5.2)
RBC MORPH BLD: ABNORMAL
WBC # BLD AUTO: 2 K/UL (ref 3.6–11)

## 2024-01-12 PROCEDURE — 96401 CHEMO ANTI-NEOPL SQ/IM: CPT

## 2024-01-12 PROCEDURE — 1036F TOBACCO NON-USER: CPT | Performed by: INTERNAL MEDICINE

## 2024-01-12 PROCEDURE — G8428 CUR MEDS NOT DOCUMENT: HCPCS | Performed by: INTERNAL MEDICINE

## 2024-01-12 PROCEDURE — 1090F PRES/ABSN URINE INCON ASSESS: CPT | Performed by: INTERNAL MEDICINE

## 2024-01-12 PROCEDURE — 85025 COMPLETE CBC W/AUTO DIFF WBC: CPT

## 2024-01-12 PROCEDURE — 99215 OFFICE O/P EST HI 40 MIN: CPT | Performed by: INTERNAL MEDICINE

## 2024-01-12 PROCEDURE — 2580000003 HC RX 258: Performed by: INTERNAL MEDICINE

## 2024-01-12 PROCEDURE — G8419 CALC BMI OUT NRM PARAM NOF/U: HCPCS | Performed by: INTERNAL MEDICINE

## 2024-01-12 PROCEDURE — A4216 STERILE WATER/SALINE, 10 ML: HCPCS | Performed by: INTERNAL MEDICINE

## 2024-01-12 PROCEDURE — 6370000000 HC RX 637 (ALT 250 FOR IP): Performed by: INTERNAL MEDICINE

## 2024-01-12 PROCEDURE — 1123F ACP DISCUSS/DSCN MKR DOCD: CPT | Performed by: INTERNAL MEDICINE

## 2024-01-12 PROCEDURE — 36415 COLL VENOUS BLD VENIPUNCTURE: CPT

## 2024-01-12 PROCEDURE — 6360000002 HC RX W HCPCS: Performed by: INTERNAL MEDICINE

## 2024-01-12 PROCEDURE — 3017F COLORECTAL CA SCREEN DOC REV: CPT | Performed by: INTERNAL MEDICINE

## 2024-01-12 PROCEDURE — G8484 FLU IMMUNIZE NO ADMIN: HCPCS | Performed by: INTERNAL MEDICINE

## 2024-01-12 PROCEDURE — G8399 PT W/DXA RESULTS DOCUMENT: HCPCS | Performed by: INTERNAL MEDICINE

## 2024-01-12 RX ORDER — DEXAMETHASONE 4 MG/1
20 TABLET ORAL ONCE
OUTPATIENT
Start: 2024-02-02 | End: 2024-02-02

## 2024-01-12 RX ORDER — DEXAMETHASONE 4 MG/1
20 TABLET ORAL ONCE
Status: COMPLETED | OUTPATIENT
Start: 2024-01-12 | End: 2024-01-12

## 2024-01-12 RX ORDER — DIPHENHYDRAMINE HCL 25 MG
25 CAPSULE ORAL ONCE
Status: COMPLETED | OUTPATIENT
Start: 2024-01-12 | End: 2024-01-12

## 2024-01-12 RX ORDER — ALBUTEROL SULFATE 90 UG/1
4 AEROSOL, METERED RESPIRATORY (INHALATION) PRN
Status: CANCELLED | OUTPATIENT
Start: 2024-01-19

## 2024-01-12 RX ORDER — ACETAMINOPHEN 325 MG/1
650 TABLET ORAL ONCE
Status: COMPLETED | OUTPATIENT
Start: 2024-01-12 | End: 2024-01-12

## 2024-01-12 RX ORDER — SODIUM CHLORIDE 0.9 % (FLUSH) 0.9 %
5-40 SYRINGE (ML) INJECTION PRN
Status: CANCELLED | OUTPATIENT
Start: 2024-01-26

## 2024-01-12 RX ORDER — ONDANSETRON 2 MG/ML
8 INJECTION INTRAMUSCULAR; INTRAVENOUS
Status: CANCELLED | OUTPATIENT
Start: 2024-01-19

## 2024-01-12 RX ORDER — MEPERIDINE HYDROCHLORIDE 25 MG/ML
12.5 INJECTION INTRAMUSCULAR; INTRAVENOUS; SUBCUTANEOUS PRN
OUTPATIENT
Start: 2024-02-02

## 2024-01-12 RX ORDER — ACETAMINOPHEN 325 MG/1
650 TABLET ORAL
Status: CANCELLED | OUTPATIENT
Start: 2024-01-19

## 2024-01-12 RX ORDER — DIPHENHYDRAMINE HYDROCHLORIDE 50 MG/ML
50 INJECTION INTRAMUSCULAR; INTRAVENOUS
OUTPATIENT
Start: 2024-02-02

## 2024-01-12 RX ORDER — DEXAMETHASONE 4 MG/1
20 TABLET ORAL ONCE
Status: CANCELLED | OUTPATIENT
Start: 2024-01-26 | End: 2024-01-26

## 2024-01-12 RX ORDER — SODIUM CHLORIDE 9 MG/ML
INJECTION, SOLUTION INTRAVENOUS CONTINUOUS
Status: CANCELLED | OUTPATIENT
Start: 2024-01-26

## 2024-01-12 RX ORDER — ONDANSETRON 4 MG/1
8 TABLET, ORALLY DISINTEGRATING ORAL
OUTPATIENT
Start: 2024-02-02

## 2024-01-12 RX ORDER — HEPARIN 100 UNIT/ML
500 SYRINGE INTRAVENOUS PRN
OUTPATIENT
Start: 2024-02-02

## 2024-01-12 RX ORDER — HEPARIN 100 UNIT/ML
500 SYRINGE INTRAVENOUS PRN
Status: CANCELLED | OUTPATIENT
Start: 2024-01-26

## 2024-01-12 RX ORDER — DIPHENHYDRAMINE HYDROCHLORIDE 50 MG/ML
50 INJECTION INTRAMUSCULAR; INTRAVENOUS
Status: CANCELLED | OUTPATIENT
Start: 2024-01-26

## 2024-01-12 RX ORDER — SODIUM CHLORIDE 9 MG/ML
5-250 INJECTION, SOLUTION INTRAVENOUS PRN
OUTPATIENT
Start: 2024-02-02

## 2024-01-12 RX ORDER — EPINEPHRINE 1 MG/ML
0.3 INJECTION, SOLUTION INTRAMUSCULAR; SUBCUTANEOUS PRN
OUTPATIENT
Start: 2024-02-02

## 2024-01-12 RX ORDER — LENALIDOMIDE 10 MG/1
10 CAPSULE ORAL EVERY OTHER DAY
Qty: 7 CAPSULE | Refills: 0 | Status: ACTIVE | OUTPATIENT
Start: 2024-01-12

## 2024-01-12 RX ORDER — ONDANSETRON 4 MG/1
8 TABLET, ORALLY DISINTEGRATING ORAL
Status: DISCONTINUED | OUTPATIENT
Start: 2024-01-12 | End: 2024-01-13 | Stop reason: HOSPADM

## 2024-01-12 RX ORDER — DIPHENHYDRAMINE HYDROCHLORIDE 50 MG/ML
50 INJECTION INTRAMUSCULAR; INTRAVENOUS
Status: CANCELLED | OUTPATIENT
Start: 2024-01-19

## 2024-01-12 RX ORDER — ONDANSETRON 2 MG/ML
8 INJECTION INTRAMUSCULAR; INTRAVENOUS
Status: CANCELLED | OUTPATIENT
Start: 2024-01-26

## 2024-01-12 RX ORDER — ACETAMINOPHEN 325 MG/1
650 TABLET ORAL
OUTPATIENT
Start: 2024-02-02

## 2024-01-12 RX ORDER — SODIUM CHLORIDE 9 MG/ML
5-250 INJECTION, SOLUTION INTRAVENOUS PRN
Status: CANCELLED | OUTPATIENT
Start: 2024-01-19

## 2024-01-12 RX ORDER — EPINEPHRINE 1 MG/ML
0.3 INJECTION, SOLUTION INTRAMUSCULAR; SUBCUTANEOUS PRN
Status: CANCELLED | OUTPATIENT
Start: 2024-01-26

## 2024-01-12 RX ORDER — MEPERIDINE HYDROCHLORIDE 25 MG/ML
12.5 INJECTION INTRAMUSCULAR; INTRAVENOUS; SUBCUTANEOUS PRN
Status: CANCELLED | OUTPATIENT
Start: 2024-01-19

## 2024-01-12 RX ORDER — EPINEPHRINE 1 MG/ML
0.3 INJECTION, SOLUTION INTRAMUSCULAR; SUBCUTANEOUS PRN
Status: CANCELLED | OUTPATIENT
Start: 2024-01-19

## 2024-01-12 RX ORDER — SODIUM CHLORIDE 0.9 % (FLUSH) 0.9 %
5-40 SYRINGE (ML) INJECTION PRN
OUTPATIENT
Start: 2024-02-02

## 2024-01-12 RX ORDER — ACETAMINOPHEN 325 MG/1
650 TABLET ORAL
Status: CANCELLED | OUTPATIENT
Start: 2024-01-26

## 2024-01-12 RX ORDER — SODIUM CHLORIDE 9 MG/ML
INJECTION, SOLUTION INTRAVENOUS CONTINUOUS
Status: CANCELLED | OUTPATIENT
Start: 2024-01-19

## 2024-01-12 RX ORDER — ALBUTEROL SULFATE 90 UG/1
4 AEROSOL, METERED RESPIRATORY (INHALATION) PRN
Status: CANCELLED | OUTPATIENT
Start: 2024-01-26

## 2024-01-12 RX ORDER — ONDANSETRON 4 MG/1
8 TABLET, ORALLY DISINTEGRATING ORAL
Status: CANCELLED | OUTPATIENT
Start: 2024-01-26

## 2024-01-12 RX ORDER — SODIUM CHLORIDE 9 MG/ML
5-250 INJECTION, SOLUTION INTRAVENOUS PRN
Status: CANCELLED | OUTPATIENT
Start: 2024-01-26

## 2024-01-12 RX ORDER — HEPARIN 100 UNIT/ML
500 SYRINGE INTRAVENOUS PRN
Status: CANCELLED | OUTPATIENT
Start: 2024-01-19

## 2024-01-12 RX ORDER — SODIUM CHLORIDE 9 MG/ML
INJECTION, SOLUTION INTRAVENOUS CONTINUOUS
OUTPATIENT
Start: 2024-02-02

## 2024-01-12 RX ORDER — MEPERIDINE HYDROCHLORIDE 25 MG/ML
12.5 INJECTION INTRAMUSCULAR; INTRAVENOUS; SUBCUTANEOUS PRN
Status: CANCELLED | OUTPATIENT
Start: 2024-01-26

## 2024-01-12 RX ORDER — SODIUM CHLORIDE 0.9 % (FLUSH) 0.9 %
5-40 SYRINGE (ML) INJECTION PRN
Status: CANCELLED | OUTPATIENT
Start: 2024-01-19

## 2024-01-12 RX ORDER — ONDANSETRON 2 MG/ML
8 INJECTION INTRAMUSCULAR; INTRAVENOUS
OUTPATIENT
Start: 2024-02-02

## 2024-01-12 RX ORDER — ALBUTEROL SULFATE 90 UG/1
4 AEROSOL, METERED RESPIRATORY (INHALATION) PRN
OUTPATIENT
Start: 2024-02-02

## 2024-01-12 RX ADMIN — ACETAMINOPHEN 650 MG: 325 TABLET ORAL at 12:41

## 2024-01-12 RX ADMIN — DARATUMUMAB AND HYALURONIDASE-FIHJ (HUMAN RECOMBINANT) 1800 MG: 1800; 30000 INJECTION SUBCUTANEOUS at 13:50

## 2024-01-12 RX ADMIN — SODIUM CHLORIDE 1.75 MG: 9 INJECTION INTRAMUSCULAR; INTRAVENOUS; SUBCUTANEOUS at 13:43

## 2024-01-12 RX ADMIN — DEXAMETHASONE 20 MG: 4 TABLET ORAL at 12:41

## 2024-01-12 RX ADMIN — DIPHENHYDRAMINE HYDROCHLORIDE 25 MG: 25 CAPSULE ORAL at 12:41

## 2024-01-12 NOTE — PROGRESS NOTES
Candida Weaver is a 68 y.o. female    Chief Complaint   Patient presents with    Follow-up     Multiple Myeloma      1. Have you been to the ER, urgent care clinic since your last visit?  Hospitalized since your last visit?No    2. Have you seen or consulted any other health care providers outside of the [facility] since your last visit?  Include any pap smears or colon screening. No

## 2024-01-12 NOTE — PROGRESS NOTES
[facility]fusion [facility] - Chemotherapy Progress Note    0945 Pt admit to hospitals for Darzalex Faspro/Velcade/C4D15 ambulatory in stable condition. Assessment completed by access RN. Labs drawn peripherally and sent for processing.       BP (!) 124/55   Pulse 79   Temp 98 °F (36.7 °C) (Temporal)   Resp 17   Ht 1.651 m (5' 5\")   Wt 71.6 kg (157 lb 12.8 oz)   BMI 26.26 kg/m²     Medications Administered         acetaminophen (TYLENOL) tablet 650 mg Admin Date  01/12/2024 Action  Given Dose  650 mg Route  Oral Administered By  Brenda Zavala RN        bortezomib (VELCADE) 1.75 mg in sodium chloride (PF) 0.9 % 0.7 mL chemo subcutaneous syringe Admin Date  01/12/2024 Action  Given Dose  1.75 mg Route  SubCUTAneous Administered By  Brenda Zavala RN        daratumumab-hyaluronidase-fihj (DARZALEX FASPRO) chemo syringe 1,800 mg Admin Date  01/12/2024 Action  Given Dose  1,800 mg Route  SubCUTAneous Administered By  Brenda Zavala RN        dexAMETHasone (DECADRON) tablet 20 mg Admin Date  01/12/2024 Action  Given Dose  20 mg Route  Oral Administered By  Brenda Zavala RN        diphenhydrAMINE (BENADRYL) capsule 25 mg Admin Date  01/12/2024 Action  Given Dose  25 mg Route  Oral Administered By  Brenda Zavala RN          (Darzalex, LLQ; Velcade, RLQ)    Two nurses verified prior to administering:, Drug name, Drug dose, Infusion volume or drug volume when prepared in a syringe, Rate of administration, Route of administration, Expiration dates and/or times, Appearance and physical integrity of the drugs, Rate set on infusion pump, when used, Sequencing of drug administration         1410 Pt tolerated treatment well. D/c home ambulatory in no distress. Pt aware of next hospitals appointment scheduled for 01/18/2024.    Recent Results (from the past 12 hour(s))   CBC With Auto Differential    Collection Time: 01/12/24 10:12 AM   Result Value Ref Range    WBC 2.0 (L) 3.6 - 11.0 K/uL    RBC 2.51 (L) 3.80 -

## 2024-01-12 NOTE — PROGRESS NOTES
[facility] [place] at [facility]  [address] [facility], [address] [place] [id number]  W: [phone]  F: [phone]    Reason for Visit:   Candida Weaver is a 68 y.o. female who is seen for Multiple Myeloma   Treatment History:   10/25/2023: Kayli VRD, Rev lite    History of Present Illness:   Patient is a 68 y.o. female who was dx with Smoldering Myeloma in 2017 and has been seeing [facility] Dr. Lopez. A BM bx at that time showed 40% plasma cells. No end organ damage. Noted to have worsening anemia 7/5/2023 with a Hb of 9.6 g/dl. This led to further evaluation that included a gammopathy eval that showed an M spike of 0.3 g/dl. She had a Kappa LC level of 1593 g/L with a K:L ratio of 157. Sent for evaluation now. She was in the ER June 2023 with some SOB. Had a CT head and this showed lytic lesions. She states that she has some carpal tunnel. BM bx showed Myeloma. She is on Kayli VRD.     Struggled to be on Revlimid due to neutropenia  Needed 1 unit of PRBC on 1/5  She is here for C4D15 which was pushed out 2 days.   She started cycle 4 on 12/29. She was on revlimid 10 mg every other day for this cycle.   She has no bleeding.   She states that when she takes revlimid she feels like her heart is fluttering and she has some feet edema.  Still has L hip pain, Still has back pain     Comes for follow up.       Presents with her .       Past Medical History:   Diagnosis Date    Anxiety 6/4/2009    Blood dyscrasia     followed by Dr. Lopez    Concussion 09/12    Disc disorder 6/4/2009    High cholesterol     Iron (Fe) deficiency anemia 6/4/2009    Multinodular goiter     Osteopenia 6/4/2009    Seasonal allergic rhinitis 6/4/2009    Vertigo       Past Surgical History:   Procedure Laterality Date    CATARACT REMOVAL      COLONOSCOPY N/A 9/22/2020    COLONOSCOPY     :- performed by Danilo Garcia MD at [facility] ENDOSCOPY    FLEXIBLE SIGMOIDOSCOPY N/A 9/15/2020    SIGMOIDOSCOPY FLEXIBLE performed by Danilo Garcia MD

## 2024-01-12 NOTE — PROGRESS NOTES
Oral Chemotherapy Note     Candida Weaver is a  68 y.o.female  diagnosed with multiple myeloma.  Ms. Weaver is being treated with Kayli-VRd.         Medication name: lenalidomide   Regimen: Kayli-VRd  Dose:  10 mg   Frequency:  every other day  Administration schedule: for 14 days followed by 7 days off every 21 days  Ordering provider: Nany Arboleda MD  Start date: 12/29/23     Lenalidomide is restarted with Cycle 4 on 12/29/23 - every other day for the 14 days.    [id number]    Rose aP, PharmD, BCPS, BCOP     For Pharmacy Admin Tracking Only    Program: Medical Group  CPA in place:  Yes  Recommendation Provided To: Patient/Caregiver: 1 via Telephone  Intervention Detail: Refill(s) Provided  Intervention Accepted By: Patient/Caregiver: 1    Time Spent (min): 15

## 2024-01-13 ENCOUNTER — HOSPITAL ENCOUNTER (OUTPATIENT)
Facility: HOSPITAL | Age: 69
End: 2024-01-13
Payer: MEDICARE

## 2024-01-13 DIAGNOSIS — M54.50 LUMBAR PAIN: ICD-10-CM

## 2024-01-13 DIAGNOSIS — J45.40 MODERATE PERSISTENT ASTHMA WITHOUT COMPLICATION: ICD-10-CM

## 2024-01-13 DIAGNOSIS — C90.00 MULTIPLE MYELOMA NOT HAVING ACHIEVED REMISSION (HCC): ICD-10-CM

## 2024-01-13 PROCEDURE — A9579 GAD-BASE MR CONTRAST NOS,1ML: HCPCS

## 2024-01-13 PROCEDURE — 72158 MRI LUMBAR SPINE W/O & W/DYE: CPT

## 2024-01-13 PROCEDURE — 6360000004 HC RX CONTRAST MEDICATION

## 2024-01-13 RX ADMIN — GADOTERIDOL 15 ML: 279.3 INJECTION, SOLUTION INTRAVENOUS at 09:36

## 2024-01-15 RX ORDER — MONTELUKAST SODIUM 10 MG/1
10 TABLET ORAL DAILY
Qty: 30 TABLET | Refills: 3 | Status: SHIPPED | OUTPATIENT
Start: 2024-01-15

## 2024-01-17 ENCOUNTER — APPOINTMENT (OUTPATIENT)
Facility: HOSPITAL | Age: 69
End: 2024-01-17
Payer: MEDICARE

## 2024-01-17 ENCOUNTER — PROCEDURE VISIT (OUTPATIENT)
Age: 69
End: 2024-01-17
Payer: MEDICARE

## 2024-01-17 ENCOUNTER — HOSPITAL ENCOUNTER (OUTPATIENT)
Facility: HOSPITAL | Age: 69
Setting detail: INFUSION SERIES
Discharge: HOME | End: 2024-01-17
Payer: MEDICARE

## 2024-01-17 VITALS
RESPIRATION RATE: 18 BRPM | TEMPERATURE: 98.2 F | HEART RATE: 83 BPM | DIASTOLIC BLOOD PRESSURE: 69 MMHG | SYSTOLIC BLOOD PRESSURE: 138 MMHG

## 2024-01-17 DIAGNOSIS — Z11.59 ENCOUNTER FOR SCREENING FOR OTHER VIRAL DISEASES: ICD-10-CM

## 2024-01-17 DIAGNOSIS — G56.01 CARPAL TUNNEL SYNDROME OF RIGHT WRIST: Primary | ICD-10-CM

## 2024-01-17 DIAGNOSIS — C90.00 MULTIPLE MYELOMA NOT HAVING ACHIEVED REMISSION (HCC): ICD-10-CM

## 2024-01-17 LAB
ALBUMIN SERPL-MCNC: 3.1 G/DL (ref 3.5–5)
ALBUMIN/GLOB SERPL: 1.1 (ref 1.1–2.2)
ALP SERPL-CCNC: 75 U/L (ref 45–117)
ALT SERPL-CCNC: 46 U/L (ref 12–78)
ANION GAP SERPL CALC-SCNC: 4 MMOL/L (ref 5–15)
AST SERPL-CCNC: 11 U/L (ref 15–37)
BASOPHILS # BLD: 0 K/UL (ref 0–0.1)
BASOPHILS NFR BLD: 1 % (ref 0–1)
BILIRUB SERPL-MCNC: 0.3 MG/DL (ref 0.2–1)
BUN SERPL-MCNC: 10 MG/DL (ref 6–20)
BUN/CREAT SERPL: 14 (ref 12–20)
CALCIUM SERPL-MCNC: 8.4 MG/DL (ref 8.5–10.1)
CHLORIDE SERPL-SCNC: 112 MMOL/L (ref 97–108)
CO2 SERPL-SCNC: 26 MMOL/L (ref 21–32)
CREAT SERPL-MCNC: 0.69 MG/DL (ref 0.55–1.02)
DIFFERENTIAL METHOD BLD: ABNORMAL
EOSINOPHIL # BLD: 0 K/UL (ref 0–0.4)
EOSINOPHIL NFR BLD: 1 % (ref 0–7)
ERYTHROCYTE [DISTWIDTH] IN BLOOD BY AUTOMATED COUNT: 19 % (ref 11.5–14.5)
GLOBULIN SER CALC-MCNC: 2.7 G/DL (ref 2–4)
GLUCOSE SERPL-MCNC: 100 MG/DL (ref 65–100)
HCT VFR BLD AUTO: 25.4 % (ref 35–47)
HGB BLD-MCNC: 8.2 G/DL (ref 11.5–16)
IMM GRANULOCYTES # BLD AUTO: 0 K/UL (ref 0–0.04)
IMM GRANULOCYTES NFR BLD AUTO: 0 % (ref 0–0.5)
LYMPHOCYTES # BLD: 0.9 K/UL (ref 0.8–3.5)
LYMPHOCYTES NFR BLD: 47 % (ref 12–49)
MCH RBC QN AUTO: 31.3 PG (ref 26–34)
MCHC RBC AUTO-ENTMCNC: 32.3 G/DL (ref 30–36.5)
MCV RBC AUTO: 96.9 FL (ref 80–99)
MONOCYTES # BLD: 0.1 K/UL (ref 0–1)
MONOCYTES NFR BLD: 6 % (ref 5–13)
NEUTS SEG # BLD: 0.9 K/UL (ref 1.8–8)
NEUTS SEG NFR BLD: 45 % (ref 32–75)
NRBC # BLD: 0 K/UL (ref 0–0.01)
NRBC BLD-RTO: 0 PER 100 WBC
PLATELET # BLD AUTO: 233 K/UL (ref 150–400)
PMV BLD AUTO: 12.9 FL (ref 8.9–12.9)
POTASSIUM SERPL-SCNC: 3.4 MMOL/L (ref 3.5–5.1)
PROT SERPL-MCNC: 5.8 G/DL (ref 6.4–8.2)
RBC # BLD AUTO: 2.62 M/UL (ref 3.8–5.2)
RBC MORPH BLD: ABNORMAL
SODIUM SERPL-SCNC: 142 MMOL/L (ref 136–145)
WBC # BLD AUTO: 1.9 K/UL (ref 3.6–11)

## 2024-01-17 PROCEDURE — 36415 COLL VENOUS BLD VENIPUNCTURE: CPT

## 2024-01-17 PROCEDURE — 86334 IMMUNOFIX E-PHORESIS SERUM: CPT

## 2024-01-17 PROCEDURE — 84165 PROTEIN E-PHORESIS SERUM: CPT

## 2024-01-17 PROCEDURE — 83521 IG LIGHT CHAINS FREE EACH: CPT

## 2024-01-17 PROCEDURE — 95909 NRV CNDJ TST 5-6 STUDIES: CPT | Performed by: PSYCHIATRY & NEUROLOGY

## 2024-01-17 PROCEDURE — 82784 ASSAY IGA/IGD/IGG/IGM EACH: CPT

## 2024-01-17 PROCEDURE — 95886 MUSC TEST DONE W/N TEST COMP: CPT | Performed by: PSYCHIATRY & NEUROLOGY

## 2024-01-17 PROCEDURE — 80053 COMPREHEN METABOLIC PANEL: CPT

## 2024-01-17 PROCEDURE — 85025 COMPLETE CBC W/AUTO DIFF WBC: CPT

## 2024-01-17 ASSESSMENT — PAIN DESCRIPTION - LOCATION: LOCATION: HAND

## 2024-01-17 ASSESSMENT — PAIN DESCRIPTION - DESCRIPTORS: DESCRIPTORS: ACHING;NUMBNESS

## 2024-01-17 ASSESSMENT — PAIN SCALES - GENERAL: PAINLEVEL_OUTOF10: 6

## 2024-01-17 ASSESSMENT — PAIN DESCRIPTION - PAIN TYPE: TYPE: CHRONIC PAIN

## 2024-01-17 ASSESSMENT — PAIN DESCRIPTION - ORIENTATION: ORIENTATION: RIGHT

## 2024-01-17 NOTE — PROGRESS NOTES
EMG completed.     
Site1 Site2 Delta-0 (ms) Dist (cm) Johnathan (m/s) Norm Johnathan (m/s)   Right Median Motor (Abd Poll Brev)  33 °C   Wrist    6.6 <4.2 6.7 >5 Elbow Wrist 4.2 26.0 62 >50   Elbow    10.8  5.7          Right Ulnar Motor (Abd Dig Minimi)  33 °C   Wrist    3.0 <4.2 6.7 >3 B Elbow Wrist 3.2 17.0 53 >53   B Elbow    6.2  6.7  A Elbow B Elbow 1.5 10.0 67 >53   A Elbow    7.7  6.4            F Wave Studies     NR F-Lat (ms) Lat Norm (ms) L-R F-Lat (ms) L-R Lat Norm   Right Ulnar (Mrkrs) (Abd Dig Min)  33 °C      26.91 <36  <2.5     EMG     Side Muscle Nerve Root Ins Act Fibs Psw Amp Dur Poly Recrt Int Pat Comment   Right 1stDorInt Ulnar C8-T1 Nml Nml Nml Nml Nml 0 Nml Nml    Right Abd Poll Brev Median C8-T1 Nml Nml Nml Nml Nml 0 Nml Nml    Right FlexPolLong Median (Ant Int) C7-8 Nml Nml Nml Nml Nml 0 Nml Nml    Right Ext Indicis Radial (Post Int) C7-8 Nml Nml Nml Nml Nml 0 Nml Nml    Right PronatorTeres Median C6-7 Nml Nml Nml Nml Nml 0 Nml Nml    Right Biceps Musculocut C5-6 Nml Nml Nml Nml Nml 0 Nml Nml    Right Triceps Radial C6-7-8 Nml Nml Nml Nml Nml 0 Nml Nml    Right Deltoid Axillary C5-6 Nml Nml Nml Nml Nml 0 Nml Nml          Waveforms:

## 2024-01-17 NOTE — PROGRESS NOTES
[facility] Peds/Adult Note                       Date: 2024    Name: Candida Weaver    MRN: [id number]         : 1955    1315 Patient arrives for Labs pre-treatment without acute problems. Please see Epic for complete assessment and education provided.    Vital signs stable throughout and prior to discharge. Patient tolerated procedure well and was discharged without incident.  Patient is aware of next [facility] appointment on 2024.  Appointment card give to the Patient.       Ms. Weaver's vitals were reviewed prior to and after treatment.   Patient Vitals for the past 12 hrs:   Temp Pulse Resp BP   24 1315 98.2 °F (36.8 °C) 83 18 138/69         Lab results were obtained and reviewed.  Labs Pending, please see EPIC for results.        Ms. Weaver tolerated the infusion, and had no complaints.    Ms. Weaver [facility] condition.     Future Appointments   Date Time Provider Department Center   2024  9:00 AM F4 [facility] [place] [facility]   2024 10:30 AM C1 [facility] MED [place] [facility]   2024 10:00 AM E3 [facility]   2024 10:30 AM B3 [facility] [place] [facility]   2024 10:45 AM Nany Arboleda MD [facility] BS [facility]   2024 10:30 AM D4 [facility] [place] [facility]       Yudith Bender RN  2024  1:54 PM

## 2024-01-19 ENCOUNTER — CLINICAL DOCUMENTATION (OUTPATIENT)
Age: 69
End: 2024-01-19

## 2024-01-19 ENCOUNTER — HOSPITAL ENCOUNTER (OUTPATIENT)
Facility: HOSPITAL | Age: 69
Setting detail: INFUSION SERIES
Discharge: HOME | End: 2024-01-19
Payer: MEDICARE

## 2024-01-19 VITALS
HEIGHT: 65 IN | HEART RATE: 80 BPM | TEMPERATURE: 98.8 F | DIASTOLIC BLOOD PRESSURE: 55 MMHG | SYSTOLIC BLOOD PRESSURE: 120 MMHG | RESPIRATION RATE: 18 BRPM | WEIGHT: 157 LBS | BODY MASS INDEX: 26.16 KG/M2

## 2024-01-19 DIAGNOSIS — Z11.59 ENCOUNTER FOR SCREENING FOR OTHER VIRAL DISEASES: ICD-10-CM

## 2024-01-19 DIAGNOSIS — C90.00 MULTIPLE MYELOMA NOT HAVING ACHIEVED REMISSION (HCC): Primary | ICD-10-CM

## 2024-01-19 DIAGNOSIS — E11.9 TYPE 2 DIABETES MELLITUS WITHOUT COMPLICATION, WITHOUT LONG-TERM CURRENT USE OF INSULIN (HCC): ICD-10-CM

## 2024-01-19 DIAGNOSIS — I10 PRIMARY HYPERTENSION: ICD-10-CM

## 2024-01-19 DIAGNOSIS — E78.2 MIXED HYPERLIPIDEMIA: Primary | ICD-10-CM

## 2024-01-19 LAB
BASO+EOS+MONOS # BLD AUTO: 0.2 K/UL (ref 0.2–1.2)
BASO+EOS+MONOS NFR BLD AUTO: 8 % (ref 3.2–16.9)
DIFFERENTIAL METHOD BLD: ABNORMAL
ERYTHROCYTE [DISTWIDTH] IN BLOOD BY AUTOMATED COUNT: 19.2 % (ref 11.8–15.8)
HCT VFR BLD AUTO: 26.6 % (ref 35–47)
HGB BLD-MCNC: 8.3 G/DL (ref 11.5–16)
LYMPHOCYTES # BLD: 0.8 K/UL (ref 0.8–3.5)
LYMPHOCYTES NFR BLD: 38 % (ref 12–49)
MCH RBC QN AUTO: 30.9 PG (ref 26–34)
MCHC RBC AUTO-ENTMCNC: 31.2 G/DL (ref 30–36.5)
MCV RBC AUTO: 98.9 FL (ref 80–99)
NEUTS SEG # BLD: 1 K/UL (ref 1.8–8)
NEUTS SEG NFR BLD: 54 % (ref 32–75)
PLATELET # BLD AUTO: 245 K/UL (ref 150–400)
RBC # BLD AUTO: 2.69 M/UL (ref 3.8–5.2)
WBC # BLD AUTO: 2 K/UL (ref 3.6–11)

## 2024-01-19 PROCEDURE — 96401 CHEMO ANTI-NEOPL SQ/IM: CPT

## 2024-01-19 PROCEDURE — 85025 COMPLETE CBC W/AUTO DIFF WBC: CPT

## 2024-01-19 PROCEDURE — 6370000000 HC RX 637 (ALT 250 FOR IP): Performed by: INTERNAL MEDICINE

## 2024-01-19 PROCEDURE — 2580000003 HC RX 258: Performed by: INTERNAL MEDICINE

## 2024-01-19 PROCEDURE — 6360000002 HC RX W HCPCS: Performed by: INTERNAL MEDICINE

## 2024-01-19 PROCEDURE — A4216 STERILE WATER/SALINE, 10 ML: HCPCS | Performed by: INTERNAL MEDICINE

## 2024-01-19 RX ORDER — ACETAMINOPHEN 325 MG/1
650 TABLET ORAL ONCE
Status: COMPLETED | OUTPATIENT
Start: 2024-01-19 | End: 2024-01-19

## 2024-01-19 RX ORDER — POTASSIUM CHLORIDE 750 MG/1
40 TABLET, FILM COATED, EXTENDED RELEASE ORAL ONCE
Status: COMPLETED | OUTPATIENT
Start: 2024-01-19 | End: 2024-01-19

## 2024-01-19 RX ORDER — DEXAMETHASONE 4 MG/1
20 TABLET ORAL ONCE
Status: COMPLETED | OUTPATIENT
Start: 2024-01-19 | End: 2024-01-19

## 2024-01-19 RX ORDER — HYDROCHLOROTHIAZIDE 12.5 MG/1
12.5 TABLET ORAL DAILY
Qty: 90 TABLET | Refills: 0 | Status: SHIPPED | OUTPATIENT
Start: 2024-01-19

## 2024-01-19 RX ORDER — BLOOD SUGAR DIAGNOSTIC
STRIP MISCELLANEOUS
Qty: 100 STRIP | Refills: 3 | Status: SHIPPED | OUTPATIENT
Start: 2024-01-19

## 2024-01-19 RX ORDER — METFORMIN HYDROCHLORIDE 500 MG/1
500 TABLET, EXTENDED RELEASE ORAL 3 TIMES DAILY
Qty: 270 TABLET | Refills: 0 | Status: SHIPPED | OUTPATIENT
Start: 2024-01-19

## 2024-01-19 RX ORDER — ONDANSETRON 4 MG/1
8 TABLET, ORALLY DISINTEGRATING ORAL
Status: DISCONTINUED | OUTPATIENT
Start: 2024-01-19 | End: 2024-01-20 | Stop reason: HOSPADM

## 2024-01-19 RX ORDER — DIPHENHYDRAMINE HCL 25 MG
25 CAPSULE ORAL ONCE
Status: COMPLETED | OUTPATIENT
Start: 2024-01-19 | End: 2024-01-19

## 2024-01-19 RX ORDER — ROSUVASTATIN CALCIUM 5 MG/1
5 TABLET, COATED ORAL NIGHTLY
Qty: 90 TABLET | Refills: 0 | Status: SHIPPED | OUTPATIENT
Start: 2024-01-19

## 2024-01-19 RX ADMIN — DARATUMUMAB AND HYALURONIDASE-FIHJ (HUMAN RECOMBINANT) 1800 MG: 1800; 30000 INJECTION SUBCUTANEOUS at 11:00

## 2024-01-19 RX ADMIN — SODIUM CHLORIDE 1.75 MG: 9 INJECTION INTRAMUSCULAR; INTRAVENOUS; SUBCUTANEOUS at 10:55

## 2024-01-19 RX ADMIN — POTASSIUM CHLORIDE 40 MEQ: 750 TABLET, FILM COATED, EXTENDED RELEASE ORAL at 09:52

## 2024-01-19 RX ADMIN — ACETAMINOPHEN 650 MG: 325 TABLET ORAL at 09:50

## 2024-01-19 RX ADMIN — DIPHENHYDRAMINE HYDROCHLORIDE 25 MG: 25 CAPSULE ORAL at 09:51

## 2024-01-19 RX ADMIN — DEXAMETHASONE 20 MG: 4 TABLET ORAL at 09:50

## 2024-01-19 ASSESSMENT — PAIN SCALES - GENERAL: PAINLEVEL_OUTOF10: 0

## 2024-01-19 NOTE — PROGRESS NOTES
Oral Chemotherapy Note     Candida Weaver is a  68 y.o.female  diagnosed with multiple myeloma.  Ms. Weaver is being treated with Kayli-VRd.         Medication name: lenalidomide   Regimen: Kayli-VRd  Dose:  10 mg   Frequency:  every other day  Administration schedule: for 14 days followed by 7 days off every 21 days  Ordering provider: Nany Arboleda MD  Start date: 1/19/24 (Cycle 5)     Lenalidomide is restarted with Cycle 4 on 12/29/23 - every other day for the 14 days.    Lab Results   Component Value Date    WBC 2.0 (L) 01/19/2024    HGB 8.3 (L) 01/19/2024    HCT 26.6 (L) 01/19/2024    MCV 98.9 01/19/2024     01/19/2024    LYMPHOPCT 38 01/19/2024    RBC 2.69 (L) 01/19/2024    MCH 30.9 01/19/2024    MCHC 31.2 01/19/2024    RDW 19.2 (H) 01/19/2024     Lab Results   Component Value Date    NEUTROABS 1.0 (L) 01/19/2024         Potassium level 3.4 mmol/L. Replacement ordered in [facility] with Potassium chloride 40 mEq oral       Expected follow up date: Labs/office visit each cycle. Next cycle start on 1/19/24     Patient provided with an Oral Chemotherapy Journal.            Ms. Weaver verbalized understanding of the information presented and all of the patient's questions were answered.     Rose Pa, PharmD, BCPS, BCOP    For Pharmacy Admin Tracking Only    Program: Medical Group  CPA in place:  Yes  Recommendation Provided To: Patient/Caregiver: 2 via In person  Intervention Detail: New Rx: 1, reason: Needs Additional Therapy  Intervention Accepted By: Patient/Caregiver: 2    Time Spent (min): 15

## 2024-01-19 NOTE — PROGRESS NOTES
[facility] Chemotherapy/Immunotherapy Progress Note    Date: 2024  Name: Candida Weaver  MRN: [id number]       : 1955    Pt arrived ambulatory and in no acute distress to [facility] for Darzalex/Velcade + Oral Potassium    Denies SOB, fever, cough, N/V. Denies Covid-like symptoms.   Labs drawn and are within treatment parameters.     Pre-medications were administered as ordered and chemotherapy was initiated. Please see MAR for specific drug names and time of administration.  Medications Administered         acetaminophen (TYLENOL) tablet 650 mg Admin Date  2024 Action  Given Dose  650 mg Route  Oral Administered By  Dacia Mccollum RN        bortezomib (VELCADE) 1.75 mg in sodium chloride (PF) 0.9 % 0.7 mL chemo subcutaneous syringe Admin Date  2024 Action  Given Dose  1.75 mg Route  SubCUTAneous Administered By  Dacia Mccollum RN        daratumumab-hyaluronidase-fihj (DARZALEX FASPRO) chemo syringe 1,800 mg Admin Date  2024 Action  Given Dose  1,800 mg Route  SubCUTAneous Administered By  Dacia Mccollum RN        dexAMETHasone (DECADRON) tablet 20 mg Admin Date  2024 Action  Given Dose  20 mg Route  Oral Administered By  Dacia Mccollum RN        diphenhydrAMINE (BENADRYL) capsule 25 mg Admin Date  2024 Action  Given Dose  25 mg Route  Oral Administered By  Dacia Mccollum RN        potassium chloride (KLOR-CON) extended release tablet 40 mEq Admin Date  2024 Action  Given Dose  40 mEq Route  Oral Administered By  Dacia Mccollum RN          Prior to chemotherapy/immunotherapy administration the following were verified with a second chemotherapy/immunotherapy certified nurse: Patient name, Patient  or CSN, Drug name, Drug dose, Infusion/drug volume, Rate of administration, Route of administration, Expiration dates/times, Appearance and physical integrity of the drug(s).     Darzalex given in Abdomen RLQ  Velcade given in Abdomen LLQ  Bandaid and Gauze applied to each

## 2024-01-22 ENCOUNTER — TELEPHONE (OUTPATIENT)
Age: 69
End: 2024-01-22

## 2024-01-22 NOTE — TELEPHONE ENCOUNTER
Nursing Dept @ [facility] called and lvm stating they received new RX Revlimid and they reached out to pt several times and haven't heard anything back.    They are wondering if there are any alt contact numbers and wanted to let our office know    Cb # [phone] ext [phone]

## 2024-01-22 NOTE — TELEPHONE ENCOUNTER
1230  Call placed to pt HIPAA verified x2  Made pt aware the pharmacy is trying to get in contact with her to schedule her medication delivery.  Pt stated she has been having a hard time getting in contact with them and that she was on hold for an hour on Friday and again today with no answer.    1230  I placed call to [facility] and spoke to Luanne who stated she has sent a message to the nurse to contact pt and they will be calling her today.    1302  Made pt aware a nurse should be contacting pt today to have medication delivery set up. If pt does not hear from them today, pt is to contact our office. Pt voiced understanding and was thankful for my call.

## 2024-01-23 ENCOUNTER — TELEPHONE (OUTPATIENT)
Age: 69
End: 2024-01-23

## 2024-01-23 LAB
ALBUMIN SERPL ELPH-MCNC: 3.2 G/DL (ref 2.9–4.4)
ALBUMIN/GLOB SERPL: 1.7 (ref 0.7–1.7)
ALPHA1 GLOB SERPL ELPH-MCNC: 0.2 G/DL (ref 0–0.4)
ALPHA2 GLOB SERPL ELPH-MCNC: 0.6 G/DL (ref 0.4–1)
B-GLOBULIN SERPL ELPH-MCNC: 0.8 G/DL (ref 0.7–1.3)
GAMMA GLOB SERPL ELPH-MCNC: 0.3 G/DL (ref 0.4–1.8)
GLOBULIN SER-MCNC: 2 G/DL (ref 2.2–3.9)
IGA SERPL-MCNC: <5 MG/DL (ref 87–352)
IGG SERPL-MCNC: 344 MG/DL (ref 586–1602)
IGM SERPL-MCNC: <5 MG/DL (ref 26–217)
INTERPRETATION SERPL IEP-IMP: ABNORMAL
KAPPA LC FREE SER-MCNC: 1193.6 MG/L (ref 3.3–19.4)
KAPPA LC FREE/LAMBDA FREE SER: ABNORMAL {RATIO} (ref 0.26–1.65)
LAMBDA LC FREE SERPL-MCNC: <1.5 MG/L (ref 5.7–26.3)
M PROTEIN SERPL ELPH-MCNC: 0.1 G/DL
PROT SERPL-MCNC: 5.2 G/DL (ref 6–8.5)

## 2024-01-23 NOTE — TELEPHONE ENCOUNTER
1451  R/t call to pt HIPAA verified x2  Pt would like to have labs done a day prior to her infusion. I advised pt that I would send a message to our infusion  to see what their availability is and she will received a call from them if they are able to add her onto the schedule.   Pt voiced understanding

## 2024-01-23 NOTE — TELEPHONE ENCOUNTER
Pt called and wanted to let the nurse know pharmacy never called back and doesn't have medication for Thursday.     # [phone]

## 2024-01-23 NOTE — TELEPHONE ENCOUNTER
Pt called and wanted to let nurses know pt got RX     Pt wanted to know if she could have labs done next Wed 1/31        # [phone]

## 2024-01-24 ENCOUNTER — APPOINTMENT (OUTPATIENT)
Facility: HOSPITAL | Age: 69
End: 2024-01-24
Payer: MEDICARE

## 2024-01-24 ENCOUNTER — HOSPITAL ENCOUNTER (OUTPATIENT)
Facility: HOSPITAL | Age: 69
Setting detail: INFUSION SERIES
Discharge: HOME | End: 2024-01-24
Payer: MEDICARE

## 2024-01-24 VITALS
TEMPERATURE: 98.5 F | DIASTOLIC BLOOD PRESSURE: 70 MMHG | SYSTOLIC BLOOD PRESSURE: 128 MMHG | RESPIRATION RATE: 18 BRPM | HEART RATE: 83 BPM

## 2024-01-24 DIAGNOSIS — T45.1X5A CHEMOTHERAPY INDUCED NEUTROPENIA (HCC): ICD-10-CM

## 2024-01-24 DIAGNOSIS — C90.00 MULTIPLE MYELOMA NOT HAVING ACHIEVED REMISSION (HCC): ICD-10-CM

## 2024-01-24 DIAGNOSIS — D70.1 CHEMOTHERAPY INDUCED NEUTROPENIA (HCC): ICD-10-CM

## 2024-01-24 DIAGNOSIS — E11.69 TYPE 2 DIABETES MELLITUS WITH OTHER SPECIFIED COMPLICATION, UNSPECIFIED WHETHER LONG TERM INSULIN USE (HCC): Primary | ICD-10-CM

## 2024-01-24 LAB
ALBUMIN SERPL-MCNC: 3.4 G/DL (ref 3.5–5)
ALBUMIN/GLOB SERPL: 1.3 (ref 1.1–2.2)
ALP SERPL-CCNC: 77 U/L (ref 45–117)
ALT SERPL-CCNC: 29 U/L (ref 12–78)
ANION GAP SERPL CALC-SCNC: 5 MMOL/L (ref 5–15)
AST SERPL-CCNC: 10 U/L (ref 15–37)
BASOPHILS # BLD: 0 K/UL (ref 0–0.1)
BASOPHILS NFR BLD: 1 % (ref 0–1)
BILIRUB SERPL-MCNC: 0.4 MG/DL (ref 0.2–1)
BUN SERPL-MCNC: 7 MG/DL (ref 6–20)
BUN/CREAT SERPL: 10 (ref 12–20)
CALCIUM SERPL-MCNC: 8.4 MG/DL (ref 8.5–10.1)
CHLORIDE SERPL-SCNC: 110 MMOL/L (ref 97–108)
CO2 SERPL-SCNC: 25 MMOL/L (ref 21–32)
CREAT SERPL-MCNC: 0.67 MG/DL (ref 0.55–1.02)
DIFFERENTIAL METHOD BLD: ABNORMAL
EOSINOPHIL # BLD: 0 K/UL (ref 0–0.4)
EOSINOPHIL NFR BLD: 1 % (ref 0–7)
ERYTHROCYTE [DISTWIDTH] IN BLOOD BY AUTOMATED COUNT: 19 % (ref 11.5–14.5)
GLOBULIN SER CALC-MCNC: 2.7 G/DL (ref 2–4)
GLUCOSE SERPL-MCNC: 97 MG/DL (ref 65–100)
HCT VFR BLD AUTO: 26.8 % (ref 35–47)
HGB BLD-MCNC: 8.5 G/DL (ref 11.5–16)
IMM GRANULOCYTES # BLD AUTO: 0 K/UL (ref 0–0.04)
IMM GRANULOCYTES NFR BLD AUTO: 0 % (ref 0–0.5)
LYMPHOCYTES # BLD: 1.1 K/UL (ref 0.8–3.5)
LYMPHOCYTES NFR BLD: 45 % (ref 12–49)
MCH RBC QN AUTO: 30.8 PG (ref 26–34)
MCHC RBC AUTO-ENTMCNC: 31.7 G/DL (ref 30–36.5)
MCV RBC AUTO: 97.1 FL (ref 80–99)
MONOCYTES # BLD: 0.2 K/UL (ref 0–1)
MONOCYTES NFR BLD: 8 % (ref 5–13)
NEUTS SEG # BLD: 1.1 K/UL (ref 1.8–8)
NEUTS SEG NFR BLD: 45 % (ref 32–75)
NRBC # BLD: 0 K/UL (ref 0–0.01)
NRBC BLD-RTO: 0 PER 100 WBC
PLATELET # BLD AUTO: 225 K/UL (ref 150–400)
PMV BLD AUTO: 13 FL (ref 8.9–12.9)
POTASSIUM SERPL-SCNC: 3.9 MMOL/L (ref 3.5–5.1)
PROT SERPL-MCNC: 6.1 G/DL (ref 6.4–8.2)
RBC # BLD AUTO: 2.76 M/UL (ref 3.8–5.2)
SODIUM SERPL-SCNC: 140 MMOL/L (ref 136–145)
WBC # BLD AUTO: 2.3 K/UL (ref 3.6–11)

## 2024-01-24 PROCEDURE — 85025 COMPLETE CBC W/AUTO DIFF WBC: CPT

## 2024-01-24 PROCEDURE — 80053 COMPREHEN METABOLIC PANEL: CPT

## 2024-01-24 PROCEDURE — 36415 COLL VENOUS BLD VENIPUNCTURE: CPT

## 2024-01-24 RX ORDER — BLOOD-GLUCOSE METER
1 EACH MISCELLANEOUS DAILY
Qty: 1 KIT | Refills: 0 | Status: SHIPPED | OUTPATIENT
Start: 2024-01-24

## 2024-01-24 ASSESSMENT — PAIN SCALES - GENERAL: PAINLEVEL_OUTOF10: 0

## 2024-01-24 NOTE — PROGRESS NOTES
[facility] Peds/Adult Note                       Date: 2024    Name: Candida Weaver    MRN: [id number]         : 1955    0940 Patient arrives for Pre-Treatment Labs without acute problems. Please see Epic for complete assessment and education provided.    Vital signs stable throughout and prior to discharge. Patient tolerated procedure well and was discharged without incident.  Patient is aware of next [facility] appointment on 2024.  Appointment card give to the Patient.       Ms. Weaver's vitals were reviewed prior to and after treatment.   Patient Vitals for the past 12 hrs:   Temp Pulse Resp BP   24 0940 98.5 °F (36.9 °C) 83 18 128/70         Lab results were obtained and reviewed.  Labs Pending, please see [facility] for results.    Recent Results (from the past 12 hour(s))   CBC with Auto Differential    Collection Time: 24  9:47 AM   Result Value Ref Range    WBC 2.3 (L) 3.6 - 11.0 K/uL    RBC 2.76 (L) 3.80 - 5.20 M/uL    Hemoglobin 8.5 (L) 11.5 - 16.0 g/dL    Hematocrit 26.8 (L) 35.0 - 47.0 %    MCV 97.1 80.0 - 99.0 FL    MCH 30.8 26.0 - 34.0 PG    MCHC 31.7 30.0 - 36.5 g/dL    RDW 19.0 (H) 11.5 - 14.5 %    Platelets 225 150 - 400 K/uL    MPV 13.0 (H) 8.9 - 12.9 FL    Nucleated RBCs 0.0 0  WBC    nRBC 0.00 0.00 - 0.01 K/uL    Neutrophils % 45 32 - 75 %    Lymphocytes % 45 12 - 49 %    Monocytes % 8 5 - 13 %    Eosinophils % 1 0 - 7 %    Basophils % 1 0 - 1 %    Immature Granulocytes 0 0.0 - 0.5 %    Neutrophils Absolute 1.1 (L) 1.8 - 8.0 K/UL    Lymphocytes Absolute 1.1 0.8 - 3.5 K/UL    Monocytes Absolute 0.2 0.0 - 1.0 K/UL    Eosinophils Absolute 0.0 0.0 - 0.4 K/UL    Basophils Absolute 0.0 0.0 - 0.1 K/UL    Absolute Immature Granulocyte 0.0 0.00 - 0.04 K/UL    Differential Type AUTOMATED             Ms. Weaver tolerated the infusion, and had no complaints.    [facility]. [facility] in [facility] condition.     Future Appointments   Date  Time Provider Department [place]   1/26/2024 10:30 AM C1 [facility] [place] [facility]   1/31/2024  9:30 AM B4 PEDS [facility]   2/2/2024 10:00 AM E3 [facility] [place] [facility]   2/7/2024  9:30 AM A3 PEDS [facility]   2/9/2024 10:30 AM B3 [facility] [place] [facility]   2/9/2024 10:45 AM Nany Arboleda MD [facility]   2/14/2024  9:30 AM B4 PEDS [facility]   2/16/2024 10:30 AM D4 [facility] [place] [facility]       Yudith Bender RN  January 24, 2024  10:47 AM

## 2024-01-26 ENCOUNTER — HOSPITAL ENCOUNTER (OUTPATIENT)
Facility: HOSPITAL | Age: 69
Setting detail: INFUSION SERIES
Discharge: HOME | End: 2024-01-26
Payer: MEDICARE

## 2024-01-26 VITALS
TEMPERATURE: 98.7 F | OXYGEN SATURATION: 95 % | DIASTOLIC BLOOD PRESSURE: 57 MMHG | SYSTOLIC BLOOD PRESSURE: 116 MMHG | WEIGHT: 159 LBS | RESPIRATION RATE: 18 BRPM | HEART RATE: 81 BPM | HEIGHT: 65 IN | BODY MASS INDEX: 26.49 KG/M2

## 2024-01-26 DIAGNOSIS — Z11.59 ENCOUNTER FOR SCREENING FOR OTHER VIRAL DISEASES: Primary | ICD-10-CM

## 2024-01-26 DIAGNOSIS — C90.00 MULTIPLE MYELOMA NOT HAVING ACHIEVED REMISSION (HCC): ICD-10-CM

## 2024-01-26 PROCEDURE — 96367 TX/PROPH/DG ADDL SEQ IV INF: CPT

## 2024-01-26 PROCEDURE — 96365 THER/PROPH/DIAG IV INF INIT: CPT

## 2024-01-26 PROCEDURE — A4216 STERILE WATER/SALINE, 10 ML: HCPCS | Performed by: INTERNAL MEDICINE

## 2024-01-26 PROCEDURE — 2580000003 HC RX 258

## 2024-01-26 PROCEDURE — 6360000002 HC RX W HCPCS: Performed by: INTERNAL MEDICINE

## 2024-01-26 PROCEDURE — 2580000003 HC RX 258: Performed by: INTERNAL MEDICINE

## 2024-01-26 PROCEDURE — 96401 CHEMO ANTI-NEOPL SQ/IM: CPT

## 2024-01-26 PROCEDURE — 6360000002 HC RX W HCPCS

## 2024-01-26 RX ORDER — SODIUM CHLORIDE 9 MG/ML
5-250 INJECTION, SOLUTION INTRAVENOUS PRN
OUTPATIENT
Start: 2024-02-21

## 2024-01-26 RX ORDER — DIPHENHYDRAMINE HYDROCHLORIDE 50 MG/ML
50 INJECTION INTRAMUSCULAR; INTRAVENOUS
OUTPATIENT
Start: 2024-02-21

## 2024-01-26 RX ORDER — SODIUM CHLORIDE 9 MG/ML
5-250 INJECTION, SOLUTION INTRAVENOUS PRN
Status: DISCONTINUED | OUTPATIENT
Start: 2024-01-26 | End: 2024-01-27 | Stop reason: HOSPADM

## 2024-01-26 RX ORDER — ONDANSETRON 2 MG/ML
8 INJECTION INTRAMUSCULAR; INTRAVENOUS
OUTPATIENT
Start: 2024-02-21

## 2024-01-26 RX ORDER — ACETAMINOPHEN 325 MG/1
650 TABLET ORAL
OUTPATIENT
Start: 2024-02-21

## 2024-01-26 RX ORDER — HEPARIN 100 UNIT/ML
500 SYRINGE INTRAVENOUS PRN
OUTPATIENT
Start: 2024-02-21

## 2024-01-26 RX ORDER — ALBUTEROL SULFATE 90 UG/1
4 AEROSOL, METERED RESPIRATORY (INHALATION) PRN
OUTPATIENT
Start: 2024-02-21

## 2024-01-26 RX ORDER — ZOLEDRONIC ACID 0.04 MG/ML
4 INJECTION, SOLUTION INTRAVENOUS ONCE
Status: COMPLETED | OUTPATIENT
Start: 2024-01-26 | End: 2024-01-26

## 2024-01-26 RX ORDER — SODIUM CHLORIDE 9 MG/ML
INJECTION, SOLUTION INTRAVENOUS CONTINUOUS
OUTPATIENT
Start: 2024-02-21

## 2024-01-26 RX ORDER — SODIUM CHLORIDE 0.9 % (FLUSH) 0.9 %
5-40 SYRINGE (ML) INJECTION PRN
OUTPATIENT
Start: 2024-02-21

## 2024-01-26 RX ORDER — ZOLEDRONIC ACID 0.04 MG/ML
4 INJECTION, SOLUTION INTRAVENOUS ONCE
OUTPATIENT
Start: 2024-02-21 | End: 2024-02-21

## 2024-01-26 RX ORDER — EPINEPHRINE 1 MG/ML
0.3 INJECTION, SOLUTION INTRAMUSCULAR; SUBCUTANEOUS PRN
OUTPATIENT
Start: 2024-02-21

## 2024-01-26 RX ORDER — DEXAMETHASONE 4 MG/1
20 TABLET ORAL ONCE
Status: COMPLETED | OUTPATIENT
Start: 2024-01-26 | End: 2024-01-26

## 2024-01-26 RX ADMIN — SODIUM CHLORIDE 1.75 MG: 9 INJECTION INTRAMUSCULAR; INTRAVENOUS; SUBCUTANEOUS at 11:56

## 2024-01-26 RX ADMIN — DEXAMETHASONE 20 MG: 4 TABLET ORAL at 11:06

## 2024-01-26 RX ADMIN — SODIUM CHLORIDE 25 ML/HR: 9 INJECTION, SOLUTION INTRAVENOUS at 11:08

## 2024-01-26 RX ADMIN — ZOLEDRONIC ACID 4 MG: 0.04 INJECTION, SOLUTION INTRAVENOUS at 11:11

## 2024-01-26 NOTE — PROGRESS NOTES
[facility] Short Note                       Date: 2024    Name: Candida Weaver    MRN: [id number]         : 1955      Pt admit to [facility] for C5D8 Velcade + zometa ambulatory in stable condition. Assessment completed and documented in flowsheets by access RN. PIV placed to left AC by access RN, labs drawn and processed on 24. Patient denies any upcoming invasive planned dental work.      Ms. Weaver's vitals were reviewed:  Patient Vitals for the past 12 hrs:   Temp Pulse Resp BP SpO2   24 1020 98.7 °F (37.1 °C) 81 18 (!) 116/57 95 %       Medications given: Velcade given in LLQ.  Medications Administered         0.9 % sodium chloride infusion Admin Date  2024 Action  New Bag Dose  25 mL/hr Rate  25 mL/hr Route  IntraVENous Administered By  Ann Moss RN        bortezomib (VELCADE) 1.75 mg in sodium chloride (PF) 0.9 % 0.7 mL chemo subcutaneous syringe Admin Date  2024 Action  Given Dose  1.75 mg Rate   Route  SubCUTAneous Administered By  Ann Moss RN        dexAMETHasone (DECADRON) tablet 20 mg Admin Date  2024 Action  Given Dose  20 mg Rate   Route  Oral Administered By  Ann Moss RN        zoledronic acid (ZOMETA) 4 mg/100 mL infusion Admin Date  2024 Action  New Bag Dose  4 mg Rate  300 mL/hr Route  IntraVENous Administered By  Ann Moss, SABRINA              Ms. Weaver tolerated the injection and was discharged [facility] in stable condition. Patient is aware if future appointments.    Future Appointments   Date Time Provider Department Center   2024  9:30 AM B4 PEDS [facility]   2024 10:00 AM E3 [facility] [place] [facility]   2024  9:30 AM A3 PEDS [facility]   2024 10:30 AM B3 [facility] MED [place] [facility]   2024 10:45 AM Nany Arboleda MD [facility] AMB   2024  9:30 AM B4 PEDS [facility]   2024 10:30 AM D4 [facility] [place] [facility]       Ann Moss RN  2024  12:46 PM

## 2024-01-31 ENCOUNTER — HOSPITAL ENCOUNTER (OUTPATIENT)
Facility: HOSPITAL | Age: 69
Setting detail: INFUSION SERIES
End: 2024-01-31

## 2024-01-31 ENCOUNTER — HOSPITAL ENCOUNTER (OUTPATIENT)
Facility: HOSPITAL | Age: 69
Setting detail: INFUSION SERIES
Discharge: HOME | End: 2024-01-31
Payer: MEDICARE

## 2024-01-31 ENCOUNTER — APPOINTMENT (OUTPATIENT)
Facility: HOSPITAL | Age: 69
End: 2024-01-31
Payer: MEDICARE

## 2024-01-31 VITALS
DIASTOLIC BLOOD PRESSURE: 73 MMHG | HEART RATE: 80 BPM | RESPIRATION RATE: 18 BRPM | TEMPERATURE: 98.1 F | SYSTOLIC BLOOD PRESSURE: 128 MMHG

## 2024-01-31 DIAGNOSIS — D70.1 CHEMOTHERAPY INDUCED NEUTROPENIA (HCC): ICD-10-CM

## 2024-01-31 DIAGNOSIS — T45.1X5A CHEMOTHERAPY INDUCED NEUTROPENIA (HCC): ICD-10-CM

## 2024-01-31 DIAGNOSIS — C90.00 MULTIPLE MYELOMA NOT HAVING ACHIEVED REMISSION (HCC): ICD-10-CM

## 2024-01-31 LAB
BASOPHILS # BLD: 0 K/UL (ref 0–0.1)
BASOPHILS NFR BLD: 0 % (ref 0–1)
DIFFERENTIAL METHOD BLD: ABNORMAL
EOSINOPHIL # BLD: 0 K/UL (ref 0–0.4)
EOSINOPHIL NFR BLD: 1 % (ref 0–7)
ERYTHROCYTE [DISTWIDTH] IN BLOOD BY AUTOMATED COUNT: 18.7 % (ref 11.5–14.5)
HCT VFR BLD AUTO: 28 % (ref 35–47)
HGB BLD-MCNC: 8.7 G/DL (ref 11.5–16)
IMM GRANULOCYTES # BLD AUTO: 0 K/UL (ref 0–0.04)
IMM GRANULOCYTES NFR BLD AUTO: 0 % (ref 0–0.5)
LYMPHOCYTES # BLD: 1.1 K/UL (ref 0.8–3.5)
LYMPHOCYTES NFR BLD: 38 % (ref 12–49)
MCH RBC QN AUTO: 31.1 PG (ref 26–34)
MCHC RBC AUTO-ENTMCNC: 31.1 G/DL (ref 30–36.5)
MCV RBC AUTO: 100 FL (ref 80–99)
MONOCYTES # BLD: 0.1 K/UL (ref 0–1)
MONOCYTES NFR BLD: 5 % (ref 5–13)
NEUTS SEG # BLD: 1.7 K/UL (ref 1.8–8)
NEUTS SEG NFR BLD: 56 % (ref 32–75)
NRBC # BLD: 0 K/UL (ref 0–0.01)
NRBC BLD-RTO: 0 PER 100 WBC
PLATELET # BLD AUTO: 191 K/UL (ref 150–400)
RBC # BLD AUTO: 2.8 M/UL (ref 3.8–5.2)
RBC MORPH BLD: ABNORMAL
RBC MORPH BLD: ABNORMAL
WBC # BLD AUTO: 2.9 K/UL (ref 3.6–11)

## 2024-01-31 PROCEDURE — 85025 COMPLETE CBC W/AUTO DIFF WBC: CPT

## 2024-01-31 PROCEDURE — 36415 COLL VENOUS BLD VENIPUNCTURE: CPT

## 2024-01-31 NOTE — PROGRESS NOTES
[facility] Short Note                   Date: 2024    Name: Candida Weaver    MRN: [id number]       : 1955      0930 Pt admit to [facility] for pretreatment labs ambulatory in stable condition. Assessment completed. No new concerns voiced.       Ms. Weaver's vitals were reviewed prior to and after treatment.   Patient Vitals for the past 12 hrs:   Temp Pulse Resp BP   24 0930 98.1 °F (36.7 °C) 80 18 128/73       Labs drawn from L AC. Pt tolerated well.       Ms. Weaver was discharged [facility] in stable condition.     Future Appointments   Date Time Provider Department Center   2024 10:00 AM E3 [facility] [place] [facility]   2024  9:30 AM A3 PEDS [facility]   2024 10:30 AM B3 [facility] [place] [facility]   2024 10:45 AM Nany Arboleda MD [facility]   2024  9:30 AM B4 PEDS [facility]   2024 10:30 AM D4 [facility] [place] [facility]       DAGOBERTO ARIAS RN  2024  10:18 AM

## 2024-02-02 ENCOUNTER — HOSPITAL ENCOUNTER (OUTPATIENT)
Facility: HOSPITAL | Age: 69
Setting detail: INFUSION SERIES
Discharge: HOME | End: 2024-02-02
Payer: MEDICARE

## 2024-02-02 VITALS
RESPIRATION RATE: 18 BRPM | BODY MASS INDEX: 25.16 KG/M2 | WEIGHT: 151 LBS | DIASTOLIC BLOOD PRESSURE: 65 MMHG | TEMPERATURE: 97.7 F | HEART RATE: 81 BPM | HEIGHT: 65 IN | SYSTOLIC BLOOD PRESSURE: 111 MMHG

## 2024-02-02 DIAGNOSIS — C90.00 MULTIPLE MYELOMA NOT HAVING ACHIEVED REMISSION (HCC): Primary | ICD-10-CM

## 2024-02-02 DIAGNOSIS — Z11.59 ENCOUNTER FOR SCREENING FOR OTHER VIRAL DISEASES: ICD-10-CM

## 2024-02-02 PROCEDURE — 6360000002 HC RX W HCPCS: Performed by: INTERNAL MEDICINE

## 2024-02-02 PROCEDURE — A4216 STERILE WATER/SALINE, 10 ML: HCPCS | Performed by: INTERNAL MEDICINE

## 2024-02-02 PROCEDURE — 2580000003 HC RX 258: Performed by: INTERNAL MEDICINE

## 2024-02-02 PROCEDURE — 96401 CHEMO ANTI-NEOPL SQ/IM: CPT

## 2024-02-02 RX ORDER — MEPERIDINE HYDROCHLORIDE 25 MG/ML
12.5 INJECTION INTRAMUSCULAR; INTRAVENOUS; SUBCUTANEOUS PRN
OUTPATIENT
Start: 2024-02-23

## 2024-02-02 RX ORDER — SODIUM CHLORIDE 9 MG/ML
INJECTION, SOLUTION INTRAVENOUS CONTINUOUS
Status: CANCELLED | OUTPATIENT
Start: 2024-02-09

## 2024-02-02 RX ORDER — HEPARIN 100 UNIT/ML
500 SYRINGE INTRAVENOUS PRN
Status: CANCELLED | OUTPATIENT
Start: 2024-02-16

## 2024-02-02 RX ORDER — ONDANSETRON 4 MG/1
8 TABLET, ORALLY DISINTEGRATING ORAL
Status: CANCELLED | OUTPATIENT
Start: 2024-02-09

## 2024-02-02 RX ORDER — DIPHENHYDRAMINE HYDROCHLORIDE 50 MG/ML
50 INJECTION INTRAMUSCULAR; INTRAVENOUS
Status: CANCELLED | OUTPATIENT
Start: 2024-02-16

## 2024-02-02 RX ORDER — SODIUM CHLORIDE 9 MG/ML
INJECTION, SOLUTION INTRAVENOUS CONTINUOUS
OUTPATIENT
Start: 2024-02-23

## 2024-02-02 RX ORDER — ALBUTEROL SULFATE 90 UG/1
4 AEROSOL, METERED RESPIRATORY (INHALATION) PRN
Status: CANCELLED | OUTPATIENT
Start: 2024-02-16

## 2024-02-02 RX ORDER — SODIUM CHLORIDE 9 MG/ML
5-250 INJECTION, SOLUTION INTRAVENOUS PRN
Status: CANCELLED | OUTPATIENT
Start: 2024-02-09

## 2024-02-02 RX ORDER — ONDANSETRON 2 MG/ML
8 INJECTION INTRAMUSCULAR; INTRAVENOUS
OUTPATIENT
Start: 2024-02-23

## 2024-02-02 RX ORDER — ONDANSETRON 4 MG/1
8 TABLET, ORALLY DISINTEGRATING ORAL
OUTPATIENT
Start: 2024-02-23

## 2024-02-02 RX ORDER — DEXAMETHASONE 4 MG/1
20 TABLET ORAL ONCE
Status: CANCELLED | OUTPATIENT
Start: 2024-02-16 | End: 2024-02-16

## 2024-02-02 RX ORDER — ACETAMINOPHEN 325 MG/1
650 TABLET ORAL
Status: CANCELLED | OUTPATIENT
Start: 2024-02-09

## 2024-02-02 RX ORDER — SODIUM CHLORIDE 0.9 % (FLUSH) 0.9 %
5-40 SYRINGE (ML) INJECTION PRN
Status: CANCELLED | OUTPATIENT
Start: 2024-02-09

## 2024-02-02 RX ORDER — MEPERIDINE HYDROCHLORIDE 25 MG/ML
12.5 INJECTION INTRAMUSCULAR; INTRAVENOUS; SUBCUTANEOUS PRN
Status: CANCELLED | OUTPATIENT
Start: 2024-02-16

## 2024-02-02 RX ORDER — ACETAMINOPHEN 325 MG/1
650 TABLET ORAL
OUTPATIENT
Start: 2024-02-23

## 2024-02-02 RX ORDER — SODIUM CHLORIDE 9 MG/ML
INJECTION, SOLUTION INTRAVENOUS CONTINUOUS
Status: CANCELLED | OUTPATIENT
Start: 2024-02-16

## 2024-02-02 RX ORDER — HEPARIN 100 UNIT/ML
500 SYRINGE INTRAVENOUS PRN
OUTPATIENT
Start: 2024-02-23

## 2024-02-02 RX ORDER — SODIUM CHLORIDE 0.9 % (FLUSH) 0.9 %
5-40 SYRINGE (ML) INJECTION PRN
OUTPATIENT
Start: 2024-02-23

## 2024-02-02 RX ORDER — ONDANSETRON 4 MG/1
8 TABLET, ORALLY DISINTEGRATING ORAL
Status: CANCELLED | OUTPATIENT
Start: 2024-02-16

## 2024-02-02 RX ORDER — EPINEPHRINE 1 MG/ML
0.3 INJECTION, SOLUTION INTRAMUSCULAR; SUBCUTANEOUS PRN
Status: CANCELLED | OUTPATIENT
Start: 2024-02-16

## 2024-02-02 RX ORDER — DIPHENHYDRAMINE HYDROCHLORIDE 50 MG/ML
50 INJECTION INTRAMUSCULAR; INTRAVENOUS
Status: CANCELLED | OUTPATIENT
Start: 2024-02-09

## 2024-02-02 RX ORDER — ONDANSETRON 2 MG/ML
8 INJECTION INTRAMUSCULAR; INTRAVENOUS
Status: CANCELLED | OUTPATIENT
Start: 2024-02-16

## 2024-02-02 RX ORDER — ACETAMINOPHEN 325 MG/1
650 TABLET ORAL
Status: CANCELLED | OUTPATIENT
Start: 2024-02-16

## 2024-02-02 RX ORDER — DEXAMETHASONE 4 MG/1
20 TABLET ORAL ONCE
OUTPATIENT
Start: 2024-02-23 | End: 2024-02-23

## 2024-02-02 RX ORDER — ALBUTEROL SULFATE 90 UG/1
4 AEROSOL, METERED RESPIRATORY (INHALATION) PRN
OUTPATIENT
Start: 2024-02-23

## 2024-02-02 RX ORDER — ONDANSETRON 2 MG/ML
8 INJECTION INTRAMUSCULAR; INTRAVENOUS
Status: CANCELLED | OUTPATIENT
Start: 2024-02-09

## 2024-02-02 RX ORDER — EPINEPHRINE 1 MG/ML
0.3 INJECTION, SOLUTION INTRAMUSCULAR; SUBCUTANEOUS PRN
OUTPATIENT
Start: 2024-02-23

## 2024-02-02 RX ORDER — HEPARIN 100 UNIT/ML
500 SYRINGE INTRAVENOUS PRN
Status: CANCELLED | OUTPATIENT
Start: 2024-02-09

## 2024-02-02 RX ORDER — SODIUM CHLORIDE 0.9 % (FLUSH) 0.9 %
5-40 SYRINGE (ML) INJECTION PRN
Status: CANCELLED | OUTPATIENT
Start: 2024-02-16

## 2024-02-02 RX ORDER — SODIUM CHLORIDE 9 MG/ML
5-250 INJECTION, SOLUTION INTRAVENOUS PRN
Status: CANCELLED | OUTPATIENT
Start: 2024-02-16

## 2024-02-02 RX ORDER — EPINEPHRINE 1 MG/ML
0.3 INJECTION, SOLUTION INTRAMUSCULAR; SUBCUTANEOUS PRN
Status: CANCELLED | OUTPATIENT
Start: 2024-02-09

## 2024-02-02 RX ORDER — ALBUTEROL SULFATE 90 UG/1
4 AEROSOL, METERED RESPIRATORY (INHALATION) PRN
Status: CANCELLED | OUTPATIENT
Start: 2024-02-09

## 2024-02-02 RX ORDER — MEPERIDINE HYDROCHLORIDE 25 MG/ML
12.5 INJECTION INTRAMUSCULAR; INTRAVENOUS; SUBCUTANEOUS PRN
Status: CANCELLED | OUTPATIENT
Start: 2024-02-09

## 2024-02-02 RX ORDER — DIPHENHYDRAMINE HYDROCHLORIDE 50 MG/ML
50 INJECTION INTRAMUSCULAR; INTRAVENOUS
OUTPATIENT
Start: 2024-02-23

## 2024-02-02 RX ORDER — DEXAMETHASONE 4 MG/1
20 TABLET ORAL ONCE
Status: COMPLETED | OUTPATIENT
Start: 2024-02-02 | End: 2024-02-02

## 2024-02-02 RX ORDER — SODIUM CHLORIDE 9 MG/ML
5-250 INJECTION, SOLUTION INTRAVENOUS PRN
OUTPATIENT
Start: 2024-02-23

## 2024-02-02 RX ADMIN — SODIUM CHLORIDE 1.75 MG: 9 INJECTION INTRAMUSCULAR; INTRAVENOUS; SUBCUTANEOUS at 10:50

## 2024-02-02 RX ADMIN — DEXAMETHASONE 20 MG: 4 TABLET ORAL at 10:11

## 2024-02-02 NOTE — PROGRESS NOTES
[facility] Chemo Progress Note    Date: February 2, 2024    0945 Ms. Weaver Arrived to [facility] for  Velcade (C5D15) ambulatory in stable condition.  Access RN completed VS/assessment. Labs drawn 1/31/24 and within treatment parameters.   Vitals:    02/02/24 0947 02/02/24 1056   BP: (!) 141/47 111/65   Pulse: 92 81   Resp: 18    Temp: 97.7 °F (36.5 °C)    Weight: 68.5 kg (151 lb)    Height: 1.651 m (5' 5\")      Pre-medications  were administered as ordered and chemotherapy was initiated.  Medications Administered         bortezomib (VELCADE) 1.75 mg in sodium chloride (PF) 0.9 % 0.7 mL chemo subcutaneous syringe Admin Date  02/02/2024 Action  Given Dose  1.75 mg Route  SubCUTAneous Administered By  Stacey White RN        dexAMETHasone (DECADRON) tablet 20 mg Admin Date  02/02/2024 Action  Given Dose  20 mg Route  Oral Administered By  Stacey White RN        Given Abdomen RLQ     Two nurses verified prior to administering: Drug name, Drug dose, Infusion volume or drug volume when prepared in a syringe, Rate of administration, Route of administration, Expiration dates and/or times, Appearance and physical integrity of the drugs, Rate set on infusion pump, when used, and Sequencing of drug administration.    1100 Patient tolerated treatment well.  Patient was discharged in stable condition. Patient is aware of next scheduled [facility] appointment.     Future Appointments   Date Time Provider Department Center   2/7/2024  9:30 AM A3 PEDS [facility]   2/9/2024 10:30 AM B3 [facility] [place] [facility]   2/9/2024 10:45 AM Nany Arboleda MD [facility] AMB   2/14/2024  9:30 AM B4 PEDS [facility]   2/16/2024 10:30 AM D4 [facility] [place] [facility]

## 2024-02-05 DIAGNOSIS — C90.00 MULTIPLE MYELOMA NOT HAVING ACHIEVED REMISSION (HCC): ICD-10-CM

## 2024-02-06 RX ORDER — LENALIDOMIDE 10 MG/1
10 CAPSULE ORAL EVERY OTHER DAY
Qty: 7 CAPSULE | Refills: 0 | Status: ACTIVE | OUTPATIENT
Start: 2024-02-06

## 2024-02-06 RX ORDER — LENALIDOMIDE 10 MG/1
10 CAPSULE ORAL DAILY
Qty: 7 CAPSULE | Refills: 0 | Status: SHIPPED | OUTPATIENT
Start: 2024-02-06 | End: 2024-02-06

## 2024-02-06 NOTE — TELEPHONE ENCOUNTER
Oral Chemotherapy Note     Candida Weaver is a  68 y.o.female  diagnosed with multiple myeloma.  Ms. Weaver is being treated with Kayli-VRd.         Medication name: lenalidomide   Regimen: Kayli-VRd  Dose:  10 mg   Frequency:  every other day  Administration schedule: for 14 days followed by 7 days off every 21 days  Ordering provider: Nany Arboleda MD  Start date: 1/19/24 (Cycle 5)    REMS auth # [id number] - Refill sent to [facility] pharmacy     Rose Pa, PharmD, BCPS, BCOP     For Pharmacy Admin Tracking Only    Program: Medical Group  CPA in place:  Yes  Recommendation Provided To: Patient/Caregiver: 1 via Telephone  Intervention Detail: Refill(s) Provided  Intervention Accepted By: Patient/Caregiver: 1    Time Spent (min): 10

## 2024-02-07 ENCOUNTER — APPOINTMENT (OUTPATIENT)
Facility: HOSPITAL | Age: 69
End: 2024-02-07
Payer: MEDICARE

## 2024-02-07 ENCOUNTER — HOSPITAL ENCOUNTER (OUTPATIENT)
Facility: HOSPITAL | Age: 69
Setting detail: INFUSION SERIES
Discharge: HOME | End: 2024-02-07
Payer: MEDICARE

## 2024-02-07 VITALS
DIASTOLIC BLOOD PRESSURE: 68 MMHG | TEMPERATURE: 98 F | RESPIRATION RATE: 18 BRPM | SYSTOLIC BLOOD PRESSURE: 105 MMHG | HEART RATE: 88 BPM

## 2024-02-07 DIAGNOSIS — Z11.59 ENCOUNTER FOR SCREENING FOR OTHER VIRAL DISEASES: Primary | ICD-10-CM

## 2024-02-07 DIAGNOSIS — C90.00 MULTIPLE MYELOMA NOT HAVING ACHIEVED REMISSION (HCC): ICD-10-CM

## 2024-02-07 LAB
ALBUMIN SERPL-MCNC: 3.5 G/DL (ref 3.5–5)
ALBUMIN/GLOB SERPL: 1.3 (ref 1.1–2.2)
ALP SERPL-CCNC: 80 U/L (ref 45–117)
ALT SERPL-CCNC: 23 U/L (ref 12–78)
ANION GAP SERPL CALC-SCNC: 3 MMOL/L (ref 5–15)
AST SERPL-CCNC: 9 U/L (ref 15–37)
BASOPHILS # BLD: 0 K/UL (ref 0–0.1)
BASOPHILS NFR BLD: 0 % (ref 0–1)
BILIRUB SERPL-MCNC: 0.4 MG/DL (ref 0.2–1)
BUN SERPL-MCNC: 7 MG/DL (ref 6–20)
BUN/CREAT SERPL: 9 (ref 12–20)
CALCIUM SERPL-MCNC: 8.9 MG/DL (ref 8.5–10.1)
CHLORIDE SERPL-SCNC: 108 MMOL/L (ref 97–108)
CO2 SERPL-SCNC: 28 MMOL/L (ref 21–32)
CREAT SERPL-MCNC: 0.81 MG/DL (ref 0.55–1.02)
DIFFERENTIAL METHOD BLD: ABNORMAL
EOSINOPHIL # BLD: 0.1 K/UL (ref 0–0.4)
EOSINOPHIL NFR BLD: 2 % (ref 0–7)
ERYTHROCYTE [DISTWIDTH] IN BLOOD BY AUTOMATED COUNT: 18.2 % (ref 11.5–14.5)
GLOBULIN SER CALC-MCNC: 2.7 G/DL (ref 2–4)
GLUCOSE SERPL-MCNC: 98 MG/DL (ref 65–100)
HCT VFR BLD AUTO: 28.1 % (ref 35–47)
HGB BLD-MCNC: 8.8 G/DL (ref 11.5–16)
IMM GRANULOCYTES # BLD AUTO: 0 K/UL (ref 0–0.04)
IMM GRANULOCYTES NFR BLD AUTO: 0 % (ref 0–0.5)
LYMPHOCYTES # BLD: 1.2 K/UL (ref 0.8–3.5)
LYMPHOCYTES NFR BLD: 44 % (ref 12–49)
MCH RBC QN AUTO: 31.3 PG (ref 26–34)
MCHC RBC AUTO-ENTMCNC: 31.3 G/DL (ref 30–36.5)
MCV RBC AUTO: 100 FL (ref 80–99)
MONOCYTES # BLD: 0.2 K/UL (ref 0–1)
MONOCYTES NFR BLD: 7 % (ref 5–13)
NEUTS SEG # BLD: 1.2 K/UL (ref 1.8–8)
NEUTS SEG NFR BLD: 47 % (ref 32–75)
NRBC # BLD: 0 K/UL (ref 0–0.01)
NRBC BLD-RTO: 0 PER 100 WBC
PLATELET # BLD AUTO: 235 K/UL (ref 150–400)
PMV BLD AUTO: 13 FL (ref 8.9–12.9)
POTASSIUM SERPL-SCNC: 3.8 MMOL/L (ref 3.5–5.1)
PROT SERPL-MCNC: 6.2 G/DL (ref 6.4–8.2)
RBC # BLD AUTO: 2.81 M/UL (ref 3.8–5.2)
SODIUM SERPL-SCNC: 139 MMOL/L (ref 136–145)
WBC # BLD AUTO: 2.7 K/UL (ref 3.6–11)

## 2024-02-07 PROCEDURE — 85025 COMPLETE CBC W/AUTO DIFF WBC: CPT

## 2024-02-07 PROCEDURE — 36415 COLL VENOUS BLD VENIPUNCTURE: CPT

## 2024-02-07 PROCEDURE — 80053 COMPREHEN METABOLIC PANEL: CPT

## 2024-02-07 ASSESSMENT — PAIN SCALES - GENERAL: PAINLEVEL_OUTOF10: 0

## 2024-02-07 NOTE — PROGRESS NOTES
[facility] Peds/Adult Note                       Date: 2024    Name: Candida Weaver    MRN: [id number]         : 1955    0930 Patient arrives for Labs Pre-Treatment without acute problems. Please see Epic for complete assessment and education provided.    Vital signs stable throughout and prior to discharge. Patient tolerated procedure well and was discharged without incident.  Patient is aware of next [facility] appointment on 2024.  Appointment card give to the Patient.       Ms. Weaver's vitals were reviewed prior to and after treatment.   Patient Vitals for the past 12 hrs:   Temp Pulse Resp BP   24 0930 98 °F (36.7 °C) 88 18 105/68       Lab results were obtained and reviewed.  Labs Pending, please see EPIC for results.        Ms. Weaver tolerated the infusion, and had no complaints.    Ms. Weaver was [facility] in stable condition.     Future Appointments   Date Time Provider Department Center   2024 10:30 AM B3 [facility] [place] [facility]   2024 10:45 AM Ashley Costa APRN - NP [facility]   2024  9:30 AM B4 PEDS [facility]   2024 10:30 [facility]       Yudith Bender RN  2024  10:10 AM

## 2024-02-08 RX ORDER — BLOOD-GLUCOSE METER
1 EACH MISCELLANEOUS DAILY
Qty: 1 KIT | Refills: 2 | Status: SHIPPED | OUTPATIENT
Start: 2024-02-08

## 2024-02-08 NOTE — PROGRESS NOTES
[facility] [place]  [address], [address] [place] [address]  W: [phone]  F: [phone]    Reason for Visit:   Candida Weaver is a 68 y.o. female who is seen for Multiple Myeloma   Treatment History:   10/25/2023: Kayli VRD, Rev lite    History of Present Illness:   Patient is a 68 y.o. female who was dx with Smoldering Myeloma in 2017 and has been seeing [facility] Dr. Lopez. A BM bx at that time showed 40% plasma cells. No end organ damage. Noted to have worsening anemia 7/5/2023 with a Hb of 9.6 g/dl. This led to further evaluation that included a gammopathy eval that showed an M spike of 0.3 g/dl. She had a Kappa LC level of 1593 g/L with a K:L ratio of 157. Sent for evaluation now. She was in the ER June 2023 with some SOB. Had a CT head and this showed lytic lesions. She states that she has some carpal tunnel. BM bx showed Myeloma. She is on Kayli VRD.     She presents for C6. Feeling well. Has ongoing lower back pain. Denies fever, chills. Tolerating Revlimid every other day well. Denies any bleeding. No other complaints.     Presents alone  Works night shift as CNA    Review of systems was obtained and pertinent findings reviewed above. Past medical history, social history, family history, medications, and allergies are located in the electronic medical record.      Physical Exam:   BP (!) 122/54 (Site: Left Upper Arm, Position: Sitting)   Pulse 77   Temp 97.5 °F (36.4 °C)   Resp 18   Wt 69.4 kg (153 lb)   SpO2 94%   BMI 25.46 kg/m²   ECOG PS: 1  General: no distress  Eyes: anicteric sclerae  HENT: oropharynx clear  Neck: supple  Respiratory: normal respiratory effort  CV: no peripheral edema  GI: nondistended  Skin: no rashes; no ecchymoses; no petechiae      Results:     Lab Results   Component Value Date/Time    WBC 2.7 02/07/2024 09:41 AM    HGB 8.8 02/07/2024 09:41 AM    HCT 28.1 02/07/2024 09:41 AM     02/07/2024 09:41 AM    .0 02/07/2024 09:41 AM

## 2024-02-09 ENCOUNTER — CLINICAL DOCUMENTATION (OUTPATIENT)
Age: 69
End: 2024-02-09

## 2024-02-09 ENCOUNTER — HOSPITAL ENCOUNTER (OUTPATIENT)
Facility: HOSPITAL | Age: 69
Setting detail: INFUSION SERIES
Discharge: HOME | End: 2024-02-09
Payer: MEDICARE

## 2024-02-09 ENCOUNTER — OFFICE VISIT (OUTPATIENT)
Age: 69
End: 2024-02-09
Payer: MEDICARE

## 2024-02-09 VITALS
RESPIRATION RATE: 18 BRPM | HEART RATE: 77 BPM | WEIGHT: 153 LBS | SYSTOLIC BLOOD PRESSURE: 122 MMHG | BODY MASS INDEX: 25.46 KG/M2 | DIASTOLIC BLOOD PRESSURE: 54 MMHG | OXYGEN SATURATION: 94 % | TEMPERATURE: 97.5 F

## 2024-02-09 VITALS
DIASTOLIC BLOOD PRESSURE: 54 MMHG | HEIGHT: 65 IN | TEMPERATURE: 97.5 F | SYSTOLIC BLOOD PRESSURE: 122 MMHG | BODY MASS INDEX: 25.59 KG/M2 | RESPIRATION RATE: 18 BRPM | HEART RATE: 77 BPM | WEIGHT: 153.6 LBS

## 2024-02-09 DIAGNOSIS — C90.00 MULTIPLE MYELOMA NOT HAVING ACHIEVED REMISSION (HCC): Primary | ICD-10-CM

## 2024-02-09 DIAGNOSIS — Z11.59 ENCOUNTER FOR SCREENING FOR OTHER VIRAL DISEASES: ICD-10-CM

## 2024-02-09 PROCEDURE — 96401 CHEMO ANTI-NEOPL SQ/IM: CPT

## 2024-02-09 PROCEDURE — 2580000003 HC RX 258: Performed by: INTERNAL MEDICINE

## 2024-02-09 PROCEDURE — 6360000002 HC RX W HCPCS

## 2024-02-09 PROCEDURE — 1036F TOBACCO NON-USER: CPT

## 2024-02-09 PROCEDURE — 6360000002 HC RX W HCPCS: Performed by: INTERNAL MEDICINE

## 2024-02-09 PROCEDURE — 3017F COLORECTAL CA SCREEN DOC REV: CPT

## 2024-02-09 PROCEDURE — 84165 PROTEIN E-PHORESIS SERUM: CPT

## 2024-02-09 PROCEDURE — G8427 DOCREV CUR MEDS BY ELIG CLIN: HCPCS

## 2024-02-09 PROCEDURE — 83521 IG LIGHT CHAINS FREE EACH: CPT

## 2024-02-09 PROCEDURE — 99215 OFFICE O/P EST HI 40 MIN: CPT

## 2024-02-09 PROCEDURE — 1090F PRES/ABSN URINE INCON ASSESS: CPT

## 2024-02-09 PROCEDURE — A4216 STERILE WATER/SALINE, 10 ML: HCPCS | Performed by: INTERNAL MEDICINE

## 2024-02-09 PROCEDURE — 82784 ASSAY IGA/IGD/IGG/IGM EACH: CPT

## 2024-02-09 PROCEDURE — G8484 FLU IMMUNIZE NO ADMIN: HCPCS

## 2024-02-09 PROCEDURE — G8399 PT W/DXA RESULTS DOCUMENT: HCPCS

## 2024-02-09 PROCEDURE — G8419 CALC BMI OUT NRM PARAM NOF/U: HCPCS

## 2024-02-09 PROCEDURE — 6370000000 HC RX 637 (ALT 250 FOR IP): Performed by: INTERNAL MEDICINE

## 2024-02-09 PROCEDURE — 1123F ACP DISCUSS/DSCN MKR DOCD: CPT

## 2024-02-09 PROCEDURE — 96411 CHEMO IV PUSH ADDL DRUG: CPT

## 2024-02-09 PROCEDURE — 36415 COLL VENOUS BLD VENIPUNCTURE: CPT

## 2024-02-09 PROCEDURE — 96409 CHEMO IV PUSH SNGL DRUG: CPT

## 2024-02-09 PROCEDURE — 86334 IMMUNOFIX E-PHORESIS SERUM: CPT

## 2024-02-09 PROCEDURE — 84155 ASSAY OF PROTEIN SERUM: CPT

## 2024-02-09 RX ORDER — DEXAMETHASONE 4 MG/1
TABLET ORAL
Status: COMPLETED
Start: 2024-02-09 | End: 2024-02-09

## 2024-02-09 RX ORDER — ACETAMINOPHEN 325 MG/1
TABLET ORAL
Status: DISCONTINUED
Start: 2024-02-09 | End: 2024-02-10 | Stop reason: HOSPADM

## 2024-02-09 RX ORDER — DIPHENHYDRAMINE HCL 25 MG
25 CAPSULE ORAL ONCE
Status: COMPLETED | OUTPATIENT
Start: 2024-02-09 | End: 2024-02-09

## 2024-02-09 RX ORDER — DEXAMETHASONE 4 MG/1
20 TABLET ORAL ONCE
Status: COMPLETED | OUTPATIENT
Start: 2024-02-09 | End: 2024-02-09

## 2024-02-09 RX ORDER — ACETAMINOPHEN 325 MG/1
650 TABLET ORAL ONCE
Status: COMPLETED | OUTPATIENT
Start: 2024-02-09 | End: 2024-02-09

## 2024-02-09 RX ADMIN — SODIUM CHLORIDE 1.75 MG: 9 INJECTION INTRAMUSCULAR; INTRAVENOUS; SUBCUTANEOUS at 12:50

## 2024-02-09 RX ADMIN — DEXAMETHASONE 20 MG: 4 TABLET ORAL at 12:04

## 2024-02-09 RX ADMIN — DIPHENHYDRAMINE HYDROCHLORIDE 25 MG: 25 CAPSULE ORAL at 12:05

## 2024-02-09 RX ADMIN — ACETAMINOPHEN 650 MG: 325 TABLET ORAL at 12:05

## 2024-02-09 RX ADMIN — DARATUMUMAB AND HYALURONIDASE-FIHJ (HUMAN RECOMBINANT) 1800 MG: 1800; 30000 INJECTION SUBCUTANEOUS at 12:55

## 2024-02-09 ASSESSMENT — PAIN SCALES - GENERAL: PAINLEVEL_OUTOF10: 0

## 2024-02-09 NOTE — PROGRESS NOTES
Oral Chemotherapy Note     Candida Weaver is a  68 y.o.female  diagnosed with multiple myeloma.  Ms. Weaver is being treated with Kayli-VRd.         Medication name: lenalidomide   Regimen: Kayli-VRd  Dose:  10 mg   Frequency:  every other day  Administration schedule: for 14 days followed by 7 days off every 21 days  Ordering provider: Nany Arboleda MD  Start date: 2/9/24 (Cycle 6)     Lenalidomide was restarted with Cycle 4 on 12/29/23 - every other day for the 14 days.     Lab Results   Component Value Date    WBC 2.7 (L) 02/07/2024    HGB 8.8 (L) 02/07/2024    HCT 28.1 (L) 02/07/2024    .0 (H) 02/07/2024     02/07/2024    LYMPHOPCT 44 02/07/2024    RBC 2.81 (L) 02/07/2024    MCH 31.3 02/07/2024    MCHC 31.3 02/07/2024    RDW 18.2 (H) 02/07/2024     Lab Results   Component Value Date    NEUTROABS 1.2 (L) 02/07/2024             Expected follow up date: Labs/office visit each cycle. Next cycle start on 3/1/24     Patient provided with an Oral Chemotherapy Journal.            Ms. Weaver verbalized understanding of the information presented and all of the patient's questions were answered.          Rose Pa, PharmD, BCPS, BCOP     For Pharmacy Admin Tracking Only    Program: Medical Group  CPA in place:  Yes  Recommendation Provided To: Patient/Caregiver: 1 via In person    Intervention Accepted By: Patient/Caregiver: 1    Time Spent (min): 20

## 2024-02-09 NOTE — PROGRESS NOTES
[facility] Progress Note    Ms. Weaver Arrived ambulatory and in no distress for Darzalex/ Veldcade regimen cycle 6 day 1..  Assessment was completed, no acute issues at this time, no new complaints voiced.   labs drawn on 2/7/2024  and results within treatment parameters..         No data to display                  Ms. Weaver's vitals were reviewed.  Patient Vitals for the past 12 hrs:   Temp Pulse Resp BP   02/09/24 1030 97.5 °F (36.4 °C) 77 18 (!) 122/54   Darzalex  given in right lower abdomen.     Velcade given in  left lower abdomen.        Pre-medications  were administered as ordered and chemotherapy was initiated.  Medications Administered         acetaminophen (TYLENOL) tablet 650 mg Admin Date  02/09/2024 Action  Given Dose  650 mg Route  Oral Administered By  Rachelle Grayson RN        bortezomib (VELCADE) 1.75 mg in sodium chloride (PF) 0.9 % 0.7 mL chemo subcutaneous syringe Admin Date  02/09/2024 Action  Given Dose  1.75 mg Route  SubCUTAneous Administered By  Rachelle Grayson RN        daratumumab-hyaluronidase-fihj (DARZALEX FASPRO) chemo syringe 1,800 mg Admin Date  02/09/2024 Action  Given Dose  1,800 mg Route  SubCUTAneous Administered By  Rachelle Grayson RN        dexAMETHasone (DECADRON) tablet 20 mg Admin Date  02/09/2024 Action  Given Dose  20 mg Route  Oral Administered By  Andreia Mina RN        diphenhydrAMINE (BENADRYL) capsule 25 mg Admin Date  02/09/2024 Action  Given Dose  25 mg Route  Oral Administered By  Rachelle Grayson RN            Two nurses verified prior to administering: Drug name, Drug dose, Infusion volume or drug volume when prepared in a syringe, Rate of administration, Route of administration, Expiration dates and/or times, Appearance and physical integrity of the drugs, Rate set on infusion pump, when used, and Sequencing of drug administration.    Ms. Weaver tolerated treatment well , patient was [facility] in stable condition at 1300.     Future

## 2024-02-09 NOTE — PROGRESS NOTES
Candida Weaver is a 68 y.o. female    Chief Complaint   Patient presents with    Follow-up      Multiple Myeloma        1. Have you been to the ER, urgent care clinic since your last visit?  Hospitalized since your last visit?No    2. Have you seen or consulted any other health care providers outside of the [facility] since your last visit?  Include any pap smears or colon screening. No

## 2024-02-14 ENCOUNTER — HOSPITAL ENCOUNTER (OUTPATIENT)
Facility: HOSPITAL | Age: 69
Setting detail: INFUSION SERIES
Discharge: HOME | End: 2024-02-14
Payer: MEDICARE

## 2024-02-14 ENCOUNTER — APPOINTMENT (OUTPATIENT)
Facility: HOSPITAL | Age: 69
End: 2024-02-14
Payer: MEDICARE

## 2024-02-14 VITALS
TEMPERATURE: 97.6 F | RESPIRATION RATE: 18 BRPM | HEART RATE: 88 BPM | DIASTOLIC BLOOD PRESSURE: 77 MMHG | SYSTOLIC BLOOD PRESSURE: 111 MMHG

## 2024-02-14 DIAGNOSIS — C90.00 MULTIPLE MYELOMA NOT HAVING ACHIEVED REMISSION (HCC): ICD-10-CM

## 2024-02-14 DIAGNOSIS — Z11.59 ENCOUNTER FOR SCREENING FOR OTHER VIRAL DISEASES: ICD-10-CM

## 2024-02-14 LAB
BASOPHILS # BLD: 0 K/UL (ref 0–0.1)
BASOPHILS NFR BLD: 0 % (ref 0–1)
DIFFERENTIAL METHOD BLD: ABNORMAL
EOSINOPHIL # BLD: 0.1 K/UL (ref 0–0.4)
EOSINOPHIL NFR BLD: 3 % (ref 0–7)
ERYTHROCYTE [DISTWIDTH] IN BLOOD BY AUTOMATED COUNT: 17.9 % (ref 11.5–14.5)
HCT VFR BLD AUTO: 28.8 % (ref 35–47)
HGB BLD-MCNC: 9.1 G/DL (ref 11.5–16)
IMM GRANULOCYTES # BLD AUTO: 0 K/UL (ref 0–0.04)
IMM GRANULOCYTES NFR BLD AUTO: 0 % (ref 0–0.5)
LYMPHOCYTES # BLD: 1.1 K/UL (ref 0.8–3.5)
LYMPHOCYTES NFR BLD: 45 % (ref 12–49)
MCH RBC QN AUTO: 31.4 PG (ref 26–34)
MCHC RBC AUTO-ENTMCNC: 31.6 G/DL (ref 30–36.5)
MCV RBC AUTO: 99.3 FL (ref 80–99)
MONOCYTES # BLD: 0.1 K/UL (ref 0–1)
MONOCYTES NFR BLD: 5 % (ref 5–13)
NEUTS SEG # BLD: 1.2 K/UL (ref 1.8–8)
NEUTS SEG NFR BLD: 47 % (ref 32–75)
NRBC # BLD: 0 K/UL (ref 0–0.01)
NRBC BLD-RTO: 0 PER 100 WBC
PLATELET # BLD AUTO: 240 K/UL (ref 150–400)
PMV BLD AUTO: 13 FL (ref 8.9–12.9)
RBC # BLD AUTO: 2.9 M/UL (ref 3.8–5.2)
WBC # BLD AUTO: 2.5 K/UL (ref 3.6–11)

## 2024-02-14 PROCEDURE — 85025 COMPLETE CBC W/AUTO DIFF WBC: CPT

## 2024-02-14 PROCEDURE — 84165 PROTEIN E-PHORESIS SERUM: CPT

## 2024-02-14 PROCEDURE — 86334 IMMUNOFIX E-PHORESIS SERUM: CPT

## 2024-02-14 PROCEDURE — 83521 IG LIGHT CHAINS FREE EACH: CPT

## 2024-02-14 PROCEDURE — 36415 COLL VENOUS BLD VENIPUNCTURE: CPT

## 2024-02-14 PROCEDURE — 82784 ASSAY IGA/IGD/IGG/IGM EACH: CPT

## 2024-02-14 PROCEDURE — 84155 ASSAY OF PROTEIN SERUM: CPT

## 2024-02-14 ASSESSMENT — PAIN SCALES - GENERAL: PAINLEVEL_OUTOF10: 0

## 2024-02-14 NOTE — PROGRESS NOTES
[facility] Peds/Adult Note                       Date: 2024    Name: Candida Weaver    MRN: [id number]         : 1955    0930 Patient arrives for Labs Pre-Treatment without acute problems. Please see Epic for complete assessment and education provided.    Vital signs stable throughout and prior to discharge. Patient tolerated procedure well and was discharged without incident. Patient is aware of next [facility] appointment on 2024.  Appointment card give to the Patient.       Ms. Weaver's vitals were reviewed prior to and after treatment.   Patient Vitals for the past 12 hrs:   Temp Pulse Resp BP   24 0930 97.6 °F (36.4 °C) 88 18 111/77         Lab results were obtained and reviewed.  Labs Pending, please see [facility] for results.    Recent Results (from the past 12 hour(s))   CBC With Auto Differential    Collection Time: 24  9:38 AM   Result Value Ref Range    WBC 2.5 (L) 3.6 - 11.0 K/uL    RBC 2.90 (L) 3.80 - 5.20 M/uL    Hemoglobin 9.1 (L) 11.5 - 16.0 g/dL    Hematocrit 28.8 (L) 35.0 - 47.0 %    MCV 99.3 (H) 80.0 - 99.0 FL    MCH 31.4 26.0 - 34.0 PG    MCHC 31.6 30.0 - 36.5 g/dL    RDW 17.9 (H) 11.5 - 14.5 %    Platelets 240 150 - 400 K/uL    MPV 13.0 (H) 8.9 - 12.9 FL    Nucleated RBCs 0.0 0  WBC    nRBC 0.00 0.00 - 0.01 K/uL    Neutrophils % 47 32 - 75 %    Lymphocytes % 45 12 - 49 %    Monocytes % 5 5 - 13 %    Eosinophils % 3 0 - 7 %    Basophils % 0 0 - 1 %    Immature Granulocytes 0 0.0 - 0.5 %    Neutrophils Absolute 1.2 (L) 1.8 - 8.0 K/UL    Lymphocytes Absolute 1.1 0.8 - 3.5 K/UL    Monocytes Absolute 0.1 0.0 - 1.0 K/UL    Eosinophils Absolute 0.1 0.0 - 0.4 K/UL    Basophils Absolute 0.0 0.0 - 0.1 K/UL    Absolute Immature Granulocyte 0.0 0.00 - 0.04 K/UL    Differential Type AUTOMATED         Ms. Weaver tolerated the infusion, and had no complaints.    Ms. Weaver was discharged from [facility] in stable condition.     Future Appointments   Date

## 2024-02-16 ENCOUNTER — HOSPITAL ENCOUNTER (OUTPATIENT)
Facility: HOSPITAL | Age: 69
Setting detail: INFUSION SERIES
Discharge: HOME | End: 2024-02-16
Payer: MEDICARE

## 2024-02-16 VITALS
RESPIRATION RATE: 18 BRPM | BODY MASS INDEX: 25.69 KG/M2 | DIASTOLIC BLOOD PRESSURE: 62 MMHG | SYSTOLIC BLOOD PRESSURE: 125 MMHG | HEIGHT: 65 IN | WEIGHT: 154.2 LBS | TEMPERATURE: 98.1 F

## 2024-02-16 DIAGNOSIS — Z11.59 ENCOUNTER FOR SCREENING FOR OTHER VIRAL DISEASES: ICD-10-CM

## 2024-02-16 DIAGNOSIS — C90.00 MULTIPLE MYELOMA NOT HAVING ACHIEVED REMISSION (HCC): Primary | ICD-10-CM

## 2024-02-16 LAB
ALBUMIN SERPL ELPH-MCNC: 3.7 G/DL (ref 2.9–4.4)
ALBUMIN/GLOB SERPL: 2.2 (ref 0.7–1.7)
ALPHA1 GLOB SERPL ELPH-MCNC: 0.2 G/DL (ref 0–0.4)
ALPHA2 GLOB SERPL ELPH-MCNC: 0.5 G/DL (ref 0.4–1)
B-GLOBULIN SERPL ELPH-MCNC: 0.7 G/DL (ref 0.7–1.3)
GAMMA GLOB SERPL ELPH-MCNC: 0.2 G/DL (ref 0.4–1.8)
GLOBULIN SER-MCNC: 1.7 G/DL (ref 2.2–3.9)
IGA SERPL-MCNC: <5 MG/DL (ref 87–352)
IGG SERPL-MCNC: 325 MG/DL (ref 586–1602)
IGM SERPL-MCNC: <5 MG/DL (ref 26–217)
INTERPRETATION SERPL IEP-IMP: ABNORMAL
KAPPA LC FREE SER-MCNC: 1198.4 MG/L (ref 3.3–19.4)
KAPPA LC FREE/LAMBDA FREE SER: 544.73 (ref 0.26–1.65)
LAMBDA LC FREE SERPL-MCNC: 2.2 MG/L (ref 5.7–26.3)
M PROTEIN SERPL ELPH-MCNC: 0.1 G/DL
PROT SERPL-MCNC: 5.4 G/DL (ref 6–8.5)

## 2024-02-16 PROCEDURE — 2580000003 HC RX 258: Performed by: INTERNAL MEDICINE

## 2024-02-16 PROCEDURE — 96401 CHEMO ANTI-NEOPL SQ/IM: CPT

## 2024-02-16 PROCEDURE — 6360000002 HC RX W HCPCS: Performed by: INTERNAL MEDICINE

## 2024-02-16 PROCEDURE — A4216 STERILE WATER/SALINE, 10 ML: HCPCS | Performed by: INTERNAL MEDICINE

## 2024-02-16 RX ORDER — DEXAMETHASONE 4 MG/1
20 TABLET ORAL ONCE
Status: COMPLETED | OUTPATIENT
Start: 2024-02-16 | End: 2024-02-16

## 2024-02-16 RX ORDER — SODIUM CHLORIDE 0.9 % (FLUSH) 0.9 %
5-40 SYRINGE (ML) INJECTION PRN
Status: DISCONTINUED | OUTPATIENT
Start: 2024-02-16 | End: 2024-02-17 | Stop reason: HOSPADM

## 2024-02-16 RX ADMIN — SODIUM CHLORIDE 1.75 MG: 9 INJECTION INTRAMUSCULAR; INTRAVENOUS; SUBCUTANEOUS at 11:25

## 2024-02-16 RX ADMIN — DEXAMETHASONE 20 MG: 4 TABLET ORAL at 10:54

## 2024-02-16 ASSESSMENT — PAIN SCALES - GENERAL: PAINLEVEL_OUTOF10: 0

## 2024-02-16 NOTE — PROGRESS NOTES
[facility] Progress Note    10:30 Ms. Weaver Arrived ambulatory and in no distress for VELCADE (C6D8) regimen.  Assessment was completed, no acute issues at this time, no new complaints voiced.         Ms. Weaver's vitals were reviewed.  Patient Vitals for the past 12 hrs:   Temp Resp BP   02/16/24 1037 98.1 °F (36.7 °C) 18 125/62         Lab results were obtained and reviewed.  No results found for this or any previous visit (from the past 12 hour(s)).    Pre-medications  were administered as ordered and chemotherapy was initiated.  Medications Administered         bortezomib (VELCADE) 1.75 mg in sodium chloride (PF) 0.9 % 0.7 mL chemo subcutaneous syringe Admin Date  02/16/2024 Action  Given Dose  1.75 mg Route  SubCUTAneous Administered By  Ruby Tabor RN        dexAMETHasone (DECADRON) tablet 20 mg Admin Date  02/16/2024 Action  Given Dose  20 mg Route  Oral Administered By  Ruby Tabor RN          VELCADE given SQ RLQ    Two nurses verified prior to administering: Drug name, Drug dose, Infusion volume or drug volume when prepared in a syringe, Rate of administration, Route of administration, Expiration dates and/or times, Appearance and physical integrity of the drugs, Rate set on infusion pump, when used, and Sequencing of drug administration.    Ms. Weaver tolerated treatment well patient [facility] in stable condition at 1125.     Future Appointments   Date Time Provider Department Center   3/6/2024  9:30 AM B4 PEDS FASTRACK [facility]   3/8/2024 10:30 AM B3 RUTH MED TX [facility]   3/8/2024 10:45 [facility]         Ruby Tabor RN  February 16, 2024

## 2024-02-19 LAB
ALBUMIN SERPL ELPH-MCNC: 3.5 G/DL (ref 2.9–4.4)
ALBUMIN/GLOB SERPL: 1.9 (ref 0.7–1.7)
ALPHA1 GLOB SERPL ELPH-MCNC: 0.2 G/DL (ref 0–0.4)
ALPHA2 GLOB SERPL ELPH-MCNC: 0.6 G/DL (ref 0.4–1)
B-GLOBULIN SERPL ELPH-MCNC: 0.8 G/DL (ref 0.7–1.3)
GAMMA GLOB SERPL ELPH-MCNC: 0.2 G/DL (ref 0.4–1.8)
GLOBULIN SER-MCNC: 1.9 G/DL (ref 2.2–3.9)
IGA SERPL-MCNC: <5 MG/DL (ref 87–352)
IGG SERPL-MCNC: 347 MG/DL (ref 586–1602)
IGM SERPL-MCNC: <5 MG/DL (ref 26–217)
INTERPRETATION SERPL IEP-IMP: ABNORMAL
KAPPA LC FREE SER-MCNC: 1176.9 MG/L (ref 3.3–19.4)
KAPPA LC FREE/LAMBDA FREE SER: 511.7 (ref 0.26–1.65)
LAMBDA LC FREE SERPL-MCNC: 2.3 MG/L (ref 5.7–26.3)
M PROTEIN SERPL ELPH-MCNC: 0.1 G/DL
PROT SERPL-MCNC: 5.4 G/DL (ref 6–8.5)

## 2024-02-26 ENCOUNTER — TELEPHONE (OUTPATIENT)
Facility: HOSPITAL | Age: 69
End: 2024-02-26

## 2024-02-26 DIAGNOSIS — C90.00 MULTIPLE MYELOMA NOT HAVING ACHIEVED REMISSION (HCC): ICD-10-CM

## 2024-02-26 RX ORDER — SODIUM CHLORIDE 0.9 % (FLUSH) 0.9 %
5-40 SYRINGE (ML) INJECTION PRN
Status: CANCELLED | OUTPATIENT
Start: 2024-03-08

## 2024-02-26 RX ORDER — SODIUM CHLORIDE 0.9 % (FLUSH) 0.9 %
5-40 SYRINGE (ML) INJECTION PRN
Status: CANCELLED | OUTPATIENT
Start: 2024-03-01

## 2024-02-26 RX ORDER — EPINEPHRINE 1 MG/ML
0.3 INJECTION, SOLUTION INTRAMUSCULAR; SUBCUTANEOUS PRN
OUTPATIENT
Start: 2024-03-15

## 2024-02-26 RX ORDER — ONDANSETRON 4 MG/1
8 TABLET, ORALLY DISINTEGRATING ORAL
Status: CANCELLED | OUTPATIENT
Start: 2024-03-01

## 2024-02-26 RX ORDER — SODIUM CHLORIDE 9 MG/ML
INJECTION, SOLUTION INTRAVENOUS CONTINUOUS
Status: CANCELLED | OUTPATIENT
Start: 2024-03-08

## 2024-02-26 RX ORDER — MEPERIDINE HYDROCHLORIDE 25 MG/ML
12.5 INJECTION INTRAMUSCULAR; INTRAVENOUS; SUBCUTANEOUS PRN
OUTPATIENT
Start: 2024-03-15

## 2024-02-26 RX ORDER — ACETAMINOPHEN 325 MG/1
650 TABLET ORAL
Status: CANCELLED | OUTPATIENT
Start: 2024-03-01

## 2024-02-26 RX ORDER — ONDANSETRON 4 MG/1
8 TABLET, ORALLY DISINTEGRATING ORAL
Status: CANCELLED | OUTPATIENT
Start: 2024-03-08

## 2024-02-26 RX ORDER — SODIUM CHLORIDE 0.9 % (FLUSH) 0.9 %
5-40 SYRINGE (ML) INJECTION PRN
OUTPATIENT
Start: 2024-03-15

## 2024-02-26 RX ORDER — EPINEPHRINE 1 MG/ML
0.3 INJECTION, SOLUTION INTRAMUSCULAR; SUBCUTANEOUS PRN
Status: CANCELLED | OUTPATIENT
Start: 2024-03-08

## 2024-02-26 RX ORDER — ALBUTEROL SULFATE 90 UG/1
4 AEROSOL, METERED RESPIRATORY (INHALATION) PRN
OUTPATIENT
Start: 2024-03-15

## 2024-02-26 RX ORDER — ACETAMINOPHEN 325 MG/1
650 TABLET ORAL
Status: CANCELLED | OUTPATIENT
Start: 2024-03-08

## 2024-02-26 RX ORDER — LENALIDOMIDE 10 MG/1
10 CAPSULE ORAL EVERY OTHER DAY
Qty: 7 CAPSULE | Refills: 0 | Status: ACTIVE | OUTPATIENT
Start: 2024-02-26

## 2024-02-26 RX ORDER — SODIUM CHLORIDE 9 MG/ML
5-250 INJECTION, SOLUTION INTRAVENOUS PRN
OUTPATIENT
Start: 2024-03-15

## 2024-02-26 RX ORDER — ACETAMINOPHEN 325 MG/1
650 TABLET ORAL
OUTPATIENT
Start: 2024-03-15

## 2024-02-26 RX ORDER — ALBUTEROL SULFATE 90 UG/1
4 AEROSOL, METERED RESPIRATORY (INHALATION) PRN
Status: CANCELLED | OUTPATIENT
Start: 2024-03-08

## 2024-02-26 RX ORDER — DEXAMETHASONE 4 MG/1
20 TABLET ORAL ONCE
Status: CANCELLED | OUTPATIENT
Start: 2024-03-08 | End: 2024-03-08

## 2024-02-26 RX ORDER — DIPHENHYDRAMINE HYDROCHLORIDE 50 MG/ML
50 INJECTION INTRAMUSCULAR; INTRAVENOUS
Status: CANCELLED | OUTPATIENT
Start: 2024-03-01

## 2024-02-26 RX ORDER — HEPARIN 100 UNIT/ML
500 SYRINGE INTRAVENOUS PRN
Status: CANCELLED | OUTPATIENT
Start: 2024-03-01

## 2024-02-26 RX ORDER — ONDANSETRON 2 MG/ML
8 INJECTION INTRAMUSCULAR; INTRAVENOUS
OUTPATIENT
Start: 2024-03-15

## 2024-02-26 RX ORDER — MEPERIDINE HYDROCHLORIDE 25 MG/ML
12.5 INJECTION INTRAMUSCULAR; INTRAVENOUS; SUBCUTANEOUS PRN
Status: CANCELLED | OUTPATIENT
Start: 2024-03-08

## 2024-02-26 RX ORDER — SODIUM CHLORIDE 9 MG/ML
INJECTION, SOLUTION INTRAVENOUS CONTINUOUS
OUTPATIENT
Start: 2024-03-15

## 2024-02-26 RX ORDER — SODIUM CHLORIDE 9 MG/ML
5-250 INJECTION, SOLUTION INTRAVENOUS PRN
Status: CANCELLED | OUTPATIENT
Start: 2024-03-08

## 2024-02-26 RX ORDER — SODIUM CHLORIDE 9 MG/ML
5-250 INJECTION, SOLUTION INTRAVENOUS PRN
Status: CANCELLED | OUTPATIENT
Start: 2024-03-01

## 2024-02-26 RX ORDER — EPINEPHRINE 1 MG/ML
0.3 INJECTION, SOLUTION INTRAMUSCULAR; SUBCUTANEOUS PRN
Status: CANCELLED | OUTPATIENT
Start: 2024-03-01

## 2024-02-26 RX ORDER — HEPARIN 100 UNIT/ML
500 SYRINGE INTRAVENOUS PRN
Status: CANCELLED | OUTPATIENT
Start: 2024-03-08

## 2024-02-26 RX ORDER — DIPHENHYDRAMINE HYDROCHLORIDE 50 MG/ML
50 INJECTION INTRAMUSCULAR; INTRAVENOUS
Status: CANCELLED | OUTPATIENT
Start: 2024-03-08

## 2024-02-26 RX ORDER — DEXAMETHASONE 4 MG/1
20 TABLET ORAL ONCE
OUTPATIENT
Start: 2024-03-15 | End: 2024-03-15

## 2024-02-26 RX ORDER — MEPERIDINE HYDROCHLORIDE 25 MG/ML
12.5 INJECTION INTRAMUSCULAR; INTRAVENOUS; SUBCUTANEOUS PRN
Status: CANCELLED | OUTPATIENT
Start: 2024-03-01

## 2024-02-26 RX ORDER — HEPARIN 100 UNIT/ML
500 SYRINGE INTRAVENOUS PRN
OUTPATIENT
Start: 2024-03-15

## 2024-02-26 RX ORDER — ALBUTEROL SULFATE 90 UG/1
4 AEROSOL, METERED RESPIRATORY (INHALATION) PRN
Status: CANCELLED | OUTPATIENT
Start: 2024-03-01

## 2024-02-26 RX ORDER — ONDANSETRON 2 MG/ML
8 INJECTION INTRAMUSCULAR; INTRAVENOUS
Status: CANCELLED | OUTPATIENT
Start: 2024-03-08

## 2024-02-26 RX ORDER — SODIUM CHLORIDE 9 MG/ML
INJECTION, SOLUTION INTRAVENOUS CONTINUOUS
Status: CANCELLED | OUTPATIENT
Start: 2024-03-01

## 2024-02-26 RX ORDER — ONDANSETRON 4 MG/1
8 TABLET, ORALLY DISINTEGRATING ORAL
OUTPATIENT
Start: 2024-03-15

## 2024-02-26 RX ORDER — DIPHENHYDRAMINE HYDROCHLORIDE 50 MG/ML
50 INJECTION INTRAMUSCULAR; INTRAVENOUS
OUTPATIENT
Start: 2024-03-15

## 2024-02-26 RX ORDER — ONDANSETRON 2 MG/ML
8 INJECTION INTRAMUSCULAR; INTRAVENOUS
Status: CANCELLED | OUTPATIENT
Start: 2024-03-01

## 2024-02-26 NOTE — TELEPHONE ENCOUNTER
Oral Chemotherapy Note     Candida Weaver is a  68 y.o.female  diagnosed with multiple myeloma.  Ms. Weaver is being treated with Kayli-VRd.         Medication name: lenalidomide   Regimen: Kayli-VRd  Dose:  10 mg   Frequency:  every other day  Administration schedule: for 14 days followed by 7 days off every 21 days  Ordering provider: Nany Arboleda MD  Start date: 2/9/24 (Cycle 6)    REMS auth # [id number] - [email or web address]     Rose Pa, PharmD, BCPS, BCOP     For Pharmacy Admin Tracking Only    Program: Medical Group  CPA in place:  Yes  Recommendation Provided To: Patient/Caregiver: 1 via Telephone  Intervention Detail: Refill(s) Provided  Intervention Accepted By: Patient/Caregiver: 1    Time Spent (min): 15

## 2024-02-27 ENCOUNTER — TELEPHONE (OUTPATIENT)
Facility: HOSPITAL | Age: 69
End: 2024-02-27

## 2024-02-27 ENCOUNTER — TELEPHONE (OUTPATIENT)
Age: 69
End: 2024-02-27

## 2024-02-27 NOTE — TELEPHONE ENCOUNTER
1140  Call placed to pt per staff message received from ALLYSON Awad verified x2  Pt stated she has not been contacted for her BM BX.   I made pt aware I would send an urgent message to the BX  to follow up with pt. If pt does not hear from them by this Friday, pt is to contact office.  Pt voiced understanding and was thankful for my call

## 2024-02-29 ENCOUNTER — HOSPITAL ENCOUNTER (OUTPATIENT)
Facility: HOSPITAL | Age: 69
Setting detail: INFUSION SERIES
Discharge: HOME | End: 2024-02-29
Payer: MEDICARE

## 2024-02-29 ENCOUNTER — TELEPHONE (OUTPATIENT)
Age: 69
End: 2024-02-29

## 2024-02-29 VITALS
TEMPERATURE: 97.5 F | SYSTOLIC BLOOD PRESSURE: 126 MMHG | HEART RATE: 79 BPM | DIASTOLIC BLOOD PRESSURE: 50 MMHG | RESPIRATION RATE: 18 BRPM

## 2024-02-29 DIAGNOSIS — C90.00 MULTIPLE MYELOMA NOT HAVING ACHIEVED REMISSION (HCC): ICD-10-CM

## 2024-02-29 DIAGNOSIS — T45.1X5A CHEMOTHERAPY INDUCED NEUTROPENIA (HCC): ICD-10-CM

## 2024-02-29 DIAGNOSIS — D70.1 CHEMOTHERAPY INDUCED NEUTROPENIA (HCC): ICD-10-CM

## 2024-02-29 LAB
BASOPHILS # BLD: 0.1 K/UL (ref 0–0.1)
BASOPHILS NFR BLD: 2 % (ref 0–1)
DIFFERENTIAL METHOD BLD: ABNORMAL
EOSINOPHIL # BLD: 0.1 K/UL (ref 0–0.4)
EOSINOPHIL NFR BLD: 3 % (ref 0–7)
ERYTHROCYTE [DISTWIDTH] IN BLOOD BY AUTOMATED COUNT: 16.5 % (ref 11.5–14.5)
HCT VFR BLD AUTO: 28.5 % (ref 35–47)
HGB BLD-MCNC: 9.1 G/DL (ref 11.5–16)
IMM GRANULOCYTES # BLD AUTO: 0 K/UL (ref 0–0.04)
IMM GRANULOCYTES NFR BLD AUTO: 0 % (ref 0–0.5)
LYMPHOCYTES # BLD: 1.3 K/UL (ref 0.8–3.5)
LYMPHOCYTES NFR BLD: 49 % (ref 12–49)
MCH RBC QN AUTO: 31.4 PG (ref 26–34)
MCHC RBC AUTO-ENTMCNC: 31.9 G/DL (ref 30–36.5)
MCV RBC AUTO: 98.3 FL (ref 80–99)
MONOCYTES # BLD: 0.2 K/UL (ref 0–1)
MONOCYTES NFR BLD: 9 % (ref 5–13)
NEUTS SEG # BLD: 1 K/UL (ref 1.8–8)
NEUTS SEG NFR BLD: 37 % (ref 32–75)
NRBC # BLD: 0 K/UL (ref 0–0.01)
NRBC BLD-RTO: 0 PER 100 WBC
PLATELET # BLD AUTO: 240 K/UL (ref 150–400)
PMV BLD AUTO: 11.9 FL (ref 8.9–12.9)
RBC # BLD AUTO: 2.9 M/UL (ref 3.8–5.2)
RBC MORPH BLD: ABNORMAL
RBC MORPH BLD: ABNORMAL
WBC # BLD AUTO: 2.7 K/UL (ref 3.6–11)

## 2024-02-29 PROCEDURE — 85025 COMPLETE CBC W/AUTO DIFF WBC: CPT

## 2024-02-29 PROCEDURE — 36415 COLL VENOUS BLD VENIPUNCTURE: CPT

## 2024-02-29 ASSESSMENT — PAIN SCALES - GENERAL: PAINLEVEL_OUTOF10: 0

## 2024-02-29 NOTE — PROGRESS NOTES
[facility] Short Note                   Date: 2024    Name: Candida Weaver    MRN: [id number]         : 1955      0945 - Pt admit to [facility] for Pre Treatment Labs ambulatory in stable condition. Assessment completed. No new concerns voiced.     Ms. Weaver's vitals were reviewed prior to and after treatment.   Patient Vitals for the past 12 hrs:   Temp Pulse Resp BP   24 0948 97.5 °F (36.4 °C) 79 18 (!) 126/50       Lab results were obtained and reviewed.   Please review pending lab results in Epic.  No results found for this or any previous visit (from the past 12 hour(s)).    Medications given: NONE    Ms. Weaver tolerated the procedure, and had no complaints.    Ms. Weaver was [facility] in stable condition.     Future Appointments   Date Time Provider Department Center   2024 10:30 AM B4 PEDS FASTRACK [facility]   3/1/2024 11:00 AM B3 RUTH MED TX [facility]   3/6/2024  1:00 PM [facility] CT MAIN 1 [facility]   3/7/2024  9:30 AM A3 PEDS FASTRACK [facility]   3/8/2024 10:30 AM B3 RUTH MED [place] [facility]   3/8/2024 10:45 AM Nany Arboleda MD [facility] BS [facility]   3/14/2024  9:30 AM A3 PEDS FASTRACK [facility]   3/15/2024 10:30 AM B3 RUTH MED TX [facility]   3/21/2024  9:30 AM A3 PEDS FASTRACK [facility]   3/22/2024 10:30 AM B3 RUTH MED TX [facility]   3/28/2024  9:30 AM B4 PEDS FASTRACK [facility]   3/29/2024 11:00 AM B3 RUTH MED TX [facility]   2024  9:30 AM B4 PEDS FASTRACK [facility]   2024 10:30 AM A1 RUTH MED TX [facility]       Ann Patterson RN  2024  10:16 AM

## 2024-03-01 ENCOUNTER — CLINICAL DOCUMENTATION (OUTPATIENT)
Age: 69
End: 2024-03-01

## 2024-03-01 ENCOUNTER — HOSPITAL ENCOUNTER (OUTPATIENT)
Facility: HOSPITAL | Age: 69
Setting detail: INFUSION SERIES
Discharge: HOME | End: 2024-03-01
Payer: MEDICARE

## 2024-03-01 VITALS
SYSTOLIC BLOOD PRESSURE: 117 MMHG | TEMPERATURE: 98 F | HEIGHT: 65 IN | BODY MASS INDEX: 25.66 KG/M2 | DIASTOLIC BLOOD PRESSURE: 52 MMHG | WEIGHT: 154 LBS | RESPIRATION RATE: 18 BRPM | HEART RATE: 74 BPM

## 2024-03-01 DIAGNOSIS — Z11.59 ENCOUNTER FOR SCREENING FOR OTHER VIRAL DISEASES: ICD-10-CM

## 2024-03-01 DIAGNOSIS — C90.00 MULTIPLE MYELOMA NOT HAVING ACHIEVED REMISSION (HCC): Primary | ICD-10-CM

## 2024-03-01 LAB
ALBUMIN SERPL-MCNC: 3.5 G/DL (ref 3.5–5)
ALBUMIN/GLOB SERPL: 1.3 (ref 1.1–2.2)
ALP SERPL-CCNC: 99 U/L (ref 45–117)
ALT SERPL-CCNC: 22 U/L (ref 12–78)
ANION GAP BLD CALC-SCNC: 8.7 MMOL/L (ref 10–20)
ANION GAP SERPL CALC-SCNC: 3 MMOL/L (ref 5–15)
AST SERPL-CCNC: 13 U/L (ref 15–37)
BILIRUB SERPL-MCNC: 0.2 MG/DL (ref 0.2–1)
BUN SERPL-MCNC: 18 MG/DL (ref 6–20)
BUN/CREAT SERPL: 21 (ref 12–20)
CA-I BLD-MCNC: 1.21 MMOL/L (ref 1.12–1.32)
CALCIUM SERPL-MCNC: 9.2 MG/DL (ref 8.5–10.1)
CHLORIDE BLD-SCNC: 103 MMOL/L (ref 98–107)
CHLORIDE SERPL-SCNC: 105 MMOL/L (ref 97–108)
CO2 BLD-SCNC: 25.3 MMOL/L (ref 21–32)
CO2 SERPL-SCNC: 27 MMOL/L (ref 21–32)
CREAT BLD-MCNC: 0.66 MG/DL (ref 0.6–1.3)
CREAT SERPL-MCNC: 0.85 MG/DL (ref 0.55–1.02)
GLOBULIN SER CALC-MCNC: 2.8 G/DL (ref 2–4)
GLUCOSE BLD-MCNC: 94 MG/DL (ref 65–100)
GLUCOSE SERPL-MCNC: 109 MG/DL (ref 65–100)
POTASSIUM BLD-SCNC: 4.1 MMOL/L (ref 3.5–5.1)
POTASSIUM SERPL-SCNC: 3.8 MMOL/L (ref 3.5–5.1)
PROT SERPL-MCNC: 6.3 G/DL (ref 6.4–8.2)
SERVICE CMNT-IMP: ABNORMAL
SODIUM BLD-SCNC: 137 MMOL/L (ref 136–145)
SODIUM SERPL-SCNC: 135 MMOL/L (ref 136–145)

## 2024-03-01 PROCEDURE — 80047 BASIC METABLC PNL IONIZED CA: CPT

## 2024-03-01 PROCEDURE — 96401 CHEMO ANTI-NEOPL SQ/IM: CPT

## 2024-03-01 PROCEDURE — 6370000000 HC RX 637 (ALT 250 FOR IP): Performed by: INTERNAL MEDICINE

## 2024-03-01 PROCEDURE — 2580000003 HC RX 258

## 2024-03-01 PROCEDURE — A4216 STERILE WATER/SALINE, 10 ML: HCPCS | Performed by: INTERNAL MEDICINE

## 2024-03-01 PROCEDURE — 83521 IG LIGHT CHAINS FREE EACH: CPT

## 2024-03-01 PROCEDURE — 84165 PROTEIN E-PHORESIS SERUM: CPT

## 2024-03-01 PROCEDURE — 6360000002 HC RX W HCPCS

## 2024-03-01 PROCEDURE — 2580000003 HC RX 258: Performed by: INTERNAL MEDICINE

## 2024-03-01 PROCEDURE — 80053 COMPREHEN METABOLIC PANEL: CPT

## 2024-03-01 PROCEDURE — 82784 ASSAY IGA/IGD/IGG/IGM EACH: CPT

## 2024-03-01 PROCEDURE — 96365 THER/PROPH/DIAG IV INF INIT: CPT

## 2024-03-01 PROCEDURE — 36415 COLL VENOUS BLD VENIPUNCTURE: CPT

## 2024-03-01 PROCEDURE — 86334 IMMUNOFIX E-PHORESIS SERUM: CPT

## 2024-03-01 PROCEDURE — 6360000002 HC RX W HCPCS: Performed by: INTERNAL MEDICINE

## 2024-03-01 PROCEDURE — 96367 TX/PROPH/DG ADDL SEQ IV INF: CPT

## 2024-03-01 RX ORDER — DIPHENHYDRAMINE HCL 25 MG
25 CAPSULE ORAL ONCE
Status: COMPLETED | OUTPATIENT
Start: 2024-03-01 | End: 2024-03-01

## 2024-03-01 RX ORDER — ZOLEDRONIC ACID 0.04 MG/ML
4 INJECTION, SOLUTION INTRAVENOUS ONCE
OUTPATIENT
Start: 2024-03-22 | End: 2024-03-22

## 2024-03-01 RX ORDER — ACETAMINOPHEN 325 MG/1
650 TABLET ORAL ONCE
Status: COMPLETED | OUTPATIENT
Start: 2024-03-01 | End: 2024-03-01

## 2024-03-01 RX ORDER — EPINEPHRINE 1 MG/ML
0.3 INJECTION, SOLUTION INTRAMUSCULAR; SUBCUTANEOUS PRN
OUTPATIENT
Start: 2024-03-22

## 2024-03-01 RX ORDER — ONDANSETRON 2 MG/ML
8 INJECTION INTRAMUSCULAR; INTRAVENOUS
OUTPATIENT
Start: 2024-03-22

## 2024-03-01 RX ORDER — DEXAMETHASONE 4 MG/1
20 TABLET ORAL ONCE
Status: COMPLETED | OUTPATIENT
Start: 2024-03-01 | End: 2024-03-01

## 2024-03-01 RX ORDER — SODIUM CHLORIDE 9 MG/ML
5-250 INJECTION, SOLUTION INTRAVENOUS PRN
Status: DISCONTINUED | OUTPATIENT
Start: 2024-03-01 | End: 2024-03-02 | Stop reason: HOSPADM

## 2024-03-01 RX ORDER — SODIUM CHLORIDE 9 MG/ML
5-250 INJECTION, SOLUTION INTRAVENOUS PRN
OUTPATIENT
Start: 2024-03-22

## 2024-03-01 RX ORDER — HEPARIN 100 UNIT/ML
500 SYRINGE INTRAVENOUS PRN
OUTPATIENT
Start: 2024-03-22

## 2024-03-01 RX ORDER — DIPHENHYDRAMINE HYDROCHLORIDE 50 MG/ML
50 INJECTION INTRAMUSCULAR; INTRAVENOUS
OUTPATIENT
Start: 2024-03-22

## 2024-03-01 RX ORDER — ZOLEDRONIC ACID 0.04 MG/ML
4 INJECTION, SOLUTION INTRAVENOUS ONCE
Status: COMPLETED | OUTPATIENT
Start: 2024-03-01 | End: 2024-03-01

## 2024-03-01 RX ORDER — SODIUM CHLORIDE 0.9 % (FLUSH) 0.9 %
5-40 SYRINGE (ML) INJECTION PRN
Status: DISCONTINUED | OUTPATIENT
Start: 2024-03-01 | End: 2024-03-02 | Stop reason: HOSPADM

## 2024-03-01 RX ORDER — SODIUM CHLORIDE 0.9 % (FLUSH) 0.9 %
5-40 SYRINGE (ML) INJECTION PRN
OUTPATIENT
Start: 2024-03-22

## 2024-03-01 RX ORDER — ACETAMINOPHEN 325 MG/1
650 TABLET ORAL
OUTPATIENT
Start: 2024-03-22

## 2024-03-01 RX ORDER — ALBUTEROL SULFATE 90 UG/1
4 AEROSOL, METERED RESPIRATORY (INHALATION) PRN
OUTPATIENT
Start: 2024-03-22

## 2024-03-01 RX ORDER — SODIUM CHLORIDE 9 MG/ML
INJECTION, SOLUTION INTRAVENOUS CONTINUOUS
OUTPATIENT
Start: 2024-03-22

## 2024-03-01 RX ADMIN — ACETAMINOPHEN 650 MG: 325 TABLET ORAL at 12:13

## 2024-03-01 RX ADMIN — SODIUM CHLORIDE 50 ML/HR: 9 INJECTION, SOLUTION INTRAVENOUS at 11:38

## 2024-03-01 RX ADMIN — ZOLEDRONIC ACID 4 MG: 0.04 INJECTION, SOLUTION INTRAVENOUS at 11:40

## 2024-03-01 RX ADMIN — SODIUM CHLORIDE 1.75 MG: 9 INJECTION INTRAMUSCULAR; INTRAVENOUS; SUBCUTANEOUS at 12:52

## 2024-03-01 RX ADMIN — DIPHENHYDRAMINE HYDROCHLORIDE 25 MG: 25 CAPSULE ORAL at 12:13

## 2024-03-01 RX ADMIN — DARATUMUMAB AND HYALURONIDASE-FIHJ (HUMAN RECOMBINANT) 1800 MG: 1800; 30000 INJECTION SUBCUTANEOUS at 12:52

## 2024-03-01 RX ADMIN — DEXAMETHASONE 20 MG: 4 TABLET ORAL at 12:14

## 2024-03-01 ASSESSMENT — PAIN SCALES - GENERAL: PAINLEVEL_OUTOF10: 9

## 2024-03-01 ASSESSMENT — PAIN DESCRIPTION - LOCATION: LOCATION: FINGER (COMMENT WHICH ONE)

## 2024-03-01 NOTE — PROGRESS NOTES
Oral Chemotherapy Note     Candida Weaver is a  68 y.o.female  diagnosed with multiple myeloma.  Ms. Weaver is being treated with Kayli-VRd.         Medication name: lenalidomide   Regimen: Kayli-VRd  Dose:  10 mg   Frequency:  every other day  Administration schedule: for 14 days followed by 7 days off every 21 days  Ordering provider: Nany Arboleda MD  Start date: 3/1/24 (Cycle 7)     Lab Results   Component Value Date    WBC 2.7 (L) 02/29/2024    HGB 9.1 (L) 02/29/2024    HCT 28.5 (L) 02/29/2024    MCV 98.3 02/29/2024     02/29/2024    LYMPHOPCT 49 02/29/2024    RBC 2.90 (L) 02/29/2024    MCH 31.4 02/29/2024    MCHC 31.9 02/29/2024    RDW 16.5 (H) 02/29/2024         Lab Results   Component Value Date    NEUTROABS 1.0 (L) 02/29/2024       Expected follow up date: Labs/office visit each cycle. Next cycle start on 3/22/24     Patient provided with an Oral Chemotherapy Journal.                       Rose Pa, PharmD, BCPS, BCOP

## 2024-03-01 NOTE — PROGRESS NOTES
\[facility]\"" Short Note                       Date: 2024    Name: Candida Weaver    MRN: [id number]         : 1955      Pt admit to \[facility]\"" for C7D! Darzalex/Velcade +  Zometa ambulatory in stable condition. Assessment completed and documented in flowsheets. PIV placed to right AC. Labs drawn and processed.      Ms. Weaver's vitals were reviewed prior to and after treatment.   Patient Vitals for the past 12 hrs:   Temp Pulse Resp BP   24 1054 98 °F (36.7 °C) 74 18 (!) 117/52         Lab results were obtained and reviewed. Labs within parameter for treatment.  Recent Results (from the past 12 hour(s))   Comprehensive metabolic panel    Collection Time: 24 10:48 AM   Result Value Ref Range    Sodium 135 (L) 136 - 145 mmol/L    Potassium 3.8 3.5 - 5.1 mmol/L    Chloride 105 97 - 108 mmol/L    CO2 27 21 - 32 mmol/L    Anion Gap 3 (L) 5 - 15 mmol/L    Glucose 109 (H) 65 - 100 mg/dL    BUN 18 6 - 20 MG/DL    Creatinine 0.85 0.55 - 1.02 MG/DL    Bun/Cre Ratio 21 (H) 12 - 20      Est, Glom Filt Rate >60 >60 ml/min/1.73m2    Calcium 9.2 8.5 - 10.1 MG/DL    Total Bilirubin 0.2 0.2 - 1.0 MG/DL    ALT 22 12 - 78 U/L    AST 13 (L) 15 - 37 U/L    Alk Phosphatase 99 45 - 117 U/L    Total Protein 6.3 (L) 6.4 - 8.2 g/dL    Albumin 3.5 3.5 - 5.0 g/dL    Globulin 2.8 2.0 - 4.0 g/dL    Albumin/Globulin Ratio 1.3 1.1 - 2.2     POC CHEM 8    Collection Time: 24 10:56 AM   Result Value Ref Range    POC Ionized Calcium 1.21 1.12 - 1.32 mmol/L    POC Sodium 137 136 - 145 mmol/L    POC Potassium 4.1 3.5 - 5.1 mmol/L    POC Chloride 103 98 - 107 mmol/L    POC TCO2 25.3 21 - 32 mmol/L    Anion Gap, POC 8.7 (L) 10 - 20 mmol/L    POC Glucose 94 65 - 100 mg/dL    POC Creatinine 0.66 0.6 - 1.3 mg/dL    eGFR, POC >60 >60 ml/min/1.73m2    UA Comment Comment Not Indicated.               Medications given: Darzalex given SC in RLQ, Velcade given SC in LLQ  Medications Administered         0.9 % sodium chloride infusion Admin  [facility]   4/4/2024  9:30 AM B4 PEDS FASTRACK [facility]   4/5/2024 10:30 AM A1 [facility]       Ann Moss RN  March 1, 2024  2:07 PM

## 2024-03-05 ENCOUNTER — TELEPHONE (OUTPATIENT)
Age: 69
End: 2024-03-05

## 2024-03-06 ENCOUNTER — HOSPITAL ENCOUNTER (OUTPATIENT)
Facility: HOSPITAL | Age: 69
Discharge: HOME | End: 2024-03-09
Payer: MEDICARE

## 2024-03-06 ENCOUNTER — APPOINTMENT (OUTPATIENT)
Facility: HOSPITAL | Age: 69
End: 2024-03-06
Payer: MEDICARE

## 2024-03-06 VITALS
SYSTOLIC BLOOD PRESSURE: 130 MMHG | DIASTOLIC BLOOD PRESSURE: 62 MMHG | TEMPERATURE: 97.8 F | RESPIRATION RATE: 16 BRPM | OXYGEN SATURATION: 98 % | HEART RATE: 82 BPM

## 2024-03-06 DIAGNOSIS — C90.00 MULTIPLE MYELOMA NOT HAVING ACHIEVED REMISSION (HCC): ICD-10-CM

## 2024-03-06 PROCEDURE — 2500000003 HC RX 250 WO HCPCS

## 2024-03-06 PROCEDURE — 6360000002 HC RX W HCPCS

## 2024-03-06 PROCEDURE — 99152 MOD SED SAME PHYS/QHP 5/>YRS: CPT

## 2024-03-06 PROCEDURE — 88341 IMHCHEM/IMCYTCHM EA ADD ANTB: CPT

## 2024-03-06 PROCEDURE — 36415 COLL VENOUS BLD VENIPUNCTURE: CPT

## 2024-03-06 PROCEDURE — 88364 INSITU HYBRIDIZATION (FISH): CPT

## 2024-03-06 PROCEDURE — 88311 DECALCIFY TISSUE: CPT

## 2024-03-06 PROCEDURE — 85025 COMPLETE CBC W/AUTO DIFF WBC: CPT

## 2024-03-06 PROCEDURE — 88313 SPECIAL STAINS GROUP 2: CPT

## 2024-03-06 PROCEDURE — 88305 TISSUE EXAM BY PATHOLOGIST: CPT

## 2024-03-06 PROCEDURE — 88342 IMHCHEM/IMCYTCHM 1ST ANTB: CPT

## 2024-03-06 PROCEDURE — 88365 INSITU HYBRIDIZATION (FISH): CPT

## 2024-03-06 RX ORDER — FENTANYL CITRATE 50 UG/ML
INJECTION, SOLUTION INTRAMUSCULAR; INTRAVENOUS PRN
Status: COMPLETED | OUTPATIENT
Start: 2024-03-06 | End: 2024-03-06

## 2024-03-06 RX ORDER — ONDANSETRON 2 MG/ML
INJECTION INTRAMUSCULAR; INTRAVENOUS PRN
Status: COMPLETED | OUTPATIENT
Start: 2024-03-06 | End: 2024-03-06

## 2024-03-06 RX ORDER — MIDAZOLAM HYDROCHLORIDE 2 MG/2ML
INJECTION, SOLUTION INTRAMUSCULAR; INTRAVENOUS PRN
Status: COMPLETED | OUTPATIENT
Start: 2024-03-06 | End: 2024-03-06

## 2024-03-06 RX ORDER — LIDOCAINE HYDROCHLORIDE 10 MG/ML
INJECTION, SOLUTION EPIDURAL; INFILTRATION; INTRACAUDAL; PERINEURAL PRN
Status: COMPLETED | OUTPATIENT
Start: 2024-03-06 | End: 2024-03-06

## 2024-03-06 RX ADMIN — MIDAZOLAM HYDROCHLORIDE 2 MG: 1 INJECTION, SOLUTION INTRAMUSCULAR; INTRAVENOUS at 14:32

## 2024-03-06 RX ADMIN — FENTANYL CITRATE 75 MCG: 0.05 INJECTION, SOLUTION INTRAMUSCULAR; INTRAVENOUS at 14:29

## 2024-03-06 RX ADMIN — FENTANYL CITRATE 75 MCG: 0.05 INJECTION, SOLUTION INTRAMUSCULAR; INTRAVENOUS at 14:34

## 2024-03-06 RX ADMIN — LIDOCAINE HYDROCHLORIDE 10 ML: 10 INJECTION, SOLUTION EPIDURAL; INFILTRATION; INTRACAUDAL; PERINEURAL at 14:43

## 2024-03-06 RX ADMIN — FENTANYL CITRATE 50 MCG: 0.05 INJECTION, SOLUTION INTRAMUSCULAR; INTRAVENOUS at 14:38

## 2024-03-06 RX ADMIN — ONDANSETRON HYDROCHLORIDE 4 MG: 2 INJECTION, SOLUTION INTRAMUSCULAR; INTRAVENOUS at 14:15

## 2024-03-06 RX ADMIN — MIDAZOLAM HYDROCHLORIDE 2 MG: 1 INJECTION, SOLUTION INTRAMUSCULAR; INTRAVENOUS at 14:29

## 2024-03-06 ASSESSMENT — PAIN - FUNCTIONAL ASSESSMENT: PAIN_FUNCTIONAL_ASSESSMENT: NONE - DENIES PAIN

## 2024-03-06 NOTE — PROGRESS NOTES
INTERVENTIONAL RADIOLOGY  Preoperative History and Physical      Patient:  Candida Weaver  :  1955  Age:  69 y.o.  MRN:  [id number]  Today's Date:  3/6/2024      CC / HPI   Candida Weaver is a 69 y.o. female with a history of multiple myeloma who presents for CT guided bone marrow biopsy.    PAST MEDICAL HISTORY  Past Medical History:   Diagnosis Date    Anxiety 2009    Blood dyscrasia     followed by Dr. Jessica Gonzalez     Disc disorder 2009    High cholesterol     Iron (Fe) deficiency anemia 2009    Multinodular goiter     Osteopenia 2009    Seasonal allergic rhinitis 2009    Vertigo        PAST SURGICAL HISTORY  Past Surgical History:   Procedure Laterality Date    CATARACT REMOVAL      COLONOSCOPY N/A 2020    COLONOSCOPY     :- performed by Danilo Garcia MD at [facility] ENDOSCOPY    FLEXIBLE SIGMOIDOSCOPY N/A 9/15/2020    SIGMOIDOSCOPY FLEXIBLE performed by Danilo Garcia MD at [facility] ENDOSCOPY    ORTHOPEDIC SURGERY      left foot       SOCIAL HISTORY  Social History     Socioeconomic History    Marital status:      Spouse name: Not on file    Number of children: Not on file    Years of education: Not on file    Highest education level: Not on file   Occupational History    Not on file   Tobacco Use    Smoking status: Never    Smokeless tobacco: Never   Substance and Sexual Activity    Alcohol use: Yes    Drug use: No    Sexual activity: Not Currently   Other Topics Concern    Not on file   Social History Narrative    Lives in [place] with her 36 yo son.  Also has a daughter who lives in [place].  Currently .  Used to work for the [employer] and then worked for [employer] as an .  Originally from [place].       Social Determinants of Health     Financial Resource Strain: Patient Declined (2023)    Overall Financial Resource Strain (CARDIA)     Difficulty of Paying Living Expenses: Patient declined   Food Insecurity: Not on file

## 2024-03-06 NOTE — PROGRESS NOTES
Pt arrives ambulatory to angio department accompanied by son for BMBX procedure. All assessments completed and consent was reviewed.  Education given was regarding procedure, moderate sedation, post-procedure care and  management/follow-up. Opportunity for questions was provided and all questions and concerns were addressed.

## 2024-03-07 ENCOUNTER — HOSPITAL ENCOUNTER (OUTPATIENT)
Facility: HOSPITAL | Age: 69
Setting detail: INFUSION SERIES
Discharge: HOME | End: 2024-03-07
Payer: MEDICARE

## 2024-03-07 VITALS
TEMPERATURE: 97.7 F | SYSTOLIC BLOOD PRESSURE: 113 MMHG | HEART RATE: 77 BPM | RESPIRATION RATE: 18 BRPM | DIASTOLIC BLOOD PRESSURE: 57 MMHG

## 2024-03-07 DIAGNOSIS — C90.00 MULTIPLE MYELOMA NOT HAVING ACHIEVED REMISSION (HCC): ICD-10-CM

## 2024-03-07 DIAGNOSIS — T45.1X5A CHEMOTHERAPY INDUCED NEUTROPENIA (HCC): ICD-10-CM

## 2024-03-07 DIAGNOSIS — D70.1 CHEMOTHERAPY INDUCED NEUTROPENIA (HCC): ICD-10-CM

## 2024-03-07 LAB
BASOPHILS # BLD: 0 K/UL (ref 0–0.1)
BASOPHILS NFR BLD: 1 % (ref 0–1)
DIFFERENTIAL METHOD BLD: ABNORMAL
EOSINOPHIL # BLD: 0.1 K/UL (ref 0–0.4)
EOSINOPHIL NFR BLD: 5 % (ref 0–7)
ERYTHROCYTE [DISTWIDTH] IN BLOOD BY AUTOMATED COUNT: 16.6 % (ref 11.5–14.5)
HCT VFR BLD AUTO: 27.6 % (ref 35–47)
HGB BLD-MCNC: 8.8 G/DL (ref 11.5–16)
IMM GRANULOCYTES # BLD AUTO: 0 K/UL (ref 0–0.04)
IMM GRANULOCYTES NFR BLD AUTO: 0 % (ref 0–0.5)
LYMPHOCYTES # BLD: 1.2 K/UL (ref 0.8–3.5)
LYMPHOCYTES NFR BLD: 47 % (ref 12–49)
MCH RBC QN AUTO: 32 PG (ref 26–34)
MCHC RBC AUTO-ENTMCNC: 31.9 G/DL (ref 30–36.5)
MCV RBC AUTO: 100.4 FL (ref 80–99)
MONOCYTES # BLD: 0.1 K/UL (ref 0–1)
MONOCYTES NFR BLD: 6 % (ref 5–13)
NEUTS SEG # BLD: 1 K/UL (ref 1.8–8)
NEUTS SEG NFR BLD: 41 % (ref 32–75)
NRBC # BLD: 0 K/UL (ref 0–0.01)
NRBC BLD-RTO: 0 PER 100 WBC
PLATELET # BLD AUTO: 223 K/UL (ref 150–400)
PMV BLD AUTO: 11.9 FL (ref 8.9–12.9)
RBC # BLD AUTO: 2.75 M/UL (ref 3.8–5.2)
RBC MORPH BLD: ABNORMAL
WBC # BLD AUTO: 2.4 K/UL (ref 3.6–11)

## 2024-03-07 PROCEDURE — 85025 COMPLETE CBC W/AUTO DIFF WBC: CPT

## 2024-03-07 PROCEDURE — 36415 COLL VENOUS BLD VENIPUNCTURE: CPT

## 2024-03-07 ASSESSMENT — PAIN DESCRIPTION - ORIENTATION: ORIENTATION: RIGHT;LEFT

## 2024-03-07 ASSESSMENT — PAIN SCALES - GENERAL: PAINLEVEL_OUTOF10: 9

## 2024-03-07 ASSESSMENT — PAIN DESCRIPTION - LOCATION: LOCATION: HAND;FINGER (COMMENT WHICH ONE)

## 2024-03-07 ASSESSMENT — PAIN DESCRIPTION - DESCRIPTORS: DESCRIPTORS: BURNING;SHARP;SHOOTING

## 2024-03-07 NOTE — PROGRESS NOTES
[facility] Peds/Adult Note                       Date: 2024    Name: Candida Weaver    MRN: [id number]         : 1955    0928 Patient arrives for Pre-Treatment Labs without acute problems. Please see Epic for complete assessment and education provided.    Vital signs stable throughout and prior to discharge. Patient tolerated procedure well and was discharged without incident.  Patient is aware of next [facility] appointment on 2024. Appointment card give to the Patient.      Ms. Weaver's vitals were reviewed prior to and after treatment.   Patient Vitals for the past 12 hrs:   Temp Pulse Resp BP   24 0928 97.7 °F (36.5 °C) 77 18 (!) 113/57         Lab results were obtained and reviewed.  Recent Results (from the past 12 hour(s))   CBC with Auto Differential    Collection Time: 24  9:39 AM   Result Value Ref Range    WBC 2.4 (L) 3.6 - 11.0 K/uL    RBC 2.75 (L) 3.80 - 5.20 M/uL    Hemoglobin 8.8 (L) 11.5 - 16.0 g/dL    Hematocrit 27.6 (L) 35.0 - 47.0 %    .4 (H) 80.0 - 99.0 FL    MCH 32.0 26.0 - 34.0 PG    MCHC 31.9 30.0 - 36.5 g/dL    RDW 16.6 (H) 11.5 - 14.5 %    Platelets 223 150 - 400 K/uL    MPV 11.9 8.9 - 12.9 FL    Nucleated RBCs 0.0 0  WBC    nRBC 0.00 0.00 - 0.01 K/uL    Neutrophils % PENDING %    Lymphocytes % PENDING %    Monocytes % PENDING %    Eosinophils % PENDING %    Basophils % PENDING %    Immature Granulocytes PENDING %    Neutrophils Absolute PENDING K/UL    Lymphocytes Absolute PENDING K/UL    Monocytes Absolute PENDING K/UL    Eosinophils Absolute PENDING K/UL    Basophils Absolute PENDING K/UL    Absolute Immature Granulocyte PENDING K/UL    Differential Type PENDING        Ms. Weaver tolerated the infusion, and had no complaints.    Ms. Weaver was [facility] in stable condition.     ANDRIA KILLIAN RN  2024  10:36 AM

## 2024-03-08 ENCOUNTER — CLINICAL DOCUMENTATION (OUTPATIENT)
Age: 69
End: 2024-03-08

## 2024-03-08 ENCOUNTER — HOSPITAL ENCOUNTER (OUTPATIENT)
Facility: HOSPITAL | Age: 69
Setting detail: INFUSION SERIES
Discharge: HOME | End: 2024-03-08
Payer: MEDICARE

## 2024-03-08 VITALS
TEMPERATURE: 97.5 F | DIASTOLIC BLOOD PRESSURE: 50 MMHG | BODY MASS INDEX: 25.68 KG/M2 | SYSTOLIC BLOOD PRESSURE: 121 MMHG | WEIGHT: 154.32 LBS | HEART RATE: 75 BPM | RESPIRATION RATE: 16 BRPM

## 2024-03-08 DIAGNOSIS — C90.00 MULTIPLE MYELOMA NOT HAVING ACHIEVED REMISSION (HCC): Primary | ICD-10-CM

## 2024-03-08 DIAGNOSIS — Z11.59 ENCOUNTER FOR SCREENING FOR OTHER VIRAL DISEASES: ICD-10-CM

## 2024-03-08 LAB
ALBUMIN SERPL ELPH-MCNC: 3.6 G/DL (ref 2.9–4.4)
ALBUMIN/GLOB SERPL: 1.9 (ref 0.7–1.7)
ALPHA1 GLOB SERPL ELPH-MCNC: 0.3 G/DL (ref 0–0.4)
ALPHA2 GLOB SERPL ELPH-MCNC: 0.6 G/DL (ref 0.4–1)
B-GLOBULIN SERPL ELPH-MCNC: 0.8 G/DL (ref 0.7–1.3)
BASOPHILS # BLD: 0 K/UL (ref 0–0.1)
BASOPHILS NFR BLD: 2 % (ref 0–1)
DIFFERENTIAL METHOD BLD: ABNORMAL
EOSINOPHIL # BLD: 0.1 K/UL (ref 0–0.4)
EOSINOPHIL NFR BLD: 5 % (ref 0–7)
ERYTHROCYTE [DISTWIDTH] IN BLOOD BY AUTOMATED COUNT: 16.5 % (ref 11.5–14.5)
GAMMA GLOB SERPL ELPH-MCNC: 0.3 G/DL (ref 0.4–1.8)
GLOBULIN SER-MCNC: 2 G/DL (ref 2.2–3.9)
HCT VFR BLD AUTO: 29.4 % (ref 35–47)
HGB BLD-MCNC: 9.6 G/DL (ref 11.5–16)
IGA SERPL-MCNC: <5 MG/DL (ref 64–422)
IGG SERPL-MCNC: 341 MG/DL (ref 586–1602)
IGM SERPL-MCNC: <5 MG/DL (ref 26–217)
IMM GRANULOCYTES # BLD AUTO: 0 K/UL (ref 0–0.04)
IMM GRANULOCYTES NFR BLD AUTO: 0 % (ref 0–0.5)
INTERPRETATION SERPL IEP-IMP: ABNORMAL
KAPPA LC FREE SER-MCNC: 1141.4 MG/L (ref 3.3–19.4)
KAPPA LC FREE/LAMBDA FREE SER: 475.58 (ref 0.26–1.65)
LAMBDA LC FREE SERPL-MCNC: 2.4 MG/L (ref 5.7–26.3)
LYMPHOCYTES # BLD: 1.2 K/UL (ref 0.8–3.5)
LYMPHOCYTES NFR BLD: 52 % (ref 12–49)
M PROTEIN SERPL ELPH-MCNC: 0.1 G/DL
MCH RBC QN AUTO: 32 PG (ref 26–34)
MCHC RBC AUTO-ENTMCNC: 32.7 G/DL (ref 30–36.5)
MCV RBC AUTO: 98 FL (ref 80–99)
MONOCYTES # BLD: 0.1 K/UL (ref 0–1)
MONOCYTES NFR BLD: 7 % (ref 5–13)
NEUTS SEG # BLD: 0.7 K/UL (ref 1.8–8)
NEUTS SEG NFR BLD: 34 % (ref 32–75)
NRBC # BLD: 0 K/UL (ref 0–0.01)
NRBC BLD-RTO: 0 PER 100 WBC
PATH REV BLD -IMP: ABNORMAL
PLATELET # BLD AUTO: 222 K/UL (ref 150–400)
PMV BLD AUTO: 11.6 FL (ref 8.9–12.9)
PROT SERPL-MCNC: 5.6 G/DL (ref 6–8.5)
RBC # BLD AUTO: 3 M/UL (ref 3.8–5.2)
RBC MORPH BLD: ABNORMAL
RBC MORPH BLD: ABNORMAL
WBC # BLD AUTO: 2.1 K/UL (ref 3.6–11)

## 2024-03-08 PROCEDURE — 2580000003 HC RX 258: Performed by: INTERNAL MEDICINE

## 2024-03-08 PROCEDURE — 96401 CHEMO ANTI-NEOPL SQ/IM: CPT

## 2024-03-08 PROCEDURE — A4216 STERILE WATER/SALINE, 10 ML: HCPCS | Performed by: INTERNAL MEDICINE

## 2024-03-08 PROCEDURE — 6360000002 HC RX W HCPCS: Performed by: INTERNAL MEDICINE

## 2024-03-08 RX ORDER — DEXAMETHASONE 4 MG/1
20 TABLET ORAL ONCE
Status: COMPLETED | OUTPATIENT
Start: 2024-03-08 | End: 2024-03-08

## 2024-03-08 RX ADMIN — SODIUM CHLORIDE 1.75 MG: 9 INJECTION INTRAMUSCULAR; INTRAVENOUS; SUBCUTANEOUS at 11:31

## 2024-03-08 RX ADMIN — DEXAMETHASONE 20 MG: 4 TABLET ORAL at 10:39

## 2024-03-08 ASSESSMENT — PAIN DESCRIPTION - LOCATION: LOCATION: FINGER (COMMENT WHICH ONE);OTHER (COMMENT)

## 2024-03-08 ASSESSMENT — PAIN SCALES - GENERAL: PAINLEVEL_OUTOF10: 7

## 2024-03-08 NOTE — PROGRESS NOTES
[facility] - Chemotherapy Progress Note    1015 Pt admit to [facility] for Velcade/C7D8 ambulatory in stable condition. Assessment completed. No new concerns voiced. Labs drawn prior to today's visit.     BP (!) 121/50   Pulse 75   Temp 97.5 °F (36.4 °C) (Temporal)   Resp 16   Wt 70 kg (154 lb 5.2 oz)   BMI 25.68 kg/m²     Medications Administered         bortezomib (VELCADE) 1.75 mg in sodium chloride (PF) 0.9 % 0.7 mL chemo subcutaneous syringe Admin Date  03/08/2024 Action  Given Dose  1.75 mg Route  SubCUTAneous Administered By  Brenda Zavala RN        dexAMETHasone (DECADRON) tablet 20 mg Admin Date  03/08/2024 Action  Given Dose  20 mg Route  Oral Administered By  Brenda Zavala RN          (SC RLQ)      Two nurses verified prior to administering:, Drug name, Drug dose, Infusion volume or drug volume when prepared in a syringe, Rate of administration, Route of administration, Expiration dates and/or times, Appearance and physical integrity of the drugs, Rate set on infusion pump, when used, Sequencing of drug administration         1140 Pt tolerated treatment well. D/c home ambulatory in no distress. Pt aware of next [facility] appointment scheduled for 03/14/2024.    Please review labs in Results tab

## 2024-03-08 NOTE — PROGRESS NOTES
Oral Chemotherapy Note     Candida Weaver is a  68 y.o.female  diagnosed with multiple myeloma.  Ms. Weaver is being treated with Kayli-VRd.         Medication name: lenalidomide   Regimen: Kayli-VRd  Dose:  10 mg   Frequency:  every other day  Administration schedule: for 14 days followed by 7 days off every 21 days  Ordering provider: Nany Arboleda MD  Start date: 3/1/24 (Cycle 7)       Patient provided with an Oral Chemotherapy Journal.         Ms. Weaver verbalized understanding of the information presented and all of the patient's questions were answered.                Rose Pa, PharmD, BCPS, BCOP

## 2024-03-11 ENCOUNTER — TELEPHONE (OUTPATIENT)
Facility: HOSPITAL | Age: 69
End: 2024-03-11

## 2024-03-14 ENCOUNTER — OFFICE VISIT (OUTPATIENT)
Age: 69
End: 2024-03-14
Payer: MEDICARE

## 2024-03-14 ENCOUNTER — HOSPITAL ENCOUNTER (OUTPATIENT)
Facility: HOSPITAL | Age: 69
Setting detail: INFUSION SERIES
Discharge: HOME | End: 2024-03-14
Payer: MEDICARE

## 2024-03-14 VITALS
DIASTOLIC BLOOD PRESSURE: 65 MMHG | RESPIRATION RATE: 18 BRPM | HEART RATE: 78 BPM | SYSTOLIC BLOOD PRESSURE: 130 MMHG | TEMPERATURE: 98.1 F

## 2024-03-14 VITALS
BODY MASS INDEX: 26.63 KG/M2 | RESPIRATION RATE: 18 BRPM | TEMPERATURE: 98.1 F | DIASTOLIC BLOOD PRESSURE: 65 MMHG | SYSTOLIC BLOOD PRESSURE: 130 MMHG | WEIGHT: 160 LBS | OXYGEN SATURATION: 97 % | HEART RATE: 78 BPM

## 2024-03-14 DIAGNOSIS — C90.00 MULTIPLE MYELOMA NOT HAVING ACHIEVED REMISSION (HCC): Primary | ICD-10-CM

## 2024-03-14 DIAGNOSIS — C90.00 MULTIPLE MYELOMA NOT HAVING ACHIEVED REMISSION (HCC): ICD-10-CM

## 2024-03-14 DIAGNOSIS — Z11.59 ENCOUNTER FOR SCREENING FOR OTHER VIRAL DISEASES: ICD-10-CM

## 2024-03-14 LAB
BASOPHILS # BLD: 0 K/UL (ref 0–0.1)
BASOPHILS NFR BLD: 0 % (ref 0–1)
DIFFERENTIAL METHOD BLD: ABNORMAL
EOSINOPHIL # BLD: 0.1 K/UL (ref 0–0.4)
EOSINOPHIL NFR BLD: 5 % (ref 0–7)
ERYTHROCYTE [DISTWIDTH] IN BLOOD BY AUTOMATED COUNT: 16.1 % (ref 11.5–14.5)
HCT VFR BLD AUTO: 27.5 % (ref 35–47)
HGB BLD-MCNC: 8.5 G/DL (ref 11.5–16)
IMM GRANULOCYTES # BLD AUTO: 0 K/UL (ref 0–0.04)
IMM GRANULOCYTES NFR BLD AUTO: 0 % (ref 0–0.5)
LYMPHOCYTES # BLD: 1.1 K/UL (ref 0.8–3.5)
LYMPHOCYTES NFR BLD: 43 % (ref 12–49)
MCH RBC QN AUTO: 31.1 PG (ref 26–34)
MCHC RBC AUTO-ENTMCNC: 30.9 G/DL (ref 30–36.5)
MCV RBC AUTO: 100.7 FL (ref 80–99)
MONOCYTES # BLD: 0.3 K/UL (ref 0–1)
MONOCYTES NFR BLD: 11 % (ref 5–13)
NEUTS SEG # BLD: 1.1 K/UL (ref 1.8–8)
NEUTS SEG NFR BLD: 41 % (ref 32–75)
NRBC # BLD: 0 K/UL (ref 0–0.01)
NRBC BLD-RTO: 0 PER 100 WBC
PLATELET # BLD AUTO: 257 K/UL (ref 150–400)
PMV BLD AUTO: 12.2 FL (ref 8.9–12.9)
RBC # BLD AUTO: 2.73 M/UL (ref 3.8–5.2)
WBC # BLD AUTO: 2.6 K/UL (ref 3.6–11)

## 2024-03-14 PROCEDURE — 82784 ASSAY IGA/IGD/IGG/IGM EACH: CPT

## 2024-03-14 PROCEDURE — 83521 IG LIGHT CHAINS FREE EACH: CPT

## 2024-03-14 PROCEDURE — 1123F ACP DISCUSS/DSCN MKR DOCD: CPT | Performed by: INTERNAL MEDICINE

## 2024-03-14 PROCEDURE — 86334 IMMUNOFIX E-PHORESIS SERUM: CPT

## 2024-03-14 PROCEDURE — 1036F TOBACCO NON-USER: CPT | Performed by: INTERNAL MEDICINE

## 2024-03-14 PROCEDURE — 99215 OFFICE O/P EST HI 40 MIN: CPT | Performed by: INTERNAL MEDICINE

## 2024-03-14 PROCEDURE — 3017F COLORECTAL CA SCREEN DOC REV: CPT | Performed by: INTERNAL MEDICINE

## 2024-03-14 PROCEDURE — 36415 COLL VENOUS BLD VENIPUNCTURE: CPT

## 2024-03-14 PROCEDURE — 84155 ASSAY OF PROTEIN SERUM: CPT

## 2024-03-14 PROCEDURE — G8428 CUR MEDS NOT DOCUMENT: HCPCS | Performed by: INTERNAL MEDICINE

## 2024-03-14 PROCEDURE — 84165 PROTEIN E-PHORESIS SERUM: CPT

## 2024-03-14 PROCEDURE — 1090F PRES/ABSN URINE INCON ASSESS: CPT | Performed by: INTERNAL MEDICINE

## 2024-03-14 PROCEDURE — G8419 CALC BMI OUT NRM PARAM NOF/U: HCPCS | Performed by: INTERNAL MEDICINE

## 2024-03-14 PROCEDURE — 85025 COMPLETE CBC W/AUTO DIFF WBC: CPT

## 2024-03-14 PROCEDURE — G8484 FLU IMMUNIZE NO ADMIN: HCPCS | Performed by: INTERNAL MEDICINE

## 2024-03-14 PROCEDURE — G8399 PT W/DXA RESULTS DOCUMENT: HCPCS | Performed by: INTERNAL MEDICINE

## 2024-03-14 NOTE — PROGRESS NOTES
[facility] Lab visit:     0930: Patient arrived ambulatory and in no distress.  Pre treatment Labs drawn per Tess Law RN from [facility].  Departed [facility] ambulatory and in no distress.     Visit Vitals:  Patient Vitals for the past 12 hrs:   Temp Pulse Resp BP   03/14/24 0930 98.1 °F (36.7 °C) 78 18 130/65       Labs: See CC for pending results.

## 2024-03-14 NOTE — PROGRESS NOTES
Candida Weaver is a 69 y.o. female    Chief Complaint   Patient presents with    Follow-up     Multiple Myeloma        1. Have you been to the ER, urgent care clinic since your last visit?  Hospitalized since your last visit?No    2. Have you seen or consulted any other health care providers outside of the [facility] since your last visit?  Include any pap smears or colon screening. No      
Lancets (ONETOUCH DELICA PLUS [id number]) MISC USE AS DIRECTED TO TEST  ONCE DAILY    albuterol sulfate HFA (PROVENTIL;VENTOLIN;PROAIR) 108 (90 Base) MCG/ACT inhaler     albuterol (PROVENTIL) (2.5 MG/3ML) 0.083% nebulizer solution Take 3 mLs by nebulization 4 times daily as needed for Wheezing    Nebulizers (COMPRESSOR/NEBULIZER) MISC Use 4 times a day    aspirin 81 MG EC tablet Take by mouth daily    Calcium Carbonate-Vitamin D 600-3.125 MG-MCG TABS Take by mouth    Cholecalciferol 50 MCG (2000 UT) TABS Take 1 tablet by mouth daily    clonazePAM (KLONOPIN) 0.5 MG tablet Take by mouth 3 times daily.    cyanocobalamin 1000 MCG tablet Take by mouth every 7 days    ferrous sulfate (IRON 325) 325 (65 Fe) MG tablet TAKE 1 TABLET BY MOUTH DAILY BEFORE BREAKFAST    ondansetron (ZOFRAN-ODT) 4 MG disintegrating tablet Take by mouth every 8 hours as needed    spironolactone (ALDACTONE) 25 MG tablet Take by mouth daily    zolpidem (AMBIEN) 10 MG tablet Take by mouth nightly as needed for Sleep.     No current facility-administered medications for this visit.      Allergies   Allergen Reactions    Penicillins Itching    Ibandronic Acid Other (See Comments)     Severe bone pain    Oxycodone-Acetaminophen Other (See Comments)     Pass out, feels dizzy        Review of Systems: A complete review of systems was obtained, negative except as described above.    Physical Exam:   /65 (Site: Left Upper Arm, Position: Sitting)   Pulse 78   Temp 98.1 °F (36.7 °C)   Resp 18   Wt 72.6 kg (160 lb)   SpO2 97%   BMI 26.63 kg/m²   ECOG PS: 1  General: No distress  Eyes: PERRLA, anicteric sclerae  HENT: Atraumatic, OP clear  Neck: Supple  CVS: Regular, bilateral pedal edema but R > L  Skin: No rashes, ecchymoses, or petechiae. Normal temperature, turgor, and texture.  Psych: Alert, oriented, appropriate affect, normal judgment/insight    Results:     Lab Results   Component Value Date/Time    WBC 2.4 03/07/2024 09:39 AM    HGB 8.8

## 2024-03-15 DIAGNOSIS — C90.00 MULTIPLE MYELOMA NOT HAVING ACHIEVED REMISSION (HCC): ICD-10-CM

## 2024-03-15 RX ORDER — LENALIDOMIDE 10 MG/1
CAPSULE ORAL
Qty: 7 CAPSULE | Refills: 0 | OUTPATIENT
Start: 2024-03-15

## 2024-03-18 LAB
ALBUMIN SERPL ELPH-MCNC: 3.7 G/DL (ref 2.9–4.4)
ALBUMIN/GLOB SERPL: 2.2 (ref 0.7–1.7)
ALPHA1 GLOB SERPL ELPH-MCNC: 0.3 G/DL (ref 0–0.4)
ALPHA2 GLOB SERPL ELPH-MCNC: 0.4 G/DL (ref 0.4–1)
B-GLOBULIN SERPL ELPH-MCNC: 0.7 G/DL (ref 0.7–1.3)
GAMMA GLOB SERPL ELPH-MCNC: 0.3 G/DL (ref 0.4–1.8)
GLOBULIN SER-MCNC: 1.7 G/DL (ref 2.2–3.9)
IGA SERPL-MCNC: <5 MG/DL (ref 64–422)
IGG SERPL-MCNC: 291 MG/DL (ref 586–1602)
IGM SERPL-MCNC: <5 MG/DL (ref 26–217)
INTERPRETATION SERPL IEP-IMP: ABNORMAL
KAPPA LC FREE SER-MCNC: 1159.9 MG/L (ref 3.3–19.4)
KAPPA LC FREE/LAMBDA FREE SER: 527.23 (ref 0.26–1.65)
LAMBDA LC FREE SERPL-MCNC: 2.2 MG/L (ref 5.7–26.3)
M PROTEIN SERPL ELPH-MCNC: 0.1 G/DL
PROT SERPL-MCNC: 5.4 G/DL (ref 6–8.5)

## 2024-03-19 ENCOUNTER — TELEPHONE (OUTPATIENT)
Dept: PRIMARY CARE CLINIC | Facility: CLINIC | Age: 69
End: 2024-03-19

## 2024-03-19 DIAGNOSIS — C90.00 MULTIPLE MYELOMA NOT HAVING ACHIEVED REMISSION (HCC): ICD-10-CM

## 2024-03-19 DIAGNOSIS — Z12.31 ENCOUNTER FOR SCREENING MAMMOGRAM FOR MALIGNANT NEOPLASM OF BREAST: Primary | ICD-10-CM

## 2024-03-19 DIAGNOSIS — K64.8 OTHER HEMORRHOIDS: Primary | ICD-10-CM

## 2024-03-19 RX ORDER — LENALIDOMIDE 10 MG/1
CAPSULE ORAL
Qty: 7 CAPSULE | Refills: 0 | OUTPATIENT
Start: 2024-03-19

## 2024-03-19 RX ORDER — HYDROCORTISONE ACETATE 25 MG/1
25 SUPPOSITORY RECTAL 2 TIMES DAILY PRN
Qty: 10 SUPPOSITORY | Refills: 0 | Status: SHIPPED | OUTPATIENT
Start: 2024-03-19

## 2024-03-19 NOTE — TELEPHONE ENCOUNTER
----- Message from Eveline Edwina sent at 3/19/2024 10:36 AM EDT -----  Subject: Message to Provider    QUESTIONS  Information for Provider? Pt is having pain when she is wiping her rectal   area .Please reach out to pt as she requested a call back   ---------------------------------------------------------------------------  --------------  CALL BACK INFO  [phone]; OK to leave message on voicemail  ---------------------------------------------------------------------------  --------------  SCRIPT ANSWERS  Relationship to Patient? Self

## 2024-03-19 NOTE — TELEPHONE ENCOUNTER
Called and spoke with the patient, patient reports that she has been doing chemo. She has been off chemo for 2 weeks now.  Patient reports that she is having pain around the rectal area when wiping. Noticed this about 3-2 weeks ago. Does have a hx of hemorrhoids but not sure if that is this.  Not using any cream, doesn't like sitz baths because they are \"unclean\"  Seeking medical advise for this.      Also asking for a Mammogram order

## 2024-03-20 ENCOUNTER — TELEPHONE (OUTPATIENT)
Age: 69
End: 2024-03-20

## 2024-03-20 NOTE — TELEPHONE ENCOUNTER
----- Message from KIERA Swanson NP sent at 3/20/2024  8:13 AM EDT -----  Can you please call patient and ask her if she is ok with pushing her tx out by 1 week? This would allow time for her to get her new oral meds in the mail and keep her schedule with PO and IV meds in sync.   If some pushback, ok to start with IV chemo this week and start oral pills once she gets them in the mail (that can cause some confusion though)  Thank you!  ----- Message -----  From: Rose Pa RPH  Sent: 3/19/2024   4:16 PM EDT  To: KIERA Swanson NP    Probably not, I have to re-enroll her in the Pomalyst REMS program and they will have to re-do her prior authorization, may be prudent to push out a week or we start with the carfilzomib and add on the pomalyst once it is received  ----- Message -----  From: Ashley Costa APRN - NP  Sent: 3/19/2024   4:05 PM EDT  To: Rose Pa RPH    Do you think it will arrive before 3/22?    ----- Message -----  From: Rose Pa RPH  Sent: 3/19/2024   7:17 AM EDT  To: KIERA Swanson NP    Her cycle of Pomalyst does not start until 3/22. I will be sending it in today for her  ----- Message -----  From: Ashley Costa APRN - NP  Sent: 3/18/2024   9:20 AM EDT  To: Rose Pa RPH    Needs Pomalyst Rx  Will likely have to push her start date out to next week so she can get the meds

## 2024-03-20 NOTE — TELEPHONE ENCOUNTER
Pt returned nurse call. Read note back to pt; pt would like to know if oral chemo med is one to take at home or if she takes it here. Call back number is [phone].

## 2024-03-20 NOTE — TELEPHONE ENCOUNTER
1:18pm: left pt vm informing her we are going to reschedule her IV infusion from this week to next Thursday/Friday. Informed her this is due to her oral chemo meds not arriving yet. Informed her we would like to keep oral and IV chemo meds in sync. Informed pt that [facility] will be reaching out with new dates/times. Requested call back to let us know she received message.

## 2024-03-21 ENCOUNTER — TELEPHONE (OUTPATIENT)
Age: 69
End: 2024-03-21

## 2024-03-21 ENCOUNTER — TELEPHONE (OUTPATIENT)
Facility: HOSPITAL | Age: 69
End: 2024-03-21

## 2024-03-21 ENCOUNTER — HOSPITAL ENCOUNTER (OUTPATIENT)
Facility: HOSPITAL | Age: 69
Setting detail: INFUSION SERIES
End: 2024-03-21

## 2024-03-21 NOTE — TELEPHONE ENCOUNTER
8:17am: pt verified x2, informed pt she will take oral chemo meds at home and reviewed schedule with her. Pt is still having a hard time understanding treatment schedule. Informed her that I will ask the pharmacist to make her a calendar. Also informed pt to call our office when she receives oral meds. Pt requested she has labs drawn at 9:30am in peds the day before infusions.

## 2024-03-21 NOTE — TELEPHONE ENCOUNTER
confirmed updated sched with pt labs in peds, treatment starting back 3/28 3/29 around 2 hours per team

## 2024-03-21 NOTE — TELEPHONE ENCOUNTER
Pharmacy Note- Chemotherapy Education     Candida Weaver is a  69 y.o.female  diagnosed with multiple myeloma contacted today for  chemotherapy counseling and consent. Ms. Weaver is being treated with [facility].   Provided education on carfilzomib, pomalidomide, and dexamethasone.           Patient enrolled in the Pomalyst REMS program and patient REMS guide and medication guide will be provided to patient.         Medication name: pomalidomide  Regimen: KPd  Dose:     Frequency: daily  Administration schedule: for 21 days followed by 7 days off every 28 days  Ordering provider: Nany Arboleda MD  Start date: pending     Ms. Weaver will continue acyclovir twice a day and aspirin daily.         Side effects of chemotherapy reviewed included s/s infection, anemia, appetite changes, thrombocytopenia, fatigue, hair loss/alopecia, bone pain, skin and nail changes, diarrhea/constipation, blood clots and infusion reactions.     Patient given ways to manage these side effects and when to contact office.      [facility] Handout of medications provided to patient. Ms. Weaver verbalized understanding of the information presented and all of the patient's questions were answered.     Rose Pa, PharmD, BCPS, BCOP    For Pharmacy Admin Tracking Only    Program: Medical Group  CPA in place:  Yes  Recommendation Provided To: Patient/Caregiver: 1 via Telephone    Intervention Accepted By: Patient/Caregiver: 1    Time Spent (min): 15

## 2024-03-22 ENCOUNTER — CLINICAL DOCUMENTATION (OUTPATIENT)
Age: 69
End: 2024-03-22

## 2024-03-22 DIAGNOSIS — C90.00 MULTIPLE MYELOMA NOT HAVING ACHIEVED REMISSION (HCC): Primary | ICD-10-CM

## 2024-03-22 NOTE — PROGRESS NOTES
Oral Chemotherapy     Candida Weaver is a  69 y.o.female  diagnosed with multiple myeloma . Ms. Weaver is being treated with KPd.     Medication name: pomalidomide  Regimen: KPd  Dose:   3 mg  Frequency: daily  Administration schedule: for 21 days followed by 7 days off every 28 days  Ordering provider: Nany Arboleda MD  Start date: pending        REMS auth # [id number]  Prescription sent to pharmacy for processing.        Rose Pa, PHARMD, BCOP, BCPS    For Pharmacy Admin Tracking Only    Program: Medical Group  CPA in place:  Yes  Recommendation Provided To: Patient/Caregiver: 1 via Telephone  Intervention Detail: New Rx: 1, reason: Needs Additional Therapy  Intervention Accepted By: Patient/Caregiver: 1    Time Spent (min): 15

## 2024-03-26 ENCOUNTER — CLINICAL DOCUMENTATION (OUTPATIENT)
Facility: HOSPITAL | Age: 69
End: 2024-03-26

## 2024-03-26 DIAGNOSIS — Z11.59 ENCOUNTER FOR SCREENING FOR OTHER VIRAL DISEASES: ICD-10-CM

## 2024-03-26 DIAGNOSIS — C90.00 MULTIPLE MYELOMA NOT HAVING ACHIEVED REMISSION (HCC): Primary | ICD-10-CM

## 2024-03-26 RX ORDER — DEXAMETHASONE 4 MG/1
20 TABLET ORAL ONCE
Status: CANCELLED
Start: 2024-04-05 | End: 2024-04-05

## 2024-03-26 RX ORDER — ONDANSETRON 2 MG/ML
8 INJECTION INTRAMUSCULAR; INTRAVENOUS
OUTPATIENT
Start: 2024-04-19

## 2024-03-26 RX ORDER — MEPERIDINE HYDROCHLORIDE 25 MG/ML
12.5 INJECTION INTRAMUSCULAR; INTRAVENOUS; SUBCUTANEOUS PRN
OUTPATIENT
Start: 2024-04-19

## 2024-03-26 RX ORDER — ALBUTEROL SULFATE 90 UG/1
4 AEROSOL, METERED RESPIRATORY (INHALATION) PRN
OUTPATIENT
Start: 2024-04-19

## 2024-03-26 RX ORDER — DEXTROSE MONOHYDRATE 50 MG/ML
5-250 INJECTION, SOLUTION INTRAVENOUS PRN
OUTPATIENT
Start: 2024-04-19

## 2024-03-26 RX ORDER — DIPHENHYDRAMINE HYDROCHLORIDE 50 MG/ML
50 INJECTION INTRAMUSCULAR; INTRAVENOUS
OUTPATIENT
Start: 2024-04-04

## 2024-03-26 RX ORDER — SODIUM CHLORIDE 9 MG/ML
INJECTION, SOLUTION INTRAVENOUS CONTINUOUS
OUTPATIENT
Start: 2024-04-04

## 2024-03-26 RX ORDER — DIPHENHYDRAMINE HYDROCHLORIDE 50 MG/ML
50 INJECTION INTRAMUSCULAR; INTRAVENOUS
OUTPATIENT
Start: 2024-04-12

## 2024-03-26 RX ORDER — SODIUM CHLORIDE 9 MG/ML
INJECTION, SOLUTION INTRAVENOUS CONTINUOUS
OUTPATIENT
Start: 2024-04-12

## 2024-03-26 RX ORDER — SODIUM CHLORIDE 0.9 % (FLUSH) 0.9 %
5-40 SYRINGE (ML) INJECTION PRN
OUTPATIENT
Start: 2024-04-18

## 2024-03-26 RX ORDER — DIPHENHYDRAMINE HYDROCHLORIDE 50 MG/ML
50 INJECTION INTRAMUSCULAR; INTRAVENOUS
OUTPATIENT
Start: 2024-04-18

## 2024-03-26 RX ORDER — SODIUM CHLORIDE 9 MG/ML
INJECTION, SOLUTION INTRAVENOUS CONTINUOUS
OUTPATIENT
Start: 2024-04-18

## 2024-03-26 RX ORDER — ONDANSETRON 2 MG/ML
8 INJECTION INTRAMUSCULAR; INTRAVENOUS
OUTPATIENT
Start: 2024-04-12

## 2024-03-26 RX ORDER — ONDANSETRON 2 MG/ML
8 INJECTION INTRAMUSCULAR; INTRAVENOUS
OUTPATIENT
Start: 2024-04-04

## 2024-03-26 RX ORDER — DEXAMETHASONE 4 MG/1
20 TABLET ORAL ONCE
Status: CANCELLED
Start: 2024-04-04 | End: 2024-04-04

## 2024-03-26 RX ORDER — MEPERIDINE HYDROCHLORIDE 25 MG/ML
12.5 INJECTION INTRAMUSCULAR; INTRAVENOUS; SUBCUTANEOUS PRN
OUTPATIENT
Start: 2024-04-05

## 2024-03-26 RX ORDER — SODIUM CHLORIDE 0.9 % (FLUSH) 0.9 %
5-40 SYRINGE (ML) INJECTION PRN
OUTPATIENT
Start: 2024-04-04

## 2024-03-26 RX ORDER — SODIUM CHLORIDE 0.9 % (FLUSH) 0.9 %
5-40 SYRINGE (ML) INJECTION PRN
OUTPATIENT
Start: 2024-04-12

## 2024-03-26 RX ORDER — ONDANSETRON 2 MG/ML
8 INJECTION INTRAMUSCULAR; INTRAVENOUS
OUTPATIENT
Start: 2024-04-05

## 2024-03-26 RX ORDER — EPINEPHRINE 1 MG/ML
0.3 INJECTION, SOLUTION, CONCENTRATE INTRAVENOUS PRN
OUTPATIENT
Start: 2024-04-11

## 2024-03-26 RX ORDER — ALBUTEROL SULFATE 90 UG/1
4 AEROSOL, METERED RESPIRATORY (INHALATION) PRN
OUTPATIENT
Start: 2024-04-11

## 2024-03-26 RX ORDER — DEXTROSE MONOHYDRATE 50 MG/ML
5-250 INJECTION, SOLUTION INTRAVENOUS PRN
OUTPATIENT
Start: 2024-04-18

## 2024-03-26 RX ORDER — SODIUM CHLORIDE 9 MG/ML
5-250 INJECTION, SOLUTION INTRAVENOUS PRN
OUTPATIENT
Start: 2024-04-04

## 2024-03-26 RX ORDER — SODIUM CHLORIDE 0.9 % (FLUSH) 0.9 %
5-40 SYRINGE (ML) INJECTION PRN
OUTPATIENT
Start: 2024-04-11

## 2024-03-26 RX ORDER — ALBUTEROL SULFATE 90 UG/1
4 AEROSOL, METERED RESPIRATORY (INHALATION) PRN
OUTPATIENT
Start: 2024-04-04

## 2024-03-26 RX ORDER — ACETAMINOPHEN 325 MG/1
650 TABLET ORAL
OUTPATIENT
Start: 2024-04-19

## 2024-03-26 RX ORDER — DIPHENHYDRAMINE HYDROCHLORIDE 50 MG/ML
50 INJECTION INTRAMUSCULAR; INTRAVENOUS
OUTPATIENT
Start: 2024-04-05

## 2024-03-26 RX ORDER — HEPARIN 100 UNIT/ML
500 SYRINGE INTRAVENOUS PRN
OUTPATIENT
Start: 2024-04-18

## 2024-03-26 RX ORDER — DEXTROSE MONOHYDRATE 50 MG/ML
5-250 INJECTION, SOLUTION INTRAVENOUS PRN
OUTPATIENT
Start: 2024-04-11

## 2024-03-26 RX ORDER — EPINEPHRINE 1 MG/ML
0.3 INJECTION, SOLUTION, CONCENTRATE INTRAVENOUS PRN
OUTPATIENT
Start: 2024-04-05

## 2024-03-26 RX ORDER — EPINEPHRINE 1 MG/ML
0.3 INJECTION, SOLUTION, CONCENTRATE INTRAVENOUS PRN
OUTPATIENT
Start: 2024-04-18

## 2024-03-26 RX ORDER — EPINEPHRINE 1 MG/ML
0.3 INJECTION, SOLUTION, CONCENTRATE INTRAVENOUS PRN
OUTPATIENT
Start: 2024-04-04

## 2024-03-26 RX ORDER — SODIUM CHLORIDE 0.9 % (FLUSH) 0.9 %
5-40 SYRINGE (ML) INJECTION PRN
OUTPATIENT
Start: 2024-04-05

## 2024-03-26 RX ORDER — DIPHENHYDRAMINE HYDROCHLORIDE 50 MG/ML
50 INJECTION INTRAMUSCULAR; INTRAVENOUS
OUTPATIENT
Start: 2024-04-19

## 2024-03-26 RX ORDER — ACETAMINOPHEN 325 MG/1
650 TABLET ORAL
OUTPATIENT
Start: 2024-04-04

## 2024-03-26 RX ORDER — DEXAMETHASONE 4 MG/1
20 TABLET ORAL ONCE
Status: CANCELLED
Start: 2024-04-11 | End: 2024-04-11

## 2024-03-26 RX ORDER — SODIUM CHLORIDE 9 MG/ML
5-250 INJECTION, SOLUTION INTRAVENOUS PRN
OUTPATIENT
Start: 2024-04-12

## 2024-03-26 RX ORDER — DEXAMETHASONE 4 MG/1
20 TABLET ORAL ONCE
Status: CANCELLED
Start: 2024-03-28 | End: 2024-03-28

## 2024-03-26 RX ORDER — EPINEPHRINE 1 MG/ML
0.3 INJECTION, SOLUTION, CONCENTRATE INTRAVENOUS PRN
OUTPATIENT
Start: 2024-04-12

## 2024-03-26 RX ORDER — DIPHENHYDRAMINE HYDROCHLORIDE 50 MG/ML
50 INJECTION INTRAMUSCULAR; INTRAVENOUS
OUTPATIENT
Start: 2024-04-11

## 2024-03-26 RX ORDER — ACETAMINOPHEN 325 MG/1
650 TABLET ORAL
OUTPATIENT
Start: 2024-04-05

## 2024-03-26 RX ORDER — SODIUM CHLORIDE 9 MG/ML
5-250 INJECTION, SOLUTION INTRAVENOUS PRN
OUTPATIENT
Start: 2024-04-05

## 2024-03-26 RX ORDER — SODIUM CHLORIDE 9 MG/ML
5-250 INJECTION, SOLUTION INTRAVENOUS PRN
OUTPATIENT
Start: 2024-04-18

## 2024-03-26 RX ORDER — ACETAMINOPHEN 325 MG/1
650 TABLET ORAL
OUTPATIENT
Start: 2024-04-12

## 2024-03-26 RX ORDER — HEPARIN 100 UNIT/ML
500 SYRINGE INTRAVENOUS PRN
OUTPATIENT
Start: 2024-04-19

## 2024-03-26 RX ORDER — ONDANSETRON 2 MG/ML
8 INJECTION INTRAMUSCULAR; INTRAVENOUS
OUTPATIENT
Start: 2024-04-11

## 2024-03-26 RX ORDER — ACETAMINOPHEN 325 MG/1
650 TABLET ORAL
OUTPATIENT
Start: 2024-04-18

## 2024-03-26 RX ORDER — MEPERIDINE HYDROCHLORIDE 25 MG/ML
12.5 INJECTION INTRAMUSCULAR; INTRAVENOUS; SUBCUTANEOUS PRN
OUTPATIENT
Start: 2024-04-04

## 2024-03-26 RX ORDER — ALBUTEROL SULFATE 90 UG/1
4 AEROSOL, METERED RESPIRATORY (INHALATION) PRN
OUTPATIENT
Start: 2024-04-05

## 2024-03-26 RX ORDER — ALBUTEROL SULFATE 90 UG/1
4 AEROSOL, METERED RESPIRATORY (INHALATION) PRN
OUTPATIENT
Start: 2024-04-12

## 2024-03-26 RX ORDER — SODIUM CHLORIDE 9 MG/ML
INJECTION, SOLUTION INTRAVENOUS CONTINUOUS
OUTPATIENT
Start: 2024-04-05

## 2024-03-26 RX ORDER — SODIUM CHLORIDE 9 MG/ML
5-250 INJECTION, SOLUTION INTRAVENOUS PRN
OUTPATIENT
Start: 2024-04-11

## 2024-03-26 RX ORDER — HEPARIN 100 UNIT/ML
500 SYRINGE INTRAVENOUS PRN
OUTPATIENT
Start: 2024-04-12

## 2024-03-26 RX ORDER — EPINEPHRINE 1 MG/ML
0.3 INJECTION, SOLUTION, CONCENTRATE INTRAVENOUS PRN
OUTPATIENT
Start: 2024-04-19

## 2024-03-26 RX ORDER — MEPERIDINE HYDROCHLORIDE 25 MG/ML
12.5 INJECTION INTRAMUSCULAR; INTRAVENOUS; SUBCUTANEOUS PRN
OUTPATIENT
Start: 2024-04-12

## 2024-03-26 RX ORDER — SODIUM CHLORIDE 9 MG/ML
5-250 INJECTION, SOLUTION INTRAVENOUS PRN
OUTPATIENT
Start: 2024-04-19

## 2024-03-26 RX ORDER — ALBUTEROL SULFATE 90 UG/1
4 AEROSOL, METERED RESPIRATORY (INHALATION) PRN
OUTPATIENT
Start: 2024-04-18

## 2024-03-26 RX ORDER — HEPARIN 100 UNIT/ML
500 SYRINGE INTRAVENOUS PRN
OUTPATIENT
Start: 2024-04-04

## 2024-03-26 RX ORDER — DEXAMETHASONE 4 MG/1
20 TABLET ORAL ONCE
Status: CANCELLED
Start: 2024-04-12 | End: 2024-04-12

## 2024-03-26 RX ORDER — SODIUM CHLORIDE 9 MG/ML
INJECTION, SOLUTION INTRAVENOUS CONTINUOUS
OUTPATIENT
Start: 2024-04-19

## 2024-03-26 RX ORDER — MEPERIDINE HYDROCHLORIDE 25 MG/ML
12.5 INJECTION INTRAMUSCULAR; INTRAVENOUS; SUBCUTANEOUS PRN
OUTPATIENT
Start: 2024-04-11

## 2024-03-26 RX ORDER — SODIUM CHLORIDE 9 MG/ML
INJECTION, SOLUTION INTRAVENOUS CONTINUOUS
OUTPATIENT
Start: 2024-04-11

## 2024-03-26 RX ORDER — ONDANSETRON 2 MG/ML
8 INJECTION INTRAMUSCULAR; INTRAVENOUS
OUTPATIENT
Start: 2024-04-18

## 2024-03-26 RX ORDER — DEXTROSE MONOHYDRATE 50 MG/ML
5-250 INJECTION, SOLUTION INTRAVENOUS PRN
OUTPATIENT
Start: 2024-04-12

## 2024-03-26 RX ORDER — ACETAMINOPHEN 325 MG/1
650 TABLET ORAL
OUTPATIENT
Start: 2024-04-11

## 2024-03-26 RX ORDER — DEXTROSE MONOHYDRATE 50 MG/ML
5-250 INJECTION, SOLUTION INTRAVENOUS PRN
OUTPATIENT
Start: 2024-04-05

## 2024-03-26 RX ORDER — HEPARIN 100 UNIT/ML
500 SYRINGE INTRAVENOUS PRN
OUTPATIENT
Start: 2024-04-11

## 2024-03-26 RX ORDER — DEXTROSE MONOHYDRATE 50 MG/ML
5-250 INJECTION, SOLUTION INTRAVENOUS PRN
OUTPATIENT
Start: 2024-04-04

## 2024-03-26 RX ORDER — MEPERIDINE HYDROCHLORIDE 25 MG/ML
12.5 INJECTION INTRAMUSCULAR; INTRAVENOUS; SUBCUTANEOUS PRN
OUTPATIENT
Start: 2024-04-18

## 2024-03-26 RX ORDER — HEPARIN 100 UNIT/ML
500 SYRINGE INTRAVENOUS PRN
OUTPATIENT
Start: 2024-04-05

## 2024-03-26 RX ORDER — SODIUM CHLORIDE 0.9 % (FLUSH) 0.9 %
5-40 SYRINGE (ML) INJECTION PRN
OUTPATIENT
Start: 2024-04-19

## 2024-03-26 RX ORDER — DEXAMETHASONE 4 MG/1
20 TABLET ORAL ONCE
Status: CANCELLED
Start: 2024-03-29 | End: 2024-03-29

## 2024-03-26 NOTE — TELEPHONE ENCOUNTER
----- Message from [address] [place] [address] sent at 1/8/2018 12:43 PM EST -----  Regarding: Dr. bEony Wallace / telephone Gina Galvez  Patient request callback to determine the next step in follow up for Thyroids. Scheduled appointment 01/12/18 2:45pm . Patient needs refill for Metformin requests smaller tablets hard to swallow rayna . [phone]   Best contact [phone]
LM on patient VM advising order put in to system. Tee has attempted to contact patient.
Patient has f/u on 01/12. She is asking should she have a thyroid US before visit and also labs before visit. She stated her previous US was done at the [facility]. She does not want to drive here and then have to have testing completed. Please advise.
Will order the usg thyroid and she can jose the usg when the  calls her
same name as above

## 2024-03-26 NOTE — PROGRESS NOTES
Patient Assistance    Met with: Spoke to patient needs help with chemo drugs    Navigator Type: Infusion  Documentation Type: New Patient  Contact Type: Telephone  Navigation Status: Copay Enrollment  Status of Patient Insurance Coverage: Patient has active coverage          Additional notes:     Drug Name: Darzalex  Other Drug Name: Kyprolis  Form of PAP Assistance: [employer] Assistance

## 2024-03-27 ENCOUNTER — APPOINTMENT (OUTPATIENT)
Facility: HOSPITAL | Age: 69
End: 2024-03-27
Payer: MEDICARE

## 2024-03-27 ENCOUNTER — HOSPITAL ENCOUNTER (EMERGENCY)
Facility: HOSPITAL | Age: 69
Discharge: HOME | End: 2024-03-27
Attending: EMERGENCY MEDICINE
Payer: MEDICARE

## 2024-03-27 VITALS
SYSTOLIC BLOOD PRESSURE: 124 MMHG | WEIGHT: 160.5 LBS | BODY MASS INDEX: 26.71 KG/M2 | OXYGEN SATURATION: 97 % | DIASTOLIC BLOOD PRESSURE: 59 MMHG | TEMPERATURE: 98.2 F | HEART RATE: 79 BPM | RESPIRATION RATE: 20 BRPM

## 2024-03-27 DIAGNOSIS — R07.9 CHEST PAIN, UNSPECIFIED TYPE: Primary | ICD-10-CM

## 2024-03-27 LAB
ALBUMIN SERPL-MCNC: 3.4 G/DL (ref 3.5–5)
ALBUMIN/GLOB SERPL: 1.2 (ref 1.1–2.2)
ALP SERPL-CCNC: 68 U/L (ref 45–117)
ALT SERPL-CCNC: 20 U/L (ref 12–78)
ANION GAP SERPL CALC-SCNC: 4 MMOL/L (ref 5–15)
AST SERPL-CCNC: 14 U/L (ref 15–37)
BASOPHILS # BLD: 0 K/UL (ref 0–0.1)
BASOPHILS NFR BLD: 1 % (ref 0–1)
BILIRUB SERPL-MCNC: 0.2 MG/DL (ref 0.2–1)
BUN/CREAT SERPL: 13 (ref 12–20)
CALCIUM SERPL-MCNC: 8.8 MG/DL (ref 8.5–10.1)
CHLORIDE SERPL-SCNC: 111 MMOL/L (ref 97–108)
CO2 SERPL-SCNC: 25 MMOL/L (ref 21–32)
COMMENT:: NORMAL
CREAT SERPL-MCNC: 0.76 MG/DL (ref 0.55–1.02)
DIFFERENTIAL METHOD BLD: ABNORMAL
EOSINOPHIL # BLD: 0.1 K/UL (ref 0–0.4)
EOSINOPHIL NFR BLD: 3 % (ref 0–7)
ERYTHROCYTE [DISTWIDTH] IN BLOOD BY AUTOMATED COUNT: 15.5 % (ref 11.5–14.5)
GLOBULIN SER CALC-MCNC: 2.8 G/DL (ref 2–4)
GLUCOSE SERPL-MCNC: 120 MG/DL (ref 65–100)
HCT VFR BLD AUTO: 27.5 % (ref 35–47)
IMM GRANULOCYTES # BLD AUTO: 0 K/UL (ref 0–0.04)
IMM GRANULOCYTES NFR BLD AUTO: 0 % (ref 0–0.5)
LYMPHOCYTES # BLD: 0.9 K/UL (ref 0.8–3.5)
MCH RBC QN AUTO: 31.9 PG (ref 26–34)
MCHC RBC AUTO-ENTMCNC: 32.7 G/DL (ref 30–36.5)
MCV RBC AUTO: 97.5 FL (ref 80–99)
MONOCYTES # BLD: 0.2 K/UL (ref 0–1)
MONOCYTES NFR BLD: 12 % (ref 5–13)
NEUTS SEG # BLD: 0.8 K/UL (ref 1.8–8)
NEUTS SEG NFR BLD: 40 % (ref 32–75)
NRBC # BLD: 0 K/UL (ref 0–0.01)
NRBC BLD-RTO: 0 PER 100 WBC
NT PRO BNP: 88 PG/ML
PLATELET COMMENT: ABNORMAL
PMV BLD AUTO: 11.7 FL (ref 8.9–12.9)
POTASSIUM SERPL-SCNC: 3.8 MMOL/L (ref 3.5–5.1)
PROT SERPL-MCNC: 6.2 G/DL (ref 6.4–8.2)
RBC # BLD AUTO: 2.82 M/UL (ref 3.8–5.2)
RBC MORPH BLD: ABNORMAL
SODIUM SERPL-SCNC: 140 MMOL/L (ref 136–145)
SPECIMEN HOLD: NORMAL
TROPONIN I SERPL HS-MCNC: 6 NG/L (ref 0–51)
WBC # BLD AUTO: 2 K/UL (ref 3.6–11)

## 2024-03-27 PROCEDURE — 93005 ELECTROCARDIOGRAM TRACING: CPT | Performed by: STUDENT IN AN ORGANIZED HEALTH CARE EDUCATION/TRAINING PROGRAM

## 2024-03-27 PROCEDURE — 85379 FIBRIN DEGRADATION QUANT: CPT

## 2024-03-27 PROCEDURE — 83880 ASSAY OF NATRIURETIC PEPTIDE: CPT

## 2024-03-27 PROCEDURE — 80053 COMPREHEN METABOLIC PANEL: CPT

## 2024-03-27 PROCEDURE — 99285 EMERGENCY DEPT VISIT HI MDM: CPT

## 2024-03-27 PROCEDURE — 84484 ASSAY OF TROPONIN QUANT: CPT

## 2024-03-27 PROCEDURE — 71275 CT ANGIOGRAPHY CHEST: CPT

## 2024-03-27 PROCEDURE — 36415 COLL VENOUS BLD VENIPUNCTURE: CPT

## 2024-03-27 PROCEDURE — 71046 X-RAY EXAM CHEST 2 VIEWS: CPT

## 2024-03-27 PROCEDURE — 85025 COMPLETE CBC W/AUTO DIFF WBC: CPT

## 2024-03-27 PROCEDURE — 6360000004 HC RX CONTRAST MEDICATION: Performed by: RADIOLOGY

## 2024-03-27 PROCEDURE — 93971 EXTREMITY STUDY: CPT

## 2024-03-27 RX ORDER — HYDROCODONE BITARTRATE AND ACETAMINOPHEN 5; 325 MG/1; MG/1
1 TABLET ORAL EVERY 6 HOURS PRN
Qty: 5 TABLET | Refills: 0 | Status: SHIPPED | OUTPATIENT
Start: 2024-03-27 | End: 2024-03-30

## 2024-03-27 RX ADMIN — IOPAMIDOL 80 ML: 755 INJECTION, SOLUTION INTRAVENOUS at 12:18

## 2024-03-27 ASSESSMENT — PAIN DESCRIPTION - LOCATION: LOCATION: CHEST

## 2024-03-27 ASSESSMENT — PAIN DESCRIPTION - ORIENTATION: ORIENTATION: RIGHT

## 2024-03-27 ASSESSMENT — PAIN - FUNCTIONAL ASSESSMENT
PAIN_FUNCTIONAL_ASSESSMENT: PREVENTS OR INTERFERES SOME ACTIVE ACTIVITIES AND ADLS
PAIN_FUNCTIONAL_ASSESSMENT: 0-10

## 2024-03-27 ASSESSMENT — PAIN DESCRIPTION - DESCRIPTORS: DESCRIPTORS: ACHING

## 2024-03-27 ASSESSMENT — PAIN SCALES - GENERAL: PAINLEVEL_OUTOF10: 8

## 2024-03-27 NOTE — ED TRIAGE NOTES
Pt c/o right sided chest pain, denies fever, denies cough, +right leg swelling, hurts worse to move, leg swelling x one month , denies n/v

## 2024-03-27 NOTE — ED PROVIDER NOTES
radiographic images are visualized and preliminarily interpreted by the emergency physician with the below findings:        Interpretation per the Radiologist below, if available at the time of this note:    Vascular duplex lower extremity venous right    (Results Pending)   XR CHEST (2 VW)    (Results Pending)        LABS:  Labs Reviewed   CBC WITH AUTO DIFFERENTIAL   COMPREHENSIVE METABOLIC PANEL   TROPONIN   EXTRA TUBES HOLD   BRAIN NATRIURETIC PEPTIDE   D-DIMER, QUANTITATIVE       All other labs were within normal range or not returned as of this dictation.    EMERGENCY DEPARTMENT COURSE and DIFFERENTIAL DIAGNOSIS/MDM:   Vitals:    Vitals:    03/27/24 0950   BP: (!) 161/82   Pulse: 75   Resp: 20   Temp: 98.2 °F (36.8 °C)   TempSrc: Oral   SpO2: 100%   Weight: 72.8 kg (160 lb 7.9 oz)           Medical Decision Making  Amount and/or Complexity of Data Reviewed  Labs: ordered.  Radiology: ordered.  ECG/medicine tests: ordered.    Risk  Prescription drug management.            REASSESSMENT      Progress Note:  Results, treatment, and follow up plan have been discussed with patient.  Questions were answered. Javier Brown MD  1:39 PM      CONSULTS:  None    PROCEDURES:  Unless otherwise noted below, none     Procedures      FINAL IMPRESSION      Assessment/plan: 69-year-old with history of hyperlipidemia, multiple myeloma.  She presents with a 5-day history of chest pain.  Differential diagnosis includes musculoskeletal pain (which I suspect), ACS, PE, aortic dissection, pneumonia, pneumothorax, pleurisy, GERD, and others.  Reassuring appearance/exam with stable vital signs.  CBC, CMP, troponin, EKG, chest x-ray okay.  D-dimer was elevated but chest CT shows no PE.  Home with limited Lortab and PCP follow-up.  Return precautions.  Javier Brown MD  1:40 PM      DISPOSITION/PLAN   DISPOSITION        PATIENT REFERRED TO:  No follow-up provider specified.    DISCHARGE MEDICATIONS:  New Prescriptions    No medications on

## 2024-03-27 NOTE — ED NOTES
9:46 AM  I have evaluated the patient as the Provider in Rapid Medical Evaluation (RME). I have reviewed her vital signs and the triage nurse assessment. I have talked with the patient and any available family and advised that I am the provider in triage and have ordered the appropriate study to initiate their work up based on the clinical presentation during my assessment. I have advised that the patient will be accommodated in the Main ED as soon as possible. I have also requested to contact the triage nurse or myself immediately if the patient experiences any changes in their condition during this brief waiting period.    69 y.o. female presents to ED with chest pain. Patient reports she has had R sided chest pain, worse with movements for the past 3-4 days, since Friday 03/22. She reports that this started when she was getting under the covers, heard something \"pop' under her chest. She also notes SOB and R leg swelling for the past month. She was scheduled for a duplex, ordered by Dr. Arboleda. She has history of multiple myeloma.     PATITO GOMEZ Ashley, PA  03/27/24 0928

## 2024-03-28 ENCOUNTER — HOSPITAL ENCOUNTER (OUTPATIENT)
Facility: HOSPITAL | Age: 69
Setting detail: INFUSION SERIES
End: 2024-03-28

## 2024-03-28 LAB
EKG ATRIAL RATE: 73 BPM
EKG DIAGNOSIS: NORMAL
EKG P AXIS: 71 DEGREES
EKG P-R INTERVAL: 178 MS
EKG Q-T INTERVAL: 384 MS
EKG QRS DURATION: 76 MS
EKG QTC CALCULATION (BAZETT): 423 MS
EKG R AXIS: 32 DEGREES
EKG T AXIS: 71 DEGREES

## 2024-03-28 PROCEDURE — 93010 ELECTROCARDIOGRAM REPORT: CPT | Performed by: SPECIALIST

## 2024-03-29 ENCOUNTER — TELEPHONE (OUTPATIENT)
Dept: PRIMARY CARE CLINIC | Facility: CLINIC | Age: 69
End: 2024-03-29

## 2024-03-29 ENCOUNTER — APPOINTMENT (OUTPATIENT)
Facility: HOSPITAL | Age: 69
End: 2024-03-29
Payer: MEDICARE

## 2024-03-29 NOTE — TELEPHONE ENCOUNTER
Called and spoke with the patient, patient reports that she has had chest pain that had started roughly on Saturday of last week. Patient said she heard a pop sound on the chest when she was moving around in bed. Patient said that by Wednesday the pain was extreme and she went to the ER.  The did imaging, and the doctor said that he couldn't see any inflammation that he was unsure?  The provided her with 5 tablets of Norco.  Patient reports that she cannot wait until Monday for an appointment that the pain is unbearable. And that she is sleeping down stairs near bathroom. She has to call someone to help her get up and down and walk.  Asking for either refill of Norco or something to help with the pain.  I told her that I will need to speak with Dr Vazquez about this and that I will call her back.    Did make an appointment for Monday as a follow up

## 2024-03-29 NOTE — TELEPHONE ENCOUNTER
Patient called stating she went to the ER on 3/27/24 with chest pain and leg swelling.  The ER instructied her to contact her PCP right away to let her know.  She may want an appt but didn't say that she did.  Patient wanted a call back at #[phone].

## 2024-04-01 ENCOUNTER — OFFICE VISIT (OUTPATIENT)
Dept: PRIMARY CARE CLINIC | Facility: CLINIC | Age: 69
End: 2024-04-01
Payer: MEDICARE

## 2024-04-01 VITALS
DIASTOLIC BLOOD PRESSURE: 80 MMHG | HEART RATE: 97 BPM | HEIGHT: 65 IN | SYSTOLIC BLOOD PRESSURE: 134 MMHG | RESPIRATION RATE: 17 BRPM | WEIGHT: 155.4 LBS | TEMPERATURE: 97.5 F | OXYGEN SATURATION: 98 % | BODY MASS INDEX: 25.89 KG/M2

## 2024-04-01 DIAGNOSIS — M51.36 LUMBAR DEGENERATIVE DISC DISEASE: ICD-10-CM

## 2024-04-01 DIAGNOSIS — Z71.89 ACP (ADVANCE CARE PLANNING): ICD-10-CM

## 2024-04-01 DIAGNOSIS — Z00.00 MEDICARE ANNUAL WELLNESS VISIT, SUBSEQUENT: Primary | ICD-10-CM

## 2024-04-01 DIAGNOSIS — M85.89 OSTEOPENIA OF MULTIPLE SITES: ICD-10-CM

## 2024-04-01 DIAGNOSIS — D70.1 CHEMOTHERAPY INDUCED NEUTROPENIA (HCC): ICD-10-CM

## 2024-04-01 DIAGNOSIS — E85.81 LIGHT CHAIN (AL) AMYLOIDOSIS (HCC): ICD-10-CM

## 2024-04-01 DIAGNOSIS — T45.1X5A CHEMOTHERAPY INDUCED NEUTROPENIA (HCC): ICD-10-CM

## 2024-04-01 DIAGNOSIS — C90.00 MULTIPLE MYELOMA NOT HAVING ACHIEVED REMISSION (HCC): ICD-10-CM

## 2024-04-01 DIAGNOSIS — E11.9 TYPE 2 DIABETES MELLITUS WITHOUT COMPLICATION, WITHOUT LONG-TERM CURRENT USE OF INSULIN (HCC): ICD-10-CM

## 2024-04-01 DIAGNOSIS — R07.89 MID STERNAL CHEST PAIN: ICD-10-CM

## 2024-04-01 PROBLEM — I50.20 SYSTOLIC CONGESTIVE HEART FAILURE (HCC): Status: RESOLVED | Noted: 2023-12-06 | Resolved: 2024-04-01

## 2024-04-01 PROCEDURE — 2022F DILAT RTA XM EVC RTNOPTHY: CPT | Performed by: INTERNAL MEDICINE

## 2024-04-01 PROCEDURE — 99214 OFFICE O/P EST MOD 30 MIN: CPT | Performed by: INTERNAL MEDICINE

## 2024-04-01 PROCEDURE — G8399 PT W/DXA RESULTS DOCUMENT: HCPCS | Performed by: INTERNAL MEDICINE

## 2024-04-01 PROCEDURE — 3017F COLORECTAL CA SCREEN DOC REV: CPT | Performed by: INTERNAL MEDICINE

## 2024-04-01 PROCEDURE — 3046F HEMOGLOBIN A1C LEVEL >9.0%: CPT | Performed by: INTERNAL MEDICINE

## 2024-04-01 PROCEDURE — 1036F TOBACCO NON-USER: CPT | Performed by: INTERNAL MEDICINE

## 2024-04-01 PROCEDURE — 1090F PRES/ABSN URINE INCON ASSESS: CPT | Performed by: INTERNAL MEDICINE

## 2024-04-01 PROCEDURE — G8419 CALC BMI OUT NRM PARAM NOF/U: HCPCS | Performed by: INTERNAL MEDICINE

## 2024-04-01 PROCEDURE — 1123F ACP DISCUSS/DSCN MKR DOCD: CPT | Performed by: INTERNAL MEDICINE

## 2024-04-01 PROCEDURE — G0439 PPPS, SUBSEQ VISIT: HCPCS | Performed by: INTERNAL MEDICINE

## 2024-04-01 PROCEDURE — G8427 DOCREV CUR MEDS BY ELIG CLIN: HCPCS | Performed by: INTERNAL MEDICINE

## 2024-04-01 RX ORDER — BACLOFEN 10 MG/1
10 TABLET ORAL
Qty: 30 TABLET | Refills: 0 | Status: SHIPPED | OUTPATIENT
Start: 2024-04-01 | End: 2024-05-01

## 2024-04-01 ASSESSMENT — ENCOUNTER SYMPTOMS
RHINORRHEA: 0
SHORTNESS OF BREATH: 0
DIARRHEA: 0
EYE DISCHARGE: 0
CONSTIPATION: 0
COLOR CHANGE: 0
SORE THROAT: 0
COUGH: 0
BACK PAIN: 1
ABDOMINAL PAIN: 0

## 2024-04-01 NOTE — PROGRESS NOTES
Health Decision Maker has been checked with the patient   Primary Decision Maker: Jone Calvo - Spouse - [phone]     Patient has stated that the scribe can come in room    No chief complaint on file.    Depression: Not at risk (4/1/2024)    PHQ-2     PHQ-2 Score: 2      /80 (Site: Left Upper Arm)   Pulse 97   Temp 97.5 °F (36.4 °C)   Resp 17   Ht 1.651 m (5' 5\")   Wt 70.5 kg (155 lb 6.4 oz)   SpO2 98%   BMI 25.86 kg/m²     \"Have you been to the ER, urgent care clinic since your last visit?  Hospitalized since your last visit?\"    YES - When: approximately 1  weeks ago.  Where and Why: [place] [facility], [facility] [place].    “Have you seen or consulted any other health care providers outside of [facility] since your last visit?”    NO           Specialist patient sees: oncologist    Chart reviewed: immunizations are documented.   Immunization History   Administered Date(s) Administered    COVID-19, J&J, (age 18y+), IM, 0.5 mL 04/02/2021    Influenza, FLUAD, (age 65 y+), Adjuvanted, 0.5mL 09/10/2020    Influenza, FLUZONE (age 65 y+), High Dose, 0.7mL 10/14/2022, 09/01/2023    Influenza, High Dose (Fluzone 65 yrs and older) 09/19/2018, 10/15/2019, 09/10/2020    Influenza, Triv, inactivated, subunit, adjuvanted, IM (Fluad 65 yrs and older) 09/24/2018, 10/08/2019    Pneumococcal, PCV-13, PREVNAR 13, (age 6w+), IM, 0.5mL 08/29/2019    Pneumococcal, PPSV23, PNEUMOVAX 23, (age 2y+), SC/IM, 0.5mL 03/17/2018, 08/10/2018    RSV, ABRYSVO, (age 60y+), PF, IM, 0.5mL 09/01/2023    TDaP, ADACEL (age 10y-64y), BOOSTRIX (age 10y+), IM, 0.5mL 07/07/2010, 03/17/2018, 10/19/2022    Zoster Recombinant (Shingrix) 01/05/2021, 03/24/2021      
Medicare Annual Wellness Visit    Candida Weaver is here for No chief complaint on file.           Subjective       Patient's complete Health Risk Assessment and screening values have been reviewed and are found in Flowsheets. The following problems were reviewed today and where indicated follow up appointments were made and/or referrals ordered.    Positive Risk Factor Screenings with Interventions:    Fall Risk:          Interventions:    Reviewed medications, home hazards, visual acuity, and co-morbidities that can increase risk for falls  Patient declines any further evaluation or treatment             Activity, Diet, and Weight:               Body mass index is 25.86 kg/m².        Inactivity Interventions:  Patient comments: walks inside the house   Do you eat balanced/healthy meals regularly Interventions:  Patient comments: tries to eat protein, has cut down Rice              Advanced Directives:       Intervention:  has NO advanced directive  - referred to ACP Coordinator                              Objective   Vitals:    04/01/24 1349   BP: 134/80   Site: Left Upper Arm   Pulse: 97   Resp: 17   Temp: 97.5 °F (36.4 °C)   SpO2: 98%   Weight: 70.5 kg (155 lb 6.4 oz)   Height: 1.651 m (5' 5\")      Body mass index is 25.86 kg/m².             Allergies   Allergen Reactions    Penicillins Itching    Ibandronic Acid Other (See Comments)     Severe bone pain    Oxycodone-Acetaminophen Other (See Comments)     Pass out, feels dizzy     Prior to Visit Medications    Medication Sig Taking? Authorizing Provider   pomalidomide (POMALYST) 3 MG chemo capsule Take 1 capsule by mouth daily For 21 days followed by 7 days off REMS auth # [id number] Female Not of Reproductive Potential  Nany Arboleda MD   hydrocortisone (ANUSOL-HC) 25 MG suppository Place 1 suppository rectally 2 times daily as needed for Hemorrhoids  Marycruz Vazquez MD   Blood Glucose Monitoring Suppl (ONE TOUCH ULTRA 2) w/Device KIT 1 kit by Does not apply 
needed      spironolactone (ALDACTONE) 25 MG tablet Take by mouth daily      zolpidem (AMBIEN) 10 MG tablet Take by mouth nightly as needed for Sleep.       No current facility-administered medications on file prior to visit.       Allergies   Allergen Reactions    Penicillins Itching    Ibandronic Acid Other (See Comments)     Severe bone pain    Oxycodone-Acetaminophen Other (See Comments)     Pass out, feels dizzy       Past Medical History:   Diagnosis Date    Anxiety 6/4/2009    Blood dyscrasia     followed by Dr. Jessica Gonzalez 09/12    Disc disorder 6/4/2009    High cholesterol     Iron (Fe) deficiency anemia 6/4/2009    Multinodular goiter     Osteopenia 6/4/2009    Seasonal allergic rhinitis 6/4/2009    Vertigo        Past Surgical History:   Procedure Laterality Date    CATARACT REMOVAL      COLONOSCOPY N/A 9/22/2020    COLONOSCOPY     :- performed by Danilo Garcia MD at [facility] ENDOSCOPY    CT BONE MARROW BIOPSY  3/6/2024    CT BONE MARROW BIOPSY 3/6/2024 [facility] RAD CT    FLEXIBLE SIGMOIDOSCOPY N/A 9/15/2020    SIGMOIDOSCOPY FLEXIBLE performed by Danilo Garcia MD at [facility] ENDOSCOPY    ORTHOPEDIC SURGERY      left foot       Family History   Problem Relation Age of Onset    Breast Cancer Sister 50    Other Son         psoriatic arthritis    Rheum Arthritis Mother     Other Mother         colon polyps    Heart Disease Father        Social History     Socioeconomic History    Marital status:      Spouse name: Not on file    Number of children: Not on file    Years of education: Not on file    Highest education level: Not on file   Occupational History    Not on file   Tobacco Use    Smoking status: Never    Smokeless tobacco: Never   Substance and Sexual Activity    Alcohol use: Not Currently    Drug use: No    Sexual activity: Not Currently   Other Topics Concern    Not on file   Social History Narrative    Lives in [place] with her 34 yo son.  Also has a daughter who lives in [place].  Currently

## 2024-04-02 ENCOUNTER — CLINICAL DOCUMENTATION (OUTPATIENT)
Dept: SPIRITUAL SERVICES | Age: 69
End: 2024-04-02

## 2024-04-02 NOTE — PROGRESS NOTES
Advance Care Planning   Ambulatory ACP Specialist Patient Outreach    Date:  4/2/2024    ACP Specialist:  Nancy Johns    Outreach call to patient in follow-up to ACP Specialist referral from:Marycruz Vazquez MD    [x] PCP  [] Provider   [] Ambulatory Care Management [] Other     For:                  [x] Advance Directive Assistance              [] Complete Portable DNR order              [] Complete POST/POLST/MOST              [] Code Status Discussion             [] Discuss Goals of Care             [] Early ACP Decision-Making              [] Other (Specify)    Date Referral Received:4/1/24    Next Step:   [x] ACP scheduled conversation  [] Outreach again in one week               [x] Email / Mail ACP Info Sheets  [x] Email / Mail Advance Directive   [] Closing referral.  Routing closure to referring provider/staff and to ACP Specialist .    [] Closure letter mailed to patient with invitation to contact ACP Specialist if / when ready.   [] Other (Specify here):         [x] At this time, Healthcare Decision Maker Is: Advance Care Planning   Healthcare Decision Maker:    Primary Decision Maker: CalvoJone - Spouse - [phone]    Secondary Decision Maker: Robert Weaver - Child - [phone]    Secondary Decision Maker: Jewell Acosta - Child - [phone]          [] Primary agent named in scanned advance directive.    [x] Legal Next of Kin.     [] Unable to determine legal decision maker at this time.       Outreaches:         [x] 1st -  Date:  4/2/24               Intervention:  [x] Spoke with Patient   [] Left Voice mail [] Email / Mail    [] MyChart  [] Other (Specify) :     Outcomes: Spoke with patient on mobile phone number which is also listed as the home number scheduling a telephone conversation with ACP Specialist Rebecca Winston on Tuesday, 4/16/24 at 11:00 a.m.   Patient is needing assistance with transportation, housekeeping and getting groceries.           [] 2nd -

## 2024-04-03 ENCOUNTER — TELEPHONE (OUTPATIENT)
Facility: HOSPITAL | Age: 69
End: 2024-04-03

## 2024-04-03 ENCOUNTER — HOSPITAL ENCOUNTER (OUTPATIENT)
Facility: HOSPITAL | Age: 69
Setting detail: INFUSION SERIES
Discharge: HOME | End: 2024-04-03
Payer: MEDICARE

## 2024-04-03 VITALS
RESPIRATION RATE: 18 BRPM | DIASTOLIC BLOOD PRESSURE: 69 MMHG | HEART RATE: 97 BPM | SYSTOLIC BLOOD PRESSURE: 102 MMHG | TEMPERATURE: 97.8 F

## 2024-04-03 DIAGNOSIS — C90.00 MULTIPLE MYELOMA NOT HAVING ACHIEVED REMISSION (HCC): ICD-10-CM

## 2024-04-03 DIAGNOSIS — Z11.59 ENCOUNTER FOR SCREENING FOR OTHER VIRAL DISEASES: ICD-10-CM

## 2024-04-03 LAB
ALBUMIN SERPL-MCNC: 3.8 G/DL (ref 3.5–5)
ALBUMIN/GLOB SERPL: 1.3 (ref 1.1–2.2)
ALP SERPL-CCNC: 64 U/L (ref 45–117)
ALT SERPL-CCNC: 20 U/L (ref 12–78)
ANION GAP SERPL CALC-SCNC: 4 MMOL/L (ref 5–15)
AST SERPL-CCNC: 12 U/L (ref 15–37)
BASOPHILS # BLD: 0 K/UL (ref 0–0.1)
BASOPHILS NFR BLD: 1 % (ref 0–1)
BILIRUB SERPL-MCNC: 0.3 MG/DL (ref 0.2–1)
BUN SERPL-MCNC: 16 MG/DL (ref 6–20)
BUN/CREAT SERPL: 18 (ref 12–20)
CALCIUM SERPL-MCNC: 9.8 MG/DL (ref 8.5–10.1)
CHLORIDE SERPL-SCNC: 105 MMOL/L (ref 97–108)
CO2 SERPL-SCNC: 25 MMOL/L (ref 21–32)
CREAT SERPL-MCNC: 0.88 MG/DL (ref 0.55–1.02)
DIFFERENTIAL METHOD BLD: ABNORMAL
EOSINOPHIL # BLD: 0.1 K/UL (ref 0–0.4)
EOSINOPHIL NFR BLD: 2 % (ref 0–7)
ERYTHROCYTE [DISTWIDTH] IN BLOOD BY AUTOMATED COUNT: 14.9 % (ref 11.5–14.5)
GLOBULIN SER CALC-MCNC: 3 G/DL (ref 2–4)
GLUCOSE SERPL-MCNC: 110 MG/DL (ref 65–100)
HCT VFR BLD AUTO: 29.4 % (ref 35–47)
HGB BLD-MCNC: 9.6 G/DL (ref 11.5–16)
IMM GRANULOCYTES # BLD AUTO: 0 K/UL (ref 0–0.04)
IMM GRANULOCYTES NFR BLD AUTO: 0 % (ref 0–0.5)
LYMPHOCYTES # BLD: 1 K/UL (ref 0.8–3.5)
LYMPHOCYTES NFR BLD: 35 % (ref 12–49)
MCH RBC QN AUTO: 31.3 PG (ref 26–34)
MCHC RBC AUTO-ENTMCNC: 32.7 G/DL (ref 30–36.5)
MCV RBC AUTO: 95.8 FL (ref 80–99)
MONOCYTES # BLD: 0.3 K/UL (ref 0–1)
MONOCYTES NFR BLD: 9 % (ref 5–13)
NEUTS SEG # BLD: 1.5 K/UL (ref 1.8–8)
NEUTS SEG NFR BLD: 53 % (ref 32–75)
NRBC # BLD: 0 K/UL (ref 0–0.01)
NRBC BLD-RTO: 0 PER 100 WBC
PLATELET # BLD AUTO: 293 K/UL (ref 150–400)
PMV BLD AUTO: 11.6 FL (ref 8.9–12.9)
POTASSIUM SERPL-SCNC: 3.9 MMOL/L (ref 3.5–5.1)
PROT SERPL-MCNC: 6.8 G/DL (ref 6.4–8.2)
RBC # BLD AUTO: 3.07 M/UL (ref 3.8–5.2)
SODIUM SERPL-SCNC: 134 MMOL/L (ref 136–145)
WBC # BLD AUTO: 2.8 K/UL (ref 3.6–11)

## 2024-04-03 PROCEDURE — 86334 IMMUNOFIX E-PHORESIS SERUM: CPT

## 2024-04-03 PROCEDURE — 84165 PROTEIN E-PHORESIS SERUM: CPT

## 2024-04-03 PROCEDURE — 36415 COLL VENOUS BLD VENIPUNCTURE: CPT

## 2024-04-03 PROCEDURE — 85025 COMPLETE CBC W/AUTO DIFF WBC: CPT

## 2024-04-03 PROCEDURE — 80053 COMPREHEN METABOLIC PANEL: CPT

## 2024-04-03 PROCEDURE — 83521 IG LIGHT CHAINS FREE EACH: CPT

## 2024-04-03 PROCEDURE — 82784 ASSAY IGA/IGD/IGG/IGM EACH: CPT

## 2024-04-03 ASSESSMENT — PAIN SCALES - GENERAL: PAINLEVEL_OUTOF10: 8

## 2024-04-03 ASSESSMENT — PAIN DESCRIPTION - ORIENTATION: ORIENTATION: MID

## 2024-04-03 ASSESSMENT — PAIN DESCRIPTION - DESCRIPTORS: DESCRIPTORS: SHARP;SHOOTING;STABBING

## 2024-04-03 ASSESSMENT — PAIN DESCRIPTION - LOCATION: LOCATION: STERNUM

## 2024-04-03 ASSESSMENT — PAIN DESCRIPTION - PAIN TYPE: TYPE: ACUTE PAIN

## 2024-04-03 NOTE — PROGRESS NOTES
[facility] Peds/Adult Note                       Date: April 3, 2024    Name: Candida Weaver    MRN: [id number]         : 1955    0940 Patient arrives for Pre-Treatment Labs without acute problems. Please see Epic for complete assessment and education provided.    Vital signs stable throughout and prior to discharge. Patient tolerated procedure well and was discharged without incident.  Patient is aware of next [facility] appointment on 2024.  Appointment card give to the Patient.       Ms. Weaver's vitals were reviewed prior to and after treatment.   Patient Vitals for the past 12 hrs:   Temp Pulse Resp BP   24 0940 97.8 °F (36.6 °C) 97 18 102/69       Lab results were obtained and reviewed.  Labs Pending, please see [facility] for results.    Recent Results (from the past 12 hour(s))   CBC With Auto Differential    Collection Time: 24  9:49 AM   Result Value Ref Range    WBC 2.8 (L) 3.6 - 11.0 K/uL    RBC 3.07 (L) 3.80 - 5.20 M/uL    Hemoglobin 9.6 (L) 11.5 - 16.0 g/dL    Hematocrit 29.4 (L) 35.0 - 47.0 %    MCV 95.8 80.0 - 99.0 FL    MCH 31.3 26.0 - 34.0 PG    MCHC 32.7 30.0 - 36.5 g/dL    RDW 14.9 (H) 11.5 - 14.5 %    Platelets 293 150 - 400 K/uL    MPV 11.6 8.9 - 12.9 FL    Nucleated RBCs 0.0 0  WBC    nRBC 0.00 0.00 - 0.01 K/uL    Neutrophils % 53 32 - 75 %    Lymphocytes % 35 12 - 49 %    Monocytes % 9 5 - 13 %    Eosinophils % 2 0 - 7 %    Basophils % 1 0 - 1 %    Immature Granulocytes 0 0.0 - 0.5 %    Neutrophils Absolute 1.5 (L) 1.8 - 8.0 K/UL    Lymphocytes Absolute 1.0 0.8 - 3.5 K/UL    Monocytes Absolute 0.3 0.0 - 1.0 K/UL    Eosinophils Absolute 0.1 0.0 - 0.4 K/UL    Basophils Absolute 0.0 0.0 - 0.1 K/UL    Absolute Immature Granulocyte 0.0 0.00 - 0.04 K/UL    Differential Type AUTOMATED     Comprehensive metabolic panel    Collection Time: 24  9:49 AM   Result Value Ref Range    Sodium 134 (L) 136 - 145 mmol/L    Potassium 3.9 3.5 - 5.1 mmol/L    Chloride 105 97 - 108

## 2024-04-03 NOTE — PROGRESS NOTES
[facility] at [facility]  [address], [address] [place] [id number]  W: [phone]  F: [phone]    Reason for Visit:   Candida Weaver is a 69 y.o. female who is seen for Multiple Myeloma   Treatment History:   10/25/2023: Kayli VRD, Rev lite  3/2024: Kyprolis Pomalyst and Dexamethasone    History of Present Illness:   Patient is a 69 y.o. female who was dx with Smoldering Myeloma in 2017 and has been seeing [facility] Dr. Lopez. A BM bx at that time showed 40% plasma cells. No end organ damage. Noted to have worsening anemia 7/5/2023 with a Hb of 9.6 g/dl. This led to further evaluation that included a gammopathy eval that showed an M spike of 0.3 g/dl. She had a Kappa LC level of 1593 g/L with a K:L ratio of 157. Sent for evaluation now. She was in the ER June 2023 with some SOB. Had a CT head and this showed lytic lesions. She states that she has some carpal tunnel. BM bx showed Myeloma. She is on Kayli VRD. She had minimal response and was switched to Kyprolis and Pomalyst and Dexamethasone.    Today is C1D1. Was seen in ED last week for chest pain, cardiac workup as negative, was deemed strained muscle. Still has this pain, mostly with movement. She has not taken any pain medications for this.  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 Swelling resolved.  She denies any other new complaints.       Presents alone.       Past Medical History:   Diagnosis Date    Anxiety 6/4/2009    Blood dyscrasia     followed by Dr. Lopez    Concussion 09/12    Disc disorder 6/4/2009    High cholesterol     Iron (Fe) deficiency anemia 6/4/2009    Multinodular goiter     Osteopenia 6/4/2009    Seasonal allergic rhinitis 6/4/2009    Vertigo       Past Surgical History:   Procedure Laterality Date    CATARACT REMOVAL      COLONOSCOPY N/A 9/22/2020    COLONOSCOPY     :- performed by Danilo Garcia MD at [facility] ENDOSCOPY    CT BONE MARROW BIOPSY  3/6/2024    CT BONE MARROW BIOPSY 3/6/2024 [facility] RAD CT

## 2024-04-03 NOTE — TELEPHONE ENCOUNTER
spoke with pt regarding sched wanted to confirm sched isnt changed made aware all appts are the same pt coming in to get pre opic labs

## 2024-04-04 ENCOUNTER — APPOINTMENT (OUTPATIENT)
Facility: HOSPITAL | Age: 69
End: 2024-04-04
Payer: MEDICARE

## 2024-04-04 ENCOUNTER — HOSPITAL ENCOUNTER (OUTPATIENT)
Facility: HOSPITAL | Age: 69
Setting detail: INFUSION SERIES
Discharge: HOME | End: 2024-04-04
Payer: MEDICARE

## 2024-04-04 ENCOUNTER — OFFICE VISIT (OUTPATIENT)
Age: 69
End: 2024-04-04
Payer: MEDICARE

## 2024-04-04 ENCOUNTER — CLINICAL DOCUMENTATION (OUTPATIENT)
Age: 69
End: 2024-04-04

## 2024-04-04 VITALS
WEIGHT: 155.8 LBS | HEIGHT: 65 IN | TEMPERATURE: 97.5 F | OXYGEN SATURATION: 95 % | HEART RATE: 86 BPM | DIASTOLIC BLOOD PRESSURE: 61 MMHG | RESPIRATION RATE: 18 BRPM | SYSTOLIC BLOOD PRESSURE: 128 MMHG | BODY MASS INDEX: 25.96 KG/M2

## 2024-04-04 VITALS — SYSTOLIC BLOOD PRESSURE: 130 MMHG | DIASTOLIC BLOOD PRESSURE: 57 MMHG

## 2024-04-04 DIAGNOSIS — C90.00 MULTIPLE MYELOMA NOT HAVING ACHIEVED REMISSION (HCC): Primary | ICD-10-CM

## 2024-04-04 DIAGNOSIS — Z11.59 ENCOUNTER FOR SCREENING FOR OTHER VIRAL DISEASES: Primary | ICD-10-CM

## 2024-04-04 DIAGNOSIS — C90.00 MULTIPLE MYELOMA NOT HAVING ACHIEVED REMISSION (HCC): ICD-10-CM

## 2024-04-04 PROCEDURE — 1123F ACP DISCUSS/DSCN MKR DOCD: CPT

## 2024-04-04 PROCEDURE — 96409 CHEMO IV PUSH SNGL DRUG: CPT

## 2024-04-04 PROCEDURE — 96367 TX/PROPH/DG ADDL SEQ IV INF: CPT

## 2024-04-04 PROCEDURE — 1036F TOBACCO NON-USER: CPT

## 2024-04-04 PROCEDURE — 3017F COLORECTAL CA SCREEN DOC REV: CPT

## 2024-04-04 PROCEDURE — 2580000003 HC RX 258

## 2024-04-04 PROCEDURE — 1090F PRES/ABSN URINE INCON ASSESS: CPT

## 2024-04-04 PROCEDURE — 99215 OFFICE O/P EST HI 40 MIN: CPT

## 2024-04-04 PROCEDURE — 6360000002 HC RX W HCPCS

## 2024-04-04 PROCEDURE — 6360000002 HC RX W HCPCS: Performed by: INTERNAL MEDICINE

## 2024-04-04 PROCEDURE — G8427 DOCREV CUR MEDS BY ELIG CLIN: HCPCS

## 2024-04-04 PROCEDURE — G8399 PT W/DXA RESULTS DOCUMENT: HCPCS

## 2024-04-04 PROCEDURE — 2580000003 HC RX 258: Performed by: INTERNAL MEDICINE

## 2024-04-04 PROCEDURE — G8419 CALC BMI OUT NRM PARAM NOF/U: HCPCS

## 2024-04-04 RX ORDER — SODIUM CHLORIDE 9 MG/ML
5-250 INJECTION, SOLUTION INTRAVENOUS PRN
Status: DISCONTINUED | OUTPATIENT
Start: 2024-04-04 | End: 2024-04-05 | Stop reason: HOSPADM

## 2024-04-04 RX ORDER — SODIUM CHLORIDE 9 MG/ML
INJECTION, SOLUTION INTRAVENOUS CONTINUOUS
OUTPATIENT
Start: 2024-04-21

## 2024-04-04 RX ORDER — ACETAMINOPHEN 325 MG/1
650 TABLET ORAL
Status: DISCONTINUED | OUTPATIENT
Start: 2024-04-04 | End: 2024-04-05 | Stop reason: HOSPADM

## 2024-04-04 RX ORDER — ALBUTEROL SULFATE 90 UG/1
4 AEROSOL, METERED RESPIRATORY (INHALATION) PRN
Status: DISCONTINUED | OUTPATIENT
Start: 2024-04-04 | End: 2024-04-05 | Stop reason: HOSPADM

## 2024-04-04 RX ORDER — SODIUM CHLORIDE 9 MG/ML
5-250 INJECTION, SOLUTION INTRAVENOUS PRN
OUTPATIENT
Start: 2024-04-21

## 2024-04-04 RX ORDER — ALBUTEROL SULFATE 90 UG/1
4 AEROSOL, METERED RESPIRATORY (INHALATION) PRN
OUTPATIENT
Start: 2024-04-21

## 2024-04-04 RX ORDER — DEXTROSE MONOHYDRATE 50 MG/ML
5-250 INJECTION, SOLUTION INTRAVENOUS PRN
Status: DISCONTINUED | OUTPATIENT
Start: 2024-04-04 | End: 2024-04-05 | Stop reason: HOSPADM

## 2024-04-04 RX ORDER — ONDANSETRON 2 MG/ML
8 INJECTION INTRAMUSCULAR; INTRAVENOUS
Status: DISCONTINUED | OUTPATIENT
Start: 2024-04-04 | End: 2024-04-05 | Stop reason: HOSPADM

## 2024-04-04 RX ORDER — DIPHENHYDRAMINE HYDROCHLORIDE 50 MG/ML
50 INJECTION INTRAMUSCULAR; INTRAVENOUS
Status: DISCONTINUED | OUTPATIENT
Start: 2024-04-04 | End: 2024-04-05 | Stop reason: HOSPADM

## 2024-04-04 RX ORDER — SODIUM CHLORIDE 9 MG/ML
INJECTION, SOLUTION INTRAVENOUS CONTINUOUS
Status: DISCONTINUED | OUTPATIENT
Start: 2024-04-04 | End: 2024-04-05 | Stop reason: HOSPADM

## 2024-04-04 RX ORDER — DIPHENHYDRAMINE HYDROCHLORIDE 50 MG/ML
50 INJECTION INTRAMUSCULAR; INTRAVENOUS
OUTPATIENT
Start: 2024-04-21

## 2024-04-04 RX ORDER — ZOLEDRONIC ACID 0.04 MG/ML
4 INJECTION, SOLUTION INTRAVENOUS ONCE
OUTPATIENT
Start: 2024-04-21 | End: 2024-04-21

## 2024-04-04 RX ORDER — EPINEPHRINE 1 MG/ML
0.3 INJECTION, SOLUTION INTRAMUSCULAR; SUBCUTANEOUS PRN
Status: DISCONTINUED | OUTPATIENT
Start: 2024-04-04 | End: 2024-04-05 | Stop reason: HOSPADM

## 2024-04-04 RX ORDER — DEXAMETHASONE 4 MG/1
10 TABLET ORAL ONCE
Status: COMPLETED | OUTPATIENT
Start: 2024-04-04 | End: 2024-04-04

## 2024-04-04 RX ORDER — ACETAMINOPHEN 325 MG/1
650 TABLET ORAL
OUTPATIENT
Start: 2024-04-21

## 2024-04-04 RX ORDER — EPINEPHRINE 1 MG/ML
0.3 INJECTION, SOLUTION INTRAMUSCULAR; SUBCUTANEOUS PRN
OUTPATIENT
Start: 2024-04-21

## 2024-04-04 RX ORDER — ONDANSETRON 2 MG/ML
8 INJECTION INTRAMUSCULAR; INTRAVENOUS
OUTPATIENT
Start: 2024-04-21

## 2024-04-04 RX ORDER — SODIUM CHLORIDE 0.9 % (FLUSH) 0.9 %
5-40 SYRINGE (ML) INJECTION PRN
Status: DISCONTINUED | OUTPATIENT
Start: 2024-04-04 | End: 2024-04-05 | Stop reason: HOSPADM

## 2024-04-04 RX ORDER — MEPERIDINE HYDROCHLORIDE 25 MG/ML
12.5 INJECTION INTRAMUSCULAR; INTRAVENOUS; SUBCUTANEOUS PRN
Status: DISCONTINUED | OUTPATIENT
Start: 2024-04-04 | End: 2024-04-05 | Stop reason: HOSPADM

## 2024-04-04 RX ORDER — ZOLEDRONIC ACID 0.04 MG/ML
4 INJECTION, SOLUTION INTRAVENOUS ONCE
Status: COMPLETED | OUTPATIENT
Start: 2024-04-04 | End: 2024-04-04

## 2024-04-04 RX ORDER — HEPARIN 100 UNIT/ML
500 SYRINGE INTRAVENOUS PRN
OUTPATIENT
Start: 2024-04-21

## 2024-04-04 RX ORDER — SODIUM CHLORIDE 0.9 % (FLUSH) 0.9 %
5-40 SYRINGE (ML) INJECTION PRN
OUTPATIENT
Start: 2024-04-21

## 2024-04-04 RX ORDER — ARIPIPRAZOLE 5 MG/1
5 TABLET ORAL DAILY
COMMUNITY
Start: 2024-03-22

## 2024-04-04 RX ORDER — DULOXETIN HYDROCHLORIDE 30 MG/1
60 CAPSULE, DELAYED RELEASE ORAL DAILY
Qty: 60 CAPSULE | Refills: 5 | Status: SHIPPED | OUTPATIENT
Start: 2024-04-04

## 2024-04-04 RX ADMIN — DEXTROSE MONOHYDRATE 25 ML/HR: 50 INJECTION, SOLUTION INTRAVENOUS at 12:49

## 2024-04-04 RX ADMIN — CARFILZOMIB 36.4 MG: 10 INJECTION, POWDER, LYOPHILIZED, FOR SOLUTION INTRAVENOUS at 12:50

## 2024-04-04 RX ADMIN — ZOLEDRONIC ACID 4 MG: 0.04 INJECTION, SOLUTION INTRAVENOUS at 12:19

## 2024-04-04 RX ADMIN — DEXAMETHASONE 10 MG: 4 TABLET ORAL at 12:19

## 2024-04-04 RX ADMIN — SODIUM CHLORIDE 25 ML/HR: 9 INJECTION, SOLUTION INTRAVENOUS at 12:17

## 2024-04-04 ASSESSMENT — PAIN DESCRIPTION - PAIN TYPE: TYPE: ACUTE PAIN

## 2024-04-04 ASSESSMENT — PAIN DESCRIPTION - DESCRIPTORS: DESCRIPTORS: SHARP

## 2024-04-04 ASSESSMENT — PAIN SCALES - GENERAL: PAINLEVEL_OUTOF10: 10

## 2024-04-04 ASSESSMENT — PAIN DESCRIPTION - ORIENTATION: ORIENTATION: MID

## 2024-04-04 ASSESSMENT — PAIN DESCRIPTION - LOCATION: LOCATION: STERNUM

## 2024-04-04 NOTE — PROGRESS NOTES
Pharmacy Note- Chemotherapy Education     Candida Weaver is a  69 y.o.female  diagnosed with multiple myeloma contacted today for Cycle 1, Day 1  chemotherapy counseling and consent. Ms. Weaver is being treated with [facility].   Provided education on carfilzomib, pomalidomide, and dexamethasone.       Ms. Weaver provided with REMS guide and medication guide.       Medication name: pomalidomide  Regimen: KPd  Dose:   3 mg  Frequency: daily  Administration schedule: for 21 days followed by 7 days off every 28 days  Ordering provider: Nany Arboleda MD  Start date: 4/4/24     Ms. Weaver will continue acyclovir twice a day and aspirin daily.         Side effects of chemotherapy reviewed included s/s infection, anemia, appetite changes, thrombocytopenia, fatigue, hair loss/alopecia, bone pain, skin and nail changes, diarrhea/constipation, blood clots and infusion reactions.     Patient given ways to manage these side effects and when to contact office.      [facility] Handout of medications provided to patient. Ms. Weaver verbalized understanding of the information presented and all of the patient's questions were answered.    Lab Results   Component Value Date    WBC 2.8 (L) 04/03/2024    HGB 9.6 (L) 04/03/2024    HCT 29.4 (L) 04/03/2024    MCV 95.8 04/03/2024     04/03/2024    LYMPHOPCT 35 04/03/2024    RBC 3.07 (L) 04/03/2024    MCH 31.3 04/03/2024    MCHC 32.7 04/03/2024    RDW 14.9 (H) 04/03/2024       Lab Results   Component Value Date    NEUTROABS 1.5 (L) 04/03/2024              Expected follow up date: Labs/office visit each treatment. Next cycle start on 5/2/24     Patient provided with an Oral Chemotherapy Journal.     Ms. Weaver verbalized understanding of the information presented and all of the patient's questions were answered.           Rose Pa, PharmD, BCPS, BCOP

## 2024-04-04 NOTE — PROGRESS NOTES
Chief Complaint   Patient presents with    Follow-up        BP (!) 130/57 (Site: Right Upper Arm, Position: Sitting, Cuff Size: Large Adult)      1. Have you been to the ER, urgent care clinic since your last visit?  Hospitalized since your last visit? Yes -- 3/27 for chest pain, received hydrocodone (5 pills) finished    2. Have you seen or consulted any other health care providers outside of the [facility] since your last visit?  Include any pap smears or colon screening. No

## 2024-04-04 NOTE — PROGRESS NOTES
\[facility]\"" Chemotherapy Progress Note    Date: 2024    Name: Candida Weaver    MRN: [id number]         : 1955      1030 Pt admit to \[facility]\"" for Kyprolis and Zometa ambulatory in stable condition. Assessment completed. No new concerns voiced. PIV placed in left AC. with positive blood return. Labs sent for processing.             Ms. Weaver's vitals were reviewed.  Patient Vitals for the past 12 hrs:   Temp Pulse Resp BP SpO2   24 1300 -- 86 -- 128/61 --   24 1032 97.5 °F (36.4 °C) 87 18 (!) 125/58 95 %             Pre-medications  were administered as ordered and chemotherapy was initiated.  Medications Administered         0.9 % sodium chloride infusion Admin Date  2024 Action  New Bag Dose  25 mL/hr Rate  25 mL/hr Route  IntraVENous Administered By  Nicole Villegas, RN        0.9 % sodium chloride infusion Admin Date  2024 Action  Rate/Dose Change Dose   Rate  50 mL/hr Route  IntraVENous Administered By  Nicole Villegas, SABRINA        carfilzomib (KYPROLIS) 36.4 mg in dextrose 5 % 50 mL chemo IVPB Admin Date  2024 Action  New Bag Dose  36.4 mg Rate  439.2 mL/hr Route  IntraVENous Administered By  Nicole Villegas, SABRINA        dexAMETHasone (DECADRON) tablet 10 mg Admin Date  2024 Action  Given Dose  10 mg Rate   Route  Oral Administered By  Nicole Villegas, RN        dextrose 5 % solution Admin Date  2024 Action  New Bag Dose  25 mL/hr Rate  25 mL/hr Route  IntraVENous Administered By  Nicole Villegas, SABRINA        zoledronic acid (ZOMETA) 4 mg/100 mL infusion Admin Date  2024 Action  New Bag Dose  4 mg Rate  300 mL/hr Route  IntraVENous Administered By  Nicole Villegas, SABRINA            Two nurses verified prior to administering:Drug name, Drug doseInfusion volume or drug volume when prepared in a syringe, Rate of administration, Route of administration, Expiration dates and/or times, Appearance and physical integrity of the drugs, Rate set on infusion pump, when

## 2024-04-05 ENCOUNTER — APPOINTMENT (OUTPATIENT)
Facility: HOSPITAL | Age: 69
End: 2024-04-05
Payer: MEDICARE

## 2024-04-05 ENCOUNTER — HOSPITAL ENCOUNTER (OUTPATIENT)
Facility: HOSPITAL | Age: 69
Setting detail: INFUSION SERIES
Discharge: HOME | End: 2024-04-05
Payer: MEDICARE

## 2024-04-05 ENCOUNTER — TELEPHONE (OUTPATIENT)
Age: 69
End: 2024-04-05

## 2024-04-05 VITALS
OXYGEN SATURATION: 97 % | DIASTOLIC BLOOD PRESSURE: 58 MMHG | RESPIRATION RATE: 18 BRPM | TEMPERATURE: 98.1 F | SYSTOLIC BLOOD PRESSURE: 133 MMHG | HEART RATE: 102 BPM

## 2024-04-05 DIAGNOSIS — Z11.59 ENCOUNTER FOR SCREENING FOR OTHER VIRAL DISEASES: Primary | ICD-10-CM

## 2024-04-05 DIAGNOSIS — C90.00 MULTIPLE MYELOMA NOT HAVING ACHIEVED REMISSION (HCC): ICD-10-CM

## 2024-04-05 PROCEDURE — 6360000002 HC RX W HCPCS: Performed by: INTERNAL MEDICINE

## 2024-04-05 PROCEDURE — 2580000003 HC RX 258: Performed by: INTERNAL MEDICINE

## 2024-04-05 PROCEDURE — 96409 CHEMO IV PUSH SNGL DRUG: CPT

## 2024-04-05 RX ORDER — ACETAMINOPHEN 325 MG/1
650 TABLET ORAL
Status: DISCONTINUED | OUTPATIENT
Start: 2024-04-05 | End: 2024-04-06 | Stop reason: HOSPADM

## 2024-04-05 RX ORDER — ALBUTEROL SULFATE 90 UG/1
4 AEROSOL, METERED RESPIRATORY (INHALATION) PRN
Status: DISCONTINUED | OUTPATIENT
Start: 2024-04-05 | End: 2024-04-06 | Stop reason: HOSPADM

## 2024-04-05 RX ORDER — SODIUM CHLORIDE 9 MG/ML
5-250 INJECTION, SOLUTION INTRAVENOUS PRN
Status: DISCONTINUED | OUTPATIENT
Start: 2024-04-05 | End: 2024-04-06 | Stop reason: HOSPADM

## 2024-04-05 RX ORDER — HEPARIN 100 UNIT/ML
500 SYRINGE INTRAVENOUS PRN
Status: DISCONTINUED | OUTPATIENT
Start: 2024-04-05 | End: 2024-04-06 | Stop reason: HOSPADM

## 2024-04-05 RX ORDER — SODIUM CHLORIDE 0.9 % (FLUSH) 0.9 %
5-40 SYRINGE (ML) INJECTION PRN
Status: DISCONTINUED | OUTPATIENT
Start: 2024-04-05 | End: 2024-04-06 | Stop reason: HOSPADM

## 2024-04-05 RX ORDER — DEXTROSE MONOHYDRATE 50 MG/ML
5-250 INJECTION, SOLUTION INTRAVENOUS PRN
Status: DISCONTINUED | OUTPATIENT
Start: 2024-04-05 | End: 2024-04-06 | Stop reason: HOSPADM

## 2024-04-05 RX ORDER — SODIUM CHLORIDE 9 MG/ML
INJECTION, SOLUTION INTRAVENOUS CONTINUOUS
Status: DISCONTINUED | OUTPATIENT
Start: 2024-04-05 | End: 2024-04-06 | Stop reason: HOSPADM

## 2024-04-05 RX ORDER — DEXAMETHASONE 4 MG/1
10 TABLET ORAL ONCE
Status: COMPLETED | OUTPATIENT
Start: 2024-04-05 | End: 2024-04-05

## 2024-04-05 RX ORDER — ONDANSETRON 2 MG/ML
8 INJECTION INTRAMUSCULAR; INTRAVENOUS
Status: DISCONTINUED | OUTPATIENT
Start: 2024-04-05 | End: 2024-04-06 | Stop reason: HOSPADM

## 2024-04-05 RX ORDER — DIPHENHYDRAMINE HYDROCHLORIDE 50 MG/ML
50 INJECTION INTRAMUSCULAR; INTRAVENOUS
Status: DISCONTINUED | OUTPATIENT
Start: 2024-04-05 | End: 2024-04-06 | Stop reason: HOSPADM

## 2024-04-05 RX ORDER — EPINEPHRINE 1 MG/ML
0.3 INJECTION, SOLUTION INTRAMUSCULAR; SUBCUTANEOUS PRN
Status: DISCONTINUED | OUTPATIENT
Start: 2024-04-05 | End: 2024-04-06 | Stop reason: HOSPADM

## 2024-04-05 RX ORDER — MEPERIDINE HYDROCHLORIDE 25 MG/ML
12.5 INJECTION INTRAMUSCULAR; INTRAVENOUS; SUBCUTANEOUS PRN
Status: DISCONTINUED | OUTPATIENT
Start: 2024-04-05 | End: 2024-04-06 | Stop reason: HOSPADM

## 2024-04-05 RX ADMIN — CARFILZOMIB 36.4 MG: 10 INJECTION, POWDER, LYOPHILIZED, FOR SOLUTION INTRAVENOUS at 11:18

## 2024-04-05 RX ADMIN — DEXTROSE MONOHYDRATE 25 ML/HR: 50 INJECTION, SOLUTION INTRAVENOUS at 10:22

## 2024-04-05 RX ADMIN — DEXAMETHASONE 10 MG: 4 TABLET ORAL at 10:25

## 2024-04-05 ASSESSMENT — PAIN DESCRIPTION - LOCATION: LOCATION: STERNUM

## 2024-04-05 ASSESSMENT — PAIN SCALES - GENERAL: PAINLEVEL_OUTOF10: 9

## 2024-04-05 ASSESSMENT — PAIN DESCRIPTION - ORIENTATION: ORIENTATION: MID

## 2024-04-05 NOTE — PROGRESS NOTES
[facility] Chemotherapy Progress Note    Date: 2024    Name: Candida Weaver    MRN: [id number]         : 1955      Pt admit to [facility] for Kyprolis C1D2 ambulatory in stable condition. Assessment completed. No new concerns voiced. PIV placed in right AC with positive blood return.          Ms. Weaver's vitals were reviewed.  Patient Vitals for the past 12 hrs:   Temp Pulse Resp BP SpO2   24 1130 -- (!) 102 -- (!) 133/58 --   24 1000 98.1 °F (36.7 °C) 99 18 (!) 136/51 97 %               Pre-medications  were administered as ordered and chemotherapy was initiated.  Medications Administered         carfilzomib (KYPROLIS) 36.4 mg in dextrose 5 % 50 mL chemo IVPB Admin Date  2024 Action  New Bag Dose  36.4 mg Rate  439.2 mL/hr Route  IntraVENous Administered By  Aishwarya Gimenez RN        dexAMETHasone (DECADRON) tablet 10 mg Admin Date  2024 Action  Given Dose  10 mg Rate   Route  Oral Administered By  Aishwarya Gimenez RN        dextrose 5 % solution Admin Date  2024 Action  New Bag Dose  25 mL/hr Rate  25 mL/hr Route  IntraVENous Administered By  Aishwarya Gimenez RN            Two nurses verified prior to administering:Drug name, Drug doseInfusion volume or drug volume when prepared in a syringe, Rate of administration, Route of administration, Expiration dates and/or times, Appearance and physical integrity of the drugs, Rate set on infusion pump, when used, Sequencing of drug administration.      Pt tolerated treatment well.     Patient did complain for some numbness to the right fingers 10 minutes prior to treatment starting. Monitored throughout treatment and it never got any worse. Patient educated on stroke like symptoms and to call 911 if she starts to experience them.     PIV maintained positive blood return throughout treatment. Flushed,  de-accessed per protocol. D/c home ambulatory in no distress. Pt aware of next appointment scheduled.    Future Appointments   Date Time

## 2024-04-08 DIAGNOSIS — C90.00 MULTIPLE MYELOMA NOT HAVING ACHIEVED REMISSION (HCC): ICD-10-CM

## 2024-04-08 LAB
ALBUMIN SERPL ELPH-MCNC: 4.1 G/DL (ref 2.9–4.4)
ALBUMIN/GLOB SERPL: 2.2 (ref 0.7–1.7)
ALPHA1 GLOB SERPL ELPH-MCNC: 0.3 G/DL (ref 0–0.4)
ALPHA2 GLOB SERPL ELPH-MCNC: 0.6 G/DL (ref 0.4–1)
B-GLOBULIN SERPL ELPH-MCNC: 0.8 G/DL (ref 0.7–1.3)
GAMMA GLOB SERPL ELPH-MCNC: 0.3 G/DL (ref 0.4–1.8)
GLOBULIN SER-MCNC: 1.9 G/DL (ref 2.2–3.9)
IGA SERPL-MCNC: <5 MG/DL (ref 64–422)
IGG SERPL-MCNC: 294 MG/DL (ref 586–1602)
IGM SERPL-MCNC: <5 MG/DL (ref 26–217)
INTERPRETATION SERPL IEP-IMP: ABNORMAL
KAPPA LC FREE SER-MCNC: 1213.4 MG/L (ref 3.3–19.4)
KAPPA LC FREE/LAMBDA FREE SER: 577.81 (ref 0.26–1.65)
LAMBDA LC FREE SERPL-MCNC: 2.1 MG/L (ref 5.7–26.3)
M PROTEIN SERPL ELPH-MCNC: 0.1 G/DL
PROT SERPL-MCNC: 6 G/DL (ref 6–8.5)

## 2024-04-08 RX ORDER — ACYCLOVIR 400 MG/1
400 TABLET ORAL 2 TIMES DAILY
Qty: 60 TABLET | Refills: 5 | Status: SHIPPED | OUTPATIENT
Start: 2024-04-08

## 2024-04-10 ENCOUNTER — HOSPITAL ENCOUNTER (OUTPATIENT)
Facility: HOSPITAL | Age: 69
Setting detail: INFUSION SERIES
Discharge: HOME | End: 2024-04-10
Payer: MEDICARE

## 2024-04-10 VITALS
HEART RATE: 75 BPM | RESPIRATION RATE: 18 BRPM | DIASTOLIC BLOOD PRESSURE: 54 MMHG | TEMPERATURE: 97.7 F | SYSTOLIC BLOOD PRESSURE: 122 MMHG

## 2024-04-10 DIAGNOSIS — E11.9 TYPE 2 DIABETES MELLITUS WITHOUT COMPLICATION, WITHOUT LONG-TERM CURRENT USE OF INSULIN (HCC): ICD-10-CM

## 2024-04-10 DIAGNOSIS — Z11.59 ENCOUNTER FOR SCREENING FOR OTHER VIRAL DISEASES: ICD-10-CM

## 2024-04-10 DIAGNOSIS — C90.00 MULTIPLE MYELOMA NOT HAVING ACHIEVED REMISSION (HCC): ICD-10-CM

## 2024-04-10 LAB
ALBUMIN SERPL-MCNC: 3.2 G/DL (ref 3.5–5)
ALBUMIN/GLOB SERPL: 1.1 (ref 1.1–2.2)
ALP SERPL-CCNC: 88 U/L (ref 45–117)
ALT SERPL-CCNC: 24 U/L (ref 12–78)
ANION GAP SERPL CALC-SCNC: 7 MMOL/L (ref 5–15)
AST SERPL-CCNC: 7 U/L (ref 15–37)
BASOPHILS # BLD: 0 K/UL (ref 0–0.1)
BASOPHILS NFR BLD: 1 % (ref 0–1)
BILIRUB SERPL-MCNC: 0.3 MG/DL (ref 0.2–1)
BUN SERPL-MCNC: 15 MG/DL (ref 6–20)
BUN/CREAT SERPL: 20 (ref 12–20)
CALCIUM SERPL-MCNC: 9.8 MG/DL (ref 8.5–10.1)
CHLORIDE SERPL-SCNC: 106 MMOL/L (ref 97–108)
CHOLEST SERPL-MCNC: 160 MG/DL
CO2 SERPL-SCNC: 25 MMOL/L (ref 21–32)
CREAT SERPL-MCNC: 0.74 MG/DL (ref 0.55–1.02)
DIFFERENTIAL METHOD BLD: ABNORMAL
EOSINOPHIL # BLD: 0.1 K/UL (ref 0–0.4)
EOSINOPHIL NFR BLD: 4 % (ref 0–7)
ERYTHROCYTE [DISTWIDTH] IN BLOOD BY AUTOMATED COUNT: 14.9 % (ref 11.5–14.5)
GLOBULIN SER CALC-MCNC: 2.8 G/DL (ref 2–4)
GLUCOSE SERPL-MCNC: 120 MG/DL (ref 65–100)
HCT VFR BLD AUTO: 26.9 % (ref 35–47)
HDLC SERPL-MCNC: 71 MG/DL
HDLC SERPL: 2.3 (ref 0–5)
HGB BLD-MCNC: 8.7 G/DL (ref 11.5–16)
IMM GRANULOCYTES # BLD AUTO: 0 K/UL (ref 0–0.04)
IMM GRANULOCYTES NFR BLD AUTO: 1 % (ref 0–0.5)
LDLC SERPL CALC-MCNC: 63.6 MG/DL (ref 0–100)
LYMPHOCYTES # BLD: 0.8 K/UL (ref 0.8–3.5)
LYMPHOCYTES NFR BLD: 39 % (ref 12–49)
MCH RBC QN AUTO: 31.5 PG (ref 26–34)
MCHC RBC AUTO-ENTMCNC: 32.3 G/DL (ref 30–36.5)
MCV RBC AUTO: 97.5 FL (ref 80–99)
MONOCYTES # BLD: 0.2 K/UL (ref 0–1)
MONOCYTES NFR BLD: 9 % (ref 5–13)
NEUTS SEG # BLD: 1 K/UL (ref 1.8–8)
NEUTS SEG NFR BLD: 46 % (ref 32–75)
NRBC # BLD: 0 K/UL (ref 0–0.01)
NRBC BLD-RTO: 0 PER 100 WBC
PLATELET # BLD AUTO: 257 K/UL (ref 150–400)
PMV BLD AUTO: 12.2 FL (ref 8.9–12.9)
POTASSIUM SERPL-SCNC: 4.1 MMOL/L (ref 3.5–5.1)
PROT SERPL-MCNC: 6 G/DL (ref 6.4–8.2)
RBC # BLD AUTO: 2.76 M/UL (ref 3.8–5.2)
RBC MORPH BLD: ABNORMAL
SODIUM SERPL-SCNC: 138 MMOL/L (ref 136–145)
TRIGL SERPL-MCNC: 127 MG/DL
VLDLC SERPL CALC-MCNC: 25.4 MG/DL
WBC # BLD AUTO: 2.1 K/UL (ref 3.6–11)

## 2024-04-10 PROCEDURE — 80061 LIPID PANEL: CPT

## 2024-04-10 PROCEDURE — 83036 HEMOGLOBIN GLYCOSYLATED A1C: CPT

## 2024-04-10 PROCEDURE — 36415 COLL VENOUS BLD VENIPUNCTURE: CPT

## 2024-04-10 PROCEDURE — 80053 COMPREHEN METABOLIC PANEL: CPT

## 2024-04-10 PROCEDURE — 85025 COMPLETE CBC W/AUTO DIFF WBC: CPT

## 2024-04-10 ASSESSMENT — PAIN DESCRIPTION - ORIENTATION: ORIENTATION: MID;LOWER

## 2024-04-10 ASSESSMENT — PAIN SCALES - GENERAL: PAINLEVEL_OUTOF10: 9

## 2024-04-10 ASSESSMENT — PAIN DESCRIPTION - DESCRIPTORS: DESCRIPTORS: ACHING;SHARP;SHOOTING

## 2024-04-10 ASSESSMENT — PAIN DESCRIPTION - LOCATION: LOCATION: STERNUM;BACK

## 2024-04-10 ASSESSMENT — PAIN DESCRIPTION - PAIN TYPE: TYPE: ACUTE PAIN

## 2024-04-10 NOTE — PROGRESS NOTES
\[facility]\"" Peds/Adult Note                       Date: April 10, 2024    Name: Candida Weaver    MRN: [id number]         : 1955    1100 Patient arrives for Labs without acute problems. Please see Epic for complete assessment and education provided.    Vital signs stable throughout and prior to discharge. Patient tolerated procedure well and was discharged without incident.  Patient is aware of next \[facility]\"" appointment on 2024.  Appointment card give to the Patient.      Ms. Weaver's vitals were reviewed prior to and after treatment.   Patient Vitals for the past 12 hrs:   Temp Pulse Resp BP   04/10/24 1100 97.7 °F (36.5 °C) 75 18 (!) 122/54         Lab results were obtained and reviewed.  Recent Results (from the past 12 hour(s))   CBC With Auto Differential    Collection Time: 04/10/24 11:07 AM   Result Value Ref Range    WBC 2.1 (L) 3.6 - 11.0 K/uL    RBC 2.76 (L) 3.80 - 5.20 M/uL    Hemoglobin 8.7 (L) 11.5 - 16.0 g/dL    Hematocrit 26.9 (L) 35.0 - 47.0 %    MCV 97.5 80.0 - 99.0 FL    MCH 31.5 26.0 - 34.0 PG    MCHC 32.3 30.0 - 36.5 g/dL    RDW 14.9 (H) 11.5 - 14.5 %    Platelets 257 150 - 400 K/uL    MPV 12.2 8.9 - 12.9 FL    Nucleated RBCs 0.0 0  WBC    nRBC 0.00 0.00 - 0.01 K/uL    Neutrophils % 46 32 - 75 %    Lymphocytes % 39 12 - 49 %    Monocytes % 9 5 - 13 %    Eosinophils % 4 0 - 7 %    Basophils % 1 0 - 1 %    Immature Granulocytes % 1 (H) 0.0 - 0.5 %    Neutrophils Absolute 1.0 (L) 1.8 - 8.0 K/UL    Lymphocytes Absolute 0.8 0.8 - 3.5 K/UL    Monocytes Absolute 0.2 0.0 - 1.0 K/UL    Eosinophils Absolute 0.1 0.0 - 0.4 K/UL    Basophils Absolute 0.0 0.0 - 0.1 K/UL    Immature Granulocytes Absolute 0.0 0.00 - 0.04 K/UL    Differential Type SMEAR SCANNED      RBC Comment NORMOCYTIC, NORMOCHROMIC     Comprehensive Metabolic Panel    Collection Time: 04/10/24 11:08 AM   Result Value Ref Range    Sodium 138 136 - 145 mmol/L    Potassium 4.1 3.5 - 5.1 mmol/L    Chloride 106 97 - 108 mmol/L    CO2 25  full weight-bearing

## 2024-04-11 ENCOUNTER — HOSPITAL ENCOUNTER (OUTPATIENT)
Facility: HOSPITAL | Age: 69
Setting detail: INFUSION SERIES
Discharge: HOME | End: 2024-04-11
Payer: MEDICARE

## 2024-04-11 ENCOUNTER — TELEPHONE (OUTPATIENT)
Age: 69
End: 2024-04-11

## 2024-04-11 VITALS
HEART RATE: 86 BPM | BODY MASS INDEX: 27.16 KG/M2 | WEIGHT: 163 LBS | TEMPERATURE: 98.2 F | DIASTOLIC BLOOD PRESSURE: 68 MMHG | HEIGHT: 65 IN | RESPIRATION RATE: 20 BRPM | SYSTOLIC BLOOD PRESSURE: 122 MMHG | OXYGEN SATURATION: 97 %

## 2024-04-11 DIAGNOSIS — C90.00 MULTIPLE MYELOMA NOT HAVING ACHIEVED REMISSION (HCC): ICD-10-CM

## 2024-04-11 DIAGNOSIS — Z11.59 ENCOUNTER FOR SCREENING FOR OTHER VIRAL DISEASES: Primary | ICD-10-CM

## 2024-04-11 DIAGNOSIS — C90.00 MULTIPLE MYELOMA NOT HAVING ACHIEVED REMISSION (HCC): Primary | ICD-10-CM

## 2024-04-11 LAB
EST. AVERAGE GLUCOSE BLD GHB EST-MCNC: 126 MG/DL
HBA1C MFR BLD: 6 % (ref 4–5.6)

## 2024-04-11 PROCEDURE — 2580000003 HC RX 258: Performed by: INTERNAL MEDICINE

## 2024-04-11 PROCEDURE — 96413 CHEMO IV INFUSION 1 HR: CPT

## 2024-04-11 PROCEDURE — 6360000002 HC RX W HCPCS: Performed by: INTERNAL MEDICINE

## 2024-04-11 RX ORDER — SODIUM CHLORIDE 9 MG/ML
5-250 INJECTION, SOLUTION INTRAVENOUS PRN
Status: DISCONTINUED | OUTPATIENT
Start: 2024-04-11 | End: 2024-04-12 | Stop reason: HOSPADM

## 2024-04-11 RX ORDER — DEXAMETHASONE 4 MG/1
10 TABLET ORAL ONCE
Status: COMPLETED | OUTPATIENT
Start: 2024-04-11 | End: 2024-04-11

## 2024-04-11 RX ORDER — SODIUM CHLORIDE 0.9 % (FLUSH) 0.9 %
5-40 SYRINGE (ML) INJECTION PRN
Status: DISCONTINUED | OUTPATIENT
Start: 2024-04-11 | End: 2024-04-12 | Stop reason: HOSPADM

## 2024-04-11 RX ORDER — DEXTROSE MONOHYDRATE 50 MG/ML
5-250 INJECTION, SOLUTION INTRAVENOUS PRN
Status: DISCONTINUED | OUTPATIENT
Start: 2024-04-11 | End: 2024-04-12 | Stop reason: HOSPADM

## 2024-04-11 RX ORDER — HEPARIN 100 UNIT/ML
500 SYRINGE INTRAVENOUS PRN
Status: DISCONTINUED | OUTPATIENT
Start: 2024-04-11 | End: 2024-04-12 | Stop reason: HOSPADM

## 2024-04-11 RX ADMIN — DEXAMETHASONE 10 MG: 4 TABLET ORAL at 12:04

## 2024-04-11 RX ADMIN — CARFILZOMIB 49.2 MG: 10 INJECTION, POWDER, LYOPHILIZED, FOR SOLUTION INTRAVENOUS at 12:40

## 2024-04-11 ASSESSMENT — PAIN DESCRIPTION - ORIENTATION: ORIENTATION: LOWER

## 2024-04-11 ASSESSMENT — PAIN DESCRIPTION - LOCATION: LOCATION: BACK

## 2024-04-11 ASSESSMENT — PAIN SCALES - GENERAL: PAINLEVEL_OUTOF10: 9

## 2024-04-11 ASSESSMENT — PAIN DESCRIPTION - PAIN TYPE: TYPE: CHRONIC PAIN

## 2024-04-11 NOTE — TELEPHONE ENCOUNTER
1516 HIPAA verified x2  Returned phone encounter to pt concerning recent labs  JON Hilario reviewed labs and her labs with us look fine  She should reach out to her PCP regarding the other labs that were drawn yesterday - lipid panel, A1c, urine  Pt voiced understanding.    Pt would like to know if it is normal for her WBC to be lower.   I explained to pt while undergoing tx her immune system is compromised so her counts will be on the lower end.  Advised pt to avoid large crowds, being around people who are not feeling well and wear a mask if she does have to be around larger crowds of people. Also good hand hygiene is important.  Pt voiced understanding.    Pt would also like to know if she is able to get a port placed since she is having to have multiple blood draws often for tx.  Advised pt I would discuss with team and r/t with updates.  Pt voiced understanding and was thankful for my call.

## 2024-04-11 NOTE — TELEPHONE ENCOUNTER
[id number]  R/t call to pt and made her aware a port order has been placed. Provided pt with number to scheduling.    Pt also inquired about a disabled parking placard.  Made pt aware we will have it filled out and up front for  whenever she is available to stop by.  Pt voiced understanding and was thankful for my call.   The left coronary artery was selectively engaged and injected.

## 2024-04-11 NOTE — PROGRESS NOTES
[facility] Short Note                       Date: 2024    Name: Candida Weaver    MRN: [id number]         : 1955      1100 Pt admit to [facility] for Kyprolis C1D8 ambulatory in stable condition. Assessment completed. No new concerns voiced.  PIV established R FA pos blood return noted    Ms. Weaver's vitals were reviewed prior to and after treatment.   Patient Vitals for the past 12 hrs:   Temp Pulse Resp BP SpO2   24 1300 -- -- -- 122/68 --   24 1100 98.2 °F (36.8 °C) 86 20 112/62 97 %         Lab results were  reviewed.  No results found for this or any previous visit (from the past 12 hour(s)).    Medications given:   Medications Administered         carfilzomib (KYPROLIS) 49.2 mg in dextrose 5 % 100 mL chemo IVPB Admin Date  2024 Action  New Bag Dose  49.2 mg Rate  269.2 mL/hr Route  IntraVENous Administered By  Jazmyne Dyer RN        dexAMETHasone (DECADRON) tablet 10 mg Admin Date  2024 Action  Given Dose  10 mg Rate   Route  Oral Administered By  Jazmyne Dyer RN        PIV removed    Ms. Weaver tolerated the infusion, and had no complaints.    [facility]. Pt aware of next appt    Future Appointments   Date Time Provider Department Center   2024 10:30 AM G2 RUTH [facility]STRACK [facility]   2024 10:00 AM SPT DEXA 1 [facility]   2024 10:30 AM SPT MAMMO 1 [facility]   2024  9:30 AM B4 PEDS FASTRACK [facility]   2024 10:30 AM G2 RUTH FASTRACK [facility]   2024 11:00 AM H2 RUTH FASTRACK [facility]   2024  9:30 AM B4 PEDS [facility]   2024 10:30 AM H2 RUTH [facility]   2024 10:45 AM Nany Arboleda MD [facility] BS AMB   5/3/2024 10:30 AM G1 RUTH [facility]   2024  9:30 AM B4 PEDS [facility]   2024 10:30 AM H2 RUTH [facility]   5/10/2024 10:30 AM G2 RUTH [facility]   2024  9:30 AM B4 PEDS

## 2024-04-12 ENCOUNTER — APPOINTMENT (OUTPATIENT)
Facility: HOSPITAL | Age: 69
End: 2024-04-12
Payer: MEDICARE

## 2024-04-12 ENCOUNTER — HOSPITAL ENCOUNTER (OUTPATIENT)
Facility: HOSPITAL | Age: 69
Setting detail: INFUSION SERIES
Discharge: HOME | End: 2024-04-12
Payer: MEDICARE

## 2024-04-12 VITALS
WEIGHT: 163 LBS | BODY MASS INDEX: 27.16 KG/M2 | HEIGHT: 65 IN | HEART RATE: 83 BPM | TEMPERATURE: 98.1 F | DIASTOLIC BLOOD PRESSURE: 54 MMHG | RESPIRATION RATE: 18 BRPM | SYSTOLIC BLOOD PRESSURE: 117 MMHG | OXYGEN SATURATION: 95 %

## 2024-04-12 DIAGNOSIS — C90.00 MULTIPLE MYELOMA NOT HAVING ACHIEVED REMISSION (HCC): ICD-10-CM

## 2024-04-12 DIAGNOSIS — Z11.59 ENCOUNTER FOR SCREENING FOR OTHER VIRAL DISEASES: Primary | ICD-10-CM

## 2024-04-12 PROCEDURE — 96413 CHEMO IV INFUSION 1 HR: CPT

## 2024-04-12 PROCEDURE — 2580000003 HC RX 258: Performed by: INTERNAL MEDICINE

## 2024-04-12 PROCEDURE — 6360000002 HC RX W HCPCS: Performed by: INTERNAL MEDICINE

## 2024-04-12 RX ORDER — SODIUM CHLORIDE 0.9 % (FLUSH) 0.9 %
5-40 SYRINGE (ML) INJECTION PRN
Status: DISCONTINUED | OUTPATIENT
Start: 2024-04-12 | End: 2024-04-13 | Stop reason: HOSPADM

## 2024-04-12 RX ORDER — DEXAMETHASONE 4 MG/1
10 TABLET ORAL ONCE
Status: COMPLETED | OUTPATIENT
Start: 2024-04-12 | End: 2024-04-12

## 2024-04-12 RX ORDER — DEXTROSE MONOHYDRATE 50 MG/ML
5-250 INJECTION, SOLUTION INTRAVENOUS PRN
Status: DISCONTINUED | OUTPATIENT
Start: 2024-04-12 | End: 2024-04-13 | Stop reason: HOSPADM

## 2024-04-12 RX ADMIN — DEXTROSE MONOHYDRATE 50 ML/HR: 50 INJECTION, SOLUTION INTRAVENOUS at 11:48

## 2024-04-12 RX ADMIN — DEXAMETHASONE 10 MG: 4 TABLET ORAL at 11:02

## 2024-04-12 RX ADMIN — CARFILZOMIB 49.2 MG: 10 INJECTION, POWDER, LYOPHILIZED, FOR SOLUTION INTRAVENOUS at 11:52

## 2024-04-12 NOTE — PROGRESS NOTES
[facility] - Chemotherapy Progress Note    1000 Pt admit to [facility] for Kyprolis/C1D9 ambulatory in stable condition. Assessment completed by access RN.  PIV access established by access RN with positive blood return. Line flushed and capped.    1140 PIV flushed w/ + blood return; D5 infusing    BP (!) 150/51   Pulse 94   Temp 98.1 °F (36.7 °C) (Temporal)   Resp 18   Ht 1.651 m (5' 5\")   Wt 73.9 kg (163 lb)   SpO2 95%   BMI 27.12 kg/m²     Medications Administered         carfilzomib (KYPROLIS) 49.2 mg in dextrose 5 % 100 mL chemo IVPB Admin Date  04/12/2024 Action  New Bag Dose  49.2 mg Rate  269.2 mL/hr Route  IntraVENous Administered By  Brenda Zavala RN        dexAMETHasone (DECADRON) tablet 10 mg Admin Date  04/12/2024 Action  Given Dose  10 mg Rate   Route  Oral Administered By  Brenda Zavala RN        dextrose 5 % solution Admin Date  04/12/2024 Action  New Bag Dose  50 mL/hr Rate  50 mL/hr Route  IntraVENous Administered By  Brenda Zavala RN              Two nurses verified prior to administering:, Drug name, Drug dose, Infusion volume or drug volume when prepared in a syringe, Rate of administration, Route of administration, Expiration dates and/or times, Appearance and physical integrity of the drugs, Rate set on infusion pump, when used, Sequencing of drug administration         1235 Pt tolerated treatment well. PIV removed at discharge. D/c home ambulatory in no distress. Pt aware of next [facility] appointment scheduled for 04/24/2024.

## 2024-04-15 RX ORDER — POMALIDOMIDE 3 MG/1
3 CAPSULE ORAL DAILY
Qty: 21 CAPSULE | Refills: 0 | Status: ACTIVE | OUTPATIENT
Start: 2024-04-15

## 2024-04-15 NOTE — TELEPHONE ENCOUNTER
Oral Chemotherapy     Candida Weaver is a  69 y.o.female  diagnosed with multiple myeloma . Ms. Weaver is being treated with KPd.     Medication name: pomalidomide  Regimen: KPd  Dose:   3 mg  Frequency: daily  Administration schedule: for 21 days followed by 7 days off every 28 days  Ordering provider: Nany Arboleda MD  Start date: 4/4/24        REMS auth # [id number]  Prescription sent to pharmacy for processing.        Rose Pa, PHARMD, BCOP, BCPS    For Pharmacy Admin Tracking Only    Program: Medical Group  CPA in place:  Yes  Recommendation Provided To: Patient/Caregiver: 1 via Telephone  Intervention Detail: Refill(s) Provided  Intervention Accepted By: Patient/Caregiver: 1    Time Spent (min): 10

## 2024-04-16 ENCOUNTER — CLINICAL DOCUMENTATION (OUTPATIENT)
Dept: CASE MANAGEMENT | Age: 69
End: 2024-04-16

## 2024-04-16 DIAGNOSIS — K64.8 OTHER HEMORRHOIDS: ICD-10-CM

## 2024-04-16 RX ORDER — HYDROCORTISONE ACETATE 25 MG
SUPPOSITORY, RECTAL RECTAL
Qty: 10 SUPPOSITORY | Refills: 0 | Status: SHIPPED | OUTPATIENT
Start: 2024-04-16

## 2024-04-17 ENCOUNTER — HOSPITAL ENCOUNTER (OUTPATIENT)
Facility: HOSPITAL | Age: 69
Discharge: HOME | End: 2024-04-20
Attending: INTERNAL MEDICINE
Payer: MEDICARE

## 2024-04-17 DIAGNOSIS — M85.89 OSTEOPENIA OF MULTIPLE SITES: ICD-10-CM

## 2024-04-17 DIAGNOSIS — Z12.31 ENCOUNTER FOR SCREENING MAMMOGRAM FOR MALIGNANT NEOPLASM OF BREAST: ICD-10-CM

## 2024-04-17 PROCEDURE — 77063 BREAST TOMOSYNTHESIS BI: CPT

## 2024-04-17 PROCEDURE — 77080 DXA BONE DENSITY AXIAL: CPT

## 2024-04-19 ENCOUNTER — HOSPITAL ENCOUNTER (OUTPATIENT)
Facility: HOSPITAL | Age: 69
Discharge: HOME | End: 2024-04-19
Attending: STUDENT IN AN ORGANIZED HEALTH CARE EDUCATION/TRAINING PROGRAM | Admitting: STUDENT IN AN ORGANIZED HEALTH CARE EDUCATION/TRAINING PROGRAM
Payer: MEDICARE

## 2024-04-19 VITALS
BODY MASS INDEX: 25.83 KG/M2 | TEMPERATURE: 98.7 F | SYSTOLIC BLOOD PRESSURE: 127 MMHG | HEART RATE: 74 BPM | OXYGEN SATURATION: 99 % | RESPIRATION RATE: 22 BRPM | HEIGHT: 65 IN | DIASTOLIC BLOOD PRESSURE: 57 MMHG | WEIGHT: 155 LBS

## 2024-04-19 DIAGNOSIS — C90.00 MULTIPLE MYELOMA NOT HAVING ACHIEVED REMISSION (HCC): ICD-10-CM

## 2024-04-19 PROCEDURE — 2580000003 HC RX 258: Performed by: PHYSICIAN ASSISTANT

## 2024-04-19 PROCEDURE — 36561 INSERT TUNNELED CV CATH: CPT

## 2024-04-19 PROCEDURE — 2500000003 HC RX 250 WO HCPCS: Performed by: PHYSICIAN ASSISTANT

## 2024-04-19 PROCEDURE — 6360000002 HC RX W HCPCS: Performed by: PHYSICIAN ASSISTANT

## 2024-04-19 RX ORDER — LIDOCAINE HYDROCHLORIDE 10 MG/ML
INJECTION, SOLUTION EPIDURAL; INFILTRATION; INTRACAUDAL; PERINEURAL PRN
Status: COMPLETED | OUTPATIENT
Start: 2024-04-19 | End: 2024-04-19

## 2024-04-19 RX ORDER — MIDAZOLAM HYDROCHLORIDE 2 MG/2ML
INJECTION, SOLUTION INTRAMUSCULAR; INTRAVENOUS PRN
Status: COMPLETED | OUTPATIENT
Start: 2024-04-19 | End: 2024-04-19

## 2024-04-19 RX ORDER — HEPARIN 100 UNIT/ML
SYRINGE INTRAVENOUS PRN
Status: COMPLETED | OUTPATIENT
Start: 2024-04-19 | End: 2024-04-19

## 2024-04-19 RX ORDER — FENTANYL CITRATE 50 UG/ML
INJECTION, SOLUTION INTRAMUSCULAR; INTRAVENOUS PRN
Status: COMPLETED | OUTPATIENT
Start: 2024-04-19 | End: 2024-04-19

## 2024-04-19 RX ORDER — LIDOCAINE HYDROCHLORIDE AND EPINEPHRINE BITARTRATE 20; .01 MG/ML; MG/ML
INJECTION, SOLUTION SUBCUTANEOUS PRN
Status: COMPLETED | OUTPATIENT
Start: 2024-04-19 | End: 2024-04-19

## 2024-04-19 RX ORDER — HYDROCODONE BITARTRATE AND ACETAMINOPHEN 10; 325 MG/1; MG/1
TABLET ORAL
COMMUNITY
Start: 2024-04-10

## 2024-04-19 RX ADMIN — MIDAZOLAM HYDROCHLORIDE 1 MG: 1 INJECTION, SOLUTION INTRAMUSCULAR; INTRAVENOUS at 11:03

## 2024-04-19 RX ADMIN — FENTANYL CITRATE 50 MCG: 50 INJECTION, SOLUTION INTRAMUSCULAR; INTRAVENOUS at 11:00

## 2024-04-19 RX ADMIN — FENTANYL CITRATE 50 MCG: 50 INJECTION, SOLUTION INTRAMUSCULAR; INTRAVENOUS at 11:05

## 2024-04-19 RX ADMIN — LIDOCAINE HYDROCHLORIDE AND EPINEPHRINE 10 ML: 20; 10 INJECTION, SOLUTION INFILTRATION; PERINEURAL at 11:29

## 2024-04-19 RX ADMIN — WATER 2000 MG: 1 INJECTION INTRAMUSCULAR; INTRAVENOUS; SUBCUTANEOUS at 10:52

## 2024-04-19 RX ADMIN — HEPARIN 500 UNITS: 100 SYRINGE at 11:17

## 2024-04-19 RX ADMIN — LIDOCAINE HYDROCHLORIDE 3 ML: 10 INJECTION, SOLUTION EPIDURAL; INFILTRATION; INTRACAUDAL; PERINEURAL at 11:29

## 2024-04-19 RX ADMIN — MIDAZOLAM HYDROCHLORIDE 1 MG: 1 INJECTION, SOLUTION INTRAMUSCULAR; INTRAVENOUS at 11:00

## 2024-04-19 ASSESSMENT — PAIN DESCRIPTION - DESCRIPTORS: DESCRIPTORS: CRAMPING

## 2024-04-19 ASSESSMENT — PAIN DESCRIPTION - FREQUENCY: FREQUENCY: INTERMITTENT

## 2024-04-19 ASSESSMENT — PAIN DESCRIPTION - LOCATION: LOCATION: HAND

## 2024-04-19 ASSESSMENT — PAIN DESCRIPTION - PAIN TYPE: TYPE: ACUTE PAIN

## 2024-04-19 ASSESSMENT — PAIN SCALES - GENERAL: PAINLEVEL_OUTOF10: 8

## 2024-04-19 ASSESSMENT — PAIN DESCRIPTION - ORIENTATION: ORIENTATION: RIGHT;LEFT

## 2024-04-19 ASSESSMENT — PULMONARY FUNCTION TESTS
PIF_VALUE: 0

## 2024-04-19 NOTE — H&P
Interventional Radiology History and Physical      Patient: Candida Weaver 69 y.o. female  [id number]    Consult Requested by:  Ashley Costa APRN -*    Chief Complaint: multiple myeloma    History of Present Illness: 70 yo female with the above cc presents for image-guided port placement.    History:  Past Medical History:   Diagnosis Date    Anxiety 6/4/2009    Blood dyscrasia     followed by Dr. Jessica Gonzalez 09/12    Disc disorder 6/4/2009    High cholesterol     Iron (Fe) deficiency anemia 6/4/2009    Multinodular goiter     Osteopenia 6/4/2009    Seasonal allergic rhinitis 6/4/2009    Vertigo      Family History   Problem Relation Age of Onset    Breast Cancer Sister 50    Other Son         psoriatic arthritis    Rheum Arthritis Mother     Other Mother         colon polyps    Heart Disease Father      Social History     Socioeconomic History    Marital status:      Spouse name: Not on file    Number of children: Not on file    Years of education: Not on file    Highest education level: Not on file   Occupational History    Not on file   Tobacco Use    Smoking status: Never    Smokeless tobacco: Never   Substance and Sexual Activity    Alcohol use: Not Currently    Drug use: No    Sexual activity: Not Currently   Other Topics Concern    Not on file   Social History Narrative    Lives in [place] with her 36 yo son.  Also has a daughter who lives in [place].  Currently .  Used to work for the [employer] and then worked for [employer] as an .  Originally from [place].       Social Determinants of Health     Financial Resource Strain: Patient Declined (7/5/2023)    Overall Financial Resource Strain (CARDIA)     Difficulty of Paying Living Expenses: Patient declined   Food Insecurity: Not on file (7/5/2023)   Transportation Needs: Unknown (7/5/2023)    PRAPARE - Transportation     Lack of Transportation (Medical): Not on file     Lack of Transportation

## 2024-04-19 NOTE — DISCHARGE INSTRUCTIONS
CHEST/ARM PORT INSERTION/ REMOVAL DISCHARGE INSTRUCTIONS      Medications:    Resume regular medications, EXCEPT aspirin, ibuprofen, NSAIDS and /or anti platelets or blood thinners for 24 hours  You may take Tylenol (acetaminophen) for pain or discomfort if OK with your physician  Please contact your prescribing physician for instructions if you are taking Coumadin    Activities:    Take it easy for the rest of the day  No heavy lifting (avoid objects greater than 10 lbs.) or strenuous activity for the next 72 hours  You may shower 24 hours after your procedure but keep the incision site dry by using a waterproof covering   Do not swim, take a bath, hot tub, or use a whirlpool for 10-14 days after or your biopsy or until your site has healed.  Do not play contact sports while you have a port      Diet:    Resume your diet as tolerated      Notify your physician if you notice:    Bleeding  Excessive pain  Excessive swelling  Excessive bruising   Foul odor or drainage at the procedure site  Fever greater than 100 °F  Any problems with your port      Specific Instructions:      Keep the incision site dry for the next 24 hours   You have skin adhesive on your incision(s) that will dissolve on its own, do not try to remove it.          You have received sedation medications today. YOU SHOULD NOT DRIVE FOR 24 HOURS, DO NOT OPERATE HEAVY MACHINERY, DO NOT MAKE ANY LEGAL DECISIONS OR SIGN LEGAL DOCUMENTS FOR 24 HOURS. DO NOT DRINK ALCOHOL, TAKE ANY MEDICATIONS UNLESS PRESCRIBED BY YOUR DOCTOR. IF YOU ARE A CAREGIVER, SOMEONE SHOULD TAKE THAT ROLE FOR 24 HOURS.       Side effects of sedation medications and other medications used today have been reviewed  Those side effects can include but are not limited to: dizziness, drowsiness, poor balance, fatigue, sleepiness. Take precautions at home to prevent falls, such as assistance with walking or stairs if allowed and /or when needed or position changes. Allergic or adverse

## 2024-04-19 NOTE — PROGRESS NOTES
Patient presented ambulatory for port placement accompanied by son, patient reports penicillin allergy with reported reaction of \"itching and it's been a very long time ago\", verified okay to give ancef for procedure    1230  Procedure: port placement    Sedation: 2 milligrams versed and 100 micrograms fentanyl    Site:right subclavian    Discharge Details:discharge teaching completed with patient and son, understanding verbalized, vitals stable, site clean, dry, and intact, patient discharged via wheelchair with son

## 2024-04-22 ENCOUNTER — CLINICAL DOCUMENTATION (OUTPATIENT)
Dept: CASE MANAGEMENT | Age: 69
End: 2024-04-22

## 2024-04-24 ENCOUNTER — TELEPHONE (OUTPATIENT)
Facility: HOSPITAL | Age: 69
End: 2024-04-24

## 2024-04-24 NOTE — TELEPHONE ENCOUNTER
made patient aware doesnt need to come in today for labs or treatment this week based on updated treatment plan due for next week confirmed dates and time with pt

## 2024-04-25 ENCOUNTER — APPOINTMENT (OUTPATIENT)
Facility: HOSPITAL | Age: 69
End: 2024-04-25
Payer: MEDICARE

## 2024-04-25 DIAGNOSIS — M51.36 LUMBAR DEGENERATIVE DISC DISEASE: ICD-10-CM

## 2024-04-25 RX ORDER — DIPHENHYDRAMINE HYDROCHLORIDE 50 MG/ML
50 INJECTION INTRAMUSCULAR; INTRAVENOUS
OUTPATIENT
Start: 2024-05-16

## 2024-04-25 RX ORDER — ONDANSETRON 2 MG/ML
8 INJECTION INTRAMUSCULAR; INTRAVENOUS
OUTPATIENT
Start: 2024-05-10

## 2024-04-25 RX ORDER — SODIUM CHLORIDE 9 MG/ML
5-250 INJECTION, SOLUTION INTRAVENOUS PRN
OUTPATIENT
Start: 2024-05-10

## 2024-04-25 RX ORDER — HEPARIN 100 UNIT/ML
500 SYRINGE INTRAVENOUS PRN
OUTPATIENT
Start: 2024-05-16

## 2024-04-25 RX ORDER — ALBUTEROL SULFATE 90 UG/1
4 AEROSOL, METERED RESPIRATORY (INHALATION) PRN
Status: CANCELLED | OUTPATIENT
Start: 2024-05-02

## 2024-04-25 RX ORDER — SODIUM CHLORIDE 0.9 % (FLUSH) 0.9 %
5-40 SYRINGE (ML) INJECTION PRN
OUTPATIENT
Start: 2024-05-10

## 2024-04-25 RX ORDER — SODIUM CHLORIDE 9 MG/ML
INJECTION, SOLUTION INTRAVENOUS CONTINUOUS
Status: CANCELLED | OUTPATIENT
Start: 2024-05-03

## 2024-04-25 RX ORDER — EPINEPHRINE 1 MG/ML
0.3 INJECTION, SOLUTION INTRAMUSCULAR; SUBCUTANEOUS PRN
OUTPATIENT
Start: 2024-05-10

## 2024-04-25 RX ORDER — DEXAMETHASONE 4 MG/1
10 TABLET ORAL ONCE
Status: CANCELLED
Start: 2024-05-09 | End: 2024-05-09

## 2024-04-25 RX ORDER — SODIUM CHLORIDE 0.9 % (FLUSH) 0.9 %
5-40 SYRINGE (ML) INJECTION PRN
Status: CANCELLED | OUTPATIENT
Start: 2024-05-09

## 2024-04-25 RX ORDER — ACETAMINOPHEN 325 MG/1
650 TABLET ORAL
Status: CANCELLED | OUTPATIENT
Start: 2024-05-03

## 2024-04-25 RX ORDER — SODIUM CHLORIDE 9 MG/ML
INJECTION, SOLUTION INTRAVENOUS CONTINUOUS
Status: CANCELLED | OUTPATIENT
Start: 2024-05-09

## 2024-04-25 RX ORDER — EPINEPHRINE 1 MG/ML
0.3 INJECTION, SOLUTION INTRAMUSCULAR; SUBCUTANEOUS PRN
Status: CANCELLED | OUTPATIENT
Start: 2024-05-03

## 2024-04-25 RX ORDER — DIPHENHYDRAMINE HYDROCHLORIDE 50 MG/ML
50 INJECTION INTRAMUSCULAR; INTRAVENOUS
Status: CANCELLED | OUTPATIENT
Start: 2024-05-03

## 2024-04-25 RX ORDER — BACLOFEN 10 MG/1
10 TABLET ORAL NIGHTLY
Qty: 30 TABLET | Refills: 0 | Status: SHIPPED | OUTPATIENT
Start: 2024-04-25

## 2024-04-25 RX ORDER — ONDANSETRON 2 MG/ML
8 INJECTION INTRAMUSCULAR; INTRAVENOUS
Status: CANCELLED | OUTPATIENT
Start: 2024-05-03

## 2024-04-25 RX ORDER — SODIUM CHLORIDE 0.9 % (FLUSH) 0.9 %
5-40 SYRINGE (ML) INJECTION PRN
Status: CANCELLED | OUTPATIENT
Start: 2024-05-03

## 2024-04-25 RX ORDER — ACETAMINOPHEN 325 MG/1
650 TABLET ORAL
Status: CANCELLED | OUTPATIENT
Start: 2024-05-02

## 2024-04-25 RX ORDER — DIPHENHYDRAMINE HYDROCHLORIDE 50 MG/ML
50 INJECTION INTRAMUSCULAR; INTRAVENOUS
Status: CANCELLED | OUTPATIENT
Start: 2024-05-02

## 2024-04-25 RX ORDER — SODIUM CHLORIDE 9 MG/ML
INJECTION, SOLUTION INTRAVENOUS CONTINUOUS
OUTPATIENT
Start: 2024-05-17

## 2024-04-25 RX ORDER — SODIUM CHLORIDE 9 MG/ML
INJECTION, SOLUTION INTRAVENOUS CONTINUOUS
OUTPATIENT
Start: 2024-05-10

## 2024-04-25 RX ORDER — DIPHENHYDRAMINE HYDROCHLORIDE 50 MG/ML
50 INJECTION INTRAMUSCULAR; INTRAVENOUS
OUTPATIENT
Start: 2024-05-10

## 2024-04-25 RX ORDER — SODIUM CHLORIDE 9 MG/ML
5-250 INJECTION, SOLUTION INTRAVENOUS PRN
Status: CANCELLED | OUTPATIENT
Start: 2024-05-09

## 2024-04-25 RX ORDER — DEXTROSE MONOHYDRATE 50 MG/ML
5-250 INJECTION, SOLUTION INTRAVENOUS PRN
OUTPATIENT
Start: 2024-05-16

## 2024-04-25 RX ORDER — DEXTROSE MONOHYDRATE 50 MG/ML
5-250 INJECTION, SOLUTION INTRAVENOUS PRN
OUTPATIENT
Start: 2024-05-17

## 2024-04-25 RX ORDER — ONDANSETRON 2 MG/ML
8 INJECTION INTRAMUSCULAR; INTRAVENOUS
Status: CANCELLED | OUTPATIENT
Start: 2024-05-02

## 2024-04-25 RX ORDER — DIPHENHYDRAMINE HYDROCHLORIDE 50 MG/ML
50 INJECTION INTRAMUSCULAR; INTRAVENOUS
OUTPATIENT
Start: 2024-05-17

## 2024-04-25 RX ORDER — HEPARIN 100 UNIT/ML
500 SYRINGE INTRAVENOUS PRN
OUTPATIENT
Start: 2024-05-17

## 2024-04-25 RX ORDER — SODIUM CHLORIDE 9 MG/ML
INJECTION, SOLUTION INTRAVENOUS CONTINUOUS
Status: CANCELLED | OUTPATIENT
Start: 2024-05-02

## 2024-04-25 RX ORDER — ACETAMINOPHEN 325 MG/1
650 TABLET ORAL
Status: CANCELLED | OUTPATIENT
Start: 2024-05-09

## 2024-04-25 RX ORDER — ALBUTEROL SULFATE 90 UG/1
4 AEROSOL, METERED RESPIRATORY (INHALATION) PRN
Status: CANCELLED | OUTPATIENT
Start: 2024-05-03

## 2024-04-25 RX ORDER — ALBUTEROL SULFATE 90 UG/1
4 AEROSOL, METERED RESPIRATORY (INHALATION) PRN
OUTPATIENT
Start: 2024-05-16

## 2024-04-25 RX ORDER — DIPHENHYDRAMINE HYDROCHLORIDE 50 MG/ML
50 INJECTION INTRAMUSCULAR; INTRAVENOUS
Status: CANCELLED | OUTPATIENT
Start: 2024-05-09

## 2024-04-25 RX ORDER — ALBUTEROL SULFATE 90 UG/1
4 AEROSOL, METERED RESPIRATORY (INHALATION) PRN
OUTPATIENT
Start: 2024-05-10

## 2024-04-25 RX ORDER — SODIUM CHLORIDE 9 MG/ML
5-250 INJECTION, SOLUTION INTRAVENOUS PRN
Status: CANCELLED | OUTPATIENT
Start: 2024-05-03

## 2024-04-25 RX ORDER — ONDANSETRON 2 MG/ML
8 INJECTION INTRAMUSCULAR; INTRAVENOUS
Status: CANCELLED | OUTPATIENT
Start: 2024-05-09

## 2024-04-25 RX ORDER — DEXAMETHASONE 4 MG/1
10 TABLET ORAL ONCE
Start: 2024-05-16 | End: 2024-05-16

## 2024-04-25 RX ORDER — SODIUM CHLORIDE 0.9 % (FLUSH) 0.9 %
5-40 SYRINGE (ML) INJECTION PRN
OUTPATIENT
Start: 2024-05-17

## 2024-04-25 RX ORDER — DEXTROSE MONOHYDRATE 50 MG/ML
5-250 INJECTION, SOLUTION INTRAVENOUS PRN
Status: CANCELLED | OUTPATIENT
Start: 2024-05-03

## 2024-04-25 RX ORDER — DEXAMETHASONE 4 MG/1
10 TABLET ORAL ONCE
Status: CANCELLED
Start: 2024-05-10 | End: 2024-05-10

## 2024-04-25 RX ORDER — DEXTROSE MONOHYDRATE 50 MG/ML
5-250 INJECTION, SOLUTION INTRAVENOUS PRN
Status: CANCELLED | OUTPATIENT
Start: 2024-05-10

## 2024-04-25 RX ORDER — DEXAMETHASONE 4 MG/1
10 TABLET ORAL ONCE
Status: CANCELLED
Start: 2024-05-03 | End: 2024-05-03

## 2024-04-25 RX ORDER — EPINEPHRINE 1 MG/ML
0.3 INJECTION, SOLUTION INTRAMUSCULAR; SUBCUTANEOUS PRN
Status: CANCELLED | OUTPATIENT
Start: 2024-05-02

## 2024-04-25 RX ORDER — SODIUM CHLORIDE 9 MG/ML
5-250 INJECTION, SOLUTION INTRAVENOUS PRN
OUTPATIENT
Start: 2024-05-16

## 2024-04-25 RX ORDER — ONDANSETRON 2 MG/ML
8 INJECTION INTRAMUSCULAR; INTRAVENOUS
OUTPATIENT
Start: 2024-05-16

## 2024-04-25 RX ORDER — ONDANSETRON 2 MG/ML
8 INJECTION INTRAMUSCULAR; INTRAVENOUS
OUTPATIENT
Start: 2024-05-17

## 2024-04-25 RX ORDER — HEPARIN 100 UNIT/ML
500 SYRINGE INTRAVENOUS PRN
OUTPATIENT
Start: 2024-05-10

## 2024-04-25 RX ORDER — HEPARIN 100 UNIT/ML
500 SYRINGE INTRAVENOUS PRN
Status: CANCELLED | OUTPATIENT
Start: 2024-05-03

## 2024-04-25 RX ORDER — ALBUTEROL SULFATE 90 UG/1
4 AEROSOL, METERED RESPIRATORY (INHALATION) PRN
OUTPATIENT
Start: 2024-05-17

## 2024-04-25 RX ORDER — DEXTROSE MONOHYDRATE 50 MG/ML
5-250 INJECTION, SOLUTION INTRAVENOUS PRN
Status: CANCELLED | OUTPATIENT
Start: 2024-05-09

## 2024-04-25 RX ORDER — SODIUM CHLORIDE 9 MG/ML
INJECTION, SOLUTION INTRAVENOUS CONTINUOUS
OUTPATIENT
Start: 2024-05-16

## 2024-04-25 RX ORDER — ACETAMINOPHEN 325 MG/1
650 TABLET ORAL
OUTPATIENT
Start: 2024-05-16

## 2024-04-25 RX ORDER — DEXAMETHASONE 4 MG/1
10 TABLET ORAL ONCE
Start: 2024-05-17 | End: 2024-05-17

## 2024-04-25 RX ORDER — ALBUTEROL SULFATE 90 UG/1
4 AEROSOL, METERED RESPIRATORY (INHALATION) PRN
Status: CANCELLED | OUTPATIENT
Start: 2024-05-09

## 2024-04-25 RX ORDER — EPINEPHRINE 1 MG/ML
0.3 INJECTION, SOLUTION INTRAMUSCULAR; SUBCUTANEOUS PRN
OUTPATIENT
Start: 2024-05-16

## 2024-04-25 RX ORDER — ACETAMINOPHEN 325 MG/1
650 TABLET ORAL
OUTPATIENT
Start: 2024-05-17

## 2024-04-25 RX ORDER — ACETAMINOPHEN 325 MG/1
650 TABLET ORAL
OUTPATIENT
Start: 2024-05-10

## 2024-04-25 RX ORDER — EPINEPHRINE 1 MG/ML
0.3 INJECTION, SOLUTION INTRAMUSCULAR; SUBCUTANEOUS PRN
OUTPATIENT
Start: 2024-05-17

## 2024-04-25 RX ORDER — HEPARIN 100 UNIT/ML
500 SYRINGE INTRAVENOUS PRN
Status: CANCELLED | OUTPATIENT
Start: 2024-05-09

## 2024-04-25 RX ORDER — SODIUM CHLORIDE 0.9 % (FLUSH) 0.9 %
5-40 SYRINGE (ML) INJECTION PRN
OUTPATIENT
Start: 2024-05-16

## 2024-04-25 RX ORDER — EPINEPHRINE 1 MG/ML
0.3 INJECTION, SOLUTION INTRAMUSCULAR; SUBCUTANEOUS PRN
Status: CANCELLED | OUTPATIENT
Start: 2024-05-09

## 2024-04-25 RX ORDER — SODIUM CHLORIDE 9 MG/ML
5-250 INJECTION, SOLUTION INTRAVENOUS PRN
OUTPATIENT
Start: 2024-05-17

## 2024-04-26 ENCOUNTER — APPOINTMENT (OUTPATIENT)
Facility: HOSPITAL | Age: 69
End: 2024-04-26
Payer: MEDICARE

## 2024-04-30 ENCOUNTER — TELEPHONE (OUTPATIENT)
Dept: PRIMARY CARE CLINIC | Facility: CLINIC | Age: 69
End: 2024-04-30

## 2024-04-30 DIAGNOSIS — C90.00 MULTIPLE MYELOMA NOT HAVING ACHIEVED REMISSION (HCC): ICD-10-CM

## 2024-04-30 RX ORDER — ACYCLOVIR 400 MG/1
400 TABLET ORAL 2 TIMES DAILY
Qty: 60 TABLET | Refills: 5 | Status: SHIPPED | OUTPATIENT
Start: 2024-04-30

## 2024-04-30 NOTE — TELEPHONE ENCOUNTER
Spoke to pt and told her Dr. Vazquez signed [employer] parking pass for her and its at the front. She asked how long is it for and I told her for forever. She said ok, thank you. I told her she can go to the  and tell them her name and what she is here for. She asked what time do we open and I said 815am.

## 2024-05-01 ENCOUNTER — HOSPITAL ENCOUNTER (OUTPATIENT)
Facility: HOSPITAL | Age: 69
Setting detail: INFUSION SERIES
Discharge: HOME | End: 2024-05-01
Payer: MEDICARE

## 2024-05-01 ENCOUNTER — CLINICAL DOCUMENTATION (OUTPATIENT)
Dept: CASE MANAGEMENT | Age: 69
End: 2024-05-01

## 2024-05-01 VITALS
SYSTOLIC BLOOD PRESSURE: 128 MMHG | TEMPERATURE: 97.5 F | DIASTOLIC BLOOD PRESSURE: 67 MMHG | HEART RATE: 68 BPM | RESPIRATION RATE: 20 BRPM

## 2024-05-01 DIAGNOSIS — C90.00 MULTIPLE MYELOMA NOT HAVING ACHIEVED REMISSION (HCC): ICD-10-CM

## 2024-05-01 DIAGNOSIS — Z11.59 ENCOUNTER FOR SCREENING FOR OTHER VIRAL DISEASES: ICD-10-CM

## 2024-05-01 LAB
ALBUMIN SERPL-MCNC: 3.8 G/DL (ref 3.5–5)
ALBUMIN/GLOB SERPL: 1.3 (ref 1.1–2.2)
ALP SERPL-CCNC: 63 U/L (ref 45–117)
ALT SERPL-CCNC: 27 U/L (ref 12–78)
ANION GAP SERPL CALC-SCNC: 1 MMOL/L (ref 5–15)
AST SERPL-CCNC: 15 U/L (ref 15–37)
BASOPHILS # BLD: 0 K/UL (ref 0–0.1)
BASOPHILS NFR BLD: 1 % (ref 0–1)
BILIRUB SERPL-MCNC: 0.2 MG/DL (ref 0.2–1)
BUN SERPL-MCNC: 14 MG/DL (ref 6–20)
BUN/CREAT SERPL: 18 (ref 12–20)
CALCIUM SERPL-MCNC: 9.6 MG/DL (ref 8.5–10.1)
CHLORIDE SERPL-SCNC: 110 MMOL/L (ref 97–108)
CO2 SERPL-SCNC: 28 MMOL/L (ref 21–32)
CREAT SERPL-MCNC: 0.79 MG/DL (ref 0.55–1.02)
DIFFERENTIAL METHOD BLD: ABNORMAL
EOSINOPHIL # BLD: 0 K/UL (ref 0–0.4)
EOSINOPHIL NFR BLD: 0 % (ref 0–7)
ERYTHROCYTE [DISTWIDTH] IN BLOOD BY AUTOMATED COUNT: 14.5 % (ref 11.5–14.5)
GLOBULIN SER CALC-MCNC: 2.9 G/DL (ref 2–4)
GLUCOSE SERPL-MCNC: 99 MG/DL (ref 65–100)
HCT VFR BLD AUTO: 29.6 % (ref 35–47)
HGB BLD-MCNC: 9.3 G/DL (ref 11.5–16)
IMM GRANULOCYTES # BLD AUTO: 0 K/UL
IMM GRANULOCYTES NFR BLD AUTO: 0 %
LYMPHOCYTES # BLD: 1.1 K/UL (ref 0.8–3.5)
LYMPHOCYTES NFR BLD: 41 % (ref 12–49)
MCH RBC QN AUTO: 30.5 PG (ref 26–34)
MCHC RBC AUTO-ENTMCNC: 31.4 G/DL (ref 30–36.5)
MCV RBC AUTO: 97 FL (ref 80–99)
MONOCYTES # BLD: 0.3 K/UL (ref 0–1)
MONOCYTES NFR BLD: 10 % (ref 5–13)
NEUTS SEG # BLD: 1.2 K/UL (ref 1.8–8)
NEUTS SEG NFR BLD: 48 % (ref 32–75)
NRBC # BLD: 0 K/UL (ref 0–0.01)
NRBC BLD-RTO: 0 PER 100 WBC
PLATELET # BLD AUTO: 400 K/UL (ref 150–400)
PMV BLD AUTO: 10.9 FL (ref 8.9–12.9)
POTASSIUM SERPL-SCNC: 3.8 MMOL/L (ref 3.5–5.1)
PROT SERPL-MCNC: 6.7 G/DL (ref 6.4–8.2)
RBC # BLD AUTO: 3.05 M/UL (ref 3.8–5.2)
RBC MORPH BLD: ABNORMAL
SODIUM SERPL-SCNC: 139 MMOL/L (ref 136–145)
WBC # BLD AUTO: 2.6 K/UL (ref 3.6–11)

## 2024-05-01 PROCEDURE — 83521 IG LIGHT CHAINS FREE EACH: CPT

## 2024-05-01 PROCEDURE — 84165 PROTEIN E-PHORESIS SERUM: CPT

## 2024-05-01 PROCEDURE — 36415 COLL VENOUS BLD VENIPUNCTURE: CPT

## 2024-05-01 PROCEDURE — 86334 IMMUNOFIX E-PHORESIS SERUM: CPT

## 2024-05-01 PROCEDURE — 82784 ASSAY IGA/IGD/IGG/IGM EACH: CPT

## 2024-05-01 PROCEDURE — 80053 COMPREHEN METABOLIC PANEL: CPT

## 2024-05-01 PROCEDURE — 85025 COMPLETE CBC W/AUTO DIFF WBC: CPT

## 2024-05-01 ASSESSMENT — PAIN SCALES - GENERAL: PAINLEVEL_OUTOF10: 0

## 2024-05-01 NOTE — PROGRESS NOTES
[facility] Peds/Adult Note                       Date: May 1, 2024    Name: Candida Weaver    MRN: [id number]         : 1955    0930 Patient arrives for labs without acute problems. Please see EPIC for complete assessment and education provided.    Vital signs stable throughout and prior to discharge. Patient tolerated procedure well and was discharged without incident.  Patient/Family member is aware of next [facility] appointment on 24. Appointment card give to the Patient/Family member.      Ms. Weaver's vitals were reviewed prior to and after treatment.   Patient Vitals for the past 12 hrs:   Temp Pulse Resp BP   24 0930 97.5 °F (36.4 °C) 68 20 128/67         Lab results were obtained and reviewed.  Recent Results (from the past 12 hour(s))   CBC With Auto Differential    Collection Time: 24  9:49 AM   Result Value Ref Range    WBC 2.6 (L) 3.6 - 11.0 K/uL    RBC 3.05 (L) 3.80 - 5.20 M/uL    Hemoglobin 9.3 (L) 11.5 - 16.0 g/dL    Hematocrit 29.6 (L) 35.0 - 47.0 %    MCV 97.0 80.0 - 99.0 FL    MCH 30.5 26.0 - 34.0 PG    MCHC 31.4 30.0 - 36.5 g/dL    RDW 14.5 11.5 - 14.5 %    Platelets 400 150 - 400 K/uL    MPV 10.9 8.9 - 12.9 FL    Nucleated RBCs 0.0 0  WBC    nRBC 0.00 0.00 - 0.01 K/uL    Neutrophils % PENDING %    Lymphocytes % PENDING %    Monocytes % PENDING %    Eosinophils % PENDING %    Basophils % PENDING %    Immature Granulocytes % PENDING %    Neutrophils Absolute PENDING K/UL    Lymphocytes Absolute PENDING K/UL    Monocytes Absolute PENDING K/UL    Eosinophils Absolute PENDING K/UL    Basophils Absolute PENDING K/UL    Immature Granulocytes Absolute PENDING K/UL    Differential Type PENDING                Ms. Weaver tolerated the treatment and had no complaints.    [facility]. [facility] condition. Discharge Instructions provided to patient, patient verbalized understanding.     Future Appointments   Date Time Provider Department

## 2024-05-02 ENCOUNTER — HOSPITAL ENCOUNTER (OUTPATIENT)
Facility: HOSPITAL | Age: 69
Setting detail: INFUSION SERIES
Discharge: HOME | End: 2024-05-02
Payer: MEDICARE

## 2024-05-02 ENCOUNTER — OFFICE VISIT (OUTPATIENT)
Age: 69
End: 2024-05-02
Payer: MEDICARE

## 2024-05-02 ENCOUNTER — CLINICAL DOCUMENTATION (OUTPATIENT)
Age: 69
End: 2024-05-02

## 2024-05-02 VITALS
OXYGEN SATURATION: 100 % | TEMPERATURE: 97.8 F | BODY MASS INDEX: 25.13 KG/M2 | HEART RATE: 76 BPM | SYSTOLIC BLOOD PRESSURE: 118 MMHG | RESPIRATION RATE: 20 BRPM | WEIGHT: 151 LBS | DIASTOLIC BLOOD PRESSURE: 43 MMHG

## 2024-05-02 VITALS
OXYGEN SATURATION: 100 % | HEIGHT: 65 IN | HEART RATE: 78 BPM | TEMPERATURE: 97.8 F | DIASTOLIC BLOOD PRESSURE: 63 MMHG | SYSTOLIC BLOOD PRESSURE: 131 MMHG | BODY MASS INDEX: 25.29 KG/M2 | WEIGHT: 151.8 LBS

## 2024-05-02 DIAGNOSIS — C90.00 MULTIPLE MYELOMA NOT HAVING ACHIEVED REMISSION (HCC): Primary | ICD-10-CM

## 2024-05-02 DIAGNOSIS — R26.81 UNSTEADY GAIT: Primary | ICD-10-CM

## 2024-05-02 DIAGNOSIS — Z11.59 ENCOUNTER FOR SCREENING FOR OTHER VIRAL DISEASES: ICD-10-CM

## 2024-05-02 DIAGNOSIS — C90.00 MULTIPLE MYELOMA NOT HAVING ACHIEVED REMISSION (HCC): ICD-10-CM

## 2024-05-02 LAB
FERRITIN SERPL-MCNC: 408 NG/ML (ref 8–252)
IRON SATN MFR SERPL: 22 % (ref 20–50)
IRON SERPL-MCNC: 62 UG/DL (ref 35–150)
TIBC SERPL-MCNC: 285 UG/DL (ref 250–450)

## 2024-05-02 PROCEDURE — 99215 OFFICE O/P EST HI 40 MIN: CPT

## 2024-05-02 PROCEDURE — 83540 ASSAY OF IRON: CPT

## 2024-05-02 PROCEDURE — 1036F TOBACCO NON-USER: CPT

## 2024-05-02 PROCEDURE — 36415 COLL VENOUS BLD VENIPUNCTURE: CPT

## 2024-05-02 PROCEDURE — 6360000002 HC RX W HCPCS: Performed by: INTERNAL MEDICINE

## 2024-05-02 PROCEDURE — 1123F ACP DISCUSS/DSCN MKR DOCD: CPT

## 2024-05-02 PROCEDURE — 3017F COLORECTAL CA SCREEN DOC REV: CPT

## 2024-05-02 PROCEDURE — G8419 CALC BMI OUT NRM PARAM NOF/U: HCPCS

## 2024-05-02 PROCEDURE — 2580000003 HC RX 258: Performed by: INTERNAL MEDICINE

## 2024-05-02 PROCEDURE — 83550 IRON BINDING TEST: CPT

## 2024-05-02 PROCEDURE — 96367 TX/PROPH/DG ADDL SEQ IV INF: CPT

## 2024-05-02 PROCEDURE — G8399 PT W/DXA RESULTS DOCUMENT: HCPCS

## 2024-05-02 PROCEDURE — 6360000002 HC RX W HCPCS

## 2024-05-02 PROCEDURE — 1090F PRES/ABSN URINE INCON ASSESS: CPT

## 2024-05-02 PROCEDURE — 82728 ASSAY OF FERRITIN: CPT

## 2024-05-02 PROCEDURE — G8427 DOCREV CUR MEDS BY ELIG CLIN: HCPCS

## 2024-05-02 PROCEDURE — 96413 CHEMO IV INFUSION 1 HR: CPT

## 2024-05-02 RX ORDER — ZOLEDRONIC ACID 0.04 MG/ML
4 INJECTION, SOLUTION INTRAVENOUS ONCE
OUTPATIENT
Start: 2024-05-23 | End: 2024-05-23

## 2024-05-02 RX ORDER — HEPARIN 100 UNIT/ML
500 SYRINGE INTRAVENOUS PRN
Status: DISCONTINUED | OUTPATIENT
Start: 2024-05-02 | End: 2024-05-03 | Stop reason: HOSPADM

## 2024-05-02 RX ORDER — SODIUM CHLORIDE 9 MG/ML
5-250 INJECTION, SOLUTION INTRAVENOUS PRN
OUTPATIENT
Start: 2024-05-23

## 2024-05-02 RX ORDER — DIPHENHYDRAMINE HYDROCHLORIDE 50 MG/ML
50 INJECTION INTRAMUSCULAR; INTRAVENOUS
OUTPATIENT
Start: 2024-05-23

## 2024-05-02 RX ORDER — ACETAMINOPHEN 325 MG/1
650 TABLET ORAL
OUTPATIENT
Start: 2024-05-23

## 2024-05-02 RX ORDER — SODIUM CHLORIDE 9 MG/ML
5-250 INJECTION, SOLUTION INTRAVENOUS PRN
Status: DISCONTINUED | OUTPATIENT
Start: 2024-05-02 | End: 2024-05-03 | Stop reason: HOSPADM

## 2024-05-02 RX ORDER — SODIUM CHLORIDE 9 MG/ML
INJECTION, SOLUTION INTRAVENOUS CONTINUOUS
OUTPATIENT
Start: 2024-05-23

## 2024-05-02 RX ORDER — HEPARIN 100 UNIT/ML
500 SYRINGE INTRAVENOUS PRN
OUTPATIENT
Start: 2024-05-23

## 2024-05-02 RX ORDER — ONDANSETRON 2 MG/ML
8 INJECTION INTRAMUSCULAR; INTRAVENOUS
OUTPATIENT
Start: 2024-05-23

## 2024-05-02 RX ORDER — DEXTROSE MONOHYDRATE 50 MG/ML
5-250 INJECTION, SOLUTION INTRAVENOUS PRN
Status: DISCONTINUED | OUTPATIENT
Start: 2024-05-02 | End: 2024-05-03 | Stop reason: HOSPADM

## 2024-05-02 RX ORDER — SODIUM CHLORIDE 0.9 % (FLUSH) 0.9 %
5-40 SYRINGE (ML) INJECTION PRN
OUTPATIENT
Start: 2024-05-23

## 2024-05-02 RX ORDER — ZOLEDRONIC ACID 0.04 MG/ML
4 INJECTION, SOLUTION INTRAVENOUS ONCE
Status: COMPLETED | OUTPATIENT
Start: 2024-05-02 | End: 2024-05-02

## 2024-05-02 RX ORDER — DEXAMETHASONE 4 MG/1
10 TABLET ORAL ONCE
Status: COMPLETED | OUTPATIENT
Start: 2024-05-02 | End: 2024-05-02

## 2024-05-02 RX ORDER — ALBUTEROL SULFATE 90 UG/1
4 AEROSOL, METERED RESPIRATORY (INHALATION) PRN
OUTPATIENT
Start: 2024-05-23

## 2024-05-02 RX ORDER — SODIUM CHLORIDE 0.9 % (FLUSH) 0.9 %
5-40 SYRINGE (ML) INJECTION PRN
Status: DISCONTINUED | OUTPATIENT
Start: 2024-05-02 | End: 2024-05-03 | Stop reason: HOSPADM

## 2024-05-02 RX ORDER — EPINEPHRINE 1 MG/ML
0.3 INJECTION, SOLUTION INTRAMUSCULAR; SUBCUTANEOUS PRN
OUTPATIENT
Start: 2024-05-23

## 2024-05-02 RX ADMIN — CARFILZOMIB 49.2 MG: 10 INJECTION, POWDER, LYOPHILIZED, FOR SOLUTION INTRAVENOUS at 12:46

## 2024-05-02 RX ADMIN — DEXAMETHASONE 10 MG: 4 TABLET ORAL at 11:33

## 2024-05-02 RX ADMIN — SODIUM CHLORIDE, PRESERVATIVE FREE 20 ML: 5 INJECTION INTRAVENOUS at 13:35

## 2024-05-02 RX ADMIN — DEXTROSE MONOHYDRATE 25 ML/HR: 50 INJECTION, SOLUTION INTRAVENOUS at 11:27

## 2024-05-02 RX ADMIN — ZOLEDRONIC ACID 4 MG: 0.04 INJECTION, SOLUTION INTRAVENOUS at 11:37

## 2024-05-02 ASSESSMENT — PAIN SCALES - GENERAL: PAINLEVEL_OUTOF10: 0

## 2024-05-02 NOTE — PROGRESS NOTES
ral Chemotherapy      Candida Weaver is a  69 y.o.female  diagnosed with multiple myeloma . Ms. Weaver is being treated with KPd.      Medication name: pomalidomide  Regimen: KPd  Dose:   3 mg  Frequency: daily  Administration schedule: for 21 days followed by 7 days off every 28 days  Ordering provider: Nany Arboleda MD  Start date: 5/2/24    Lab Results   Component Value Date    WBC 2.6 (L) 05/01/2024    HGB 9.3 (L) 05/01/2024    HCT 29.6 (L) 05/01/2024    MCV 97.0 05/01/2024     05/01/2024    LYMPHOPCT 41 05/01/2024    RBC 3.05 (L) 05/01/2024    MCH 30.5 05/01/2024    MCHC 31.4 05/01/2024    RDW 14.5 05/01/2024     Lab Results   Component Value Date    NEUTROABS 1.2 (L) 05/01/2024             Expected follow up date: Labs/office visit each treatment. Next cycle start on 5/30/24     Patient provided with an Oral Chemotherapy Journal.      Ms. Weaver verbalized understanding of the information presented and all of the patient's questions were answered.              Rose Pa, PharmD, BCPS, BCOP    For Pharmacy Admin Tracking Only    Program: Medical Group  CPA in place:  Yes  Recommendation Provided To: Patient/Caregiver: 1 via In person    Intervention Accepted By: Patient/Caregiver: 1    Time Spent (min): 15

## 2024-05-02 NOTE — PROGRESS NOTES
Candida Weaver is a 69 y.o. female    Chief Complaint   Patient presents with    Follow-up      Multiple Myeloma        1. Have you been to the ER, urgent care clinic since your last visit?  Hospitalized since your last visit?No    2. Have you seen or consulted any other health care providers outside of the [facility] since your last visit?  Include any pap smears or colon screening. No

## 2024-05-02 NOTE — PROGRESS NOTES
[facility]  [address], [address] [place] [id number]  W: [phone]  F: [phone]    Reason for Visit:   Candida Weaver is a 69 y.o. female who is seen for Multiple Myeloma   Treatment History:   10/25/2023: Kayli VRD, Rev lite  3/2024: Kyprolis Pomalyst and Dexamethasone    History of Present Illness:   Patient is a 69 y.o. female who was dx with Smoldering Myeloma in 2017 and has been seeing [facility] Dr. Lopez. A BM bx at that time showed 40% plasma cells. No end organ damage. Noted to have worsening anemia 7/5/2023 with a Hb of 9.6 g/dl. This led to further evaluation that included a gammopathy eval that showed an M spike of 0.3 g/dl. She had a Kappa LC level of 1593 g/L with a K:L ratio of 157. Sent for evaluation now. She was in the ER June 2023 with some SOB. Had a CT head and this showed lytic lesions. She states that she has some carpal tunnel. BM bx showed Myeloma. She is on Kayli VRD. She had minimal response and was switched to Kyprolis and Pomalyst and Dexamethasone.    Today is C1D1.  She reports feeling very fatigued, has a hard time staying asleep at night. 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   Has ongoing neuropathy. She reports having unsteady gait, sometimes has to scale the wall when walking to prevent from falling. She has some nausea with this.   Denies headaches, dizziness. Denies skin rash. Has mild swelling to bilateral ankles.      Presents with her .     Review of systems was obtained and pertinent findings reviewed above. Past medical history, social history, family history, medications, and allergies are located in the electronic medical record.    Physical Exam:   BP (!) 118/43 (Site: Left Upper Arm, Position: Sitting)   Pulse 76   Temp 97.8 °F (36.6 °C)   Resp 20   Wt 68.5 kg (151 lb)   SpO2 100%   BMI 25.13 kg/m²   ECOG PS: 1  General: No distress  Eyes: PERRLA, anicteric sclerae  HENT: Atraumatic,

## 2024-05-02 NOTE — PROGRESS NOTES
hospitals Progress Note    1020 Ms. Weaver Arrived ambulatory and in no distress for Kyprolis (C2D1) regimen.  Assessment completed, patient reports fatigue/occasional lightheadedness. Port accessed with positive blood return. Port flushed and capped.     Labs reviewed from yesterday and criteria for treatment was met.    Ms. Weaver's vitals were reviewed.  Patient Vitals for the past 12 hrs:   Temp Pulse BP SpO2   05/02/24 1333 -- 78 131/63 --   05/02/24 1020 97.8 °F (36.6 °C) 76 (!) 118/43 100 %     Pre-medications  were administered as ordered and chemotherapy was initiated.  Medications Administered         carfilzomib (KYPROLIS) 49.2 mg in dextrose 5 % 100 mL chemo IVPB Admin Date  05/02/2024 Action  New Bag Dose  49.2 mg Rate  269.2 mL/hr Route  IntraVENous Administered By  Stacey White RN        dexAMETHasone (DECADRON) tablet 10 mg Admin Date  05/02/2024 Action  Given Dose  10 mg Rate   Route  Oral Administered By  Stacey White RN        dextrose 5 % solution Admin Date  05/02/2024 Action  New Bag Dose  25 mL/hr Rate  25 mL/hr Route  IntraVENous Administered By  Stacey White RN        dextrose 5 % solution Admin Date  05/02/2024 Action  Rate/Dose Verify Dose   Rate  25 mL/hr Route  IntraVENous Administered By  Stacey White RN        sodium chloride flush 0.9 % injection 5-40 mL Admin Date  05/02/2024 Action  Given Dose  20 mL Rate   Route  IntraVENous Administered By  Stacey White RN        zoledronic acid (ZOMETA) 4 mg/100 mL infusion Admin Date  05/02/2024 Action  New Bag Dose  4 mg Rate  300 mL/hr Route  IntraVENous Administered By  Stacey White, SABRINA        Two nurses verified prior to administering: Drug name, Drug dose, Infusion volume or drug volume when prepared in a syringe, Rate of administration, Route of administration, Expiration dates and/or times, Appearance and physical integrity of the drugs, Rate set on infusion pump, when used, and Sequencing of drug administration.    Ms. Weaver tolerated

## 2024-05-03 ENCOUNTER — APPOINTMENT (OUTPATIENT)
Facility: HOSPITAL | Age: 69
End: 2024-05-03
Payer: MEDICARE

## 2024-05-03 ENCOUNTER — HOSPITAL ENCOUNTER (OUTPATIENT)
Facility: HOSPITAL | Age: 69
Setting detail: INFUSION SERIES
Discharge: HOME | End: 2024-05-03
Payer: MEDICARE

## 2024-05-03 VITALS — HEART RATE: 80 BPM | TEMPERATURE: 97.8 F | SYSTOLIC BLOOD PRESSURE: 125 MMHG | DIASTOLIC BLOOD PRESSURE: 59 MMHG

## 2024-05-03 DIAGNOSIS — C90.00 MULTIPLE MYELOMA NOT HAVING ACHIEVED REMISSION (HCC): ICD-10-CM

## 2024-05-03 DIAGNOSIS — Z11.59 ENCOUNTER FOR SCREENING FOR OTHER VIRAL DISEASES: Primary | ICD-10-CM

## 2024-05-03 PROCEDURE — 96413 CHEMO IV INFUSION 1 HR: CPT

## 2024-05-03 PROCEDURE — 6360000002 HC RX W HCPCS: Performed by: INTERNAL MEDICINE

## 2024-05-03 PROCEDURE — 2580000003 HC RX 258: Performed by: INTERNAL MEDICINE

## 2024-05-03 RX ORDER — ALBUTEROL SULFATE 90 UG/1
4 AEROSOL, METERED RESPIRATORY (INHALATION) PRN
Status: DISCONTINUED | OUTPATIENT
Start: 2024-05-03 | End: 2024-05-04 | Stop reason: HOSPADM

## 2024-05-03 RX ORDER — HEPARIN 100 UNIT/ML
500 SYRINGE INTRAVENOUS PRN
Status: DISCONTINUED | OUTPATIENT
Start: 2024-05-03 | End: 2024-05-04 | Stop reason: HOSPADM

## 2024-05-03 RX ORDER — EPINEPHRINE 1 MG/ML
0.3 INJECTION, SOLUTION INTRAMUSCULAR; SUBCUTANEOUS PRN
Status: DISCONTINUED | OUTPATIENT
Start: 2024-05-03 | End: 2024-05-04 | Stop reason: HOSPADM

## 2024-05-03 RX ORDER — ACETAMINOPHEN 325 MG/1
650 TABLET ORAL
Status: DISCONTINUED | OUTPATIENT
Start: 2024-05-03 | End: 2024-05-04 | Stop reason: HOSPADM

## 2024-05-03 RX ORDER — ONDANSETRON 2 MG/ML
8 INJECTION INTRAMUSCULAR; INTRAVENOUS
Status: DISCONTINUED | OUTPATIENT
Start: 2024-05-03 | End: 2024-05-04 | Stop reason: HOSPADM

## 2024-05-03 RX ORDER — DIPHENHYDRAMINE HYDROCHLORIDE 50 MG/ML
50 INJECTION INTRAMUSCULAR; INTRAVENOUS
Status: DISCONTINUED | OUTPATIENT
Start: 2024-05-03 | End: 2024-05-04 | Stop reason: HOSPADM

## 2024-05-03 RX ORDER — SODIUM CHLORIDE 0.9 % (FLUSH) 0.9 %
5-40 SYRINGE (ML) INJECTION PRN
Status: DISCONTINUED | OUTPATIENT
Start: 2024-05-03 | End: 2024-05-04 | Stop reason: HOSPADM

## 2024-05-03 RX ORDER — SODIUM CHLORIDE 9 MG/ML
5-250 INJECTION, SOLUTION INTRAVENOUS PRN
Status: DISCONTINUED | OUTPATIENT
Start: 2024-05-03 | End: 2024-05-04 | Stop reason: HOSPADM

## 2024-05-03 RX ORDER — SODIUM CHLORIDE 9 MG/ML
INJECTION, SOLUTION INTRAVENOUS CONTINUOUS
Status: DISCONTINUED | OUTPATIENT
Start: 2024-05-03 | End: 2024-05-04 | Stop reason: HOSPADM

## 2024-05-03 RX ORDER — DEXTROSE MONOHYDRATE 50 MG/ML
5-250 INJECTION, SOLUTION INTRAVENOUS PRN
Status: DISCONTINUED | OUTPATIENT
Start: 2024-05-03 | End: 2024-05-04 | Stop reason: HOSPADM

## 2024-05-03 RX ORDER — DEXAMETHASONE 4 MG/1
10 TABLET ORAL ONCE
Status: COMPLETED | OUTPATIENT
Start: 2024-05-03 | End: 2024-05-03

## 2024-05-03 RX ADMIN — CARFILZOMIB 49.2 MG: 10 INJECTION, POWDER, LYOPHILIZED, FOR SOLUTION INTRAVENOUS at 11:19

## 2024-05-03 RX ADMIN — DEXAMETHASONE 10 MG: 4 TABLET ORAL at 10:38

## 2024-05-03 RX ADMIN — DEXTROSE MONOHYDRATE 25 ML/HR: 50 INJECTION, SOLUTION INTRAVENOUS at 10:34

## 2024-05-03 ASSESSMENT — PAIN SCALES - GENERAL: PAINLEVEL_OUTOF10: 0

## 2024-05-03 NOTE — PROGRESS NOTES
[facility] Progress Note     Ms. Weaver Arrived ambulatory and in no distress for Kyprolis (C2D2) regimen.  Assessment completed, patient reports fatigue/occasional lightheadedness. Port accessed with positive blood return.     Labs reviewed from yesterday and criteria for treatment was met.    Ms. Weaver's vitals were reviewed.  Patient Vitals for the past 12 hrs:   Temp Pulse BP   05/03/24 1145 -- 80 (!) 125/59   05/03/24 1015 97.8 °F (36.6 °C) 87 137/63       Pre-medications  were administered as ordered and chemotherapy was initiated.  Medications Administered         carfilzomib (KYPROLIS) 49.2 mg in dextrose 5 % 100 mL chemo IVPB Admin Date  05/03/2024 Action  New Bag Dose  49.2 mg Rate  269.2 mL/hr Route  IntraVENous Administered By  Aishwarya Gimenez RN        dexAMETHasone (DECADRON) tablet 10 mg Admin Date  05/03/2024 Action  Given Dose  10 mg Rate   Route  Oral Administered By  Aishwarya Gimenez RN        dextrose 5 % solution Admin Date  05/03/2024 Action  New Bag Dose  25 mL/hr Rate  25 mL/hr Route  IntraVENous Administered By  Aishwarya Gimenez, RN        Two nurses verified prior to administering: Drug name, Drug dose, Infusion volume or drug volume when prepared in a syringe, Rate of administration, Route of administration, Expiration dates and/or times, Appearance and physical integrity of the drugs, Rate set on infusion pump, when used, and Sequencing of drug administration.    Ms. Weaver tolerated treatment well. Port maintained blood return throughout entire infusion. Port flushed and de accessed, [facility] condition.     Future Appointments   Date Time Provider Department Center   5/8/2024  9:30 AM PEDS LAB CHAIR 1 [facility]   5/9/2024 10:30 AM RUTH CHEMO CHAIR 1 [facility]   5/10/2024 10:30 [facility]   5/22/2024  9:30 AM PEDS LAB CHAIR 1 [facility]   5/23/2024 11:00 AM RUTH CHEMO CHAIR 3 [facility]   5/24/2024 10:30 AM RUTH  CHEMO CHAIR 2 [facility]   5/29/2024  9:30 AM PEDS LAB CHAIR 1 [facility]   5/30/2024 10:30 AM RUTH CHEMO CHAIR 1 [facility]   5/30/2024 10:45 AM Nany Arboleda MD [facility] BS AMB   5/31/2024 10:30 AM RUTH CHEMO CHAIR 1 [facility]   6/5/2024  9:30 AM PEDS LAB CHAIR 1 [facility]   6/6/2024 10:30 AM RUTH CHEMO CHAIR 2 [facility]   6/7/2024 10:30 AM RUTH CHEMO CHAIR 2 [facility]   6/19/2024  9:30 AM PEDS LAB CHAIR 1 [facility]   6/20/2024 10:30 AM RUTH CHEMO CHAIR 2 [facility]   6/21/2024 10:30 AM RUTH CHEMO CHAIR 2 [facility]   6/26/2024  9:30 AM PEDS LAB CHAIR 1 [facility]   6/27/2024 10:30 AM RUTH CHEMO CHAIR 2 [facility]   6/28/2024 10:30 AM RUTH CHEMO CHAIR 2 [facility]   7/2/2024  9:30 AM PEDS LAB CHAIR 1 [facility]   7/3/2024 11:00 AM RUTH CHEMO CHAIR 3 [facility]   7/5/2024 11:30 AM RUTH CHEMO CHAIR 4 [facility]   7/10/2024  9:30 AM PEDS LAB CHAIR 1 [facility]   7/11/2024 10:30 AM RUTH CHEMO CHAIR 2 [facility]   7/12/2024 11:00 AM RUTH CHEMO CHAIR 3 [facility]   7/17/2024  9:30 AM PEDS LAB CHAIR 1 [facility]   7/24/2024  9:30 AM PEDS LAB CHAIR 1 [facility]   7/25/2024 10:30 AM RUTH CHEMO CHAIR 2 [facility]   7/26/2024 11:00 AM RUTH CHEMO CHAIR 3 [facility]   7/31/2024  9:30 AM PEDS LAB CHAIR 1 [facility]   8/1/2024 10:30 AM RUTH CHEMO CHAIR 2 [facility]   8/2/2024 10:30 AM URTH CHEMO CHAIR 2 [facility]   8/7/2024  9:30 AM PEDS LAB CHAIR 1 [facility]   8/8/2024 10:30 AM [facility]   8/9/2024 11:00 AM [facility] CHEMO CHAIR 3 [facility]         JUSTIN JAY RN  May 3, 2024

## 2024-05-07 LAB
ALBUMIN SERPL ELPH-MCNC: 3.7 G/DL (ref 2.9–4.4)
ALBUMIN/GLOB SERPL: 1.7 (ref 0.7–1.7)
ALPHA1 GLOB SERPL ELPH-MCNC: 0.3 G/DL (ref 0–0.4)
ALPHA2 GLOB SERPL ELPH-MCNC: 0.7 G/DL (ref 0.4–1)
B-GLOBULIN SERPL ELPH-MCNC: 1 G/DL (ref 0.7–1.3)
GAMMA GLOB SERPL ELPH-MCNC: 0.2 G/DL (ref 0.4–1.8)
GLOBULIN SER-MCNC: 2.2 G/DL (ref 2.2–3.9)
IGA SERPL-MCNC: <5 MG/DL (ref 64–422)
IGG SERPL-MCNC: 263 MG/DL (ref 586–1602)
IGM SERPL-MCNC: 11 MG/DL (ref 26–217)
INTERPRETATION SERPL IEP-IMP: ABNORMAL
KAPPA LC FREE SER-MCNC: 922.1 MG/L (ref 3.3–19.4)
KAPPA LC FREE/LAMBDA FREE SER: 384.21 (ref 0.26–1.65)
LAMBDA LC FREE SERPL-MCNC: 2.4 MG/L (ref 5.7–26.3)
M PROTEIN SERPL ELPH-MCNC: ABNORMAL G/DL
PROT SERPL-MCNC: 5.9 G/DL (ref 6–8.5)

## 2024-05-08 ENCOUNTER — HOSPITAL ENCOUNTER (OUTPATIENT)
Facility: HOSPITAL | Age: 69
Setting detail: INFUSION SERIES
Discharge: HOME | End: 2024-05-08
Payer: MEDICARE

## 2024-05-08 VITALS
DIASTOLIC BLOOD PRESSURE: 77 MMHG | HEART RATE: 74 BPM | RESPIRATION RATE: 18 BRPM | SYSTOLIC BLOOD PRESSURE: 124 MMHG | TEMPERATURE: 97.8 F

## 2024-05-08 DIAGNOSIS — C90.00 MULTIPLE MYELOMA NOT HAVING ACHIEVED REMISSION (HCC): ICD-10-CM

## 2024-05-08 DIAGNOSIS — Z11.59 ENCOUNTER FOR SCREENING FOR OTHER VIRAL DISEASES: ICD-10-CM

## 2024-05-08 LAB
ALBUMIN SERPL-MCNC: 3.2 G/DL (ref 3.5–5)
ALBUMIN/GLOB SERPL: 1.5 (ref 1.1–2.2)
ALP SERPL-CCNC: 64 U/L (ref 45–117)
ALT SERPL-CCNC: 20 U/L (ref 12–78)
ANION GAP SERPL CALC-SCNC: 1 MMOL/L (ref 5–15)
AST SERPL-CCNC: 7 U/L (ref 15–37)
BASOPHILS # BLD: 0 K/UL (ref 0–0.1)
BASOPHILS NFR BLD: 0 % (ref 0–1)
BILIRUB SERPL-MCNC: 0.3 MG/DL (ref 0.2–1)
BUN SERPL-MCNC: 10 MG/DL (ref 6–20)
BUN/CREAT SERPL: 14 (ref 12–20)
CALCIUM SERPL-MCNC: 9.2 MG/DL (ref 8.5–10.1)
CHLORIDE SERPL-SCNC: 112 MMOL/L (ref 97–108)
CO2 SERPL-SCNC: 28 MMOL/L (ref 21–32)
CREAT SERPL-MCNC: 0.74 MG/DL (ref 0.55–1.02)
DIFFERENTIAL METHOD BLD: ABNORMAL
EOSINOPHIL # BLD: 0.1 K/UL (ref 0–0.4)
EOSINOPHIL NFR BLD: 5 % (ref 0–7)
ERYTHROCYTE [DISTWIDTH] IN BLOOD BY AUTOMATED COUNT: 15 % (ref 11.5–14.5)
GLOBULIN SER CALC-MCNC: 2.2 G/DL (ref 2–4)
GLUCOSE SERPL-MCNC: 88 MG/DL (ref 65–100)
HCT VFR BLD AUTO: 26 % (ref 35–47)
HGB BLD-MCNC: 8.1 G/DL (ref 11.5–16)
IMM GRANULOCYTES # BLD AUTO: 0 K/UL
IMM GRANULOCYTES NFR BLD AUTO: 0 %
LYMPHOCYTES # BLD: 0.7 K/UL (ref 0.8–3.5)
LYMPHOCYTES NFR BLD: 29 % (ref 12–49)
MCH RBC QN AUTO: 30.5 PG (ref 26–34)
MCHC RBC AUTO-ENTMCNC: 31.2 G/DL (ref 30–36.5)
MCV RBC AUTO: 97.7 FL (ref 80–99)
MONOCYTES # BLD: 0.1 K/UL (ref 0–1)
MONOCYTES NFR BLD: 5 % (ref 5–13)
NEUTS SEG # BLD: 1.6 K/UL (ref 1.8–8)
NEUTS SEG NFR BLD: 61 % (ref 32–75)
NRBC # BLD: 0.02 K/UL (ref 0–0.01)
NRBC BLD-RTO: 0.8 PER 100 WBC
PLATELET # BLD AUTO: 202 K/UL (ref 150–400)
PMV BLD AUTO: 12.7 FL (ref 8.9–12.9)
POTASSIUM SERPL-SCNC: 4.2 MMOL/L (ref 3.5–5.1)
PROT SERPL-MCNC: 5.4 G/DL (ref 6.4–8.2)
RBC # BLD AUTO: 2.66 M/UL (ref 3.8–5.2)
RBC MORPH BLD: ABNORMAL
SODIUM SERPL-SCNC: 141 MMOL/L (ref 136–145)
WBC # BLD AUTO: 2.5 K/UL (ref 3.6–11)
WBC MORPH BLD: ABNORMAL

## 2024-05-08 PROCEDURE — 80053 COMPREHEN METABOLIC PANEL: CPT

## 2024-05-08 PROCEDURE — 36415 COLL VENOUS BLD VENIPUNCTURE: CPT

## 2024-05-08 PROCEDURE — 85025 COMPLETE CBC W/AUTO DIFF WBC: CPT

## 2024-05-08 ASSESSMENT — PAIN SCALES - GENERAL: PAINLEVEL_OUTOF10: 7

## 2024-05-08 ASSESSMENT — PAIN DESCRIPTION - ORIENTATION: ORIENTATION: RIGHT;LEFT

## 2024-05-08 ASSESSMENT — PAIN DESCRIPTION - PAIN TYPE: TYPE: NEUROPATHIC PAIN

## 2024-05-08 ASSESSMENT — PAIN DESCRIPTION - LOCATION: LOCATION: FINGER (COMMENT WHICH ONE)

## 2024-05-08 NOTE — PROGRESS NOTES
[facility] Peds/Adult Note                       Date: May 8, 2024    Name: Candida Weaver    MRN: [id number]         : 1955    0939 Patient arrives for Pre Treatment Labs without acute problems. Please see EPIC for complete assessment and education provided.    Vital signs stable throughout and prior to discharge. Patient tolerated procedure well and was discharged without incident.  Patient is aware of next [facility] appointment on 24.      Ms. Weaver's vitals were reviewed prior to and after treatment.   Patient Vitals for the past 12 hrs:   Temp Pulse Resp BP   24 0939 97.8 °F (36.6 °C) 74 18 124/77     Lab results were obtained and reviewed.  No results found for this or any previous visit (from the past 12 hour(s)).      Ms. Weaver tolerated the treatment and had no complaints.    Ms. Weaver was discharged [facility] in stable condition. Discharge Instructions provided to patient, patient verbalized understanding.     Future Appointments   Date Time Provider Department Center   2024 10:30 AM RUTH CHEMO CHAIR 1 [facility]   5/10/2024 10:30 AM RUTH CHEMO CHAIR 2 [facility]   2024  9:30 AM PEDS LAB CHAIR 1 [facility]   2024 11:00 AM RUTH CHEMO CHAIR 3 [facility]   2024 10:30 AM RUTH CHEMO CHAIR 2 [facility]   2024  9:30 AM PEDS LAB CHAIR 1 [facility]   2024 10:30 AM RUTH CHEMO CHAIR 1 [facility]   2024 10:45 AM Nany Arboleda MD [facility] BS AMB   2024 10:30 AM RUTH CHEMO CHAIR 1 [facility]   2024  9:30 AM PEDS LAB CHAIR 1 [facility]   2024 10:30 AM RUTH CHEMO CHAIR 2 [facility]   2024 10:30 AM RUTH CHEMO CHAIR 2 [facility]   2024  9:30 AM PEDS LAB CHAIR 1 [facility]   2024 10:30 AM RUTH CHEMO CHAIR 2 [facility]   2024 10:30 AM RUTH CHEMO CHAIR 2 [facility]   2024  9:30 AM PEDS LAB CHAIR 1 [facility]   2024 10:30 AM RUTH CHEMO CHAIR 2 [facility]   2024 10:30 AM RUTH CHEMO CHAIR 2 [facility]   2024  9:30

## 2024-05-09 ENCOUNTER — HOSPITAL ENCOUNTER (OUTPATIENT)
Facility: HOSPITAL | Age: 69
Setting detail: INFUSION SERIES
Discharge: HOME | End: 2024-05-09
Payer: MEDICARE

## 2024-05-09 ENCOUNTER — APPOINTMENT (OUTPATIENT)
Facility: HOSPITAL | Age: 69
End: 2024-05-09
Payer: MEDICARE

## 2024-05-09 VITALS
SYSTOLIC BLOOD PRESSURE: 127 MMHG | BODY MASS INDEX: 26.26 KG/M2 | WEIGHT: 157.6 LBS | HEART RATE: 70 BPM | OXYGEN SATURATION: 96 % | TEMPERATURE: 97.9 F | HEIGHT: 65 IN | DIASTOLIC BLOOD PRESSURE: 52 MMHG

## 2024-05-09 DIAGNOSIS — Z11.59 ENCOUNTER FOR SCREENING FOR OTHER VIRAL DISEASES: Primary | ICD-10-CM

## 2024-05-09 DIAGNOSIS — C90.00 MULTIPLE MYELOMA NOT HAVING ACHIEVED REMISSION (HCC): ICD-10-CM

## 2024-05-09 PROCEDURE — 6360000002 HC RX W HCPCS: Performed by: INTERNAL MEDICINE

## 2024-05-09 PROCEDURE — 2580000003 HC RX 258: Performed by: INTERNAL MEDICINE

## 2024-05-09 PROCEDURE — 96413 CHEMO IV INFUSION 1 HR: CPT

## 2024-05-09 RX ORDER — SODIUM CHLORIDE 0.9 % (FLUSH) 0.9 %
5-40 SYRINGE (ML) INJECTION PRN
Status: DISCONTINUED | OUTPATIENT
Start: 2024-05-09 | End: 2024-05-10 | Stop reason: HOSPADM

## 2024-05-09 RX ORDER — DEXAMETHASONE 4 MG/1
10 TABLET ORAL ONCE
Status: COMPLETED | OUTPATIENT
Start: 2024-05-09 | End: 2024-05-09

## 2024-05-09 RX ORDER — DEXTROSE MONOHYDRATE 50 MG/ML
5-250 INJECTION, SOLUTION INTRAVENOUS PRN
Status: DISCONTINUED | OUTPATIENT
Start: 2024-05-09 | End: 2024-05-10 | Stop reason: HOSPADM

## 2024-05-09 RX ADMIN — SODIUM CHLORIDE, PRESERVATIVE FREE 10 ML: 5 INJECTION INTRAVENOUS at 11:09

## 2024-05-09 RX ADMIN — SODIUM CHLORIDE, PRESERVATIVE FREE 20 ML: 5 INJECTION INTRAVENOUS at 13:02

## 2024-05-09 RX ADMIN — CARFILZOMIB 49.2 MG: 10 INJECTION, POWDER, LYOPHILIZED, FOR SOLUTION INTRAVENOUS at 12:28

## 2024-05-09 RX ADMIN — DEXTROSE MONOHYDRATE 25 ML/HR: 50 INJECTION, SOLUTION INTRAVENOUS at 11:10

## 2024-05-09 RX ADMIN — DEXAMETHASONE 10 MG: 4 TABLET ORAL at 11:13

## 2024-05-09 ASSESSMENT — PAIN SCALES - GENERAL: PAINLEVEL_OUTOF10: 0

## 2024-05-09 NOTE — PROGRESS NOTES
[facility] Chemotherapy Progress Note  Date: May 9, 2024  Name: Candida Weaver  MRN: [id number]       : 1955    Pt admit to [facility] for C2 D8 Kyprolis   Assessment and port access completed by SABRINA Garcia     Port with positive blood return. Labs reviewed from  and noted to [facility] for treatment.     Ms. Weaver's vitals were reviewed.  Patient Vitals for the past 12 hrs:   Temp Pulse BP SpO2   24 1301 -- 70 (!) 127/52 --   24 1015 97.9 °F (36.6 °C) -- -- 96 %         Pre-medications  were administered as ordered and chemotherapy was initiated.  Medications Administered         carfilzomib (KYPROLIS) 49.2 mg in dextrose 5 % 100 mL chemo IVPB Admin Date  2024 Action  New Bag Dose  49.2 mg Rate  269.2 mL/hr Route  IntraVENous Administered By  Ann Morrison RN        dexAMETHasone (DECADRON) tablet 10 mg Admin Date  2024 Action  Given Dose  10 mg Rate   Route  Oral Administered By  Ann Morrison RN        dextrose 5 % solution Admin Date  2024 Action  New Bag Dose  25 mL/hr Rate  25 mL/hr Route  IntraVENous Administered By  Ann Morrison RN        sodium chloride flush 0.9 % injection 5-40 mL Admin Date  2024 Action  Given Dose  10 mL Rate   Route  IntraVENous Administered By  Ann Morrison RN        sodium chloride flush 0.9 % injection 5-40 mL Admin Date  2024 Action  Given Dose  20 mL Rate   Route  IntraVENous Administered By  Ann Morrison RN            Prior to chemotherapy/immunotherapy administration the following were verified with a second chemotherapy/immunotherapy certified nurse: Patient name, Patient  or CSN, Drug name, Drug dose, Infusion/drug volume, Rate of administration, Route of administration, Expiration dates/times, Appearance and physical integrity of the drug(s)    Please see MAR for specific drug names and time of administration.    Patient was monitored appropriately. Tolerated infusion well without issue.    Port maintained positive blood return

## 2024-05-10 ENCOUNTER — CLINICAL DOCUMENTATION (OUTPATIENT)
Dept: CASE MANAGEMENT | Age: 69
End: 2024-05-10

## 2024-05-10 ENCOUNTER — TELEPHONE (OUTPATIENT)
Age: 69
End: 2024-05-10

## 2024-05-10 ENCOUNTER — HOSPITAL ENCOUNTER (OUTPATIENT)
Facility: HOSPITAL | Age: 69
Setting detail: INFUSION SERIES
Discharge: HOME | End: 2024-05-10
Payer: MEDICARE

## 2024-05-10 VITALS
DIASTOLIC BLOOD PRESSURE: 70 MMHG | WEIGHT: 157 LBS | TEMPERATURE: 97.8 F | BODY MASS INDEX: 26.13 KG/M2 | OXYGEN SATURATION: 98 % | HEART RATE: 68 BPM | SYSTOLIC BLOOD PRESSURE: 128 MMHG

## 2024-05-10 DIAGNOSIS — C90.00 MULTIPLE MYELOMA NOT HAVING ACHIEVED REMISSION (HCC): ICD-10-CM

## 2024-05-10 DIAGNOSIS — Z11.59 ENCOUNTER FOR SCREENING FOR OTHER VIRAL DISEASES: Primary | ICD-10-CM

## 2024-05-10 PROCEDURE — 2580000003 HC RX 258: Performed by: INTERNAL MEDICINE

## 2024-05-10 PROCEDURE — 6360000002 HC RX W HCPCS: Performed by: INTERNAL MEDICINE

## 2024-05-10 PROCEDURE — 96413 CHEMO IV INFUSION 1 HR: CPT

## 2024-05-10 RX ORDER — DEXAMETHASONE 4 MG/1
10 TABLET ORAL ONCE
Status: COMPLETED | OUTPATIENT
Start: 2024-05-10 | End: 2024-05-10

## 2024-05-10 RX ORDER — DEXTROSE MONOHYDRATE 50 MG/ML
5-250 INJECTION, SOLUTION INTRAVENOUS PRN
Status: DISCONTINUED | OUTPATIENT
Start: 2024-05-10 | End: 2024-05-11 | Stop reason: HOSPADM

## 2024-05-10 RX ADMIN — CARFILZOMIB 49.2 MG: 10 INJECTION, POWDER, LYOPHILIZED, FOR SOLUTION INTRAVENOUS at 11:15

## 2024-05-10 RX ADMIN — DEXAMETHASONE 10 MG: 4 TABLET ORAL at 10:39

## 2024-05-10 ASSESSMENT — PAIN SCALES - GENERAL: PAINLEVEL_OUTOF10: 0

## 2024-05-10 NOTE — PROGRESS NOTES
[facility] Short Note                       Date: May 10, 2024    Name: Candida Weaver    MRN: [id number]         : 1955      0830 Pt admit to [facility] for Kyprolis  C2D9 ambulatory in stable condition. Assessment completed. No new concerns voiced.         Ms. Weaver's vitals were reviewed prior to and after treatment.   Patient Vitals for the past 12 hrs:   Temp Pulse BP SpO2   05/10/24 1130 -- -- 128/70 --   05/10/24 1015 97.8 °F (36.6 °C) 68 122/70 98 %         Lab results were  reviewed.      Medications given:   Medications Administered         carfilzomib (KYPROLIS) 49.2 mg in dextrose 5 % 100 mL chemo IVPB Admin Date  05/10/2024 Action  New Bag Dose  49.2 mg Rate  269.2 mL/hr Route  IntraVENous Administered By  Jazmyne Dyer RN        dexAMETHasone (DECADRON) tablet 10 mg Admin Date  05/10/2024 Action  Given Dose  10 mg Rate   Route  Oral Administered By  Jazmyne Dyer RN            Port accessed with positive blood return. Port flushed,  and de-accessed per protocol.     Ms. Weaver tolerated the infusion, and had no complaints.    Ms. Weaver was [facility] in stable condition.  Pt aware of next appt    Future Appointments   Date Time Provider Department Center   2024  9:30 AM PEDS LAB CHAIR 1 [facility]   2024 11:00 AM RUTH CHEMO CHAIR 3 [facility]   2024 10:30 AM RUTH CHEMO CHAIR 2 [facility]   2024  9:30 AM PEDS LAB CHAIR 1 [facility]   2024 10:30 AM RUTH CHEMO CHAIR 1 [facility]   2024 10:45 AM Nany Arboleda MD [facility] BS AMB   2024 10:30 AM RUTH CHEMO CHAIR 1 [facility]   2024  9:30 AM PEDS LAB CHAIR 1 [facility]   2024 10:30 AM RUTH CHEMO CHAIR 2 [facility]   2024 10:30 AM RUTH CHEMO CHAIR 2 [facility]   2024  9:30 AM PEDS LAB CHAIR 1 [facility]   2024 10:30 AM [facility]   2024 10:30 AM [facility]   2024  9:30 AM PEDS LAB CHAIR 1 [facility]   2024 10:30 AM

## 2024-05-10 NOTE — TELEPHONE ENCOUNTER
9:48am: left pt vm informed her she can call central scheduling to schedule scan. Provided her central scheduling phone number to schedule scan, provided office number for questions.

## 2024-05-10 NOTE — TELEPHONE ENCOUNTER
Patient LVM. Stated Sulaiman sent an order for an MRI. Pt stated she needed to speak to Sulaiman about having difficulty with scheduling it. Would like a call back from the team to assist with the matter.    #[phone]

## 2024-05-14 DIAGNOSIS — C90.00 MULTIPLE MYELOMA NOT HAVING ACHIEVED REMISSION (HCC): ICD-10-CM

## 2024-05-14 RX ORDER — POMALIDOMIDE 3 MG/1
3 CAPSULE ORAL DAILY
Qty: 21 CAPSULE | Refills: 0 | Status: ACTIVE | OUTPATIENT
Start: 2024-05-14

## 2024-05-14 NOTE — TELEPHONE ENCOUNTER
Oral Chemotherapy      Candida Weaver is a  69 y.o.female  diagnosed with multiple myeloma . Ms. Weaver is being treated with KPd.      Medication name: pomalidomide  Regimen: KPd  Dose:   3 mg  Frequency: daily  Administration schedule: for 21 days followed by 7 days off every 28 days  Ordering provider: Nany Arboleda MD  Start date: 5/2/24    REMS auth # [id number]  Prescription sent to pharmacy for processing.         Rose Pa, PHARMD, BCOP, BCPS    For Pharmacy Admin Tracking Only    Program: Medical Group  CPA in place:  Yes  Recommendation Provided To: Patient/Caregiver: 1 via Telephone  Intervention Detail: Refill(s) Provided  Intervention Accepted By: Patient/Caregiver: 1    Time Spent (min): 15

## 2024-05-18 DIAGNOSIS — J45.40 MODERATE PERSISTENT ASTHMA WITHOUT COMPLICATION: ICD-10-CM

## 2024-05-19 RX ORDER — MONTELUKAST SODIUM 10 MG/1
10 TABLET ORAL DAILY
Qty: 30 TABLET | Refills: 3 | Status: SHIPPED | OUTPATIENT
Start: 2024-05-19

## 2024-05-21 RX ORDER — EPINEPHRINE 1 MG/ML
0.3 INJECTION, SOLUTION INTRAMUSCULAR; SUBCUTANEOUS PRN
OUTPATIENT
Start: 2024-06-20

## 2024-05-21 RX ORDER — SODIUM CHLORIDE 9 MG/ML
INJECTION, SOLUTION INTRAVENOUS CONTINUOUS
OUTPATIENT
Start: 2024-06-21

## 2024-05-21 RX ORDER — SODIUM CHLORIDE 9 MG/ML
INJECTION, SOLUTION INTRAVENOUS CONTINUOUS
Status: CANCELLED | OUTPATIENT
Start: 2024-06-06

## 2024-05-21 RX ORDER — SODIUM CHLORIDE 0.9 % (FLUSH) 0.9 %
5-40 SYRINGE (ML) INJECTION PRN
OUTPATIENT
Start: 2024-06-13

## 2024-05-21 RX ORDER — DEXTROSE MONOHYDRATE 50 MG/ML
5-250 INJECTION, SOLUTION INTRAVENOUS PRN
OUTPATIENT
Start: 2024-06-20

## 2024-05-21 RX ORDER — DEXAMETHASONE 4 MG/1
10 TABLET ORAL ONCE
Start: 2024-06-21 | End: 2024-06-14

## 2024-05-21 RX ORDER — ONDANSETRON 2 MG/ML
8 INJECTION INTRAMUSCULAR; INTRAVENOUS
OUTPATIENT
Start: 2024-06-13

## 2024-05-21 RX ORDER — SODIUM CHLORIDE 9 MG/ML
INJECTION, SOLUTION INTRAVENOUS CONTINUOUS
OUTPATIENT
Start: 2024-06-13

## 2024-05-21 RX ORDER — DIPHENHYDRAMINE HYDROCHLORIDE 50 MG/ML
50 INJECTION INTRAMUSCULAR; INTRAVENOUS
OUTPATIENT
Start: 2024-06-13

## 2024-05-21 RX ORDER — ALBUTEROL SULFATE 90 UG/1
4 AEROSOL, METERED RESPIRATORY (INHALATION) PRN
OUTPATIENT
Start: 2024-06-20

## 2024-05-21 RX ORDER — ALBUTEROL SULFATE 90 UG/1
4 AEROSOL, METERED RESPIRATORY (INHALATION) PRN
Status: CANCELLED | OUTPATIENT
Start: 2024-06-06

## 2024-05-21 RX ORDER — ACETAMINOPHEN 325 MG/1
650 TABLET ORAL
OUTPATIENT
Start: 2024-06-13

## 2024-05-21 RX ORDER — SODIUM CHLORIDE 9 MG/ML
5-250 INJECTION, SOLUTION INTRAVENOUS PRN
Status: CANCELLED | OUTPATIENT
Start: 2024-06-06

## 2024-05-21 RX ORDER — ONDANSETRON 2 MG/ML
8 INJECTION INTRAMUSCULAR; INTRAVENOUS
OUTPATIENT
Start: 2024-06-20

## 2024-05-21 RX ORDER — ONDANSETRON 2 MG/ML
8 INJECTION INTRAMUSCULAR; INTRAVENOUS
OUTPATIENT
Start: 2024-06-14

## 2024-05-21 RX ORDER — HEPARIN 100 UNIT/ML
500 SYRINGE INTRAVENOUS PRN
OUTPATIENT
Start: 2024-06-21

## 2024-05-21 RX ORDER — ACETAMINOPHEN 325 MG/1
650 TABLET ORAL
OUTPATIENT
Start: 2024-06-20

## 2024-05-21 RX ORDER — ONDANSETRON 2 MG/ML
8 INJECTION INTRAMUSCULAR; INTRAVENOUS
Status: CANCELLED | OUTPATIENT
Start: 2024-06-07

## 2024-05-21 RX ORDER — HEPARIN 100 UNIT/ML
500 SYRINGE INTRAVENOUS PRN
OUTPATIENT
Start: 2024-06-13

## 2024-05-21 RX ORDER — ALBUTEROL SULFATE 90 UG/1
4 AEROSOL, METERED RESPIRATORY (INHALATION) PRN
OUTPATIENT
Start: 2024-06-14

## 2024-05-21 RX ORDER — EPINEPHRINE 1 MG/ML
0.3 INJECTION, SOLUTION INTRAMUSCULAR; SUBCUTANEOUS PRN
Status: CANCELLED | OUTPATIENT
Start: 2024-06-07

## 2024-05-21 RX ORDER — ACETAMINOPHEN 325 MG/1
650 TABLET ORAL
OUTPATIENT
Start: 2024-06-14

## 2024-05-21 RX ORDER — ALBUTEROL SULFATE 90 UG/1
4 AEROSOL, METERED RESPIRATORY (INHALATION) PRN
OUTPATIENT
Start: 2024-06-13

## 2024-05-21 RX ORDER — ACETAMINOPHEN 325 MG/1
650 TABLET ORAL
Status: CANCELLED | OUTPATIENT
Start: 2024-06-07

## 2024-05-21 RX ORDER — DIPHENHYDRAMINE HYDROCHLORIDE 50 MG/ML
50 INJECTION INTRAMUSCULAR; INTRAVENOUS
Status: CANCELLED | OUTPATIENT
Start: 2024-06-06

## 2024-05-21 RX ORDER — HEPARIN 100 UNIT/ML
500 SYRINGE INTRAVENOUS PRN
OUTPATIENT
Start: 2024-06-14

## 2024-05-21 RX ORDER — HEPARIN 100 UNIT/ML
500 SYRINGE INTRAVENOUS PRN
OUTPATIENT
Start: 2024-06-20

## 2024-05-21 RX ORDER — SODIUM CHLORIDE 0.9 % (FLUSH) 0.9 %
5-40 SYRINGE (ML) INJECTION PRN
OUTPATIENT
Start: 2024-06-14

## 2024-05-21 RX ORDER — ALBUTEROL SULFATE 90 UG/1
4 AEROSOL, METERED RESPIRATORY (INHALATION) PRN
Status: CANCELLED | OUTPATIENT
Start: 2024-06-07

## 2024-05-21 RX ORDER — DIPHENHYDRAMINE HYDROCHLORIDE 50 MG/ML
50 INJECTION INTRAMUSCULAR; INTRAVENOUS
OUTPATIENT
Start: 2024-06-21

## 2024-05-21 RX ORDER — EPINEPHRINE 1 MG/ML
0.3 INJECTION, SOLUTION INTRAMUSCULAR; SUBCUTANEOUS PRN
OUTPATIENT
Start: 2024-06-14

## 2024-05-21 RX ORDER — ALBUTEROL SULFATE 90 UG/1
4 AEROSOL, METERED RESPIRATORY (INHALATION) PRN
OUTPATIENT
Start: 2024-06-21

## 2024-05-21 RX ORDER — SODIUM CHLORIDE 0.9 % (FLUSH) 0.9 %
5-40 SYRINGE (ML) INJECTION PRN
OUTPATIENT
Start: 2024-06-20

## 2024-05-21 RX ORDER — DEXTROSE MONOHYDRATE 50 MG/ML
5-250 INJECTION, SOLUTION INTRAVENOUS PRN
Status: CANCELLED | OUTPATIENT
Start: 2024-06-06

## 2024-05-21 RX ORDER — HEPARIN 100 UNIT/ML
500 SYRINGE INTRAVENOUS PRN
Status: CANCELLED | OUTPATIENT
Start: 2024-06-07

## 2024-05-21 RX ORDER — DEXAMETHASONE 4 MG/1
10 TABLET ORAL ONCE
Start: 2024-06-13 | End: 2024-06-06

## 2024-05-21 RX ORDER — EPINEPHRINE 1 MG/ML
0.3 INJECTION, SOLUTION INTRAMUSCULAR; SUBCUTANEOUS PRN
OUTPATIENT
Start: 2024-06-21

## 2024-05-21 RX ORDER — SODIUM CHLORIDE 9 MG/ML
5-250 INJECTION, SOLUTION INTRAVENOUS PRN
OUTPATIENT
Start: 2024-06-14

## 2024-05-21 RX ORDER — DEXAMETHASONE 4 MG/1
10 TABLET ORAL ONCE
Status: CANCELLED
Start: 2024-06-06 | End: 2024-05-30

## 2024-05-21 RX ORDER — SODIUM CHLORIDE 9 MG/ML
5-250 INJECTION, SOLUTION INTRAVENOUS PRN
Status: CANCELLED | OUTPATIENT
Start: 2024-06-07

## 2024-05-21 RX ORDER — DIPHENHYDRAMINE HYDROCHLORIDE 50 MG/ML
50 INJECTION INTRAMUSCULAR; INTRAVENOUS
OUTPATIENT
Start: 2024-06-14

## 2024-05-21 RX ORDER — ONDANSETRON 2 MG/ML
8 INJECTION INTRAMUSCULAR; INTRAVENOUS
OUTPATIENT
Start: 2024-06-21

## 2024-05-21 RX ORDER — SODIUM CHLORIDE 0.9 % (FLUSH) 0.9 %
5-40 SYRINGE (ML) INJECTION PRN
Status: CANCELLED | OUTPATIENT
Start: 2024-06-07

## 2024-05-21 RX ORDER — SODIUM CHLORIDE 9 MG/ML
5-250 INJECTION, SOLUTION INTRAVENOUS PRN
OUTPATIENT
Start: 2024-06-13

## 2024-05-21 RX ORDER — ACETAMINOPHEN 325 MG/1
650 TABLET ORAL
OUTPATIENT
Start: 2024-06-21

## 2024-05-21 RX ORDER — DEXTROSE MONOHYDRATE 50 MG/ML
5-250 INJECTION, SOLUTION INTRAVENOUS PRN
OUTPATIENT
Start: 2024-06-13

## 2024-05-21 RX ORDER — SODIUM CHLORIDE 9 MG/ML
INJECTION, SOLUTION INTRAVENOUS CONTINUOUS
OUTPATIENT
Start: 2024-06-20

## 2024-05-21 RX ORDER — DEXTROSE MONOHYDRATE 50 MG/ML
5-250 INJECTION, SOLUTION INTRAVENOUS PRN
OUTPATIENT
Start: 2024-06-14

## 2024-05-21 RX ORDER — SODIUM CHLORIDE 9 MG/ML
5-250 INJECTION, SOLUTION INTRAVENOUS PRN
OUTPATIENT
Start: 2024-06-20

## 2024-05-21 RX ORDER — DEXTROSE MONOHYDRATE 50 MG/ML
5-250 INJECTION, SOLUTION INTRAVENOUS PRN
OUTPATIENT
Start: 2024-06-21

## 2024-05-21 RX ORDER — DIPHENHYDRAMINE HYDROCHLORIDE 50 MG/ML
50 INJECTION INTRAMUSCULAR; INTRAVENOUS
Status: CANCELLED | OUTPATIENT
Start: 2024-06-07

## 2024-05-21 RX ORDER — DEXAMETHASONE 4 MG/1
10 TABLET ORAL ONCE
Start: 2024-06-20 | End: 2024-06-13

## 2024-05-21 RX ORDER — SODIUM CHLORIDE 0.9 % (FLUSH) 0.9 %
5-40 SYRINGE (ML) INJECTION PRN
Status: CANCELLED | OUTPATIENT
Start: 2024-06-06

## 2024-05-21 RX ORDER — SODIUM CHLORIDE 9 MG/ML
INJECTION, SOLUTION INTRAVENOUS CONTINUOUS
Status: CANCELLED | OUTPATIENT
Start: 2024-06-07

## 2024-05-21 RX ORDER — ONDANSETRON 2 MG/ML
8 INJECTION INTRAMUSCULAR; INTRAVENOUS
Status: CANCELLED | OUTPATIENT
Start: 2024-06-06

## 2024-05-21 RX ORDER — DIPHENHYDRAMINE HYDROCHLORIDE 50 MG/ML
50 INJECTION INTRAMUSCULAR; INTRAVENOUS
OUTPATIENT
Start: 2024-06-20

## 2024-05-21 RX ORDER — HEPARIN 100 UNIT/ML
500 SYRINGE INTRAVENOUS PRN
Status: CANCELLED | OUTPATIENT
Start: 2024-06-06

## 2024-05-21 RX ORDER — SODIUM CHLORIDE 9 MG/ML
INJECTION, SOLUTION INTRAVENOUS CONTINUOUS
OUTPATIENT
Start: 2024-06-14

## 2024-05-21 RX ORDER — EPINEPHRINE 1 MG/ML
0.3 INJECTION, SOLUTION INTRAMUSCULAR; SUBCUTANEOUS PRN
Status: CANCELLED | OUTPATIENT
Start: 2024-06-06

## 2024-05-21 RX ORDER — DEXTROSE MONOHYDRATE 50 MG/ML
5-250 INJECTION, SOLUTION INTRAVENOUS PRN
Status: CANCELLED | OUTPATIENT
Start: 2024-06-07

## 2024-05-21 RX ORDER — ACETAMINOPHEN 325 MG/1
650 TABLET ORAL
Status: CANCELLED | OUTPATIENT
Start: 2024-06-06

## 2024-05-21 RX ORDER — EPINEPHRINE 1 MG/ML
0.3 INJECTION, SOLUTION INTRAMUSCULAR; SUBCUTANEOUS PRN
OUTPATIENT
Start: 2024-06-13

## 2024-05-21 RX ORDER — SODIUM CHLORIDE 0.9 % (FLUSH) 0.9 %
5-40 SYRINGE (ML) INJECTION PRN
OUTPATIENT
Start: 2024-06-21

## 2024-05-21 RX ORDER — DEXAMETHASONE 4 MG/1
10 TABLET ORAL ONCE
Start: 2024-06-14 | End: 2024-06-07

## 2024-05-21 RX ORDER — SODIUM CHLORIDE 9 MG/ML
5-250 INJECTION, SOLUTION INTRAVENOUS PRN
OUTPATIENT
Start: 2024-06-21

## 2024-05-21 RX ORDER — DEXAMETHASONE 4 MG/1
10 TABLET ORAL ONCE
Status: CANCELLED
Start: 2024-06-07 | End: 2024-05-31

## 2024-05-22 ENCOUNTER — APPOINTMENT (OUTPATIENT)
Facility: HOSPITAL | Age: 69
End: 2024-05-22
Payer: MEDICARE

## 2024-05-23 ENCOUNTER — TELEPHONE (OUTPATIENT)
Age: 69
End: 2024-05-23

## 2024-05-23 ENCOUNTER — APPOINTMENT (OUTPATIENT)
Facility: HOSPITAL | Age: 69
End: 2024-05-23
Payer: MEDICARE

## 2024-05-23 ENCOUNTER — HOSPITAL ENCOUNTER (OUTPATIENT)
Facility: HOSPITAL | Age: 69
Setting detail: INFUSION SERIES
End: 2024-05-23

## 2024-05-23 DIAGNOSIS — C90.00 MULTIPLE MYELOMA NOT HAVING ACHIEVED REMISSION (HCC): Primary | ICD-10-CM

## 2024-05-23 RX ORDER — LIDOCAINE AND PRILOCAINE 25; 25 MG/G; MG/G
CREAM TOPICAL PRN
Qty: 30 G | Refills: 1 | Status: SHIPPED | OUTPATIENT
Start: 2024-05-23

## 2024-05-24 ENCOUNTER — APPOINTMENT (OUTPATIENT)
Facility: HOSPITAL | Age: 69
End: 2024-05-24
Payer: MEDICARE

## 2024-05-29 ENCOUNTER — HOSPITAL ENCOUNTER (OUTPATIENT)
Facility: HOSPITAL | Age: 69
Setting detail: INFUSION SERIES
Discharge: HOME | End: 2024-05-29
Payer: MEDICARE

## 2024-05-29 ENCOUNTER — HOSPITAL ENCOUNTER (OUTPATIENT)
Facility: HOSPITAL | Age: 69
Discharge: HOME | End: 2024-06-01
Payer: MEDICARE

## 2024-05-29 VITALS
HEART RATE: 73 BPM | RESPIRATION RATE: 18 BRPM | OXYGEN SATURATION: 97 % | SYSTOLIC BLOOD PRESSURE: 122 MMHG | TEMPERATURE: 98.1 F | DIASTOLIC BLOOD PRESSURE: 73 MMHG

## 2024-05-29 DIAGNOSIS — C90.00 MULTIPLE MYELOMA NOT HAVING ACHIEVED REMISSION (HCC): Primary | ICD-10-CM

## 2024-05-29 DIAGNOSIS — Z11.59 ENCOUNTER FOR SCREENING FOR OTHER VIRAL DISEASES: ICD-10-CM

## 2024-05-29 DIAGNOSIS — C90.00 MULTIPLE MYELOMA NOT HAVING ACHIEVED REMISSION (HCC): ICD-10-CM

## 2024-05-29 DIAGNOSIS — R26.81 UNSTEADY GAIT: ICD-10-CM

## 2024-05-29 LAB
ALBUMIN SERPL-MCNC: 3.4 G/DL (ref 3.5–5)
ALBUMIN/GLOB SERPL: 1.3 (ref 1.1–2.2)
ALP SERPL-CCNC: 88 U/L (ref 45–117)
ALT SERPL-CCNC: 28 U/L (ref 12–78)
ANION GAP SERPL CALC-SCNC: 5 MMOL/L (ref 5–15)
AST SERPL-CCNC: 14 U/L (ref 15–37)
BASOPHILS # BLD: 0.1 K/UL (ref 0–0.1)
BASOPHILS NFR BLD: 3 % (ref 0–1)
BILIRUB SERPL-MCNC: 0.3 MG/DL (ref 0.2–1)
BUN SERPL-MCNC: 12 MG/DL (ref 6–20)
BUN/CREAT SERPL: 19 (ref 12–20)
CALCIUM SERPL-MCNC: 8.9 MG/DL (ref 8.5–10.1)
CHLORIDE SERPL-SCNC: 110 MMOL/L (ref 97–108)
CO2 SERPL-SCNC: 26 MMOL/L (ref 21–32)
CREAT SERPL-MCNC: 0.64 MG/DL (ref 0.55–1.02)
DIFFERENTIAL METHOD BLD: ABNORMAL
EOSINOPHIL # BLD: 0.1 K/UL (ref 0–0.4)
EOSINOPHIL NFR BLD: 3 % (ref 0–7)
ERYTHROCYTE [DISTWIDTH] IN BLOOD BY AUTOMATED COUNT: 15 % (ref 11.5–14.5)
GLOBULIN SER CALC-MCNC: 2.6 G/DL (ref 2–4)
GLUCOSE SERPL-MCNC: 105 MG/DL (ref 65–100)
HCT VFR BLD AUTO: 27.8 % (ref 35–47)
HGB BLD-MCNC: 8.9 G/DL (ref 11.5–16)
IMM GRANULOCYTES # BLD AUTO: 0 K/UL (ref 0–0.04)
IMM GRANULOCYTES NFR BLD AUTO: 0 % (ref 0–0.5)
LYMPHOCYTES # BLD: 1 K/UL (ref 0.8–3.5)
LYMPHOCYTES NFR BLD: 38 % (ref 12–49)
MCH RBC QN AUTO: 30.5 PG (ref 26–34)
MCHC RBC AUTO-ENTMCNC: 32 G/DL (ref 30–36.5)
MCV RBC AUTO: 95.2 FL (ref 80–99)
MONOCYTES # BLD: 0.5 K/UL (ref 0–1)
MONOCYTES NFR BLD: 18 % (ref 5–13)
NEUTS SEG # BLD: 0.9 K/UL (ref 1.8–8)
NEUTS SEG NFR BLD: 38 % (ref 32–75)
NRBC # BLD: 0 K/UL (ref 0–0.01)
NRBC BLD-RTO: 0 PER 100 WBC
PLATELET # BLD AUTO: 333 K/UL (ref 150–400)
PMV BLD AUTO: 11.1 FL (ref 8.9–12.9)
POTASSIUM SERPL-SCNC: 3.6 MMOL/L (ref 3.5–5.1)
PROT SERPL-MCNC: 6 G/DL (ref 6.4–8.2)
RBC # BLD AUTO: 2.92 M/UL (ref 3.8–5.2)
RBC MORPH BLD: ABNORMAL
SODIUM SERPL-SCNC: 141 MMOL/L (ref 136–145)
WBC # BLD AUTO: 2.6 K/UL (ref 3.6–11)

## 2024-05-29 PROCEDURE — A9579 GAD-BASE MR CONTRAST NOS,1ML: HCPCS

## 2024-05-29 PROCEDURE — 85025 COMPLETE CBC W/AUTO DIFF WBC: CPT

## 2024-05-29 PROCEDURE — 86334 IMMUNOFIX E-PHORESIS SERUM: CPT

## 2024-05-29 PROCEDURE — 6360000004 HC RX CONTRAST MEDICATION

## 2024-05-29 PROCEDURE — 82784 ASSAY IGA/IGD/IGG/IGM EACH: CPT

## 2024-05-29 PROCEDURE — 83521 IG LIGHT CHAINS FREE EACH: CPT

## 2024-05-29 PROCEDURE — 80053 COMPREHEN METABOLIC PANEL: CPT

## 2024-05-29 PROCEDURE — 70553 MRI BRAIN STEM W/O & W/DYE: CPT

## 2024-05-29 PROCEDURE — 84165 PROTEIN E-PHORESIS SERUM: CPT

## 2024-05-29 PROCEDURE — 36415 COLL VENOUS BLD VENIPUNCTURE: CPT

## 2024-05-29 RX ADMIN — GADOTERIDOL 14 ML: 279.3 INJECTION, SOLUTION INTRAVENOUS at 11:10

## 2024-05-29 ASSESSMENT — PAIN SCALES - GENERAL: PAINLEVEL_OUTOF10: 0

## 2024-05-29 NOTE — PROGRESS NOTES
[facility] Lab visit:     0930: Patient arrived ambulatory and in no distress.  Pretreatment Labs drawn from [facility]. Departed [facility] ambulatory and in no distress.     Visit Vitals:  Patient Vitals for the past 12 hrs:   Temp Pulse Resp BP SpO2   05/29/24 0930 98.1 °F (36.7 °C) 73 18 122/73 97 %       Labs: See CC for pending results.  Recent Results (from the past 12 hour(s))   Comprehensive metabolic panel    Collection Time: 05/29/24  9:40 AM   Result Value Ref Range    Sodium 141 136 - 145 mmol/L    Potassium 3.6 3.5 - 5.1 mmol/L    Chloride 110 (H) 97 - 108 mmol/L    CO2 26 21 - 32 mmol/L    Anion Gap 5 5 - 15 mmol/L    Glucose 105 (H) 65 - 100 mg/dL    BUN 12 6 - 20 MG/DL    Creatinine 0.64 0.55 - 1.02 MG/DL    BUN/Creatinine Ratio 19 12 - 20      Est, Glom Filt Rate >90 >60 ml/min/1.73m2    Calcium 8.9 8.5 - 10.1 MG/DL    Total Bilirubin 0.3 0.2 - 1.0 MG/DL    ALT 28 12 - 78 U/L    AST 14 (L) 15 - 37 U/L    Alk Phosphatase 88 45 - 117 U/L    Total Protein 6.0 (L) 6.4 - 8.2 g/dL    Albumin 3.4 (L) 3.5 - 5.0 g/dL    Globulin 2.6 2.0 - 4.0 g/dL    Albumin/Globulin Ratio 1.3 1.1 - 2.2     CBC With Auto Differential    Collection Time: 05/29/24  9:40 AM   Result Value Ref Range    WBC 2.6 (L) 3.6 - 11.0 K/uL    RBC 2.92 (L) 3.80 - 5.20 M/uL    Hemoglobin 8.9 (L) 11.5 - 16.0 g/dL    Hematocrit 27.8 (L) 35.0 - 47.0 %    MCV 95.2 80.0 - 99.0 FL    MCH 30.5 26.0 - 34.0 PG    MCHC 32.0 30.0 - 36.5 g/dL    RDW 15.0 (H) 11.5 - 14.5 %    Platelets 333 150 - 400 K/uL    MPV 11.1 8.9 - 12.9 FL    Nucleated RBCs 0.0 0  WBC    nRBC 0.00 0.00 - 0.01 K/uL    Neutrophils % 38 32 - 75 %    Lymphocytes % 38 12 - 49 %    Monocytes % 18 (H) 5 - 13 %    Eosinophils % 3 0 - 7 %    Basophils % 3 (H) 0 - 1 %    Immature Granulocytes % 0 0.0 - 0.5 %    Neutrophils Absolute 0.9 (L) 1.8 - 8.0 K/UL    Lymphocytes Absolute 1.0 0.8 - 3.5 K/UL    Monocytes Absolute 0.5 0.0 - 1.0 K/UL    Eosinophils Absolute 0.1 0.0 - 0.4 K/UL    Basophils

## 2024-05-30 ENCOUNTER — HOSPITAL ENCOUNTER (OUTPATIENT)
Facility: HOSPITAL | Age: 69
Setting detail: INFUSION SERIES
Discharge: HOME | End: 2024-05-30
Payer: MEDICARE

## 2024-05-30 VITALS — DIASTOLIC BLOOD PRESSURE: 47 MMHG | SYSTOLIC BLOOD PRESSURE: 116 MMHG | HEART RATE: 72 BPM

## 2024-05-30 DIAGNOSIS — D70.1 CHEMOTHERAPY INDUCED NEUTROPENIA (HCC): ICD-10-CM

## 2024-05-30 DIAGNOSIS — T45.1X5A CHEMOTHERAPY INDUCED NEUTROPENIA (HCC): ICD-10-CM

## 2024-05-30 DIAGNOSIS — Z11.59 ENCOUNTER FOR SCREENING FOR OTHER VIRAL DISEASES: ICD-10-CM

## 2024-05-30 DIAGNOSIS — C90.00 MULTIPLE MYELOMA NOT HAVING ACHIEVED REMISSION (HCC): Primary | ICD-10-CM

## 2024-05-30 LAB
BASOPHILS # BLD: 0 K/UL (ref 0–0.1)
BASOPHILS NFR BLD: 1 % (ref 0–1)
DIFFERENTIAL METHOD BLD: ABNORMAL
EOSINOPHIL # BLD: 0.1 K/UL (ref 0–0.4)
EOSINOPHIL NFR BLD: 3 % (ref 0–7)
ERYTHROCYTE [DISTWIDTH] IN BLOOD BY AUTOMATED COUNT: 15 % (ref 11.5–14.5)
HCT VFR BLD AUTO: 27.1 % (ref 35–47)
HGB BLD-MCNC: 8.4 G/DL (ref 11.5–16)
IMM GRANULOCYTES # BLD AUTO: 0 K/UL
IMM GRANULOCYTES NFR BLD AUTO: 0 %
LYMPHOCYTES # BLD: 1.1 K/UL (ref 0.8–3.5)
LYMPHOCYTES NFR BLD: 48 % (ref 12–49)
MCH RBC QN AUTO: 29.8 PG (ref 26–34)
MCHC RBC AUTO-ENTMCNC: 31 G/DL (ref 30–36.5)
MCV RBC AUTO: 96.1 FL (ref 80–99)
MONOCYTES # BLD: 0.4 K/UL (ref 0–1)
MONOCYTES NFR BLD: 15 % (ref 5–13)
NEUTS SEG # BLD: 0.8 K/UL (ref 1.8–8)
NEUTS SEG NFR BLD: 33 % (ref 32–75)
NRBC # BLD: 0 K/UL (ref 0–0.01)
NRBC BLD-RTO: 0 PER 100 WBC
PLATELET # BLD AUTO: 318 K/UL (ref 150–400)
PMV BLD AUTO: 11.5 FL (ref 8.9–12.9)
RBC # BLD AUTO: 2.82 M/UL (ref 3.8–5.2)
RBC MORPH BLD: ABNORMAL
WBC # BLD AUTO: 2.4 K/UL (ref 3.6–11)
WBC MORPH BLD: ABNORMAL

## 2024-05-30 PROCEDURE — 2580000003 HC RX 258

## 2024-05-30 PROCEDURE — 96372 THER/PROPH/DIAG INJ SC/IM: CPT

## 2024-05-30 PROCEDURE — 6360000002 HC RX W HCPCS

## 2024-05-30 PROCEDURE — 36415 COLL VENOUS BLD VENIPUNCTURE: CPT

## 2024-05-30 PROCEDURE — 96365 THER/PROPH/DIAG IV INF INIT: CPT

## 2024-05-30 PROCEDURE — 85025 COMPLETE CBC W/AUTO DIFF WBC: CPT

## 2024-05-30 PROCEDURE — 6360000002 HC RX W HCPCS: Performed by: INTERNAL MEDICINE

## 2024-05-30 RX ORDER — SODIUM CHLORIDE 9 MG/ML
5-250 INJECTION, SOLUTION INTRAVENOUS PRN
OUTPATIENT
Start: 2024-06-20

## 2024-05-30 RX ORDER — SODIUM CHLORIDE 0.9 % (FLUSH) 0.9 %
5-40 SYRINGE (ML) INJECTION PRN
OUTPATIENT
Start: 2024-06-20

## 2024-05-30 RX ORDER — ONDANSETRON 2 MG/ML
8 INJECTION INTRAMUSCULAR; INTRAVENOUS
OUTPATIENT
Start: 2024-06-20

## 2024-05-30 RX ORDER — DIPHENHYDRAMINE HYDROCHLORIDE 50 MG/ML
50 INJECTION INTRAMUSCULAR; INTRAVENOUS
OUTPATIENT
Start: 2024-05-31

## 2024-05-30 RX ORDER — SODIUM CHLORIDE 9 MG/ML
5-250 INJECTION, SOLUTION INTRAVENOUS PRN
Status: DISCONTINUED | OUTPATIENT
Start: 2024-05-30 | End: 2024-05-31 | Stop reason: HOSPADM

## 2024-05-30 RX ORDER — EPINEPHRINE 1 MG/ML
0.3 INJECTION, SOLUTION INTRAMUSCULAR; SUBCUTANEOUS PRN
OUTPATIENT
Start: 2024-06-20

## 2024-05-30 RX ORDER — ZOLEDRONIC ACID 0.04 MG/ML
4 INJECTION, SOLUTION INTRAVENOUS ONCE
OUTPATIENT
Start: 2024-06-20 | End: 2024-06-20

## 2024-05-30 RX ORDER — ALBUTEROL SULFATE 90 UG/1
4 AEROSOL, METERED RESPIRATORY (INHALATION) PRN
OUTPATIENT
Start: 2024-06-20

## 2024-05-30 RX ORDER — SODIUM CHLORIDE 0.9 % (FLUSH) 0.9 %
5-40 SYRINGE (ML) INJECTION PRN
Status: DISCONTINUED | OUTPATIENT
Start: 2024-05-30 | End: 2024-05-31 | Stop reason: HOSPADM

## 2024-05-30 RX ORDER — ALBUTEROL SULFATE 90 UG/1
4 AEROSOL, METERED RESPIRATORY (INHALATION) PRN
OUTPATIENT
Start: 2024-05-31

## 2024-05-30 RX ORDER — HEPARIN 100 UNIT/ML
500 SYRINGE INTRAVENOUS PRN
OUTPATIENT
Start: 2024-06-20

## 2024-05-30 RX ORDER — ACETAMINOPHEN 325 MG/1
650 TABLET ORAL
OUTPATIENT
Start: 2024-06-20

## 2024-05-30 RX ORDER — ZOLEDRONIC ACID 0.04 MG/ML
4 INJECTION, SOLUTION INTRAVENOUS ONCE
Status: COMPLETED | OUTPATIENT
Start: 2024-05-30 | End: 2024-05-30

## 2024-05-30 RX ORDER — EPINEPHRINE 1 MG/ML
0.3 INJECTION, SOLUTION INTRAMUSCULAR; SUBCUTANEOUS PRN
OUTPATIENT
Start: 2024-05-31

## 2024-05-30 RX ORDER — ACETAMINOPHEN 325 MG/1
650 TABLET ORAL
OUTPATIENT
Start: 2024-05-31

## 2024-05-30 RX ORDER — DIPHENHYDRAMINE HYDROCHLORIDE 50 MG/ML
50 INJECTION INTRAMUSCULAR; INTRAVENOUS
OUTPATIENT
Start: 2024-06-20

## 2024-05-30 RX ORDER — SODIUM CHLORIDE 9 MG/ML
INJECTION, SOLUTION INTRAVENOUS CONTINUOUS
OUTPATIENT
Start: 2024-05-31

## 2024-05-30 RX ORDER — SODIUM CHLORIDE 9 MG/ML
INJECTION, SOLUTION INTRAVENOUS CONTINUOUS
OUTPATIENT
Start: 2024-06-20

## 2024-05-30 RX ORDER — ONDANSETRON 2 MG/ML
8 INJECTION INTRAMUSCULAR; INTRAVENOUS
OUTPATIENT
Start: 2024-05-31

## 2024-05-30 RX ORDER — HEPARIN 100 UNIT/ML
500 SYRINGE INTRAVENOUS PRN
Status: DISCONTINUED | OUTPATIENT
Start: 2024-05-30 | End: 2024-05-31 | Stop reason: HOSPADM

## 2024-05-30 RX ADMIN — FILGRASTIM-AAFI 480 MCG: 480 INJECTION, SOLUTION SUBCUTANEOUS at 12:28

## 2024-05-30 RX ADMIN — ZOLEDRONIC ACID 4 MG: 0.04 INJECTION, SOLUTION INTRAVENOUS at 10:03

## 2024-05-30 RX ADMIN — SODIUM CHLORIDE 25 ML/HR: 9 INJECTION, SOLUTION INTRAVENOUS at 10:02

## 2024-05-30 NOTE — PROGRESS NOTES
[facility] Progress Note    10:30 Ms. Weaver Arrived ambulatory and in no distress for KYPROLIS (HELD)/ ZOMETA C3D1 regimen.  Assessment was completed, no acute issues at this time, no new complaints voiced.  Port accessed without difficulty, labs drawn and processed.      Ms. Weaver's vitals were reviewed.  Patient Vitals for the past 12 hrs:   Pulse BP   05/30/24 1214 72 (!) 116/47         Lab results were obtained and reviewed.  Recent Results (from the past 12 hour(s))   CBC with Auto Differential    Collection Time: 05/30/24  9:47 AM   Result Value Ref Range    WBC 2.4 (L) 3.6 - 11.0 K/uL    RBC 2.82 (L) 3.80 - 5.20 M/uL    Hemoglobin 8.4 (L) 11.5 - 16.0 g/dL    Hematocrit 27.1 (L) 35.0 - 47.0 %    MCV 96.1 80.0 - 99.0 FL    MCH 29.8 26.0 - 34.0 PG    MCHC 31.0 30.0 - 36.5 g/dL    RDW 15.0 (H) 11.5 - 14.5 %    Platelets 318 150 - 400 K/uL    MPV 11.5 8.9 - 12.9 FL    Nucleated RBCs 0.0 0  WBC    nRBC 0.00 0.00 - 0.01 K/uL    Neutrophils % 33 32 - 75 %    Lymphocytes % 48 12 - 49 %    Monocytes % 15 (H) 5 - 13 %    Eosinophils % 3 0 - 7 %    Basophils % 1 0 - 1 %    Immature Granulocytes % 0 %    Neutrophils Absolute 0.8 (L) 1.8 - 8.0 K/UL    Lymphocytes Absolute 1.1 0.8 - 3.5 K/UL    Monocytes Absolute 0.4 0.0 - 1.0 K/UL    Eosinophils Absolute 0.1 0.0 - 0.4 K/UL    Basophils Absolute 0.0 0.0 - 0.1 K/UL    Immature Granulocytes Absolute 0.0 K/UL    Differential Type MANUAL      RBC Comment ANISOCYTOSIS  1+        RBC Comment MACROCYTOSIS  1+        RBC Comment OVALOCYTES  PRESENT        RBC Comment TEARDROP CELLS  PRESENT        WBC Comment REACTIVE LYMPHS         Pre-medications  were administered as ordered and chemotherapy was initiated.  Medications Administered         0.9 % sodium chloride infusion Admin Date  05/30/2024 Action  New Bag Dose  25 mL/hr Rate  25 mL/hr Route  IntraVENous Administered By  Ruby Tabor, RN        filgrastim-aafi (NIVESTYM) injection 480 mcg Admin Date  05/30/2024

## 2024-05-31 ENCOUNTER — APPOINTMENT (OUTPATIENT)
Facility: HOSPITAL | Age: 69
End: 2024-05-31
Payer: MEDICARE

## 2024-06-05 ENCOUNTER — HOSPITAL ENCOUNTER (OUTPATIENT)
Facility: HOSPITAL | Age: 69
Setting detail: INFUSION SERIES
Discharge: HOME | End: 2024-06-05
Payer: MEDICARE

## 2024-06-05 VITALS
DIASTOLIC BLOOD PRESSURE: 56 MMHG | SYSTOLIC BLOOD PRESSURE: 128 MMHG | RESPIRATION RATE: 18 BRPM | OXYGEN SATURATION: 97 % | TEMPERATURE: 98.1 F | HEART RATE: 79 BPM

## 2024-06-05 DIAGNOSIS — C90.00 MULTIPLE MYELOMA NOT HAVING ACHIEVED REMISSION (HCC): ICD-10-CM

## 2024-06-05 DIAGNOSIS — Z11.59 ENCOUNTER FOR SCREENING FOR OTHER VIRAL DISEASES: ICD-10-CM

## 2024-06-05 LAB
ALBUMIN SERPL-MCNC: 3.6 G/DL (ref 3.5–5)
ALBUMIN/GLOB SERPL: 1.2 (ref 1.1–2.2)
ALP SERPL-CCNC: 92 U/L (ref 45–117)
ALT SERPL-CCNC: 25 U/L (ref 12–78)
ANION GAP SERPL CALC-SCNC: 6 MMOL/L (ref 5–15)
AST SERPL-CCNC: 11 U/L (ref 15–37)
BASOPHILS # BLD: 0.1 K/UL (ref 0–0.1)
BASOPHILS NFR BLD: 3 % (ref 0–1)
BILIRUB SERPL-MCNC: 0.3 MG/DL (ref 0.2–1)
BUN SERPL-MCNC: 17 MG/DL (ref 6–20)
BUN/CREAT SERPL: 22 (ref 12–20)
CALCIUM SERPL-MCNC: 9.5 MG/DL (ref 8.5–10.1)
CHLORIDE SERPL-SCNC: 106 MMOL/L (ref 97–108)
CO2 SERPL-SCNC: 26 MMOL/L (ref 21–32)
CREAT SERPL-MCNC: 0.78 MG/DL (ref 0.55–1.02)
DIFFERENTIAL METHOD BLD: ABNORMAL
DIFFERENTIAL METHOD BLD: ABNORMAL
EOSINOPHIL # BLD: 0.1 K/UL (ref 0–0.4)
EOSINOPHIL NFR BLD: 5 % (ref 0–7)
ERYTHROCYTE [DISTWIDTH] IN BLOOD BY AUTOMATED COUNT: 15.8 % (ref 11.5–14.5)
ERYTHROCYTE [DISTWIDTH] IN BLOOD BY AUTOMATED COUNT: 15.9 % (ref 11.5–14.5)
GLOBULIN SER CALC-MCNC: 2.9 G/DL (ref 2–4)
GLUCOSE SERPL-MCNC: 102 MG/DL (ref 65–100)
HCT VFR BLD AUTO: 28.5 % (ref 35–47)
HCT VFR BLD AUTO: 29.2 % (ref 35–47)
HGB BLD-MCNC: 9.1 G/DL (ref 11.5–16)
HGB BLD-MCNC: 9.1 G/DL (ref 11.5–16)
IMM GRANULOCYTES # BLD AUTO: 0 K/UL
IMM GRANULOCYTES NFR BLD AUTO: 0 %
LYMPHOCYTES # BLD: 0.5 K/UL (ref 0.8–3.5)
LYMPHOCYTES NFR BLD: 22 % (ref 12–49)
MCH RBC QN AUTO: 29.6 PG (ref 26–34)
MCH RBC QN AUTO: 30.2 PG (ref 26–34)
MCHC RBC AUTO-ENTMCNC: 31.2 G/DL (ref 30–36.5)
MCHC RBC AUTO-ENTMCNC: 31.9 G/DL (ref 30–36.5)
MCV RBC AUTO: 94.7 FL (ref 80–99)
MCV RBC AUTO: 95.1 FL (ref 80–99)
METAMYELOCYTES NFR BLD MANUAL: 2 %
MONOCYTES # BLD: 0.1 K/UL (ref 0–1)
MONOCYTES NFR BLD: 3 % (ref 5–13)
NEUTS SEG # BLD: 1.6 K/UL (ref 1.8–8)
NEUTS SEG NFR BLD: 65 % (ref 32–75)
NRBC # BLD: 0 K/UL (ref 0–0.01)
NRBC BLD-RTO: 0 PER 100 WBC
PLATELET # BLD AUTO: 255 K/UL (ref 150–400)
PLATELET # BLD AUTO: 258 K/UL (ref 150–400)
PMV BLD AUTO: 12.4 FL (ref 8.9–12.9)
POTASSIUM SERPL-SCNC: 3.3 MMOL/L (ref 3.5–5.1)
PROT SERPL-MCNC: 6.5 G/DL (ref 6.4–8.2)
RBC # BLD AUTO: 3.01 M/UL (ref 3.8–5.2)
RBC # BLD AUTO: 3.07 M/UL (ref 3.8–5.2)
RBC MORPH BLD: ABNORMAL
SODIUM SERPL-SCNC: 138 MMOL/L (ref 136–145)
WBC # BLD AUTO: 2.4 K/UL (ref 3.6–11)
WBC # BLD AUTO: 2.4 K/UL (ref 3.6–11)

## 2024-06-05 PROCEDURE — 82784 ASSAY IGA/IGD/IGG/IGM EACH: CPT

## 2024-06-05 PROCEDURE — 83521 IG LIGHT CHAINS FREE EACH: CPT

## 2024-06-05 PROCEDURE — 36415 COLL VENOUS BLD VENIPUNCTURE: CPT

## 2024-06-05 PROCEDURE — 84165 PROTEIN E-PHORESIS SERUM: CPT

## 2024-06-05 PROCEDURE — 85025 COMPLETE CBC W/AUTO DIFF WBC: CPT

## 2024-06-05 PROCEDURE — 80053 COMPREHEN METABOLIC PANEL: CPT

## 2024-06-05 PROCEDURE — 86334 IMMUNOFIX E-PHORESIS SERUM: CPT

## 2024-06-05 ASSESSMENT — PAIN DESCRIPTION - PAIN TYPE: TYPE: OTHER (COMMENT)

## 2024-06-05 ASSESSMENT — PAIN DESCRIPTION - ORIENTATION: ORIENTATION: RIGHT;LEFT

## 2024-06-05 ASSESSMENT — PAIN DESCRIPTION - LOCATION: LOCATION: HAND

## 2024-06-05 ASSESSMENT — PAIN DESCRIPTION - DESCRIPTORS: DESCRIPTORS: BURNING;THROBBING

## 2024-06-05 ASSESSMENT — PAIN SCALES - GENERAL: PAINLEVEL_OUTOF10: 7

## 2024-06-05 NOTE — PROGRESS NOTES
\[facility]\"" Peds/Adult Note                       Date: 2024    Name: Candida Weaver    MRN: [id number]         : 1955    0935 Patient arrives for Pre-treatment labs without acute problems. Please see Epic for complete assessment and education provided.    Vital signs stable throughout and prior to discharge. Patient tolerated procedure well and was discharged without incident.  Patient is aware of next \[facility]\"" appointment on 2024.  Appointment card give to the Patient.       Ms. Weaver's vitals were reviewed prior to and after treatment.   Patient Vitals for the past 12 hrs:   Temp Pulse Resp BP SpO2   24 0935 98.1 °F (36.7 °C) 79 18 (!) 128/56 97 %         Lab results were obtained and reviewed.  Labs Pending, please see [facility] for results.    Recent Results (from the past 12 hour(s))   CBC with Partial Differential    Collection Time: 24  9:58 AM   Result Value Ref Range    WBC 2.4 (L) 3.6 - 11.0 K/uL    RBC 3.01 (L) 3.80 - 5.20 M/uL    Hemoglobin 9.1 (L) 11.5 - 16.0 g/dL    Hematocrit 28.5 (L) 35.0 - 47.0 %    MCV 94.7 80.0 - 99.0 FL    MCH 30.2 26.0 - 34.0 PG    MCHC 31.9 30.0 - 36.5 g/dL    RDW 15.8 (H) 11.5 - 14.5 %    Platelets 255 150 - 400 K/uL    Neutrophils % PENDING %    Mixed Cells PENDING 3.2 - 16.9 %    Lymphocytes % PENDING %    Neutrophils Absolute PENDING K/UL    ABSOLUTE MIXED CELLS PENDING 0.2 - 1.2 K/uL    Lymphocytes Absolute PENDING K/UL    Differential Type AUTOMATED     Comprehensive metabolic panel    Collection Time: 24  9:58 AM   Result Value Ref Range    Sodium 138 136 - 145 mmol/L    Potassium 3.3 (L) 3.5 - 5.1 mmol/L    Chloride 106 97 - 108 mmol/L    CO2 26 21 - 32 mmol/L    Anion Gap 6 5 - 15 mmol/L    Glucose 102 (H) 65 - 100 mg/dL    BUN 17 6 - 20 MG/DL    Creatinine 0.78 0.55 - 1.02 MG/DL    BUN/Creatinine Ratio 22 (H) 12 - 20      Est, Glom Filt Rate 81 >60 ml/min/1.73m2    Calcium 9.5 8.5 - 10.1 MG/DL    Total Bilirubin 0.3 0.2 - 1.0 MG/DL

## 2024-06-06 ENCOUNTER — HOSPITAL ENCOUNTER (OUTPATIENT)
Facility: HOSPITAL | Age: 69
Setting detail: INFUSION SERIES
Discharge: HOME | End: 2024-06-06
Payer: MEDICARE

## 2024-06-06 ENCOUNTER — OFFICE VISIT (OUTPATIENT)
Age: 69
End: 2024-06-06
Payer: MEDICARE

## 2024-06-06 ENCOUNTER — CLINICAL DOCUMENTATION (OUTPATIENT)
Age: 69
End: 2024-06-06

## 2024-06-06 ENCOUNTER — APPOINTMENT (OUTPATIENT)
Facility: HOSPITAL | Age: 69
End: 2024-06-06
Payer: MEDICARE

## 2024-06-06 VITALS
BODY MASS INDEX: 25.62 KG/M2 | DIASTOLIC BLOOD PRESSURE: 44 MMHG | HEART RATE: 84 BPM | WEIGHT: 153.8 LBS | HEIGHT: 65 IN | TEMPERATURE: 97.9 F | SYSTOLIC BLOOD PRESSURE: 121 MMHG | RESPIRATION RATE: 18 BRPM

## 2024-06-06 VITALS
TEMPERATURE: 97.9 F | BODY MASS INDEX: 25.46 KG/M2 | RESPIRATION RATE: 18 BRPM | DIASTOLIC BLOOD PRESSURE: 63 MMHG | WEIGHT: 153 LBS | OXYGEN SATURATION: 93 % | SYSTOLIC BLOOD PRESSURE: 104 MMHG | HEART RATE: 84 BPM

## 2024-06-06 DIAGNOSIS — Z11.59 ENCOUNTER FOR SCREENING FOR OTHER VIRAL DISEASES: ICD-10-CM

## 2024-06-06 DIAGNOSIS — C90.00 MULTIPLE MYELOMA NOT HAVING ACHIEVED REMISSION (HCC): Primary | ICD-10-CM

## 2024-06-06 DIAGNOSIS — C90.00 MULTIPLE MYELOMA NOT HAVING ACHIEVED REMISSION (HCC): ICD-10-CM

## 2024-06-06 DIAGNOSIS — Z11.59 ENCOUNTER FOR SCREENING FOR OTHER VIRAL DISEASES: Primary | ICD-10-CM

## 2024-06-06 PROCEDURE — 1090F PRES/ABSN URINE INCON ASSESS: CPT

## 2024-06-06 PROCEDURE — 99215 OFFICE O/P EST HI 40 MIN: CPT

## 2024-06-06 PROCEDURE — 1123F ACP DISCUSS/DSCN MKR DOCD: CPT

## 2024-06-06 PROCEDURE — 1036F TOBACCO NON-USER: CPT

## 2024-06-06 PROCEDURE — 3017F COLORECTAL CA SCREEN DOC REV: CPT

## 2024-06-06 PROCEDURE — 2580000003 HC RX 258: Performed by: INTERNAL MEDICINE

## 2024-06-06 PROCEDURE — 6370000000 HC RX 637 (ALT 250 FOR IP): Performed by: INTERNAL MEDICINE

## 2024-06-06 PROCEDURE — G8427 DOCREV CUR MEDS BY ELIG CLIN: HCPCS

## 2024-06-06 PROCEDURE — G8419 CALC BMI OUT NRM PARAM NOF/U: HCPCS

## 2024-06-06 PROCEDURE — 96413 CHEMO IV INFUSION 1 HR: CPT

## 2024-06-06 PROCEDURE — 6360000002 HC RX W HCPCS: Performed by: INTERNAL MEDICINE

## 2024-06-06 PROCEDURE — G8399 PT W/DXA RESULTS DOCUMENT: HCPCS

## 2024-06-06 RX ORDER — DEXTROSE MONOHYDRATE 50 MG/ML
5-250 INJECTION, SOLUTION INTRAVENOUS PRN
Status: DISCONTINUED | OUTPATIENT
Start: 2024-06-06 | End: 2024-06-07 | Stop reason: HOSPADM

## 2024-06-06 RX ORDER — POTASSIUM CHLORIDE 750 MG/1
40 TABLET, FILM COATED, EXTENDED RELEASE ORAL ONCE
Status: COMPLETED | OUTPATIENT
Start: 2024-06-06 | End: 2024-06-06

## 2024-06-06 RX ORDER — SODIUM CHLORIDE 0.9 % (FLUSH) 0.9 %
5-40 SYRINGE (ML) INJECTION PRN
Status: DISCONTINUED | OUTPATIENT
Start: 2024-06-06 | End: 2024-06-07 | Stop reason: HOSPADM

## 2024-06-06 RX ORDER — DEXAMETHASONE 4 MG/1
10 TABLET ORAL ONCE
Status: COMPLETED | OUTPATIENT
Start: 2024-06-06 | End: 2024-06-06

## 2024-06-06 RX ADMIN — CARFILZOMIB 49.2 MG: 10 INJECTION, POWDER, LYOPHILIZED, FOR SOLUTION INTRAVENOUS at 12:21

## 2024-06-06 RX ADMIN — POTASSIUM CHLORIDE 40 MEQ: 750 TABLET, FILM COATED, EXTENDED RELEASE ORAL at 13:06

## 2024-06-06 RX ADMIN — DEXTROSE MONOHYDRATE 25 ML/HR: 50 INJECTION, SOLUTION INTRAVENOUS at 11:58

## 2024-06-06 RX ADMIN — DEXAMETHASONE 10 MG: 4 TABLET ORAL at 11:22

## 2024-06-06 ASSESSMENT — PAIN DESCRIPTION - DESCRIPTORS: DESCRIPTORS: BURNING

## 2024-06-06 ASSESSMENT — PAIN DESCRIPTION - LOCATION: LOCATION: HAND

## 2024-06-06 ASSESSMENT — PAIN DESCRIPTION - ORIENTATION: ORIENTATION: RIGHT;LEFT

## 2024-06-06 NOTE — PROGRESS NOTES
Candida Weaver is a 69 y.o. female    Chief Complaint   Patient presents with    Follow-up     Multiple Myeloma        1. Have you been to the ER, urgent care clinic since your last visit?  Hospitalized since your last visit?No    2. Have you seen or consulted any other health care providers outside of the [facility] since your last visit?  Include any pap smears or colon screening. No      
management of HR disease  Treatments are palliative.   Barrier of care - complete understanding of diagnosis.  Patient on drug therapy requiring intensive monitoring for toxicity.  Patient case discussed with Dr. Nany Arboleda.      Plan:     Proceed with C3D1 KPd (Carfilzomib 27 mg/m2 on days 1,2, 8,9,15, and 16, Pomalyst 3mg on days 1-21, dexamethasone 40mg PO on days 1,8,15 and 22 for cycle 1-4 then 20mg) given every 4 weeks  Labs: CBC weekly with treatment; CMP, SPEP and serum free light chains prior to each cycle  PRN Antiemetics: Prochlorperazine and Ondansetron PRN  Will not dose escalate Kyprolis beyond 27mg/m2.   ASA, Acyclovir  Cbc, cmp,gammoathy  Zometa q6 weeks  Cymbalta 60 mg daily  Tylenol 1000mg q8h PRN for pain  Consider ultrasound of spleen at a later date  RTC in 4 weeks for OV/OPIC    I appreciate the opportunity to participate in Ms. Candida Weaver's care.    Signed By: KIERA Swanson NP

## 2024-06-06 NOTE — PROGRESS NOTES
Oral Chemotherapy      Candida Weaver is a  69 y.o.female  diagnosed with multiple myeloma . Ms. Weaver is being treated with KPd.      Medication name: pomalidomide  Regimen: KPd  Dose:   3 mg  Frequency: daily  Administration schedule: for 21 days followed by 7 days off every 28 days  Ordering provider: Nany Arboleda MD  Start date: 6/6/24 (Cycle 3 - delayed 1 week due to low ANC)    Lab Results   Component Value Date    WBC 2.4 (L) 06/05/2024    WBC 2.4 (L) 06/05/2024    HGB 9.1 (L) 06/05/2024    HGB 9.1 (L) 06/05/2024    HCT 28.5 (L) 06/05/2024    HCT 29.2 (L) 06/05/2024    MCV 94.7 06/05/2024    MCV 95.1 06/05/2024     06/05/2024     06/05/2024    LYMPHOPCT 22 06/05/2024    RBC 3.01 (L) 06/05/2024    RBC 3.07 (L) 06/05/2024    MCH 30.2 06/05/2024    MCH 29.6 06/05/2024    MCHC 31.9 06/05/2024    MCHC 31.2 06/05/2024    RDW 15.8 (H) 06/05/2024    RDW 15.9 (H) 06/05/2024     Lab Results   Component Value Date    NEUTROABS 1.6 (L) 06/05/2024         Potassium level 3.3 mmol/L. Replacement ordered in [facility] with Potassium chloride 40 mEq oral    Expected follow up date: Labs/office visit each treatment. Next cycle start on 7/3/24     Patient provided with an Oral Chemotherapy Journal.      Ms. Weaver verbalized understanding of the information presented and all of the patient's questions were answered.              Rose Pa, PharmD, BCPS, BCOP    For Pharmacy Admin Tracking Only    Program: Medical Group  CPA in place:  Yes  Recommendation Provided To: Patient/Caregiver: 2 via In person  Intervention Detail: New Rx: 1, reason: Needs Additional Therapy  Intervention Accepted By: Patient/Caregiver: 2    Time Spent (min): 15

## 2024-06-06 NOTE — PROGRESS NOTES
[facility] Chemotherapy Progress Note    Date: 2024    Name: Candida Weaver    MRN: [id number]         : 1955      1030 Pt admit to [facility] for  C3D1 Kyprolis ambulatory in stable condition. Assessment completed. No new concerns voiced. Port with positive blood return. Labs sent for processing.         Ms. Weaver's vitals were reviewed.  Patient Vitals for the past 12 hrs:   Temp Pulse Resp BP   24 0945 97.9 °F (36.6 °C) 84 18 (!) 121/44         Lab results were obtained and reviewed.      Pre-medications  were administered as ordered and chemotherapy was initiated.  Medications Administered         carfilzomib (KYPROLIS) 49.2 mg in dextrose 5 % 100 mL chemo IVPB Admin Date  2024 Action  New Bag Dose  49.2 mg Rate  269.2 mL/hr Route  IntraVENous Administered By  Nicole Villegas RN        dexAMETHasone (DECADRON) tablet 10 mg Admin Date  2024 Action  Given Dose  10 mg Rate   Route  Oral Administered By  Nicole Villegas RN        dextrose 5 % solution Admin Date  2024 Action  New Bag Dose  25 mL/hr Rate  25 mL/hr Route  IntraVENous Administered By  Nicole Villegas RN        dextrose 5 % solution Admin Date  2024 Action  Rate/Dose Change Dose   Rate  20 mL/hr Route  IntraVENous Administered By  Nicole Villegas RN        dextrose 5 % solution Admin Date  2024 Action  Rate/Dose Change Dose   Rate  25 mL/hr Route  IntraVENous Administered By  Nicole Villegas RN        dextrose 5 % solution Admin Date  2024 Action  Rate/Dose Verify Dose   Rate  25 mL/hr Route  IntraVENous Administered By  Nicole Villegas RN        potassium chloride (KLOR-CON) extended release tablet 40 mEq Admin Date  2024 Action  Given Dose  40 mEq Rate   Route  Oral Administered By  Nicole Villegas RN            Two nurses verified prior to administering:Drug name, Drug doseInfusion volume or drug volume when prepared in a syringe, Rate of administration, Route of administration,  Expiration dates and/or times, Appearance and physical integrity of the drugs, Rate set on infusion pump, when used, Sequencing of drug administration.       Pt tolerated treatment well. Port maintained positive blood return throughout treatment. Flushed, heparinized and de-accessed per protocol. D/c home ambulatory in no distress. Pt aware of next appointment scheduled.    Future Appointments   Date Time Provider Department Center   6/7/2024 10:30 AM [facility] CHEMO CHAIR 2 [facility]   6/12/2024  9:30 AM PEDS LAB CHAIR 1 [facility]   6/13/2024 10:00 AM [facility] CHEMO CHAIR 11 [facility]   6/14/2024 10:00 AM [facility] CHEMO CHAIR 1 [facility]   6/19/2024  9:30 AM PEDS LAB CHAIR 1 [facility]   6/20/2024 11:00 AM [facility] CHEMO CHAIR 3 [facility]   6/21/2024 10:30 AM [facility] CHEMO CHAIR 2 [facility]   7/2/2024  9:30 AM PEDS LAB CHAIR 1 [facility]   7/3/2024 11:00 AM [facility] CHEMO CHAIR 3 [facility]   7/5/2024 11:30 AM [facility] CHEMO CHAIR 4 [facility]   7/10/2024  9:30 AM PEDS LAB CHAIR 1 [facility]   7/11/2024 10:30 AM [facility] CHEMO CHAIR 2 [facility]   7/12/2024 11:00 AM [facility] CHEMO CHAIR 3 [facility]   7/17/2024  9:30 AM PEDS LAB CHAIR 1 [facility]   7/18/2024 10:00 AM [facility] CHEMO CHAIR 1 [facility]   7/19/2024 10:30 AM [facility] CHEMO CHAIR 2 [facility]   7/24/2024  9:30 AM PEDS LAB CHAIR 1 [facility]   7/31/2024  9:30 AM PEDS LAB CHAIR 1 [facility]   8/1/2024 10:30 AM [facility] CHEMO CHAIR 2 [facility]   8/2/2024 10:30 AM [facility] CHEMO CHAIR 2 [facility]   8/7/2024  9:30 AM PEDS LAB CHAIR 1 [facility]   8/8/2024 10:30 AM [facility] CHEMO CHAIR 2 [facility]   8/9/2024 11:00 AM [facility]MO CHAIR 3 [facility]         Nicole Villegas RN  June 6, 2024

## 2024-06-07 ENCOUNTER — HOSPITAL ENCOUNTER (OUTPATIENT)
Facility: HOSPITAL | Age: 69
Setting detail: INFUSION SERIES
Discharge: HOME | End: 2024-06-07
Payer: MEDICARE

## 2024-06-07 ENCOUNTER — APPOINTMENT (OUTPATIENT)
Facility: HOSPITAL | Age: 69
End: 2024-06-07
Payer: MEDICARE

## 2024-06-07 VITALS — HEART RATE: 80 BPM | DIASTOLIC BLOOD PRESSURE: 70 MMHG | SYSTOLIC BLOOD PRESSURE: 142 MMHG | TEMPERATURE: 97.9 F

## 2024-06-07 DIAGNOSIS — Z11.59 ENCOUNTER FOR SCREENING FOR OTHER VIRAL DISEASES: Primary | ICD-10-CM

## 2024-06-07 DIAGNOSIS — C90.00 MULTIPLE MYELOMA NOT HAVING ACHIEVED REMISSION (HCC): ICD-10-CM

## 2024-06-07 LAB
ALBUMIN SERPL ELPH-MCNC: 3.4 G/DL (ref 2.9–4.4)
ALBUMIN/GLOB SERPL: 1.8 (ref 0.7–1.7)
ALPHA1 GLOB SERPL ELPH-MCNC: 0.3 G/DL (ref 0–0.4)
ALPHA2 GLOB SERPL ELPH-MCNC: 0.6 G/DL (ref 0.4–1)
B-GLOBULIN SERPL ELPH-MCNC: 0.8 G/DL (ref 0.7–1.3)
GAMMA GLOB SERPL ELPH-MCNC: 0.2 G/DL (ref 0.4–1.8)
GLOBULIN SER-MCNC: 1.9 G/DL (ref 2.2–3.9)
IGA SERPL-MCNC: 6 MG/DL (ref 64–422)
IGG SERPL-MCNC: 230 MG/DL (ref 586–1602)
IGM SERPL-MCNC: 6 MG/DL (ref 26–217)
INTERPRETATION SERPL IEP-IMP: ABNORMAL
KAPPA LC FREE SER-MCNC: 851.6 MG/L (ref 3.3–19.4)
KAPPA LC FREE/LAMBDA FREE SER: 315.41 (ref 0.26–1.65)
LAMBDA LC FREE SERPL-MCNC: 2.7 MG/L (ref 5.7–26.3)
M PROTEIN SERPL ELPH-MCNC: ABNORMAL G/DL
PROT SERPL-MCNC: 5.3 G/DL (ref 6–8.5)

## 2024-06-07 PROCEDURE — 2580000003 HC RX 258: Performed by: INTERNAL MEDICINE

## 2024-06-07 PROCEDURE — 6360000002 HC RX W HCPCS: Performed by: INTERNAL MEDICINE

## 2024-06-07 PROCEDURE — 96413 CHEMO IV INFUSION 1 HR: CPT

## 2024-06-07 RX ORDER — DEXAMETHASONE 4 MG/1
10 TABLET ORAL ONCE
Status: COMPLETED | OUTPATIENT
Start: 2024-06-07 | End: 2024-06-07

## 2024-06-07 RX ORDER — DEXTROSE MONOHYDRATE 50 MG/ML
5-250 INJECTION, SOLUTION INTRAVENOUS PRN
Status: DISCONTINUED | OUTPATIENT
Start: 2024-06-07 | End: 2024-06-08 | Stop reason: HOSPADM

## 2024-06-07 RX ORDER — DEXAMETHASONE 4 MG/1
10 TABLET ORAL ONCE
Status: DISCONTINUED | OUTPATIENT
Start: 2024-06-07 | End: 2024-06-07

## 2024-06-07 RX ORDER — HEPARIN 100 UNIT/ML
500 SYRINGE INTRAVENOUS PRN
Status: DISCONTINUED | OUTPATIENT
Start: 2024-06-07 | End: 2024-06-08 | Stop reason: HOSPADM

## 2024-06-07 RX ORDER — SODIUM CHLORIDE 0.9 % (FLUSH) 0.9 %
5-40 SYRINGE (ML) INJECTION PRN
Status: DISCONTINUED | OUTPATIENT
Start: 2024-06-07 | End: 2024-06-08 | Stop reason: HOSPADM

## 2024-06-07 RX ORDER — SODIUM CHLORIDE 9 MG/ML
5-250 INJECTION, SOLUTION INTRAVENOUS PRN
Status: DISCONTINUED | OUTPATIENT
Start: 2024-06-07 | End: 2024-06-08 | Stop reason: HOSPADM

## 2024-06-07 RX ADMIN — SODIUM CHLORIDE, PRESERVATIVE FREE 20 ML: 5 INJECTION INTRAVENOUS at 11:32

## 2024-06-07 RX ADMIN — DEXTROSE MONOHYDRATE 50 ML/HR: 50 INJECTION, SOLUTION INTRAVENOUS at 10:30

## 2024-06-07 RX ADMIN — DEXAMETHASONE 10 MG: 4 TABLET ORAL at 10:35

## 2024-06-07 RX ADMIN — CARFILZOMIB 49.2 MG: 10 INJECTION, POWDER, LYOPHILIZED, FOR SOLUTION INTRAVENOUS at 11:01

## 2024-06-07 ASSESSMENT — PAIN DESCRIPTION - LOCATION: LOCATION: HAND

## 2024-06-07 ASSESSMENT — PAIN DESCRIPTION - ORIENTATION: ORIENTATION: RIGHT;LEFT

## 2024-06-07 ASSESSMENT — PAIN DESCRIPTION - DESCRIPTORS: DESCRIPTORS: BURNING

## 2024-06-07 ASSESSMENT — PAIN SCALES - GENERAL: PAINLEVEL_OUTOF10: 6

## 2024-06-07 NOTE — PROGRESS NOTES
Appointments   Date Time Provider Department Center   6/12/2024  9:30 AM PEDS LAB CHAIR 1 [facility]   6/13/2024 10:00 AM [facility] CHEMO CHAIR 11 [facility]   6/14/2024 10:00 AM [facility] CHEMO CHAIR 1 [facility]   6/19/2024  9:30 AM PEDS LAB CHAIR 1 [facility]   6/20/2024 11:00 AM [facility] CHEMO CHAIR 3 [facility]   6/21/2024 10:30 AM [facility] CHEMO CHAIR 2 [facility]   7/2/2024  9:30 AM PEDS LAB CHAIR 1 [facility]   7/3/2024 11:00 AM [facility] CHEMO CHAIR 3 [facility]   7/5/2024 11:30 AM [facility] CHEMO CHAIR 4 [facility]   7/10/2024  9:30 AM PEDS LAB CHAIR 1 [facility]   7/11/2024 10:30 AM [facility] CHEMO CHAIR 2 [facility]   7/12/2024 11:00 AM [facility] CHEMO CHAIR 3 [facility]   7/17/2024  9:30 AM PEDS LAB CHAIR 1 [facility]   7/18/2024 10:00 AM [facility] CHEMO CHAIR 1 [facility]   7/19/2024 10:30 AM [facility] CHEMO CHAIR 2 [facility]   7/24/2024  9:30 AM PEDS LAB CHAIR 1 [facility]   7/31/2024  9:30 AM PEDS LAB CHAIR 1 [facility]   8/1/2024 10:30 AM [facility] CHEMO CHAIR 2 [facility]   8/2/2024 10:30 AM [facility] CHEMO CHAIR 2 [facility]   8/7/2024  9:30 AM PEDS LAB CHAIR 1 [facility]   8/8/2024 10:30 AM [facility] CHEMO CHAIR 2 [facility]   8/9/2024 11:00 AM [facility] CHEMO CHAIR 3 [facility]         MÓNICA KIMBALL RN  June 7, 2024

## 2024-06-10 DIAGNOSIS — M51.36 LUMBAR DEGENERATIVE DISC DISEASE: ICD-10-CM

## 2024-06-10 RX ORDER — BACLOFEN 10 MG/1
10 TABLET ORAL NIGHTLY
Qty: 30 TABLET | Refills: 0 | Status: SHIPPED | OUTPATIENT
Start: 2024-06-10

## 2024-06-12 ENCOUNTER — HOSPITAL ENCOUNTER (OUTPATIENT)
Facility: HOSPITAL | Age: 69
Setting detail: INFUSION SERIES
Discharge: HOME | End: 2024-06-12
Payer: MEDICARE

## 2024-06-12 VITALS
RESPIRATION RATE: 18 BRPM | HEART RATE: 72 BPM | DIASTOLIC BLOOD PRESSURE: 50 MMHG | OXYGEN SATURATION: 99 % | TEMPERATURE: 98.1 F | SYSTOLIC BLOOD PRESSURE: 134 MMHG

## 2024-06-12 DIAGNOSIS — Z11.59 ENCOUNTER FOR SCREENING FOR OTHER VIRAL DISEASES: ICD-10-CM

## 2024-06-12 DIAGNOSIS — C90.00 MULTIPLE MYELOMA NOT HAVING ACHIEVED REMISSION (HCC): ICD-10-CM

## 2024-06-12 LAB
ALBUMIN SERPL-MCNC: 3.3 G/DL (ref 3.5–5)
ALBUMIN/GLOB SERPL: 1.2 (ref 1.1–2.2)
ALP SERPL-CCNC: 60 U/L (ref 45–117)
ALT SERPL-CCNC: 28 U/L (ref 12–78)
ANION GAP SERPL CALC-SCNC: 4 MMOL/L (ref 5–15)
AST SERPL-CCNC: 8 U/L (ref 15–37)
BASOPHILS # BLD: 0 K/UL (ref 0–0.1)
BASOPHILS NFR BLD: 1 % (ref 0–1)
BILIRUB SERPL-MCNC: 0.2 MG/DL (ref 0.2–1)
BUN SERPL-MCNC: 14 MG/DL (ref 6–20)
BUN/CREAT SERPL: 20 (ref 12–20)
CALCIUM SERPL-MCNC: 9 MG/DL (ref 8.5–10.1)
CHLORIDE SERPL-SCNC: 108 MMOL/L (ref 97–108)
CO2 SERPL-SCNC: 26 MMOL/L (ref 21–32)
CREAT SERPL-MCNC: 0.69 MG/DL (ref 0.55–1.02)
DIFFERENTIAL METHOD BLD: ABNORMAL
EOSINOPHIL # BLD: 0.1 K/UL (ref 0–0.4)
EOSINOPHIL NFR BLD: 3 % (ref 0–7)
ERYTHROCYTE [DISTWIDTH] IN BLOOD BY AUTOMATED COUNT: 15.4 % (ref 11.5–14.5)
GLOBULIN SER CALC-MCNC: 2.7 G/DL (ref 2–4)
GLUCOSE SERPL-MCNC: 88 MG/DL (ref 65–100)
HCT VFR BLD AUTO: 27.8 % (ref 35–47)
HGB BLD-MCNC: 8.8 G/DL (ref 11.5–16)
IMM GRANULOCYTES # BLD AUTO: 0 K/UL (ref 0–0.04)
IMM GRANULOCYTES NFR BLD AUTO: 0 % (ref 0–0.5)
LYMPHOCYTES # BLD: 0.6 K/UL (ref 0.8–3.5)
LYMPHOCYTES NFR BLD: 21 % (ref 12–49)
MCH RBC QN AUTO: 29.6 PG (ref 26–34)
MCHC RBC AUTO-ENTMCNC: 31.7 G/DL (ref 30–36.5)
MCV RBC AUTO: 93.6 FL (ref 80–99)
MONOCYTES # BLD: 0.2 K/UL (ref 0–1)
MONOCYTES NFR BLD: 8 % (ref 5–13)
NEUTS SEG # BLD: 2.1 K/UL (ref 1.8–8)
NEUTS SEG NFR BLD: 67 % (ref 32–75)
NRBC # BLD: 0.03 K/UL (ref 0–0.01)
NRBC BLD-RTO: 1 PER 100 WBC
PLATELET # BLD AUTO: 216 K/UL (ref 150–400)
PMV BLD AUTO: 12.7 FL (ref 8.9–12.9)
POTASSIUM SERPL-SCNC: 4.3 MMOL/L (ref 3.5–5.1)
PROT SERPL-MCNC: 6 G/DL (ref 6.4–8.2)
RBC # BLD AUTO: 2.97 M/UL (ref 3.8–5.2)
RBC MORPH BLD: ABNORMAL
SODIUM SERPL-SCNC: 138 MMOL/L (ref 136–145)
WBC # BLD AUTO: 3 K/UL (ref 3.6–11)

## 2024-06-12 PROCEDURE — 36415 COLL VENOUS BLD VENIPUNCTURE: CPT

## 2024-06-12 PROCEDURE — 80053 COMPREHEN METABOLIC PANEL: CPT

## 2024-06-12 PROCEDURE — 85025 COMPLETE CBC W/AUTO DIFF WBC: CPT

## 2024-06-12 ASSESSMENT — PAIN DESCRIPTION - PAIN TYPE: TYPE: NEUROPATHIC PAIN

## 2024-06-12 ASSESSMENT — PAIN DESCRIPTION - ORIENTATION: ORIENTATION: RIGHT;LEFT

## 2024-06-12 ASSESSMENT — PAIN DESCRIPTION - LOCATION: LOCATION: HAND

## 2024-06-12 ASSESSMENT — PAIN DESCRIPTION - DESCRIPTORS: DESCRIPTORS: BURNING

## 2024-06-12 ASSESSMENT — PAIN SCALES - GENERAL: PAINLEVEL_OUTOF10: 7

## 2024-06-12 NOTE — PROGRESS NOTES
[facility] Peds/Adult Note                       Date: 2024    Name: Candida Weaver    MRN: [id number]         : 1955    0935 Patient arrives for Pre-treatment labs without acute problems. Please see Epic for complete assessment and education provided.    Vital signs stable throughout and prior to discharge. Patient tolerated procedure well and was discharged without incident.  Patient is aware of next [facility] appointment on 2024.  Appointment card give to the Patient.       Ms. Weaver's vitals were reviewed prior to and after treatment.   Patient Vitals for the past 12 hrs:   Temp Pulse Resp BP SpO2   24 0935 98.1 °F (36.7 °C) 72 18 (!) 134/50 99 %         Lab results were obtained and reviewed.  Recent Results (from the past 12 hour(s))   CBC With Auto Differential    Collection Time: 24  9:48 AM   Result Value Ref Range    WBC 3.0 (L) 3.6 - 11.0 K/uL    RBC 2.97 (L) 3.80 - 5.20 M/uL    Hemoglobin 8.8 (L) 11.5 - 16.0 g/dL    Hematocrit 27.8 (L) 35.0 - 47.0 %    MCV 93.6 80.0 - 99.0 FL    MCH 29.6 26.0 - 34.0 PG    MCHC 31.7 30.0 - 36.5 g/dL    RDW 15.4 (H) 11.5 - 14.5 %    Platelets 216 150 - 400 K/uL    MPV 12.7 8.9 - 12.9 FL    Nucleated RBCs 1.0 (H) 0  WBC    nRBC 0.03 (H) 0.00 - 0.01 K/uL    Neutrophils % PENDING %    Lymphocytes % PENDING %    Monocytes % PENDING %    Eosinophils % PENDING %    Basophils % PENDING %    Immature Granulocytes % PENDING %    Neutrophils Absolute PENDING K/UL    Lymphocytes Absolute PENDING K/UL    Monocytes Absolute PENDING K/UL    Eosinophils Absolute PENDING K/UL    Basophils Absolute PENDING K/UL    Immature Granulocytes Absolute PENDING K/UL    Differential Type PENDING    Comprehensive metabolic panel    Collection Time: 24  9:48 AM   Result Value Ref Range    Sodium 138 136 - 145 mmol/L    Potassium 4.3 3.5 - 5.1 mmol/L    Chloride 108 97 - 108 mmol/L    CO2 26 21 - 32 mmol/L    Anion Gap 4 (L) 5 - 15 mmol/L    Glucose 88 65 - 100  mg/dL    BUN 14 6 - 20 MG/DL    Creatinine 0.69 0.55 - 1.02 MG/DL    BUN/Creatinine Ratio 20 12 - 20      Est, Glom Filt Rate >90 >60 ml/min/1.73m2    Calcium 9.0 8.5 - 10.1 MG/DL    Total Bilirubin 0.2 0.2 - 1.0 MG/DL    ALT 28 12 - 78 U/L    AST 8 (L) 15 - 37 U/L    Alk Phosphatase 60 45 - 117 U/L    Total Protein 6.0 (L) 6.4 - 8.2 g/dL    Albumin 3.3 (L) 3.5 - 5.0 g/dL    Globulin 2.7 2.0 - 4.0 g/dL    Albumin/Globulin Ratio 1.2 1.1 - 2.2       Ms. Weaver tolerated the infusion, and had no complaints.    [facility] condition.     ANDRIA KILLIAN RN  June 12, 2024  10:33 AM

## 2024-06-13 ENCOUNTER — HOSPITAL ENCOUNTER (OUTPATIENT)
Facility: HOSPITAL | Age: 69
Setting detail: INFUSION SERIES
Discharge: HOME | End: 2024-06-13
Payer: MEDICARE

## 2024-06-13 VITALS
BODY MASS INDEX: 25.96 KG/M2 | HEIGHT: 65 IN | DIASTOLIC BLOOD PRESSURE: 69 MMHG | OXYGEN SATURATION: 97 % | TEMPERATURE: 98.2 F | SYSTOLIC BLOOD PRESSURE: 137 MMHG | HEART RATE: 74 BPM | WEIGHT: 155.8 LBS

## 2024-06-13 DIAGNOSIS — C90.00 MULTIPLE MYELOMA NOT HAVING ACHIEVED REMISSION (HCC): ICD-10-CM

## 2024-06-13 DIAGNOSIS — Z11.59 ENCOUNTER FOR SCREENING FOR OTHER VIRAL DISEASES: Primary | ICD-10-CM

## 2024-06-13 LAB
ALBUMIN SERPL ELPH-MCNC: 3.9 G/DL (ref 2.9–4.4)
ALBUMIN/GLOB SERPL: 1.9 (ref 0.7–1.7)
ALPHA1 GLOB SERPL ELPH-MCNC: 0.3 G/DL (ref 0–0.4)
ALPHA2 GLOB SERPL ELPH-MCNC: 0.7 G/DL (ref 0.4–1)
B-GLOBULIN SERPL ELPH-MCNC: 0.9 G/DL (ref 0.7–1.3)
GAMMA GLOB SERPL ELPH-MCNC: 0.2 G/DL (ref 0.4–1.8)
GLOBULIN SER-MCNC: 2.1 G/DL (ref 2.2–3.9)
IGA SERPL-MCNC: 6 MG/DL (ref 64–422)
IGG SERPL-MCNC: 236 MG/DL (ref 586–1602)
IGM SERPL-MCNC: 6 MG/DL (ref 26–217)
INTERPRETATION SERPL IEP-IMP: ABNORMAL
KAPPA LC FREE SER-MCNC: 916.7 MG/L (ref 3.3–19.4)
KAPPA LC FREE/LAMBDA FREE SER: 269.62 (ref 0.26–1.65)
LAMBDA LC FREE SERPL-MCNC: 3.4 MG/L (ref 5.7–26.3)
M PROTEIN SERPL ELPH-MCNC: ABNORMAL G/DL
PROT SERPL-MCNC: 6 G/DL (ref 6–8.5)

## 2024-06-13 PROCEDURE — 96413 CHEMO IV INFUSION 1 HR: CPT

## 2024-06-13 PROCEDURE — 2580000003 HC RX 258: Performed by: INTERNAL MEDICINE

## 2024-06-13 PROCEDURE — 6360000002 HC RX W HCPCS: Performed by: INTERNAL MEDICINE

## 2024-06-13 RX ORDER — DEXAMETHASONE 4 MG/1
10 TABLET ORAL ONCE
Status: COMPLETED | OUTPATIENT
Start: 2024-06-13 | End: 2024-06-13

## 2024-06-13 RX ORDER — DEXTROSE MONOHYDRATE 50 MG/ML
5-250 INJECTION, SOLUTION INTRAVENOUS PRN
Status: DISCONTINUED | OUTPATIENT
Start: 2024-06-13 | End: 2024-06-14 | Stop reason: HOSPADM

## 2024-06-13 RX ORDER — SODIUM CHLORIDE 0.9 % (FLUSH) 0.9 %
5-40 SYRINGE (ML) INJECTION PRN
Status: DISCONTINUED | OUTPATIENT
Start: 2024-06-13 | End: 2024-06-14 | Stop reason: HOSPADM

## 2024-06-13 RX ADMIN — SODIUM CHLORIDE, PRESERVATIVE FREE 20 ML: 5 INJECTION INTRAVENOUS at 11:09

## 2024-06-13 RX ADMIN — DEXTROSE MONOHYDRATE 50 ML/HR: 50 INJECTION, SOLUTION INTRAVENOUS at 10:16

## 2024-06-13 RX ADMIN — CARFILZOMIB 49.2 MG: 10 INJECTION, POWDER, LYOPHILIZED, FOR SOLUTION INTRAVENOUS at 10:36

## 2024-06-13 RX ADMIN — DEXAMETHASONE 10 MG: 4 TABLET ORAL at 10:12

## 2024-06-13 ASSESSMENT — PAIN DESCRIPTION - PAIN TYPE: TYPE: NEUROPATHIC PAIN

## 2024-06-13 ASSESSMENT — PAIN DESCRIPTION - LOCATION: LOCATION: HAND

## 2024-06-13 ASSESSMENT — PAIN DESCRIPTION - DESCRIPTORS: DESCRIPTORS: TINGLING;BURNING

## 2024-06-13 ASSESSMENT — PAIN SCALES - GENERAL: PAINLEVEL_OUTOF10: 6

## 2024-06-13 ASSESSMENT — PAIN DESCRIPTION - ORIENTATION: ORIENTATION: RIGHT;LEFT

## 2024-06-13 NOTE — PROGRESS NOTES
[facility] Chemotherapy Progress Note    Date: 2024    Name: Candida Weaver    MRN: [id number]         : 1955      1000 Pt admit to [facility] for C3 D8 Kyprolis ambulatory in stable condition. Assessment completed. No new concerns voiced. Port with positive blood return. Labs sent for processing on 24.                       No data to display                  Ms. Weaver's vitals were reviewed.  Patient Vitals for the past 12 hrs:   Temp Pulse BP SpO2   24 1107 -- 74 137/69 --   24 0936 98.2 °F (36.8 °C) 73 130/64 97 %         Lab results were obtained and reviewed.  No results found for this or any previous visit (from the past 12 hour(s)).    Pre-medications  were administered as ordered and chemotherapy was initiated.  Medications Administered         carfilzomib (KYPROLIS) 49.2 mg in dextrose 5 % 100 mL chemo IVPB Admin Date  2024 Action  New Bag Dose  49.2 mg Rate  269.2 mL/hr Route  IntraVENous Administered By  Debra Fragoso, SABRINA        dexAMETHasone (DECADRON) tablet 10 mg Admin Date  2024 Action  Given Dose  10 mg Rate   Route  Oral Administered By  Debra Fragoso, RN        dextrose 5 % solution Admin Date  2024 Action  New Bag Dose  50 mL/hr Rate  50 mL/hr Route  IntraVENous Administered By  Debra Fragoso, RN        sodium chloride flush 0.9 % injection 5-40 mL Admin Date  2024 Action  Given Dose  20 mL Rate   Route  IntraVENous Administered By  Debra Fragoso, RN            Two nurses verified prior to administering:Drug name, Drug doseInfusion volume or drug volume when prepared in a syringe, Rate of administration, Route of administration, Expiration dates and/or times, Appearance and physical integrity of the drugs, Rate set on infusion pump, when used, Sequencing of drug administration.      1115 Pt tolerated treatment well. Port maintained positive blood return throughout treatment. Flushed, and de-accessed per protocol. D/c home ambulatory in no

## 2024-06-14 ENCOUNTER — HOSPITAL ENCOUNTER (OUTPATIENT)
Facility: HOSPITAL | Age: 69
Setting detail: INFUSION SERIES
Discharge: HOME | End: 2024-06-14
Payer: MEDICARE

## 2024-06-14 VITALS
WEIGHT: 159 LBS | HEART RATE: 80 BPM | BODY MASS INDEX: 26.49 KG/M2 | DIASTOLIC BLOOD PRESSURE: 80 MMHG | SYSTOLIC BLOOD PRESSURE: 140 MMHG | RESPIRATION RATE: 18 BRPM | HEIGHT: 65 IN | TEMPERATURE: 98.6 F

## 2024-06-14 DIAGNOSIS — Z11.59 ENCOUNTER FOR SCREENING FOR OTHER VIRAL DISEASES: Primary | ICD-10-CM

## 2024-06-14 DIAGNOSIS — C90.00 MULTIPLE MYELOMA NOT HAVING ACHIEVED REMISSION (HCC): ICD-10-CM

## 2024-06-14 PROCEDURE — 2580000003 HC RX 258: Performed by: INTERNAL MEDICINE

## 2024-06-14 PROCEDURE — 96413 CHEMO IV INFUSION 1 HR: CPT

## 2024-06-14 PROCEDURE — 6360000002 HC RX W HCPCS: Performed by: INTERNAL MEDICINE

## 2024-06-14 RX ORDER — DEXAMETHASONE 4 MG/1
10 TABLET ORAL ONCE
Status: COMPLETED | OUTPATIENT
Start: 2024-06-14 | End: 2024-06-14

## 2024-06-14 RX ORDER — DEXTROSE MONOHYDRATE 50 MG/ML
5-250 INJECTION, SOLUTION INTRAVENOUS PRN
Status: DISCONTINUED | OUTPATIENT
Start: 2024-06-14 | End: 2024-06-15 | Stop reason: HOSPADM

## 2024-06-14 RX ADMIN — CARFILZOMIB 49.2 MG: 10 INJECTION, POWDER, LYOPHILIZED, FOR SOLUTION INTRAVENOUS at 11:23

## 2024-06-14 RX ADMIN — DEXAMETHASONE 10 MG: 4 TABLET ORAL at 10:40

## 2024-06-14 RX ADMIN — DEXTROSE MONOHYDRATE 50 ML/HR: 50 INJECTION, SOLUTION INTRAVENOUS at 10:39

## 2024-06-14 ASSESSMENT — PAIN SCALES - GENERAL: PAINLEVEL_OUTOF10: 6

## 2024-06-14 ASSESSMENT — PAIN DESCRIPTION - ORIENTATION: ORIENTATION: RIGHT;LEFT

## 2024-06-14 ASSESSMENT — PAIN DESCRIPTION - DESCRIPTORS: DESCRIPTORS: TINGLING;BURNING

## 2024-06-14 NOTE — PROGRESS NOTES
[facility] Chemotherapy Progress Note    Date: 2024    Name: Candida Weaver    MRN: [id number]         : 1955      Pt admit to [facility] for C3 D9 Kyprolis ambulatory in stable condition. Assessment completed. No new concerns voiced. Port with positive blood return.    Ms. Weaver's vitals were reviewed.  Patient Vitals for the past 12 hrs:   Temp Pulse Resp BP   24 1201 -- 80 -- (!) 140/80   24 1017 98.6 °F (37 °C) 90 18 (!) 142/77     Labs reviewed 24 and within treatment parameters.    Pre-medications  were administered as ordered and chemotherapy was initiated.  Medications Administered         carfilzomib (KYPROLIS) 49.2 mg in dextrose 5 % 100 mL chemo IVPB Admin Date  2024 Action  New Bag Dose  49.2 mg Rate  269.2 mL/hr Route  IntraVENous Administered By  Candida Díaz, RN        dexAMETHasone (DECADRON) tablet 10 mg Admin Date  2024 Action  Given Dose  10 mg Rate   Route  Oral Administered By  Candida Díaz, SABRINA        dextrose 5 % solution Admin Date  2024 Action  New Bag Dose  50 mL/hr Rate  50 mL/hr Route  IntraVENous Administered By  Candida Díaz, RN            Two nurses verified prior to administering:Drug name, Drug doseInfusion volume or drug volume when prepared in a syringe, Rate of administration, Route of administration, Expiration dates and/or times, Appearance and physical integrity of the drugs, Rate set on infusion pump, when used, Sequencing of drug administration.    Pt tolerated treatment well. Port maintained positive blood return throughout treatment. Flushed,  and de-accessed per protocol. D/c home ambulatory in no distress. Pt aware of next appointment scheduled.    Future Appointments   Date Time Provider Department Center   2024  9:30 AM PEDS LAB CHAIR 1 [facility]   2024 11:00 AM [facility] CHEMO CHAIR 3 [facility]   2024 10:30 AM [facility] CHEMO CHAIR 2 [facility]   2024  9:30 AM PEDS LAB CHAIR 1 [facility]   7/3/2024 11:00 AM [facility] CHEMO

## 2024-06-17 DIAGNOSIS — C90.00 MULTIPLE MYELOMA NOT HAVING ACHIEVED REMISSION (HCC): ICD-10-CM

## 2024-06-18 RX ORDER — POMALIDOMIDE 3 MG/1
3 CAPSULE ORAL DAILY
Qty: 21 CAPSULE | Refills: 0 | Status: ACTIVE | OUTPATIENT
Start: 2024-06-18

## 2024-06-18 NOTE — TELEPHONE ENCOUNTER
Oral Chemotherapy      Candida Weaver is a  69 y.o.female  diagnosed with multiple myeloma . Ms. Weaver is being treated with KPd.      Medication name: pomalidomide  Regimen: KPd  Dose:   3 mg  Frequency: daily  Administration schedule: for 21 days followed by 7 days off every 28 days  Ordering provider: Nany Arboleda MD  Start date: 6/6/24 (Cycle 3 - delayed 1 week due to low ANC)      REMS auth # [id number]  Prescription sent to pharmacy for processing.         Rose Pa, WOOD, BCOP, BCPS    For Pharmacy Admin Tracking Only    Program: Medical Group  CPA in place:  Yes  Recommendation Provided To: Patient/Caregiver: 1 via Telephone  Intervention Detail: Refill(s) Provided  Intervention Accepted By: Patient/Caregiver: 1    Time Spent (min): 10

## 2024-06-19 ENCOUNTER — HOSPITAL ENCOUNTER (OUTPATIENT)
Facility: HOSPITAL | Age: 69
Setting detail: INFUSION SERIES
Discharge: HOME | End: 2024-06-19
Payer: MEDICARE

## 2024-06-19 ENCOUNTER — APPOINTMENT (OUTPATIENT)
Facility: HOSPITAL | Age: 69
End: 2024-06-19
Payer: MEDICARE

## 2024-06-19 VITALS
RESPIRATION RATE: 18 BRPM | OXYGEN SATURATION: 98 % | HEART RATE: 76 BPM | DIASTOLIC BLOOD PRESSURE: 69 MMHG | SYSTOLIC BLOOD PRESSURE: 129 MMHG | TEMPERATURE: 97.8 F

## 2024-06-19 DIAGNOSIS — C90.00 MULTIPLE MYELOMA NOT HAVING ACHIEVED REMISSION (HCC): ICD-10-CM

## 2024-06-19 DIAGNOSIS — Z11.59 ENCOUNTER FOR SCREENING FOR OTHER VIRAL DISEASES: ICD-10-CM

## 2024-06-19 LAB
ALBUMIN SERPL-MCNC: 3.4 G/DL (ref 3.5–5)
ALBUMIN/GLOB SERPL: 1.2 (ref 1.1–2.2)
ALP SERPL-CCNC: 62 U/L (ref 45–117)
ALT SERPL-CCNC: 21 U/L (ref 12–78)
ANION GAP SERPL CALC-SCNC: 5 MMOL/L (ref 5–15)
AST SERPL-CCNC: 6 U/L (ref 15–37)
BASOPHILS # BLD: 0 K/UL (ref 0–0.1)
BASOPHILS NFR BLD: 2 % (ref 0–1)
BILIRUB SERPL-MCNC: 0.4 MG/DL (ref 0.2–1)
BUN SERPL-MCNC: 22 MG/DL (ref 6–20)
BUN/CREAT SERPL: 30 (ref 12–20)
CALCIUM SERPL-MCNC: 9.5 MG/DL (ref 8.5–10.1)
CHLORIDE SERPL-SCNC: 105 MMOL/L (ref 97–108)
CO2 SERPL-SCNC: 26 MMOL/L (ref 21–32)
CREAT SERPL-MCNC: 0.73 MG/DL (ref 0.55–1.02)
DIFFERENTIAL METHOD BLD: ABNORMAL
EOSINOPHIL # BLD: 0.1 K/UL (ref 0–0.4)
EOSINOPHIL NFR BLD: 4 % (ref 0–7)
ERYTHROCYTE [DISTWIDTH] IN BLOOD BY AUTOMATED COUNT: 15.6 % (ref 11.5–14.5)
GLOBULIN SER CALC-MCNC: 2.8 G/DL (ref 2–4)
GLUCOSE SERPL-MCNC: 94 MG/DL (ref 65–100)
HCT VFR BLD AUTO: 26.9 % (ref 35–47)
HGB BLD-MCNC: 8.9 G/DL (ref 11.5–16)
IMM GRANULOCYTES # BLD AUTO: 0 K/UL
IMM GRANULOCYTES NFR BLD AUTO: 0 %
LYMPHOCYTES # BLD: 0.5 K/UL (ref 0.8–3.5)
LYMPHOCYTES NFR BLD: 24 % (ref 12–49)
MCH RBC QN AUTO: 30.6 PG (ref 26–34)
MCHC RBC AUTO-ENTMCNC: 33.1 G/DL (ref 30–36.5)
MCV RBC AUTO: 92.4 FL (ref 80–99)
MONOCYTES # BLD: 0.3 K/UL (ref 0–1)
MONOCYTES NFR BLD: 14 % (ref 5–13)
NEUTS SEG # BLD: 1.1 K/UL (ref 1.8–8)
NEUTS SEG NFR BLD: 56 % (ref 32–75)
NRBC # BLD: 0.03 K/UL (ref 0–0.01)
NRBC BLD-RTO: 1.5 PER 100 WBC
PLATELET # BLD AUTO: 204 K/UL (ref 150–400)
PMV BLD AUTO: 12.4 FL (ref 8.9–12.9)
POTASSIUM SERPL-SCNC: 4.2 MMOL/L (ref 3.5–5.1)
PROT SERPL-MCNC: 6.2 G/DL (ref 6.4–8.2)
RBC # BLD AUTO: 2.91 M/UL (ref 3.8–5.2)
RBC MORPH BLD: ABNORMAL
SODIUM SERPL-SCNC: 136 MMOL/L (ref 136–145)
TOTAL CELLS COUNTED SPEC: 50
WBC # BLD AUTO: 2 K/UL (ref 3.6–11)

## 2024-06-19 PROCEDURE — 85025 COMPLETE CBC W/AUTO DIFF WBC: CPT

## 2024-06-19 PROCEDURE — 36415 COLL VENOUS BLD VENIPUNCTURE: CPT

## 2024-06-19 PROCEDURE — 80053 COMPREHEN METABOLIC PANEL: CPT

## 2024-06-19 ASSESSMENT — PAIN SCALES - GENERAL: PAINLEVEL_OUTOF10: 7

## 2024-06-19 ASSESSMENT — PAIN DESCRIPTION - PAIN TYPE: TYPE: NEUROPATHIC PAIN

## 2024-06-19 ASSESSMENT — PAIN DESCRIPTION - LOCATION: LOCATION: HAND

## 2024-06-19 ASSESSMENT — PAIN DESCRIPTION - ORIENTATION: ORIENTATION: RIGHT;LEFT

## 2024-06-19 ASSESSMENT — PAIN DESCRIPTION - DESCRIPTORS: DESCRIPTORS: TINGLING;BURNING

## 2024-06-19 NOTE — PROGRESS NOTES
\[facility]\"" Peds/Adult Note                       Date: 2024    Name: Candida Weaver    MRN: [id number]         : 1955    0930 Patient arrives for Pre-Treatment Labs without acute problems. Please see Epic for complete assessment and education provided.    Vital signs stable throughout and prior to discharge. Patient tolerated procedure well and was discharged without incident.  Patient is aware of next \[facility]\"" appointment on 2024.  Appointment card give to the Patient.       Ms. Weaver's vitals were reviewed prior to and after treatment.   Patient Vitals for the past 12 hrs:   Temp Pulse Resp BP SpO2   24 0930 97.8 °F (36.6 °C) 76 18 129/69 98 %     Lab results were obtained and reviewed.  Recent Results (from the past 12 hour(s))   CBC With Auto Differential    Collection Time: 24  9:41 AM   Result Value Ref Range    WBC 2.0 (L) 3.6 - 11.0 K/uL    RBC 2.91 (L) 3.80 - 5.20 M/uL    Hemoglobin 8.9 (L) 11.5 - 16.0 g/dL    Hematocrit 26.9 (L) 35.0 - 47.0 %    MCV 92.4 80.0 - 99.0 FL    MCH 30.6 26.0 - 34.0 PG    MCHC 33.1 30.0 - 36.5 g/dL    RDW 15.6 (H) 11.5 - 14.5 %    Platelets 204 150 - 400 K/uL    MPV 12.4 8.9 - 12.9 FL    Nucleated RBCs 1.5 (H) 0  WBC    nRBC 0.03 (H) 0.00 - 0.01 K/uL    Neutrophils % 56 32 - 75 %    Lymphocytes % 24 12 - 49 %    Monocytes % 14 (H) 5 - 13 %    Eosinophils % 4 0 - 7 %    Basophils % 2 (H) 0 - 1 %    Immature Granulocytes % 0 %    Total cells counted 50      Neutrophils Absolute 1.1 (L) 1.8 - 8.0 K/UL    Lymphocytes Absolute 0.5 (L) 0.8 - 3.5 K/UL    Monocytes Absolute 0.3 0.0 - 1.0 K/UL    Eosinophils Absolute 0.1 0.0 - 0.4 K/UL    Basophils Absolute 0.0 0.0 - 0.1 K/UL    Immature Granulocytes Absolute 0.0 K/UL    Differential Type MANUAL      RBC Comment ANISOCYTOSIS  1+       Comprehensive metabolic panel    Collection Time: 24  9:41 AM   Result Value Ref Range    Sodium 136 136 - 145 mmol/L    Potassium 4.2 3.5 - 5.1 mmol/L    Chloride 105

## 2024-06-20 ENCOUNTER — HOSPITAL ENCOUNTER (OUTPATIENT)
Facility: HOSPITAL | Age: 69
Setting detail: INFUSION SERIES
Discharge: HOME | End: 2024-06-20
Payer: MEDICARE

## 2024-06-20 VITALS
HEART RATE: 64 BPM | RESPIRATION RATE: 18 BRPM | BODY MASS INDEX: 25.26 KG/M2 | TEMPERATURE: 97.8 F | SYSTOLIC BLOOD PRESSURE: 123 MMHG | WEIGHT: 151.6 LBS | DIASTOLIC BLOOD PRESSURE: 62 MMHG | HEIGHT: 65 IN

## 2024-06-20 DIAGNOSIS — Z11.59 ENCOUNTER FOR SCREENING FOR OTHER VIRAL DISEASES: Primary | ICD-10-CM

## 2024-06-20 DIAGNOSIS — C90.00 MULTIPLE MYELOMA NOT HAVING ACHIEVED REMISSION (HCC): ICD-10-CM

## 2024-06-20 PROCEDURE — 6360000002 HC RX W HCPCS: Performed by: INTERNAL MEDICINE

## 2024-06-20 PROCEDURE — 2580000003 HC RX 258: Performed by: INTERNAL MEDICINE

## 2024-06-20 PROCEDURE — 96413 CHEMO IV INFUSION 1 HR: CPT

## 2024-06-20 RX ORDER — SODIUM CHLORIDE 0.9 % (FLUSH) 0.9 %
5-40 SYRINGE (ML) INJECTION PRN
Status: DISCONTINUED | OUTPATIENT
Start: 2024-06-20 | End: 2024-06-21 | Stop reason: HOSPADM

## 2024-06-20 RX ORDER — DEXTROSE MONOHYDRATE 50 MG/ML
5-250 INJECTION, SOLUTION INTRAVENOUS PRN
Status: DISCONTINUED | OUTPATIENT
Start: 2024-06-20 | End: 2024-06-21 | Stop reason: HOSPADM

## 2024-06-20 RX ORDER — DEXAMETHASONE 4 MG/1
10 TABLET ORAL ONCE
Status: COMPLETED | OUTPATIENT
Start: 2024-06-20 | End: 2024-06-20

## 2024-06-20 RX ADMIN — DEXAMETHASONE 10 MG: 4 TABLET ORAL at 11:31

## 2024-06-20 RX ADMIN — DEXTROSE MONOHYDRATE 25 ML/HR: 50 INJECTION, SOLUTION INTRAVENOUS at 11:51

## 2024-06-20 RX ADMIN — CARFILZOMIB 49.2 MG: 10 INJECTION, POWDER, LYOPHILIZED, FOR SOLUTION INTRAVENOUS at 11:54

## 2024-06-20 NOTE — PROGRESS NOTES
[facility] Chemotherapy Progress Note    Date: 2024    Name: Candida Weaver    MRN: [id number]         : 1955      1100 Pt admit to [facility] for Kyprolis ambulatory in stable condition. Assessment completed. No new concerns voiced. Port with positive blood return.         Ms. Weaver's vitals were reviewed.  Patient Vitals for the past 12 hrs:   Temp Pulse Resp BP   24 1045 97.8 °F (36.6 °C) 64 18 123/62         Lab results were obtained and reviewed.      Pre-medications  were administered as ordered and chemotherapy was initiated.  Medications Administered         carfilzomib (KYPROLIS) 49.2 mg in dextrose 5 % 100 mL chemo IVPB Admin Date  2024 Action  New Bag Dose  49.2 mg Rate  269.2 mL/hr Route  IntraVENous Administered By  Nicole Villegas, RN        dexAMETHasone (DECADRON) tablet 10 mg Admin Date  2024 Action  Given Dose  10 mg Rate   Route  Oral Administered By  Nciole Villegas, RN        dextrose 5 % solution Admin Date  2024 Action  New Bag Dose  25 mL/hr Rate  25 mL/hr Route  IntraVENous Administered By  Nicole Villegas, RN            Two nurses verified prior to administering:Drug name, Drug doseInfusion volume or drug volume when prepared in a syringe, Rate of administration, Route of administration, Expiration dates and/or times, Appearance and physical integrity of the drugs, Rate set on infusion pump, when used, Sequencing of drug administration.       Pt tolerated treatment well. Port maintained positive blood return throughout treatment. Flushed, heparinized and de-accessed per protocol. D/c home ambulatory in no distress. Pt aware of next appointment scheduled.    Future Appointments   Date Time Provider Department Center   2024 10:30 AM [facility] CHEMO CHAIR 2 [facility]   2024  9:30 AM PEDS LAB CHAIR 1 [facility]   7/3/2024 11:00 AM [facility] CHEMO CHAIR 3 [facility]   7/3/2024 11:45 AM Nany Arboleda MD [facility] AMB   2024 10:00 AM [facility] CHEMO CHAIR 1 [facility]

## 2024-06-21 ENCOUNTER — APPOINTMENT (OUTPATIENT)
Facility: HOSPITAL | Age: 69
End: 2024-06-21
Payer: MEDICARE

## 2024-06-21 ENCOUNTER — HOSPITAL ENCOUNTER (OUTPATIENT)
Facility: HOSPITAL | Age: 69
Setting detail: INFUSION SERIES
Discharge: HOME | End: 2024-06-21
Payer: MEDICARE

## 2024-06-21 VITALS
TEMPERATURE: 98 F | HEART RATE: 77 BPM | RESPIRATION RATE: 18 BRPM | OXYGEN SATURATION: 97 % | WEIGHT: 152.2 LBS | HEIGHT: 65 IN | DIASTOLIC BLOOD PRESSURE: 47 MMHG | SYSTOLIC BLOOD PRESSURE: 130 MMHG | BODY MASS INDEX: 25.36 KG/M2

## 2024-06-21 DIAGNOSIS — C90.00 MULTIPLE MYELOMA NOT HAVING ACHIEVED REMISSION (HCC): ICD-10-CM

## 2024-06-21 DIAGNOSIS — Z11.59 ENCOUNTER FOR SCREENING FOR OTHER VIRAL DISEASES: Primary | ICD-10-CM

## 2024-06-21 PROCEDURE — 2580000003 HC RX 258: Performed by: INTERNAL MEDICINE

## 2024-06-21 PROCEDURE — 6360000002 HC RX W HCPCS: Performed by: INTERNAL MEDICINE

## 2024-06-21 PROCEDURE — 96413 CHEMO IV INFUSION 1 HR: CPT

## 2024-06-21 RX ORDER — ONDANSETRON 2 MG/ML
8 INJECTION INTRAMUSCULAR; INTRAVENOUS
Status: DISCONTINUED | OUTPATIENT
Start: 2024-06-21 | End: 2024-06-22 | Stop reason: HOSPADM

## 2024-06-21 RX ORDER — ALBUTEROL SULFATE 90 UG/1
4 AEROSOL, METERED RESPIRATORY (INHALATION) PRN
Status: DISCONTINUED | OUTPATIENT
Start: 2024-06-21 | End: 2024-06-22 | Stop reason: HOSPADM

## 2024-06-21 RX ORDER — HEPARIN 100 UNIT/ML
500 SYRINGE INTRAVENOUS PRN
Status: DISCONTINUED | OUTPATIENT
Start: 2024-06-21 | End: 2024-06-22 | Stop reason: HOSPADM

## 2024-06-21 RX ORDER — DEXAMETHASONE 4 MG/1
10 TABLET ORAL ONCE
Status: COMPLETED | OUTPATIENT
Start: 2024-06-21 | End: 2024-06-21

## 2024-06-21 RX ORDER — ACETAMINOPHEN 325 MG/1
650 TABLET ORAL
Status: DISCONTINUED | OUTPATIENT
Start: 2024-06-21 | End: 2024-06-22 | Stop reason: HOSPADM

## 2024-06-21 RX ORDER — EPINEPHRINE 1 MG/ML
0.3 INJECTION, SOLUTION INTRAMUSCULAR; SUBCUTANEOUS PRN
Status: DISCONTINUED | OUTPATIENT
Start: 2024-06-21 | End: 2024-06-22 | Stop reason: HOSPADM

## 2024-06-21 RX ORDER — DIPHENHYDRAMINE HYDROCHLORIDE 50 MG/ML
50 INJECTION INTRAMUSCULAR; INTRAVENOUS
Status: DISCONTINUED | OUTPATIENT
Start: 2024-06-21 | End: 2024-06-22 | Stop reason: HOSPADM

## 2024-06-21 RX ORDER — DEXTROSE MONOHYDRATE 50 MG/ML
5-250 INJECTION, SOLUTION INTRAVENOUS PRN
Status: DISCONTINUED | OUTPATIENT
Start: 2024-06-21 | End: 2024-06-22 | Stop reason: HOSPADM

## 2024-06-21 RX ORDER — SODIUM CHLORIDE 9 MG/ML
INJECTION, SOLUTION INTRAVENOUS CONTINUOUS
Status: DISCONTINUED | OUTPATIENT
Start: 2024-06-21 | End: 2024-06-22 | Stop reason: HOSPADM

## 2024-06-21 RX ORDER — SODIUM CHLORIDE 9 MG/ML
5-250 INJECTION, SOLUTION INTRAVENOUS PRN
Status: DISCONTINUED | OUTPATIENT
Start: 2024-06-21 | End: 2024-06-22 | Stop reason: HOSPADM

## 2024-06-21 RX ORDER — SODIUM CHLORIDE 0.9 % (FLUSH) 0.9 %
5-40 SYRINGE (ML) INJECTION PRN
Status: DISCONTINUED | OUTPATIENT
Start: 2024-06-21 | End: 2024-06-22 | Stop reason: HOSPADM

## 2024-06-21 RX ADMIN — CARFILZOMIB 49.2 MG: 10 INJECTION, POWDER, LYOPHILIZED, FOR SOLUTION INTRAVENOUS at 12:11

## 2024-06-21 RX ADMIN — DEXAMETHASONE 10 MG: 4 TABLET ORAL at 10:59

## 2024-06-21 RX ADMIN — DEXTROSE MONOHYDRATE 50 ML/HR: 50 INJECTION, SOLUTION INTRAVENOUS at 10:58

## 2024-06-21 ASSESSMENT — PAIN DESCRIPTION - PAIN TYPE: TYPE: NEUROPATHIC PAIN

## 2024-06-21 ASSESSMENT — PAIN SCALES - GENERAL: PAINLEVEL_OUTOF10: 7

## 2024-06-21 ASSESSMENT — PAIN DESCRIPTION - DESCRIPTORS: DESCRIPTORS: TINGLING;BURNING

## 2024-06-21 ASSESSMENT — PAIN DESCRIPTION - LOCATION: LOCATION: HAND

## 2024-06-21 ASSESSMENT — PAIN DESCRIPTION - ORIENTATION: ORIENTATION: RIGHT;LEFT

## 2024-06-21 NOTE — PROGRESS NOTES
[facility] Progress Note    Ms. Weaver Arrived ambulatory and in no distress for cycle 3 day 16 of Kyprolis regimen.  Assessment was completed, no acute issues at this time, no new complaints voiced.  Port accessed without difficulty.,        Ms. Weaver's vitals were reviewed.  Patient Vitals for the past 12 hrs:   Temp Pulse Resp BP SpO2   06/21/24 1245 -- 77 -- (!) 130/47 --   06/21/24 1030 98 °F (36.7 °C) 74 18 (!) 139/58 97 %         Pre-medications  were administered as ordered and chemotherapy was initiated.  Medications Administered         carfilzomib (KYPROLIS) 49.2 mg in dextrose 5 % 100 mL chemo IVPB Admin Date  06/21/2024 Action  New Bag Dose  49.2 mg Rate  269.2 mL/hr Route  IntraVENous Administered By  Rachelle Grayson, SABRINA        dexAMETHasone (DECADRON) tablet 10 mg Admin Date  06/21/2024 Action  Given Dose  10 mg Rate   Route  Oral Administered By  Rahcelle Grayson RN        dextrose 5 % solution Admin Date  06/21/2024 Action  New Bag Dose  50 mL/hr Rate  50 mL/hr Route  IntraVENous Administered By  Rachelle Grayson, SABRINA           Blood return maintained throughout infusion.    Two nurses verified prior to administering: Drug name, Drug dose, Infusion volume or drug volume when prepared in a syringe, Rate of administration, Route of administration, Expiration dates and/or times, Appearance and physical integrity of the drugs, Rate set on infusion pump, when used, and Sequencing of drug administration.    Ms. Weaver tolerated treatment well, port flushed and de accessed, [facility] condition.      Future Appointments   Date Time Provider Department Center   7/2/2024  9:30 AM PEDS LAB CHAIR 1 [facility]   7/3/2024 11:00 AM [facility]   7/3/2024 11:45 AM Nany Arboleda MD [facility] BS AMB   7/5/2024 10:00 AM [facility] CHEMO CHAIR 1 [facility]   7/10/2024  9:30 AM PEDS LAB CHAIR 1 [facility]   7/11/2024 10:30 AM [facility]   7/12/2024   9:30 AM [facility] CHEMO CHAIR 4 [facility]   7/17/2024  9:30 AM PEDS LAB CHAIR 1 [facility]   7/18/2024 10:00 AM [facility] CHEMO CHAIR 1 [facility]   7/19/2024 10:30 AM [facility] CHEMO CHAIR 2 [facility]   7/24/2024  9:30 AM PEDS LAB CHAIR 1 [facility]   7/31/2024  9:30 AM PEDS LAB CHAIR 1 [facility]   8/1/2024 10:30 AM [facility] CHEMO CHAIR 2 [facility]   8/2/2024 10:30 AM [facility] CHEMO CHAIR 2 [facility]   8/7/2024  9:30 AM PEDS LAB CHAIR 1 [facility]   8/8/2024 10:30 AM [facility] CHEMO CHAIR 2 [facility]   8/9/2024 10:00 AM [facility] CHEMO CHAIR 1 [facility]         Rachelle Grayson RN  June 21, 2024

## 2024-06-23 RX ORDER — LANCETS 33 GAUGE
EACH MISCELLANEOUS
Qty: 100 EACH | Refills: 3 | Status: SHIPPED | OUTPATIENT
Start: 2024-06-23

## 2024-06-25 RX ORDER — ACETAMINOPHEN 325 MG/1
650 TABLET ORAL
Status: CANCELLED | OUTPATIENT
Start: 2024-07-11

## 2024-06-25 RX ORDER — DIPHENHYDRAMINE HYDROCHLORIDE 50 MG/ML
50 INJECTION INTRAMUSCULAR; INTRAVENOUS
Status: CANCELLED | OUTPATIENT
Start: 2024-07-03

## 2024-06-25 RX ORDER — DEXAMETHASONE 4 MG/1
10 TABLET ORAL ONCE
Start: 2024-07-19 | End: 2024-07-19

## 2024-06-25 RX ORDER — DEXTROSE MONOHYDRATE 50 MG/ML
5-250 INJECTION, SOLUTION INTRAVENOUS PRN
OUTPATIENT
Start: 2024-07-18

## 2024-06-25 RX ORDER — SODIUM CHLORIDE 0.9 % (FLUSH) 0.9 %
5-40 SYRINGE (ML) INJECTION PRN
Status: CANCELLED | OUTPATIENT
Start: 2024-07-12

## 2024-06-25 RX ORDER — DEXAMETHASONE 4 MG/1
10 TABLET ORAL ONCE
Start: 2024-07-18 | End: 2024-07-18

## 2024-06-25 RX ORDER — ONDANSETRON 2 MG/ML
8 INJECTION INTRAMUSCULAR; INTRAVENOUS
Status: CANCELLED | OUTPATIENT
Start: 2024-07-03

## 2024-06-25 RX ORDER — ALBUTEROL SULFATE 90 UG/1
4 AEROSOL, METERED RESPIRATORY (INHALATION) PRN
OUTPATIENT
Start: 2024-07-18

## 2024-06-25 RX ORDER — ACETAMINOPHEN 325 MG/1
650 TABLET ORAL
OUTPATIENT
Start: 2024-07-19

## 2024-06-25 RX ORDER — DEXAMETHASONE 4 MG/1
10 TABLET ORAL ONCE
Status: CANCELLED
Start: 2024-07-11 | End: 2024-07-11

## 2024-06-25 RX ORDER — DEXAMETHASONE 4 MG/1
10 TABLET ORAL ONCE
Status: CANCELLED
Start: 2024-07-12 | End: 2024-07-12

## 2024-06-25 RX ORDER — SODIUM CHLORIDE 9 MG/ML
5-250 INJECTION, SOLUTION INTRAVENOUS PRN
OUTPATIENT
Start: 2024-07-19

## 2024-06-25 RX ORDER — DIPHENHYDRAMINE HYDROCHLORIDE 50 MG/ML
50 INJECTION INTRAMUSCULAR; INTRAVENOUS
Status: CANCELLED | OUTPATIENT
Start: 2024-07-12

## 2024-06-25 RX ORDER — HEPARIN 100 UNIT/ML
500 SYRINGE INTRAVENOUS PRN
Status: CANCELLED | OUTPATIENT
Start: 2024-07-12

## 2024-06-25 RX ORDER — HEPARIN 100 UNIT/ML
500 SYRINGE INTRAVENOUS PRN
Status: CANCELLED | OUTPATIENT
Start: 2024-07-05

## 2024-06-25 RX ORDER — ONDANSETRON 2 MG/ML
8 INJECTION INTRAMUSCULAR; INTRAVENOUS
Status: CANCELLED | OUTPATIENT
Start: 2024-07-12

## 2024-06-25 RX ORDER — ALBUTEROL SULFATE 90 UG/1
4 AEROSOL, METERED RESPIRATORY (INHALATION) PRN
Status: CANCELLED | OUTPATIENT
Start: 2024-07-05

## 2024-06-25 RX ORDER — EPINEPHRINE 1 MG/ML
0.3 INJECTION, SOLUTION INTRAMUSCULAR; SUBCUTANEOUS PRN
OUTPATIENT
Start: 2024-07-18

## 2024-06-25 RX ORDER — ONDANSETRON 2 MG/ML
8 INJECTION INTRAMUSCULAR; INTRAVENOUS
Status: CANCELLED | OUTPATIENT
Start: 2024-07-11

## 2024-06-25 RX ORDER — DEXTROSE MONOHYDRATE 50 MG/ML
5-250 INJECTION, SOLUTION INTRAVENOUS PRN
Status: CANCELLED | OUTPATIENT
Start: 2024-07-03

## 2024-06-25 RX ORDER — ALBUTEROL SULFATE 90 UG/1
4 AEROSOL, METERED RESPIRATORY (INHALATION) PRN
Status: CANCELLED | OUTPATIENT
Start: 2024-07-11

## 2024-06-25 RX ORDER — ONDANSETRON 2 MG/ML
8 INJECTION INTRAMUSCULAR; INTRAVENOUS
Status: CANCELLED | OUTPATIENT
Start: 2024-07-05

## 2024-06-25 RX ORDER — ACETAMINOPHEN 325 MG/1
650 TABLET ORAL
Status: CANCELLED | OUTPATIENT
Start: 2024-07-05

## 2024-06-25 RX ORDER — SODIUM CHLORIDE 9 MG/ML
INJECTION, SOLUTION INTRAVENOUS CONTINUOUS
Status: CANCELLED | OUTPATIENT
Start: 2024-07-03

## 2024-06-25 RX ORDER — ALBUTEROL SULFATE 90 UG/1
4 AEROSOL, METERED RESPIRATORY (INHALATION) PRN
Status: CANCELLED | OUTPATIENT
Start: 2024-07-03

## 2024-06-25 RX ORDER — EPINEPHRINE 1 MG/ML
0.3 INJECTION, SOLUTION INTRAMUSCULAR; SUBCUTANEOUS PRN
Status: CANCELLED | OUTPATIENT
Start: 2024-07-05

## 2024-06-25 RX ORDER — SODIUM CHLORIDE 9 MG/ML
INJECTION, SOLUTION INTRAVENOUS CONTINUOUS
OUTPATIENT
Start: 2024-07-18

## 2024-06-25 RX ORDER — DEXTROSE MONOHYDRATE 50 MG/ML
5-250 INJECTION, SOLUTION INTRAVENOUS PRN
Status: CANCELLED | OUTPATIENT
Start: 2024-07-11

## 2024-06-25 RX ORDER — HEPARIN 100 UNIT/ML
500 SYRINGE INTRAVENOUS PRN
OUTPATIENT
Start: 2024-07-19

## 2024-06-25 RX ORDER — ALBUTEROL SULFATE 90 UG/1
4 AEROSOL, METERED RESPIRATORY (INHALATION) PRN
Status: CANCELLED | OUTPATIENT
Start: 2024-07-12

## 2024-06-25 RX ORDER — SODIUM CHLORIDE 9 MG/ML
INJECTION, SOLUTION INTRAVENOUS CONTINUOUS
Status: CANCELLED | OUTPATIENT
Start: 2024-07-11

## 2024-06-25 RX ORDER — DEXTROSE MONOHYDRATE 50 MG/ML
5-250 INJECTION, SOLUTION INTRAVENOUS PRN
Status: CANCELLED | OUTPATIENT
Start: 2024-07-12

## 2024-06-25 RX ORDER — DIPHENHYDRAMINE HYDROCHLORIDE 50 MG/ML
50 INJECTION INTRAMUSCULAR; INTRAVENOUS
Status: CANCELLED | OUTPATIENT
Start: 2024-07-05

## 2024-06-25 RX ORDER — EPINEPHRINE 1 MG/ML
0.3 INJECTION, SOLUTION INTRAMUSCULAR; SUBCUTANEOUS PRN
Status: CANCELLED | OUTPATIENT
Start: 2024-07-12

## 2024-06-25 RX ORDER — HEPARIN 100 UNIT/ML
500 SYRINGE INTRAVENOUS PRN
Status: CANCELLED | OUTPATIENT
Start: 2024-07-11

## 2024-06-25 RX ORDER — SODIUM CHLORIDE 0.9 % (FLUSH) 0.9 %
5-40 SYRINGE (ML) INJECTION PRN
OUTPATIENT
Start: 2024-07-19

## 2024-06-25 RX ORDER — SODIUM CHLORIDE 0.9 % (FLUSH) 0.9 %
5-40 SYRINGE (ML) INJECTION PRN
OUTPATIENT
Start: 2024-07-18

## 2024-06-25 RX ORDER — DEXAMETHASONE 4 MG/1
10 TABLET ORAL ONCE
Status: CANCELLED
Start: 2024-07-03 | End: 2024-07-03

## 2024-06-25 RX ORDER — SODIUM CHLORIDE 9 MG/ML
INJECTION, SOLUTION INTRAVENOUS CONTINUOUS
OUTPATIENT
Start: 2024-07-19

## 2024-06-25 RX ORDER — EPINEPHRINE 1 MG/ML
0.3 INJECTION, SOLUTION INTRAMUSCULAR; SUBCUTANEOUS PRN
Status: CANCELLED | OUTPATIENT
Start: 2024-07-03

## 2024-06-25 RX ORDER — EPINEPHRINE 1 MG/ML
0.3 INJECTION, SOLUTION INTRAMUSCULAR; SUBCUTANEOUS PRN
Status: CANCELLED | OUTPATIENT
Start: 2024-07-11

## 2024-06-25 RX ORDER — DIPHENHYDRAMINE HYDROCHLORIDE 50 MG/ML
50 INJECTION INTRAMUSCULAR; INTRAVENOUS
OUTPATIENT
Start: 2024-07-19

## 2024-06-25 RX ORDER — SODIUM CHLORIDE 0.9 % (FLUSH) 0.9 %
5-40 SYRINGE (ML) INJECTION PRN
Status: CANCELLED | OUTPATIENT
Start: 2024-07-03

## 2024-06-25 RX ORDER — SODIUM CHLORIDE 9 MG/ML
5-250 INJECTION, SOLUTION INTRAVENOUS PRN
Status: CANCELLED | OUTPATIENT
Start: 2024-07-03

## 2024-06-25 RX ORDER — ONDANSETRON 2 MG/ML
8 INJECTION INTRAMUSCULAR; INTRAVENOUS
OUTPATIENT
Start: 2024-07-19

## 2024-06-25 RX ORDER — SODIUM CHLORIDE 9 MG/ML
5-250 INJECTION, SOLUTION INTRAVENOUS PRN
Status: CANCELLED | OUTPATIENT
Start: 2024-07-12

## 2024-06-25 RX ORDER — EPINEPHRINE 1 MG/ML
0.3 INJECTION, SOLUTION INTRAMUSCULAR; SUBCUTANEOUS PRN
OUTPATIENT
Start: 2024-07-19

## 2024-06-25 RX ORDER — ONDANSETRON 2 MG/ML
8 INJECTION INTRAMUSCULAR; INTRAVENOUS
OUTPATIENT
Start: 2024-07-18

## 2024-06-25 RX ORDER — DEXAMETHASONE 4 MG/1
10 TABLET ORAL ONCE
Status: CANCELLED
Start: 2024-07-05 | End: 2024-07-05

## 2024-06-25 RX ORDER — DIPHENHYDRAMINE HYDROCHLORIDE 50 MG/ML
50 INJECTION INTRAMUSCULAR; INTRAVENOUS
OUTPATIENT
Start: 2024-07-18

## 2024-06-25 RX ORDER — SODIUM CHLORIDE 9 MG/ML
INJECTION, SOLUTION INTRAVENOUS CONTINUOUS
Status: CANCELLED | OUTPATIENT
Start: 2024-07-05

## 2024-06-25 RX ORDER — ACETAMINOPHEN 325 MG/1
650 TABLET ORAL
Status: CANCELLED | OUTPATIENT
Start: 2024-07-12

## 2024-06-25 RX ORDER — SODIUM CHLORIDE 0.9 % (FLUSH) 0.9 %
5-40 SYRINGE (ML) INJECTION PRN
Status: CANCELLED | OUTPATIENT
Start: 2024-07-05

## 2024-06-25 RX ORDER — HEPARIN 100 UNIT/ML
500 SYRINGE INTRAVENOUS PRN
Status: CANCELLED | OUTPATIENT
Start: 2024-07-03

## 2024-06-25 RX ORDER — SODIUM CHLORIDE 9 MG/ML
INJECTION, SOLUTION INTRAVENOUS CONTINUOUS
Status: CANCELLED | OUTPATIENT
Start: 2024-07-12

## 2024-06-25 RX ORDER — ACETAMINOPHEN 325 MG/1
650 TABLET ORAL
OUTPATIENT
Start: 2024-07-18

## 2024-06-25 RX ORDER — DEXTROSE MONOHYDRATE 50 MG/ML
5-250 INJECTION, SOLUTION INTRAVENOUS PRN
OUTPATIENT
Start: 2024-07-19

## 2024-06-25 RX ORDER — HEPARIN 100 UNIT/ML
500 SYRINGE INTRAVENOUS PRN
OUTPATIENT
Start: 2024-07-18

## 2024-06-25 RX ORDER — DIPHENHYDRAMINE HYDROCHLORIDE 50 MG/ML
50 INJECTION INTRAMUSCULAR; INTRAVENOUS
Status: CANCELLED | OUTPATIENT
Start: 2024-07-11

## 2024-06-25 RX ORDER — SODIUM CHLORIDE 0.9 % (FLUSH) 0.9 %
5-40 SYRINGE (ML) INJECTION PRN
Status: CANCELLED | OUTPATIENT
Start: 2024-07-11

## 2024-06-25 RX ORDER — DEXTROSE MONOHYDRATE 50 MG/ML
5-250 INJECTION, SOLUTION INTRAVENOUS PRN
Status: CANCELLED | OUTPATIENT
Start: 2024-07-05

## 2024-06-25 RX ORDER — SODIUM CHLORIDE 9 MG/ML
5-250 INJECTION, SOLUTION INTRAVENOUS PRN
Status: CANCELLED | OUTPATIENT
Start: 2024-07-11

## 2024-06-25 RX ORDER — SODIUM CHLORIDE 9 MG/ML
5-250 INJECTION, SOLUTION INTRAVENOUS PRN
Status: CANCELLED | OUTPATIENT
Start: 2024-07-05

## 2024-06-25 RX ORDER — SODIUM CHLORIDE 9 MG/ML
5-250 INJECTION, SOLUTION INTRAVENOUS PRN
OUTPATIENT
Start: 2024-07-18

## 2024-06-25 RX ORDER — ALBUTEROL SULFATE 90 UG/1
4 AEROSOL, METERED RESPIRATORY (INHALATION) PRN
OUTPATIENT
Start: 2024-07-19

## 2024-06-25 RX ORDER — ACETAMINOPHEN 325 MG/1
650 TABLET ORAL
Status: CANCELLED | OUTPATIENT
Start: 2024-07-03

## 2024-06-27 ENCOUNTER — APPOINTMENT (OUTPATIENT)
Facility: HOSPITAL | Age: 69
End: 2024-06-27
Payer: MEDICARE

## 2024-06-28 ENCOUNTER — APPOINTMENT (OUTPATIENT)
Facility: HOSPITAL | Age: 69
End: 2024-06-28
Payer: MEDICARE

## 2024-07-02 ENCOUNTER — HOSPITAL ENCOUNTER (OUTPATIENT)
Facility: HOSPITAL | Age: 69
Setting detail: INFUSION SERIES
Discharge: HOME | End: 2024-07-02
Payer: MEDICARE

## 2024-07-02 VITALS
TEMPERATURE: 97.8 F | SYSTOLIC BLOOD PRESSURE: 136 MMHG | OXYGEN SATURATION: 97 % | HEART RATE: 72 BPM | DIASTOLIC BLOOD PRESSURE: 60 MMHG | RESPIRATION RATE: 18 BRPM

## 2024-07-02 DIAGNOSIS — C90.00 MULTIPLE MYELOMA NOT HAVING ACHIEVED REMISSION (HCC): Primary | ICD-10-CM

## 2024-07-02 DIAGNOSIS — Z11.59 ENCOUNTER FOR SCREENING FOR OTHER VIRAL DISEASES: ICD-10-CM

## 2024-07-02 LAB
ALBUMIN SERPL-MCNC: 3.5 G/DL (ref 3.5–5)
ALBUMIN/GLOB SERPL: 1.3 (ref 1.1–2.2)
ALP SERPL-CCNC: 76 U/L (ref 45–117)
ALT SERPL-CCNC: 20 U/L (ref 12–78)
ANION GAP SERPL CALC-SCNC: 7 MMOL/L (ref 5–15)
AST SERPL-CCNC: 12 U/L (ref 15–37)
BASOPHILS # BLD: 0.2 K/UL (ref 0–0.1)
BASOPHILS NFR BLD: 7 % (ref 0–1)
BILIRUB SERPL-MCNC: 0.3 MG/DL (ref 0.2–1)
BUN SERPL-MCNC: 18 MG/DL (ref 6–20)
BUN/CREAT SERPL: 25 (ref 12–20)
CALCIUM SERPL-MCNC: 8.9 MG/DL (ref 8.5–10.1)
CHLORIDE SERPL-SCNC: 106 MMOL/L (ref 97–108)
CO2 SERPL-SCNC: 26 MMOL/L (ref 21–32)
CREAT SERPL-MCNC: 0.71 MG/DL (ref 0.55–1.02)
DIFFERENTIAL METHOD BLD: ABNORMAL
EOSINOPHIL # BLD: 0 K/UL (ref 0–0.4)
EOSINOPHIL NFR BLD: 1 % (ref 0–7)
ERYTHROCYTE [DISTWIDTH] IN BLOOD BY AUTOMATED COUNT: 17.2 % (ref 11.5–14.5)
GLOBULIN SER CALC-MCNC: 2.7 G/DL (ref 2–4)
GLUCOSE SERPL-MCNC: 83 MG/DL (ref 65–100)
HCT VFR BLD AUTO: 29.9 % (ref 35–47)
HGB BLD-MCNC: 9.2 G/DL (ref 11.5–16)
IMM GRANULOCYTES # BLD AUTO: 0 K/UL
IMM GRANULOCYTES NFR BLD AUTO: 0 %
LYMPHOCYTES # BLD: 0.8 K/UL (ref 0.8–3.5)
LYMPHOCYTES NFR BLD: 25 % (ref 12–49)
MCH RBC QN AUTO: 30.3 PG (ref 26–34)
MCHC RBC AUTO-ENTMCNC: 30.8 G/DL (ref 30–36.5)
MCV RBC AUTO: 98.4 FL (ref 80–99)
METAMYELOCYTES NFR BLD MANUAL: 1 %
MONOCYTES # BLD: 0.4 K/UL (ref 0–1)
MONOCYTES NFR BLD: 12 % (ref 5–13)
NEUTS SEG # BLD: 1.6 K/UL (ref 1.8–8)
NEUTS SEG NFR BLD: 54 % (ref 32–75)
NRBC # BLD: 0 K/UL (ref 0–0.01)
NRBC BLD-RTO: 0 PER 100 WBC
PLATELET # BLD AUTO: 356 K/UL (ref 150–400)
PLATELET COMMENT: ABNORMAL
PMV BLD AUTO: 11.3 FL (ref 8.9–12.9)
POTASSIUM SERPL-SCNC: 3.8 MMOL/L (ref 3.5–5.1)
PROT SERPL-MCNC: 6.2 G/DL (ref 6.4–8.2)
RBC # BLD AUTO: 3.04 M/UL (ref 3.8–5.2)
RBC MORPH BLD: ABNORMAL
SODIUM SERPL-SCNC: 139 MMOL/L (ref 136–145)
WBC # BLD AUTO: 3 K/UL (ref 3.6–11)

## 2024-07-02 PROCEDURE — 86334 IMMUNOFIX E-PHORESIS SERUM: CPT

## 2024-07-02 PROCEDURE — 82784 ASSAY IGA/IGD/IGG/IGM EACH: CPT

## 2024-07-02 PROCEDURE — 83521 IG LIGHT CHAINS FREE EACH: CPT

## 2024-07-02 PROCEDURE — 85025 COMPLETE CBC W/AUTO DIFF WBC: CPT

## 2024-07-02 PROCEDURE — 36415 COLL VENOUS BLD VENIPUNCTURE: CPT

## 2024-07-02 PROCEDURE — 80053 COMPREHEN METABOLIC PANEL: CPT

## 2024-07-02 PROCEDURE — 84165 PROTEIN E-PHORESIS SERUM: CPT

## 2024-07-02 ASSESSMENT — PAIN DESCRIPTION - DESCRIPTORS: DESCRIPTORS: BURNING;TINGLING

## 2024-07-02 ASSESSMENT — PAIN DESCRIPTION - PAIN TYPE: TYPE: NEUROPATHIC PAIN

## 2024-07-02 ASSESSMENT — PAIN DESCRIPTION - LOCATION: LOCATION: HAND

## 2024-07-02 ASSESSMENT — PAIN SCALES - GENERAL: PAINLEVEL_OUTOF10: 5

## 2024-07-02 ASSESSMENT — PAIN DESCRIPTION - ORIENTATION: ORIENTATION: RIGHT;LEFT

## 2024-07-02 NOTE — PROGRESS NOTES
[facility] Peds/Adult Note                       Date: 2024    Name: Candida Weaver    MRN: [id number]         : 1955    0935 Patient arrives for Pre-Treatment Labs without acute problems. Please see Epic for complete assessment and education provided.    Vital signs stable throughout and prior to discharge. Patient tolerated procedure well and was discharged without incident.  Patient is aware of next [facility] appointment on 2024.  Appointment card give to the Patient.     Ms. Weaver's vitals were reviewed prior to and after treatment.   Patient Vitals for the past 12 hrs:   Temp Pulse Resp BP SpO2   24 0935 97.8 °F (36.6 °C) 72 18 136/60 97 %     Lab results were obtained and reviewed.  Labs Pending, please see [facility] for results.    Recent Results (from the past 12 hour(s))   CBC With Auto Differential    Collection Time: 24  9:42 AM   Result Value Ref Range    WBC 3.0 (L) 3.6 - 11.0 K/uL    RBC 3.04 (L) 3.80 - 5.20 M/uL    Hemoglobin 9.2 (L) 11.5 - 16.0 g/dL    Hematocrit 29.9 (L) 35.0 - 47.0 %    MCV 98.4 80.0 - 99.0 FL    MCH 30.3 26.0 - 34.0 PG    MCHC 30.8 30.0 - 36.5 g/dL    RDW 17.2 (H) 11.5 - 14.5 %    Platelets 356 150 - 400 K/uL    MPV 11.3 8.9 - 12.9 FL    Nucleated RBCs 0.0 0  WBC    nRBC 0.00 0.00 - 0.01 K/uL    Neutrophils % 54 32 - 75 %    Lymphocytes % 25 12 - 49 %    Monocytes % 12 5 - 13 %    Eosinophils % 1 0 - 7 %    Basophils % 7 (H) 0 - 1 %    Metamyelocytes 1 (H) 0 %    Immature Granulocytes % 0 %    Neutrophils Absolute 1.6 (L) 1.8 - 8.0 K/UL    Lymphocytes Absolute 0.8 0.8 - 3.5 K/UL    Monocytes Absolute 0.4 0.0 - 1.0 K/UL    Eosinophils Absolute 0.0 0.0 - 0.4 K/UL    Basophils Absolute 0.2 (H) 0.0 - 0.1 K/UL    Immature Granulocytes Absolute 0.0 K/UL    Differential Type SMEAR SCANNED      Platelet Comment Large Platelets      RBC Comment ANISOCYTOSIS  1+       Comprehensive metabolic panel    Collection Time: 24  9:42 AM   Result Value Ref

## 2024-07-03 ENCOUNTER — HOSPITAL ENCOUNTER (OUTPATIENT)
Facility: HOSPITAL | Age: 69
Setting detail: INFUSION SERIES
Discharge: HOME | End: 2024-07-03
Payer: MEDICARE

## 2024-07-03 ENCOUNTER — CLINICAL DOCUMENTATION (OUTPATIENT)
Age: 69
End: 2024-07-03

## 2024-07-03 ENCOUNTER — OFFICE VISIT (OUTPATIENT)
Age: 69
End: 2024-07-03
Payer: MEDICARE

## 2024-07-03 ENCOUNTER — PATIENT MESSAGE (OUTPATIENT)
Age: 69
End: 2024-07-03

## 2024-07-03 VITALS
RESPIRATION RATE: 18 BRPM | OXYGEN SATURATION: 95 % | SYSTOLIC BLOOD PRESSURE: 126 MMHG | BODY MASS INDEX: 25.46 KG/M2 | DIASTOLIC BLOOD PRESSURE: 56 MMHG | HEART RATE: 75 BPM | TEMPERATURE: 97.9 F | WEIGHT: 153 LBS

## 2024-07-03 VITALS
DIASTOLIC BLOOD PRESSURE: 50 MMHG | RESPIRATION RATE: 18 BRPM | SYSTOLIC BLOOD PRESSURE: 118 MMHG | OXYGEN SATURATION: 95 % | HEIGHT: 65 IN | TEMPERATURE: 97.9 F | WEIGHT: 153 LBS | BODY MASS INDEX: 25.49 KG/M2 | HEART RATE: 75 BPM

## 2024-07-03 DIAGNOSIS — C90.00 MULTIPLE MYELOMA NOT HAVING ACHIEVED REMISSION (HCC): Primary | ICD-10-CM

## 2024-07-03 DIAGNOSIS — Z11.59 ENCOUNTER FOR SCREENING FOR OTHER VIRAL DISEASES: ICD-10-CM

## 2024-07-03 DIAGNOSIS — Z11.59 ENCOUNTER FOR SCREENING FOR OTHER VIRAL DISEASES: Primary | ICD-10-CM

## 2024-07-03 DIAGNOSIS — C90.00 MULTIPLE MYELOMA NOT HAVING ACHIEVED REMISSION (HCC): ICD-10-CM

## 2024-07-03 PROCEDURE — 96367 TX/PROPH/DG ADDL SEQ IV INF: CPT

## 2024-07-03 PROCEDURE — G8419 CALC BMI OUT NRM PARAM NOF/U: HCPCS | Performed by: INTERNAL MEDICINE

## 2024-07-03 PROCEDURE — 3017F COLORECTAL CA SCREEN DOC REV: CPT | Performed by: INTERNAL MEDICINE

## 2024-07-03 PROCEDURE — 2580000003 HC RX 258

## 2024-07-03 PROCEDURE — 6360000002 HC RX W HCPCS: Performed by: INTERNAL MEDICINE

## 2024-07-03 PROCEDURE — 1036F TOBACCO NON-USER: CPT | Performed by: INTERNAL MEDICINE

## 2024-07-03 PROCEDURE — 96413 CHEMO IV INFUSION 1 HR: CPT

## 2024-07-03 PROCEDURE — 1090F PRES/ABSN URINE INCON ASSESS: CPT | Performed by: INTERNAL MEDICINE

## 2024-07-03 PROCEDURE — 99215 OFFICE O/P EST HI 40 MIN: CPT | Performed by: INTERNAL MEDICINE

## 2024-07-03 PROCEDURE — 1123F ACP DISCUSS/DSCN MKR DOCD: CPT | Performed by: INTERNAL MEDICINE

## 2024-07-03 PROCEDURE — 6360000002 HC RX W HCPCS

## 2024-07-03 PROCEDURE — G8428 CUR MEDS NOT DOCUMENT: HCPCS | Performed by: INTERNAL MEDICINE

## 2024-07-03 PROCEDURE — G8399 PT W/DXA RESULTS DOCUMENT: HCPCS | Performed by: INTERNAL MEDICINE

## 2024-07-03 PROCEDURE — 2580000003 HC RX 258: Performed by: INTERNAL MEDICINE

## 2024-07-03 RX ORDER — DIPHENHYDRAMINE HYDROCHLORIDE 50 MG/ML
50 INJECTION INTRAMUSCULAR; INTRAVENOUS
OUTPATIENT
Start: 2024-07-14

## 2024-07-03 RX ORDER — SODIUM CHLORIDE 9 MG/ML
5-250 INJECTION, SOLUTION INTRAVENOUS PRN
Status: DISCONTINUED | OUTPATIENT
Start: 2024-07-03 | End: 2024-07-04 | Stop reason: HOSPADM

## 2024-07-03 RX ORDER — ONDANSETRON 2 MG/ML
8 INJECTION INTRAMUSCULAR; INTRAVENOUS
OUTPATIENT
Start: 2024-07-14

## 2024-07-03 RX ORDER — SODIUM CHLORIDE 9 MG/ML
INJECTION, SOLUTION INTRAVENOUS CONTINUOUS
OUTPATIENT
Start: 2024-07-14

## 2024-07-03 RX ORDER — ALBUTEROL SULFATE 90 UG/1
4 AEROSOL, METERED RESPIRATORY (INHALATION) PRN
OUTPATIENT
Start: 2024-07-14

## 2024-07-03 RX ORDER — ACETAMINOPHEN 325 MG/1
650 TABLET ORAL
OUTPATIENT
Start: 2024-07-14

## 2024-07-03 RX ORDER — EPINEPHRINE 1 MG/ML
0.3 INJECTION, SOLUTION INTRAMUSCULAR; SUBCUTANEOUS PRN
OUTPATIENT
Start: 2024-07-14

## 2024-07-03 RX ORDER — ZOLEDRONIC ACID 0.04 MG/ML
4 INJECTION, SOLUTION INTRAVENOUS ONCE
OUTPATIENT
Start: 2024-07-14 | End: 2024-07-14

## 2024-07-03 RX ORDER — SODIUM CHLORIDE 9 MG/ML
5-250 INJECTION, SOLUTION INTRAVENOUS PRN
OUTPATIENT
Start: 2024-07-14

## 2024-07-03 RX ORDER — DEXTROSE MONOHYDRATE 50 MG/ML
5-250 INJECTION, SOLUTION INTRAVENOUS PRN
Status: DISCONTINUED | OUTPATIENT
Start: 2024-07-03 | End: 2024-07-04 | Stop reason: HOSPADM

## 2024-07-03 RX ORDER — HEPARIN 100 UNIT/ML
500 SYRINGE INTRAVENOUS PRN
Status: DISCONTINUED | OUTPATIENT
Start: 2024-07-03 | End: 2024-07-04 | Stop reason: HOSPADM

## 2024-07-03 RX ORDER — HEPARIN 100 UNIT/ML
500 SYRINGE INTRAVENOUS PRN
OUTPATIENT
Start: 2024-07-14

## 2024-07-03 RX ORDER — SODIUM CHLORIDE 0.9 % (FLUSH) 0.9 %
5-40 SYRINGE (ML) INJECTION PRN
Status: DISCONTINUED | OUTPATIENT
Start: 2024-07-03 | End: 2024-07-04 | Stop reason: HOSPADM

## 2024-07-03 RX ORDER — ZOLEDRONIC ACID 0.04 MG/ML
4 INJECTION, SOLUTION INTRAVENOUS ONCE
Status: COMPLETED | OUTPATIENT
Start: 2024-07-03 | End: 2024-07-03

## 2024-07-03 RX ORDER — SODIUM CHLORIDE 0.9 % (FLUSH) 0.9 %
5-40 SYRINGE (ML) INJECTION PRN
OUTPATIENT
Start: 2024-07-14

## 2024-07-03 RX ORDER — DEXAMETHASONE 4 MG/1
10 TABLET ORAL ONCE
Status: COMPLETED | OUTPATIENT
Start: 2024-07-03 | End: 2024-07-03

## 2024-07-03 RX ADMIN — SODIUM CHLORIDE, PRESERVATIVE FREE 20 ML: 5 INJECTION INTRAVENOUS at 14:24

## 2024-07-03 RX ADMIN — CARFILZOMIB 65.6 MG: 10 INJECTION, POWDER, LYOPHILIZED, FOR SOLUTION INTRAVENOUS at 13:52

## 2024-07-03 RX ADMIN — DEXAMETHASONE 10 MG: 4 TABLET ORAL at 13:21

## 2024-07-03 RX ADMIN — SODIUM CHLORIDE 50 ML/HR: 9 INJECTION, SOLUTION INTRAVENOUS at 13:23

## 2024-07-03 RX ADMIN — DEXTROSE MONOHYDRATE 50 ML/HR: 50 INJECTION, SOLUTION INTRAVENOUS at 13:48

## 2024-07-03 RX ADMIN — ZOLEDRONIC ACID 4 MG: 0.04 INJECTION, SOLUTION INTRAVENOUS at 13:25

## 2024-07-03 ASSESSMENT — PAIN SCALES - GENERAL: PAINLEVEL_OUTOF10: 4

## 2024-07-03 ASSESSMENT — PAIN DESCRIPTION - ORIENTATION: ORIENTATION: RIGHT;LEFT

## 2024-07-03 ASSESSMENT — PAIN DESCRIPTION - DESCRIPTORS: DESCRIPTORS: BURNING;TINGLING

## 2024-07-03 ASSESSMENT — PAIN DESCRIPTION - PAIN TYPE: TYPE: NEUROPATHIC PAIN

## 2024-07-03 ASSESSMENT — PAIN DESCRIPTION - LOCATION: LOCATION: HAND

## 2024-07-03 NOTE — PROGRESS NOTES
[facility] [place] at [facility]  [address] [facility], [address] [place] [id number]  W: [phone]  F: [phone]    Reason for Visit:   Candida Weaver is a 69 y.o. female who is seen for Multiple Myeloma   Treatment History:   10/25/2023: Kayli VRD, Rev lite  3/2024: Kyprolis Pomalyst and Dexamethasone    History of Present Illness:   Patient is a 69 y.o. female who was dx with Smoldering Myeloma in 2017 and has been seeing [facility] Dr. Lopez. A BM bx at that time showed 40% plasma cells. No end organ damage. Noted to have worsening anemia 7/5/2023 with a Hb of 9.6 g/dl. This led to further evaluation that included a gammopathy eval that showed an M spike of 0.3 g/dl. She had a Kappa LC level of 1593 g/L with a K:L ratio of 157. Sent for evaluation now. She was in the ER June 2023 with some SOB. Had a CT head and this showed lytic lesions. She states that she has some carpal tunnel. BM bx showed Myeloma. She is on Kayli VRD. She had minimal response and was switched to Kyprolis and Pomalyst and Dexamethasone.    Today is C4D1.  She is tired.  She works nights, she has better SOB, no leg swelling. She worked last night. She has missed doses of pomalyst. She took it last 7 days AGO   Presents with her Son    Review of systems was obtained and pertinent findings reviewed above. Past medical history, social history, family history, medications, and allergies are located in the electronic medical record.    Physical Exam:   BP (!) 126/56   Pulse 75   Temp 97.9 °F (36.6 °C)   Resp 18   Wt 69.4 kg (153 lb)   SpO2 95%   BMI 25.46 kg/m²    ECOG PS: 1  General: No distress  Eyes: PERRLA, anicteric sclerae  HENT: Atraumatic, OP clear  Neck: Supple  CVS: Regular  Skin: No rashes, ecchymoses, or petechiae. Normal temperature, turgor, and texture.  Psych: Alert, oriented, appropriate affect, normal judgment/insight    Results:     Lab Results   Component Value Date/Time    WBC 3.0 07/02/2024 09:42 AM    HGB 9.2

## 2024-07-03 NOTE — PROGRESS NOTES
Candida Weaver is a 69 y.o. female    Chief Complaint   Patient presents with    Follow-up     Multiple Myeloma      1. Have you been to the ER, urgent care clinic since your last visit?  Hospitalized since your last visit?No    2. Have you seen or consulted any other health care providers outside of the [facility] since your last visit?  Include any pap smears or colon screening. No

## 2024-07-03 NOTE — PROGRESS NOTES
Oral Chemotherapy      Candida Weaver is a  69 y.o.female  diagnosed with multiple myeloma . Ms. Weaver is being treated with KPd.      Medication name: pomalidomide  Regimen: KPd  Dose:   3 mg  Frequency: daily  Administration schedule: for 21 days followed by 7 days off every 28 days  Ordering provider: Nany Arboleda MD  Start date: 7/3/24 (Cycle 3 - pomalidomide will start 7/4)    Lab Results   Component Value Date    WBC 3.0 (L) 07/02/2024    HGB 9.2 (L) 07/02/2024    HCT 29.9 (L) 07/02/2024    MCV 98.4 07/02/2024     07/02/2024    LYMPHOPCT 25 07/02/2024    RBC 3.04 (L) 07/02/2024    MCH 30.3 07/02/2024    MCHC 30.8 07/02/2024    RDW 17.2 (H) 07/02/2024     Lab Results   Component Value Date    NEUTROABS 1.6 (L) 07/02/2024           Expected follow up date: Labs/office visit each treatment. Next cycle start on 8/1/24     Patient provided with an Oral Chemotherapy Journal.      Ms. Weavre verbalized understanding of the information presented and all of the patient's questions were answered.              Rose Pa, PharmD, BCPS, BCOP    For Pharmacy Admin Tracking Only    Program: Medical Group  CPA in place:  Yes  Recommendation Provided To: Patient/Caregiver: 1 via In person    Intervention Accepted By: Patient/Caregiver: 1    Time Spent (min): 15

## 2024-07-03 NOTE — PROGRESS NOTES
[facility] Chemotherapy Progress Note    Date: July 3, 2024    Name: Candida Weaver    MRN: [id number]         : 1955      1100 Pt admit to [facility] for C4 D1 Kyprolis and Zometa ambulatory in stable condition. Assessment completed. No new concerns voiced. Port with positive blood return. Labs done on  24.                       No data to display                  Ms. Weaver's vitals were reviewed.  Patient Vitals for the past 12 hrs:   Temp Pulse Resp BP SpO2   24 1423 -- 75 -- (!) 118/50 --   24 1055 97.9 °F (36.6 °C) 75 18 (!) 126/56 95 %         Lab results were obtained and reviewed.  No results found for this or any previous visit (from the past 12 hour(s)).    Pre-medications  were administered as ordered and chemotherapy was initiated.  Medications Administered         0.9 % sodium chloride infusion Admin Date  2024 Action  New Bag Dose  50 mL/hr Rate  50 mL/hr Route  IntraVENous Administered By  Debra Fragoso RN        carfilzomib (KYPROLIS) 65.6 mg in dextrose 5 % 100 mL chemo IVPB Admin Date  2024 Action  New Bag Dose  65.6 mg Rate  285.6 mL/hr Route  IntraVENous Administered By  Debra Fragoso RN        dexAMETHasone (DECADRON) tablet 10 mg Admin Date  2024 Action  Given Dose  10 mg Rate   Route  Oral Administered By  Debra Fragoso RN        dextrose 5 % solution Admin Date  2024 Action  New Bag Dose  50 mL/hr Rate  50 mL/hr Route  IntraVENous Administered By  Debra Fragoso RN        sodium chloride flush 0.9 % injection 5-40 mL Admin Date  2024 Action  Given Dose  20 mL Rate   Route  IntraVENous Administered By  Debra Fragoso RN        zoledronic acid (ZOMETA) 4 mg/100 mL infusion Admin Date  2024 Action  New Bag Dose  4 mg Rate  300 mL/hr Route  IntraVENous Administered By  Debra Fragoso RN            Two nurses verified prior to administering:Drug name, Drug doseInfusion volume or drug volume when prepared in a syringe, Rate of

## 2024-07-05 ENCOUNTER — HOSPITAL ENCOUNTER (OUTPATIENT)
Facility: HOSPITAL | Age: 69
Setting detail: INFUSION SERIES
Discharge: HOME | End: 2024-07-05
Payer: MEDICARE

## 2024-07-05 VITALS
RESPIRATION RATE: 18 BRPM | BODY MASS INDEX: 25.79 KG/M2 | SYSTOLIC BLOOD PRESSURE: 115 MMHG | WEIGHT: 154.8 LBS | HEART RATE: 79 BPM | HEIGHT: 65 IN | DIASTOLIC BLOOD PRESSURE: 72 MMHG | TEMPERATURE: 98.3 F

## 2024-07-05 DIAGNOSIS — Z11.59 ENCOUNTER FOR SCREENING FOR OTHER VIRAL DISEASES: Primary | ICD-10-CM

## 2024-07-05 DIAGNOSIS — C90.00 MULTIPLE MYELOMA NOT HAVING ACHIEVED REMISSION (HCC): ICD-10-CM

## 2024-07-05 LAB
FERRITIN SERPL-MCNC: 1484 NG/ML (ref 8–252)
IRON SATN MFR SERPL: 11 % (ref 20–50)
IRON SERPL-MCNC: 34 UG/DL (ref 35–150)
TIBC SERPL-MCNC: 296 UG/DL (ref 250–450)

## 2024-07-05 PROCEDURE — 82728 ASSAY OF FERRITIN: CPT

## 2024-07-05 PROCEDURE — 36415 COLL VENOUS BLD VENIPUNCTURE: CPT

## 2024-07-05 PROCEDURE — 83550 IRON BINDING TEST: CPT

## 2024-07-05 PROCEDURE — 2580000003 HC RX 258: Performed by: INTERNAL MEDICINE

## 2024-07-05 PROCEDURE — 6360000002 HC RX W HCPCS: Performed by: INTERNAL MEDICINE

## 2024-07-05 PROCEDURE — 83540 ASSAY OF IRON: CPT

## 2024-07-05 PROCEDURE — 96413 CHEMO IV INFUSION 1 HR: CPT

## 2024-07-05 RX ORDER — SODIUM CHLORIDE 0.9 % (FLUSH) 0.9 %
5-40 SYRINGE (ML) INJECTION PRN
Status: DISCONTINUED | OUTPATIENT
Start: 2024-07-05 | End: 2024-07-06 | Stop reason: HOSPADM

## 2024-07-05 RX ORDER — DEXAMETHASONE 4 MG/1
10 TABLET ORAL ONCE
Status: COMPLETED | OUTPATIENT
Start: 2024-07-05 | End: 2024-07-05

## 2024-07-05 RX ORDER — DEXTROSE MONOHYDRATE 50 MG/ML
5-250 INJECTION, SOLUTION INTRAVENOUS PRN
Status: DISCONTINUED | OUTPATIENT
Start: 2024-07-05 | End: 2024-07-06 | Stop reason: HOSPADM

## 2024-07-05 RX ADMIN — DEXTROSE MONOHYDRATE 50 ML/HR: 50 INJECTION, SOLUTION INTRAVENOUS at 09:58

## 2024-07-05 RX ADMIN — DEXAMETHASONE 10 MG: 4 TABLET ORAL at 10:08

## 2024-07-05 RX ADMIN — CARFILZOMIB 65.6 MG: 10 INJECTION, POWDER, LYOPHILIZED, FOR SOLUTION INTRAVENOUS at 10:57

## 2024-07-05 ASSESSMENT — PAIN DESCRIPTION - DESCRIPTORS: DESCRIPTORS: BURNING;TINGLING

## 2024-07-05 ASSESSMENT — PAIN DESCRIPTION - ORIENTATION: ORIENTATION: RIGHT;LEFT

## 2024-07-05 ASSESSMENT — PAIN SCALES - GENERAL: PAINLEVEL_OUTOF10: 5

## 2024-07-05 ASSESSMENT — PAIN DESCRIPTION - LOCATION: LOCATION: HAND

## 2024-07-05 NOTE — PROGRESS NOTES
[facility] Chemotherapy Progress Note    Date: 2024    Name: Candida Weaver    MRN: [id number]         : 1955      Pt admit to [facility] for C4 D2 Kyprolis ambulatory in stable condition. Assessment completed. No new concerns voiced. Port with positive blood return. Labs drawn and sent for processing.    Ms. Weaver's vitals were reviewed.  Patient Vitals for the past 12 hrs:   Temp Pulse Resp BP   24 1129 -- 79 -- 115/72   24 0940 98.3 °F (36.8 °C) 83 18 127/74     Labs reviewed 24 and within treatment parameters.    Pre-medications  were administered as ordered and chemotherapy was initiated.  Medications Administered         carfilzomib (KYPROLIS) 65.6 mg in dextrose 5 % 100 mL chemo IVPB Admin Date  2024 Action  New Bag Dose  65.6 mg Rate  285.6 mL/hr Route  IntraVENous Administered By  Candida Díaz, SABRINA        dexAMETHasone (DECADRON) tablet 10 mg Admin Date  2024 Action  Given Dose  10 mg Rate   Route  Oral Administered By  Candida Díaz, SABRINA        dextrose 5 % solution Admin Date  2024 Action  New Bag Dose  50 mL/hr Rate  50 mL/hr Route  IntraVENous Administered By  Candida Díaz, RN          Two nurses verified prior to administering:Drug name, Drug doseInfusion volume or drug volume when prepared in a syringe, Rate of administration, Route of administration, Expiration dates and/or times, Appearance and physical integrity of the drugs, Rate set on infusion pump, when used, Sequencing of drug administration.    Pt tolerated treatment well. Port maintained positive blood return throughout treatment. Flushed,  and de-accessed per protocol. D/c home ambulatory in no distress. Pt aware of next appointment scheduled.    Pt aware of her iron study results and has questions regarding them. Encouraged pt to reach out to MD office with questions and she verbalized understanding.    Future Appointments   Date Time Provider Department Center   7/10/2024  9:30 AM PEDS LAB CHAIR 1

## 2024-07-08 LAB
ALBUMIN SERPL ELPH-MCNC: 3.6 G/DL (ref 2.9–4.4)
ALBUMIN/GLOB SERPL: 1.9 (ref 0.7–1.7)
ALPHA1 GLOB SERPL ELPH-MCNC: 0.2 G/DL (ref 0–0.4)
ALPHA2 GLOB SERPL ELPH-MCNC: 0.6 G/DL (ref 0.4–1)
B-GLOBULIN SERPL ELPH-MCNC: 0.9 G/DL (ref 0.7–1.3)
GAMMA GLOB SERPL ELPH-MCNC: 0.2 G/DL (ref 0.4–1.8)
GLOBULIN SER-MCNC: 1.9 G/DL (ref 2.2–3.9)
IGA SERPL-MCNC: 7 MG/DL (ref 64–422)
IGG SERPL-MCNC: 234 MG/DL (ref 586–1602)
IGM SERPL-MCNC: <5 MG/DL (ref 26–217)
INTERPRETATION SERPL IEP-IMP: ABNORMAL
KAPPA LC FREE SER-MCNC: 497.2 MG/L (ref 3.3–19.4)
KAPPA LC FREE/LAMBDA FREE SER: 292.47 (ref 0.26–1.65)
LAMBDA LC FREE SERPL-MCNC: 1.7 MG/L (ref 5.7–26.3)
M PROTEIN SERPL ELPH-MCNC: ABNORMAL G/DL
PROT SERPL-MCNC: 5.5 G/DL (ref 6–8.5)

## 2024-07-10 ENCOUNTER — HOSPITAL ENCOUNTER (OUTPATIENT)
Facility: HOSPITAL | Age: 69
Setting detail: INFUSION SERIES
Discharge: HOME | End: 2024-07-10
Payer: MEDICARE

## 2024-07-10 VITALS
SYSTOLIC BLOOD PRESSURE: 111 MMHG | DIASTOLIC BLOOD PRESSURE: 63 MMHG | RESPIRATION RATE: 18 BRPM | TEMPERATURE: 98.6 F | HEART RATE: 72 BPM | OXYGEN SATURATION: 97 %

## 2024-07-10 DIAGNOSIS — Z11.59 ENCOUNTER FOR SCREENING FOR OTHER VIRAL DISEASES: ICD-10-CM

## 2024-07-10 DIAGNOSIS — C90.00 MULTIPLE MYELOMA NOT HAVING ACHIEVED REMISSION (HCC): ICD-10-CM

## 2024-07-10 LAB
ALBUMIN SERPL-MCNC: 3.5 G/DL (ref 3.5–5)
ALBUMIN/GLOB SERPL: 1.3 (ref 1.1–2.2)
ALP SERPL-CCNC: 72 U/L (ref 45–117)
ALT SERPL-CCNC: 24 U/L (ref 12–78)
ANION GAP SERPL CALC-SCNC: 3 MMOL/L (ref 5–15)
AST SERPL-CCNC: 10 U/L (ref 15–37)
BASOPHILS # BLD: 0.1 K/UL (ref 0–0.1)
BASOPHILS NFR BLD: 2 % (ref 0–1)
BILIRUB SERPL-MCNC: 0.3 MG/DL (ref 0.2–1)
BUN SERPL-MCNC: 20 MG/DL (ref 6–20)
BUN/CREAT SERPL: 27 (ref 12–20)
CALCIUM SERPL-MCNC: 9.2 MG/DL (ref 8.5–10.1)
CHLORIDE SERPL-SCNC: 109 MMOL/L (ref 97–108)
CO2 SERPL-SCNC: 26 MMOL/L (ref 21–32)
CREAT SERPL-MCNC: 0.75 MG/DL (ref 0.55–1.02)
DIFFERENTIAL METHOD BLD: ABNORMAL
EOSINOPHIL # BLD: 0 K/UL (ref 0–0.4)
EOSINOPHIL NFR BLD: 0 % (ref 0–7)
ERYTHROCYTE [DISTWIDTH] IN BLOOD BY AUTOMATED COUNT: 16.5 % (ref 11.5–14.5)
GLOBULIN SER CALC-MCNC: 2.7 G/DL (ref 2–4)
GLUCOSE SERPL-MCNC: 98 MG/DL (ref 65–100)
HCT VFR BLD AUTO: 28.4 % (ref 35–47)
HGB BLD-MCNC: 9.3 G/DL (ref 11.5–16)
IMM GRANULOCYTES # BLD AUTO: 0 K/UL
IMM GRANULOCYTES NFR BLD AUTO: 0 %
LYMPHOCYTES # BLD: 0.8 K/UL (ref 0.8–3.5)
LYMPHOCYTES NFR BLD: 23 % (ref 12–49)
MCH RBC QN AUTO: 31 PG (ref 26–34)
MCHC RBC AUTO-ENTMCNC: 32.7 G/DL (ref 30–36.5)
MCV RBC AUTO: 94.7 FL (ref 80–99)
MONOCYTES # BLD: 0.1 K/UL (ref 0–1)
MONOCYTES NFR BLD: 4 % (ref 5–13)
NEUTS BAND NFR BLD MANUAL: 2 % (ref 0–6)
NEUTS SEG # BLD: 2.5 K/UL (ref 1.8–8)
NEUTS SEG NFR BLD: 69 % (ref 32–75)
NRBC # BLD: 0.08 K/UL (ref 0–0.01)
NRBC BLD-RTO: 2.3 PER 100 WBC
PLATELET # BLD AUTO: 196 K/UL (ref 150–400)
PMV BLD AUTO: 12.9 FL (ref 8.9–12.9)
POTASSIUM SERPL-SCNC: 4.2 MMOL/L (ref 3.5–5.1)
PROT SERPL-MCNC: 6.2 G/DL (ref 6.4–8.2)
RBC # BLD AUTO: 3 M/UL (ref 3.8–5.2)
RBC MORPH BLD: ABNORMAL
SODIUM SERPL-SCNC: 138 MMOL/L (ref 136–145)
WBC # BLD AUTO: 3.5 K/UL (ref 3.6–11)

## 2024-07-10 PROCEDURE — 96361 HYDRATE IV INFUSION ADD-ON: CPT

## 2024-07-10 PROCEDURE — 96375 TX/PRO/DX INJ NEW DRUG ADDON: CPT

## 2024-07-10 PROCEDURE — 36415 COLL VENOUS BLD VENIPUNCTURE: CPT

## 2024-07-10 PROCEDURE — 85025 COMPLETE CBC W/AUTO DIFF WBC: CPT

## 2024-07-10 PROCEDURE — 80053 COMPREHEN METABOLIC PANEL: CPT

## 2024-07-10 PROCEDURE — 96374 THER/PROPH/DIAG INJ IV PUSH: CPT

## 2024-07-10 ASSESSMENT — PAIN SCALES - GENERAL: PAINLEVEL_OUTOF10: 5

## 2024-07-10 ASSESSMENT — PAIN DESCRIPTION - DESCRIPTORS: DESCRIPTORS: BURNING;TINGLING

## 2024-07-10 ASSESSMENT — PAIN DESCRIPTION - LOCATION: LOCATION: HAND

## 2024-07-10 ASSESSMENT — PAIN DESCRIPTION - ORIENTATION: ORIENTATION: RIGHT;LEFT

## 2024-07-10 NOTE — PROGRESS NOTES
[facility] Peds/Adult Note                       Date: July 10, 2024    Name: Candida Weaver    MRN: [id number]         : 1955    0940 Patient arrives for Pre-treatment labs without acute problems. Please see Epic for complete assessment and education provided.    Vital signs stable throughout and prior to discharge. Patient tolerated procedure well and was discharged without incident.  Patient is aware of next [facility] appointment on 2024.  Appointment card give to the Patient.       Ms. Weaver's vitals were reviewed prior to and after treatment.   Patient Vitals for the past 12 hrs:   Temp Pulse Resp BP SpO2   07/10/24 0940 98.6 °F (37 °C) 72 18 111/63 97 %         Lab results were obtained and reviewed.  Recent Results (from the past 12 hour(s))   CBC With Auto Differential    Collection Time: 07/10/24  9:47 AM   Result Value Ref Range    WBC 3.5 (L) 3.6 - 11.0 K/uL    RBC 3.00 (L) 3.80 - 5.20 M/uL    Hemoglobin 9.3 (L) 11.5 - 16.0 g/dL    Hematocrit 28.4 (L) 35.0 - 47.0 %    MCV 94.7 80.0 - 99.0 FL    MCH 31.0 26.0 - 34.0 PG    MCHC 32.7 30.0 - 36.5 g/dL    RDW 16.5 (H) 11.5 - 14.5 %    Platelets 196 150 - 400 K/uL    MPV 12.9 8.9 - 12.9 FL    Nucleated RBCs 2.3 (H) 0  WBC    nRBC 0.08 (H) 0.00 - 0.01 K/uL    Neutrophils % 69 32 - 75 %    Band Neutrophils 2 0 - 6 %    Lymphocytes % 23 12 - 49 %    Monocytes % 4 (L) 5 - 13 %    Eosinophils % 0 0 - 7 %    Basophils % 2 (H) 0 - 1 %    Immature Granulocytes % 0 %    Neutrophils Absolute 2.5 1.8 - 8.0 K/UL    Lymphocytes Absolute 0.8 0.8 - 3.5 K/UL    Monocytes Absolute 0.1 0.0 - 1.0 K/UL    Eosinophils Absolute 0.0 0.0 - 0.4 K/UL    Basophils Absolute 0.1 0.0 - 0.1 K/UL    Immature Granulocytes Absolute 0.0 K/UL    Differential Type MANUAL      RBC Comment ANISOCYTOSIS  1+       Comprehensive metabolic panel    Collection Time: 07/10/24  9:47 AM   Result Value Ref Range    Sodium 138 136 - 145 mmol/L    Potassium 4.2 3.5 - 5.1 mmol/L    Chloride 109 (H)

## 2024-07-11 ENCOUNTER — HOSPITAL ENCOUNTER (OUTPATIENT)
Facility: HOSPITAL | Age: 69
Setting detail: INFUSION SERIES
Discharge: HOME | End: 2024-07-11
Payer: MEDICARE

## 2024-07-11 VITALS
HEART RATE: 70 BPM | BODY MASS INDEX: 25.06 KG/M2 | WEIGHT: 150.4 LBS | TEMPERATURE: 98.8 F | SYSTOLIC BLOOD PRESSURE: 118 MMHG | HEIGHT: 65 IN | DIASTOLIC BLOOD PRESSURE: 50 MMHG | RESPIRATION RATE: 18 BRPM

## 2024-07-11 DIAGNOSIS — Z11.59 ENCOUNTER FOR SCREENING FOR OTHER VIRAL DISEASES: Primary | ICD-10-CM

## 2024-07-11 DIAGNOSIS — C90.00 MULTIPLE MYELOMA NOT HAVING ACHIEVED REMISSION (HCC): ICD-10-CM

## 2024-07-11 PROCEDURE — 2580000003 HC RX 258: Performed by: INTERNAL MEDICINE

## 2024-07-11 PROCEDURE — 6360000002 HC RX W HCPCS: Performed by: INTERNAL MEDICINE

## 2024-07-11 PROCEDURE — 96413 CHEMO IV INFUSION 1 HR: CPT

## 2024-07-11 RX ORDER — DEXAMETHASONE 4 MG/1
10 TABLET ORAL ONCE
Status: COMPLETED | OUTPATIENT
Start: 2024-07-11 | End: 2024-07-11

## 2024-07-11 RX ORDER — DEXTROSE MONOHYDRATE 50 MG/ML
5-250 INJECTION, SOLUTION INTRAVENOUS PRN
Status: DISCONTINUED | OUTPATIENT
Start: 2024-07-11 | End: 2024-07-12 | Stop reason: HOSPADM

## 2024-07-11 RX ORDER — SODIUM CHLORIDE 0.9 % (FLUSH) 0.9 %
5-40 SYRINGE (ML) INJECTION PRN
Status: DISCONTINUED | OUTPATIENT
Start: 2024-07-11 | End: 2024-07-12 | Stop reason: HOSPADM

## 2024-07-11 RX ADMIN — DEXTROSE MONOHYDRATE 30 ML/HR: 50 INJECTION, SOLUTION INTRAVENOUS at 11:03

## 2024-07-11 RX ADMIN — CARFILZOMIB 65.6 MG: 10 INJECTION, POWDER, LYOPHILIZED, FOR SOLUTION INTRAVENOUS at 11:47

## 2024-07-11 RX ADMIN — SODIUM CHLORIDE, PRESERVATIVE FREE 20 ML: 5 INJECTION INTRAVENOUS at 12:25

## 2024-07-11 RX ADMIN — DEXAMETHASONE 10 MG: 4 TABLET ORAL at 11:10

## 2024-07-11 ASSESSMENT — PAIN DESCRIPTION - DESCRIPTORS: DESCRIPTORS: BURNING;TINGLING

## 2024-07-11 ASSESSMENT — PAIN DESCRIPTION - ORIENTATION: ORIENTATION: LEFT;RIGHT

## 2024-07-11 ASSESSMENT — PAIN SCALES - GENERAL: PAINLEVEL_OUTOF10: 5

## 2024-07-11 ASSESSMENT — PAIN DESCRIPTION - LOCATION: LOCATION: HAND

## 2024-07-11 NOTE — PROGRESS NOTES
[facility] Chemo Progress Note      [facility]    Date: 2024    Name: Candida Weaver    MRN: [id number]         : 1955    Ms. Weaver Arrived ambulatory and in no distress for cycle 4 day 8 of Kyprolis.      Assessment, Port access, and Lab draw was completed and documented in flowsheets by Ann MART RN. Line was also flushed, clamped, and Curos Cap applied to end clave.    Follow Up: Proceed with treatment      Ms. Weaver's vitals were reviewed.  Patient Vitals for the past 12 hrs:   Temp Pulse Resp BP   24 1219 -- 70 -- (!) 118/50   24 1030 98.8 °F (37.1 °C) 75 18 (!) 117/55         Lab results were obtained and reviewed.  Labs within parameter for treatment.     Pre-medications  were administered as ordered and chemotherapy was initiated.    Two nurses verified prior to administering:  Drug name, Drug dose, Infusion volume or drug volume when prepared in a syringe, Rate of administration, Route of administration, Expiration dates and/or times, Appearance and physical integrity of the drugs, Rate set on infusion pump, when used, and Sequencing of drug administration.    Medications Administered         carfilzomib (KYPROLIS) 65.6 mg in dextrose 5 % 100 mL chemo IVPB Admin Date  2024 Action  New Bag Dose  65.6 mg Rate  285.6 mL/hr Route  IntraVENous Administered By  Gurmeet Bobby, SABRINA        dexAMETHasone (DECADRON) tablet 10 mg Admin Date  2024 Action  Given Dose  10 mg Rate   Route  Oral Administered By  Gurmeet Bobby, SABRINA        dextrose 5 % solution Admin Date  2024 Action  New Bag Dose  30 mL/hr Rate  30 mL/hr Route  IntraVENous Administered By  Gurmeet Bobby, SABRINA        sodium chloride flush 0.9 % injection 5-40 mL Admin Date  2024 Action  Given Dose  20 mL Rate   Route  IntraVENous Administered By  Gurmeet Bobby, SABRINA            Ms. Weaver tolerated treatment well, port flushed and de-accessed per protocol. Patient was discharged from Outpatient

## 2024-07-12 ENCOUNTER — HOSPITAL ENCOUNTER (OUTPATIENT)
Facility: HOSPITAL | Age: 69
Setting detail: INFUSION SERIES
Discharge: HOME | End: 2024-07-12
Payer: MEDICARE

## 2024-07-12 VITALS
DIASTOLIC BLOOD PRESSURE: 83 MMHG | SYSTOLIC BLOOD PRESSURE: 141 MMHG | RESPIRATION RATE: 18 BRPM | HEART RATE: 85 BPM | WEIGHT: 153.6 LBS | BODY MASS INDEX: 25.59 KG/M2 | HEIGHT: 65 IN | TEMPERATURE: 99.1 F

## 2024-07-12 DIAGNOSIS — C90.00 MULTIPLE MYELOMA NOT HAVING ACHIEVED REMISSION (HCC): ICD-10-CM

## 2024-07-12 DIAGNOSIS — Z11.59 ENCOUNTER FOR SCREENING FOR OTHER VIRAL DISEASES: Primary | ICD-10-CM

## 2024-07-12 PROCEDURE — 96413 CHEMO IV INFUSION 1 HR: CPT

## 2024-07-12 PROCEDURE — 2580000003 HC RX 258: Performed by: INTERNAL MEDICINE

## 2024-07-12 PROCEDURE — 6360000002 HC RX W HCPCS: Performed by: INTERNAL MEDICINE

## 2024-07-12 RX ORDER — DEXAMETHASONE 4 MG/1
10 TABLET ORAL ONCE
Status: COMPLETED | OUTPATIENT
Start: 2024-07-12 | End: 2024-07-12

## 2024-07-12 RX ORDER — SODIUM CHLORIDE 0.9 % (FLUSH) 0.9 %
5-40 SYRINGE (ML) INJECTION PRN
Status: DISCONTINUED | OUTPATIENT
Start: 2024-07-12 | End: 2024-07-13 | Stop reason: HOSPADM

## 2024-07-12 RX ORDER — DEXTROSE MONOHYDRATE 50 MG/ML
5-250 INJECTION, SOLUTION INTRAVENOUS PRN
Status: DISCONTINUED | OUTPATIENT
Start: 2024-07-12 | End: 2024-07-13 | Stop reason: HOSPADM

## 2024-07-12 RX ORDER — ONDANSETRON 2 MG/ML
8 INJECTION INTRAMUSCULAR; INTRAVENOUS
Status: DISCONTINUED | OUTPATIENT
Start: 2024-07-12 | End: 2024-07-13 | Stop reason: HOSPADM

## 2024-07-12 RX ADMIN — DEXAMETHASONE 10 MG: 4 TABLET ORAL at 09:58

## 2024-07-12 RX ADMIN — DEXTROSE MONOHYDRATE 50 ML/HR: 50 INJECTION, SOLUTION INTRAVENOUS at 09:42

## 2024-07-12 RX ADMIN — CARFILZOMIB 65.6 MG: 10 INJECTION, POWDER, LYOPHILIZED, FOR SOLUTION INTRAVENOUS at 10:41

## 2024-07-12 ASSESSMENT — PAIN SCALES - GENERAL: PAINLEVEL_OUTOF10: 5

## 2024-07-12 ASSESSMENT — PAIN DESCRIPTION - ORIENTATION: ORIENTATION: LEFT;RIGHT

## 2024-07-12 ASSESSMENT — PAIN DESCRIPTION - LOCATION: LOCATION: HAND

## 2024-07-12 ASSESSMENT — PAIN DESCRIPTION - DESCRIPTORS: DESCRIPTORS: BURNING;TINGLING

## 2024-07-12 NOTE — PROGRESS NOTES
[facility] Chemo Progress Note    Date: 2024    Name: Candida Weaver    MRN: [id number]         : 1955    Ms. Weaver Arrived ambulatory and in no distress for cycle 4 day 9 of Kyprolis.      Assessment was completed and documented in flowsheets. Port accessed without difficulty.    Ms. Weaver's vitals were reviewed.  Patient Vitals for the past 12 hrs:   Temp Pulse Resp BP   24 1126 -- 85 -- (!) 141/83   24 0932 99.1 °F (37.3 °C) 94 18 (!) 141/71       Pre-medications  were administered as ordered and chemotherapy was initiated.  Medications Administered         carfilzomib (KYPROLIS) 65.6 mg in dextrose 5 % 100 mL chemo IVPB Admin Date  2024 Action  New Bag Dose  65.6 mg Rate  285.6 mL/hr Route  IntraVENous Administered By  Ann Moss RN        dexAMETHasone (DECADRON) tablet 10 mg Admin Date  2024 Action  Given Dose  10 mg Rate   Route  Oral Administered By  Ann Moss RN        dextrose 5 % solution Admin Date  2024 Action  New Bag Dose  50 mL/hr Rate  50 mL/hr Route  IntraVENous Administered By  Ann Moss, SABRINA            Two nurses verified prior to administering:  Drug name, Drug dose, Infusion volume or drug volume when prepared in a syringe, Rate of administration, Route of administration, Expiration dates and/or times, Appearance and physical integrity of the drugs, Rate set on infusion pump, when used, and Sequencing of drug administration.    Ms. Weaver tolerated treatment well, port flushed and de-accessed per protocol. Patient was discharged from [facility] in stable condition. Patient aware of next appointment.     Future Appointments   Date Time Provider Department Center   2024  9:30 AM PEDS LAB CHAIR 1 [facility]   2024 10:00 [facility]   2024 10:30 [facility]   2024  9:30 AM PEDS LAB CHAIR 1 [facility]   2024  9:30 AM PEDS LAB CHAIR 1 [facility]   2024 10:00 AM [place]

## 2024-07-16 DIAGNOSIS — C90.00 MULTIPLE MYELOMA NOT HAVING ACHIEVED REMISSION (HCC): ICD-10-CM

## 2024-07-16 RX ORDER — POMALIDOMIDE 3 MG/1
3 CAPSULE ORAL DAILY
Qty: 21 CAPSULE | Refills: 0 | Status: ACTIVE | OUTPATIENT
Start: 2024-07-16

## 2024-07-16 NOTE — TELEPHONE ENCOUNTER
Oral Chemotherapy      Candida Weaver is a  69 y.o.female  diagnosed with multiple myeloma . Ms. Weaver is being treated with KPd.      Medication name: pomalidomide  Regimen: KPd  Dose:   3 mg  Frequency: daily  Administration schedule: for 21 days followed by 7 days off every 28 days  Ordering provider: Nany Arboleda MD  Start date: 7/3/24 (Cycle 4)      REMS auth # [id number]  Prescription sent to pharmacy for processing.         Rose Pa, WOOD, BCOP, [facility]    For Pharmacy Admin Tracking Only    Program: Medical Group  CPA in place:  Yes  Recommendation Provided To: Patient/Caregiver: 1 via Telephone  Intervention Detail: Refill(s) Provided  Intervention Accepted By: Patient/Caregiver: 1    Time Spent (min): 10

## 2024-07-17 ENCOUNTER — HOSPITAL ENCOUNTER (OUTPATIENT)
Facility: HOSPITAL | Age: 69
Setting detail: INFUSION SERIES
Discharge: HOME | End: 2024-07-17
Payer: MEDICARE

## 2024-07-17 ENCOUNTER — APPOINTMENT (OUTPATIENT)
Facility: HOSPITAL | Age: 69
End: 2024-07-17
Payer: MEDICARE

## 2024-07-17 VITALS
HEART RATE: 70 BPM | TEMPERATURE: 97.8 F | DIASTOLIC BLOOD PRESSURE: 58 MMHG | RESPIRATION RATE: 18 BRPM | SYSTOLIC BLOOD PRESSURE: 126 MMHG

## 2024-07-17 DIAGNOSIS — C90.00 MULTIPLE MYELOMA NOT HAVING ACHIEVED REMISSION (HCC): ICD-10-CM

## 2024-07-17 DIAGNOSIS — T45.1X5A CHEMOTHERAPY INDUCED NEUTROPENIA (HCC): ICD-10-CM

## 2024-07-17 DIAGNOSIS — D70.1 CHEMOTHERAPY INDUCED NEUTROPENIA (HCC): ICD-10-CM

## 2024-07-17 LAB
ALBUMIN SERPL-MCNC: 3.4 G/DL (ref 3.5–5)
ALBUMIN/GLOB SERPL: 1.3 (ref 1.1–2.2)
ALP SERPL-CCNC: 86 U/L (ref 45–117)
ALT SERPL-CCNC: 19 U/L (ref 12–78)
ANION GAP SERPL CALC-SCNC: 5 MMOL/L (ref 5–15)
AST SERPL-CCNC: 9 U/L (ref 15–37)
BASOPHILS # BLD: 0 K/UL (ref 0–0.1)
BASOPHILS NFR BLD: 1 % (ref 0–1)
BILIRUB SERPL-MCNC: 0.3 MG/DL (ref 0.2–1)
BUN SERPL-MCNC: 23 MG/DL (ref 6–20)
BUN/CREAT SERPL: 32 (ref 12–20)
CALCIUM SERPL-MCNC: 8.8 MG/DL (ref 8.5–10.1)
CHLORIDE SERPL-SCNC: 104 MMOL/L (ref 97–108)
CO2 SERPL-SCNC: 26 MMOL/L (ref 21–32)
CREAT SERPL-MCNC: 0.71 MG/DL (ref 0.55–1.02)
DIFFERENTIAL METHOD BLD: ABNORMAL
EOSINOPHIL # BLD: 0.1 K/UL (ref 0–0.4)
EOSINOPHIL NFR BLD: 4 % (ref 0–7)
ERYTHROCYTE [DISTWIDTH] IN BLOOD BY AUTOMATED COUNT: 16.4 % (ref 11.5–14.5)
GLOBULIN SER CALC-MCNC: 2.6 G/DL (ref 2–4)
GLUCOSE SERPL-MCNC: 95 MG/DL (ref 65–100)
HCT VFR BLD AUTO: 28.1 % (ref 35–47)
HGB BLD-MCNC: 8.8 G/DL (ref 11.5–16)
IMM GRANULOCYTES # BLD AUTO: 0 K/UL (ref 0–0.04)
IMM GRANULOCYTES NFR BLD AUTO: 1 % (ref 0–0.5)
LYMPHOCYTES # BLD: 0.6 K/UL (ref 0.8–3.5)
LYMPHOCYTES NFR BLD: 19 % (ref 12–49)
MCH RBC QN AUTO: 30.1 PG (ref 26–34)
MCHC RBC AUTO-ENTMCNC: 31.3 G/DL (ref 30–36.5)
MCV RBC AUTO: 96.2 FL (ref 80–99)
MONOCYTES # BLD: 0.3 K/UL (ref 0–1)
MONOCYTES NFR BLD: 10 % (ref 5–13)
NEUTS SEG # BLD: 2.3 K/UL (ref 1.8–8)
NEUTS SEG NFR BLD: 65 % (ref 32–75)
NRBC # BLD: 0.06 K/UL (ref 0–0.01)
NRBC BLD-RTO: 1.8 PER 100 WBC
PLATELET # BLD AUTO: 157 K/UL (ref 150–400)
PMV BLD AUTO: 11.9 FL (ref 8.9–12.9)
POTASSIUM SERPL-SCNC: 4.5 MMOL/L (ref 3.5–5.1)
PROT SERPL-MCNC: 6 G/DL (ref 6.4–8.2)
RBC # BLD AUTO: 2.92 M/UL (ref 3.8–5.2)
RBC MORPH BLD: ABNORMAL
RBC MORPH BLD: ABNORMAL
SODIUM SERPL-SCNC: 135 MMOL/L (ref 136–145)
WBC # BLD AUTO: 3.3 K/UL (ref 3.6–11)

## 2024-07-17 PROCEDURE — 80053 COMPREHEN METABOLIC PANEL: CPT

## 2024-07-17 PROCEDURE — 85025 COMPLETE CBC W/AUTO DIFF WBC: CPT

## 2024-07-17 PROCEDURE — 36415 COLL VENOUS BLD VENIPUNCTURE: CPT

## 2024-07-17 ASSESSMENT — PAIN DESCRIPTION - DESCRIPTORS: DESCRIPTORS: DISCOMFORT

## 2024-07-17 ASSESSMENT — PAIN DESCRIPTION - ORIENTATION: ORIENTATION: LOWER

## 2024-07-17 ASSESSMENT — PAIN DESCRIPTION - LOCATION: LOCATION: BACK

## 2024-07-17 ASSESSMENT — PAIN SCALES - GENERAL: PAINLEVEL_OUTOF10: 6

## 2024-07-17 NOTE — PROGRESS NOTES
[facility] Short Note                   Date: 2024    Name: Candida Weaver    MRN: [id number]         : 1955      0940 - Pt admit to [facility] for Pre-treatment Labs ambulatory in stable condition. Assessment completed. No new concerns voiced.       Ms. Weaver's vitals were reviewed prior to and after treatment.   Patient Vitals for the past 12 hrs:   Temp Pulse Resp BP   24 0943 97.8 °F (36.6 °C) 70 18 (!) 126/58       Lab results were obtained and reviewed.   Please review pending lab results in Epic.  No results found for this or any previous visit (from the past 12 hour(s)).    Medications given: NONE    Ms. Weaver tolerated the procedure, and had no complaints.    Ms. Weaver was [facility] in stable condition.     Future Appointments   Date Time Provider Department Center   2024 10:00 AM [facility] CHEMO CHAIR 1 [facility]   2024 10:30 AM [facility]UTH CHEMO CHAIR 2 [facility]   2024  9:30 AM PEDS LAB CHAIR 1 [facility]   2024  9:30 AM PEDS LAB CHAIR 1 [facility]   2024 10:00 AM Nany Arboleda MD [facility] BS AMB   2024 10:30 AM RUTH CHEMO CHAIR 2 [facility]   2024 10:30 AM RUTH CHEMO CHAIR 2 [facility]   2024  9:30 AM PEDS LAB CHAIR 1 [facility]   2024 10:30 AM RUTH CHEMO CHAIR 2 [facility]   2024 10:00 AM RUTH CHEMO CHAIR 1 [facility]       Ann Patterson RN  2024  9:54 AM

## 2024-07-18 ENCOUNTER — HOSPITAL ENCOUNTER (OUTPATIENT)
Facility: HOSPITAL | Age: 69
Setting detail: INFUSION SERIES
Discharge: HOME | End: 2024-07-18
Payer: MEDICARE

## 2024-07-18 VITALS
HEART RATE: 71 BPM | OXYGEN SATURATION: 96 % | TEMPERATURE: 97.7 F | RESPIRATION RATE: 20 BRPM | BODY MASS INDEX: 25.29 KG/M2 | DIASTOLIC BLOOD PRESSURE: 45 MMHG | SYSTOLIC BLOOD PRESSURE: 113 MMHG | HEIGHT: 65 IN | WEIGHT: 151.8 LBS

## 2024-07-18 DIAGNOSIS — Z11.59 ENCOUNTER FOR SCREENING FOR OTHER VIRAL DISEASES: Primary | ICD-10-CM

## 2024-07-18 DIAGNOSIS — C90.00 MULTIPLE MYELOMA NOT HAVING ACHIEVED REMISSION (HCC): ICD-10-CM

## 2024-07-18 PROCEDURE — 6360000002 HC RX W HCPCS: Performed by: INTERNAL MEDICINE

## 2024-07-18 PROCEDURE — 96413 CHEMO IV INFUSION 1 HR: CPT

## 2024-07-18 PROCEDURE — 2580000003 HC RX 258: Performed by: INTERNAL MEDICINE

## 2024-07-18 RX ORDER — DEXAMETHASONE 4 MG/1
10 TABLET ORAL ONCE
Status: COMPLETED | OUTPATIENT
Start: 2024-07-18 | End: 2024-07-18

## 2024-07-18 RX ORDER — SODIUM CHLORIDE 9 MG/ML
5-250 INJECTION, SOLUTION INTRAVENOUS PRN
Status: DISCONTINUED | OUTPATIENT
Start: 2024-07-18 | End: 2024-07-19 | Stop reason: HOSPADM

## 2024-07-18 RX ORDER — DEXTROSE MONOHYDRATE 50 MG/ML
5-250 INJECTION, SOLUTION INTRAVENOUS PRN
Status: DISCONTINUED | OUTPATIENT
Start: 2024-07-18 | End: 2024-07-19 | Stop reason: HOSPADM

## 2024-07-18 RX ORDER — HEPARIN 100 UNIT/ML
500 SYRINGE INTRAVENOUS PRN
Status: DISCONTINUED | OUTPATIENT
Start: 2024-07-18 | End: 2024-07-19 | Stop reason: HOSPADM

## 2024-07-18 RX ORDER — SODIUM CHLORIDE 0.9 % (FLUSH) 0.9 %
5-40 SYRINGE (ML) INJECTION PRN
Status: DISCONTINUED | OUTPATIENT
Start: 2024-07-18 | End: 2024-07-19 | Stop reason: HOSPADM

## 2024-07-18 RX ADMIN — DEXAMETHASONE 10 MG: 4 TABLET ORAL at 10:46

## 2024-07-18 RX ADMIN — DEXTROSE MONOHYDRATE 25 ML/HR: 50 INJECTION, SOLUTION INTRAVENOUS at 10:46

## 2024-07-18 RX ADMIN — CARFILZOMIB 65.6 MG: 10 INJECTION, POWDER, LYOPHILIZED, FOR SOLUTION INTRAVENOUS at 11:12

## 2024-07-18 ASSESSMENT — PAIN SCALES - GENERAL: PAINLEVEL_OUTOF10: 6

## 2024-07-18 ASSESSMENT — PAIN DESCRIPTION - LOCATION: LOCATION: BACK

## 2024-07-18 ASSESSMENT — PAIN DESCRIPTION - ORIENTATION: ORIENTATION: LOWER

## 2024-07-18 ASSESSMENT — PAIN DESCRIPTION - DESCRIPTORS: DESCRIPTORS: ACHING

## 2024-07-18 NOTE — PROGRESS NOTES
[facility] Progress Note    Ms. Weaver Arrived ambulatory and in no distress for Kyprolis (C4D15) regimen.  Assessment was completed, no acute issues at this time, no new complaints voiced.  Port accessed without difficulty, with positive blood return.    Labs drawn yesterday and reviewed. Criteria met for treatment.    Ms. Weaver's vitals were reviewed.  Patient Vitals for the past 12 hrs:   Temp Pulse Resp BP SpO2   07/18/24 1135 -- 71 -- (!) 113/45 --   07/18/24 1013 97.7 °F (36.5 °C) 77 20 (!) 114/43 96 %     Pre-medications  were administered as ordered and chemotherapy was initiated.  Medications Administered         carfilzomib (KYPROLIS) 65.6 mg in dextrose 5 % 100 mL chemo IVPB Admin Date  07/18/2024 Action  New Bag Dose  65.6 mg Rate  285.6 mL/hr Route  IntraVENous Administered By  Stacey White, SABRINA        dexAMETHasone (DECADRON) tablet 10 mg Admin Date  07/18/2024 Action  Given Dose  10 mg Rate   Route  Oral Administered By  Stacey White, SABRINA        dextrose 5 % solution Admin Date  07/18/2024 Action  New Bag Dose  25 mL/hr Rate  25 mL/hr Route  IntraVENous Administered By  Stacey White, SABRINA        Two nurses verified prior to administering: Drug name, Drug dose, Infusion volume or drug volume when prepared in a syringe, Rate of administration, Route of administration, Expiration dates and/or times, Appearance and physical integrity of the drugs, Rate set on infusion pump, when used, and Sequencing of drug administration.    Ms. Weaver tolerated treatment well. Port maintained blood return throughout entire infusion. Port flushed and de-accessed. Patient was discharged from [facility] in stable condition.     Future Appointments   Date Time Provider Department Center   7/19/2024 10:30 AM [facility]UTH CHEMO CHAIR 2 [facility]   7/24/2024  9:30 AM PEDS LAB CHAIR 1 [facility]   7/31/2024  9:30 AM PEDS LAB CHAIR 1 [facility]   7/31/2024 10:00 AM aNny Arboleda MD [facility] BS AMB   8/1/2024 10:30 AM RUTH CHEMO

## 2024-07-19 ENCOUNTER — HOSPITAL ENCOUNTER (OUTPATIENT)
Facility: HOSPITAL | Age: 69
Setting detail: INFUSION SERIES
Discharge: HOME | End: 2024-07-19
Payer: MEDICARE

## 2024-07-19 VITALS
WEIGHT: 155.4 LBS | DIASTOLIC BLOOD PRESSURE: 62 MMHG | BODY MASS INDEX: 25.89 KG/M2 | HEIGHT: 65 IN | HEART RATE: 72 BPM | TEMPERATURE: 98.1 F | RESPIRATION RATE: 18 BRPM | SYSTOLIC BLOOD PRESSURE: 133 MMHG

## 2024-07-19 DIAGNOSIS — C90.00 MULTIPLE MYELOMA NOT HAVING ACHIEVED REMISSION (HCC): ICD-10-CM

## 2024-07-19 DIAGNOSIS — Z11.59 ENCOUNTER FOR SCREENING FOR OTHER VIRAL DISEASES: Primary | ICD-10-CM

## 2024-07-19 PROCEDURE — 96413 CHEMO IV INFUSION 1 HR: CPT

## 2024-07-19 PROCEDURE — 2580000003 HC RX 258: Performed by: INTERNAL MEDICINE

## 2024-07-19 PROCEDURE — 6360000002 HC RX W HCPCS: Performed by: INTERNAL MEDICINE

## 2024-07-19 RX ORDER — SODIUM CHLORIDE 0.9 % (FLUSH) 0.9 %
5-40 SYRINGE (ML) INJECTION PRN
Status: DISCONTINUED | OUTPATIENT
Start: 2024-07-19 | End: 2024-07-20 | Stop reason: HOSPADM

## 2024-07-19 RX ORDER — DEXAMETHASONE 4 MG/1
10 TABLET ORAL ONCE
Status: COMPLETED | OUTPATIENT
Start: 2024-07-19 | End: 2024-07-19

## 2024-07-19 RX ORDER — DEXTROSE MONOHYDRATE 50 MG/ML
5-250 INJECTION, SOLUTION INTRAVENOUS PRN
Status: DISCONTINUED | OUTPATIENT
Start: 2024-07-19 | End: 2024-07-20 | Stop reason: HOSPADM

## 2024-07-19 RX ADMIN — DEXAMETHASONE 10 MG: 4 TABLET ORAL at 10:47

## 2024-07-19 RX ADMIN — DEXTROSE MONOHYDRATE 50 ML/HR: 50 INJECTION, SOLUTION INTRAVENOUS at 10:47

## 2024-07-19 RX ADMIN — CARFILZOMIB 65.6 MG: 10 INJECTION, POWDER, LYOPHILIZED, FOR SOLUTION INTRAVENOUS at 11:42

## 2024-07-19 NOTE — PROGRESS NOTES
[facility] Chemotherapy Progress Note    Date: 2024    Name: Candida Weaver    MRN: [id number]         : 1955      Pt admit to [facility] for C4 D16 Kyprolis ambulatory in stable condition. Assessment completed. No new concerns voiced. Port with positive blood return. Labs drawn and sent for processing. Independent check done by Rachelle Grayson RN.    Ms. Weaver's vitals were reviewed.  Vitals:    24 1214   BP: 133/62   Pulse: 72   Resp:    Temp:          Pre-medications  were administered as ordered and chemotherapy was initiated.  Medications Administered         carfilzomib (KYPROLIS) 65.6 mg in dextrose 5 % 100 mL chemo IVPB Admin Date  2024 Action  New Bag Dose  65.6 mg Rate  285.6 mL/hr Route  IntraVENous Administered By  Candida íDaz, SABRINA        dexAMETHasone (DECADRON) tablet 10 mg Admin Date  2024 Action  Given Dose  10 mg Rate   Route  Oral Administered By  Candida Díaz, SABRINA        dextrose 5 % solution Admin Date  2024 Action  New Bag Dose  50 mL/hr Rate  50 mL/hr Route  IntraVENous Administered By  Candida Díaz, SABRINA          Two nurses verified prior to administering:Drug name, Drug doseInfusion volume or drug volume when prepared in a syringe, Rate of administration, Route of administration, Expiration dates and/or times, Appearance and physical integrity of the drugs, Rate set on infusion pump, when used, Sequencing of drug administration.    Pt tolerated treatment well. Port maintained positive blood return throughout treatment. Flushed,  and de-accessed per protocol. D/c home ambulatory in no distress. Pt aware of next appointment scheduled.    Future Appointments   Date Time Provider Department Center   2024  9:30 AM PEDS LAB CHAIR 1 [facility]   2024  9:30 AM PEDS LAB CHAIR 1 [facility]   2024 10:00 AM Nany Arboleda MD [facility] BS AMB   2024 10:30 AM RUTH CHEMO CHAIR 2 [facility]   2024 10:30 AM RUTH CHEMO CHAIR 2 [facility]   2024  9:30 AM PEDS LAB

## 2024-07-24 ENCOUNTER — HOSPITAL ENCOUNTER (OUTPATIENT)
Facility: HOSPITAL | Age: 69
Setting detail: INFUSION SERIES
End: 2024-07-24

## 2024-07-24 DIAGNOSIS — M51.36 LUMBAR DEGENERATIVE DISC DISEASE: ICD-10-CM

## 2024-07-24 RX ORDER — DEXTROSE MONOHYDRATE 50 MG/ML
5-250 INJECTION, SOLUTION INTRAVENOUS PRN
Status: CANCELLED | OUTPATIENT
Start: 2024-08-09

## 2024-07-24 RX ORDER — EPINEPHRINE 1 MG/ML
0.3 INJECTION, SOLUTION INTRAMUSCULAR; SUBCUTANEOUS PRN
OUTPATIENT
Start: 2024-08-08

## 2024-07-24 RX ORDER — SODIUM CHLORIDE 9 MG/ML
INJECTION, SOLUTION INTRAVENOUS CONTINUOUS
Status: CANCELLED | OUTPATIENT
Start: 2024-08-01

## 2024-07-24 RX ORDER — ALBUTEROL SULFATE 90 UG/1
4 AEROSOL, METERED RESPIRATORY (INHALATION) PRN
Status: CANCELLED | OUTPATIENT
Start: 2024-08-09

## 2024-07-24 RX ORDER — DIPHENHYDRAMINE HYDROCHLORIDE 50 MG/ML
50 INJECTION INTRAMUSCULAR; INTRAVENOUS
OUTPATIENT
Start: 2024-08-08

## 2024-07-24 RX ORDER — ACETAMINOPHEN 325 MG/1
650 TABLET ORAL
Status: CANCELLED | OUTPATIENT
Start: 2024-08-09

## 2024-07-24 RX ORDER — SODIUM CHLORIDE 9 MG/ML
5-250 INJECTION, SOLUTION INTRAVENOUS PRN
Status: CANCELLED | OUTPATIENT
Start: 2024-08-16

## 2024-07-24 RX ORDER — ALBUTEROL SULFATE 90 UG/1
4 AEROSOL, METERED RESPIRATORY (INHALATION) PRN
Status: CANCELLED | OUTPATIENT
Start: 2024-08-02

## 2024-07-24 RX ORDER — ACETAMINOPHEN 325 MG/1
650 TABLET ORAL
Status: CANCELLED | OUTPATIENT
Start: 2024-08-16

## 2024-07-24 RX ORDER — HEPARIN 100 UNIT/ML
500 SYRINGE INTRAVENOUS PRN
OUTPATIENT
Start: 2024-08-08

## 2024-07-24 RX ORDER — HEPARIN 100 UNIT/ML
500 SYRINGE INTRAVENOUS PRN
Status: CANCELLED | OUTPATIENT
Start: 2024-08-16

## 2024-07-24 RX ORDER — ONDANSETRON 2 MG/ML
8 INJECTION INTRAMUSCULAR; INTRAVENOUS
Status: CANCELLED | OUTPATIENT
Start: 2024-08-02

## 2024-07-24 RX ORDER — ALBUTEROL SULFATE 90 UG/1
4 AEROSOL, METERED RESPIRATORY (INHALATION) PRN
OUTPATIENT
Start: 2024-08-15

## 2024-07-24 RX ORDER — ALBUTEROL SULFATE 90 UG/1
4 AEROSOL, METERED RESPIRATORY (INHALATION) PRN
Status: CANCELLED | OUTPATIENT
Start: 2024-08-16

## 2024-07-24 RX ORDER — SODIUM CHLORIDE 0.9 % (FLUSH) 0.9 %
5-40 SYRINGE (ML) INJECTION PRN
Status: CANCELLED | OUTPATIENT
Start: 2024-08-16

## 2024-07-24 RX ORDER — BACLOFEN 10 MG/1
10 TABLET ORAL NIGHTLY
Qty: 30 TABLET | Refills: 0 | Status: SHIPPED | OUTPATIENT
Start: 2024-07-24

## 2024-07-24 RX ORDER — DIPHENHYDRAMINE HYDROCHLORIDE 50 MG/ML
50 INJECTION INTRAMUSCULAR; INTRAVENOUS
Status: CANCELLED | OUTPATIENT
Start: 2024-08-16

## 2024-07-24 RX ORDER — EPINEPHRINE 1 MG/ML
0.3 INJECTION, SOLUTION INTRAMUSCULAR; SUBCUTANEOUS PRN
Status: CANCELLED | OUTPATIENT
Start: 2024-08-01

## 2024-07-24 RX ORDER — ONDANSETRON 2 MG/ML
8 INJECTION INTRAMUSCULAR; INTRAVENOUS
OUTPATIENT
Start: 2024-08-08

## 2024-07-24 RX ORDER — DEXTROSE MONOHYDRATE 50 MG/ML
5-250 INJECTION, SOLUTION INTRAVENOUS PRN
Status: CANCELLED | OUTPATIENT
Start: 2024-08-02

## 2024-07-24 RX ORDER — EPINEPHRINE 1 MG/ML
0.3 INJECTION, SOLUTION INTRAMUSCULAR; SUBCUTANEOUS PRN
Status: CANCELLED | OUTPATIENT
Start: 2024-08-16

## 2024-07-24 RX ORDER — SODIUM CHLORIDE 9 MG/ML
5-250 INJECTION, SOLUTION INTRAVENOUS PRN
Status: CANCELLED | OUTPATIENT
Start: 2024-08-09

## 2024-07-24 RX ORDER — DEXTROSE MONOHYDRATE 50 MG/ML
5-250 INJECTION, SOLUTION INTRAVENOUS PRN
Status: CANCELLED | OUTPATIENT
Start: 2024-08-16

## 2024-07-24 RX ORDER — ACETAMINOPHEN 325 MG/1
650 TABLET ORAL
Status: CANCELLED | OUTPATIENT
Start: 2024-08-01

## 2024-07-24 RX ORDER — SODIUM CHLORIDE 0.9 % (FLUSH) 0.9 %
5-40 SYRINGE (ML) INJECTION PRN
Status: CANCELLED | OUTPATIENT
Start: 2024-08-02

## 2024-07-24 RX ORDER — ONDANSETRON 2 MG/ML
8 INJECTION INTRAMUSCULAR; INTRAVENOUS
OUTPATIENT
Start: 2024-08-15

## 2024-07-24 RX ORDER — SODIUM CHLORIDE 9 MG/ML
INJECTION, SOLUTION INTRAVENOUS CONTINUOUS
OUTPATIENT
Start: 2024-08-15

## 2024-07-24 RX ORDER — ONDANSETRON 2 MG/ML
8 INJECTION INTRAMUSCULAR; INTRAVENOUS
Status: CANCELLED | OUTPATIENT
Start: 2024-08-01

## 2024-07-24 RX ORDER — SODIUM CHLORIDE 9 MG/ML
5-250 INJECTION, SOLUTION INTRAVENOUS PRN
Status: CANCELLED | OUTPATIENT
Start: 2024-08-01

## 2024-07-24 RX ORDER — ONDANSETRON 2 MG/ML
8 INJECTION INTRAMUSCULAR; INTRAVENOUS
Status: CANCELLED | OUTPATIENT
Start: 2024-08-09

## 2024-07-24 RX ORDER — DEXAMETHASONE 4 MG/1
10 TABLET ORAL ONCE
Status: CANCELLED
Start: 2024-08-09 | End: 2024-08-09

## 2024-07-24 RX ORDER — DEXAMETHASONE 4 MG/1
10 TABLET ORAL ONCE
Status: CANCELLED
Start: 2024-08-08 | End: 2024-08-08

## 2024-07-24 RX ORDER — SODIUM CHLORIDE 9 MG/ML
INJECTION, SOLUTION INTRAVENOUS CONTINUOUS
Status: CANCELLED | OUTPATIENT
Start: 2024-08-02

## 2024-07-24 RX ORDER — EPINEPHRINE 1 MG/ML
0.3 INJECTION, SOLUTION INTRAMUSCULAR; SUBCUTANEOUS PRN
OUTPATIENT
Start: 2024-08-15

## 2024-07-24 RX ORDER — SODIUM CHLORIDE 9 MG/ML
INJECTION, SOLUTION INTRAVENOUS CONTINUOUS
OUTPATIENT
Start: 2024-08-08

## 2024-07-24 RX ORDER — ACETAMINOPHEN 325 MG/1
650 TABLET ORAL
OUTPATIENT
Start: 2024-08-08

## 2024-07-24 RX ORDER — ACETAMINOPHEN 325 MG/1
650 TABLET ORAL
OUTPATIENT
Start: 2024-08-15

## 2024-07-24 RX ORDER — DEXTROSE MONOHYDRATE 50 MG/ML
5-250 INJECTION, SOLUTION INTRAVENOUS PRN
OUTPATIENT
Start: 2024-08-08

## 2024-07-24 RX ORDER — HEPARIN 100 UNIT/ML
500 SYRINGE INTRAVENOUS PRN
Status: CANCELLED | OUTPATIENT
Start: 2024-08-02

## 2024-07-24 RX ORDER — SODIUM CHLORIDE 0.9 % (FLUSH) 0.9 %
5-40 SYRINGE (ML) INJECTION PRN
OUTPATIENT
Start: 2024-08-15

## 2024-07-24 RX ORDER — HEPARIN 100 UNIT/ML
500 SYRINGE INTRAVENOUS PRN
Status: CANCELLED | OUTPATIENT
Start: 2024-08-01

## 2024-07-24 RX ORDER — DEXAMETHASONE 4 MG/1
10 TABLET ORAL ONCE
Status: CANCELLED
Start: 2024-08-02 | End: 2024-08-02

## 2024-07-24 RX ORDER — DEXAMETHASONE 4 MG/1
10 TABLET ORAL ONCE
Status: CANCELLED
Start: 2024-08-15 | End: 2024-08-15

## 2024-07-24 RX ORDER — ONDANSETRON 2 MG/ML
8 INJECTION INTRAMUSCULAR; INTRAVENOUS
Status: CANCELLED | OUTPATIENT
Start: 2024-08-16

## 2024-07-24 RX ORDER — HEPARIN 100 UNIT/ML
500 SYRINGE INTRAVENOUS PRN
OUTPATIENT
Start: 2024-08-15

## 2024-07-24 RX ORDER — EPINEPHRINE 1 MG/ML
0.3 INJECTION, SOLUTION INTRAMUSCULAR; SUBCUTANEOUS PRN
Status: CANCELLED | OUTPATIENT
Start: 2024-08-09

## 2024-07-24 RX ORDER — SODIUM CHLORIDE 0.9 % (FLUSH) 0.9 %
5-40 SYRINGE (ML) INJECTION PRN
OUTPATIENT
Start: 2024-08-08

## 2024-07-24 RX ORDER — SODIUM CHLORIDE 0.9 % (FLUSH) 0.9 %
5-40 SYRINGE (ML) INJECTION PRN
Status: CANCELLED | OUTPATIENT
Start: 2024-08-09

## 2024-07-24 RX ORDER — SODIUM CHLORIDE 9 MG/ML
INJECTION, SOLUTION INTRAVENOUS CONTINUOUS
Status: CANCELLED | OUTPATIENT
Start: 2024-08-16

## 2024-07-24 RX ORDER — SODIUM CHLORIDE 0.9 % (FLUSH) 0.9 %
5-40 SYRINGE (ML) INJECTION PRN
Status: CANCELLED | OUTPATIENT
Start: 2024-08-01

## 2024-07-24 RX ORDER — SODIUM CHLORIDE 9 MG/ML
5-250 INJECTION, SOLUTION INTRAVENOUS PRN
OUTPATIENT
Start: 2024-08-08

## 2024-07-24 RX ORDER — DIPHENHYDRAMINE HYDROCHLORIDE 50 MG/ML
50 INJECTION INTRAMUSCULAR; INTRAVENOUS
Status: CANCELLED | OUTPATIENT
Start: 2024-08-01

## 2024-07-24 RX ORDER — DEXTROSE MONOHYDRATE 50 MG/ML
5-250 INJECTION, SOLUTION INTRAVENOUS PRN
Status: CANCELLED | OUTPATIENT
Start: 2024-08-01

## 2024-07-24 RX ORDER — EPINEPHRINE 1 MG/ML
0.3 INJECTION, SOLUTION INTRAMUSCULAR; SUBCUTANEOUS PRN
Status: CANCELLED | OUTPATIENT
Start: 2024-08-02

## 2024-07-24 RX ORDER — SODIUM CHLORIDE 9 MG/ML
5-250 INJECTION, SOLUTION INTRAVENOUS PRN
Status: CANCELLED | OUTPATIENT
Start: 2024-08-02

## 2024-07-24 RX ORDER — SODIUM CHLORIDE 9 MG/ML
INJECTION, SOLUTION INTRAVENOUS CONTINUOUS
Status: CANCELLED | OUTPATIENT
Start: 2024-08-09

## 2024-07-24 RX ORDER — ALBUTEROL SULFATE 90 UG/1
4 AEROSOL, METERED RESPIRATORY (INHALATION) PRN
Status: CANCELLED | OUTPATIENT
Start: 2024-08-01

## 2024-07-24 RX ORDER — DEXAMETHASONE 4 MG/1
10 TABLET ORAL ONCE
Status: CANCELLED
Start: 2024-08-16 | End: 2024-08-16

## 2024-07-24 RX ORDER — DEXAMETHASONE 4 MG/1
10 TABLET ORAL ONCE
Status: CANCELLED
Start: 2024-08-01 | End: 2024-08-01

## 2024-07-24 RX ORDER — ALBUTEROL SULFATE 90 UG/1
4 AEROSOL, METERED RESPIRATORY (INHALATION) PRN
OUTPATIENT
Start: 2024-08-08

## 2024-07-24 RX ORDER — SODIUM CHLORIDE 9 MG/ML
5-250 INJECTION, SOLUTION INTRAVENOUS PRN
OUTPATIENT
Start: 2024-08-15

## 2024-07-24 RX ORDER — DIPHENHYDRAMINE HYDROCHLORIDE 50 MG/ML
50 INJECTION INTRAMUSCULAR; INTRAVENOUS
Status: CANCELLED | OUTPATIENT
Start: 2024-08-02

## 2024-07-24 RX ORDER — DIPHENHYDRAMINE HYDROCHLORIDE 50 MG/ML
50 INJECTION INTRAMUSCULAR; INTRAVENOUS
Status: CANCELLED | OUTPATIENT
Start: 2024-08-09

## 2024-07-24 RX ORDER — HEPARIN 100 UNIT/ML
500 SYRINGE INTRAVENOUS PRN
Status: CANCELLED | OUTPATIENT
Start: 2024-08-09

## 2024-07-24 RX ORDER — ACETAMINOPHEN 325 MG/1
650 TABLET ORAL
Status: CANCELLED | OUTPATIENT
Start: 2024-08-02

## 2024-07-24 RX ORDER — DEXTROSE MONOHYDRATE 50 MG/ML
5-250 INJECTION, SOLUTION INTRAVENOUS PRN
OUTPATIENT
Start: 2024-08-15

## 2024-07-24 RX ORDER — DIPHENHYDRAMINE HYDROCHLORIDE 50 MG/ML
50 INJECTION INTRAMUSCULAR; INTRAVENOUS
OUTPATIENT
Start: 2024-08-15

## 2024-07-25 ENCOUNTER — APPOINTMENT (OUTPATIENT)
Facility: HOSPITAL | Age: 69
End: 2024-07-25
Payer: MEDICARE

## 2024-07-26 ENCOUNTER — APPOINTMENT (OUTPATIENT)
Facility: HOSPITAL | Age: 69
End: 2024-07-26
Payer: MEDICARE

## 2024-07-31 ENCOUNTER — HOSPITAL ENCOUNTER (OUTPATIENT)
Facility: HOSPITAL | Age: 69
Setting detail: INFUSION SERIES
Discharge: HOME | End: 2024-07-31
Payer: MEDICARE

## 2024-07-31 VITALS
DIASTOLIC BLOOD PRESSURE: 64 MMHG | OXYGEN SATURATION: 98 % | SYSTOLIC BLOOD PRESSURE: 114 MMHG | HEART RATE: 82 BPM | RESPIRATION RATE: 20 BRPM | TEMPERATURE: 97.5 F

## 2024-07-31 DIAGNOSIS — C90.00 MULTIPLE MYELOMA NOT HAVING ACHIEVED REMISSION (HCC): Primary | ICD-10-CM

## 2024-07-31 DIAGNOSIS — Z11.59 ENCOUNTER FOR SCREENING FOR OTHER VIRAL DISEASES: ICD-10-CM

## 2024-07-31 LAB
ALBUMIN SERPL-MCNC: 3.1 G/DL (ref 3.5–5)
ALBUMIN/GLOB SERPL: 1.2 (ref 1.1–2.2)
ALP SERPL-CCNC: 73 U/L (ref 45–117)
ALT SERPL-CCNC: 23 U/L (ref 12–78)
ANION GAP SERPL CALC-SCNC: 3 MMOL/L (ref 5–15)
AST SERPL-CCNC: 10 U/L (ref 15–37)
BASOPHILS # BLD: 0.2 K/UL (ref 0–0.1)
BASOPHILS NFR BLD: 7 % (ref 0–1)
BILIRUB SERPL-MCNC: 0.3 MG/DL (ref 0.2–1)
BUN SERPL-MCNC: 19 MG/DL (ref 6–20)
BUN/CREAT SERPL: 30 (ref 12–20)
CALCIUM SERPL-MCNC: 8.8 MG/DL (ref 8.5–10.1)
CHLORIDE SERPL-SCNC: 110 MMOL/L (ref 97–108)
CO2 SERPL-SCNC: 26 MMOL/L (ref 21–32)
CREAT SERPL-MCNC: 0.63 MG/DL (ref 0.55–1.02)
DIFFERENTIAL METHOD BLD: ABNORMAL
EOSINOPHIL # BLD: 0.1 K/UL (ref 0–0.4)
EOSINOPHIL NFR BLD: 5 % (ref 0–7)
ERYTHROCYTE [DISTWIDTH] IN BLOOD BY AUTOMATED COUNT: 16.5 % (ref 11.5–14.5)
GLOBULIN SER CALC-MCNC: 2.5 G/DL (ref 2–4)
GLUCOSE SERPL-MCNC: 100 MG/DL (ref 65–100)
HCT VFR BLD AUTO: 28.4 % (ref 35–47)
HGB BLD-MCNC: 8.8 G/DL (ref 11.5–16)
IMM GRANULOCYTES # BLD AUTO: 0 K/UL
IMM GRANULOCYTES NFR BLD AUTO: 0 %
LYMPHOCYTES # BLD: 0.5 K/UL (ref 0.8–3.5)
LYMPHOCYTES NFR BLD: 23 % (ref 12–49)
MCH RBC QN AUTO: 30.4 PG (ref 26–34)
MCHC RBC AUTO-ENTMCNC: 31 G/DL (ref 30–36.5)
MCV RBC AUTO: 98.3 FL (ref 80–99)
MONOCYTES # BLD: 0.5 K/UL (ref 0–1)
MONOCYTES NFR BLD: 21 % (ref 5–13)
NEUTS SEG # BLD: 1 K/UL (ref 1.8–8)
NEUTS SEG NFR BLD: 44 % (ref 32–75)
NRBC # BLD: 0 K/UL (ref 0–0.01)
NRBC BLD-RTO: 0 PER 100 WBC
PLATELET # BLD AUTO: 403 K/UL (ref 150–400)
PLATELET COMMENT: ABNORMAL
PMV BLD AUTO: 10.5 FL (ref 8.9–12.9)
POTASSIUM SERPL-SCNC: 4 MMOL/L (ref 3.5–5.1)
PROT SERPL-MCNC: 5.6 G/DL (ref 6.4–8.2)
RBC # BLD AUTO: 2.89 M/UL (ref 3.8–5.2)
RBC MORPH BLD: ABNORMAL
SODIUM SERPL-SCNC: 139 MMOL/L (ref 136–145)
WBC # BLD AUTO: 2.3 K/UL (ref 3.6–11)
WBC MORPH BLD: ABNORMAL

## 2024-07-31 PROCEDURE — 85025 COMPLETE CBC W/AUTO DIFF WBC: CPT

## 2024-07-31 PROCEDURE — 80053 COMPREHEN METABOLIC PANEL: CPT

## 2024-07-31 PROCEDURE — 86334 IMMUNOFIX E-PHORESIS SERUM: CPT

## 2024-07-31 PROCEDURE — 36415 COLL VENOUS BLD VENIPUNCTURE: CPT

## 2024-07-31 PROCEDURE — 84165 PROTEIN E-PHORESIS SERUM: CPT

## 2024-07-31 PROCEDURE — 83521 IG LIGHT CHAINS FREE EACH: CPT

## 2024-07-31 PROCEDURE — 82784 ASSAY IGA/IGD/IGG/IGM EACH: CPT

## 2024-07-31 ASSESSMENT — PAIN SCALES - GENERAL: PAINLEVEL_OUTOF10: 6

## 2024-07-31 ASSESSMENT — PAIN DESCRIPTION - DESCRIPTORS: DESCRIPTORS: BURNING

## 2024-07-31 ASSESSMENT — PAIN DESCRIPTION - ORIENTATION: ORIENTATION: RIGHT;LEFT

## 2024-07-31 ASSESSMENT — PAIN DESCRIPTION - PAIN TYPE: TYPE: NEUROPATHIC PAIN;CHRONIC PAIN

## 2024-07-31 ASSESSMENT — PAIN DESCRIPTION - LOCATION: LOCATION: HAND

## 2024-07-31 NOTE — PROGRESS NOTES
[facility] Lab visit:     0945: Patient arrived ambulatory and in no distress.  Pre [facility]-Labs drawn from right AC per Latonya Bland RN. Departed [facility] ambulatory and in no distress.     Visit Vitals:  Patient Vitals for the past 12 hrs:   Temp Pulse Resp BP SpO2   07/31/24 0945 97.5 °F (36.4 °C) 82 20 114/64 98 %       Labs: See EPIC for pending results.

## 2024-08-01 ENCOUNTER — OFFICE VISIT (OUTPATIENT)
Age: 69
End: 2024-08-01
Payer: MEDICARE

## 2024-08-01 ENCOUNTER — HOSPITAL ENCOUNTER (OUTPATIENT)
Facility: HOSPITAL | Age: 69
Setting detail: INFUSION SERIES
Discharge: HOME | End: 2024-08-01
Payer: MEDICARE

## 2024-08-01 VITALS
HEIGHT: 65 IN | RESPIRATION RATE: 18 BRPM | HEART RATE: 91 BPM | SYSTOLIC BLOOD PRESSURE: 121 MMHG | OXYGEN SATURATION: 96 % | BODY MASS INDEX: 25.69 KG/M2 | TEMPERATURE: 98.8 F | DIASTOLIC BLOOD PRESSURE: 74 MMHG | WEIGHT: 154.2 LBS

## 2024-08-01 VITALS
WEIGHT: 158 LBS | BODY MASS INDEX: 26.33 KG/M2 | RESPIRATION RATE: 18 BRPM | DIASTOLIC BLOOD PRESSURE: 56 MMHG | TEMPERATURE: 96.8 F | HEIGHT: 65 IN | HEART RATE: 76 BPM | SYSTOLIC BLOOD PRESSURE: 120 MMHG | OXYGEN SATURATION: 97 %

## 2024-08-01 DIAGNOSIS — C90.00 MULTIPLE MYELOMA NOT HAVING ACHIEVED REMISSION (HCC): ICD-10-CM

## 2024-08-01 DIAGNOSIS — Z11.59 ENCOUNTER FOR SCREENING FOR OTHER VIRAL DISEASES: Primary | ICD-10-CM

## 2024-08-01 DIAGNOSIS — C90.00 MULTIPLE MYELOMA NOT HAVING ACHIEVED REMISSION (HCC): Primary | ICD-10-CM

## 2024-08-01 PROCEDURE — 96367 TX/PROPH/DG ADDL SEQ IV INF: CPT

## 2024-08-01 PROCEDURE — 1123F ACP DISCUSS/DSCN MKR DOCD: CPT | Performed by: INTERNAL MEDICINE

## 2024-08-01 PROCEDURE — 99215 OFFICE O/P EST HI 40 MIN: CPT | Performed by: INTERNAL MEDICINE

## 2024-08-01 PROCEDURE — 3017F COLORECTAL CA SCREEN DOC REV: CPT | Performed by: INTERNAL MEDICINE

## 2024-08-01 PROCEDURE — 1090F PRES/ABSN URINE INCON ASSESS: CPT | Performed by: INTERNAL MEDICINE

## 2024-08-01 PROCEDURE — 96413 CHEMO IV INFUSION 1 HR: CPT

## 2024-08-01 PROCEDURE — G8428 CUR MEDS NOT DOCUMENT: HCPCS | Performed by: INTERNAL MEDICINE

## 2024-08-01 PROCEDURE — 2580000003 HC RX 258: Performed by: INTERNAL MEDICINE

## 2024-08-01 PROCEDURE — 2580000003 HC RX 258

## 2024-08-01 PROCEDURE — 6360000002 HC RX W HCPCS

## 2024-08-01 PROCEDURE — G8419 CALC BMI OUT NRM PARAM NOF/U: HCPCS | Performed by: INTERNAL MEDICINE

## 2024-08-01 PROCEDURE — 1036F TOBACCO NON-USER: CPT | Performed by: INTERNAL MEDICINE

## 2024-08-01 PROCEDURE — G8399 PT W/DXA RESULTS DOCUMENT: HCPCS | Performed by: INTERNAL MEDICINE

## 2024-08-01 RX ORDER — DEXAMETHASONE 4 MG/1
20 TABLET ORAL ONCE
Status: CANCELLED
Start: 2024-08-01 | End: 2024-08-01

## 2024-08-01 RX ORDER — ZOLEDRONIC ACID 0.04 MG/ML
4 INJECTION, SOLUTION INTRAVENOUS ONCE
Status: COMPLETED | OUTPATIENT
Start: 2024-08-01 | End: 2024-08-01

## 2024-08-01 RX ORDER — ALBUTEROL SULFATE 90 UG/1
4 AEROSOL, METERED RESPIRATORY (INHALATION) PRN
OUTPATIENT
Start: 2024-08-14

## 2024-08-01 RX ORDER — SODIUM CHLORIDE 9 MG/ML
5-250 INJECTION, SOLUTION INTRAVENOUS PRN
OUTPATIENT
Start: 2024-08-14

## 2024-08-01 RX ORDER — ONDANSETRON 2 MG/ML
8 INJECTION INTRAMUSCULAR; INTRAVENOUS
OUTPATIENT
Start: 2024-08-14

## 2024-08-01 RX ORDER — ACETAMINOPHEN 325 MG/1
650 TABLET ORAL
OUTPATIENT
Start: 2024-08-14

## 2024-08-01 RX ORDER — SODIUM CHLORIDE 9 MG/ML
5-250 INJECTION, SOLUTION INTRAVENOUS PRN
Status: DISCONTINUED | OUTPATIENT
Start: 2024-08-01 | End: 2024-08-02 | Stop reason: HOSPADM

## 2024-08-01 RX ORDER — DEXTROSE MONOHYDRATE 50 MG/ML
5-250 INJECTION, SOLUTION INTRAVENOUS PRN
Status: DISCONTINUED | OUTPATIENT
Start: 2024-08-01 | End: 2024-08-02 | Stop reason: HOSPADM

## 2024-08-01 RX ORDER — DEXAMETHASONE 4 MG/1
20 TABLET ORAL ONCE
Status: CANCELLED
Start: 2024-08-08 | End: 2024-08-08

## 2024-08-01 RX ORDER — SODIUM CHLORIDE 0.9 % (FLUSH) 0.9 %
5-40 SYRINGE (ML) INJECTION PRN
Status: DISCONTINUED | OUTPATIENT
Start: 2024-08-01 | End: 2024-08-02 | Stop reason: HOSPADM

## 2024-08-01 RX ORDER — ZOLEDRONIC ACID 0.04 MG/ML
4 INJECTION, SOLUTION INTRAVENOUS ONCE
OUTPATIENT
Start: 2024-08-14 | End: 2024-08-14

## 2024-08-01 RX ORDER — SODIUM CHLORIDE 9 MG/ML
INJECTION, SOLUTION INTRAVENOUS CONTINUOUS
OUTPATIENT
Start: 2024-08-14

## 2024-08-01 RX ORDER — EPINEPHRINE 1 MG/ML
0.3 INJECTION, SOLUTION INTRAMUSCULAR; SUBCUTANEOUS PRN
OUTPATIENT
Start: 2024-08-14

## 2024-08-01 RX ORDER — SODIUM CHLORIDE 0.9 % (FLUSH) 0.9 %
5-40 SYRINGE (ML) INJECTION PRN
OUTPATIENT
Start: 2024-08-14

## 2024-08-01 RX ORDER — DEXAMETHASONE 4 MG/1
20 TABLET ORAL ONCE
Status: COMPLETED | OUTPATIENT
Start: 2024-08-01 | End: 2024-08-01

## 2024-08-01 RX ORDER — DIPHENHYDRAMINE HYDROCHLORIDE 50 MG/ML
50 INJECTION INTRAMUSCULAR; INTRAVENOUS
OUTPATIENT
Start: 2024-08-14

## 2024-08-01 RX ORDER — HEPARIN 100 UNIT/ML
500 SYRINGE INTRAVENOUS PRN
OUTPATIENT
Start: 2024-08-14

## 2024-08-01 RX ORDER — HEPARIN 100 UNIT/ML
500 SYRINGE INTRAVENOUS PRN
Status: DISCONTINUED | OUTPATIENT
Start: 2024-08-01 | End: 2024-08-02 | Stop reason: HOSPADM

## 2024-08-01 RX ADMIN — CARFILZOMIB 127.4 MG: 10 INJECTION, POWDER, LYOPHILIZED, FOR SOLUTION INTRAVENOUS at 11:49

## 2024-08-01 RX ADMIN — DEXAMETHASONE 20 MG: 4 TABLET ORAL at 11:17

## 2024-08-01 RX ADMIN — SODIUM CHLORIDE 25 ML/HR: 9 INJECTION, SOLUTION INTRAVENOUS at 11:14

## 2024-08-01 RX ADMIN — ZOLEDRONIC ACID 4 MG: 0.04 INJECTION, SOLUTION INTRAVENOUS at 11:16

## 2024-08-01 ASSESSMENT — PAIN SCALES - GENERAL: PAINLEVEL_OUTOF10: 0

## 2024-08-01 NOTE — PROGRESS NOTES
[facility] Progress Note    10:30 Ms. Weaver Arrived ambulatory and in no distress for KYPROLIS/ZOMETA C5D1 regimen.  Assessment was completed, no acute issues at this time, no new complaints voiced.  Port accessed without difficulty, labs drawn and processed.      Ms. Weaver's vitals were reviewed.  Patient Vitals for the past 12 hrs:   Temp Pulse Resp BP SpO2   08/01/24 1219 -- 76 -- (!) 120/56 --   08/01/24 0936 96.8 °F (36 °C) 94 18 125/71 97 %         Lab results were obtained and reviewed.  No results found for this or any previous visit (from the past 12 hour(s)).    Pre-medications  were administered as ordered and chemotherapy was initiated.  Medications Administered         0.9 % sodium chloride infusion Admin Date  08/01/2024 Action  New Bag Dose  25 mL/hr Rate  25 mL/hr Route  IntraVENous Administered By  Ruby Tabor RN        carfilzomib (KYPROLIS) 127.4 mg in dextrose 5 % 100 mL chemo IVPB Admin Date  08/01/2024 Action  New Bag Dose  127.4 mg Rate  347.4 mL/hr Route  IntraVENous Administered By  Ruby Tabor RN        dexAMETHasone (DECADRON) tablet 20 mg Admin Date  08/01/2024 Action  Given Dose  20 mg Rate   Route  Oral Administered By  Ruby Tabor RN        zoledronic acid (ZOMETA) 4 mg/100 mL infusion Admin Date  08/01/2024 Action  New Bag Dose  4 mg Rate  300 mL/hr Route  IntraVENous Administered By  Ruby Tabor, SABRINA            Two nurses verified prior to administering: Drug name, Drug dose, Infusion volume or drug volume when prepared in a syringe, Rate of administration, Route of administration, Expiration dates and/or times, Appearance and physical integrity of the drugs, Rate set on infusion pump, when used, and Sequencing of drug administration.    Ms. Weaver tolerated treatment well. Port maintained blood return throughout entire treatment. Port flushed and de accessed, patient was discharged from [facility] in stable condition at 1230.     Future Appointments

## 2024-08-01 NOTE — PROGRESS NOTES
Candida Weaver is a 69 y.o. female  Chief Complaint   Patient presents with    Follow-up     Multiple Myeloma     1. Have you been to the ER, urgent care clinic since your last visit?  Hospitalized since your last visit?No    2. Have you seen or consulted any other health care providers outside of the [facility] since your last visit?  Include any pap smears or colon screening. Yes [facility] Tri[facility]    Per patient, her neuropathy pain is at a 6 and her back pain and hip pain was at a 9 and she took 2 Tylenol.  
rashes, ecchymoses, or petechiae. Normal temperature, turgor, and texture.  Psych: Alert, oriented, appropriate affect, normal judgment/insight    Results:     Lab Results   Component Value Date/Time    WBC 3.3 07/17/2024 09:46 AM    HGB 8.8 07/17/2024 09:46 AM    HCT 28.1 07/17/2024 09:46 AM     07/17/2024 09:46 AM    MCV 96.2 07/17/2024 09:46 AM     Lab Results   Component Value Date/Time     07/17/2024 09:46 AM    K 4.5 07/17/2024 09:46 AM     07/17/2024 09:46 AM    CO2 26 07/17/2024 09:46 AM    BUN 23 07/17/2024 09:46 AM    GFRAA >60 11/19/2021 08:25 AM     Lab Results   Component Value Date/Time    ALT 19 07/17/2024 09:46 AM    GLOB 2.6 07/17/2024 09:46 AM     Lab Results   Component Value Date/Time    TIBC 296 07/05/2024 10:00 AM    TSH 3.44 12/02/2022 09:37 AM    SPE6L Not Observed 07/02/2024 09:42 AM       No results found for: \"INR\", \"APTT\", \"318570\", \"F8LT\", \"F8RALT\", \"F8B1LT\", \"VONWLT\", \"041709\", \"ATHRLT\", \"PROSLT\", \"PROSLF\", \"PRSFLT\", \"PRTCLT\", \"PRCFLT\", \"559615\", \"284991\", \"122471\", \"127143\", \"901868\"    Component      Latest Ref Rng 12/6/2023 12/29/2023 1/5/2024   Free Kappa Light Chains      3.3 - 19.4 mg/L 2,178.2 (H)  1,378.9 (H)  1,319.3 (H)      Component      Latest Ref Rng 12/6/2023/6/2023 12/29/2023 1/5/2024   M-Luis      Not Observed g/dL Not Observed  0.1 (H)  0.1 (H)         BM BX 10/2023  Bone marrow, posterior iliac crest, touch imprint, and decalcified core   biopsy:        Multiple myeloma involving 95% of hypercellular marrow with trilineage hypoplasia.    T cell lymphocytosis with phenotypic atypia detected by flow cytometry with monoclonal plasm cells 72.2% of total: CD19 neg, CD38 yaneli, CD45 neg, CD56 neg,  variable and cKappa.     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   abnormal chromosome 13 (1R1G, 78%, normal <%). An atypical fusion signal pattern was detected with the 11;14 probe set (59%).  The concurrent IGH probes confirmed the IGH gene rearrangement.

## 2024-08-05 LAB
ALBUMIN SERPL ELPH-MCNC: 3.3 G/DL (ref 2.9–4.4)
ALBUMIN/GLOB SERPL: 2 (ref 0.7–1.7)
ALPHA1 GLOB SERPL ELPH-MCNC: 0.2 G/DL (ref 0–0.4)
ALPHA2 GLOB SERPL ELPH-MCNC: 0.5 G/DL (ref 0.4–1)
B-GLOBULIN SERPL ELPH-MCNC: 0.8 G/DL (ref 0.7–1.3)
GAMMA GLOB SERPL ELPH-MCNC: 0.2 G/DL (ref 0.4–1.8)
GLOBULIN SER-MCNC: 1.7 G/DL (ref 2.2–3.9)
IGA SERPL-MCNC: 7 MG/DL (ref 64–422)
IGG SERPL-MCNC: 200 MG/DL (ref 586–1602)
IGM SERPL-MCNC: 5 MG/DL (ref 26–217)
INTERPRETATION SERPL IEP-IMP: ABNORMAL
KAPPA LC FREE SER-MCNC: 400.5 MG/L (ref 3.3–19.4)
KAPPA LC FREE/LAMBDA FREE SER: 174.13 (ref 0.26–1.65)
LAMBDA LC FREE SERPL-MCNC: 2.3 MG/L (ref 5.7–26.3)
M PROTEIN SERPL ELPH-MCNC: ABNORMAL G/DL
PROT SERPL-MCNC: 5 G/DL (ref 6–8.5)

## 2024-08-07 ENCOUNTER — HOSPITAL ENCOUNTER (OUTPATIENT)
Facility: HOSPITAL | Age: 69
Setting detail: INFUSION SERIES
Discharge: HOME | End: 2024-08-07
Payer: MEDICARE

## 2024-08-07 VITALS
SYSTOLIC BLOOD PRESSURE: 115 MMHG | TEMPERATURE: 97.9 F | OXYGEN SATURATION: 99 % | DIASTOLIC BLOOD PRESSURE: 66 MMHG | HEART RATE: 83 BPM | RESPIRATION RATE: 18 BRPM

## 2024-08-07 DIAGNOSIS — C90.00 MULTIPLE MYELOMA NOT HAVING ACHIEVED REMISSION (HCC): ICD-10-CM

## 2024-08-07 DIAGNOSIS — Z11.59 ENCOUNTER FOR SCREENING FOR OTHER VIRAL DISEASES: ICD-10-CM

## 2024-08-07 LAB
ALBUMIN SERPL-MCNC: 3.4 G/DL (ref 3.5–5)
ALBUMIN/GLOB SERPL: 1.2 (ref 1.1–2.2)
ALP SERPL-CCNC: 96 U/L (ref 45–117)
ALT SERPL-CCNC: 25 U/L (ref 12–78)
ANION GAP SERPL CALC-SCNC: 4 MMOL/L (ref 5–15)
AST SERPL-CCNC: 13 U/L (ref 15–37)
BASOPHILS # BLD: 0 K/UL (ref 0–0.1)
BASOPHILS NFR BLD: 1 % (ref 0–1)
BILIRUB SERPL-MCNC: 0.2 MG/DL (ref 0.2–1)
BUN SERPL-MCNC: 20 MG/DL (ref 6–20)
BUN/CREAT SERPL: 25 (ref 12–20)
CALCIUM SERPL-MCNC: 9.1 MG/DL (ref 8.5–10.1)
CHLORIDE SERPL-SCNC: 106 MMOL/L (ref 97–108)
CO2 SERPL-SCNC: 27 MMOL/L (ref 21–32)
CREAT SERPL-MCNC: 0.8 MG/DL (ref 0.55–1.02)
DIFFERENTIAL METHOD BLD: ABNORMAL
EOSINOPHIL # BLD: 0 K/UL (ref 0–0.4)
EOSINOPHIL NFR BLD: 1 % (ref 0–7)
ERYTHROCYTE [DISTWIDTH] IN BLOOD BY AUTOMATED COUNT: 15.9 % (ref 11.5–14.5)
GLOBULIN SER CALC-MCNC: 2.9 G/DL (ref 2–4)
GLUCOSE SERPL-MCNC: 92 MG/DL (ref 65–100)
HCT VFR BLD AUTO: 28.6 % (ref 35–47)
HGB BLD-MCNC: 9.2 G/DL (ref 11.5–16)
IMM GRANULOCYTES # BLD AUTO: 0 K/UL
IMM GRANULOCYTES NFR BLD AUTO: 0 %
LYMPHOCYTES # BLD: 0.7 K/UL (ref 0.8–3.5)
LYMPHOCYTES NFR BLD: 31 % (ref 12–49)
MCH RBC QN AUTO: 30.7 PG (ref 26–34)
MCHC RBC AUTO-ENTMCNC: 32.2 G/DL (ref 30–36.5)
MCV RBC AUTO: 95.3 FL (ref 80–99)
MONOCYTES # BLD: 0.1 K/UL (ref 0–1)
MONOCYTES NFR BLD: 4 % (ref 5–13)
NEUTS SEG # BLD: 1.6 K/UL (ref 1.8–8)
NEUTS SEG NFR BLD: 63 % (ref 32–75)
NRBC # BLD: 0.02 K/UL (ref 0–0.01)
NRBC BLD-RTO: 0.8 PER 100 WBC
PLATELET # BLD AUTO: 229 K/UL (ref 150–400)
PLATELET COMMENT: ABNORMAL
PMV BLD AUTO: 11.7 FL (ref 8.9–12.9)
POTASSIUM SERPL-SCNC: 4 MMOL/L (ref 3.5–5.1)
PROT SERPL-MCNC: 6.3 G/DL (ref 6.4–8.2)
RBC # BLD AUTO: 3 M/UL (ref 3.8–5.2)
RBC MORPH BLD: ABNORMAL
SODIUM SERPL-SCNC: 137 MMOL/L (ref 136–145)
WBC # BLD AUTO: 2.4 K/UL (ref 3.6–11)
WBC MORPH BLD: ABNORMAL

## 2024-08-07 PROCEDURE — 80053 COMPREHEN METABOLIC PANEL: CPT

## 2024-08-07 PROCEDURE — 85025 COMPLETE CBC W/AUTO DIFF WBC: CPT

## 2024-08-07 PROCEDURE — 36415 COLL VENOUS BLD VENIPUNCTURE: CPT

## 2024-08-07 ASSESSMENT — PAIN DESCRIPTION - DESCRIPTORS: DESCRIPTORS: BURNING;ACHING

## 2024-08-07 ASSESSMENT — PAIN DESCRIPTION - ORIENTATION: ORIENTATION: RIGHT

## 2024-08-07 ASSESSMENT — PAIN DESCRIPTION - LOCATION: LOCATION: HAND;SHOULDER

## 2024-08-07 ASSESSMENT — PAIN SCALES - GENERAL: PAINLEVEL_OUTOF10: 5

## 2024-08-07 ASSESSMENT — PAIN DESCRIPTION - PAIN TYPE: TYPE: NEUROPATHIC PAIN;CHRONIC PAIN

## 2024-08-07 NOTE — PROGRESS NOTES
[facility] Peds/Adult Note                       Date: 2024    Name: Candida Weaver    MRN: [id number]         : 1955    0945 Patient arrives for Pre-treatment labs without acute problems. Please see Epic for complete assessment and education provided.      Vital signs stable throughout and prior to discharge. Patient tolerated procedure well and was discharged without incident.  Patient is aware of next [facility] appointment on 2024.  Appointment card give to the Patient.       Ms. Weaver's vitals were reviewed prior to and after treatment.   Patient Vitals for the past 12 hrs:   Temp Pulse Resp BP SpO2   24 0945 97.9 °F (36.6 °C) 83 18 115/66 99 %         Lab results were obtained and reviewed.  Recent Results (from the past 12 hour(s))   CBC With Auto Differential    Collection Time: 24  9:50 AM   Result Value Ref Range    WBC 2.4 (L) 3.6 - 11.0 K/uL    RBC 3.00 (L) 3.80 - 5.20 M/uL    Hemoglobin 9.2 (L) 11.5 - 16.0 g/dL    Hematocrit 28.6 (L) 35.0 - 47.0 %    MCV 95.3 80.0 - 99.0 FL    MCH 30.7 26.0 - 34.0 PG    MCHC 32.2 30.0 - 36.5 g/dL    RDW 15.9 (H) 11.5 - 14.5 %    Platelets 229 150 - 400 K/uL    MPV 11.7 8.9 - 12.9 FL    Nucleated RBCs 0.8 (H) 0  WBC    nRBC 0.02 (H) 0.00 - 0.01 K/uL    Neutrophils % PENDING %    Lymphocytes % PENDING %    Monocytes % PENDING %    Eosinophils % PENDING %    Basophils % PENDING %    Immature Granulocytes % PENDING %    Neutrophils Absolute PENDING K/UL    Lymphocytes Absolute PENDING K/UL    Monocytes Absolute PENDING K/UL    Eosinophils Absolute PENDING K/UL    Basophils Absolute PENDING K/UL    Immature Granulocytes Absolute PENDING K/UL    Differential Type PENDING    Comprehensive metabolic panel    Collection Time: 24  9:50 AM   Result Value Ref Range    Sodium 137 136 - 145 mmol/L    Potassium 4.0 3.5 - 5.1 mmol/L    Chloride 106 97 - 108 mmol/L    CO2 27 21 - 32 mmol/L    Anion Gap 4 (L) 5 - 15 mmol/L    Glucose 92 65 - 100

## 2024-08-08 ENCOUNTER — HOSPITAL ENCOUNTER (OUTPATIENT)
Facility: HOSPITAL | Age: 69
Setting detail: INFUSION SERIES
Discharge: HOME | End: 2024-08-08
Payer: MEDICARE

## 2024-08-08 VITALS
HEART RATE: 75 BPM | BODY MASS INDEX: 25.49 KG/M2 | WEIGHT: 153 LBS | TEMPERATURE: 98 F | DIASTOLIC BLOOD PRESSURE: 46 MMHG | OXYGEN SATURATION: 98 % | SYSTOLIC BLOOD PRESSURE: 118 MMHG | HEIGHT: 65 IN | RESPIRATION RATE: 18 BRPM

## 2024-08-08 DIAGNOSIS — Z11.59 ENCOUNTER FOR SCREENING FOR OTHER VIRAL DISEASES: Primary | ICD-10-CM

## 2024-08-08 DIAGNOSIS — C90.00 MULTIPLE MYELOMA NOT HAVING ACHIEVED REMISSION (HCC): ICD-10-CM

## 2024-08-08 PROCEDURE — 6360000002 HC RX W HCPCS: Performed by: INTERNAL MEDICINE

## 2024-08-08 PROCEDURE — 96413 CHEMO IV INFUSION 1 HR: CPT

## 2024-08-08 PROCEDURE — 2580000003 HC RX 258: Performed by: INTERNAL MEDICINE

## 2024-08-08 RX ORDER — DEXAMETHASONE 4 MG/1
20 TABLET ORAL ONCE
Status: COMPLETED | OUTPATIENT
Start: 2024-08-08 | End: 2024-08-08

## 2024-08-08 RX ORDER — DEXTROSE MONOHYDRATE 50 MG/ML
5-250 INJECTION, SOLUTION INTRAVENOUS PRN
Status: DISCONTINUED | OUTPATIENT
Start: 2024-08-08 | End: 2024-08-09 | Stop reason: HOSPADM

## 2024-08-08 RX ORDER — SODIUM CHLORIDE 0.9 % (FLUSH) 0.9 %
5-40 SYRINGE (ML) INJECTION PRN
Status: DISCONTINUED | OUTPATIENT
Start: 2024-08-08 | End: 2024-08-09 | Stop reason: HOSPADM

## 2024-08-08 RX ADMIN — SODIUM CHLORIDE, PRESERVATIVE FREE 20 ML: 5 INJECTION INTRAVENOUS at 12:20

## 2024-08-08 RX ADMIN — DEXTROSE MONOHYDRATE 25 ML/HR: 50 INJECTION, SOLUTION INTRAVENOUS at 11:27

## 2024-08-08 RX ADMIN — CARFILZOMIB 127.4 MG: 10 INJECTION, POWDER, LYOPHILIZED, FOR SOLUTION INTRAVENOUS at 11:43

## 2024-08-08 RX ADMIN — SODIUM CHLORIDE, PRESERVATIVE FREE 10 ML: 5 INJECTION INTRAVENOUS at 11:25

## 2024-08-08 RX ADMIN — DEXAMETHASONE 20 MG: 4 TABLET ORAL at 11:20

## 2024-08-08 ASSESSMENT — PAIN DESCRIPTION - LOCATION: LOCATION: HAND;SHOULDER

## 2024-08-08 ASSESSMENT — PAIN DESCRIPTION - DESCRIPTORS: DESCRIPTORS: TINGLING

## 2024-08-08 ASSESSMENT — PAIN SCALES - GENERAL: PAINLEVEL_OUTOF10: 5

## 2024-08-08 ASSESSMENT — PAIN DESCRIPTION - PAIN TYPE: TYPE: NEUROPATHIC PAIN

## 2024-08-08 ASSESSMENT — PAIN DESCRIPTION - ORIENTATION: ORIENTATION: RIGHT

## 2024-08-08 NOTE — PLAN OF CARE
[facility] Chemotherapy/Immunotherapy Progress Note    Date: 2024  Name: Candida Weaver  MRN: [id number]       : 1955    Pt arrived ambulatory and in no acute distress to [facility] for Kyprolis   Denies SOB, fever, cough, N/V. Denies Covid-like symptoms.     Chest port accessed with positive blood return. Labs drawn prior to admission today and meet treatment parameters.     Ms. Weaver's vitals were reviewed.  Patient Vitals for the past 12 hrs:   Temp Pulse Resp BP SpO2   24 1218 -- 75 18 (!) 118/46 --   24 1123 98 °F (36.7 °C) 67 18 (!) 110/49 --   24 1026 98.2 °F (36.8 °C) 74 18 137/63 98 %     Pre-medications were administered as ordered and chemotherapy was initiated. Please see MAR for specific drug names and time of administration.  Medications Administered         carfilzomib (KYPROLIS) 127.4 mg in dextrose 5 % 100 mL chemo IVPB Admin Date  2024 Action  New Bag Dose  127.4 mg Rate  347.4 mL/hr Route  IntraVENous Administered By  Dacia Mccollum RN        dexAMETHasone (DECADRON) tablet 20 mg Admin Date  2024 Action  Given Dose  20 mg Rate   Route  Oral Administered By  Dacia Mccollum RN        dextrose 5 % solution Admin Date  2024 Action  New Bag Dose  25 mL/hr Rate  25 mL/hr Route  IntraVENous Administered By  Dacia Mccollum RN        sodium chloride flush 0.9 % injection 5-40 mL Admin Date  2024 Action  Given Dose  10 mL Rate   Route  IntraVENous Administered By  Dacia Mccollum RN        sodium chloride flush 0.9 % injection 5-40 mL Admin Date  2024 Action  Given Dose  20 mL Rate   Route  IntraVENous Administered By  Dacia Mccollum RN          Prior to chemotherapy/immunotherapy administration the following were verified with a second chemotherapy/immunotherapy certified nurse: Patient name, Patient  or CSN, Drug name, Drug dose, Infusion/drug volume, Rate of administration, Route of administration, Expiration dates/times, Appearance and

## 2024-08-09 ENCOUNTER — HOSPITAL ENCOUNTER (OUTPATIENT)
Facility: HOSPITAL | Age: 69
Setting detail: INFUSION SERIES
End: 2024-08-09

## 2024-08-09 ENCOUNTER — APPOINTMENT (OUTPATIENT)
Facility: HOSPITAL | Age: 69
End: 2024-08-09
Payer: MEDICARE

## 2024-08-14 ENCOUNTER — TELEPHONE (OUTPATIENT)
Facility: HOSPITAL | Age: 69
End: 2024-08-14

## 2024-08-14 ENCOUNTER — APPOINTMENT (OUTPATIENT)
Facility: HOSPITAL | Age: 69
End: 2024-08-14
Payer: MEDICARE

## 2024-08-14 ENCOUNTER — HOSPITAL ENCOUNTER (OUTPATIENT)
Facility: HOSPITAL | Age: 69
Setting detail: INFUSION SERIES
Discharge: HOME | End: 2024-08-14
Payer: MEDICARE

## 2024-08-14 VITALS
SYSTOLIC BLOOD PRESSURE: 119 MMHG | TEMPERATURE: 97.9 F | RESPIRATION RATE: 18 BRPM | OXYGEN SATURATION: 98 % | HEART RATE: 74 BPM | DIASTOLIC BLOOD PRESSURE: 54 MMHG

## 2024-08-14 DIAGNOSIS — Z11.59 ENCOUNTER FOR SCREENING FOR OTHER VIRAL DISEASES: ICD-10-CM

## 2024-08-14 DIAGNOSIS — C90.00 MULTIPLE MYELOMA NOT HAVING ACHIEVED REMISSION (HCC): ICD-10-CM

## 2024-08-14 LAB
ALBUMIN SERPL-MCNC: 3.5 G/DL (ref 3.5–5)
ALBUMIN/GLOB SERPL: 1.3 (ref 1.1–2.2)
ALP SERPL-CCNC: 73 U/L (ref 45–117)
ALT SERPL-CCNC: 26 U/L (ref 12–78)
ANION GAP SERPL CALC-SCNC: 5 MMOL/L (ref 5–15)
AST SERPL-CCNC: 9 U/L (ref 15–37)
BASOPHILS # BLD: 0.1 K/UL (ref 0–0.1)
BASOPHILS NFR BLD: 2 % (ref 0–1)
BILIRUB SERPL-MCNC: 0.2 MG/DL (ref 0.2–1)
BUN SERPL-MCNC: 13 MG/DL (ref 6–20)
BUN/CREAT SERPL: 18 (ref 12–20)
CALCIUM SERPL-MCNC: 9.1 MG/DL (ref 8.5–10.1)
CHLORIDE SERPL-SCNC: 108 MMOL/L (ref 97–108)
CO2 SERPL-SCNC: 26 MMOL/L (ref 21–32)
CREAT SERPL-MCNC: 0.74 MG/DL (ref 0.55–1.02)
DIFFERENTIAL METHOD BLD: ABNORMAL
EOSINOPHIL # BLD: 0.2 K/UL (ref 0–0.4)
EOSINOPHIL NFR BLD: 5 % (ref 0–7)
ERYTHROCYTE [DISTWIDTH] IN BLOOD BY AUTOMATED COUNT: 15.4 % (ref 11.5–14.5)
GLOBULIN SER CALC-MCNC: 2.7 G/DL (ref 2–4)
GLUCOSE SERPL-MCNC: 90 MG/DL (ref 65–100)
HCT VFR BLD AUTO: 30.2 % (ref 35–47)
HGB BLD-MCNC: 9.3 G/DL (ref 11.5–16)
IMM GRANULOCYTES # BLD AUTO: 0 K/UL (ref 0–0.04)
IMM GRANULOCYTES NFR BLD AUTO: 1 % (ref 0–0.5)
LYMPHOCYTES # BLD: 0.8 K/UL (ref 0.8–3.5)
LYMPHOCYTES NFR BLD: 22 % (ref 12–49)
MCH RBC QN AUTO: 30.4 PG (ref 26–34)
MCHC RBC AUTO-ENTMCNC: 30.8 G/DL (ref 30–36.5)
MCV RBC AUTO: 98.7 FL (ref 80–99)
MONOCYTES # BLD: 0.8 K/UL (ref 0–1)
MONOCYTES NFR BLD: 20 % (ref 5–13)
NEUTS SEG # BLD: 1.9 K/UL (ref 1.8–8)
NEUTS SEG NFR BLD: 50 % (ref 32–75)
NRBC # BLD: 0.02 K/UL (ref 0–0.01)
NRBC BLD-RTO: 0.5 PER 100 WBC
PLATELET # BLD AUTO: 227 K/UL (ref 150–400)
PMV BLD AUTO: 11.6 FL (ref 8.9–12.9)
POTASSIUM SERPL-SCNC: 4.2 MMOL/L (ref 3.5–5.1)
PROT SERPL-MCNC: 6.2 G/DL (ref 6.4–8.2)
RBC # BLD AUTO: 3.06 M/UL (ref 3.8–5.2)
RBC MORPH BLD: ABNORMAL
SODIUM SERPL-SCNC: 139 MMOL/L (ref 136–145)
WBC # BLD AUTO: 3.8 K/UL (ref 3.6–11)

## 2024-08-14 PROCEDURE — 36415 COLL VENOUS BLD VENIPUNCTURE: CPT

## 2024-08-14 PROCEDURE — 85025 COMPLETE CBC W/AUTO DIFF WBC: CPT

## 2024-08-14 PROCEDURE — 80053 COMPREHEN METABOLIC PANEL: CPT

## 2024-08-14 ASSESSMENT — PAIN SCALES - GENERAL: PAINLEVEL_OUTOF10: 0

## 2024-08-14 NOTE — TELEPHONE ENCOUNTER
pt called in having some confusion about labs in peds made aware they are sched for her 930 pre opic in peds on wed as requested infusion center also provided updated print out she called while checking in

## 2024-08-15 ENCOUNTER — HOSPITAL ENCOUNTER (OUTPATIENT)
Facility: HOSPITAL | Age: 69
Setting detail: INFUSION SERIES
Discharge: HOME | End: 2024-08-15
Payer: MEDICARE

## 2024-08-15 VITALS
WEIGHT: 152.12 LBS | DIASTOLIC BLOOD PRESSURE: 70 MMHG | TEMPERATURE: 97 F | RESPIRATION RATE: 16 BRPM | BODY MASS INDEX: 25.31 KG/M2 | SYSTOLIC BLOOD PRESSURE: 143 MMHG | HEART RATE: 71 BPM

## 2024-08-15 DIAGNOSIS — Z11.59 ENCOUNTER FOR SCREENING FOR OTHER VIRAL DISEASES: ICD-10-CM

## 2024-08-15 DIAGNOSIS — C90.00 MULTIPLE MYELOMA NOT HAVING ACHIEVED REMISSION (HCC): Primary | ICD-10-CM

## 2024-08-15 PROCEDURE — 96413 CHEMO IV INFUSION 1 HR: CPT

## 2024-08-15 PROCEDURE — 2580000003 HC RX 258: Performed by: INTERNAL MEDICINE

## 2024-08-15 PROCEDURE — 6360000002 HC RX W HCPCS: Performed by: INTERNAL MEDICINE

## 2024-08-15 RX ORDER — DEXAMETHASONE 4 MG/1
20 TABLET ORAL ONCE
Status: COMPLETED | OUTPATIENT
Start: 2024-08-15 | End: 2024-08-15

## 2024-08-15 RX ORDER — EPINEPHRINE 1 MG/ML
0.3 INJECTION, SOLUTION INTRAMUSCULAR; SUBCUTANEOUS PRN
Status: DISCONTINUED | OUTPATIENT
Start: 2024-08-15 | End: 2024-08-16 | Stop reason: HOSPADM

## 2024-08-15 RX ORDER — ONDANSETRON 2 MG/ML
8 INJECTION INTRAMUSCULAR; INTRAVENOUS
Status: DISCONTINUED | OUTPATIENT
Start: 2024-08-15 | End: 2024-08-16 | Stop reason: HOSPADM

## 2024-08-15 RX ORDER — DEXTROSE MONOHYDRATE 50 MG/ML
5-250 INJECTION, SOLUTION INTRAVENOUS PRN
Status: DISCONTINUED | OUTPATIENT
Start: 2024-08-15 | End: 2024-08-16 | Stop reason: HOSPADM

## 2024-08-15 RX ORDER — ACETAMINOPHEN 325 MG/1
650 TABLET ORAL
Status: DISCONTINUED | OUTPATIENT
Start: 2024-08-15 | End: 2024-08-16 | Stop reason: HOSPADM

## 2024-08-15 RX ORDER — ALBUTEROL SULFATE 90 UG/1
4 AEROSOL, METERED RESPIRATORY (INHALATION) PRN
Status: DISCONTINUED | OUTPATIENT
Start: 2024-08-15 | End: 2024-08-16 | Stop reason: HOSPADM

## 2024-08-15 RX ORDER — SODIUM CHLORIDE 9 MG/ML
INJECTION, SOLUTION INTRAVENOUS CONTINUOUS
Status: DISCONTINUED | OUTPATIENT
Start: 2024-08-15 | End: 2024-08-16 | Stop reason: HOSPADM

## 2024-08-15 RX ORDER — DIPHENHYDRAMINE HYDROCHLORIDE 50 MG/ML
50 INJECTION INTRAMUSCULAR; INTRAVENOUS
Status: DISCONTINUED | OUTPATIENT
Start: 2024-08-15 | End: 2024-08-16 | Stop reason: HOSPADM

## 2024-08-15 RX ADMIN — DEXTROSE MONOHYDRATE 50 ML/HR: 50 INJECTION, SOLUTION INTRAVENOUS at 12:29

## 2024-08-15 RX ADMIN — CARFILZOMIB 127.4 MG: 10 INJECTION, POWDER, LYOPHILIZED, FOR SOLUTION INTRAVENOUS at 12:31

## 2024-08-15 RX ADMIN — DEXAMETHASONE 20 MG: 4 TABLET ORAL at 11:16

## 2024-08-15 ASSESSMENT — PAIN SCALES - GENERAL: PAINLEVEL_OUTOF10: 6

## 2024-08-15 ASSESSMENT — PAIN DESCRIPTION - LOCATION: LOCATION: SHOULDER

## 2024-08-15 ASSESSMENT — PAIN DESCRIPTION - ORIENTATION: ORIENTATION: RIGHT

## 2024-08-15 NOTE — PROGRESS NOTES
[facility] - Chemotherapy Progress Note    1045 Pt admit to [facility] for Kyprolis/C5D15 ambulatory in stable condition. Assessment completed. No new concerns voiced. Port accessed by access RN with positive blood return. Labs drawn prior to today's visit. Line flushed, D5 infusing.    BP (!) 143/70   Pulse 71   Temp 97 °F (36.1 °C) (Temporal)   Resp 16   Wt 69 kg (152 lb 1.9 oz)   BMI 25.31 kg/m²     Medications Administered         carfilzomib (KYPROLIS) 127.4 mg in dextrose 5 % 100 mL chemo IVPB Admin Date  08/15/2024 Action  New Bag Dose  127.4 mg Rate  347.4 mL/hr Route  IntraVENous Documented By  Brenda Zavala RN        dexAMETHasone (DECADRON) tablet 20 mg Admin Date  08/15/2024 Action  Given Dose  20 mg Rate   Route  Oral Documented By  Brenda Zavala RN        dextrose 5 % solution Admin Date  08/15/2024 Action  New Bag Dose  50 mL/hr Rate  50 mL/hr Route  IntraVENous Documented By  Brenda Zavala RN                Two nurses verified prior to administering:, Drug name, Drug dose, Infusion volume or drug volume when prepared in a syringe, Rate of administration, Route of administration, Expiration dates and/or times, Appearance and physical integrity of the drugs, Rate set on infusion pump, when used, Sequencing of drug administration         1320 Pt tolerated treatment well. Port maintained positive blood return throughout treatment, flushed with positive blood return at conclusion and de-accessed per protocol. D/c home ambulatory in no distress. Pt aware of next [facility] appointment scheduled for 08/21/2024.    Please see labs in Results tab

## 2024-08-16 DIAGNOSIS — C90.00 MULTIPLE MYELOMA NOT HAVING ACHIEVED REMISSION (HCC): ICD-10-CM

## 2024-08-16 RX ORDER — POMALIDOMIDE 3 MG/1
3 CAPSULE ORAL DAILY
Qty: 21 CAPSULE | Refills: 0 | Status: ACTIVE | OUTPATIENT
Start: 2024-08-16

## 2024-08-16 NOTE — TELEPHONE ENCOUNTER
Oral Chemotherapy      Candida Weaver is a  69 y.o.female  diagnosed with multiple myeloma . Ms. Weaver is being treated with KPd.      Medication name: pomalidomide  Regimen: KPd  Dose:   3 mg  Frequency: daily  Administration schedule: for 21 days followed by 7 days off every 28 days  Ordering provider: Nany Arboleda MD  Start date: 7/3/24 (Cycle 4)      REMS auth # [id number]  Prescription sent to pharmacy for processing.         Rose Pa, WOOD, [facility]OP, [facility]    For Pharmacy Admin Tracking Only    Program: Medical Group  CPA in place:  Yes  Recommendation Provided To: Patient/Caregiver: 1 via Telephone  Intervention Detail: Refill(s) Provided  Intervention Accepted By: Patient/Caregiver: 1    Time Spent (min): 10

## 2024-08-21 ENCOUNTER — HOSPITAL ENCOUNTER (OUTPATIENT)
Facility: HOSPITAL | Age: 69
Setting detail: INFUSION SERIES
Discharge: HOME | End: 2024-08-21
Payer: MEDICARE

## 2024-08-21 VITALS
TEMPERATURE: 97.8 F | RESPIRATION RATE: 18 BRPM | OXYGEN SATURATION: 99 % | HEART RATE: 71 BPM | SYSTOLIC BLOOD PRESSURE: 117 MMHG | DIASTOLIC BLOOD PRESSURE: 70 MMHG

## 2024-08-21 LAB
ALBUMIN SERPL-MCNC: 3.2 G/DL (ref 3.5–5)
ALBUMIN/GLOB SERPL: 1.3 (ref 1.1–2.2)
ALP SERPL-CCNC: 71 U/L (ref 45–117)
ALT SERPL-CCNC: 25 U/L (ref 12–78)
ANION GAP SERPL CALC-SCNC: 5 MMOL/L (ref 5–15)
AST SERPL-CCNC: 10 U/L (ref 15–37)
BASOPHILS # BLD: 0 K/UL (ref 0–0.1)
BASOPHILS NFR BLD: 2 % (ref 0–1)
BILIRUB SERPL-MCNC: 0.3 MG/DL (ref 0.2–1)
BUN SERPL-MCNC: 22 MG/DL (ref 6–20)
BUN/CREAT SERPL: 31 (ref 12–20)
CALCIUM SERPL-MCNC: 9.2 MG/DL (ref 8.5–10.1)
CHLORIDE SERPL-SCNC: 108 MMOL/L (ref 97–108)
CO2 SERPL-SCNC: 23 MMOL/L (ref 21–32)
CREAT SERPL-MCNC: 0.7 MG/DL (ref 0.55–1.02)
DIFFERENTIAL METHOD BLD: ABNORMAL
EOSINOPHIL # BLD: 0.2 K/UL (ref 0–0.4)
EOSINOPHIL NFR BLD: 7 % (ref 0–7)
ERYTHROCYTE [DISTWIDTH] IN BLOOD BY AUTOMATED COUNT: 14.9 % (ref 11.5–14.5)
GLOBULIN SER CALC-MCNC: 2.5 G/DL (ref 2–4)
GLUCOSE SERPL-MCNC: 93 MG/DL (ref 65–100)
HCT VFR BLD AUTO: 28.8 % (ref 35–47)
HGB BLD-MCNC: 9.1 G/DL (ref 11.5–16)
IMM GRANULOCYTES # BLD AUTO: 0 K/UL (ref 0–0.04)
IMM GRANULOCYTES NFR BLD AUTO: 0 % (ref 0–0.5)
LYMPHOCYTES # BLD: 0.9 K/UL (ref 0.8–3.5)
LYMPHOCYTES NFR BLD: 36 % (ref 12–49)
MCH RBC QN AUTO: 29.9 PG (ref 26–34)
MCHC RBC AUTO-ENTMCNC: 31.6 G/DL (ref 30–36.5)
MCV RBC AUTO: 94.7 FL (ref 80–99)
MONOCYTES # BLD: 0.6 K/UL (ref 0–1)
MONOCYTES NFR BLD: 27 % (ref 5–13)
NEUTS SEG # BLD: 0.6 K/UL (ref 1.8–8)
NEUTS SEG NFR BLD: 28 % (ref 32–75)
NRBC # BLD: 0.03 K/UL (ref 0–0.01)
NRBC BLD-RTO: 1.3 PER 100 WBC
PLATELET # BLD AUTO: 167 K/UL (ref 150–400)
PMV BLD AUTO: 12.2 FL (ref 8.9–12.9)
POTASSIUM SERPL-SCNC: 4.1 MMOL/L (ref 3.5–5.1)
PROT SERPL-MCNC: 5.7 G/DL (ref 6.4–8.2)
RBC # BLD AUTO: 3.04 M/UL (ref 3.8–5.2)
RBC MORPH BLD: ABNORMAL
SODIUM SERPL-SCNC: 136 MMOL/L (ref 136–145)
WBC # BLD AUTO: 2.3 K/UL (ref 3.6–11)
WBC MORPH BLD: ABNORMAL

## 2024-08-21 PROCEDURE — 82784 ASSAY IGA/IGD/IGG/IGM EACH: CPT

## 2024-08-21 PROCEDURE — 84165 PROTEIN E-PHORESIS SERUM: CPT

## 2024-08-21 PROCEDURE — 36415 COLL VENOUS BLD VENIPUNCTURE: CPT

## 2024-08-21 PROCEDURE — 85025 COMPLETE CBC W/AUTO DIFF WBC: CPT

## 2024-08-21 PROCEDURE — 80053 COMPREHEN METABOLIC PANEL: CPT

## 2024-08-21 PROCEDURE — 86334 IMMUNOFIX E-PHORESIS SERUM: CPT

## 2024-08-21 PROCEDURE — 83521 IG LIGHT CHAINS FREE EACH: CPT

## 2024-08-21 ASSESSMENT — PAIN DESCRIPTION - DESCRIPTORS: DESCRIPTORS: ACHING

## 2024-08-21 ASSESSMENT — PAIN DESCRIPTION - ORIENTATION: ORIENTATION: RIGHT

## 2024-08-21 ASSESSMENT — PAIN SCALES - GENERAL: PAINLEVEL_OUTOF10: 8

## 2024-08-21 ASSESSMENT — PAIN DESCRIPTION - LOCATION: LOCATION: SHOULDER

## 2024-08-21 NOTE — PROGRESS NOTES
[facility] Lab visit:     0930: Patient arrived ambulatory and in no distress. Pre-treatment Labs drawn from R AC. Departed [facility] ambulatory and in no distress.     Visit Vitals:  Patient Vitals for the past 12 hrs:   Temp Pulse Resp BP SpO2   08/21/24 0930 97.8 °F (36.6 °C) 71 18 117/70 99 %       Labs: See CC for pending results.

## 2024-08-22 ENCOUNTER — HOSPITAL ENCOUNTER (OUTPATIENT)
Facility: HOSPITAL | Age: 69
Setting detail: INFUSION SERIES
Discharge: HOME | End: 2024-08-22
Payer: MEDICARE

## 2024-08-22 VITALS
DIASTOLIC BLOOD PRESSURE: 62 MMHG | TEMPERATURE: 97.4 F | SYSTOLIC BLOOD PRESSURE: 107 MMHG | RESPIRATION RATE: 16 BRPM | HEART RATE: 69 BPM

## 2024-08-22 DIAGNOSIS — T45.1X5A CHEMOTHERAPY INDUCED NEUTROPENIA (HCC): ICD-10-CM

## 2024-08-22 DIAGNOSIS — C90.00 MULTIPLE MYELOMA NOT HAVING ACHIEVED REMISSION (HCC): Primary | ICD-10-CM

## 2024-08-22 DIAGNOSIS — D70.1 CHEMOTHERAPY INDUCED NEUTROPENIA (HCC): ICD-10-CM

## 2024-08-22 PROCEDURE — 96372 THER/PROPH/DIAG INJ SC/IM: CPT

## 2024-08-22 PROCEDURE — 6360000002 HC RX W HCPCS: Performed by: INTERNAL MEDICINE

## 2024-08-22 RX ORDER — ALBUTEROL SULFATE 90 UG/1
4 AEROSOL, METERED RESPIRATORY (INHALATION) PRN
OUTPATIENT
Start: 2024-09-19

## 2024-08-22 RX ORDER — EPINEPHRINE 1 MG/ML
0.3 INJECTION, SOLUTION INTRAMUSCULAR; SUBCUTANEOUS PRN
OUTPATIENT
Start: 2024-09-19

## 2024-08-22 RX ORDER — ACETAMINOPHEN 325 MG/1
650 TABLET ORAL
OUTPATIENT
Start: 2024-09-19

## 2024-08-22 RX ORDER — ONDANSETRON 2 MG/ML
8 INJECTION INTRAMUSCULAR; INTRAVENOUS
OUTPATIENT
Start: 2024-09-19

## 2024-08-22 RX ORDER — DIPHENHYDRAMINE HYDROCHLORIDE 50 MG/ML
50 INJECTION INTRAMUSCULAR; INTRAVENOUS
OUTPATIENT
Start: 2024-09-19

## 2024-08-22 RX ORDER — SODIUM CHLORIDE 9 MG/ML
INJECTION, SOLUTION INTRAVENOUS CONTINUOUS
OUTPATIENT
Start: 2024-09-19

## 2024-08-22 RX ADMIN — FILGRASTIM-AAFI 480 MCG: 480 INJECTION, SOLUTION SUBCUTANEOUS at 09:50

## 2024-08-22 ASSESSMENT — PAIN DESCRIPTION - ORIENTATION: ORIENTATION: RIGHT

## 2024-08-22 ASSESSMENT — PAIN DESCRIPTION - LOCATION: LOCATION: SHOULDER

## 2024-08-22 ASSESSMENT — PAIN SCALES - GENERAL: PAINLEVEL_OUTOF10: 8

## 2024-08-22 NOTE — PROGRESS NOTES
[facility] Short Note                       Date: 2024    Name: Candida Weaver    MRN: [id number]         : 1955      [facility] ambulatory in stable condition. Assessment completed and documented in flowsheets. No acute concerns at this time.  Please review pending lab results in CC.      Ms. Weaver's vitals were reviewed:  Patient Vitals for the past 12 hrs:   Temp Pulse Resp BP   24 0951 -- 69 -- 107/62   24 0945 97.4 °F (36.3 °C) 69 16 (!) 98/58         Lab results were obtained and reviewed. Labs within parameter for treatment.  No results found for this or any previous visit (from the past 12 hour(s)).    Medications given: L arm subq  Medications Administered         filgrastim-aafi (NIVESTYM) injection 480 mcg Admin Date  2024 Action  Given Dose  480 mcg Route  SubCUTAneous Documented By  Aishwarya Gimenez, SABRINA            Medication education reinforced with patient and they verbalize understanding.    Ms. Weaver tolerated the injection and was [facility] in [facility] condition. Patient is aware if future appointments.    Future Appointments   Date Time Provider Department Center   2024  9:30 AM PEDS LAB CHAIR 1 [facility]   2024 11:30 AM RUTH CHEMO CHAIR 4 [facility]   2024 11:45 AM Ashley Cosat APRN - NP [facility]   2024  9:30 AM PEDS LAB CHAIR 1 [facility]   2024 10:00 AM RUTH CHEMO CHAIR 1 [facility]   2024  9:30 AM PEDS LAB CHAIR 1 [facility]   2024 11:00 AM RUTH CHEMO CHAIR 3 [facility]   2024  9:30 AM PEDS LAB CHAIR 1 [facility]   2024  9:30 AM PEDS LAB CHAIR 1 [facility]   2024 10:30 AM RUTH CHEMO CHAIR 2 [facility]   10/2/2024  9:30 AM PEDS LAB CHAIR 1 [facility]   10/3/2024 10:00 AM RUTH CHEMO CHAIR 1 [facility]   10/9/2024  9:30 AM PEDS LAB CHAIR 1 [facility]   10/10/2024 10:30 [facility]   10/16/2024  9:30 AM PEDS LAB CHAIR 1 [facility]

## 2024-08-23 RX ORDER — ALBUTEROL SULFATE 90 UG/1
4 AEROSOL, METERED RESPIRATORY (INHALATION) PRN
Status: CANCELLED | OUTPATIENT
Start: 2024-08-29

## 2024-08-23 RX ORDER — DEXAMETHASONE 4 MG/1
20 TABLET ORAL ONCE
Start: 2024-09-12 | End: 2024-09-12

## 2024-08-23 RX ORDER — ONDANSETRON 2 MG/ML
8 INJECTION INTRAMUSCULAR; INTRAVENOUS
OUTPATIENT
Start: 2024-09-12

## 2024-08-23 RX ORDER — ACETAMINOPHEN 325 MG/1
650 TABLET ORAL
OUTPATIENT
Start: 2024-09-05

## 2024-08-23 RX ORDER — ACETAMINOPHEN 325 MG/1
650 TABLET ORAL
Status: CANCELLED | OUTPATIENT
Start: 2024-08-29

## 2024-08-23 RX ORDER — ONDANSETRON 2 MG/ML
8 INJECTION INTRAMUSCULAR; INTRAVENOUS
Status: CANCELLED | OUTPATIENT
Start: 2024-08-29

## 2024-08-23 RX ORDER — SODIUM CHLORIDE 0.9 % (FLUSH) 0.9 %
5-40 SYRINGE (ML) INJECTION PRN
OUTPATIENT
Start: 2024-09-12

## 2024-08-23 RX ORDER — EPINEPHRINE 1 MG/ML
0.3 INJECTION, SOLUTION INTRAMUSCULAR; SUBCUTANEOUS PRN
OUTPATIENT
Start: 2024-09-05

## 2024-08-23 RX ORDER — ONDANSETRON 2 MG/ML
8 INJECTION INTRAMUSCULAR; INTRAVENOUS
OUTPATIENT
Start: 2024-09-05

## 2024-08-23 RX ORDER — SODIUM CHLORIDE 9 MG/ML
INJECTION, SOLUTION INTRAVENOUS CONTINUOUS
OUTPATIENT
Start: 2024-09-05

## 2024-08-23 RX ORDER — ACETAMINOPHEN 325 MG/1
650 TABLET ORAL
OUTPATIENT
Start: 2024-09-12

## 2024-08-23 RX ORDER — DIPHENHYDRAMINE HYDROCHLORIDE 50 MG/ML
50 INJECTION INTRAMUSCULAR; INTRAVENOUS
OUTPATIENT
Start: 2024-09-12

## 2024-08-23 RX ORDER — SODIUM CHLORIDE 9 MG/ML
INJECTION, SOLUTION INTRAVENOUS CONTINUOUS
Status: CANCELLED | OUTPATIENT
Start: 2024-08-29

## 2024-08-23 RX ORDER — SODIUM CHLORIDE 9 MG/ML
INJECTION, SOLUTION INTRAVENOUS CONTINUOUS
OUTPATIENT
Start: 2024-09-12

## 2024-08-23 RX ORDER — SODIUM CHLORIDE 9 MG/ML
5-250 INJECTION, SOLUTION INTRAVENOUS PRN
OUTPATIENT
Start: 2024-09-05

## 2024-08-23 RX ORDER — DIPHENHYDRAMINE HYDROCHLORIDE 50 MG/ML
50 INJECTION INTRAMUSCULAR; INTRAVENOUS
OUTPATIENT
Start: 2024-09-05

## 2024-08-23 RX ORDER — EPINEPHRINE 1 MG/ML
0.3 INJECTION, SOLUTION INTRAMUSCULAR; SUBCUTANEOUS PRN
OUTPATIENT
Start: 2024-09-12

## 2024-08-23 RX ORDER — ALBUTEROL SULFATE 90 UG/1
4 AEROSOL, METERED RESPIRATORY (INHALATION) PRN
OUTPATIENT
Start: 2024-09-05

## 2024-08-23 RX ORDER — DEXTROSE MONOHYDRATE 50 MG/ML
5-250 INJECTION, SOLUTION INTRAVENOUS PRN
OUTPATIENT
Start: 2024-09-05

## 2024-08-23 RX ORDER — ALBUTEROL SULFATE 90 UG/1
4 AEROSOL, METERED RESPIRATORY (INHALATION) PRN
OUTPATIENT
Start: 2024-09-12

## 2024-08-23 RX ORDER — HEPARIN 100 UNIT/ML
500 SYRINGE INTRAVENOUS PRN
Status: CANCELLED | OUTPATIENT
Start: 2024-08-29

## 2024-08-23 RX ORDER — DEXAMETHASONE 4 MG/1
20 TABLET ORAL ONCE
Start: 2024-09-05 | End: 2024-09-05

## 2024-08-23 RX ORDER — EPINEPHRINE 1 MG/ML
0.3 INJECTION, SOLUTION INTRAMUSCULAR; SUBCUTANEOUS PRN
Status: CANCELLED | OUTPATIENT
Start: 2024-08-29

## 2024-08-23 RX ORDER — HEPARIN 100 UNIT/ML
500 SYRINGE INTRAVENOUS PRN
OUTPATIENT
Start: 2024-09-05

## 2024-08-23 RX ORDER — SODIUM CHLORIDE 9 MG/ML
5-250 INJECTION, SOLUTION INTRAVENOUS PRN
OUTPATIENT
Start: 2024-09-12

## 2024-08-23 RX ORDER — SODIUM CHLORIDE 9 MG/ML
5-250 INJECTION, SOLUTION INTRAVENOUS PRN
Status: CANCELLED | OUTPATIENT
Start: 2024-08-29

## 2024-08-23 RX ORDER — HEPARIN 100 UNIT/ML
500 SYRINGE INTRAVENOUS PRN
OUTPATIENT
Start: 2024-09-12

## 2024-08-23 RX ORDER — DIPHENHYDRAMINE HYDROCHLORIDE 50 MG/ML
50 INJECTION INTRAMUSCULAR; INTRAVENOUS
Status: CANCELLED | OUTPATIENT
Start: 2024-08-29

## 2024-08-23 RX ORDER — DEXTROSE MONOHYDRATE 50 MG/ML
5-250 INJECTION, SOLUTION INTRAVENOUS PRN
OUTPATIENT
Start: 2024-09-12

## 2024-08-23 RX ORDER — SODIUM CHLORIDE 0.9 % (FLUSH) 0.9 %
5-40 SYRINGE (ML) INJECTION PRN
Status: CANCELLED | OUTPATIENT
Start: 2024-08-29

## 2024-08-23 RX ORDER — SODIUM CHLORIDE 0.9 % (FLUSH) 0.9 %
5-40 SYRINGE (ML) INJECTION PRN
OUTPATIENT
Start: 2024-09-05

## 2024-08-26 NOTE — PROGRESS NOTES
[facility] [place] [facility]  [address], [address] [place] [id number]  W: [phone]  F: [phone]    Reason for Visit:   Candida Weaver is a 69 y.o. female who is seen for Multiple Myeloma   Treatment History:   10/25/2023: Kayli VRD, Rev lite  3/2024: Kyprolis Pomalyst and Dexamethasone    History of Present Illness:   Patient is a 69 y.o. female who was dx with Smoldering Myeloma in 2017 and has been seeing [facility] Dr. Lopez. A BM bx at that time showed 40% plasma cells. No end organ damage. Noted to have worsening anemia 7/5/2023 with a Hb of 9.6 g/dl. This led to further evaluation that included a gammopathy eval that showed an M spike of 0.3 g/dl. She had a Kappa LC level of 1593 g/L with a K:L ratio of 157. Sent for evaluation now. She was in the ER June 2023 with some SOB. Had a CT head and this showed lytic lesions. She states that she has some carpal tunnel. BM bx showed Myeloma. She is on Kayli VRD. She had minimal response and was switched to Kyprolis and Pomalyst and Dexamethasone.    Today is C6D1.  She reports R upper shoulder/arm pain. It was hard to access her port today.  She is having a hard time sleeping. She is taking ambien without much relief.   She has stable neuropathy.       Presents with her Son    Review of systems was obtained and pertinent findings reviewed above. Past medical history, social history, family history, medications, and allergies are located in the electronic medical record.    Physical Exam:   /70 (Site: Left Upper Arm, Position: Sitting)   Pulse 68   Temp 98.2 °F (36.8 °C)   Resp 16   Wt 68.5 kg (151 lb)   SpO2 95%   BMI 25.13 kg/m²    ECOG PS: 1  General: No distress  Eyes: PERRLA, anicteric sclerae  HENT: Atraumatic, OP clear  Neck: Supple  CVS: Regular  Skin: No rashes, ecchymoses, or petechiae. Normal temperature, turgor, and texture.  Psych: Alert, oriented, appropriate affect, normal judgment/insight    Results:     Lab Results

## 2024-08-27 LAB
ALBUMIN SERPL ELPH-MCNC: 3.2 G/DL (ref 2.9–4.4)
ALBUMIN/GLOB SERPL: 1.8 (ref 0.7–1.7)
ALPHA1 GLOB SERPL ELPH-MCNC: 0.2 G/DL (ref 0–0.4)
ALPHA2 GLOB SERPL ELPH-MCNC: 0.6 G/DL (ref 0.4–1)
B-GLOBULIN SERPL ELPH-MCNC: 0.8 G/DL (ref 0.7–1.3)
GAMMA GLOB SERPL ELPH-MCNC: 0.1 G/DL (ref 0.4–1.8)
GLOBULIN SER-MCNC: 1.8 G/DL (ref 2.2–3.9)
IGA SERPL-MCNC: 6 MG/DL (ref 64–422)
IGG SERPL-MCNC: 192 MG/DL (ref 586–1602)
IGM SERPL-MCNC: 6 MG/DL (ref 26–217)
INTERPRETATION SERPL IEP-IMP: ABNORMAL
KAPPA LC FREE SER-MCNC: 255.8 MG/L (ref 3.3–19.4)
KAPPA LC FREE/LAMBDA FREE SER: 37.62 (ref 0.26–1.65)
LAMBDA LC FREE SERPL-MCNC: 6.8 MG/L (ref 5.7–26.3)
M PROTEIN SERPL ELPH-MCNC: ABNORMAL G/DL
PROT SERPL-MCNC: 5 G/DL (ref 6–8.5)

## 2024-08-28 ENCOUNTER — HOSPITAL ENCOUNTER (OUTPATIENT)
Facility: HOSPITAL | Age: 69
Setting detail: INFUSION SERIES
Discharge: HOME | End: 2024-08-28
Payer: MEDICARE

## 2024-08-28 VITALS
RESPIRATION RATE: 18 BRPM | SYSTOLIC BLOOD PRESSURE: 117 MMHG | DIASTOLIC BLOOD PRESSURE: 52 MMHG | OXYGEN SATURATION: 97 % | HEART RATE: 68 BPM | TEMPERATURE: 97.4 F

## 2024-08-28 DIAGNOSIS — C90.00 MULTIPLE MYELOMA NOT HAVING ACHIEVED REMISSION (HCC): ICD-10-CM

## 2024-08-28 DIAGNOSIS — Z11.59 ENCOUNTER FOR SCREENING FOR OTHER VIRAL DISEASES: ICD-10-CM

## 2024-08-28 LAB
ALBUMIN SERPL-MCNC: 3.3 G/DL (ref 3.5–5)
ALBUMIN/GLOB SERPL: 1.3 (ref 1.1–2.2)
ALP SERPL-CCNC: 75 U/L (ref 45–117)
ALT SERPL-CCNC: 27 U/L (ref 12–78)
ANION GAP SERPL CALC-SCNC: 5 MMOL/L (ref 5–15)
AST SERPL-CCNC: 11 U/L (ref 15–37)
BASOPHILS # BLD: 0.2 K/UL (ref 0–0.1)
BASOPHILS NFR BLD: 6 % (ref 0–1)
BILIRUB SERPL-MCNC: 0.3 MG/DL (ref 0.2–1)
BUN SERPL-MCNC: 15 MG/DL (ref 6–20)
BUN/CREAT SERPL: 23 (ref 12–20)
CALCIUM SERPL-MCNC: 9.3 MG/DL (ref 8.5–10.1)
CHLORIDE SERPL-SCNC: 111 MMOL/L (ref 97–108)
CO2 SERPL-SCNC: 24 MMOL/L (ref 21–32)
COMMENT:: NORMAL
CREAT SERPL-MCNC: 0.65 MG/DL (ref 0.55–1.02)
DIFFERENTIAL METHOD BLD: ABNORMAL
EOSINOPHIL # BLD: 0.2 K/UL (ref 0–0.4)
EOSINOPHIL NFR BLD: 6 % (ref 0–7)
ERYTHROCYTE [DISTWIDTH] IN BLOOD BY AUTOMATED COUNT: 15.4 % (ref 11.5–14.5)
GLOBULIN SER CALC-MCNC: 2.5 G/DL (ref 2–4)
GLUCOSE SERPL-MCNC: 102 MG/DL (ref 65–100)
HCT VFR BLD AUTO: 30.1 % (ref 35–47)
HGB BLD-MCNC: 9.1 G/DL (ref 11.5–16)
IMM GRANULOCYTES # BLD AUTO: 0 K/UL
IMM GRANULOCYTES NFR BLD AUTO: 0 %
LYMPHOCYTES # BLD: 1 K/UL (ref 0.8–3.5)
LYMPHOCYTES NFR BLD: 30 % (ref 12–49)
MCH RBC QN AUTO: 29.4 PG (ref 26–34)
MCHC RBC AUTO-ENTMCNC: 30.2 G/DL (ref 30–36.5)
MCV RBC AUTO: 97.1 FL (ref 80–99)
MONOCYTES # BLD: 0.6 K/UL (ref 0–1)
MONOCYTES NFR BLD: 17 % (ref 5–13)
NEUTS SEG # BLD: 1.3 K/UL (ref 1.8–8)
NEUTS SEG NFR BLD: 41 % (ref 32–75)
NRBC # BLD: 0 K/UL (ref 0–0.01)
NRBC BLD-RTO: 0 PER 100 WBC
PLATELET # BLD AUTO: 346 K/UL (ref 150–400)
PMV BLD AUTO: 11.8 FL (ref 8.9–12.9)
POTASSIUM SERPL-SCNC: 4.4 MMOL/L (ref 3.5–5.1)
PROT SERPL-MCNC: 5.8 G/DL (ref 6.4–8.2)
RBC # BLD AUTO: 3.1 M/UL (ref 3.8–5.2)
RBC MORPH BLD: ABNORMAL
SODIUM SERPL-SCNC: 140 MMOL/L (ref 136–145)
SPECIMEN HOLD: NORMAL
WBC # BLD AUTO: 3.3 K/UL (ref 3.6–11)
WBC MORPH BLD: ABNORMAL

## 2024-08-28 PROCEDURE — 80053 COMPREHEN METABOLIC PANEL: CPT

## 2024-08-28 PROCEDURE — 36415 COLL VENOUS BLD VENIPUNCTURE: CPT

## 2024-08-28 PROCEDURE — 85025 COMPLETE CBC W/AUTO DIFF WBC: CPT

## 2024-08-28 ASSESSMENT — PAIN DESCRIPTION - DESCRIPTORS: DESCRIPTORS: ACHING

## 2024-08-28 ASSESSMENT — PAIN DESCRIPTION - LOCATION: LOCATION: SHOULDER

## 2024-08-28 ASSESSMENT — PAIN DESCRIPTION - ORIENTATION: ORIENTATION: RIGHT

## 2024-08-28 ASSESSMENT — PAIN SCALES - GENERAL: PAINLEVEL_OUTOF10: 7

## 2024-08-28 NOTE — PROGRESS NOTES
[facility] Short Note                   Date: 2024    Name: Candida Weaver    MRN: [id number]         : 1955      0935 - Pt admit to [facility] for Pre-Treatment Labs drawn from left AC. Pt ambulatory in stable condition. Assessment completed. No new concerns voiced.       Ms. Weaver's vitals were reviewed prior to and after treatment.   Patient Vitals for the past 12 hrs:   Temp Pulse Resp BP SpO2   24 0935 97.4 °F (36.3 °C) 68 18 (!) 117/52 97 %       Lab results were obtained and reviewed.   Please review pending lab results in Epic.  No results found for this or any previous visit (from the past 12 hour(s)).    Medications given: NONE    Ms. Weaver tolerated the procedure, and had no complaints.    Ms. Weaver was discharged from [facility] in stable condition.           Ann Patterson RN  2024  9:57 AM

## 2024-08-29 ENCOUNTER — CLINICAL DOCUMENTATION (OUTPATIENT)
Age: 69
End: 2024-08-29

## 2024-08-29 ENCOUNTER — OFFICE VISIT (OUTPATIENT)
Age: 69
End: 2024-08-29
Payer: MEDICARE

## 2024-08-29 ENCOUNTER — HOSPITAL ENCOUNTER (OUTPATIENT)
Facility: HOSPITAL | Age: 69
Setting detail: INFUSION SERIES
Discharge: HOME | End: 2024-08-29
Payer: MEDICARE

## 2024-08-29 VITALS
BODY MASS INDEX: 25.13 KG/M2 | RESPIRATION RATE: 16 BRPM | WEIGHT: 151 LBS | OXYGEN SATURATION: 95 % | SYSTOLIC BLOOD PRESSURE: 122 MMHG | DIASTOLIC BLOOD PRESSURE: 70 MMHG | HEART RATE: 68 BPM | TEMPERATURE: 98.2 F

## 2024-08-29 VITALS
WEIGHT: 151 LBS | RESPIRATION RATE: 16 BRPM | SYSTOLIC BLOOD PRESSURE: 119 MMHG | HEART RATE: 66 BPM | BODY MASS INDEX: 25.16 KG/M2 | DIASTOLIC BLOOD PRESSURE: 51 MMHG | HEIGHT: 65 IN

## 2024-08-29 DIAGNOSIS — C90.00 MULTIPLE MYELOMA NOT HAVING ACHIEVED REMISSION (HCC): ICD-10-CM

## 2024-08-29 DIAGNOSIS — Z11.59 ENCOUNTER FOR SCREENING FOR OTHER VIRAL DISEASES: ICD-10-CM

## 2024-08-29 DIAGNOSIS — C90.00 MULTIPLE MYELOMA NOT HAVING ACHIEVED REMISSION (HCC): Primary | ICD-10-CM

## 2024-08-29 PROCEDURE — 2580000003 HC RX 258

## 2024-08-29 PROCEDURE — 1123F ACP DISCUSS/DSCN MKR DOCD: CPT

## 2024-08-29 PROCEDURE — G8427 DOCREV CUR MEDS BY ELIG CLIN: HCPCS

## 2024-08-29 PROCEDURE — 6360000002 HC RX W HCPCS

## 2024-08-29 PROCEDURE — 1090F PRES/ABSN URINE INCON ASSESS: CPT

## 2024-08-29 PROCEDURE — 3017F COLORECTAL CA SCREEN DOC REV: CPT

## 2024-08-29 PROCEDURE — 96367 TX/PROPH/DG ADDL SEQ IV INF: CPT

## 2024-08-29 PROCEDURE — 2580000003 HC RX 258: Performed by: INTERNAL MEDICINE

## 2024-08-29 PROCEDURE — 99215 OFFICE O/P EST HI 40 MIN: CPT

## 2024-08-29 PROCEDURE — 1036F TOBACCO NON-USER: CPT

## 2024-08-29 PROCEDURE — G8419 CALC BMI OUT NRM PARAM NOF/U: HCPCS

## 2024-08-29 PROCEDURE — 96413 CHEMO IV INFUSION 1 HR: CPT

## 2024-08-29 PROCEDURE — G8399 PT W/DXA RESULTS DOCUMENT: HCPCS

## 2024-08-29 PROCEDURE — 6360000002 HC RX W HCPCS: Performed by: INTERNAL MEDICINE

## 2024-08-29 RX ORDER — ONDANSETRON 2 MG/ML
8 INJECTION INTRAMUSCULAR; INTRAVENOUS
OUTPATIENT
Start: 2024-09-12

## 2024-08-29 RX ORDER — SODIUM CHLORIDE 9 MG/ML
5-250 INJECTION, SOLUTION INTRAVENOUS PRN
Status: DISCONTINUED | OUTPATIENT
Start: 2024-08-29 | End: 2024-08-30 | Stop reason: HOSPADM

## 2024-08-29 RX ORDER — ALBUTEROL SULFATE 90 UG/1
4 AEROSOL, METERED RESPIRATORY (INHALATION) PRN
OUTPATIENT
Start: 2024-09-12

## 2024-08-29 RX ORDER — EPINEPHRINE 1 MG/ML
0.3 INJECTION, SOLUTION INTRAMUSCULAR; SUBCUTANEOUS PRN
OUTPATIENT
Start: 2024-09-12

## 2024-08-29 RX ORDER — SODIUM CHLORIDE 9 MG/ML
5-250 INJECTION, SOLUTION INTRAVENOUS PRN
OUTPATIENT
Start: 2024-09-12

## 2024-08-29 RX ORDER — DEXTROSE MONOHYDRATE 50 MG/ML
5-250 INJECTION, SOLUTION INTRAVENOUS PRN
Status: DISCONTINUED | OUTPATIENT
Start: 2024-08-29 | End: 2024-08-30 | Stop reason: HOSPADM

## 2024-08-29 RX ORDER — DIPHENHYDRAMINE HYDROCHLORIDE 50 MG/ML
50 INJECTION INTRAMUSCULAR; INTRAVENOUS
OUTPATIENT
Start: 2024-09-12

## 2024-08-29 RX ORDER — SODIUM CHLORIDE 0.9 % (FLUSH) 0.9 %
5-40 SYRINGE (ML) INJECTION PRN
Status: DISCONTINUED | OUTPATIENT
Start: 2024-08-29 | End: 2024-08-30 | Stop reason: HOSPADM

## 2024-08-29 RX ORDER — SODIUM CHLORIDE 0.9 % (FLUSH) 0.9 %
5-40 SYRINGE (ML) INJECTION PRN
OUTPATIENT
Start: 2024-09-12

## 2024-08-29 RX ORDER — SODIUM CHLORIDE 9 MG/ML
INJECTION, SOLUTION INTRAVENOUS CONTINUOUS
OUTPATIENT
Start: 2024-09-12

## 2024-08-29 RX ORDER — ACETAMINOPHEN 325 MG/1
650 TABLET ORAL
OUTPATIENT
Start: 2024-09-12

## 2024-08-29 RX ORDER — DEXAMETHASONE 4 MG/1
20 TABLET ORAL ONCE
Status: COMPLETED | OUTPATIENT
Start: 2024-08-29 | End: 2024-08-29

## 2024-08-29 RX ORDER — HEPARIN 100 UNIT/ML
500 SYRINGE INTRAVENOUS PRN
Status: DISCONTINUED | OUTPATIENT
Start: 2024-08-29 | End: 2024-08-30 | Stop reason: HOSPADM

## 2024-08-29 RX ORDER — HEPARIN 100 UNIT/ML
500 SYRINGE INTRAVENOUS PRN
OUTPATIENT
Start: 2024-09-12

## 2024-08-29 RX ORDER — ZOLEDRONIC ACID 0.04 MG/ML
4 INJECTION, SOLUTION INTRAVENOUS ONCE
Status: COMPLETED | OUTPATIENT
Start: 2024-08-29 | End: 2024-08-29

## 2024-08-29 RX ORDER — ZOLEDRONIC ACID 0.04 MG/ML
4 INJECTION, SOLUTION INTRAVENOUS ONCE
OUTPATIENT
Start: 2024-09-12 | End: 2024-09-12

## 2024-08-29 RX ORDER — ONDANSETRON 8 MG/1
8 TABLET, ORALLY DISINTEGRATING ORAL EVERY 8 HOURS PRN
Qty: 120 TABLET | Refills: 2 | Status: SHIPPED | OUTPATIENT
Start: 2024-08-29

## 2024-08-29 RX ADMIN — ZOLEDRONIC ACID 4 MG: 0.04 INJECTION, SOLUTION INTRAVENOUS at 12:28

## 2024-08-29 RX ADMIN — DEXTROSE MONOHYDRATE 50 ML/HR: 50 INJECTION, SOLUTION INTRAVENOUS at 12:55

## 2024-08-29 RX ADMIN — DEXAMETHASONE 20 MG: 4 TABLET ORAL at 12:22

## 2024-08-29 RX ADMIN — SODIUM CHLORIDE 50 ML/HR: 9 INJECTION, SOLUTION INTRAVENOUS at 12:24

## 2024-08-29 RX ADMIN — SODIUM CHLORIDE, PRESERVATIVE FREE 20 ML: 5 INJECTION INTRAVENOUS at 13:39

## 2024-08-29 RX ADMIN — CARFILZOMIB 127.4 MG: 10 INJECTION, POWDER, LYOPHILIZED, FOR SOLUTION INTRAVENOUS at 13:07

## 2024-08-29 ASSESSMENT — PAIN DESCRIPTION - DESCRIPTORS: DESCRIPTORS: ACHING

## 2024-08-29 ASSESSMENT — PAIN DESCRIPTION - LOCATION: LOCATION: SHOULDER

## 2024-08-29 ASSESSMENT — PAIN SCALES - GENERAL: PAINLEVEL_OUTOF10: 7

## 2024-08-29 ASSESSMENT — PAIN DESCRIPTION - ORIENTATION: ORIENTATION: RIGHT

## 2024-08-29 NOTE — PROGRESS NOTES
Oral Chemotherapy      Candida Weaver is a  69 y.o.female  diagnosed with multiple myeloma . Ms. Weaver is being treated with KPd.      Medication name: pomalidomide  Regimen: KPd  Dose:   3 mg  Frequency: daily  Administration schedule: for 21 days followed by 7 days off every 28 days  Ordering provider: Nany Arboleda MD  Start date: 8/29/24 (Cycle 6)    Lab Results   Component Value Date    WBC 3.3 (L) 08/28/2024    HGB 9.1 (L) 08/28/2024    HCT 30.1 (L) 08/28/2024    MCV 97.1 08/28/2024     08/28/2024    LYMPHOPCT 30 08/28/2024    RBC 3.10 (L) 08/28/2024    MCH 29.4 08/28/2024    MCHC 30.2 08/28/2024    RDW 15.4 (H) 08/28/2024     Lab Results   Component Value Date    NEUTROABS 1.3 (L) 08/28/2024           Expected follow up date: Labs/office visit each treatment. Next cycle start on 9/26/24     Patient provided with an Oral Chemotherapy Journal.                    Rose Pa, PharmD, BCPS, BCOP    For Pharmacy Admin Tracking Only    Program: Medical Group  CPA in place:  Yes  Recommendation Provided To: Patient/Caregiver: 1 via In person    Intervention Accepted By: Patient/Caregiver: 1    Time Spent (min): 15

## 2024-08-29 NOTE — PROGRESS NOTES
[facility] Chemotherapy/Immunotherapy Progress Note    Date: 2024  Name: Candida Weaver  MRN: [id number]       : 1955    Pt arrived ambulatory and in no acute distress to [facility] for Kyprolis + Zometa   Denies SOB, fever, cough, N/V. Denies Covid-like symptoms.     Chest port accessed with positive blood return. Swelling at port site. MD notified and per patient and will have scan ordered.   Labs drawn day prior and within treatment parameters.     Ms. Weaver's vitals were reviewed.  Patient Vitals for the past 12 hrs:   Pulse Resp BP   24 1338 66 16 (!) 119/51     Pre-medications were administered as ordered and chemotherapy was initiated. Please see MAR for specific drug names and time of administration.  Medications Administered         0.9 % sodium chloride infusion Admin Date  2024 Action  New Bag Dose  50 mL/hr Rate  50 mL/hr Route  IntraVENous Documented By  Dacia Mccollum RN        carfilzomib (KYPROLIS) 127.4 mg in dextrose 5 % 100 mL chemo IVPB Admin Date  2024 Action  New Bag Dose  127.4 mg Rate  347.4 mL/hr Route  IntraVENous Documented By  Dacia Mccollum RN        dexAMETHasone (DECADRON) tablet 20 mg Admin Date  2024 Action  Given Dose  20 mg Rate   Route  Oral Documented By  Dacia Mccollum RN        dextrose 5 % solution Admin Date  2024 Action  New Bag Dose  50 mL/hr Rate  50 mL/hr Route  IntraVENous Documented By  Dacia Mccollum RN        sodium chloride flush 0.9 % injection 5-40 mL Admin Date  2024 Action  Given Dose  20 mL Rate   Route  IntraVENous Documented By  Dacia Mccollum RN        zoledronic acid (ZOMETA) 4 mg/100 mL infusion Admin Date  2024 Action  New Bag Dose  4 mg Rate  300 mL/hr Route  IntraVENous Documented By  Dacia Mccollum RN          Prior to chemotherapy/immunotherapy administration the following were verified with a second chemotherapy/immunotherapy certified nurse: Patient name, Patient  or CSN, Drug name, Drug dose,

## 2024-08-29 NOTE — PROGRESS NOTES
Candida Weaver is a 69 y.o. female    Chief Complaint   Patient presents with    Follow-up      Multiple Myeloma        1. Have you been to the ER, urgent care clinic since your last visit?  Hospitalized since your last visit?No    2. Have you seen or consulted any other health care providers outside of the [facility] since your last visit?  Include any pap smears or colon screening. No

## 2024-08-30 DIAGNOSIS — M51.36 LUMBAR DEGENERATIVE DISC DISEASE: ICD-10-CM

## 2024-08-30 RX ORDER — BACLOFEN 10 MG/1
10 TABLET ORAL NIGHTLY
Qty: 30 TABLET | Refills: 0 | Status: SHIPPED | OUTPATIENT
Start: 2024-08-30

## 2024-09-03 ENCOUNTER — HOSPITAL ENCOUNTER (OUTPATIENT)
Age: 69
Discharge: HOME | End: 2024-09-06
Payer: MEDICARE

## 2024-09-03 DIAGNOSIS — C90.00 MULTIPLE MYELOMA NOT HAVING ACHIEVED REMISSION (HCC): ICD-10-CM

## 2024-09-03 PROCEDURE — 93971 EXTREMITY STUDY: CPT

## 2024-09-04 ENCOUNTER — HOSPITAL ENCOUNTER (OUTPATIENT)
Facility: HOSPITAL | Age: 69
Setting detail: INFUSION SERIES
Discharge: HOME | End: 2024-09-04
Payer: MEDICARE

## 2024-09-04 VITALS
RESPIRATION RATE: 18 BRPM | OXYGEN SATURATION: 97 % | DIASTOLIC BLOOD PRESSURE: 66 MMHG | SYSTOLIC BLOOD PRESSURE: 107 MMHG | TEMPERATURE: 97.8 F | HEART RATE: 81 BPM

## 2024-09-04 DIAGNOSIS — Z11.59 ENCOUNTER FOR SCREENING FOR OTHER VIRAL DISEASES: ICD-10-CM

## 2024-09-04 DIAGNOSIS — C90.00 MULTIPLE MYELOMA NOT HAVING ACHIEVED REMISSION (HCC): ICD-10-CM

## 2024-09-04 LAB
ALBUMIN SERPL-MCNC: 3.2 G/DL (ref 3.5–5)
ALBUMIN/GLOB SERPL: 1.3 (ref 1.1–2.2)
ALP SERPL-CCNC: 73 U/L (ref 45–117)
ALT SERPL-CCNC: 31 U/L (ref 12–78)
ANION GAP SERPL CALC-SCNC: 0 MMOL/L (ref 5–15)
AST SERPL-CCNC: 10 U/L (ref 15–37)
BASOPHILS # BLD: 0 K/UL (ref 0–0.1)
BASOPHILS NFR BLD: 1 % (ref 0–1)
BILIRUB SERPL-MCNC: 0.3 MG/DL (ref 0.2–1)
BUN SERPL-MCNC: 12 MG/DL (ref 6–20)
BUN/CREAT SERPL: 16 (ref 12–20)
CALCIUM SERPL-MCNC: 8.8 MG/DL (ref 8.5–10.1)
CHLORIDE SERPL-SCNC: 109 MMOL/L (ref 97–108)
CO2 SERPL-SCNC: 28 MMOL/L (ref 21–32)
CREAT SERPL-MCNC: 0.74 MG/DL (ref 0.55–1.02)
DIFFERENTIAL METHOD BLD: ABNORMAL
EOSINOPHIL # BLD: 0.2 K/UL (ref 0–0.4)
EOSINOPHIL NFR BLD: 5 % (ref 0–7)
ERYTHROCYTE [DISTWIDTH] IN BLOOD BY AUTOMATED COUNT: 15 % (ref 11.5–14.5)
GLOBULIN SER CALC-MCNC: 2.5 G/DL (ref 2–4)
GLUCOSE SERPL-MCNC: 79 MG/DL (ref 65–100)
HCT VFR BLD AUTO: 30.5 % (ref 35–47)
HGB BLD-MCNC: 9 G/DL (ref 11.5–16)
IMM GRANULOCYTES # BLD AUTO: 0 K/UL (ref 0–0.04)
IMM GRANULOCYTES NFR BLD AUTO: 1 % (ref 0–0.5)
LYMPHOCYTES # BLD: 0.7 K/UL (ref 0.8–3.5)
LYMPHOCYTES NFR BLD: 23 % (ref 12–49)
MCH RBC QN AUTO: 29.5 PG (ref 26–34)
MCHC RBC AUTO-ENTMCNC: 29.5 G/DL (ref 30–36.5)
MCV RBC AUTO: 100 FL (ref 80–99)
MONOCYTES # BLD: 0.6 K/UL (ref 0–1)
MONOCYTES NFR BLD: 20 % (ref 5–13)
NEUTS SEG # BLD: 1.6 K/UL (ref 1.8–8)
NEUTS SEG NFR BLD: 50 % (ref 32–75)
NRBC # BLD: 0.02 K/UL (ref 0–0.01)
NRBC BLD-RTO: 0.6 PER 100 WBC
PLATELET # BLD AUTO: 205 K/UL (ref 150–400)
PMV BLD AUTO: 12 FL (ref 8.9–12.9)
POTASSIUM SERPL-SCNC: 4.5 MMOL/L (ref 3.5–5.1)
PROT SERPL-MCNC: 5.7 G/DL (ref 6.4–8.2)
RBC # BLD AUTO: 3.05 M/UL (ref 3.8–5.2)
RBC MORPH BLD: ABNORMAL
SODIUM SERPL-SCNC: 137 MMOL/L (ref 136–145)
WBC # BLD AUTO: 3.1 K/UL (ref 3.6–11)
WBC MORPH BLD: ABNORMAL

## 2024-09-04 PROCEDURE — 80053 COMPREHEN METABOLIC PANEL: CPT

## 2024-09-04 PROCEDURE — 85025 COMPLETE CBC W/AUTO DIFF WBC: CPT

## 2024-09-04 PROCEDURE — 36415 COLL VENOUS BLD VENIPUNCTURE: CPT

## 2024-09-04 ASSESSMENT — PAIN DESCRIPTION - ORIENTATION: ORIENTATION: RIGHT

## 2024-09-04 ASSESSMENT — PAIN DESCRIPTION - LOCATION: LOCATION: SHOULDER

## 2024-09-04 ASSESSMENT — PAIN DESCRIPTION - DESCRIPTORS: DESCRIPTORS: ACHING

## 2024-09-04 ASSESSMENT — PAIN SCALES - GENERAL: PAINLEVEL_OUTOF10: 7

## 2024-09-04 NOTE — PROGRESS NOTES
[facility] Lab visit:     0930: Patient arrived ambulatory and in no distress.  Labs drawn per Jackie Kam RN. Departed [facility] ambulatory and in no distress.     Visit Vitals:  Patient Vitals for the past 12 hrs:   Temp Pulse Resp BP SpO2   09/04/24 0945 97.8 °F (36.6 °C) 81 18 107/66 97 %       Labs: See Epic for pending results.  No results found for this or any previous visit (from the past 12 hour(s)).

## 2024-09-05 ENCOUNTER — HOSPITAL ENCOUNTER (OUTPATIENT)
Facility: HOSPITAL | Age: 69
Setting detail: INFUSION SERIES
Discharge: HOME | End: 2024-09-05
Payer: MEDICARE

## 2024-09-05 VITALS
HEIGHT: 65 IN | RESPIRATION RATE: 16 BRPM | HEART RATE: 67 BPM | BODY MASS INDEX: 25.26 KG/M2 | DIASTOLIC BLOOD PRESSURE: 45 MMHG | WEIGHT: 151.6 LBS | SYSTOLIC BLOOD PRESSURE: 112 MMHG | TEMPERATURE: 98.1 F | OXYGEN SATURATION: 96 %

## 2024-09-05 DIAGNOSIS — Z11.59 ENCOUNTER FOR SCREENING FOR OTHER VIRAL DISEASES: Primary | ICD-10-CM

## 2024-09-05 DIAGNOSIS — C90.00 MULTIPLE MYELOMA NOT HAVING ACHIEVED REMISSION (HCC): ICD-10-CM

## 2024-09-05 PROCEDURE — 2580000003 HC RX 258: Performed by: INTERNAL MEDICINE

## 2024-09-05 PROCEDURE — 96413 CHEMO IV INFUSION 1 HR: CPT

## 2024-09-05 PROCEDURE — 6360000002 HC RX W HCPCS: Performed by: INTERNAL MEDICINE

## 2024-09-05 RX ORDER — SODIUM CHLORIDE 0.9 % (FLUSH) 0.9 %
5-40 SYRINGE (ML) INJECTION PRN
Status: DISCONTINUED | OUTPATIENT
Start: 2024-09-05 | End: 2024-09-06 | Stop reason: HOSPADM

## 2024-09-05 RX ORDER — DEXTROSE MONOHYDRATE 50 MG/ML
5-250 INJECTION, SOLUTION INTRAVENOUS PRN
Status: DISCONTINUED | OUTPATIENT
Start: 2024-09-05 | End: 2024-09-06 | Stop reason: HOSPADM

## 2024-09-05 RX ORDER — DEXAMETHASONE 4 MG/1
20 TABLET ORAL ONCE
Status: COMPLETED | OUTPATIENT
Start: 2024-09-05 | End: 2024-09-05

## 2024-09-05 RX ADMIN — DEXAMETHASONE 20 MG: 4 TABLET ORAL at 10:19

## 2024-09-05 RX ADMIN — DEXTROSE MONOHYDRATE 25 ML/HR: 50 INJECTION, SOLUTION INTRAVENOUS at 10:12

## 2024-09-05 RX ADMIN — CARFILZOMIB 127.4 MG: 10 INJECTION, POWDER, LYOPHILIZED, FOR SOLUTION INTRAVENOUS at 10:59

## 2024-09-05 ASSESSMENT — PAIN DESCRIPTION - ORIENTATION: ORIENTATION: RIGHT

## 2024-09-05 ASSESSMENT — PAIN DESCRIPTION - LOCATION: LOCATION: SHOULDER

## 2024-09-05 ASSESSMENT — PAIN SCALES - GENERAL: PAINLEVEL_OUTOF10: 8

## 2024-09-05 NOTE — PROGRESS NOTES
[facility] Chemotherapy Progress Note  Date: 2024  Name: Candida Weaver  MRN: [id number]       : 1955    [facility] C6D8 Kyprolis   Assessment and port access completed by Ann ERAZO RN     Port with positive blood return. Lab results were obtained and reviewed from 24 and noted to [facility] for treatment.     Ms. Weaver's vitals were reviewed.  Patient Vitals for the past 12 hrs:   Temp Pulse Resp BP SpO2   24 0954 98.1 °F (36.7 °C) 67 16 (!) 112/45 96 %             Pre-medications  were administered as ordered and chemotherapy was initiated.  Medications Administered         carfilzomib (KYPROLIS) 127.4 mg in dextrose 5 % 100 mL chemo IVPB Admin Date  2024 Action  New Bag Dose  127.4 mg Rate  347.4 mL/hr Route  IntraVENous Documented By  Ann Morrison RN        dexAMETHasone (DECADRON) tablet 20 mg Admin Date  2024 Action  Given Dose  20 mg Rate   Route  Oral Documented By  Ann Morrison RN        dextrose 5 % solution Admin Date  2024 Action  New Bag Dose  25 mL/hr Rate  25 mL/hr Route  IntraVENous Documented By  Ann Morrison RN            Prior to chemotherapy/immunotherapy administration the following were verified with a second chemotherapy/immunotherapy certified nurse: Patient name, Patient  or CSN, Drug name, Drug dose, Infusion/drug volume, Rate of administration, Route of administration, Expiration dates/times, Appearance and physical integrity of the drug(s)    Please see MAR for specific drug names and time of administration.    Patient was monitored appropriately. Tolerated infusion well without issue.    Port maintained positive blood return throughout treatment.   Flushed and de-accessed per protocol.      Pt aware of next appointment scheduled.     Future Appointments   Date Time Provider Department Center   2024  9:30 AM PEDS LAB CHAIR 1 [facility]   2024 11:00 AM RUTH CHEMO CHAIR 3 [facility]   2024  9:30 AM PEDS LAB CHAIR 1

## 2024-09-06 DIAGNOSIS — K64.8 OTHER HEMORRHOIDS: ICD-10-CM

## 2024-09-06 DIAGNOSIS — I10 PRIMARY HYPERTENSION: Primary | ICD-10-CM

## 2024-09-06 RX ORDER — SPIRONOLACTONE 25 MG/1
25 TABLET ORAL DAILY
Qty: 90 TABLET | Refills: 0 | Status: SHIPPED | OUTPATIENT
Start: 2024-09-06

## 2024-09-06 RX ORDER — HYDROCORTISONE ACETATE 25 MG
SUPPOSITORY, RECTAL RECTAL
Qty: 10 SUPPOSITORY | Refills: 0 | Status: SHIPPED | OUTPATIENT
Start: 2024-09-06

## 2024-09-11 ENCOUNTER — HOSPITAL ENCOUNTER (OUTPATIENT)
Facility: HOSPITAL | Age: 69
Setting detail: INFUSION SERIES
Discharge: HOME | End: 2024-09-11
Payer: MEDICARE

## 2024-09-11 VITALS
RESPIRATION RATE: 15 BRPM | DIASTOLIC BLOOD PRESSURE: 67 MMHG | OXYGEN SATURATION: 97 % | TEMPERATURE: 98 F | SYSTOLIC BLOOD PRESSURE: 121 MMHG | HEART RATE: 66 BPM

## 2024-09-11 DIAGNOSIS — C90.00 MULTIPLE MYELOMA NOT HAVING ACHIEVED REMISSION (HCC): ICD-10-CM

## 2024-09-11 DIAGNOSIS — Z11.59 ENCOUNTER FOR SCREENING FOR OTHER VIRAL DISEASES: ICD-10-CM

## 2024-09-11 LAB
ALBUMIN SERPL-MCNC: 3.6 G/DL (ref 3.5–5)
ALBUMIN/GLOB SERPL: 1.4 (ref 1.1–2.2)
ALP SERPL-CCNC: 91 U/L (ref 45–117)
ALT SERPL-CCNC: 24 U/L (ref 12–78)
ANION GAP SERPL CALC-SCNC: 2 MMOL/L (ref 2–12)
AST SERPL-CCNC: 9 U/L (ref 15–37)
BASOPHILS # BLD: 0.1 K/UL (ref 0–0.1)
BASOPHILS NFR BLD: 2 % (ref 0–1)
BILIRUB SERPL-MCNC: 0.6 MG/DL (ref 0.2–1)
BUN SERPL-MCNC: 20 MG/DL (ref 6–20)
BUN/CREAT SERPL: 27 (ref 12–20)
CALCIUM SERPL-MCNC: 9.6 MG/DL (ref 8.5–10.1)
CHLORIDE SERPL-SCNC: 108 MMOL/L (ref 97–108)
CO2 SERPL-SCNC: 27 MMOL/L (ref 21–32)
CREAT SERPL-MCNC: 0.75 MG/DL (ref 0.55–1.02)
DIFFERENTIAL METHOD BLD: ABNORMAL
EOSINOPHIL # BLD: 0.2 K/UL (ref 0–0.4)
EOSINOPHIL NFR BLD: 8 % (ref 0–7)
ERYTHROCYTE [DISTWIDTH] IN BLOOD BY AUTOMATED COUNT: 14.9 % (ref 11.5–14.5)
GLOBULIN SER CALC-MCNC: 2.5 G/DL (ref 2–4)
GLUCOSE SERPL-MCNC: 81 MG/DL (ref 65–100)
HCT VFR BLD AUTO: 31.6 % (ref 35–47)
HGB BLD-MCNC: 9.8 G/DL (ref 11.5–16)
IMM GRANULOCYTES # BLD AUTO: 0 K/UL (ref 0–0.04)
IMM GRANULOCYTES NFR BLD AUTO: 1 % (ref 0–0.5)
LYMPHOCYTES # BLD: 0.7 K/UL (ref 0.8–3.5)
LYMPHOCYTES NFR BLD: 24 % (ref 12–49)
MCH RBC QN AUTO: 30.5 PG (ref 26–34)
MCHC RBC AUTO-ENTMCNC: 31 G/DL (ref 30–36.5)
MCV RBC AUTO: 98.4 FL (ref 80–99)
MONOCYTES # BLD: 0.4 K/UL (ref 0–1)
MONOCYTES NFR BLD: 12 % (ref 5–13)
NEUTS SEG # BLD: 1.7 K/UL (ref 1.8–8)
NEUTS SEG NFR BLD: 53 % (ref 32–75)
NRBC # BLD: 0.06 K/UL (ref 0–0.01)
NRBC BLD-RTO: 1.9 PER 100 WBC
PLATELET # BLD AUTO: 206 K/UL (ref 150–400)
PMV BLD AUTO: 12.4 FL (ref 8.9–12.9)
POTASSIUM SERPL-SCNC: 4.5 MMOL/L (ref 3.5–5.1)
PROT SERPL-MCNC: 6.1 G/DL (ref 6.4–8.2)
RBC # BLD AUTO: 3.21 M/UL (ref 3.8–5.2)
RBC MORPH BLD: ABNORMAL
SODIUM SERPL-SCNC: 137 MMOL/L (ref 136–145)
WBC # BLD AUTO: 3.1 K/UL (ref 3.6–11)
WBC MORPH BLD: ABNORMAL

## 2024-09-11 PROCEDURE — 80053 COMPREHEN METABOLIC PANEL: CPT

## 2024-09-11 PROCEDURE — 36415 COLL VENOUS BLD VENIPUNCTURE: CPT

## 2024-09-11 PROCEDURE — 85025 COMPLETE CBC W/AUTO DIFF WBC: CPT

## 2024-09-11 ASSESSMENT — PAIN DESCRIPTION - LOCATION: LOCATION: SHOULDER

## 2024-09-11 ASSESSMENT — PAIN DESCRIPTION - ORIENTATION: ORIENTATION: RIGHT

## 2024-09-11 ASSESSMENT — PAIN SCALES - GENERAL: PAINLEVEL_OUTOF10: 7

## 2024-09-12 ENCOUNTER — HOSPITAL ENCOUNTER (OUTPATIENT)
Facility: HOSPITAL | Age: 69
Setting detail: INFUSION SERIES
Discharge: HOME | End: 2024-09-12
Payer: MEDICARE

## 2024-09-12 VITALS
WEIGHT: 149 LBS | HEART RATE: 63 BPM | HEIGHT: 65 IN | DIASTOLIC BLOOD PRESSURE: 50 MMHG | SYSTOLIC BLOOD PRESSURE: 115 MMHG | RESPIRATION RATE: 16 BRPM | BODY MASS INDEX: 24.83 KG/M2 | TEMPERATURE: 97.7 F

## 2024-09-12 DIAGNOSIS — Z11.59 ENCOUNTER FOR SCREENING FOR OTHER VIRAL DISEASES: Primary | ICD-10-CM

## 2024-09-12 DIAGNOSIS — C90.00 MULTIPLE MYELOMA NOT HAVING ACHIEVED REMISSION (HCC): Primary | ICD-10-CM

## 2024-09-12 DIAGNOSIS — C90.00 MULTIPLE MYELOMA NOT HAVING ACHIEVED REMISSION (HCC): ICD-10-CM

## 2024-09-12 PROCEDURE — 96413 CHEMO IV INFUSION 1 HR: CPT

## 2024-09-12 PROCEDURE — 2580000003 HC RX 258: Performed by: INTERNAL MEDICINE

## 2024-09-12 PROCEDURE — 6360000002 HC RX W HCPCS: Performed by: INTERNAL MEDICINE

## 2024-09-12 RX ORDER — DEXTROSE MONOHYDRATE 50 MG/ML
5-250 INJECTION, SOLUTION INTRAVENOUS PRN
Status: DISCONTINUED | OUTPATIENT
Start: 2024-09-12 | End: 2024-09-13 | Stop reason: HOSPADM

## 2024-09-12 RX ORDER — SODIUM CHLORIDE 0.9 % (FLUSH) 0.9 %
5-40 SYRINGE (ML) INJECTION PRN
Status: DISCONTINUED | OUTPATIENT
Start: 2024-09-12 | End: 2024-09-13 | Stop reason: HOSPADM

## 2024-09-12 RX ORDER — DEXAMETHASONE 4 MG/1
20 TABLET ORAL ONCE
Status: COMPLETED | OUTPATIENT
Start: 2024-09-12 | End: 2024-09-12

## 2024-09-12 RX ADMIN — CARFILZOMIB 127.4 MG: 10 INJECTION, POWDER, LYOPHILIZED, FOR SOLUTION INTRAVENOUS at 12:26

## 2024-09-12 RX ADMIN — DEXAMETHASONE 20 MG: 4 TABLET ORAL at 12:10

## 2024-09-12 RX ADMIN — DEXTROSE MONOHYDRATE 50 ML/HR: 50 INJECTION, SOLUTION INTRAVENOUS at 12:08

## 2024-09-12 ASSESSMENT — PAIN DESCRIPTION - LOCATION: LOCATION: SHOULDER

## 2024-09-12 ASSESSMENT — PAIN SCALES - GENERAL: PAINLEVEL_OUTOF10: 7

## 2024-09-12 ASSESSMENT — PAIN DESCRIPTION - ORIENTATION: ORIENTATION: RIGHT

## 2024-09-18 ENCOUNTER — HOSPITAL ENCOUNTER (OUTPATIENT)
Facility: HOSPITAL | Age: 69
Setting detail: INFUSION SERIES
Discharge: HOME | End: 2024-09-18
Payer: MEDICARE

## 2024-09-18 VITALS
SYSTOLIC BLOOD PRESSURE: 123 MMHG | HEART RATE: 59 BPM | TEMPERATURE: 98.6 F | RESPIRATION RATE: 16 BRPM | OXYGEN SATURATION: 98 % | DIASTOLIC BLOOD PRESSURE: 74 MMHG

## 2024-09-18 LAB
ALBUMIN SERPL-MCNC: 3.2 G/DL (ref 3.5–5)
ALBUMIN/GLOB SERPL: 1.2 (ref 1.1–2.2)
ALP SERPL-CCNC: 75 U/L (ref 45–117)
ALT SERPL-CCNC: 24 U/L (ref 12–78)
ANION GAP SERPL CALC-SCNC: 2 MMOL/L (ref 2–12)
AST SERPL-CCNC: 24 U/L (ref 15–37)
BASOPHILS # BLD: 0.1 K/UL (ref 0–0.1)
BASOPHILS NFR BLD: 2 % (ref 0–1)
BILIRUB SERPL-MCNC: 0.4 MG/DL (ref 0.2–1)
BUN SERPL-MCNC: 10 MG/DL (ref 6–20)
BUN/CREAT SERPL: 14 (ref 12–20)
CALCIUM SERPL-MCNC: 8.7 MG/DL (ref 8.5–10.1)
CHLORIDE SERPL-SCNC: 110 MMOL/L (ref 97–108)
CO2 SERPL-SCNC: 27 MMOL/L (ref 21–32)
CREAT SERPL-MCNC: 0.7 MG/DL (ref 0.55–1.02)
DIFFERENTIAL METHOD BLD: ABNORMAL
EOSINOPHIL # BLD: 0.2 K/UL (ref 0–0.4)
EOSINOPHIL NFR BLD: 8 % (ref 0–7)
ERYTHROCYTE [DISTWIDTH] IN BLOOD BY AUTOMATED COUNT: 15.4 % (ref 11.5–14.5)
GLOBULIN SER CALC-MCNC: 2.6 G/DL (ref 2–4)
GLUCOSE SERPL-MCNC: 92 MG/DL (ref 65–100)
HCT VFR BLD AUTO: 29.4 % (ref 35–47)
HGB BLD-MCNC: 9.1 G/DL (ref 11.5–16)
IMM GRANULOCYTES # BLD AUTO: 0 K/UL (ref 0–0.04)
IMM GRANULOCYTES NFR BLD AUTO: 1 % (ref 0–0.5)
LYMPHOCYTES # BLD: 0.5 K/UL (ref 0.8–3.5)
LYMPHOCYTES NFR BLD: 21 % (ref 12–49)
MCH RBC QN AUTO: 30 PG (ref 26–34)
MCHC RBC AUTO-ENTMCNC: 31 G/DL (ref 30–36.5)
MCV RBC AUTO: 97 FL (ref 80–99)
MONOCYTES # BLD: 0.6 K/UL (ref 0–1)
MONOCYTES NFR BLD: 22 % (ref 5–13)
NEUTS SEG # BLD: 1.2 K/UL (ref 1.8–8)
NEUTS SEG NFR BLD: 46 % (ref 32–75)
NRBC # BLD: 0.03 K/UL (ref 0–0.01)
NRBC BLD-RTO: 1.2 PER 100 WBC
PLATELET # BLD AUTO: 199 K/UL (ref 150–400)
PMV BLD AUTO: 11.3 FL (ref 8.9–12.9)
POTASSIUM SERPL-SCNC: 4.4 MMOL/L (ref 3.5–5.1)
PROT SERPL-MCNC: 5.8 G/DL (ref 6.4–8.2)
RBC # BLD AUTO: 3.03 M/UL (ref 3.8–5.2)
RBC MORPH BLD: ABNORMAL
SODIUM SERPL-SCNC: 139 MMOL/L (ref 136–145)
WBC # BLD AUTO: 2.6 K/UL (ref 3.6–11)
WBC MORPH BLD: ABNORMAL

## 2024-09-18 PROCEDURE — 36415 COLL VENOUS BLD VENIPUNCTURE: CPT

## 2024-09-18 PROCEDURE — 80053 COMPREHEN METABOLIC PANEL: CPT

## 2024-09-18 PROCEDURE — 83521 IG LIGHT CHAINS FREE EACH: CPT

## 2024-09-18 PROCEDURE — 85025 COMPLETE CBC W/AUTO DIFF WBC: CPT

## 2024-09-18 PROCEDURE — 84165 PROTEIN E-PHORESIS SERUM: CPT

## 2024-09-18 PROCEDURE — 82784 ASSAY IGA/IGD/IGG/IGM EACH: CPT

## 2024-09-18 PROCEDURE — 86334 IMMUNOFIX E-PHORESIS SERUM: CPT

## 2024-09-18 RX ORDER — DEXTROSE MONOHYDRATE 50 MG/ML
5-250 INJECTION, SOLUTION INTRAVENOUS PRN
OUTPATIENT
Start: 2024-10-03

## 2024-09-18 RX ORDER — EPINEPHRINE 1 MG/ML
0.3 INJECTION, SOLUTION INTRAMUSCULAR; SUBCUTANEOUS PRN
OUTPATIENT
Start: 2024-10-10

## 2024-09-18 RX ORDER — ONDANSETRON 2 MG/ML
8 INJECTION INTRAMUSCULAR; INTRAVENOUS
OUTPATIENT
Start: 2024-10-10

## 2024-09-18 RX ORDER — DEXAMETHASONE 4 MG/1
20 TABLET ORAL ONCE
Start: 2024-10-10 | End: 2024-10-10

## 2024-09-18 RX ORDER — EPINEPHRINE 1 MG/ML
0.3 INJECTION, SOLUTION INTRAMUSCULAR; SUBCUTANEOUS PRN
Status: CANCELLED | OUTPATIENT
Start: 2024-09-26

## 2024-09-18 RX ORDER — SODIUM CHLORIDE 9 MG/ML
5-250 INJECTION, SOLUTION INTRAVENOUS PRN
OUTPATIENT
Start: 2024-10-10

## 2024-09-18 RX ORDER — ACETAMINOPHEN 325 MG/1
650 TABLET ORAL
OUTPATIENT
Start: 2024-10-10

## 2024-09-18 RX ORDER — DIPHENHYDRAMINE HYDROCHLORIDE 50 MG/ML
50 INJECTION INTRAMUSCULAR; INTRAVENOUS
Status: CANCELLED | OUTPATIENT
Start: 2024-09-26

## 2024-09-18 RX ORDER — SODIUM CHLORIDE 9 MG/ML
INJECTION, SOLUTION INTRAVENOUS CONTINUOUS
OUTPATIENT
Start: 2024-10-03

## 2024-09-18 RX ORDER — SODIUM CHLORIDE 9 MG/ML
5-250 INJECTION, SOLUTION INTRAVENOUS PRN
OUTPATIENT
Start: 2024-10-03

## 2024-09-18 RX ORDER — SODIUM CHLORIDE 9 MG/ML
INJECTION, SOLUTION INTRAVENOUS CONTINUOUS
Status: CANCELLED | OUTPATIENT
Start: 2024-09-26

## 2024-09-18 RX ORDER — DEXTROSE MONOHYDRATE 50 MG/ML
5-250 INJECTION, SOLUTION INTRAVENOUS PRN
OUTPATIENT
Start: 2024-10-10

## 2024-09-18 RX ORDER — HEPARIN 100 UNIT/ML
500 SYRINGE INTRAVENOUS PRN
OUTPATIENT
Start: 2024-10-10

## 2024-09-18 RX ORDER — SODIUM CHLORIDE 9 MG/ML
INJECTION, SOLUTION INTRAVENOUS CONTINUOUS
OUTPATIENT
Start: 2024-10-10

## 2024-09-18 RX ORDER — ONDANSETRON 2 MG/ML
8 INJECTION INTRAMUSCULAR; INTRAVENOUS
Status: CANCELLED | OUTPATIENT
Start: 2024-09-26

## 2024-09-18 RX ORDER — ACETAMINOPHEN 325 MG/1
650 TABLET ORAL
OUTPATIENT
Start: 2024-10-03

## 2024-09-18 RX ORDER — ACETAMINOPHEN 325 MG/1
650 TABLET ORAL
Status: CANCELLED | OUTPATIENT
Start: 2024-09-26

## 2024-09-18 RX ORDER — DIPHENHYDRAMINE HYDROCHLORIDE 50 MG/ML
50 INJECTION INTRAMUSCULAR; INTRAVENOUS
OUTPATIENT
Start: 2024-10-10

## 2024-09-18 RX ORDER — ALBUTEROL SULFATE 90 UG/1
4 INHALANT RESPIRATORY (INHALATION) PRN
OUTPATIENT
Start: 2024-10-03

## 2024-09-18 RX ORDER — ALBUTEROL SULFATE 90 UG/1
4 INHALANT RESPIRATORY (INHALATION) PRN
OUTPATIENT
Start: 2024-10-10

## 2024-09-18 RX ORDER — SODIUM CHLORIDE 0.9 % (FLUSH) 0.9 %
5-40 SYRINGE (ML) INJECTION PRN
OUTPATIENT
Start: 2024-10-03

## 2024-09-18 RX ORDER — DIPHENHYDRAMINE HYDROCHLORIDE 50 MG/ML
50 INJECTION INTRAMUSCULAR; INTRAVENOUS
OUTPATIENT
Start: 2024-10-03

## 2024-09-18 RX ORDER — ALBUTEROL SULFATE 90 UG/1
4 INHALANT RESPIRATORY (INHALATION) PRN
Status: CANCELLED | OUTPATIENT
Start: 2024-09-26

## 2024-09-18 RX ORDER — DEXAMETHASONE 4 MG/1
20 TABLET ORAL ONCE
Start: 2024-10-03 | End: 2024-10-03

## 2024-09-18 RX ORDER — HEPARIN 100 UNIT/ML
500 SYRINGE INTRAVENOUS PRN
OUTPATIENT
Start: 2024-10-03

## 2024-09-18 RX ORDER — EPINEPHRINE 1 MG/ML
0.3 INJECTION, SOLUTION INTRAMUSCULAR; SUBCUTANEOUS PRN
OUTPATIENT
Start: 2024-10-03

## 2024-09-18 RX ORDER — ONDANSETRON 2 MG/ML
8 INJECTION INTRAMUSCULAR; INTRAVENOUS
OUTPATIENT
Start: 2024-10-03

## 2024-09-18 RX ORDER — SODIUM CHLORIDE 0.9 % (FLUSH) 0.9 %
5-40 SYRINGE (ML) INJECTION PRN
OUTPATIENT
Start: 2024-10-10

## 2024-09-18 ASSESSMENT — PAIN DESCRIPTION - ORIENTATION: ORIENTATION: RIGHT

## 2024-09-18 ASSESSMENT — PAIN DESCRIPTION - LOCATION: LOCATION: SHOULDER

## 2024-09-18 ASSESSMENT — PAIN SCALES - GENERAL: PAINLEVEL_OUTOF10: 7

## 2024-09-18 NOTE — PLAN OF CARE
Patient tolerated her lab draw without difficulty.   Detail Level: Detailed Detail Level: Generalized

## 2024-09-24 LAB
ALBUMIN SERPL ELPH-MCNC: 3.2 G/DL (ref 2.9–4.4)
ALBUMIN/GLOB SERPL: 1.8 (ref 0.7–1.7)
ALPHA1 GLOB SERPL ELPH-MCNC: 0.4 G/DL (ref 0–0.4)
ALPHA2 GLOB SERPL ELPH-MCNC: 0.5 G/DL (ref 0.4–1)
B-GLOBULIN SERPL ELPH-MCNC: 0.7 G/DL (ref 0.7–1.3)
GAMMA GLOB SERPL ELPH-MCNC: 0.2 G/DL (ref 0.4–1.8)
GLOBULIN SER-MCNC: 1.8 G/DL (ref 2.2–3.9)
IGA SERPL-MCNC: 8 MG/DL (ref 64–422)
IGG SERPL-MCNC: 186 MG/DL (ref 586–1602)
IGM SERPL-MCNC: 7 MG/DL (ref 26–217)
INTERPRETATION SERPL IEP-IMP: ABNORMAL
KAPPA LC FREE SER-MCNC: 97.4 MG/L (ref 3.3–19.4)
KAPPA LC FREE/LAMBDA FREE SER: 51.26 (ref 0.26–1.65)
LAMBDA LC FREE SERPL-MCNC: 1.9 MG/L (ref 5.7–26.3)
M PROTEIN SERPL ELPH-MCNC: ABNORMAL G/DL
PROT SERPL-MCNC: 5 G/DL (ref 6–8.5)

## 2024-09-26 ENCOUNTER — OFFICE VISIT (OUTPATIENT)
Age: 69
End: 2024-09-26
Payer: MEDICARE

## 2024-09-26 ENCOUNTER — CLINICAL DOCUMENTATION (OUTPATIENT)
Age: 69
End: 2024-09-26

## 2024-09-26 ENCOUNTER — HOSPITAL ENCOUNTER (OUTPATIENT)
Facility: HOSPITAL | Age: 69
Setting detail: INFUSION SERIES
Discharge: HOME | End: 2024-09-26
Payer: MEDICARE

## 2024-09-26 VITALS
WEIGHT: 148 LBS | RESPIRATION RATE: 18 BRPM | BODY MASS INDEX: 24.66 KG/M2 | DIASTOLIC BLOOD PRESSURE: 43 MMHG | HEART RATE: 67 BPM | SYSTOLIC BLOOD PRESSURE: 129 MMHG | HEIGHT: 65 IN | OXYGEN SATURATION: 97 % | TEMPERATURE: 97.3 F

## 2024-09-26 VITALS
BODY MASS INDEX: 24.66 KG/M2 | HEIGHT: 65 IN | RESPIRATION RATE: 18 BRPM | SYSTOLIC BLOOD PRESSURE: 104 MMHG | OXYGEN SATURATION: 97 % | WEIGHT: 148 LBS | TEMPERATURE: 97.3 F | DIASTOLIC BLOOD PRESSURE: 63 MMHG | HEART RATE: 70 BPM

## 2024-09-26 DIAGNOSIS — C90.00 MULTIPLE MYELOMA NOT HAVING ACHIEVED REMISSION (HCC): Primary | ICD-10-CM

## 2024-09-26 DIAGNOSIS — Z11.59 ENCOUNTER FOR SCREENING FOR OTHER VIRAL DISEASES: ICD-10-CM

## 2024-09-26 DIAGNOSIS — C90.00 MULTIPLE MYELOMA NOT HAVING ACHIEVED REMISSION (HCC): ICD-10-CM

## 2024-09-26 PROCEDURE — 1090F PRES/ABSN URINE INCON ASSESS: CPT | Performed by: INTERNAL MEDICINE

## 2024-09-26 PROCEDURE — 96365 THER/PROPH/DIAG IV INF INIT: CPT

## 2024-09-26 PROCEDURE — 6360000002 HC RX W HCPCS

## 2024-09-26 PROCEDURE — 2580000003 HC RX 258: Performed by: INTERNAL MEDICINE

## 2024-09-26 PROCEDURE — 3017F COLORECTAL CA SCREEN DOC REV: CPT | Performed by: INTERNAL MEDICINE

## 2024-09-26 PROCEDURE — 99215 OFFICE O/P EST HI 40 MIN: CPT | Performed by: INTERNAL MEDICINE

## 2024-09-26 PROCEDURE — 96367 TX/PROPH/DG ADDL SEQ IV INF: CPT

## 2024-09-26 PROCEDURE — 96413 CHEMO IV INFUSION 1 HR: CPT

## 2024-09-26 PROCEDURE — 1123F ACP DISCUSS/DSCN MKR DOCD: CPT | Performed by: INTERNAL MEDICINE

## 2024-09-26 PROCEDURE — 6360000002 HC RX W HCPCS: Performed by: INTERNAL MEDICINE

## 2024-09-26 PROCEDURE — 1036F TOBACCO NON-USER: CPT | Performed by: INTERNAL MEDICINE

## 2024-09-26 PROCEDURE — G8420 CALC BMI NORM PARAMETERS: HCPCS | Performed by: INTERNAL MEDICINE

## 2024-09-26 PROCEDURE — G8399 PT W/DXA RESULTS DOCUMENT: HCPCS | Performed by: INTERNAL MEDICINE

## 2024-09-26 PROCEDURE — G8428 CUR MEDS NOT DOCUMENT: HCPCS | Performed by: INTERNAL MEDICINE

## 2024-09-26 RX ORDER — SODIUM CHLORIDE 9 MG/ML
INJECTION, SOLUTION INTRAVENOUS CONTINUOUS
OUTPATIENT
Start: 2024-10-10

## 2024-09-26 RX ORDER — SODIUM CHLORIDE 0.9 % (FLUSH) 0.9 %
5-40 SYRINGE (ML) INJECTION PRN
OUTPATIENT
Start: 2024-10-10

## 2024-09-26 RX ORDER — HEPARIN 100 UNIT/ML
500 SYRINGE INTRAVENOUS PRN
OUTPATIENT
Start: 2024-10-10

## 2024-09-26 RX ORDER — ONDANSETRON 2 MG/ML
8 INJECTION INTRAMUSCULAR; INTRAVENOUS
OUTPATIENT
Start: 2024-10-10

## 2024-09-26 RX ORDER — DEXAMETHASONE 4 MG/1
20 TABLET ORAL ONCE
Status: COMPLETED | OUTPATIENT
Start: 2024-09-26 | End: 2024-09-26

## 2024-09-26 RX ORDER — SODIUM CHLORIDE 0.9 % (FLUSH) 0.9 %
5-40 SYRINGE (ML) INJECTION PRN
Status: DISCONTINUED | OUTPATIENT
Start: 2024-09-26 | End: 2024-09-27 | Stop reason: HOSPADM

## 2024-09-26 RX ORDER — ALBUTEROL SULFATE 90 UG/1
4 INHALANT RESPIRATORY (INHALATION) PRN
OUTPATIENT
Start: 2024-10-10

## 2024-09-26 RX ORDER — SODIUM CHLORIDE 9 MG/ML
5-250 INJECTION, SOLUTION INTRAVENOUS PRN
OUTPATIENT
Start: 2024-10-10

## 2024-09-26 RX ORDER — ZOLEDRONIC ACID 0.04 MG/ML
4 INJECTION, SOLUTION INTRAVENOUS ONCE
Status: COMPLETED | OUTPATIENT
Start: 2024-09-26 | End: 2024-09-26

## 2024-09-26 RX ORDER — SODIUM CHLORIDE 9 MG/ML
5-250 INJECTION, SOLUTION INTRAVENOUS PRN
Status: DISCONTINUED | OUTPATIENT
Start: 2024-09-26 | End: 2024-09-27 | Stop reason: HOSPADM

## 2024-09-26 RX ORDER — DIPHENHYDRAMINE HYDROCHLORIDE 50 MG/ML
50 INJECTION INTRAMUSCULAR; INTRAVENOUS
OUTPATIENT
Start: 2024-10-10

## 2024-09-26 RX ORDER — ACYCLOVIR 400 MG/1
400 TABLET ORAL 2 TIMES DAILY
Qty: 60 TABLET | Refills: 5 | Status: SHIPPED | OUTPATIENT
Start: 2024-09-26

## 2024-09-26 RX ORDER — HEPARIN 100 UNIT/ML
500 SYRINGE INTRAVENOUS PRN
Status: DISCONTINUED | OUTPATIENT
Start: 2024-09-26 | End: 2024-09-27 | Stop reason: HOSPADM

## 2024-09-26 RX ORDER — ACETAMINOPHEN 325 MG/1
650 TABLET ORAL
OUTPATIENT
Start: 2024-10-10

## 2024-09-26 RX ORDER — EPINEPHRINE 1 MG/ML
0.3 INJECTION, SOLUTION INTRAMUSCULAR; SUBCUTANEOUS PRN
OUTPATIENT
Start: 2024-10-10

## 2024-09-26 RX ORDER — ZOLEDRONIC ACID 0.04 MG/ML
4 INJECTION, SOLUTION INTRAVENOUS ONCE
OUTPATIENT
Start: 2024-10-10 | End: 2024-10-10

## 2024-09-26 RX ORDER — DEXTROSE MONOHYDRATE 50 MG/ML
5-250 INJECTION, SOLUTION INTRAVENOUS PRN
Status: DISCONTINUED | OUTPATIENT
Start: 2024-09-26 | End: 2024-09-27 | Stop reason: HOSPADM

## 2024-09-26 RX ADMIN — DEXAMETHASONE 20 MG: 4 TABLET ORAL at 12:05

## 2024-09-26 RX ADMIN — CARFILZOMIB 127.4 MG: 10 INJECTION, POWDER, LYOPHILIZED, FOR SOLUTION INTRAVENOUS at 13:01

## 2024-09-26 RX ADMIN — DEXTROSE MONOHYDRATE 25 ML/HR: 50 INJECTION, SOLUTION INTRAVENOUS at 12:59

## 2024-09-26 RX ADMIN — ZOLEDRONIC ACID 4 MG: 0.04 INJECTION, SOLUTION INTRAVENOUS at 12:03

## 2024-09-26 ASSESSMENT — PAIN DESCRIPTION - LOCATION: LOCATION: SHOULDER

## 2024-09-26 ASSESSMENT — PAIN SCALES - GENERAL: PAINLEVEL_OUTOF10: 6

## 2024-09-26 ASSESSMENT — PAIN DESCRIPTION - ORIENTATION: ORIENTATION: RIGHT

## 2024-09-26 NOTE — PROGRESS NOTES
[facility] Progress Note    10:30 Ms. Weaver Arrived ambulatory and in no distress for KYPROLIS/ZOMETA C7D1 regimen.  Assessment was completed, no acute issues at this time, no new complaints voiced.  Port accessed without difficulty, labs drawn and processed.          Ms. Weaver's vitals were reviewed.  Patient Vitals for the past 12 hrs:   Temp Pulse Resp BP SpO2   09/26/24 1331 -- 67 -- (!) 129/43 --   09/26/24 1022 97.3 °F (36.3 °C) 70 18 (!) 106/49 97 %           Pre-medications  were administered as ordered and chemotherapy was initiated.  Medications Administered         carfilzomib (KYPROLIS) 127.4 mg in dextrose 5 % 100 mL chemo IVPB Admin Date  09/26/2024 Action  New Bag Dose  127.4 mg Rate  347.4 mL/hr Route  IntraVENous Documented By  Ruby Tabor RN        dexAMETHasone (DECADRON) tablet 20 mg Admin Date  09/26/2024 Action  Given Dose  20 mg Rate   Route  Oral Documented By  Ruby Tabor RN        dextrose 5 % solution Admin Date  09/26/2024 Action  New Bag Dose  25 mL/hr Rate  25 mL/hr Route  IntraVENous Documented By  Ruby Tabor RN        zoledronic acid (ZOMETA) 4 mg/100 mL infusion Admin Date  09/26/2024 Action  New Bag Dose  4 mg Rate  300 mL/hr Route  IntraVENous Documented By  Ruby Tabor, SABRINA            Two nurses verified prior to administering: Drug name, Drug dose, Infusion volume or drug volume when prepared in a syringe, Rate of administration, Route of administration, Expiration dates and/or times, Appearance and physical integrity of the drugs, Rate set on infusion pump, when used, and Sequencing of drug administration.    Ms. Weaver tolerated treatment well. Port maintained blood return throughout treatment. Port flushed and de accessed, patient was discharged from [facility] in stable condition at 1345.     Future Appointments   Date Time Provider Department Center   10/2/2024  9:30 AM PEDS LAB CHAIR 1 [facility]   10/3/2024 10:00 AM RUTH CHEMO CHAIR 1  [facility]   10/9/2024  9:30 AM PEDS LAB CHAIR 1 [facility]   10/10/2024 10:30 AM RUTH CHEMO CHAIR 2 [facility]   10/23/2024  9:30 AM PEDS LAB CHAIR 1 [facility]   10/24/2024 10:30 AM RUTH CHEMO CHAIR 2 [facility]   10/24/2024 10:45 AM Nany Arboleda MD [facility]   10/30/2024  9:30 AM PEDS LAB CHAIR 1 [facility]   10/31/2024 10:30 AM RUTH CHEMO CHAIR 2 [facility]   11/6/2024  9:30 AM PEDS LAB CHAIR 1 [facility]   11/7/2024 10:[facility]         Ruby Tabor RN  September 26, 2024

## 2024-10-02 ENCOUNTER — HOSPITAL ENCOUNTER (OUTPATIENT)
Facility: HOSPITAL | Age: 69
Setting detail: INFUSION SERIES
Discharge: HOME | End: 2024-10-02
Payer: MEDICARE

## 2024-10-02 VITALS
OXYGEN SATURATION: 99 % | DIASTOLIC BLOOD PRESSURE: 67 MMHG | HEART RATE: 69 BPM | RESPIRATION RATE: 20 BRPM | SYSTOLIC BLOOD PRESSURE: 120 MMHG | TEMPERATURE: 97.8 F

## 2024-10-02 DIAGNOSIS — Z11.59 ENCOUNTER FOR SCREENING FOR OTHER VIRAL DISEASES: ICD-10-CM

## 2024-10-02 DIAGNOSIS — C90.00 MULTIPLE MYELOMA NOT HAVING ACHIEVED REMISSION (HCC): ICD-10-CM

## 2024-10-02 LAB
ALBUMIN SERPL-MCNC: 3.1 G/DL (ref 3.5–5)
ALBUMIN/GLOB SERPL: 1.1 (ref 1.1–2.2)
ALP SERPL-CCNC: 82 U/L (ref 45–117)
ALT SERPL-CCNC: 19 U/L (ref 12–78)
ANION GAP SERPL CALC-SCNC: 4 MMOL/L (ref 2–12)
AST SERPL-CCNC: 8 U/L (ref 15–37)
BASOPHILS # BLD: 0 K/UL (ref 0–0.1)
BASOPHILS NFR BLD: 1 % (ref 0–1)
BILIRUB SERPL-MCNC: 0.4 MG/DL (ref 0.2–1)
BUN SERPL-MCNC: 16 MG/DL (ref 6–20)
BUN/CREAT SERPL: 23 (ref 12–20)
CALCIUM SERPL-MCNC: 8.9 MG/DL (ref 8.5–10.1)
CHLORIDE SERPL-SCNC: 109 MMOL/L (ref 97–108)
CO2 SERPL-SCNC: 22 MMOL/L (ref 21–32)
CREAT SERPL-MCNC: 0.7 MG/DL (ref 0.55–1.02)
DIFFERENTIAL METHOD BLD: ABNORMAL
EOSINOPHIL # BLD: 0.2 K/UL (ref 0–0.4)
EOSINOPHIL NFR BLD: 6 % (ref 0–7)
ERYTHROCYTE [DISTWIDTH] IN BLOOD BY AUTOMATED COUNT: 14.3 % (ref 11.5–14.5)
GLOBULIN SER CALC-MCNC: 2.9 G/DL (ref 2–4)
GLUCOSE SERPL-MCNC: 95 MG/DL (ref 65–100)
HCT VFR BLD AUTO: 30.5 % (ref 35–47)
HGB BLD-MCNC: 9.3 G/DL (ref 11.5–16)
IMM GRANULOCYTES # BLD AUTO: 0 K/UL (ref 0–0.04)
IMM GRANULOCYTES NFR BLD AUTO: 1 % (ref 0–0.5)
LYMPHOCYTES # BLD: 0.6 K/UL (ref 0.8–3.5)
LYMPHOCYTES NFR BLD: 21 % (ref 12–49)
MCH RBC QN AUTO: 30 PG (ref 26–34)
MCHC RBC AUTO-ENTMCNC: 30.5 G/DL (ref 30–36.5)
MCV RBC AUTO: 98.4 FL (ref 80–99)
MONOCYTES # BLD: 0.6 K/UL (ref 0–1)
MONOCYTES NFR BLD: 20 % (ref 5–13)
NEUTS SEG # BLD: 1.4 K/UL (ref 1.8–8)
NEUTS SEG NFR BLD: 51 % (ref 32–75)
NRBC # BLD: 0.05 K/UL (ref 0–0.01)
NRBC BLD-RTO: 1.8 PER 100 WBC
PLATELET # BLD AUTO: 170 K/UL (ref 150–400)
PLATELET COMMENT: ABNORMAL
PMV BLD AUTO: 12 FL (ref 8.9–12.9)
POTASSIUM SERPL-SCNC: 4 MMOL/L (ref 3.5–5.1)
PROT SERPL-MCNC: 6 G/DL (ref 6.4–8.2)
RBC # BLD AUTO: 3.1 M/UL (ref 3.8–5.2)
RBC MORPH BLD: ABNORMAL
SODIUM SERPL-SCNC: 135 MMOL/L (ref 136–145)
WBC # BLD AUTO: 2.8 K/UL (ref 3.6–11)

## 2024-10-02 PROCEDURE — 36415 COLL VENOUS BLD VENIPUNCTURE: CPT

## 2024-10-02 PROCEDURE — 80053 COMPREHEN METABOLIC PANEL: CPT

## 2024-10-02 PROCEDURE — 85025 COMPLETE CBC W/AUTO DIFF WBC: CPT

## 2024-10-02 ASSESSMENT — PAIN DESCRIPTION - LOCATION: LOCATION: SHOULDER

## 2024-10-02 ASSESSMENT — PAIN DESCRIPTION - ORIENTATION: ORIENTATION: RIGHT

## 2024-10-02 ASSESSMENT — PAIN SCALES - GENERAL: PAINLEVEL_OUTOF10: 8

## 2024-10-02 NOTE — PROGRESS NOTES
[facility] Lab visit:     0930: Patient arrived ambulatory and in no distress.  Labs drawn per Latonya Zelaya RN. Departed [facility] ambulatory and in no distress.     Visit Vitals:  Patient Vitals for the past 12 hrs:   Temp Pulse Resp BP SpO2   10/02/24 0930 97.8 °F (36.6 °C) 69 20 120/67 99 %       Labs: See epic for pending results.  No results found for this or any previous visit (from the past 12 hour(s)).

## 2024-10-03 ENCOUNTER — HOSPITAL ENCOUNTER (OUTPATIENT)
Facility: HOSPITAL | Age: 69
Setting detail: INFUSION SERIES
Discharge: HOME | End: 2024-10-03
Payer: MEDICARE

## 2024-10-03 VITALS
BODY MASS INDEX: 24.83 KG/M2 | RESPIRATION RATE: 18 BRPM | WEIGHT: 149 LBS | HEART RATE: 68 BPM | TEMPERATURE: 97.7 F | HEIGHT: 65 IN | SYSTOLIC BLOOD PRESSURE: 111 MMHG | DIASTOLIC BLOOD PRESSURE: 62 MMHG

## 2024-10-03 DIAGNOSIS — C90.00 MULTIPLE MYELOMA NOT HAVING ACHIEVED REMISSION (HCC): ICD-10-CM

## 2024-10-03 DIAGNOSIS — Z11.59 ENCOUNTER FOR SCREENING FOR OTHER VIRAL DISEASES: Primary | ICD-10-CM

## 2024-10-03 PROCEDURE — 2580000003 HC RX 258: Performed by: INTERNAL MEDICINE

## 2024-10-03 PROCEDURE — 96413 CHEMO IV INFUSION 1 HR: CPT

## 2024-10-03 PROCEDURE — 6360000002 HC RX W HCPCS: Performed by: INTERNAL MEDICINE

## 2024-10-03 RX ORDER — ONDANSETRON 2 MG/ML
8 INJECTION INTRAMUSCULAR; INTRAVENOUS
Status: DISCONTINUED | OUTPATIENT
Start: 2024-10-03 | End: 2024-10-04 | Stop reason: HOSPADM

## 2024-10-03 RX ORDER — DIPHENHYDRAMINE HYDROCHLORIDE 50 MG/ML
50 INJECTION INTRAMUSCULAR; INTRAVENOUS
Status: DISCONTINUED | OUTPATIENT
Start: 2024-10-03 | End: 2024-10-04 | Stop reason: HOSPADM

## 2024-10-03 RX ORDER — EPINEPHRINE 1 MG/ML
0.3 INJECTION, SOLUTION INTRAMUSCULAR; SUBCUTANEOUS PRN
Status: DISCONTINUED | OUTPATIENT
Start: 2024-10-03 | End: 2024-10-04 | Stop reason: HOSPADM

## 2024-10-03 RX ORDER — SODIUM CHLORIDE 9 MG/ML
INJECTION, SOLUTION INTRAVENOUS CONTINUOUS
Status: DISCONTINUED | OUTPATIENT
Start: 2024-10-03 | End: 2024-10-04 | Stop reason: HOSPADM

## 2024-10-03 RX ORDER — ACETAMINOPHEN 325 MG/1
650 TABLET ORAL
Status: DISCONTINUED | OUTPATIENT
Start: 2024-10-03 | End: 2024-10-04 | Stop reason: HOSPADM

## 2024-10-03 RX ORDER — DEXAMETHASONE 4 MG/1
20 TABLET ORAL ONCE
Status: COMPLETED | OUTPATIENT
Start: 2024-10-03 | End: 2024-10-03

## 2024-10-03 RX ORDER — SODIUM CHLORIDE 0.9 % (FLUSH) 0.9 %
5-40 SYRINGE (ML) INJECTION PRN
Status: DISCONTINUED | OUTPATIENT
Start: 2024-10-03 | End: 2024-10-04 | Stop reason: HOSPADM

## 2024-10-03 RX ORDER — ALBUTEROL SULFATE 90 UG/1
4 INHALANT RESPIRATORY (INHALATION) PRN
Status: DISCONTINUED | OUTPATIENT
Start: 2024-10-03 | End: 2024-10-04 | Stop reason: HOSPADM

## 2024-10-03 RX ORDER — SODIUM CHLORIDE 9 MG/ML
5-250 INJECTION, SOLUTION INTRAVENOUS PRN
Status: DISCONTINUED | OUTPATIENT
Start: 2024-10-03 | End: 2024-10-04 | Stop reason: HOSPADM

## 2024-10-03 RX ORDER — DEXTROSE MONOHYDRATE 50 MG/ML
5-250 INJECTION, SOLUTION INTRAVENOUS PRN
Status: DISCONTINUED | OUTPATIENT
Start: 2024-10-03 | End: 2024-10-04 | Stop reason: HOSPADM

## 2024-10-03 RX ORDER — HEPARIN 100 UNIT/ML
500 SYRINGE INTRAVENOUS PRN
Status: DISCONTINUED | OUTPATIENT
Start: 2024-10-03 | End: 2024-10-04 | Stop reason: HOSPADM

## 2024-10-03 RX ADMIN — DEXAMETHASONE 20 MG: 4 TABLET ORAL at 10:14

## 2024-10-03 RX ADMIN — CARFILZOMIB 127.4 MG: 10 INJECTION, POWDER, LYOPHILIZED, FOR SOLUTION INTRAVENOUS at 10:55

## 2024-10-03 ASSESSMENT — PAIN DESCRIPTION - ORIENTATION: ORIENTATION: RIGHT

## 2024-10-03 ASSESSMENT — PAIN DESCRIPTION - LOCATION: LOCATION: SHOULDER

## 2024-10-03 ASSESSMENT — PAIN SCALES - GENERAL: PAINLEVEL_OUTOF10: 8

## 2024-10-03 NOTE — PROGRESS NOTES
[facility] Progress Note    10:00 Ms. eWaver Arrived ambulatory and in no distress for Kyprolis C7D8 regimen.  Assessment was completed, no acute issues at this time, no new complaints voiced.  Port accessed without difficulty, labs drawn and processed.      Ms. Weaver's vitals were reviewed.  Patient Vitals for the past 12 hrs:   Temp Pulse Resp BP   10/03/24 1124 -- 68 -- 111/62   10/03/24 1004 97.7 °F (36.5 °C) 69 18 (!) 130/54         Lab results were obtained and reviewed.  No results found for this or any previous visit (from the past 12 hour(s)).    Pre-medications  were administered as ordered and chemotherapy was initiated.  Medications Administered         carfilzomib (KYPROLIS) 127.4 mg in dextrose 5 % 100 mL chemo IVPB Admin Date  10/03/2024 Action  New Bag Dose  127.4 mg Rate  347.4 mL/hr Route  IntraVENous Documented By  Ruby Tabor, RN        dexAMETHasone (DECADRON) tablet 20 mg Admin Date  10/03/2024 Action  Given Dose  20 mg Rate   Route  Oral Documented By  Ruby Tabor, RN            Two nurses verified prior to administering: Drug name, Drug dose, Infusion volume or drug volume when prepared in a syringe, Rate of administration, Route of administration, Expiration dates and/or times, Appearance and physical integrity of the drugs, Rate set on infusion pump, when used, and Sequencing of drug administration.    Ms. Weaver tolerated treatment well. Port maintained blood return throughout entire treatment. Port flushed and de accessed, [facility] condition at 1145.     Future Appointments   Date Time Provider Department Center   10/9/2024  9:30 AM PEDS LAB CHAIR 1 [facility]   10/10/2024 10:30 AM RUTH CHEMO CHAIR 2 [facility]   10/23/2024  9:30 AM PEDS LAB CHAIR 1 [facility]   10/24/2024 10:30 AM RUTH CHEMO CHAIR 2 [facility]   10/24/2024 10:45 AM Nany Arboleda MD [facility] BS AMB   10/30/2024  9:30 [facility]

## 2024-10-09 ENCOUNTER — HOSPITAL ENCOUNTER (OUTPATIENT)
Facility: HOSPITAL | Age: 69
Setting detail: INFUSION SERIES
Discharge: HOME | End: 2024-10-09
Payer: MEDICARE

## 2024-10-09 VITALS
DIASTOLIC BLOOD PRESSURE: 84 MMHG | RESPIRATION RATE: 18 BRPM | HEART RATE: 63 BPM | SYSTOLIC BLOOD PRESSURE: 143 MMHG | TEMPERATURE: 97.6 F | OXYGEN SATURATION: 97 %

## 2024-10-09 DIAGNOSIS — C90.00 MULTIPLE MYELOMA NOT HAVING ACHIEVED REMISSION (HCC): ICD-10-CM

## 2024-10-09 DIAGNOSIS — Z11.59 ENCOUNTER FOR SCREENING FOR OTHER VIRAL DISEASES: ICD-10-CM

## 2024-10-09 LAB
ALBUMIN SERPL-MCNC: 3.2 G/DL (ref 3.5–5)
ALBUMIN/GLOB SERPL: 1.3 (ref 1.1–2.2)
ALP SERPL-CCNC: 89 U/L (ref 45–117)
ALT SERPL-CCNC: 28 U/L (ref 12–78)
ANION GAP BLD CALC-SCNC: 10.8 MMOL/L (ref 10–20)
ANION GAP SERPL CALC-SCNC: 2 MMOL/L (ref 2–12)
AST SERPL-CCNC: 11 U/L (ref 15–37)
BASOPHILS # BLD: 0 K/UL (ref 0–0.1)
BASOPHILS NFR BLD: 1 % (ref 0–1)
BILIRUB SERPL-MCNC: 0.2 MG/DL (ref 0.2–1)
BUN SERPL-MCNC: 14 MG/DL (ref 6–20)
BUN/CREAT SERPL: 19 (ref 12–20)
CA-I BLD-MCNC: 1.27 MMOL/L (ref 1.15–1.33)
CALCIUM SERPL-MCNC: 8.6 MG/DL (ref 8.5–10.1)
CHLORIDE BLD-SCNC: 106 MMOL/L (ref 98–107)
CHLORIDE SERPL-SCNC: 111 MMOL/L (ref 97–108)
CO2 BLD-SCNC: 25.2 MMOL/L (ref 21–32)
CO2 SERPL-SCNC: 25 MMOL/L (ref 21–32)
CREAT BLD-MCNC: 0.83 MG/DL (ref 0.6–1.3)
CREAT SERPL-MCNC: 0.73 MG/DL (ref 0.55–1.02)
DIFFERENTIAL METHOD BLD: ABNORMAL
EOSINOPHIL # BLD: 0.2 K/UL (ref 0–0.4)
EOSINOPHIL NFR BLD: 6 % (ref 0–7)
ERYTHROCYTE [DISTWIDTH] IN BLOOD BY AUTOMATED COUNT: 14.4 % (ref 11.5–14.5)
GLOBULIN SER CALC-MCNC: 2.5 G/DL (ref 2–4)
GLUCOSE BLD-MCNC: 90 MG/DL (ref 74–99)
GLUCOSE SERPL-MCNC: 98 MG/DL (ref 65–100)
HCT VFR BLD AUTO: 28.9 % (ref 35–47)
HGB BLD-MCNC: 8.8 G/DL (ref 11.5–16)
IMM GRANULOCYTES # BLD AUTO: 0 K/UL (ref 0–0.04)
IMM GRANULOCYTES NFR BLD AUTO: 1 % (ref 0–0.5)
LYMPHOCYTES # BLD: 0.6 K/UL (ref 0.8–3.5)
LYMPHOCYTES NFR BLD: 19 % (ref 12–49)
MCH RBC QN AUTO: 29.5 PG (ref 26–34)
MCHC RBC AUTO-ENTMCNC: 30.4 G/DL (ref 30–36.5)
MCV RBC AUTO: 97 FL (ref 80–99)
MONOCYTES # BLD: 0.6 K/UL (ref 0–1)
MONOCYTES NFR BLD: 19 % (ref 5–13)
NEUTS SEG # BLD: 1.8 K/UL (ref 1.8–8)
NEUTS SEG NFR BLD: 54 % (ref 32–75)
NRBC # BLD: 0.03 K/UL (ref 0–0.01)
NRBC BLD-RTO: 0.9 PER 100 WBC
PLATELET # BLD AUTO: 220 K/UL (ref 150–400)
PMV BLD AUTO: 11.9 FL (ref 8.9–12.9)
POTASSIUM BLD-SCNC: 4.1 MMOL/L (ref 3.5–5.1)
POTASSIUM SERPL-SCNC: 4 MMOL/L (ref 3.5–5.1)
PROT SERPL-MCNC: 5.7 G/DL (ref 6.4–8.2)
RBC # BLD AUTO: 2.98 M/UL (ref 3.8–5.2)
RBC MORPH BLD: ABNORMAL
SERVICE CMNT-IMP: NORMAL
SODIUM BLD-SCNC: 142 MMOL/L (ref 136–145)
SODIUM SERPL-SCNC: 138 MMOL/L (ref 136–145)
WBC # BLD AUTO: 3.2 K/UL (ref 3.6–11)

## 2024-10-09 PROCEDURE — 36415 COLL VENOUS BLD VENIPUNCTURE: CPT

## 2024-10-09 PROCEDURE — 80053 COMPREHEN METABOLIC PANEL: CPT

## 2024-10-09 PROCEDURE — 80047 BASIC METABLC PNL IONIZED CA: CPT

## 2024-10-09 PROCEDURE — 85025 COMPLETE CBC W/AUTO DIFF WBC: CPT

## 2024-10-09 ASSESSMENT — PAIN SCALES - GENERAL: PAINLEVEL_OUTOF10: 8

## 2024-10-09 ASSESSMENT — PAIN DESCRIPTION - ORIENTATION: ORIENTATION: RIGHT

## 2024-10-09 ASSESSMENT — PAIN DESCRIPTION - LOCATION: LOCATION: SHOULDER

## 2024-10-09 NOTE — PROGRESS NOTES
[facility] Short Note                   Date: 2024    Name: Candida Weaver    MRN: [id number]         : 1955      0945 - Pt admit to [facility] for Labs ambulatory in stable condition. Assessment completed. No new concerns voiced.       Ms. Weaver's vitals were reviewed prior to and after treatment.   Patient Vitals for the past 12 hrs:   Temp Pulse Resp BP SpO2   10/09/24 0956 97.6 °F (36.4 °C) 63 18 (!) 143/84 97 %       Lab results were obtained and reviewed. Labs drawn peripherally from left AC.   Please review pending lab results in Epic.  Recent Results (from the past 12 hour(s))   POC CHEM 8    Collection Time: 10/09/24 10:06 AM   Result Value Ref Range    POC Ionized Calcium 1.27 1.15 - 1.33 mmol/L    POC Sodium 142 136 - 145 mmol/L    POC Potassium 4.1 3.5 - 5.1 mmol/L    POC Chloride 106 98 - 107 mmol/L    POC TCO2 25.2 21 - 32 mmol/L    Anion Gap, POC 10.8 10 - 20 mmol/L    POC Glucose 90 74 - 99 mg/dL    POC Creatinine 0.83 0.6 - 1.3 mg/dL    eGFR, POC 75 >60 ml/min/1.73m2    UA Comment Comment Not Indicated.         Medications given: NONE    Ms. Weaver tolerated the procedure, and had no complaints.    Ms. Weaver [facility] in stable condition.     Future Appointments   Date Time Provider Department Center   10/10/2024 10:30 AM RUTH CHEMO CHAIR 2 [facility]   10/23/2024  9:30 AM PEDS LAB CHAIR 1 [facility]   10/24/2024 10:30 AM RUTH CHEMO CHAIR 2 [facility]   10/24/2024 10:45 AM Nany Arboleda MD [facility] BS AMB   10/30/2024  9:30 AM PEDS LAB CHAIR 1 [facility]   10/31/2024 10:30 AM RUTH CHEMO CHAIR 2 [facility]   2024  9:30 AM PEDS LAB CHAIR 1 [facility]   2024 10:30 AM RUTH CHEMO CHAIR 2 [facility]       Ann Patterson RN  2024  10:23 AM

## 2024-10-10 ENCOUNTER — HOSPITAL ENCOUNTER (OUTPATIENT)
Facility: HOSPITAL | Age: 69
Setting detail: INFUSION SERIES
Discharge: HOME | End: 2024-10-10
Payer: MEDICARE

## 2024-10-10 VITALS
BODY MASS INDEX: 25.55 KG/M2 | DIASTOLIC BLOOD PRESSURE: 50 MMHG | SYSTOLIC BLOOD PRESSURE: 130 MMHG | TEMPERATURE: 97.9 F | HEIGHT: 66 IN | HEART RATE: 66 BPM | RESPIRATION RATE: 18 BRPM | WEIGHT: 159 LBS

## 2024-10-10 DIAGNOSIS — Z11.59 ENCOUNTER FOR SCREENING FOR OTHER VIRAL DISEASES: Primary | ICD-10-CM

## 2024-10-10 DIAGNOSIS — C90.00 MULTIPLE MYELOMA NOT HAVING ACHIEVED REMISSION (HCC): ICD-10-CM

## 2024-10-10 PROCEDURE — 6360000002 HC RX W HCPCS: Performed by: INTERNAL MEDICINE

## 2024-10-10 PROCEDURE — 2580000003 HC RX 258: Performed by: INTERNAL MEDICINE

## 2024-10-10 PROCEDURE — 96413 CHEMO IV INFUSION 1 HR: CPT

## 2024-10-10 RX ORDER — DEXAMETHASONE 4 MG/1
20 TABLET ORAL ONCE
Status: COMPLETED | OUTPATIENT
Start: 2024-10-10 | End: 2024-10-10

## 2024-10-10 RX ORDER — DEXTROSE MONOHYDRATE 50 MG/ML
5-250 INJECTION, SOLUTION INTRAVENOUS PRN
Status: DISCONTINUED | OUTPATIENT
Start: 2024-10-10 | End: 2024-10-11 | Stop reason: HOSPADM

## 2024-10-10 RX ORDER — SODIUM CHLORIDE 0.9 % (FLUSH) 0.9 %
5-40 SYRINGE (ML) INJECTION PRN
Status: DISCONTINUED | OUTPATIENT
Start: 2024-10-10 | End: 2024-10-11 | Stop reason: HOSPADM

## 2024-10-10 RX ADMIN — CARFILZOMIB 127.4 MG: 10 INJECTION, POWDER, LYOPHILIZED, FOR SOLUTION INTRAVENOUS at 11:31

## 2024-10-10 RX ADMIN — DEXTROSE MONOHYDRATE 50 ML/HR: 50 INJECTION, SOLUTION INTRAVENOUS at 10:44

## 2024-10-10 RX ADMIN — DEXAMETHASONE 20 MG: 4 TABLET ORAL at 10:47

## 2024-10-10 ASSESSMENT — PAIN DESCRIPTION - ORIENTATION: ORIENTATION: RIGHT

## 2024-10-10 ASSESSMENT — PAIN SCALES - GENERAL: PAINLEVEL_OUTOF10: 7

## 2024-10-10 ASSESSMENT — PAIN DESCRIPTION - LOCATION: LOCATION: SHOULDER

## 2024-10-10 NOTE — PROGRESS NOTES
[facility] Chemo Progress Note    Date: October 10, 2024    Name: Candida Weaver    MRN: [id number]         : 1955    Ms. Weaver Arrived ambulatory and in no distress for cycle 7 day 15 of Kyprolis.      Assessment was completed and documented in flowsheets. Port accessed without difficulty. Labs drawn and processed on 10/9/24.      Ms. Weaver's vitals were reviewed.  Patient Vitals for the past 12 hrs:   Temp Pulse Resp BP   10/10/24 1204 -- 66 -- (!) 130/50   10/10/24 1034 97.9 °F (36.6 °C) 69 18 (!) 147/54         Lab results were obtained from 10/9/24 and reviewed.  Labs within parameter for treatment.       Pre-medications  were administered as ordered and chemotherapy was initiated.  Medications Administered         carfilzomib (KYPROLIS) 127.4 mg in dextrose 5 % 100 mL chemo IVPB Admin Date  10/10/2024 Action  New Bag Dose  127.4 mg Rate  347.4 mL/hr Route  IntraVENous Documented By  Ann Moss, SABRINA        dexAMETHasone (DECADRON) tablet 20 mg Admin Date  10/10/2024 Action  Given Dose  20 mg Rate   Route  Oral Documented By  Ann Moss, RN        dextrose 5 % solution Admin Date  10/10/2024 Action  New Bag Dose  50 mL/hr Rate  50 mL/hr Route  IntraVENous Documented By  Ann Moss, SABRINA            Two nurses verified prior to administering:  Drug name, Drug dose, Infusion volume or drug volume when prepared in a syringe, Rate of administration, Route of administration, Expiration dates and/or times, Appearance and physical integrity of the drugs, Rate set on infusion pump, when used, and Sequencing of drug administration.    Ms. Weaver tolerated treatment well, port flushed and de-accessed per protocol. Patient was discharged from [facility] in stable condition. Patient aware of next appointment.     Future Appointments   Date Time Provider Department Center   10/23/2024  9:30 AM PEDS LAB CHAIR 1 [facility]   10/24/2024 10:30 AM RUTH CHEMO CHAIR 2 [facility]   10/24/2024 10:45 AM Nany Arboleda MD  [facility]   10/30/2024  9:30 AM PEDS LAB CHAIR 1 [facility]   10/31/2024 10:30 AM RUTH CHEMO CHAIR 2 [facility]   11/6/2024  9:30 AM PEDS LAB CHAIR 1 [facility]   11/7/2024 10:30 AM [facility] CHEMO CHAIR 2 [facility]         Ann Moss RN  October 10, 2024

## 2024-10-14 DIAGNOSIS — C90.00 MULTIPLE MYELOMA NOT HAVING ACHIEVED REMISSION (HCC): ICD-10-CM

## 2024-10-14 NOTE — TELEPHONE ENCOUNTER
June 17, 2022     Patient: Evon Dent  YOB: 1967  Date of Visit: 6/17/2022      To Whom it May Concern:    Evon Dent is under my professional care  Please excuse from work June 14 through June 17, 2022  Return to work on Monday June 20, 2022  If you have any questions or concerns, please don't hesitate to call           Sincerely,          Geronimo Dyer DO       CC: No Recipients Oral Chemotherapy      Candida Weaver is a  69 y.o.female  diagnosed with multiple myeloma . Ms. Weaver is being treated with KPd.      Medication name: pomalidomide  Regimen: KPd  Dose:  4 mg (increase dose for 9/26/24)  Frequency: daily  Administration schedule: for 21 days followed by 7 days off every 28 days  Ordering provider: Nany Arboleda MD  Start date: 9/26/24 (Cycle 7)      REMS auth # [id number]  Prescription sent to pharmacy for processing.         Rose Pa, WOOD, BCOP, BCPS    For Pharmacy Admin Tracking Only    Program: Medical Group  CPA in place:  Yes  Recommendation Provided To: Patient/Caregiver: 1 via Telephone  Intervention Detail: Refill(s) Provided  Intervention Accepted By: Patient/Caregiver: 1    Time Spent (min): 10

## 2024-10-18 RX ORDER — HEPARIN 100 UNIT/ML
500 SYRINGE INTRAVENOUS PRN
OUTPATIENT
Start: 2024-11-07

## 2024-10-18 RX ORDER — DEXAMETHASONE 4 MG/1
20 TABLET ORAL ONCE
Start: 2024-10-31 | End: 2024-10-31

## 2024-10-18 RX ORDER — SODIUM CHLORIDE 0.9 % (FLUSH) 0.9 %
5-40 SYRINGE (ML) INJECTION PRN
OUTPATIENT
Start: 2024-10-31

## 2024-10-18 RX ORDER — DIPHENHYDRAMINE HYDROCHLORIDE 50 MG/ML
50 INJECTION INTRAMUSCULAR; INTRAVENOUS
OUTPATIENT
Start: 2024-11-07

## 2024-10-18 RX ORDER — ALBUTEROL SULFATE 90 UG/1
4 INHALANT RESPIRATORY (INHALATION) PRN
OUTPATIENT
Start: 2024-11-07

## 2024-10-18 RX ORDER — EPINEPHRINE 1 MG/ML
0.3 INJECTION, SOLUTION INTRAMUSCULAR; SUBCUTANEOUS PRN
OUTPATIENT
Start: 2024-10-31

## 2024-10-18 RX ORDER — DEXTROSE MONOHYDRATE 50 MG/ML
5-250 INJECTION, SOLUTION INTRAVENOUS PRN
OUTPATIENT
Start: 2024-11-07

## 2024-10-18 RX ORDER — SODIUM CHLORIDE 9 MG/ML
INJECTION, SOLUTION INTRAVENOUS CONTINUOUS
OUTPATIENT
Start: 2024-11-07

## 2024-10-18 RX ORDER — SODIUM CHLORIDE 9 MG/ML
5-250 INJECTION, SOLUTION INTRAVENOUS PRN
Status: CANCELLED | OUTPATIENT
Start: 2024-10-24

## 2024-10-18 RX ORDER — HEPARIN 100 UNIT/ML
500 SYRINGE INTRAVENOUS PRN
OUTPATIENT
Start: 2024-10-31

## 2024-10-18 RX ORDER — EPINEPHRINE 1 MG/ML
0.3 INJECTION, SOLUTION INTRAMUSCULAR; SUBCUTANEOUS PRN
Status: CANCELLED | OUTPATIENT
Start: 2024-10-24

## 2024-10-18 RX ORDER — DIPHENHYDRAMINE HYDROCHLORIDE 50 MG/ML
50 INJECTION INTRAMUSCULAR; INTRAVENOUS
Status: CANCELLED | OUTPATIENT
Start: 2024-10-24

## 2024-10-18 RX ORDER — ONDANSETRON 2 MG/ML
8 INJECTION INTRAMUSCULAR; INTRAVENOUS
Status: CANCELLED | OUTPATIENT
Start: 2024-10-24

## 2024-10-18 RX ORDER — ALBUTEROL SULFATE 90 UG/1
4 INHALANT RESPIRATORY (INHALATION) PRN
Status: CANCELLED | OUTPATIENT
Start: 2024-10-24

## 2024-10-18 RX ORDER — SODIUM CHLORIDE 0.9 % (FLUSH) 0.9 %
5-40 SYRINGE (ML) INJECTION PRN
OUTPATIENT
Start: 2024-11-07

## 2024-10-18 RX ORDER — SODIUM CHLORIDE 9 MG/ML
5-250 INJECTION, SOLUTION INTRAVENOUS PRN
OUTPATIENT
Start: 2024-10-31

## 2024-10-18 RX ORDER — ONDANSETRON 2 MG/ML
8 INJECTION INTRAMUSCULAR; INTRAVENOUS
OUTPATIENT
Start: 2024-10-31

## 2024-10-18 RX ORDER — ONDANSETRON 2 MG/ML
8 INJECTION INTRAMUSCULAR; INTRAVENOUS
OUTPATIENT
Start: 2024-11-07

## 2024-10-18 RX ORDER — DEXAMETHASONE 4 MG/1
20 TABLET ORAL ONCE
Start: 2024-11-07 | End: 2024-11-07

## 2024-10-18 RX ORDER — SODIUM CHLORIDE 9 MG/ML
INJECTION, SOLUTION INTRAVENOUS CONTINUOUS
OUTPATIENT
Start: 2024-10-31

## 2024-10-18 RX ORDER — HEPARIN 100 UNIT/ML
500 SYRINGE INTRAVENOUS PRN
Status: CANCELLED | OUTPATIENT
Start: 2024-10-24

## 2024-10-18 RX ORDER — ACETAMINOPHEN 325 MG/1
650 TABLET ORAL
OUTPATIENT
Start: 2024-11-07

## 2024-10-18 RX ORDER — ALBUTEROL SULFATE 90 UG/1
4 INHALANT RESPIRATORY (INHALATION) PRN
OUTPATIENT
Start: 2024-10-31

## 2024-10-18 RX ORDER — DIPHENHYDRAMINE HYDROCHLORIDE 50 MG/ML
50 INJECTION INTRAMUSCULAR; INTRAVENOUS
OUTPATIENT
Start: 2024-10-31

## 2024-10-18 RX ORDER — DEXTROSE MONOHYDRATE 50 MG/ML
5-250 INJECTION, SOLUTION INTRAVENOUS PRN
OUTPATIENT
Start: 2024-10-31

## 2024-10-18 RX ORDER — SODIUM CHLORIDE 9 MG/ML
INJECTION, SOLUTION INTRAVENOUS CONTINUOUS
Status: CANCELLED | OUTPATIENT
Start: 2024-10-24

## 2024-10-18 RX ORDER — SODIUM CHLORIDE 9 MG/ML
5-250 INJECTION, SOLUTION INTRAVENOUS PRN
OUTPATIENT
Start: 2024-11-07

## 2024-10-18 RX ORDER — EPINEPHRINE 1 MG/ML
0.3 INJECTION, SOLUTION INTRAMUSCULAR; SUBCUTANEOUS PRN
OUTPATIENT
Start: 2024-11-07

## 2024-10-18 RX ORDER — ACETAMINOPHEN 325 MG/1
650 TABLET ORAL
Status: CANCELLED | OUTPATIENT
Start: 2024-10-24

## 2024-10-18 RX ORDER — ACETAMINOPHEN 325 MG/1
650 TABLET ORAL
OUTPATIENT
Start: 2024-10-31

## 2024-10-23 ENCOUNTER — HOSPITAL ENCOUNTER (OUTPATIENT)
Facility: HOSPITAL | Age: 69
Setting detail: INFUSION SERIES
Discharge: HOME | End: 2024-10-23
Payer: MEDICARE

## 2024-10-23 VITALS
OXYGEN SATURATION: 98 % | HEART RATE: 66 BPM | TEMPERATURE: 97.6 F | RESPIRATION RATE: 18 BRPM | DIASTOLIC BLOOD PRESSURE: 52 MMHG | SYSTOLIC BLOOD PRESSURE: 128 MMHG

## 2024-10-23 DIAGNOSIS — Z11.59 ENCOUNTER FOR SCREENING FOR OTHER VIRAL DISEASES: ICD-10-CM

## 2024-10-23 DIAGNOSIS — C90.00 MULTIPLE MYELOMA NOT HAVING ACHIEVED REMISSION (HCC): ICD-10-CM

## 2024-10-23 LAB
ALBUMIN SERPL-MCNC: 3.5 G/DL (ref 3.5–5)
ALBUMIN/GLOB SERPL: 1.5 (ref 1.1–2.2)
ALP SERPL-CCNC: 68 U/L (ref 45–117)
ALT SERPL-CCNC: 24 U/L (ref 12–78)
ANION GAP SERPL CALC-SCNC: 6 MMOL/L (ref 2–12)
AST SERPL-CCNC: 11 U/L (ref 15–37)
BASOPHILS # BLD: 0.2 K/UL (ref 0–0.1)
BASOPHILS NFR BLD: 6 % (ref 0–1)
BILIRUB SERPL-MCNC: 0.3 MG/DL (ref 0.2–1)
BUN SERPL-MCNC: 10 MG/DL (ref 6–20)
BUN/CREAT SERPL: 13 (ref 12–20)
CALCIUM SERPL-MCNC: 9.2 MG/DL (ref 8.5–10.1)
CHLORIDE SERPL-SCNC: 110 MMOL/L (ref 97–108)
CO2 SERPL-SCNC: 23 MMOL/L (ref 21–32)
CREAT SERPL-MCNC: 0.75 MG/DL (ref 0.55–1.02)
DIFFERENTIAL METHOD BLD: ABNORMAL
EOSINOPHIL # BLD: 0.1 K/UL (ref 0–0.4)
EOSINOPHIL NFR BLD: 4 % (ref 0–7)
ERYTHROCYTE [DISTWIDTH] IN BLOOD BY AUTOMATED COUNT: 15.2 % (ref 11.5–14.5)
GLOBULIN SER CALC-MCNC: 2.4 G/DL (ref 2–4)
GLUCOSE SERPL-MCNC: 98 MG/DL (ref 65–100)
HCT VFR BLD AUTO: 29.8 % (ref 35–47)
HGB BLD-MCNC: 9.1 G/DL (ref 11.5–16)
IMM GRANULOCYTES # BLD AUTO: 0 K/UL (ref 0–0.04)
IMM GRANULOCYTES NFR BLD AUTO: 0 % (ref 0–0.5)
LYMPHOCYTES # BLD: 1 K/UL (ref 0.8–3.5)
LYMPHOCYTES NFR BLD: 29 % (ref 12–49)
MCH RBC QN AUTO: 29.4 PG (ref 26–34)
MCHC RBC AUTO-ENTMCNC: 30.5 G/DL (ref 30–36.5)
MCV RBC AUTO: 96.4 FL (ref 80–99)
MONOCYTES # BLD: 0.5 K/UL (ref 0–1)
MONOCYTES NFR BLD: 15 % (ref 5–13)
NEUTS SEG # BLD: 1.6 K/UL (ref 1.8–8)
NEUTS SEG NFR BLD: 46 % (ref 32–75)
NRBC # BLD: 0 K/UL (ref 0–0.01)
NRBC BLD-RTO: 0 PER 100 WBC
PLATELET # BLD AUTO: 460 K/UL (ref 150–400)
PMV BLD AUTO: 10.8 FL (ref 8.9–12.9)
POTASSIUM SERPL-SCNC: 3.6 MMOL/L (ref 3.5–5.1)
PROT SERPL-MCNC: 5.9 G/DL (ref 6.4–8.2)
RBC # BLD AUTO: 3.09 M/UL (ref 3.8–5.2)
RBC MORPH BLD: ABNORMAL
SODIUM SERPL-SCNC: 139 MMOL/L (ref 136–145)
WBC # BLD AUTO: 3.4 K/UL (ref 3.6–11)

## 2024-10-23 PROCEDURE — 83521 IG LIGHT CHAINS FREE EACH: CPT

## 2024-10-23 PROCEDURE — 80053 COMPREHEN METABOLIC PANEL: CPT

## 2024-10-23 PROCEDURE — 86334 IMMUNOFIX E-PHORESIS SERUM: CPT

## 2024-10-23 PROCEDURE — 84165 PROTEIN E-PHORESIS SERUM: CPT

## 2024-10-23 PROCEDURE — 85025 COMPLETE CBC W/AUTO DIFF WBC: CPT

## 2024-10-23 PROCEDURE — 82784 ASSAY IGA/IGD/IGG/IGM EACH: CPT

## 2024-10-23 PROCEDURE — 36415 COLL VENOUS BLD VENIPUNCTURE: CPT

## 2024-10-23 ASSESSMENT — PAIN SCALES - GENERAL: PAINLEVEL_OUTOF10: 7

## 2024-10-23 ASSESSMENT — PAIN DESCRIPTION - LOCATION: LOCATION: ARM;SHOULDER;NECK

## 2024-10-23 ASSESSMENT — PAIN DESCRIPTION - DESCRIPTORS: DESCRIPTORS: ACHING

## 2024-10-23 ASSESSMENT — PAIN DESCRIPTION - ORIENTATION: ORIENTATION: RIGHT

## 2024-10-23 NOTE — PROGRESS NOTES
[facility] Peds/Adult Note                       Date: 2024    Name: Candida Weaver    MRN: [id number]         : 1955    0940 Patient arrives for Pre-treatment Labs without acute problems. Please see Epic for complete assessment and education provided.    Vital signs stable throughout and prior to discharge. Patient tolerated procedure well and was discharged without incident.  Patient is aware of next [facility] appointment on 10/24/2024.  Appointment card give to the Patient.       Ms. Weaver's vitals were reviewed prior to and after treatment.   Patient Vitals for the past 12 hrs:   Temp Pulse Resp BP SpO2   10/23/24 0940 97.6 °F (36.4 °C) 66 18 (!) 128/52 98 %         Lab results were obtained and reviewed.  Labs Pending, please see [facility] for results.    Recent Results (from the past 12 hour(s))   CBC With Auto Differential    Collection Time: 10/23/24  9:54 AM   Result Value Ref Range    WBC 3.4 (L) 3.6 - 11.0 K/uL    RBC 3.09 (L) 3.80 - 5.20 M/uL    Hemoglobin 9.1 (L) 11.5 - 16.0 g/dL    Hematocrit 29.8 (L) 35.0 - 47.0 %    MCV 96.4 80.0 - 99.0 FL    MCH 29.4 26.0 - 34.0 PG    MCHC 30.5 30.0 - 36.5 g/dL    RDW 15.2 (H) 11.5 - 14.5 %    Platelets 460 (H) 150 - 400 K/uL    MPV 10.8 8.9 - 12.9 FL    Nucleated RBCs 0.0 0  WBC    nRBC 0.00 0.00 - 0.01 K/uL    Neutrophils % PENDING %    Lymphocytes % PENDING %    Monocytes % PENDING %    Eosinophils % PENDING %    Basophils % PENDING %    Immature Granulocytes % PENDING %    Neutrophils Absolute PENDING K/UL    Lymphocytes Absolute PENDING K/UL    Monocytes Absolute PENDING K/UL    Eosinophils Absolute PENDING K/UL    Basophils Absolute PENDING K/UL    Immature Granulocytes Absolute PENDING K/UL    Differential Type PENDING    Comprehensive metabolic panel    Collection Time: 10/23/24  9:54 AM   Result Value Ref Range    Sodium 139 136 - 145 mmol/L    Potassium 3.6 3.5 - 5.1 mmol/L    Chloride 110 (H) 97 - 108 mmol/L    CO2 23 21 - 32 mmol/L

## 2024-10-24 ENCOUNTER — HOSPITAL ENCOUNTER (OUTPATIENT)
Facility: HOSPITAL | Age: 69
Setting detail: INFUSION SERIES
Discharge: HOME | End: 2024-10-24
Payer: MEDICARE

## 2024-10-24 ENCOUNTER — OFFICE VISIT (OUTPATIENT)
Age: 69
End: 2024-10-24
Payer: MEDICARE

## 2024-10-24 ENCOUNTER — CLINICAL DOCUMENTATION (OUTPATIENT)
Age: 69
End: 2024-10-24

## 2024-10-24 VITALS
RESPIRATION RATE: 18 BRPM | HEART RATE: 61 BPM | BODY MASS INDEX: 23.98 KG/M2 | HEIGHT: 66 IN | WEIGHT: 149.2 LBS | TEMPERATURE: 97.6 F | DIASTOLIC BLOOD PRESSURE: 61 MMHG | SYSTOLIC BLOOD PRESSURE: 134 MMHG

## 2024-10-24 VITALS
RESPIRATION RATE: 18 BRPM | TEMPERATURE: 97.6 F | HEART RATE: 67 BPM | SYSTOLIC BLOOD PRESSURE: 117 MMHG | BODY MASS INDEX: 24.42 KG/M2 | DIASTOLIC BLOOD PRESSURE: 53 MMHG | WEIGHT: 149 LBS

## 2024-10-24 DIAGNOSIS — Z11.59 ENCOUNTER FOR SCREENING FOR OTHER VIRAL DISEASES: ICD-10-CM

## 2024-10-24 DIAGNOSIS — M25.511 ACUTE PAIN OF RIGHT SHOULDER: Primary | ICD-10-CM

## 2024-10-24 DIAGNOSIS — C90.00 MULTIPLE MYELOMA NOT HAVING ACHIEVED REMISSION (HCC): Primary | ICD-10-CM

## 2024-10-24 PROCEDURE — 96413 CHEMO IV INFUSION 1 HR: CPT

## 2024-10-24 PROCEDURE — 2580000003 HC RX 258: Performed by: INTERNAL MEDICINE

## 2024-10-24 PROCEDURE — 6360000002 HC RX W HCPCS: Performed by: INTERNAL MEDICINE

## 2024-10-24 PROCEDURE — 96367 TX/PROPH/DG ADDL SEQ IV INF: CPT

## 2024-10-24 PROCEDURE — 6360000002 HC RX W HCPCS

## 2024-10-24 PROCEDURE — 99215 OFFICE O/P EST HI 40 MIN: CPT | Performed by: INTERNAL MEDICINE

## 2024-10-24 RX ORDER — ONDANSETRON 2 MG/ML
8 INJECTION INTRAMUSCULAR; INTRAVENOUS
OUTPATIENT
Start: 2024-11-06

## 2024-10-24 RX ORDER — ACETAMINOPHEN 325 MG/1
650 TABLET ORAL
OUTPATIENT
Start: 2024-11-06

## 2024-10-24 RX ORDER — ALBUTEROL SULFATE 90 UG/1
4 INHALANT RESPIRATORY (INHALATION) PRN
OUTPATIENT
Start: 2024-11-06

## 2024-10-24 RX ORDER — ZOLEDRONIC ACID 0.04 MG/ML
4 INJECTION, SOLUTION INTRAVENOUS ONCE
Status: COMPLETED | OUTPATIENT
Start: 2024-10-24 | End: 2024-10-24

## 2024-10-24 RX ORDER — ZOLEDRONIC ACID 0.04 MG/ML
4 INJECTION, SOLUTION INTRAVENOUS ONCE
OUTPATIENT
Start: 2024-11-06 | End: 2024-11-06

## 2024-10-24 RX ORDER — DEXAMETHASONE 4 MG/1
20 TABLET ORAL ONCE
Status: COMPLETED | OUTPATIENT
Start: 2024-10-24 | End: 2024-10-24

## 2024-10-24 RX ORDER — SODIUM CHLORIDE 0.9 % (FLUSH) 0.9 %
5-40 SYRINGE (ML) INJECTION PRN
Status: DISCONTINUED | OUTPATIENT
Start: 2024-10-24 | End: 2024-10-25 | Stop reason: HOSPADM

## 2024-10-24 RX ORDER — SODIUM CHLORIDE 9 MG/ML
5-250 INJECTION, SOLUTION INTRAVENOUS PRN
OUTPATIENT
Start: 2024-11-06

## 2024-10-24 RX ORDER — SODIUM CHLORIDE 9 MG/ML
5-250 INJECTION, SOLUTION INTRAVENOUS PRN
Status: DISCONTINUED | OUTPATIENT
Start: 2024-10-24 | End: 2024-10-24

## 2024-10-24 RX ORDER — EPINEPHRINE 1 MG/ML
0.3 INJECTION, SOLUTION INTRAMUSCULAR; SUBCUTANEOUS PRN
OUTPATIENT
Start: 2024-11-06

## 2024-10-24 RX ORDER — SODIUM CHLORIDE 9 MG/ML
INJECTION, SOLUTION INTRAVENOUS CONTINUOUS
OUTPATIENT
Start: 2024-11-06

## 2024-10-24 RX ORDER — DEXTROSE MONOHYDRATE 50 MG/ML
5-250 INJECTION, SOLUTION INTRAVENOUS PRN
Status: DISCONTINUED | OUTPATIENT
Start: 2024-10-24 | End: 2024-10-25 | Stop reason: HOSPADM

## 2024-10-24 RX ORDER — HEPARIN 100 UNIT/ML
500 SYRINGE INTRAVENOUS PRN
OUTPATIENT
Start: 2024-11-06

## 2024-10-24 RX ORDER — DIPHENHYDRAMINE HYDROCHLORIDE 50 MG/ML
50 INJECTION INTRAMUSCULAR; INTRAVENOUS
OUTPATIENT
Start: 2024-11-06

## 2024-10-24 RX ORDER — SODIUM CHLORIDE 0.9 % (FLUSH) 0.9 %
5-40 SYRINGE (ML) INJECTION PRN
OUTPATIENT
Start: 2024-11-06

## 2024-10-24 RX ADMIN — ZOLEDRONIC ACID 4 MG: 0.04 INJECTION, SOLUTION INTRAVENOUS at 12:02

## 2024-10-24 RX ADMIN — DEXAMETHASONE 20 MG: 4 TABLET ORAL at 11:54

## 2024-10-24 RX ADMIN — DEXTROSE MONOHYDRATE 50 ML/HR: 50 INJECTION, SOLUTION INTRAVENOUS at 12:27

## 2024-10-24 RX ADMIN — CARFILZOMIB 127.4 MG: 10 INJECTION, POWDER, LYOPHILIZED, FOR SOLUTION INTRAVENOUS at 12:31

## 2024-10-24 ASSESSMENT — PAIN SCALES - GENERAL: PAINLEVEL_OUTOF10: 7

## 2024-10-24 NOTE — PROGRESS NOTES
[facility] Progress Note    10:30 Ms. Weaver Arrived ambulatory and in no distress for KYPROLIS/ZOMETA regimen.  Assessment was completed, no acute issues at this time, no new complaints voiced.  Port accessed without difficulty, labs drawn and processed.      Ms. Weaver's vitals were reviewed.  Patient Vitals for the past 12 hrs:   Temp Pulse Resp BP   10/24/24 1301 -- 61 18 134/61   10/24/24 1045 97.6 °F (36.4 °C) 67 18 (!) 117/53         Lab results were obtained and reviewed.  No results found for this or any previous visit (from the past 12 hour(s)).    Pre-medications  were administered as ordered and chemotherapy was initiated.  Medications Administered         carfilzomib (KYPROLIS) 127.4 mg in dextrose 5 % 100 mL chemo IVPB Admin Date  10/24/2024 Action  New Bag Dose  127.4 mg Rate  347.4 mL/hr Route  IntraVENous Documented By  Ruby Tabor RN        dexAMETHasone (DECADRON) tablet 20 mg Admin Date  10/24/2024 Action  Given Dose  20 mg Rate   Route  Oral Documented By  Ruby Tabor RN        dextrose 5 % solution Admin Date  10/24/2024 Action  New Bag Dose  50 mL/hr Rate  50 mL/hr Route  IntraVENous Documented By  Ruby Tabor RN        zoledronic acid (ZOMETA) 4 mg/100 mL infusion Admin Date  10/24/2024 Action  New Bag Dose  4 mg Rate  300 mL/hr Route  IntraVENous Documented By  Ruby Tabor RN        zoledronic acid (ZOMETA) 4 mg/100 mL infusion Admin Date  10/24/2024 Action  Rate/Dose Verify Dose   Rate  300 mL/hr Route  IntraVENous Documented By  Ruby Tabor, SABRINA            Two nurses verified prior to administering: Drug name, Drug dose, Infusion volume or drug volume when prepared in a syringe, Rate of administration, Route of administration, Expiration dates and/or times, Appearance and physical integrity of the drugs, Rate set on infusion pump, when used, and Sequencing of drug administration.    Ms. Weaver tolerated treatment well. Port maintained blood return throughout entire

## 2024-10-24 NOTE — PROGRESS NOTES
Oral Chemotherapy      Candida Weaver is a  69 y.o.female  diagnosed with multiple myeloma . Ms. Weaver is being treated with KPd.      Medication name: pomalidomide  Regimen: KPd  Dose:   3 mg  Frequency: daily  Administration schedule: for 21 days followed by 7 days off every 28 days  Ordering provider: Nany Arboleda MD  Start date: 10/24/24 (Cycle 8)      Lab Results   Component Value Date    WBC 3.4 (L) 10/23/2024    HGB 9.1 (L) 10/23/2024    HCT 29.8 (L) 10/23/2024    MCV 96.4 10/23/2024     (H) 10/23/2024    LYMPHOPCT 29 10/23/2024    RBC 3.09 (L) 10/23/2024    MCH 29.4 10/23/2024    MCHC 30.5 10/23/2024    RDW 15.2 (H) 10/23/2024       Lab Results   Component Value Date    NEUTROABS 1.6 (L) 10/23/2024             Expected follow up date: Labs/office visit each treatment. Next cycle start on 11/21/24     Patient provided with an Oral Chemotherapy Journal.                    Rose Pa, PharmD, BCPS, BCOP    For Pharmacy Admin Tracking Only    Program: Medical Group  CPA in place:  Yes  Recommendation Provided To: Patient/Caregiver: 1 via In person    Intervention Accepted By: Patient/Caregiver: 1    Time Spent (min): 15

## 2024-10-24 NOTE — PROGRESS NOTES
Candida Weaver is a 69 y.o. female    Chief Complaint   Patient presents with    Follow-up     Multiple Myeloma        1. Have you been to the ER, urgent care clinic since your last visit?  Hospitalized since your last visit?No    2. Have you seen or consulted any other health care providers outside of the [facility] since your last visit?  Include any pap smears or colon screening. No      
unremarkable brain MRI.         Assessment:   1) Multiple Myeloma    Dx with SMM 2017  Now with a K: L ratio of > 100 and new anemia which is consistent with a definition of active myeloma   PET with 2 suspicious lesions  BM 95% plasma cells  FISH  del(13q) and t(11;14)  Standard Risk  Intent of treatment: palliative   Started palliative Kayli VRD as induction,  Revlimid lite. Started 10/25/23.  Did not have much of a response serologically by C3  Reintroduced Revlimid C4D1 at 10 mg every oether day  Consistently taking dose mod DVRD since 12/29/2023  BM on 3/2024 was reviewed and still shows 70% clonal plasma cells, Kappa has dropped < 49%  This is consistent with minimal response per [facility]   -high risk disease with refractoriness to multiple drug classes  Switched to Kyprolis pomalyst and dexamethasone 4/2024        Presents for  C8D1 Kyprolis Pomalyst     Neutropenia has been dose limiting   Has required GCSF support  She does need a VGPR for ASCT.   At the moment she has an ~ 80% decline in her Light chains   Notes from [facility] reviewed and she is to be evaluated by the transplant team.    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 Espinoza    3) Anemia  History of iron deficiency.   Secondary to myeloma.   Transfuse if < 7 g/dl    4) CHF  Last ECHO was reviewed and had a normal EF. Follows with cardiology.     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  Will consider getting an ultrasound of the spleen in a subsequent visit.  We will monitor counts.     6)Neutropenia  Has been dose limiting   GCSF as scheduled to keep ANC > 500     7) R shoulder pain  MRI shoulder     8)

## 2024-10-28 LAB
ALBUMIN SERPL ELPH-MCNC: 3.4 G/DL (ref 2.9–4.4)
ALBUMIN/GLOB SERPL: 1.9 (ref 0.7–1.7)
ALPHA1 GLOB SERPL ELPH-MCNC: 0.2 G/DL (ref 0–0.4)
ALPHA2 GLOB SERPL ELPH-MCNC: 0.5 G/DL (ref 0.4–1)
B-GLOBULIN SERPL ELPH-MCNC: 0.8 G/DL (ref 0.7–1.3)
GAMMA GLOB SERPL ELPH-MCNC: 0.2 G/DL (ref 0.4–1.8)
GLOBULIN SER-MCNC: 1.8 G/DL (ref 2.2–3.9)
IGA SERPL-MCNC: 18 MG/DL (ref 64–422)
IGG SERPL-MCNC: 193 MG/DL (ref 586–1602)
IGM SERPL-MCNC: 9 MG/DL (ref 26–217)
INTERPRETATION SERPL IEP-IMP: ABNORMAL
KAPPA LC FREE SER-MCNC: 86.5 MG/L (ref 3.3–19.4)
KAPPA LC FREE/LAMBDA FREE SER: 37.61 (ref 0.26–1.65)
LAMBDA LC FREE SERPL-MCNC: 2.3 MG/L (ref 5.7–26.3)
M PROTEIN SERPL ELPH-MCNC: ABNORMAL G/DL
PROT SERPL-MCNC: 5.2 G/DL (ref 6–8.5)

## 2024-10-30 ENCOUNTER — HOSPITAL ENCOUNTER (OUTPATIENT)
Facility: HOSPITAL | Age: 69
Setting detail: INFUSION SERIES
Discharge: HOME | End: 2024-10-30
Payer: MEDICARE

## 2024-10-30 ENCOUNTER — TELEPHONE (OUTPATIENT)
Age: 69
End: 2024-10-30

## 2024-10-30 VITALS
TEMPERATURE: 97.7 F | HEART RATE: 65 BPM | OXYGEN SATURATION: 98 % | SYSTOLIC BLOOD PRESSURE: 143 MMHG | DIASTOLIC BLOOD PRESSURE: 72 MMHG | RESPIRATION RATE: 18 BRPM

## 2024-10-30 DIAGNOSIS — Z11.59 ENCOUNTER FOR SCREENING FOR OTHER VIRAL DISEASES: ICD-10-CM

## 2024-10-30 DIAGNOSIS — C90.00 MULTIPLE MYELOMA NOT HAVING ACHIEVED REMISSION (HCC): ICD-10-CM

## 2024-10-30 LAB
ALBUMIN SERPL-MCNC: 3.4 G/DL (ref 3.5–5)
ALBUMIN/GLOB SERPL: 1.5 (ref 1.1–2.2)
ALP SERPL-CCNC: 73 U/L (ref 45–117)
ALT SERPL-CCNC: 37 U/L (ref 12–78)
ANION GAP SERPL CALC-SCNC: 4 MMOL/L (ref 2–12)
AST SERPL-CCNC: 14 U/L (ref 15–37)
BASOPHILS # BLD: 0 K/UL (ref 0–0.1)
BASOPHILS NFR BLD: 1 % (ref 0–1)
BILIRUB SERPL-MCNC: 0.4 MG/DL (ref 0.2–1)
BUN SERPL-MCNC: 14 MG/DL (ref 6–20)
BUN/CREAT SERPL: 19 (ref 12–20)
CALCIUM SERPL-MCNC: 9 MG/DL (ref 8.5–10.1)
CHLORIDE SERPL-SCNC: 110 MMOL/L (ref 97–108)
CO2 SERPL-SCNC: 26 MMOL/L (ref 21–32)
CREAT SERPL-MCNC: 0.73 MG/DL (ref 0.55–1.02)
DIFFERENTIAL METHOD BLD: ABNORMAL
EOSINOPHIL # BLD: 0.2 K/UL (ref 0–0.4)
EOSINOPHIL NFR BLD: 6 % (ref 0–7)
ERYTHROCYTE [DISTWIDTH] IN BLOOD BY AUTOMATED COUNT: 15.1 % (ref 11.5–14.5)
GLOBULIN SER CALC-MCNC: 2.3 G/DL (ref 2–4)
GLUCOSE SERPL-MCNC: 88 MG/DL (ref 65–100)
HCT VFR BLD AUTO: 29.1 % (ref 35–47)
HGB BLD-MCNC: 9 G/DL (ref 11.5–16)
IMM GRANULOCYTES # BLD AUTO: 0 K/UL (ref 0–0.04)
IMM GRANULOCYTES NFR BLD AUTO: 1 % (ref 0–0.5)
LYMPHOCYTES # BLD: 0.7 K/UL (ref 0.8–3.5)
LYMPHOCYTES NFR BLD: 27 % (ref 12–49)
MCH RBC QN AUTO: 29.2 PG (ref 26–34)
MCHC RBC AUTO-ENTMCNC: 30.9 G/DL (ref 30–36.5)
MCV RBC AUTO: 94.5 FL (ref 80–99)
MONOCYTES # BLD: 0.6 K/UL (ref 0–1)
MONOCYTES NFR BLD: 24 % (ref 5–13)
NEUTS SEG # BLD: 1 K/UL (ref 1.8–8)
NEUTS SEG NFR BLD: 41 % (ref 32–75)
NRBC # BLD: 0.03 K/UL (ref 0–0.01)
NRBC BLD-RTO: 1.2 PER 100 WBC
PLATELET # BLD AUTO: 143 K/UL (ref 150–400)
PMV BLD AUTO: 12.6 FL (ref 8.9–12.9)
POTASSIUM SERPL-SCNC: 4.4 MMOL/L (ref 3.5–5.1)
PROT SERPL-MCNC: 5.7 G/DL (ref 6.4–8.2)
RBC # BLD AUTO: 3.08 M/UL (ref 3.8–5.2)
RBC MORPH BLD: ABNORMAL
SODIUM SERPL-SCNC: 140 MMOL/L (ref 136–145)
WBC # BLD AUTO: 2.5 K/UL (ref 3.6–11)

## 2024-10-30 PROCEDURE — 36415 COLL VENOUS BLD VENIPUNCTURE: CPT

## 2024-10-30 PROCEDURE — 85025 COMPLETE CBC W/AUTO DIFF WBC: CPT

## 2024-10-30 PROCEDURE — 80053 COMPREHEN METABOLIC PANEL: CPT

## 2024-10-30 ASSESSMENT — PAIN SCALES - GENERAL: PAINLEVEL_OUTOF10: 7

## 2024-10-30 ASSESSMENT — PAIN DESCRIPTION - LOCATION: LOCATION: ARM;SHOULDER

## 2024-10-30 ASSESSMENT — PAIN DESCRIPTION - DESCRIPTORS: DESCRIPTORS: ACHING

## 2024-10-30 ASSESSMENT — PAIN DESCRIPTION - ORIENTATION: ORIENTATION: RIGHT

## 2024-10-30 NOTE — TELEPHONE ENCOUNTER
TC with patient. Returned voicemail regarding needing to schedule an MRI. Patient had LVM stating that she saw Dr. Arboleda last week for an ov appt and was informed that someone would be calling her about the excrutiating pain on her shoulder and on the side of her neck. Stated frustration with having not received a call to schedule her MRI. Provided patient with phone number to [facility] to set up her MRI. Patient inquired if she could get her MRI scheduled at [facility]. Informed patient she would need to call [facility] to see what is available and if she could get her MRI done here at [place]. Patient understood and appreciated the return call.    #[phone]

## 2024-10-30 NOTE — PROGRESS NOTES
[facility] Lab visit:     0930: Patient arrived ambulatory and in no distress.  Labs drawn from L hand. Departed [facility] ambulatory and in no distress.     Visit Vitals:  Patient Vitals for the past 12 hrs:   Temp Pulse Resp BP SpO2   10/30/24 0943 97.7 °F (36.5 °C) 65 18 (!) 143/72 98 %       Labs: See CC for pending results.

## 2024-10-31 ENCOUNTER — HOSPITAL ENCOUNTER (OUTPATIENT)
Facility: HOSPITAL | Age: 69
Setting detail: INFUSION SERIES
Discharge: HOME | End: 2024-10-31
Payer: MEDICARE

## 2024-10-31 VITALS
HEIGHT: 66 IN | DIASTOLIC BLOOD PRESSURE: 60 MMHG | HEART RATE: 67 BPM | TEMPERATURE: 97.8 F | SYSTOLIC BLOOD PRESSURE: 129 MMHG | BODY MASS INDEX: 24.08 KG/M2 | RESPIRATION RATE: 18 BRPM | WEIGHT: 149.8 LBS

## 2024-10-31 DIAGNOSIS — Z11.59 ENCOUNTER FOR SCREENING FOR OTHER VIRAL DISEASES: Primary | ICD-10-CM

## 2024-10-31 DIAGNOSIS — C90.00 MULTIPLE MYELOMA NOT HAVING ACHIEVED REMISSION (HCC): ICD-10-CM

## 2024-10-31 PROCEDURE — 96375 TX/PRO/DX INJ NEW DRUG ADDON: CPT

## 2024-10-31 PROCEDURE — 2580000003 HC RX 258: Performed by: INTERNAL MEDICINE

## 2024-10-31 PROCEDURE — 6360000002 HC RX W HCPCS: Performed by: INTERNAL MEDICINE

## 2024-10-31 PROCEDURE — 96413 CHEMO IV INFUSION 1 HR: CPT

## 2024-10-31 RX ORDER — SODIUM CHLORIDE 0.9 % (FLUSH) 0.9 %
5-40 SYRINGE (ML) INJECTION PRN
Status: DISCONTINUED | OUTPATIENT
Start: 2024-10-31 | End: 2024-11-01 | Stop reason: HOSPADM

## 2024-10-31 RX ORDER — DEXAMETHASONE 4 MG/1
20 TABLET ORAL ONCE
Status: COMPLETED | OUTPATIENT
Start: 2024-10-31 | End: 2024-10-31

## 2024-10-31 RX ORDER — DEXTROSE MONOHYDRATE 50 MG/ML
5-250 INJECTION, SOLUTION INTRAVENOUS PRN
Status: DISCONTINUED | OUTPATIENT
Start: 2024-10-31 | End: 2024-11-01 | Stop reason: HOSPADM

## 2024-10-31 RX ORDER — ONDANSETRON 2 MG/ML
8 INJECTION INTRAMUSCULAR; INTRAVENOUS
Status: COMPLETED | OUTPATIENT
Start: 2024-10-31 | End: 2024-10-31

## 2024-10-31 RX ORDER — HEPARIN 100 UNIT/ML
500 SYRINGE INTRAVENOUS PRN
Status: DISCONTINUED | OUTPATIENT
Start: 2024-10-31 | End: 2024-11-01 | Stop reason: HOSPADM

## 2024-10-31 RX ORDER — SODIUM CHLORIDE 9 MG/ML
5-250 INJECTION, SOLUTION INTRAVENOUS PRN
Status: DISCONTINUED | OUTPATIENT
Start: 2024-10-31 | End: 2024-11-01 | Stop reason: HOSPADM

## 2024-10-31 RX ADMIN — CARFILZOMIB 127.4 MG: 10 INJECTION, POWDER, LYOPHILIZED, FOR SOLUTION INTRAVENOUS at 11:36

## 2024-10-31 RX ADMIN — ONDANSETRON 8 MG: 2 INJECTION, SOLUTION INTRAMUSCULAR; INTRAVENOUS at 12:08

## 2024-10-31 RX ADMIN — DEXAMETHASONE 20 MG: 4 TABLET ORAL at 11:16

## 2024-10-31 RX ADMIN — DEXTROSE MONOHYDRATE 25 ML/HR: 50 INJECTION, SOLUTION INTRAVENOUS at 11:34

## 2024-10-31 ASSESSMENT — PAIN SCALES - GENERAL: PAINLEVEL_OUTOF10: 8

## 2024-10-31 ASSESSMENT — PAIN DESCRIPTION - LOCATION: LOCATION: ARM;SHOULDER

## 2024-10-31 ASSESSMENT — PAIN DESCRIPTION - ORIENTATION: ORIENTATION: RIGHT

## 2024-10-31 NOTE — PROGRESS NOTES
[facility] Progress Note    Date: 2024    Name: Candida Weaver    MRN: [id number]         : 1955    Ms. Weaver Arrived ambulatory and in no distress for cycle 8 day 8 of Kyprolis regimen.      Follow Up: Proceed with treatment    Assessment was completed and documented in flowsheets. No acute concerns at this time. Port accessed without difficulty, labs drawn and processed in advance.    Ms. Weaver's vitals were reviewed.  Patient Vitals for the past 12 hrs:   Temp Pulse Resp BP   10/31/24 1015 97.8 °F (36.6 °C) 62 18 124/62     Lines:   Implantable Port 24 Right Subclavian (Active)   Port-a-Cath Status Accessed 10/31/24 1036   Criteria for Appropriate Use Irritant/vesicant medication 10/31/24 1036   Site Assessment Clean, dry & intact 10/31/24 1036   Line Care Ports disinfected 24 1001   Alcohol Cap Used Yes 10/03/24 1017   Date of Last Dressing Change 10/31/24 10/31/24 1036   Dressing Type Transparent 10/31/24 1036   Dressing Status New dressing applied;Clean, dry & intact 10/31/24 1036   Dressing Intervention New 10/31/24 1036   Date Accessed  10/31/24 10/31/24 1036   Access Attempts  1 10/31/24 1036   Access Needle Gauge 20 G 10/31/24 1036   Access Needle Length 0.75 inches 10/31/24 1036   Accessed By: geovany heath 10/31/24 1036   Single Lumen Status Brisk blood return;Alcohol cap applied;Flushed 10/31/24 1036   De-Access Date 10/31/24 10/31/24 1036   De-Access Time 1220 10/31/24 1036   De-Accessed By AH RN 10/31/24 1036      Lab results were obtained and reviewed.  Labs within parameter for treatment.     Pre-medications  were administered as ordered and chemotherapy was initiated.  Medications Administered         carfilzomib (KYPROLIS) 127.4 mg in dextrose 5 % 100 mL chemo IVPB Admin Date  10/31/2024 Action  New Bag Dose  127.4 mg Rate  347.4 mL/hr Route  IntraVENous Documented By  Radha Hernandez, RN        dexAMETHasone (DECADRON) tablet 20 mg Admin Date  10/31/2024 Action  Given

## 2024-11-05 ENCOUNTER — HOSPITAL ENCOUNTER (OUTPATIENT)
Age: 69
Discharge: HOME | End: 2024-11-08
Payer: MEDICARE

## 2024-11-05 DIAGNOSIS — C90.00 MULTIPLE MYELOMA NOT HAVING ACHIEVED REMISSION (HCC): ICD-10-CM

## 2024-11-05 PROCEDURE — A9579 GAD-BASE MR CONTRAST NOS,1ML: HCPCS | Performed by: RADIOLOGY

## 2024-11-05 PROCEDURE — 6360000004 HC RX CONTRAST MEDICATION: Performed by: RADIOLOGY

## 2024-11-05 PROCEDURE — 73223 MRI JOINT UPR EXTR W/O&W/DYE: CPT

## 2024-11-05 RX ADMIN — GADOTERIDOL 12 ML: 279.3 INJECTION, SOLUTION INTRAVENOUS at 09:18

## 2024-11-06 ENCOUNTER — HOSPITAL ENCOUNTER (OUTPATIENT)
Facility: HOSPITAL | Age: 69
Setting detail: INFUSION SERIES
Discharge: HOME | End: 2024-11-06
Payer: MEDICARE

## 2024-11-06 DIAGNOSIS — C90.00 MULTIPLE MYELOMA NOT HAVING ACHIEVED REMISSION (HCC): ICD-10-CM

## 2024-11-06 DIAGNOSIS — Z11.59 ENCOUNTER FOR SCREENING FOR OTHER VIRAL DISEASES: ICD-10-CM

## 2024-11-06 LAB
ALBUMIN SERPL-MCNC: 3.2 G/DL (ref 3.5–5)
ALBUMIN/GLOB SERPL: 1.3 (ref 1.1–2.2)
ALP SERPL-CCNC: 84 U/L (ref 45–117)
ALT SERPL-CCNC: 32 U/L (ref 12–78)
ANION GAP SERPL CALC-SCNC: 3 MMOL/L (ref 2–12)
AST SERPL-CCNC: 11 U/L (ref 15–37)
BASOPHILS # BLD: 0.1 K/UL (ref 0–0.1)
BASOPHILS NFR BLD: 2 % (ref 0–1)
BILIRUB SERPL-MCNC: 0.3 MG/DL (ref 0.2–1)
BUN SERPL-MCNC: 10 MG/DL (ref 6–20)
BUN/CREAT SERPL: 15 (ref 12–20)
CALCIUM SERPL-MCNC: 8.3 MG/DL (ref 8.5–10.1)
CHLORIDE SERPL-SCNC: 115 MMOL/L (ref 97–108)
CO2 SERPL-SCNC: 26 MMOL/L (ref 21–32)
CREAT SERPL-MCNC: 0.66 MG/DL (ref 0.55–1.02)
DIFFERENTIAL METHOD BLD: ABNORMAL
EOSINOPHIL # BLD: 0.2 K/UL (ref 0–0.4)
EOSINOPHIL NFR BLD: 6 % (ref 0–7)
ERYTHROCYTE [DISTWIDTH] IN BLOOD BY AUTOMATED COUNT: 15.4 % (ref 11.5–14.5)
GLOBULIN SER CALC-MCNC: 2.4 G/DL (ref 2–4)
GLUCOSE SERPL-MCNC: 66 MG/DL (ref 65–100)
HCT VFR BLD AUTO: 29.9 % (ref 35–47)
HGB BLD-MCNC: 9 G/DL (ref 11.5–16)
IMM GRANULOCYTES # BLD AUTO: 0 K/UL (ref 0–0.04)
IMM GRANULOCYTES NFR BLD AUTO: 1 % (ref 0–0.5)
LYMPHOCYTES # BLD: 0.8 K/UL (ref 0.8–3.5)
LYMPHOCYTES NFR BLD: 24 % (ref 12–49)
MCH RBC QN AUTO: 29.1 PG (ref 26–34)
MCHC RBC AUTO-ENTMCNC: 30.1 G/DL (ref 30–36.5)
MCV RBC AUTO: 96.8 FL (ref 80–99)
MONOCYTES # BLD: 0.9 K/UL (ref 0–1)
MONOCYTES NFR BLD: 27 % (ref 5–13)
NEUTS SEG # BLD: 1.4 K/UL (ref 1.8–8)
NEUTS SEG NFR BLD: 40 % (ref 32–75)
NRBC # BLD: 0.02 K/UL (ref 0–0.01)
NRBC BLD-RTO: 0.6 PER 100 WBC
PLATELET # BLD AUTO: 213 K/UL (ref 150–400)
PMV BLD AUTO: 12.5 FL (ref 8.9–12.9)
POTASSIUM SERPL-SCNC: 3.6 MMOL/L (ref 3.5–5.1)
PROT SERPL-MCNC: 5.6 G/DL (ref 6.4–8.2)
RBC # BLD AUTO: 3.09 M/UL (ref 3.8–5.2)
RBC MORPH BLD: ABNORMAL
SODIUM SERPL-SCNC: 144 MMOL/L (ref 136–145)
WBC # BLD AUTO: 3.4 K/UL (ref 3.6–11)

## 2024-11-06 PROCEDURE — 36415 COLL VENOUS BLD VENIPUNCTURE: CPT

## 2024-11-06 PROCEDURE — 85025 COMPLETE CBC W/AUTO DIFF WBC: CPT

## 2024-11-06 PROCEDURE — 80053 COMPREHEN METABOLIC PANEL: CPT

## 2024-11-06 NOTE — PROGRESS NOTES
\[facility]\"" Lab visit:     0945: Patient arrived ambulatory and in no distress.  Labs drawn per Latonya Bland RN. Departed \[facility]\"" ambulatory and in no distress.       Labs: See EPIC for pending results.  No results found for this or any previous visit (from the past 12 hour(s)).

## 2024-11-07 ENCOUNTER — TELEPHONE (OUTPATIENT)
Dept: PRIMARY CARE CLINIC | Facility: CLINIC | Age: 69
End: 2024-11-07

## 2024-11-07 ENCOUNTER — HOSPITAL ENCOUNTER (OUTPATIENT)
Facility: HOSPITAL | Age: 69
Setting detail: INFUSION SERIES
Discharge: HOME | End: 2024-11-07
Payer: MEDICARE

## 2024-11-07 VITALS
OXYGEN SATURATION: 99 % | SYSTOLIC BLOOD PRESSURE: 155 MMHG | TEMPERATURE: 97.9 F | BODY MASS INDEX: 24.96 KG/M2 | RESPIRATION RATE: 18 BRPM | HEART RATE: 67 BPM | DIASTOLIC BLOOD PRESSURE: 70 MMHG | WEIGHT: 149.8 LBS | HEIGHT: 65 IN

## 2024-11-07 DIAGNOSIS — C90.00 MULTIPLE MYELOMA NOT HAVING ACHIEVED REMISSION (HCC): ICD-10-CM

## 2024-11-07 DIAGNOSIS — Z11.59 ENCOUNTER FOR SCREENING FOR OTHER VIRAL DISEASES: Primary | ICD-10-CM

## 2024-11-07 PROCEDURE — 96413 CHEMO IV INFUSION 1 HR: CPT

## 2024-11-07 PROCEDURE — 2580000003 HC RX 258: Performed by: INTERNAL MEDICINE

## 2024-11-07 PROCEDURE — 6360000002 HC RX W HCPCS: Performed by: INTERNAL MEDICINE

## 2024-11-07 RX ORDER — DEXTROSE MONOHYDRATE 50 MG/ML
5-250 INJECTION, SOLUTION INTRAVENOUS PRN
Status: DISCONTINUED | OUTPATIENT
Start: 2024-11-07 | End: 2024-11-08 | Stop reason: HOSPADM

## 2024-11-07 RX ORDER — DEXAMETHASONE 4 MG/1
20 TABLET ORAL ONCE
Status: COMPLETED | OUTPATIENT
Start: 2024-11-07 | End: 2024-11-07

## 2024-11-07 RX ADMIN — DEXTROSE MONOHYDRATE 25 ML/HR: 50 INJECTION, SOLUTION INTRAVENOUS at 11:49

## 2024-11-07 RX ADMIN — DEXAMETHASONE 20 MG: 4 TABLET ORAL at 11:18

## 2024-11-07 RX ADMIN — CARFILZOMIB 127.4 MG: 10 INJECTION, POWDER, LYOPHILIZED, FOR SOLUTION INTRAVENOUS at 11:54

## 2024-11-07 RX ADMIN — DEXTROSE MONOHYDRATE 25 ML/HR: 50 INJECTION, SOLUTION INTRAVENOUS at 11:54

## 2024-11-07 ASSESSMENT — PAIN SCALES - GENERAL: PAINLEVEL_OUTOF10: 8

## 2024-11-07 ASSESSMENT — PAIN DESCRIPTION - LOCATION: LOCATION: SHOULDER

## 2024-11-07 ASSESSMENT — PAIN DESCRIPTION - ORIENTATION: ORIENTATION: RIGHT

## 2024-11-07 NOTE — PROGRESS NOTES
[facility] Chemotherapy Progress Note    Date: 2024    Name: Candida Weaver    MRN: [id number]         : 1955      1030 Pt admit to [facility] for Kyprolis ambulatory in stable condition. Assessment completed.  Port with positive blood return.      Patient presented with swelling to the left side of face and neck. No trouble swallowing or breathing but some difficulty chewing. Physician was notified and asked that she see her PCP and follow up with dentist.     Ms. Weaver's vitals were reviewed.  Patient Vitals for the past 12 hrs:   Temp Pulse Resp BP SpO2   24 1226 -- 67 -- (!) 155/70 --   24 1030 97.9 °F (36.6 °C) 66 18 (!) 152/68 99 %         Lab results were obtained and reviewed.      Pre-medications  were administered as ordered and chemotherapy was initiated.  Medications Administered         carfilzomib (KYPROLIS) 127.4 mg in dextrose 5 % 100 mL chemo IVPB Admin Date  2024 Action  New Bag Dose  127.4 mg Rate  347.4 mL/hr Route  IntraVENous Documented By  Nicole Villegas, SABRINA        dexAMETHasone (DECADRON) tablet 20 mg Admin Date  2024 Action  Given Dose  20 mg Rate   Route  Oral Documented By  Nicole Villegas RN        dextrose 5 % solution Admin Date  2024 Action  New Bag Dose  25 mL/hr Rate  25 mL/hr Route  IntraVENous Documented By  Nicole Villegas RN        dextrose 5 % solution Admin Date  2024 Action  New Bag Dose  25 mL/hr Rate  25 mL/hr Route  IntraVENous Documented By  Nicole Villegas RN        dextrose 5 % solution Admin Date  2024 Action  Rate/Dose Change Dose   Rate  173 mL/hr Route  IntraVENous Documented By  Nicole Villegas, SABRINA            Two nurses verified prior to administering:Drug name, Drug doseInfusion volume or drug volume when prepared in a syringe, Rate of administration, Route of administration, Expiration dates and/or times, Appearance and physical integrity of the drugs, Rate set on infusion pump, when used, Sequencing of  drug administration.       Pt tolerated treatment well. Port maintained positive blood return throughout treatment. Flushed, heparinized and de-accessed per protocol. D/c home ambulatory in no distress. Pt aware of next appointment scheduled.    Future Appointments   Date Time Provider Department Center   11/8/2024  9:45 AM Marycruz Vazquez MD [facility]   11/20/2024  3:30 PM PEDS LAB CHAIR 1 [facility]   11/21/2024 11:30 AM RUTH CHEMO CHAIR 4 [facility]   11/21/2024 11:45 AM Ashley Costa APRN - NP [facility] BS AMB   11/26/2024  9:30 AM PEDS LAB CHAIR 1 [facility]   11/27/2024 11:30 AM RUTH CHEMO CHAIR 4 [facility]   12/4/2024  9:30 AM PEDS LAB CHAIR 1 [facility]   12/5/2024  9:00 AM RUTH CHEMO CHAIR 5 [facility]   12/18/2024  9:30 AM PEDS LAB CHAIR 1 [facility]   12/19/2024 10:00 AM RUTH CHEMO CHAIR 1 [facility]   12/19/2024 10:15 AM Nany Arboleda MD [facility] BS AMB   12/24/2024 10:30 AM PEDS LAB CHAIR 1 [facility]   12/26/2024 11:30 AM RUTH CHEMO CHAIR 4 [facility]   12/31/2024  3:00 PM PEDS LAB CHAIR 1 [facility]   1/2/2025  1:30 PM RUTH CHEMO CHAIR 1 [facility]   1/15/2025  9:30 AM PEDS LAB CHAIR 1 [facility]   1/16/2025 10:30 AM RUTH CHEMO CHAIR 2 [facility]   1/22/2025  9:30 AM PEDS LAB CHAIR 1 [facility]   1/23/2025  9:00 AM RUTH CHEMO CHAIR 5 [facility]   1/29/2025  9:30 AM PEDS LAB CHAIR 1 [facility]   1/30/2025 10:00 AM RUTH CHEMO CHAIR 1 [facility]         Nicole Villegas RN  November 7, 2024

## 2024-11-07 NOTE — TELEPHONE ENCOUNTER
Spoke to pt and she has been having swelling on her nose, neck and ear after chemo. She was told to follow up with PCP. Fariba made for tomorrow at 945am with Dr. Vazquez.

## 2024-11-08 ENCOUNTER — TELEPHONE (OUTPATIENT)
Dept: PRIMARY CARE CLINIC | Facility: CLINIC | Age: 69
End: 2024-11-08

## 2024-11-08 ENCOUNTER — OFFICE VISIT (OUTPATIENT)
Dept: PRIMARY CARE CLINIC | Facility: CLINIC | Age: 69
End: 2024-11-08

## 2024-11-08 VITALS
DIASTOLIC BLOOD PRESSURE: 80 MMHG | RESPIRATION RATE: 20 BRPM | WEIGHT: 150 LBS | HEART RATE: 78 BPM | TEMPERATURE: 97.9 F | OXYGEN SATURATION: 98 % | BODY MASS INDEX: 24.99 KG/M2 | HEIGHT: 65 IN | SYSTOLIC BLOOD PRESSURE: 145 MMHG

## 2024-11-08 DIAGNOSIS — D47.2 SMOLDERING MULTIPLE MYELOMA (SMM): Primary | ICD-10-CM

## 2024-11-08 DIAGNOSIS — E11.9 TYPE 2 DIABETES MELLITUS WITHOUT COMPLICATION, WITHOUT LONG-TERM CURRENT USE OF INSULIN (HCC): ICD-10-CM

## 2024-11-08 DIAGNOSIS — I10 PRIMARY HYPERTENSION: ICD-10-CM

## 2024-11-08 DIAGNOSIS — K11.21 ACUTE PAROTITIS: ICD-10-CM

## 2024-11-08 LAB
ALBUMIN, URINE, POC: 80 MG/L
CREATININE, URINE, POC: 100 MG/DL
HBA1C MFR BLD: 5.1 %
MICROALB/CREAT RATIO, POC: ABNORMAL MG/G

## 2024-11-08 RX ORDER — AMLODIPINE BESYLATE 5 MG/1
5 TABLET ORAL DAILY
Qty: 30 TABLET | Refills: 3 | Status: SHIPPED | OUTPATIENT
Start: 2024-11-08

## 2024-11-08 RX ORDER — LEVOFLOXACIN 500 MG/1
500 TABLET, FILM COATED ORAL DAILY
Qty: 7 TABLET | Refills: 0 | Status: SHIPPED | OUTPATIENT
Start: 2024-11-08 | End: 2024-11-15

## 2024-11-08 ASSESSMENT — ENCOUNTER SYMPTOMS
EYE DISCHARGE: 0
ABDOMINAL PAIN: 0
COUGH: 0
SHORTNESS OF BREATH: 0
COLOR CHANGE: 0
BACK PAIN: 0
CHEST TIGHTNESS: 0
RHINORRHEA: 0
FACIAL SWELLING: 1
SORE THROAT: 0
CONSTIPATION: 0
DIARRHEA: 0

## 2024-11-08 ASSESSMENT — PATIENT HEALTH QUESTIONNAIRE - PHQ9
1. LITTLE INTEREST OR PLEASURE IN DOING THINGS: NOT AT ALL
SUM OF ALL RESPONSES TO PHQ9 QUESTIONS 1 & 2: 0
SUM OF ALL RESPONSES TO PHQ QUESTIONS 1-9: 0
2. FEELING DOWN, DEPRESSED OR HOPELESS: NOT AT ALL
SUM OF ALL RESPONSES TO PHQ QUESTIONS 1-9: 0

## 2024-11-08 NOTE — TELEPHONE ENCOUNTER
Called pt, no answer. Message left that I was giving her a call and I will send her a Ziipp[employer] message.

## 2024-11-08 NOTE — PROGRESS NOTES
\"Have you been to the ER, urgent care clinic since your last visit?  Hospitalized since your last visit?\"    NO      “Have you seen or consulted any other health care providers outside our system since your last visit?”    NO      “Have you had a diabetic eye exam?”      Date of last diabetic eye exam: 11/15/2022       Chief Complaint   Patient presents with    Swelling     Left side of face, nose, ear to neck yesterday. But has happened before on both sides of face. Worse yesterday.     Shoulder Pain     Right shoulder that goes down to fingers and finger go numb.     Medication Refill     Needs inhaler refilled.        Pt is ok with scribe.   
 Right Ear: External ear normal.      Left Ear: External ear normal.   Eyes:      General: No scleral icterus.        Right eye: No discharge.         Left eye: No discharge.      Extraocular Movements: Extraocular movements intact.      Conjunctiva/sclera: Conjunctivae normal.   Cardiovascular:      Rate and Rhythm: Normal rate and regular rhythm.   Pulmonary:      Effort: Pulmonary effort is normal.      Breath sounds: Normal breath sounds. No wheezing.   Abdominal:      General: Bowel sounds are normal.      Palpations: Abdomen is soft.      Tenderness: There is no abdominal tenderness.   Musculoskeletal:      Cervical back: Normal range of motion and neck supple.   Feet:      Comments: Diabetic foot exam:     Left: Vibratory sensation normal    Sharp/dull discrimination normal    Filament test normal sensation with micro filament   Pulse DP: 2+ (normal)   Deformities: None  Right: Vibratory sensation normal   Sharp/dull discrimination normal   Filament test normal sensation with micro filament   Pulse DP: 2+ (normal)   Deformities: None    Lymphadenopathy:      Cervical: No cervical adenopathy.   Neurological:      Mental Status: She is alert and oriented to person, place, and time.   Psychiatric:         Mood and Affect: Mood normal.            Maddy MIRANDA, am scribing for and in the presence of Marycruz Vazquez MD. 11/8/24/10:07 AM EST  Marycruz MIRANDA MD, personally performed the services described by my scribe in my presence, and it is both accurate and complete.    An electronic signature was used to authenticate this note.    --Maddy Juarez

## 2024-11-08 NOTE — TELEPHONE ENCOUNTER
Called and spoke to pt because she wanted to know what its called, that she was diagnosed with today in office before she left and she asked I find out and call her. I told her in the note it says Acute parotitis which is a painful swelling of the a gland. She said oh ok and that she couldn't see anything in her chart. I told her Dr. Vazquez has not signed her chart yet so that's why its not in but by later this evening or tomorrow morning it should be signed and then you can see. She said ohhh ok.     She asked about the results of her urine test. She said she can see it on [facility]. I told her Dr. Vazquez has to review it, then her and I will reach back out. She said oh ok, thank you.

## 2024-11-11 DIAGNOSIS — C90.00 MULTIPLE MYELOMA NOT HAVING ACHIEVED REMISSION (HCC): ICD-10-CM

## 2024-11-11 NOTE — TELEPHONE ENCOUNTER
Oral Chemotherapy      Candida Weaver is a  69 y.o.female  diagnosed with multiple myeloma . Ms. Weaver is being treated with KPd.      Medication name: pomalidomide  Regimen: KPd  Dose:  4 mg (increase dose for 9/26/24)  Frequency: daily  Administration schedule: for 21 days followed by 7 days off every 28 days  Ordering provider: Nany Arboleda MD  Start date: 10/24/24 (Cycle 8)      REMS auth # [id number]  Prescription sent to pharmacy for processing.         Rose Pa, WOOD, BCOP, BCPS    For Pharmacy Admin Tracking Only    Program: Medical Group  CPA in place:  Yes  Recommendation Provided To: Patient/Caregiver: 1 via Telephone  Intervention Detail: Refill(s) Provided  Intervention Accepted By: Patient/Caregiver: 1    Time Spent (min): 10

## 2024-11-14 RX ORDER — SODIUM CHLORIDE 9 MG/ML
5-250 INJECTION, SOLUTION INTRAVENOUS PRN
OUTPATIENT
Start: 2024-12-05

## 2024-11-14 RX ORDER — ALBUTEROL SULFATE 90 UG/1
4 INHALANT RESPIRATORY (INHALATION) PRN
OUTPATIENT
Start: 2024-11-27

## 2024-11-14 RX ORDER — ALBUTEROL SULFATE 90 UG/1
4 INHALANT RESPIRATORY (INHALATION) PRN
OUTPATIENT
Start: 2024-12-05

## 2024-11-14 RX ORDER — DEXAMETHASONE 4 MG/1
20 TABLET ORAL ONCE
Start: 2024-12-05 | End: 2024-12-05

## 2024-11-14 RX ORDER — EPINEPHRINE 1 MG/ML
0.3 INJECTION, SOLUTION INTRAMUSCULAR; SUBCUTANEOUS PRN
OUTPATIENT
Start: 2024-12-05

## 2024-11-14 RX ORDER — HYDROCORTISONE SODIUM SUCCINATE 100 MG/2ML
100 INJECTION INTRAMUSCULAR; INTRAVENOUS
Status: CANCELLED | OUTPATIENT
Start: 2024-11-21

## 2024-11-14 RX ORDER — DEXAMETHASONE 4 MG/1
20 TABLET ORAL ONCE
Start: 2024-11-27 | End: 2024-11-28

## 2024-11-14 RX ORDER — SODIUM CHLORIDE 9 MG/ML
INJECTION, SOLUTION INTRAVENOUS CONTINUOUS
OUTPATIENT
Start: 2024-11-27

## 2024-11-14 RX ORDER — ALBUTEROL SULFATE 90 UG/1
4 INHALANT RESPIRATORY (INHALATION) PRN
Status: CANCELLED | OUTPATIENT
Start: 2024-11-21

## 2024-11-14 RX ORDER — ACETAMINOPHEN 325 MG/1
650 TABLET ORAL
OUTPATIENT
Start: 2024-11-27

## 2024-11-14 RX ORDER — ONDANSETRON 2 MG/ML
8 INJECTION INTRAMUSCULAR; INTRAVENOUS
Status: CANCELLED | OUTPATIENT
Start: 2024-12-05

## 2024-11-14 RX ORDER — DIPHENHYDRAMINE HYDROCHLORIDE 50 MG/ML
50 INJECTION INTRAMUSCULAR; INTRAVENOUS
OUTPATIENT
Start: 2024-11-27

## 2024-11-14 RX ORDER — SODIUM CHLORIDE 0.9 % (FLUSH) 0.9 %
5-40 SYRINGE (ML) INJECTION PRN
OUTPATIENT
Start: 2024-12-05

## 2024-11-14 RX ORDER — HEPARIN 100 UNIT/ML
500 SYRINGE INTRAVENOUS PRN
OUTPATIENT
Start: 2024-11-27

## 2024-11-14 RX ORDER — SODIUM CHLORIDE 9 MG/ML
INJECTION, SOLUTION INTRAVENOUS CONTINUOUS
OUTPATIENT
Start: 2024-12-05

## 2024-11-14 RX ORDER — ACETAMINOPHEN 325 MG/1
650 TABLET ORAL
Status: CANCELLED | OUTPATIENT
Start: 2024-11-21

## 2024-11-14 RX ORDER — DEXTROSE MONOHYDRATE 50 MG/ML
5-250 INJECTION, SOLUTION INTRAVENOUS PRN
OUTPATIENT
Start: 2024-11-27

## 2024-11-14 RX ORDER — DEXTROSE MONOHYDRATE 50 MG/ML
5-250 INJECTION, SOLUTION INTRAVENOUS PRN
OUTPATIENT
Start: 2024-12-05

## 2024-11-14 RX ORDER — SODIUM CHLORIDE 9 MG/ML
INJECTION, SOLUTION INTRAVENOUS CONTINUOUS
Status: CANCELLED | OUTPATIENT
Start: 2024-11-21

## 2024-11-14 RX ORDER — DIPHENHYDRAMINE HYDROCHLORIDE 50 MG/ML
50 INJECTION INTRAMUSCULAR; INTRAVENOUS
Status: CANCELLED | OUTPATIENT
Start: 2024-11-21

## 2024-11-14 RX ORDER — SODIUM CHLORIDE 0.9 % (FLUSH) 0.9 %
5-40 SYRINGE (ML) INJECTION PRN
OUTPATIENT
Start: 2024-11-27

## 2024-11-14 RX ORDER — HEPARIN 100 UNIT/ML
500 SYRINGE INTRAVENOUS PRN
OUTPATIENT
Start: 2024-12-05

## 2024-11-14 RX ORDER — ONDANSETRON 2 MG/ML
8 INJECTION INTRAMUSCULAR; INTRAVENOUS
OUTPATIENT
Start: 2024-11-27

## 2024-11-14 RX ORDER — DIPHENHYDRAMINE HYDROCHLORIDE 50 MG/ML
50 INJECTION INTRAMUSCULAR; INTRAVENOUS
OUTPATIENT
Start: 2024-12-05

## 2024-11-14 RX ORDER — SODIUM CHLORIDE 9 MG/ML
5-250 INJECTION, SOLUTION INTRAVENOUS PRN
OUTPATIENT
Start: 2024-11-27

## 2024-11-14 RX ORDER — ONDANSETRON 2 MG/ML
8 INJECTION INTRAMUSCULAR; INTRAVENOUS
Status: CANCELLED | OUTPATIENT
Start: 2024-11-27

## 2024-11-14 RX ORDER — ACETAMINOPHEN 325 MG/1
650 TABLET ORAL
OUTPATIENT
Start: 2024-12-05

## 2024-11-14 RX ORDER — EPINEPHRINE 1 MG/ML
0.3 INJECTION, SOLUTION INTRAMUSCULAR; SUBCUTANEOUS PRN
Status: CANCELLED | OUTPATIENT
Start: 2024-11-21

## 2024-11-14 RX ORDER — ONDANSETRON 2 MG/ML
8 INJECTION INTRAMUSCULAR; INTRAVENOUS
OUTPATIENT
Start: 2024-12-05

## 2024-11-14 RX ORDER — ONDANSETRON 2 MG/ML
8 INJECTION INTRAMUSCULAR; INTRAVENOUS
Status: CANCELLED | OUTPATIENT
Start: 2024-11-21

## 2024-11-14 RX ORDER — HYDROCORTISONE SODIUM SUCCINATE 100 MG/2ML
100 INJECTION INTRAMUSCULAR; INTRAVENOUS
OUTPATIENT
Start: 2024-12-05

## 2024-11-14 RX ORDER — EPINEPHRINE 1 MG/ML
0.3 INJECTION, SOLUTION INTRAMUSCULAR; SUBCUTANEOUS PRN
OUTPATIENT
Start: 2024-11-27

## 2024-11-14 RX ORDER — HYDROCORTISONE SODIUM SUCCINATE 100 MG/2ML
100 INJECTION INTRAMUSCULAR; INTRAVENOUS
OUTPATIENT
Start: 2024-11-27

## 2024-11-20 ENCOUNTER — HOSPITAL ENCOUNTER (OUTPATIENT)
Facility: HOSPITAL | Age: 69
Setting detail: INFUSION SERIES
Discharge: HOME | End: 2024-11-20
Payer: MEDICARE

## 2024-11-20 DIAGNOSIS — Z11.59 ENCOUNTER FOR SCREENING FOR OTHER VIRAL DISEASES: ICD-10-CM

## 2024-11-20 DIAGNOSIS — C90.00 MULTIPLE MYELOMA NOT HAVING ACHIEVED REMISSION (HCC): ICD-10-CM

## 2024-11-20 LAB
ALBUMIN SERPL-MCNC: 3.6 G/DL (ref 3.5–5)
ALBUMIN/GLOB SERPL: 1.7 (ref 1.1–2.2)
ALP SERPL-CCNC: 84 U/L (ref 45–117)
ALT SERPL-CCNC: 24 U/L (ref 12–78)
ANION GAP SERPL CALC-SCNC: 4 MMOL/L (ref 2–12)
AST SERPL-CCNC: 9 U/L (ref 15–37)
BASOPHILS # BLD: 0.2 K/UL (ref 0–0.1)
BASOPHILS NFR BLD: 6 % (ref 0–1)
BILIRUB SERPL-MCNC: 0.4 MG/DL (ref 0.2–1)
BUN SERPL-MCNC: 13 MG/DL (ref 6–20)
BUN/CREAT SERPL: 24 (ref 12–20)
CALCIUM SERPL-MCNC: 9.4 MG/DL (ref 8.5–10.1)
CHLORIDE SERPL-SCNC: 111 MMOL/L (ref 97–108)
CO2 SERPL-SCNC: 25 MMOL/L (ref 21–32)
CREAT SERPL-MCNC: 0.54 MG/DL (ref 0.55–1.02)
DIFFERENTIAL METHOD BLD: ABNORMAL
EOSINOPHIL # BLD: 0.2 K/UL (ref 0–0.4)
EOSINOPHIL NFR BLD: 6 % (ref 0–7)
ERYTHROCYTE [DISTWIDTH] IN BLOOD BY AUTOMATED COUNT: 14.6 % (ref 11.5–14.5)
GLOBULIN SER CALC-MCNC: 2.1 G/DL (ref 2–4)
GLUCOSE SERPL-MCNC: 84 MG/DL (ref 65–100)
HCT VFR BLD AUTO: 33.9 % (ref 35–47)
HGB BLD-MCNC: 10.3 G/DL (ref 11.5–16)
IMM GRANULOCYTES # BLD AUTO: 0 K/UL (ref 0–0.04)
IMM GRANULOCYTES NFR BLD AUTO: 0 % (ref 0–0.5)
LYMPHOCYTES # BLD: 1.1 K/UL (ref 0.8–3.5)
LYMPHOCYTES NFR BLD: 35 % (ref 12–49)
MCH RBC QN AUTO: 28.8 PG (ref 26–34)
MCHC RBC AUTO-ENTMCNC: 30.4 G/DL (ref 30–36.5)
MCV RBC AUTO: 94.7 FL (ref 80–99)
MONOCYTES # BLD: 0.5 K/UL (ref 0–1)
MONOCYTES NFR BLD: 17 % (ref 5–13)
NEUTS SEG # BLD: 1.1 K/UL (ref 1.8–8)
NEUTS SEG NFR BLD: 36 % (ref 32–75)
NRBC # BLD: 0 K/UL (ref 0–0.01)
NRBC BLD-RTO: 0 PER 100 WBC
PLATELET # BLD AUTO: 320 K/UL (ref 150–400)
PMV BLD AUTO: 12 FL (ref 8.9–12.9)
POTASSIUM SERPL-SCNC: 4.6 MMOL/L (ref 3.5–5.1)
PROT SERPL-MCNC: 5.7 G/DL (ref 6.4–8.2)
RBC # BLD AUTO: 3.58 M/UL (ref 3.8–5.2)
RBC MORPH BLD: ABNORMAL
SODIUM SERPL-SCNC: 140 MMOL/L (ref 136–145)
WBC # BLD AUTO: 3.1 K/UL (ref 3.6–11)
WBC MORPH BLD: ABNORMAL

## 2024-11-20 PROCEDURE — 36415 COLL VENOUS BLD VENIPUNCTURE: CPT

## 2024-11-20 PROCEDURE — 83521 IG LIGHT CHAINS FREE EACH: CPT

## 2024-11-20 PROCEDURE — 85025 COMPLETE CBC W/AUTO DIFF WBC: CPT

## 2024-11-20 PROCEDURE — 82784 ASSAY IGA/IGD/IGG/IGM EACH: CPT

## 2024-11-20 PROCEDURE — 80053 COMPREHEN METABOLIC PANEL: CPT

## 2024-11-20 PROCEDURE — 86334 IMMUNOFIX E-PHORESIS SERUM: CPT

## 2024-11-20 PROCEDURE — 84165 PROTEIN E-PHORESIS SERUM: CPT

## 2024-11-20 NOTE — PROGRESS NOTES
[facility] [place] at [facility]  [address], [address] [place] [address]  W: [phone]  F: [phone]    Reason for Visit:   Candida Weaver is a 69 y.o. female who is seen for Multiple Myeloma     Treatment History:   10/25/2023: Kayli VRD, Rev lite  3/2024: Kyprolis Pomalyst and Dexamethasone    History of Present Illness:   Patient is a 69 y.o. female who was dx with Smoldering Myeloma in 2017 and has been seeing [facility] Dr. Lopez. A BM bx at that time showed 40% plasma cells. No end organ damage. Noted to have worsening anemia 7/5/2023 with a Hb of 9.6 g/dl. This led to further evaluation that included a gammopathy eval that showed an M spike of 0.3 g/dl. She had a Kappa LC level of 1593 g/L with a K:L ratio of 157. Sent for evaluation now. She was in the ER June 2023 with some SOB. Had a CT head and this showed lytic lesions. She states that she has some carpal tunnel. BM bx showed Myeloma. She is on Kayli VRD. She had minimal response and was switched to Kyprolis and Pomalyst and Dexamethasone.    Today is C9D1  She still has shoulder ache on the R- she was started on Tramadol given by Ortho.   Nausea improved during treatments with Zofran as premed.   Has constipation for 2 days.   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 Reports new hand cramping.   Denies headaches, dizziness, cough, SOB,  vomiting, diarrhea,  mucositis, skin rash, joint pain.  Today is day 1 of pomalyst.    She works nights, she has better SOB, no leg swelling. She worked last night. She has missed doses of pomalyst. She took it last 7 days AGO   Presents with her Son    Review of systems was obtained and pertinent findings reviewed above. Past medical history, social history, family history, medications, and allergies are located in the electronic medical record.    Physical Exam:   BP (!) 111/50   Pulse 60   Temp 97.8 °F (36.6 °C)   Resp 20   Wt 66.7 kg (147 lb)   SpO2 97%   BMI 24.46 kg/m²

## 2024-11-20 NOTE — PROGRESS NOTES
Pt arrived to [facility] for labs in stable condition. Labs drawn peripherally from the left forearm and sent for processing.     Please see pending results in EPIC.    Pt tolerated treatment well. D/Cd from [facility] in no distress.  Future Appointments   Date Time Provider Department Center   11/21/2024 11:30 AM RUTH CHEMO CHAIR 4 [facility]   11/21/2024 11:45 AM Ashley Costa APRN - NP [facility]   11/26/2024  9:30 AM PEDS LAB CHAIR 1 [facility]   11/27/2024 11:30 AM RUTH CHEMO CHAIR 4 [facility]   12/4/2024  9:30 AM PEDS LAB CHAIR 1 [facility]   12/5/2024  9:00 AM RUTH CHEMO CHAIR 5 [facility]   12/18/2024  9:30 AM PEDS LAB CHAIR 1 [facility]   12/19/2024 10:00 AM RUTH CHEMO CHAIR 1 [facility]   12/19/2024 10:15 AM Nany Arboleda MD [facility] BS AMB   12/24/2024 10:30 AM PEDS LAB CHAIR 1 [facility]   12/26/2024 11:30 AM RUTH CHEMO CHAIR 4 [facility]   12/31/2024  3:00 PM PEDS LAB CHAIR 1 [facility]   1/2/2025  1:30 PM RUTH CHEMO CHAIR 1 [facility]   1/15/2025  9:30 AM PEDS LAB CHAIR 1 [facility]   1/16/2025 10:30 AM RUTH CHEMO CHAIR 2 [facility]   1/22/2025  9:30 AM PEDS LAB CHAIR 1 [facility]   1/23/2025  9:00 AM RUTH CHEMO CHAIR 5 [facility]   1/29/2025  9:30 AM PEDS LAB CHAIR 1 [facility]   1/30/2025 10:00 AM RUTH CHEMO CHAIR 1 [facility]

## 2024-11-21 ENCOUNTER — CLINICAL DOCUMENTATION (OUTPATIENT)
Age: 69
End: 2024-11-21

## 2024-11-21 ENCOUNTER — OFFICE VISIT (OUTPATIENT)
Age: 69
End: 2024-11-21
Payer: MEDICARE

## 2024-11-21 ENCOUNTER — TELEPHONE (OUTPATIENT)
Age: 69
End: 2024-11-21

## 2024-11-21 ENCOUNTER — HOSPITAL ENCOUNTER (OUTPATIENT)
Facility: HOSPITAL | Age: 69
Setting detail: INFUSION SERIES
Discharge: HOME | End: 2024-11-21
Payer: MEDICARE

## 2024-11-21 VITALS
BODY MASS INDEX: 24.46 KG/M2 | RESPIRATION RATE: 20 BRPM | SYSTOLIC BLOOD PRESSURE: 111 MMHG | TEMPERATURE: 97.8 F | DIASTOLIC BLOOD PRESSURE: 50 MMHG | HEART RATE: 60 BPM | WEIGHT: 147 LBS | OXYGEN SATURATION: 97 %

## 2024-11-21 VITALS
TEMPERATURE: 97.8 F | HEART RATE: 67 BPM | WEIGHT: 147.4 LBS | HEIGHT: 65 IN | SYSTOLIC BLOOD PRESSURE: 127 MMHG | DIASTOLIC BLOOD PRESSURE: 56 MMHG | BODY MASS INDEX: 24.56 KG/M2 | OXYGEN SATURATION: 99 % | RESPIRATION RATE: 20 BRPM

## 2024-11-21 DIAGNOSIS — Z11.59 ENCOUNTER FOR SCREENING FOR OTHER VIRAL DISEASES: ICD-10-CM

## 2024-11-21 DIAGNOSIS — C90.00 MULTIPLE MYELOMA NOT HAVING ACHIEVED REMISSION (HCC): Primary | ICD-10-CM

## 2024-11-21 PROCEDURE — 99215 OFFICE O/P EST HI 40 MIN: CPT

## 2024-11-21 PROCEDURE — 1159F MED LIST DOCD IN RCRD: CPT

## 2024-11-21 PROCEDURE — 1160F RVW MEDS BY RX/DR IN RCRD: CPT

## 2024-11-21 PROCEDURE — 3017F COLORECTAL CA SCREEN DOC REV: CPT

## 2024-11-21 PROCEDURE — 6360000002 HC RX W HCPCS

## 2024-11-21 PROCEDURE — 2580000003 HC RX 258: Performed by: INTERNAL MEDICINE

## 2024-11-21 PROCEDURE — G8399 PT W/DXA RESULTS DOCUMENT: HCPCS

## 2024-11-21 PROCEDURE — 6360000002 HC RX W HCPCS: Performed by: INTERNAL MEDICINE

## 2024-11-21 PROCEDURE — 1123F ACP DISCUSS/DSCN MKR DOCD: CPT

## 2024-11-21 PROCEDURE — 96413 CHEMO IV INFUSION 1 HR: CPT

## 2024-11-21 PROCEDURE — 1036F TOBACCO NON-USER: CPT

## 2024-11-21 PROCEDURE — 96375 TX/PRO/DX INJ NEW DRUG ADDON: CPT

## 2024-11-21 PROCEDURE — G8420 CALC BMI NORM PARAMETERS: HCPCS

## 2024-11-21 PROCEDURE — 96367 TX/PROPH/DG ADDL SEQ IV INF: CPT

## 2024-11-21 PROCEDURE — G8427 DOCREV CUR MEDS BY ELIG CLIN: HCPCS

## 2024-11-21 PROCEDURE — 1090F PRES/ABSN URINE INCON ASSESS: CPT

## 2024-11-21 PROCEDURE — 1125F AMNT PAIN NOTED PAIN PRSNT: CPT

## 2024-11-21 PROCEDURE — G8484 FLU IMMUNIZE NO ADMIN: HCPCS

## 2024-11-21 RX ORDER — DEXAMETHASONE 4 MG/1
20 TABLET ORAL ONCE
Status: COMPLETED | OUTPATIENT
Start: 2024-11-21 | End: 2024-11-21

## 2024-11-21 RX ORDER — DEXTROSE MONOHYDRATE 50 MG/ML
5-250 INJECTION, SOLUTION INTRAVENOUS PRN
Status: DISCONTINUED | OUTPATIENT
Start: 2024-11-21 | End: 2024-11-22 | Stop reason: HOSPADM

## 2024-11-21 RX ORDER — ALBUTEROL SULFATE 90 UG/1
4 INHALANT RESPIRATORY (INHALATION) PRN
OUTPATIENT
Start: 2024-12-05

## 2024-11-21 RX ORDER — SODIUM CHLORIDE 9 MG/ML
5-250 INJECTION, SOLUTION INTRAVENOUS PRN
Status: DISCONTINUED | OUTPATIENT
Start: 2024-11-21 | End: 2024-11-22 | Stop reason: HOSPADM

## 2024-11-21 RX ORDER — ONDANSETRON 2 MG/ML
8 INJECTION INTRAMUSCULAR; INTRAVENOUS ONCE
Status: COMPLETED | OUTPATIENT
Start: 2024-11-21 | End: 2024-11-21

## 2024-11-21 RX ORDER — ZOLEDRONIC ACID 0.04 MG/ML
4 INJECTION, SOLUTION INTRAVENOUS ONCE
OUTPATIENT
Start: 2024-12-05 | End: 2024-12-05

## 2024-11-21 RX ORDER — ZOLEDRONIC ACID 0.04 MG/ML
4 INJECTION, SOLUTION INTRAVENOUS ONCE
Status: COMPLETED | OUTPATIENT
Start: 2024-11-21 | End: 2024-11-21

## 2024-11-21 RX ORDER — SODIUM CHLORIDE 0.9 % (FLUSH) 0.9 %
5-40 SYRINGE (ML) INJECTION PRN
Status: DISCONTINUED | OUTPATIENT
Start: 2024-11-21 | End: 2024-11-22 | Stop reason: HOSPADM

## 2024-11-21 RX ORDER — SODIUM CHLORIDE 9 MG/ML
INJECTION, SOLUTION INTRAVENOUS CONTINUOUS
OUTPATIENT
Start: 2024-12-05

## 2024-11-21 RX ORDER — ONDANSETRON 2 MG/ML
8 INJECTION INTRAMUSCULAR; INTRAVENOUS
OUTPATIENT
Start: 2024-12-05

## 2024-11-21 RX ORDER — SODIUM CHLORIDE 0.9 % (FLUSH) 0.9 %
5-40 SYRINGE (ML) INJECTION PRN
OUTPATIENT
Start: 2024-12-05

## 2024-11-21 RX ORDER — SODIUM CHLORIDE 9 MG/ML
5-250 INJECTION, SOLUTION INTRAVENOUS PRN
OUTPATIENT
Start: 2024-12-05

## 2024-11-21 RX ORDER — DIPHENHYDRAMINE HYDROCHLORIDE 50 MG/ML
50 INJECTION INTRAMUSCULAR; INTRAVENOUS
OUTPATIENT
Start: 2024-12-05

## 2024-11-21 RX ORDER — HEPARIN 100 UNIT/ML
500 SYRINGE INTRAVENOUS PRN
Status: DISCONTINUED | OUTPATIENT
Start: 2024-11-21 | End: 2024-11-22 | Stop reason: HOSPADM

## 2024-11-21 RX ORDER — ACETAMINOPHEN 325 MG/1
650 TABLET ORAL
OUTPATIENT
Start: 2024-12-05

## 2024-11-21 RX ORDER — HYDROCORTISONE SODIUM SUCCINATE 100 MG/2ML
100 INJECTION INTRAMUSCULAR; INTRAVENOUS
OUTPATIENT
Start: 2024-12-05

## 2024-11-21 RX ORDER — HEPARIN 100 UNIT/ML
500 SYRINGE INTRAVENOUS PRN
OUTPATIENT
Start: 2024-12-05

## 2024-11-21 RX ORDER — EPINEPHRINE 1 MG/ML
0.3 INJECTION, SOLUTION INTRAMUSCULAR; SUBCUTANEOUS PRN
OUTPATIENT
Start: 2024-12-05

## 2024-11-21 RX ADMIN — DEXAMETHASONE 20 MG: 4 TABLET ORAL at 12:21

## 2024-11-21 RX ADMIN — ZOLEDRONIC ACID 4 MG: 0.04 INJECTION, SOLUTION INTRAVENOUS at 12:29

## 2024-11-21 RX ADMIN — SODIUM CHLORIDE, PRESERVATIVE FREE 20 ML: 5 INJECTION INTRAVENOUS at 14:10

## 2024-11-21 RX ADMIN — DEXTROSE MONOHYDRATE 50 ML/HR: 50 INJECTION, SOLUTION INTRAVENOUS at 12:24

## 2024-11-21 RX ADMIN — ONDANSETRON 8 MG: 2 INJECTION INTRAMUSCULAR; INTRAVENOUS at 12:24

## 2024-11-21 RX ADMIN — CARFILZOMIB 127.4 MG: 10 INJECTION, POWDER, LYOPHILIZED, FOR SOLUTION INTRAVENOUS at 13:42

## 2024-11-21 ASSESSMENT — PAIN DESCRIPTION - PAIN TYPE: TYPE: CHRONIC PAIN

## 2024-11-21 ASSESSMENT — PAIN SCALES - GENERAL: PAINLEVEL_OUTOF10: 8

## 2024-11-21 ASSESSMENT — PAIN DESCRIPTION - ORIENTATION: ORIENTATION: RIGHT

## 2024-11-21 ASSESSMENT — PAIN DESCRIPTION - FREQUENCY: FREQUENCY: CONTINUOUS

## 2024-11-21 ASSESSMENT — PAIN DESCRIPTION - LOCATION: LOCATION: SHOULDER

## 2024-11-21 ASSESSMENT — PAIN DESCRIPTION - ONSET: ONSET: ON-GOING

## 2024-11-21 NOTE — PROGRESS NOTES
[facility] Chemotherapy Progress Note    Date: 2024    Name: Candida Weaver    MRN: [id number]         : 1955      1130 Pt admit to [facility] for C9 D1 Kyprolis/Zometa ambulatory in stable condition. Assessment completed. No new concerns voiced. Port with positive blood return. Labs sent for processing( Labs done 24. )                         No data to display                  Ms. Weaver's vitals were reviewed.  Patient Vitals for the past 12 hrs:   Temp Pulse Resp BP SpO2   24 1409 -- 67 -- (!) 127/56 --   24 1115 97.8 °F (36.6 °C) 60 20 (!) 111/50 99 %         Lab results were obtained and reviewed.  No results found for this or any previous visit (from the past 12 hour(s)).    Pre-medications  were administered as ordered and chemotherapy was initiated.  Medications Administered         carfilzomib (KYPROLIS) 127.4 mg in dextrose 5 % 100 mL chemo IVPB Admin Date  2024 Action  New Bag Dose  127.4 mg Rate  347.4 mL/hr Route  IntraVENous Documented By  Debra Fragoso RN        dexAMETHasone (DECADRON) tablet 20 mg Admin Date  2024 Action  Given Dose  20 mg Rate   Route  Oral Documented By  Debra Fragoso, RN        dextrose 5 % solution Admin Date  2024 Action  New Bag Dose  50 mL/hr Rate  50 mL/hr Route  IntraVENous Documented By  Debra Fragoso, RN        ondansetron (ZOFRAN) injection 8 mg Admin Date  2024 Action  Given Dose  8 mg Rate   Route  IntraVENous Documented By  Debra Fragoso RN        sodium chloride flush 0.9 % injection 5-40 mL Admin Date  2024 Action  Given Dose  20 mL Rate   Route  IntraVENous Documented By  Debra Fragoso RN        zoledronic acid (ZOMETA) 4 mg/100 mL infusion Admin Date  2024 Action  New Bag Dose  4 mg Rate  300 mL/hr Route  IntraVENous Documented By  Debra Fragoso, RN            Two nurses verified prior to administering:Drug name, Drug doseInfusion volume or drug volume when prepared in a syringe,

## 2024-11-21 NOTE — PROGRESS NOTES
Oral Chemotherapy      Candida Weaver is a  69 y.o.female  diagnosed with multiple myeloma . Ms. Weaver is being treated with KPd.      Medication name: pomalidomide  Regimen: KPd  Dose:   3 mg  Frequency: daily  Administration schedule: for 21 days followed by 7 days off every 28 days  Ordering provider: Nnay Arboleda MD  Start date: 11/21/24 (Cycle 9)      Lab Results   Component Value Date    WBC 3.1 (L) 11/20/2024    HGB 10.3 (L) 11/20/2024    HCT 33.9 (L) 11/20/2024    MCV 94.7 11/20/2024     11/20/2024    LYMPHOPCT 35 11/20/2024    RBC 3.58 (L) 11/20/2024    MCH 28.8 11/20/2024    MCHC 30.4 11/20/2024    RDW 14.6 (H) 11/20/2024       Lab Results   Component Value Date    NEUTROABS 1.1 (L) 11/20/2024             Expected follow up date: Labs/office visit each treatment. Next cycle start on 12/19/24     Patient provided with an Oral Chemotherapy Journal.              Rose Pa, PharmD, BCPS, BCOP    For Pharmacy Admin Tracking Only    Program: Medical Group  CPA in place:  Yes  Recommendation Provided To: Patient/Caregiver: 1 via In person    Intervention Accepted By: Patient/Caregiver: 1    Time Spent (min): 15

## 2024-11-21 NOTE — TELEPHONE ENCOUNTER
Pt called because she is frustrated with her infusion schedule. She states that she has been coming for 2 years and she does not have appointments that are later than 10:00 AM. PT stated that she can only do infusion appointments that are 9:30 am or 10:00 am  She would like for them to be changed ASAP. She states she was infusion center for 4 hours today. She was told this would be changed by provider when she checked out of her appointment today.    Please advise      [phone]

## 2024-11-21 NOTE — PROGRESS NOTES
Candida Weaver is a 69 y.o. female    Chief Complaint   Patient presents with    Follow-up     Multiple Myeloma        1. Have you been to the ER, urgent care clinic since your last visit?  Hospitalized since your last visit?No    2. Have you seen or consulted any other health care providers outside of the [facility] since your last visit?  Include any pap smears or colon screening. Yes, [facility]

## 2024-11-22 ENCOUNTER — TELEPHONE (OUTPATIENT)
Age: 69
End: 2024-11-22

## 2024-11-22 NOTE — TELEPHONE ENCOUNTER
Patient LVM. Stated needing to cancel opic appt on 11/26 at 9:30am. Pt stated will need to cancel due to an appt at [facility] on 11/26 at 8:30am.    #[phone]

## 2024-11-24 LAB
ALBUMIN SERPL ELPH-MCNC: 3.5 G/DL (ref 2.9–4.4)
ALBUMIN/GLOB SERPL: 1.8 (ref 0.7–1.7)
ALPHA1 GLOB SERPL ELPH-MCNC: 0.2 G/DL (ref 0–0.4)
ALPHA2 GLOB SERPL ELPH-MCNC: 0.7 G/DL (ref 0.4–1)
B-GLOBULIN SERPL ELPH-MCNC: 0.9 G/DL (ref 0.7–1.3)
GAMMA GLOB SERPL ELPH-MCNC: 0.3 G/DL (ref 0.4–1.8)
GLOBULIN SER-MCNC: 2 G/DL (ref 2.2–3.9)
IGA SERPL-MCNC: 19 MG/DL (ref 64–422)
IGG SERPL-MCNC: 235 MG/DL (ref 586–1602)
IGM SERPL-MCNC: 9 MG/DL (ref 26–217)
INTERPRETATION SERPL IEP-IMP: ABNORMAL
KAPPA LC FREE SER-MCNC: 76.5 MG/L (ref 3.3–19.4)
KAPPA LC FREE/LAMBDA FREE SER: 27.32 (ref 0.26–1.65)
LAMBDA LC FREE SERPL-MCNC: 2.8 MG/L (ref 5.7–26.3)
M PROTEIN SERPL ELPH-MCNC: ABNORMAL G/DL
PROT SERPL-MCNC: 5.5 G/DL (ref 6–8.5)

## 2024-11-26 ENCOUNTER — HOSPITAL ENCOUNTER (OUTPATIENT)
Facility: HOSPITAL | Age: 69
Setting detail: INFUSION SERIES
End: 2024-11-26

## 2024-11-27 ENCOUNTER — HOSPITAL ENCOUNTER (OUTPATIENT)
Facility: HOSPITAL | Age: 69
Setting detail: INFUSION SERIES
End: 2024-11-27

## 2024-11-27 DIAGNOSIS — Z11.59 ENCOUNTER FOR SCREENING FOR OTHER VIRAL DISEASES: ICD-10-CM

## 2024-11-27 DIAGNOSIS — C90.00 MULTIPLE MYELOMA NOT HAVING ACHIEVED REMISSION (HCC): Primary | ICD-10-CM

## 2024-12-02 ENCOUNTER — TELEPHONE (OUTPATIENT)
Age: 69
End: 2024-12-02

## 2024-12-02 NOTE — TELEPHONE ENCOUNTER
Patient LVM at 11:20am. Stated having an appt with Dr. Olivares at [facility] last week. Pt stated a lot of blood was taken for a blood test. Stated around 7 vials of blood were taken out. Stated wanting to know from Dr. Arboleda if she still needs to take blood test again on Wednesday this week or can the blood taken from Dr. Olivares at [facility] be used instead? Please advise.      #[phone]

## 2024-12-04 ENCOUNTER — TELEPHONE (OUTPATIENT)
Age: 69
End: 2024-12-04

## 2024-12-04 ENCOUNTER — HOSPITAL ENCOUNTER (OUTPATIENT)
Facility: HOSPITAL | Age: 69
Setting detail: INFUSION SERIES
End: 2024-12-04

## 2024-12-04 NOTE — TELEPHONE ENCOUNTER
Patient LVM at 824. Stated she has left a couple messages for Dr. Arboleda and has not heard back. Looks as if some communication has been done through [employer]. Pt stated she has to get lab tests done this morning. Stated she has done some lab testing at [facility]. Stated she did some yesterday at [facility] and with Dr. Olivares. Pt stated she was told that Dr. Arboleda can go in and check all of the lab tests. Stated wanting to know if she needs to come in this morning for another lab test. Pt stated this would be the third blood test she will have taken and has chemo tomorrow. Please advise.    #[phone]

## 2024-12-04 NOTE — TELEPHONE ENCOUNTER
HIPAA x2  SW pt to let her know that her labs from yesterday will be fine to use for her treatment for tomorrow. Pt verbalized understanding and was appreciative of my call.

## 2024-12-04 NOTE — TELEPHONE ENCOUNTER
LVM pt had labs done yesterday at [facility] will see if those labs are ok with provider.

## 2024-12-05 ENCOUNTER — HOSPITAL ENCOUNTER (OUTPATIENT)
Facility: HOSPITAL | Age: 69
Setting detail: INFUSION SERIES
Discharge: HOME | End: 2024-12-05
Payer: MEDICARE

## 2024-12-05 VITALS
HEIGHT: 65 IN | WEIGHT: 151 LBS | DIASTOLIC BLOOD PRESSURE: 51 MMHG | OXYGEN SATURATION: 95 % | BODY MASS INDEX: 25.16 KG/M2 | SYSTOLIC BLOOD PRESSURE: 114 MMHG | TEMPERATURE: 97.2 F | HEART RATE: 58 BPM

## 2024-12-05 DIAGNOSIS — C90.00 MULTIPLE MYELOMA NOT HAVING ACHIEVED REMISSION (HCC): ICD-10-CM

## 2024-12-05 DIAGNOSIS — Z11.59 ENCOUNTER FOR SCREENING FOR OTHER VIRAL DISEASES: Primary | ICD-10-CM

## 2024-12-05 PROCEDURE — 6360000002 HC RX W HCPCS: Performed by: INTERNAL MEDICINE

## 2024-12-05 PROCEDURE — 2580000003 HC RX 258: Performed by: INTERNAL MEDICINE

## 2024-12-05 PROCEDURE — 96413 CHEMO IV INFUSION 1 HR: CPT

## 2024-12-05 PROCEDURE — 96375 TX/PRO/DX INJ NEW DRUG ADDON: CPT

## 2024-12-05 RX ORDER — ONDANSETRON 2 MG/ML
8 INJECTION INTRAMUSCULAR; INTRAVENOUS ONCE
Status: COMPLETED | OUTPATIENT
Start: 2024-12-05 | End: 2024-12-05

## 2024-12-05 RX ORDER — DEXAMETHASONE 4 MG/1
12 TABLET ORAL ONCE
Status: COMPLETED | OUTPATIENT
Start: 2024-12-05 | End: 2024-12-05

## 2024-12-05 RX ORDER — DEXAMETHASONE 4 MG/1
20 TABLET ORAL ONCE
Status: DISCONTINUED | OUTPATIENT
Start: 2024-12-05 | End: 2024-12-06 | Stop reason: HOSPADM

## 2024-12-05 RX ADMIN — ONDANSETRON 8 MG: 2 INJECTION INTRAMUSCULAR; INTRAVENOUS at 10:02

## 2024-12-05 RX ADMIN — CARFILZOMIB 127.4 MG: 10 INJECTION, POWDER, LYOPHILIZED, FOR SOLUTION INTRAVENOUS at 10:23

## 2024-12-05 RX ADMIN — DEXAMETHASONE 12 MG: 4 TABLET ORAL at 10:07

## 2024-12-05 ASSESSMENT — PAIN SCALES - GENERAL: PAINLEVEL_OUTOF10: 9

## 2024-12-05 ASSESSMENT — PAIN DESCRIPTION - PAIN TYPE: TYPE: CHRONIC PAIN

## 2024-12-05 ASSESSMENT — PAIN DESCRIPTION - LOCATION: LOCATION: SHOULDER

## 2024-12-05 ASSESSMENT — PAIN DESCRIPTION - FREQUENCY: FREQUENCY: CONTINUOUS

## 2024-12-05 ASSESSMENT — PAIN DESCRIPTION - ORIENTATION: ORIENTATION: RIGHT

## 2024-12-05 NOTE — PROGRESS NOTES
\[facility]\"" Progress Note    Ms. Weaver Arrived ambulatory and in no distress for Kyprolis regimen.  Assessment was completed, no acute issues at this time, no new complaints voiced.  Port accessed without difficulty.          Ms. Weaver's vitals were reviewed.  Patient Vitals for the past 12 hrs:   Temp Pulse BP SpO2   12/05/24 1045 -- 58 (!) 114/51 --   12/05/24 0845 97.2 °F (36.2 °C) 63 (!) 111/44 95 %         Lab results were obtained and reviewed.  Results from 12/04/2024    Pre-medications  were administered as ordered and chemotherapy was initiated.  Medications Administered         carfilzomib (KYPROLIS) 127.4 mg in dextrose 5 % 100 mL chemo IVPB Admin Date  12/05/2024 Action  New Bag Dose  127.4 mg Rate  347.4 mL/hr Route  IntraVENous Documented By  Rachelle Grayson, SABRINA        dexAMETHasone (DECADRON) tablet 12 mg Admin Date  12/05/2024 Action  Given Dose  12 mg Rate   Route  Oral Documented By  Rachelle Grayson, SABRINA        ondansetron (ZOFRAN) injection 8 mg Admin Date  12/05/2024 Action  Given Dose  8 mg Rate   Route  IntraVENous Documented By  Rachelle Grayson, SABRINA          Blood return maintained throughout infusion.    Two nurses verified prior to administering: Drug name, Drug dose, Infusion volume or drug volume when prepared in a syringe, Rate of administration, Route of administration, Expiration dates and/or times, Appearance and physical integrity of the drugs, Rate set on infusion pump, when used, and Sequencing of drug administration.    Ms. Weaver tolerated treatment well, port flushed and de accessed, [facility].     Future Appointments   Date Time Provider Department Center   12/18/2024  9:30 AM PEDS LAB CHAIR 1 [facility]   12/19/2024 10:00 AM RUTH CHEMO CHAIR 1 [facility]   12/19/2024 10:15 AM Nany Arboleda MD [facility] BS [facility]   12/24/2024 10:30 AM PEDS LAB CHAIR 1 [facility]   12/26/2024 11:30 AM [facility]    12/31/2024  3:00 PM PEDS LAB CHAIR 1 [facility]   1/2/2025 11:00 AM RUTH CHEMO CHAIR 3 [facility]   1/15/2025  9:30 AM PEDS LAB CHAIR 1 [facility]   1/16/2025 10:30 AM RUTH CHEMO CHAIR 2 [facility]   1/22/2025  9:30 AM PEDS LAB CHAIR 1 [facility]   1/23/2025  9:00 AM RUTH CHEMO CHAIR 5 [facility]   1/29/2025  9:30 AM PEDS LAB CHAIR 1 [facility]   1/30/2025 10:00 AM RUTH CHEMO CHAIR 1 [facility]         Rachelle Grayson RN  December 5, 2024

## 2024-12-06 DIAGNOSIS — J98.01 COUGH DUE TO BRONCHOSPASM: ICD-10-CM

## 2024-12-06 DIAGNOSIS — C90.00 MULTIPLE MYELOMA NOT HAVING ACHIEVED REMISSION (HCC): ICD-10-CM

## 2024-12-06 RX ORDER — ALBUTEROL SULFATE 0.83 MG/ML
2.5 SOLUTION RESPIRATORY (INHALATION) 4 TIMES DAILY PRN
Qty: 120 EACH | Refills: 3 | Status: SHIPPED | OUTPATIENT
Start: 2024-12-06

## 2024-12-06 NOTE — TELEPHONE ENCOUNTER
Pt requesting for a nurse to call her because she wanted Dr. Vazquez to order her a nebulizer since she suffers from asthma.

## 2024-12-06 NOTE — TELEPHONE ENCOUNTER
Oral Chemotherapy      Candida Weaver is a  69 y.o.female  diagnosed with multiple myeloma . Ms. Weaver is being treated with KPd.      Medication name: pomalidomide  Regimen: KPd  Dose:  4 mg (increase dose for 9/26/24)  Frequency: daily  Administration schedule: for 21 days followed by 7 days off every 28 days  Ordering provider: Nany Arboleda MD  Start date: 11/21/24 (Cycle 9)      REMS auth # [id number]  Prescription sent to pharmacy for processing.         Rose Pa, WOOD, BCOP, BCPS    For Pharmacy Admin Tracking Only    Program: Medical Group  CPA in place:  Yes  Recommendation Provided To: Patient/Caregiver: 1 via Telephone  Intervention Detail: Refill(s) Provided  Intervention Accepted By: Patient/Caregiver: 1    Time Spent (min): 10

## 2024-12-09 ENCOUNTER — TELEPHONE (OUTPATIENT)
Dept: PRIMARY CARE CLINIC | Facility: CLINIC | Age: 69
End: 2024-12-09

## 2024-12-09 DIAGNOSIS — J98.01 COUGH DUE TO BRONCHOSPASM: Primary | ICD-10-CM

## 2024-12-09 NOTE — TELEPHONE ENCOUNTER
Patient called and transferred over as urgent.  Patient stated that the pharmacy doesn't have nebulizer medication. I tried calling, but the pharmacy is closed until 9.    Patient can barely breathe, struggling to talk on the phone, she is panicking over how tight her chest is, started Friday night, patient believes that it is her asthma but she is actively going through Chemo treatments

## 2024-12-10 RX ORDER — SODIUM CHLORIDE 0.9 % (FLUSH) 0.9 %
5-40 SYRINGE (ML) INJECTION PRN
OUTPATIENT
Start: 2025-01-02

## 2024-12-10 RX ORDER — DEXTROSE MONOHYDRATE 50 MG/ML
5-250 INJECTION, SOLUTION INTRAVENOUS PRN
Status: CANCELLED | OUTPATIENT
Start: 2024-12-19

## 2024-12-10 RX ORDER — DIPHENHYDRAMINE HYDROCHLORIDE 50 MG/ML
50 INJECTION INTRAMUSCULAR; INTRAVENOUS
OUTPATIENT
Start: 2025-01-09

## 2024-12-10 RX ORDER — ONDANSETRON 2 MG/ML
8 INJECTION INTRAMUSCULAR; INTRAVENOUS ONCE
Start: 2025-01-09 | End: 2025-01-02

## 2024-12-10 RX ORDER — HYDROCORTISONE SODIUM SUCCINATE 100 MG/2ML
100 INJECTION INTRAMUSCULAR; INTRAVENOUS
OUTPATIENT
Start: 2025-01-09

## 2024-12-10 RX ORDER — SODIUM CHLORIDE 9 MG/ML
5-250 INJECTION, SOLUTION INTRAVENOUS PRN
Status: CANCELLED | OUTPATIENT
Start: 2024-12-19

## 2024-12-10 RX ORDER — ACETAMINOPHEN 325 MG/1
650 TABLET ORAL
OUTPATIENT
Start: 2025-01-02

## 2024-12-10 RX ORDER — ACETAMINOPHEN 325 MG/1
650 TABLET ORAL
Status: CANCELLED | OUTPATIENT
Start: 2024-12-19

## 2024-12-10 RX ORDER — ALBUTEROL SULFATE 90 UG/1
4 INHALANT RESPIRATORY (INHALATION) PRN
OUTPATIENT
Start: 2025-01-09

## 2024-12-10 RX ORDER — HEPARIN 100 UNIT/ML
500 SYRINGE INTRAVENOUS PRN
OUTPATIENT
Start: 2025-01-09

## 2024-12-10 RX ORDER — ACETAMINOPHEN 325 MG/1
650 TABLET ORAL
OUTPATIENT
Start: 2025-01-09

## 2024-12-10 RX ORDER — EPINEPHRINE 1 MG/ML
0.3 INJECTION, SOLUTION INTRAMUSCULAR; SUBCUTANEOUS PRN
Status: CANCELLED | OUTPATIENT
Start: 2024-12-19

## 2024-12-10 RX ORDER — DEXTROSE MONOHYDRATE 50 MG/ML
5-250 INJECTION, SOLUTION INTRAVENOUS PRN
OUTPATIENT
Start: 2025-01-09

## 2024-12-10 RX ORDER — HYDROCORTISONE SODIUM SUCCINATE 100 MG/2ML
100 INJECTION INTRAMUSCULAR; INTRAVENOUS
OUTPATIENT
Start: 2025-01-02

## 2024-12-10 RX ORDER — SODIUM CHLORIDE 9 MG/ML
INJECTION, SOLUTION INTRAVENOUS CONTINUOUS
OUTPATIENT
Start: 2025-01-09

## 2024-12-10 RX ORDER — DEXAMETHASONE 4 MG/1
20 TABLET ORAL ONCE
Status: CANCELLED
Start: 2024-12-19 | End: 2024-12-19

## 2024-12-10 RX ORDER — ONDANSETRON 2 MG/ML
8 INJECTION INTRAMUSCULAR; INTRAVENOUS ONCE
Start: 2025-01-02 | End: 2024-12-26

## 2024-12-10 RX ORDER — DEXTROSE MONOHYDRATE 50 MG/ML
5-250 INJECTION, SOLUTION INTRAVENOUS PRN
OUTPATIENT
Start: 2025-01-02

## 2024-12-10 RX ORDER — ONDANSETRON 2 MG/ML
8 INJECTION INTRAMUSCULAR; INTRAVENOUS ONCE
Status: CANCELLED
Start: 2024-12-19 | End: 2024-12-19

## 2024-12-10 RX ORDER — SODIUM CHLORIDE 9 MG/ML
5-250 INJECTION, SOLUTION INTRAVENOUS PRN
OUTPATIENT
Start: 2025-01-02

## 2024-12-10 RX ORDER — ONDANSETRON 2 MG/ML
8 INJECTION INTRAMUSCULAR; INTRAVENOUS
OUTPATIENT
Start: 2025-01-02

## 2024-12-10 RX ORDER — SODIUM CHLORIDE 9 MG/ML
INJECTION, SOLUTION INTRAVENOUS CONTINUOUS
OUTPATIENT
Start: 2025-01-02

## 2024-12-10 RX ORDER — DEXAMETHASONE 4 MG/1
20 TABLET ORAL ONCE
Start: 2025-01-02 | End: 2024-12-26

## 2024-12-10 RX ORDER — EPINEPHRINE 1 MG/ML
0.3 INJECTION, SOLUTION INTRAMUSCULAR; SUBCUTANEOUS PRN
OUTPATIENT
Start: 2025-01-09

## 2024-12-10 RX ORDER — ONDANSETRON 2 MG/ML
8 INJECTION INTRAMUSCULAR; INTRAVENOUS
OUTPATIENT
Start: 2025-01-09

## 2024-12-10 RX ORDER — ONDANSETRON 2 MG/ML
8 INJECTION INTRAMUSCULAR; INTRAVENOUS
Status: CANCELLED | OUTPATIENT
Start: 2024-12-19

## 2024-12-10 RX ORDER — ALBUTEROL SULFATE 90 UG/1
4 INHALANT RESPIRATORY (INHALATION) PRN
Status: CANCELLED | OUTPATIENT
Start: 2024-12-19

## 2024-12-10 RX ORDER — HEPARIN 100 UNIT/ML
500 SYRINGE INTRAVENOUS PRN
Status: CANCELLED | OUTPATIENT
Start: 2024-12-19

## 2024-12-10 RX ORDER — DIPHENHYDRAMINE HYDROCHLORIDE 50 MG/ML
50 INJECTION INTRAMUSCULAR; INTRAVENOUS
Status: CANCELLED | OUTPATIENT
Start: 2024-12-19

## 2024-12-10 RX ORDER — SODIUM CHLORIDE 0.9 % (FLUSH) 0.9 %
5-40 SYRINGE (ML) INJECTION PRN
OUTPATIENT
Start: 2025-01-09

## 2024-12-10 RX ORDER — SODIUM CHLORIDE 0.9 % (FLUSH) 0.9 %
5-40 SYRINGE (ML) INJECTION PRN
Status: CANCELLED | OUTPATIENT
Start: 2024-12-19

## 2024-12-10 RX ORDER — EPINEPHRINE 1 MG/ML
0.3 INJECTION, SOLUTION INTRAMUSCULAR; SUBCUTANEOUS PRN
OUTPATIENT
Start: 2025-01-02

## 2024-12-10 RX ORDER — ALBUTEROL SULFATE 90 UG/1
4 INHALANT RESPIRATORY (INHALATION) PRN
OUTPATIENT
Start: 2025-01-02

## 2024-12-10 RX ORDER — DIPHENHYDRAMINE HYDROCHLORIDE 50 MG/ML
50 INJECTION INTRAMUSCULAR; INTRAVENOUS
OUTPATIENT
Start: 2025-01-02

## 2024-12-10 RX ORDER — SODIUM CHLORIDE 9 MG/ML
5-250 INJECTION, SOLUTION INTRAVENOUS PRN
OUTPATIENT
Start: 2025-01-09

## 2024-12-10 RX ORDER — SODIUM CHLORIDE 9 MG/ML
INJECTION, SOLUTION INTRAVENOUS CONTINUOUS
Status: CANCELLED | OUTPATIENT
Start: 2024-12-19

## 2024-12-10 RX ORDER — HEPARIN 100 UNIT/ML
500 SYRINGE INTRAVENOUS PRN
OUTPATIENT
Start: 2025-01-02

## 2024-12-10 RX ORDER — DEXAMETHASONE 4 MG/1
20 TABLET ORAL ONCE
Start: 2025-01-09 | End: 2025-01-02

## 2024-12-10 RX ORDER — HYDROCORTISONE SODIUM SUCCINATE 100 MG/2ML
100 INJECTION INTRAMUSCULAR; INTRAVENOUS
Status: CANCELLED | OUTPATIENT
Start: 2024-12-19

## 2024-12-17 NOTE — PROGRESS NOTES
[facility] [place] at [facility]  [address], [address] [place] [address]  W: [phone]  F: [phone]    Reason for Visit:   Candida Weaver is a 69 y.o. female who is seen for Multiple Myeloma     Treatment History:   10/25/2023: Kayli VRD, Rev lite  3/2024: Kyprolis Pomalyst and Dexamethasone    History of Present Illness:   Patient is a 69 y.o. female who was dx with Smoldering Myeloma in 2017 and has been seeing [facility] Dr[facility] Lopez. A BM bx at that time showed 40% plasma cells. No end organ damage. Noted to have worsening anemia 7/5/2023 with a Hb of 9.6 g/dl. This led to further evaluation that included a gammopathy eval that showed an M spike of 0.3 g/dl. She had a Kappa LC level of 1593 g/L with a K:L ratio of 157. Sent for evaluation now. She was in the ER June 2023 with some SOB. Had a CT head and this showed lytic lesions. She states that she has some carpal tunnel. BM bx showed Myeloma. She is on Kayli VRD. She had minimal response and was switched to Kyprolis and Pomalyst and Dexamethasone.    She has some R chest wall pain x 1 day, worse on breathing, She had a URI last week. She has no fevers, has mild.    She works nights, she has better SOB, no leg swelling. She worked last night.   Review of systems was obtained and pertinent findings reviewed above. Past medical history, social history, family history, medications, and allergies are located in the electronic medical record.    Physical Exam:   /69 (Site: Right Upper Arm, Position: Sitting)   Pulse 76   Temp 98.2 °F (36.8 °C)   Resp 16   Wt 64 kg (141 lb)   SpO2 92%   BMI 23.46 kg/m²    Physical Exam  Constitutional: No acute distress, non-toxic appearance, and non-diaphoretic.  HENT: Normocephalic and atraumatic head.    Eyes: Normal conjunctivae, anicteric sclerae.  Cardiovascular: No pitting edema.  Pulmonary: Normal respiratory effort. No chest wall tenderness   Abdominal: No abdominal distention.  Skin: No

## 2024-12-18 ENCOUNTER — HOSPITAL ENCOUNTER (OUTPATIENT)
Facility: HOSPITAL | Age: 69
Setting detail: INFUSION SERIES
Discharge: HOME | End: 2024-12-18
Payer: MEDICARE

## 2024-12-18 VITALS
DIASTOLIC BLOOD PRESSURE: 63 MMHG | SYSTOLIC BLOOD PRESSURE: 127 MMHG | OXYGEN SATURATION: 95 % | HEART RATE: 78 BPM | TEMPERATURE: 97.9 F | RESPIRATION RATE: 20 BRPM

## 2024-12-18 DIAGNOSIS — C90.00 MULTIPLE MYELOMA NOT HAVING ACHIEVED REMISSION (HCC): Primary | ICD-10-CM

## 2024-12-18 DIAGNOSIS — Z11.59 ENCOUNTER FOR SCREENING FOR OTHER VIRAL DISEASES: ICD-10-CM

## 2024-12-18 LAB
ALBUMIN SERPL-MCNC: 3.3 G/DL (ref 3.5–5)
ALBUMIN/GLOB SERPL: 1.2 (ref 1.1–2.2)
ALP SERPL-CCNC: 69 U/L (ref 45–117)
ALT SERPL-CCNC: 17 U/L (ref 12–78)
ANION GAP SERPL CALC-SCNC: 6 MMOL/L (ref 2–12)
AST SERPL-CCNC: 9 U/L (ref 15–37)
BASOPHILS # BLD: 0.1 K/UL (ref 0–0.1)
BASOPHILS NFR BLD: 4 % (ref 0–1)
BILIRUB SERPL-MCNC: 0.4 MG/DL (ref 0.2–1)
BUN SERPL-MCNC: 13 MG/DL (ref 6–20)
BUN/CREAT SERPL: 18 (ref 12–20)
CALCIUM SERPL-MCNC: 9.2 MG/DL (ref 8.5–10.1)
CHLORIDE SERPL-SCNC: 109 MMOL/L (ref 97–108)
CO2 SERPL-SCNC: 22 MMOL/L (ref 21–32)
CREAT SERPL-MCNC: 0.73 MG/DL (ref 0.55–1.02)
DIFFERENTIAL METHOD BLD: ABNORMAL
EOSINOPHIL # BLD: 0.1 K/UL (ref 0–0.4)
EOSINOPHIL NFR BLD: 4 % (ref 0–7)
ERYTHROCYTE [DISTWIDTH] IN BLOOD BY AUTOMATED COUNT: 14.9 % (ref 11.5–14.5)
GLOBULIN SER CALC-MCNC: 2.8 G/DL (ref 2–4)
GLUCOSE SERPL-MCNC: 105 MG/DL (ref 65–100)
HCT VFR BLD AUTO: 32.3 % (ref 35–47)
HGB BLD-MCNC: 10 G/DL (ref 11.5–16)
IMM GRANULOCYTES # BLD AUTO: 0 K/UL (ref 0–0.04)
IMM GRANULOCYTES NFR BLD AUTO: 1 % (ref 0–0.5)
LYMPHOCYTES # BLD: 0.9 K/UL (ref 0.8–3.5)
LYMPHOCYTES NFR BLD: 31 % (ref 12–49)
MCH RBC QN AUTO: 28.3 PG (ref 26–34)
MCHC RBC AUTO-ENTMCNC: 31 G/DL (ref 30–36.5)
MCV RBC AUTO: 91.5 FL (ref 80–99)
MONOCYTES # BLD: 0.7 K/UL (ref 0–1)
MONOCYTES NFR BLD: 22 % (ref 5–13)
NEUTS SEG # BLD: 1.2 K/UL (ref 1.8–8)
NEUTS SEG NFR BLD: 38 % (ref 32–75)
NRBC # BLD: 0 K/UL (ref 0–0.01)
NRBC BLD-RTO: 0 PER 100 WBC
PLATELET # BLD AUTO: 437 K/UL (ref 150–400)
PMV BLD AUTO: 10.6 FL (ref 8.9–12.9)
POTASSIUM SERPL-SCNC: 4 MMOL/L (ref 3.5–5.1)
PROT SERPL-MCNC: 6.1 G/DL (ref 6.4–8.2)
RBC # BLD AUTO: 3.53 M/UL (ref 3.8–5.2)
RBC MORPH BLD: ABNORMAL
SODIUM SERPL-SCNC: 137 MMOL/L (ref 136–145)
WBC # BLD AUTO: 3 K/UL (ref 3.6–11)

## 2024-12-18 PROCEDURE — 86334 IMMUNOFIX E-PHORESIS SERUM: CPT

## 2024-12-18 PROCEDURE — 36415 COLL VENOUS BLD VENIPUNCTURE: CPT

## 2024-12-18 PROCEDURE — 82784 ASSAY IGA/IGD/IGG/IGM EACH: CPT

## 2024-12-18 PROCEDURE — 84165 PROTEIN E-PHORESIS SERUM: CPT

## 2024-12-18 PROCEDURE — 85025 COMPLETE CBC W/AUTO DIFF WBC: CPT

## 2024-12-18 PROCEDURE — 80053 COMPREHEN METABOLIC PANEL: CPT

## 2024-12-18 PROCEDURE — 83521 IG LIGHT CHAINS FREE EACH: CPT

## 2024-12-18 ASSESSMENT — PAIN DESCRIPTION - PAIN TYPE: TYPE: CHRONIC PAIN

## 2024-12-18 ASSESSMENT — PAIN SCALES - GENERAL: PAINLEVEL_OUTOF10: 9

## 2024-12-18 ASSESSMENT — PAIN DESCRIPTION - ORIENTATION: ORIENTATION: LEFT

## 2024-12-18 ASSESSMENT — PAIN DESCRIPTION - LOCATION: LOCATION: RIB CAGE

## 2024-12-18 ASSESSMENT — PAIN DESCRIPTION - DESCRIPTORS: DESCRIPTORS: ACHING

## 2024-12-18 NOTE — PROGRESS NOTES
[facility] Lab visit:     0930: Patient arrived ambulatory and in no distress.  Labs drawn from L hand. Departed [facility] ambulatory and in no distress.     Visit Vitals:  Patient Vitals for the past 12 hrs:   Temp Pulse Resp BP SpO2   12/18/24 0939 97.9 °F (36.6 °C) 78 20 127/63 95 %       Labs: See CC for pending results.  Recent Results (from the past 12 hour(s))   CBC With Auto Differential    Collection Time: 12/18/24  9:41 AM   Result Value Ref Range    WBC 3.0 (L) 3.6 - 11.0 K/uL    RBC 3.53 (L) 3.80 - 5.20 M/uL    Hemoglobin 10.0 (L) 11.5 - 16.0 g/dL    Hematocrit 32.3 (L) 35.0 - 47.0 %    MCV 91.5 80.0 - 99.0 FL    MCH 28.3 26.0 - 34.0 PG    MCHC 31.0 30.0 - 36.5 g/dL    RDW 14.9 (H) 11.5 - 14.5 %    Platelets 437 (H) 150 - 400 K/uL    MPV 10.6 8.9 - 12.9 FL    Nucleated RBCs 0.0 0  WBC    nRBC 0.00 0.00 - 0.01 K/uL    Neutrophils % PENDING %    Lymphocytes % PENDING %    Monocytes % PENDING %    Eosinophils % PENDING %    Basophils % PENDING %    Immature Granulocytes % PENDING %    Neutrophils Absolute PENDING K/UL    Lymphocytes Absolute PENDING K/UL    Monocytes Absolute PENDING K/UL    Eosinophils Absolute PENDING K/UL    Basophils Absolute PENDING K/UL    Immature Granulocytes Absolute PENDING K/UL    Differential Type PENDING

## 2024-12-19 ENCOUNTER — TELEPHONE (OUTPATIENT)
Age: 69
End: 2024-12-19

## 2024-12-19 ENCOUNTER — HOSPITAL ENCOUNTER (INPATIENT)
Facility: HOSPITAL | Age: 69
LOS: 2 days | Discharge: HOME | DRG: 175 | End: 2024-12-22
Attending: EMERGENCY MEDICINE | Admitting: FAMILY MEDICINE
Payer: MEDICARE

## 2024-12-19 ENCOUNTER — HOSPITAL ENCOUNTER (OUTPATIENT)
Facility: HOSPITAL | Age: 69
Setting detail: INFUSION SERIES
Discharge: HOME | End: 2024-12-19

## 2024-12-19 ENCOUNTER — OFFICE VISIT (OUTPATIENT)
Age: 69
End: 2024-12-19
Payer: MEDICARE

## 2024-12-19 ENCOUNTER — HOSPITAL ENCOUNTER (OUTPATIENT)
Facility: HOSPITAL | Age: 69
Discharge: HOME | DRG: 175 | End: 2024-12-22
Payer: MEDICARE

## 2024-12-19 VITALS
DIASTOLIC BLOOD PRESSURE: 69 MMHG | OXYGEN SATURATION: 92 % | WEIGHT: 141 LBS | HEART RATE: 76 BPM | TEMPERATURE: 98.2 F | SYSTOLIC BLOOD PRESSURE: 116 MMHG | BODY MASS INDEX: 23.46 KG/M2 | RESPIRATION RATE: 16 BRPM

## 2024-12-19 DIAGNOSIS — R06.00 DYSPNEA, UNSPECIFIED TYPE: Primary | ICD-10-CM

## 2024-12-19 DIAGNOSIS — I26.99 OTHER ACUTE PULMONARY EMBOLISM WITHOUT ACUTE COR PULMONALE (HCC): Primary | ICD-10-CM

## 2024-12-19 DIAGNOSIS — R06.00 DYSPNEA, UNSPECIFIED TYPE: ICD-10-CM

## 2024-12-19 DIAGNOSIS — R06.02 SHORTNESS OF BREATH: ICD-10-CM

## 2024-12-19 DIAGNOSIS — C90.00 MULTIPLE MYELOMA NOT HAVING ACHIEVED REMISSION (HCC): Primary | ICD-10-CM

## 2024-12-19 DIAGNOSIS — R07.9 CHEST PAIN, UNSPECIFIED TYPE: Primary | ICD-10-CM

## 2024-12-19 DIAGNOSIS — C90.00 MULTIPLE MYELOMA NOT HAVING ACHIEVED REMISSION (HCC): ICD-10-CM

## 2024-12-19 DIAGNOSIS — I26.99 ACUTE PULMONARY EMBOLISM WITHOUT ACUTE COR PULMONALE, UNSPECIFIED PULMONARY EMBOLISM TYPE (HCC): Primary | ICD-10-CM

## 2024-12-19 DIAGNOSIS — Z11.59 ENCOUNTER FOR SCREENING FOR OTHER VIRAL DISEASES: Primary | ICD-10-CM

## 2024-12-19 LAB
ALBUMIN SERPL-MCNC: 3.5 G/DL (ref 3.5–5)
ALBUMIN/GLOB SERPL: 1.3 (ref 1.1–2.2)
ALP SERPL-CCNC: 75 U/L (ref 45–117)
ALT SERPL-CCNC: 18 U/L (ref 12–78)
ANION GAP SERPL CALC-SCNC: 6 MMOL/L (ref 2–12)
APTT PPP: 28.7 SEC (ref 22.1–31)
AST SERPL-CCNC: 29 U/L (ref 15–37)
BASOPHILS # BLD: 0.1 K/UL (ref 0–0.1)
BASOPHILS NFR BLD: 3 % (ref 0–1)
BILIRUB SERPL-MCNC: 0.4 MG/DL (ref 0.2–1)
BUN SERPL-MCNC: 12 MG/DL (ref 6–20)
BUN/CREAT SERPL: 17 (ref 12–20)
CALCIUM SERPL-MCNC: 8.9 MG/DL (ref 8.5–10.1)
CHLORIDE SERPL-SCNC: 108 MMOL/L (ref 97–108)
CO2 SERPL-SCNC: 23 MMOL/L (ref 21–32)
CREAT SERPL-MCNC: 0.71 MG/DL (ref 0.55–1.02)
DIFFERENTIAL METHOD BLD: ABNORMAL
EOSINOPHIL # BLD: 0.1 K/UL (ref 0–0.4)
EOSINOPHIL NFR BLD: 2 % (ref 0–7)
ERYTHROCYTE [DISTWIDTH] IN BLOOD BY AUTOMATED COUNT: 15 % (ref 11.5–14.5)
GLOBULIN SER CALC-MCNC: 2.7 G/DL (ref 2–4)
GLUCOSE SERPL-MCNC: 120 MG/DL (ref 65–100)
HCT VFR BLD AUTO: 33.2 % (ref 35–47)
HGB BLD-MCNC: 10.4 G/DL (ref 11.5–16)
IMM GRANULOCYTES # BLD AUTO: 0 K/UL (ref 0–0.04)
IMM GRANULOCYTES NFR BLD AUTO: 0 % (ref 0–0.5)
INR PPP: 1 (ref 0.9–1.1)
LYMPHOCYTES # BLD: 1.3 K/UL (ref 0.8–3.5)
LYMPHOCYTES NFR BLD: 28 % (ref 12–49)
MCH RBC QN AUTO: 28.7 PG (ref 26–34)
MCHC RBC AUTO-ENTMCNC: 31.3 G/DL (ref 30–36.5)
MCV RBC AUTO: 91.7 FL (ref 80–99)
MONOCYTES # BLD: 1 K/UL (ref 0–1)
MONOCYTES NFR BLD: 21 % (ref 5–13)
NEUTS SEG # BLD: 2.3 K/UL (ref 1.8–8)
NEUTS SEG NFR BLD: 46 % (ref 32–75)
NRBC # BLD: 0 K/UL (ref 0–0.01)
NRBC BLD-RTO: 0 PER 100 WBC
NT PRO BNP: 84 PG/ML
PLATELET # BLD AUTO: 518 K/UL (ref 150–400)
PMV BLD AUTO: 10.5 FL (ref 8.9–12.9)
POTASSIUM SERPL-SCNC: 5 MMOL/L (ref 3.5–5.1)
PROT SERPL-MCNC: 6.2 G/DL (ref 6.4–8.2)
PROTHROMBIN TIME: 10.9 SEC (ref 9–11.1)
RBC # BLD AUTO: 3.62 M/UL (ref 3.8–5.2)
RBC MORPH BLD: ABNORMAL
RBC MORPH BLD: ABNORMAL
SODIUM SERPL-SCNC: 137 MMOL/L (ref 136–145)
THERAPEUTIC RANGE: NORMAL SECS (ref 58–77)
TROPONIN I SERPL HS-MCNC: 8 NG/L (ref 0–51)
WBC # BLD AUTO: 4.8 K/UL (ref 3.6–11)

## 2024-12-19 PROCEDURE — 71275 CT ANGIOGRAPHY CHEST: CPT

## 2024-12-19 PROCEDURE — G8420 CALC BMI NORM PARAMETERS: HCPCS | Performed by: INTERNAL MEDICINE

## 2024-12-19 PROCEDURE — 85610 PROTHROMBIN TIME: CPT

## 2024-12-19 PROCEDURE — 85025 COMPLETE CBC W/AUTO DIFF WBC: CPT

## 2024-12-19 PROCEDURE — 1125F AMNT PAIN NOTED PAIN PRSNT: CPT | Performed by: INTERNAL MEDICINE

## 2024-12-19 PROCEDURE — G8399 PT W/DXA RESULTS DOCUMENT: HCPCS | Performed by: INTERNAL MEDICINE

## 2024-12-19 PROCEDURE — 6360000004 HC RX CONTRAST MEDICATION: Performed by: RADIOLOGY

## 2024-12-19 PROCEDURE — 3017F COLORECTAL CA SCREEN DOC REV: CPT | Performed by: INTERNAL MEDICINE

## 2024-12-19 PROCEDURE — 99215 OFFICE O/P EST HI 40 MIN: CPT | Performed by: INTERNAL MEDICINE

## 2024-12-19 PROCEDURE — 85730 THROMBOPLASTIN TIME PARTIAL: CPT

## 2024-12-19 PROCEDURE — 84484 ASSAY OF TROPONIN QUANT: CPT

## 2024-12-19 PROCEDURE — 99285 EMERGENCY DEPT VISIT HI MDM: CPT

## 2024-12-19 PROCEDURE — 1036F TOBACCO NON-USER: CPT | Performed by: INTERNAL MEDICINE

## 2024-12-19 PROCEDURE — G8484 FLU IMMUNIZE NO ADMIN: HCPCS | Performed by: INTERNAL MEDICINE

## 2024-12-19 PROCEDURE — 1090F PRES/ABSN URINE INCON ASSESS: CPT | Performed by: INTERNAL MEDICINE

## 2024-12-19 PROCEDURE — 80053 COMPREHEN METABOLIC PANEL: CPT

## 2024-12-19 PROCEDURE — 36415 COLL VENOUS BLD VENIPUNCTURE: CPT

## 2024-12-19 PROCEDURE — 83880 ASSAY OF NATRIURETIC PEPTIDE: CPT

## 2024-12-19 PROCEDURE — 1123F ACP DISCUSS/DSCN MKR DOCD: CPT | Performed by: INTERNAL MEDICINE

## 2024-12-19 PROCEDURE — G8428 CUR MEDS NOT DOCUMENT: HCPCS | Performed by: INTERNAL MEDICINE

## 2024-12-19 RX ORDER — HEPARIN 100 UNIT/ML
500 SYRINGE INTRAVENOUS PRN
Status: CANCELLED | OUTPATIENT
Start: 2024-12-26

## 2024-12-19 RX ORDER — DIPHENHYDRAMINE HYDROCHLORIDE 50 MG/ML
50 INJECTION INTRAMUSCULAR; INTRAVENOUS
Status: CANCELLED | OUTPATIENT
Start: 2024-12-26

## 2024-12-19 RX ORDER — ONDANSETRON 2 MG/ML
8 INJECTION INTRAMUSCULAR; INTRAVENOUS ONCE
Status: CANCELLED
Start: 2024-12-26 | End: 2024-12-26

## 2024-12-19 RX ORDER — ALBUTEROL SULFATE 90 UG/1
4 INHALANT RESPIRATORY (INHALATION) PRN
Status: CANCELLED | OUTPATIENT
Start: 2024-12-26

## 2024-12-19 RX ORDER — IOPAMIDOL 755 MG/ML
100 INJECTION, SOLUTION INTRAVASCULAR
Status: COMPLETED | OUTPATIENT
Start: 2024-12-19 | End: 2024-12-19

## 2024-12-19 RX ORDER — SODIUM CHLORIDE 0.9 % (FLUSH) 0.9 %
5-40 SYRINGE (ML) INJECTION PRN
Status: CANCELLED | OUTPATIENT
Start: 2024-12-26

## 2024-12-19 RX ORDER — ONDANSETRON 2 MG/ML
8 INJECTION INTRAMUSCULAR; INTRAVENOUS
Status: CANCELLED | OUTPATIENT
Start: 2024-12-26

## 2024-12-19 RX ORDER — SODIUM CHLORIDE 9 MG/ML
INJECTION, SOLUTION INTRAVENOUS CONTINUOUS
Status: CANCELLED | OUTPATIENT
Start: 2024-12-26

## 2024-12-19 RX ORDER — ENOXAPARIN SODIUM 100 MG/ML
1 INJECTION SUBCUTANEOUS 2 TIMES DAILY
Status: DISCONTINUED | OUTPATIENT
Start: 2024-12-19 | End: 2024-12-22 | Stop reason: HOSPADM

## 2024-12-19 RX ORDER — SODIUM CHLORIDE 9 MG/ML
5-250 INJECTION, SOLUTION INTRAVENOUS PRN
Status: CANCELLED | OUTPATIENT
Start: 2024-12-26

## 2024-12-19 RX ORDER — HYDROCORTISONE SODIUM SUCCINATE 100 MG/2ML
100 INJECTION INTRAMUSCULAR; INTRAVENOUS
Status: CANCELLED | OUTPATIENT
Start: 2024-12-26

## 2024-12-19 RX ORDER — EPINEPHRINE 1 MG/ML
0.3 INJECTION, SOLUTION INTRAMUSCULAR; SUBCUTANEOUS PRN
Status: CANCELLED | OUTPATIENT
Start: 2024-12-26

## 2024-12-19 RX ORDER — DEXAMETHASONE 4 MG/1
20 TABLET ORAL ONCE
Status: CANCELLED
Start: 2024-12-26 | End: 2024-12-26

## 2024-12-19 RX ORDER — DEXTROSE MONOHYDRATE 50 MG/ML
5-250 INJECTION, SOLUTION INTRAVENOUS PRN
Status: CANCELLED | OUTPATIENT
Start: 2024-12-26

## 2024-12-19 RX ORDER — ACETAMINOPHEN 325 MG/1
650 TABLET ORAL
Status: CANCELLED | OUTPATIENT
Start: 2024-12-26

## 2024-12-19 RX ADMIN — IOPAMIDOL 80 ML: 755 INJECTION, SOLUTION INTRAVENOUS at 14:24

## 2024-12-19 ASSESSMENT — PAIN DESCRIPTION - ORIENTATION: ORIENTATION: MID

## 2024-12-19 ASSESSMENT — PAIN DESCRIPTION - DESCRIPTORS: DESCRIPTORS: SHARP

## 2024-12-19 ASSESSMENT — PAIN DESCRIPTION - ONSET: ONSET: ON-GOING

## 2024-12-19 ASSESSMENT — PAIN DESCRIPTION - LOCATION: LOCATION: CHEST

## 2024-12-19 ASSESSMENT — PAIN DESCRIPTION - PAIN TYPE: TYPE: ACUTE PAIN

## 2024-12-19 ASSESSMENT — PAIN SCALES - GENERAL: PAINLEVEL_OUTOF10: 9

## 2024-12-19 ASSESSMENT — PAIN DESCRIPTION - FREQUENCY: FREQUENCY: CONTINUOUS

## 2024-12-19 NOTE — TELEPHONE ENCOUNTER
Pt called from [facility] stating they cannot see pt's order for CT; advised that message would be sent to NP to release order.

## 2024-12-19 NOTE — PROGRESS NOTES
Candida Weaver is a 69 y.o. female    Chief Complaint   Patient presents with    Follow-up     Multiple Myeloma        1. Have you been to the ER, urgent care clinic since your last visit?  Hospitalized since your last visit?No    2. Have you seen or consulted any other health care providers outside of the [facility] since your last visit?  Include any pap smears or colon screening. No

## 2024-12-20 PROBLEM — J96.01 ACUTE HYPOXIC RESPIRATORY FAILURE: Status: ACTIVE | Noted: 2024-12-20

## 2024-12-20 LAB
ANION GAP SERPL CALC-SCNC: 4 MMOL/L (ref 2–12)
B PERT DNA SPEC QL NAA+PROBE: NOT DETECTED
BASOPHILS # BLD: 0.1 K/UL (ref 0–0.1)
BASOPHILS NFR BLD: 3 % (ref 0–1)
BORDETELLA PARAPERTUSSIS BY PCR: NOT DETECTED
BUN SERPL-MCNC: 8 MG/DL (ref 6–20)
BUN/CREAT SERPL: 13 (ref 12–20)
C PNEUM DNA SPEC QL NAA+PROBE: NOT DETECTED
CALCIUM SERPL-MCNC: 8.6 MG/DL (ref 8.5–10.1)
CHLORIDE SERPL-SCNC: 110 MMOL/L (ref 97–108)
CO2 SERPL-SCNC: 25 MMOL/L (ref 21–32)
CREAT SERPL-MCNC: 0.62 MG/DL (ref 0.55–1.02)
DIFFERENTIAL METHOD BLD: ABNORMAL
EOSINOPHIL # BLD: 0.1 K/UL (ref 0–0.4)
EOSINOPHIL NFR BLD: 3 % (ref 0–7)
ERYTHROCYTE [DISTWIDTH] IN BLOOD BY AUTOMATED COUNT: 15.1 % (ref 11.5–14.5)
FLUAV SUBTYP SPEC NAA+PROBE: NOT DETECTED
FLUBV RNA SPEC QL NAA+PROBE: NOT DETECTED
GLUCOSE SERPL-MCNC: 98 MG/DL (ref 65–100)
HADV DNA SPEC QL NAA+PROBE: NOT DETECTED
HCOV 229E RNA SPEC QL NAA+PROBE: NOT DETECTED
HCOV HKU1 RNA SPEC QL NAA+PROBE: NOT DETECTED
HCOV NL63 RNA SPEC QL NAA+PROBE: NOT DETECTED
HCOV OC43 RNA SPEC QL NAA+PROBE: NOT DETECTED
HCT VFR BLD AUTO: 29.9 % (ref 35–47)
HGB BLD-MCNC: 9 G/DL (ref 11.5–16)
HMPV RNA SPEC QL NAA+PROBE: NOT DETECTED
HPIV1 RNA SPEC QL NAA+PROBE: NOT DETECTED
HPIV2 RNA SPEC QL NAA+PROBE: NOT DETECTED
HPIV3 RNA SPEC QL NAA+PROBE: NOT DETECTED
HPIV4 RNA SPEC QL NAA+PROBE: NOT DETECTED
IMM GRANULOCYTES # BLD AUTO: 0 K/UL (ref 0–0.04)
IMM GRANULOCYTES NFR BLD AUTO: 0 % (ref 0–0.5)
LYMPHOCYTES # BLD: 1.3 K/UL (ref 0.8–3.5)
LYMPHOCYTES NFR BLD: 31 % (ref 12–49)
M PNEUMO DNA SPEC QL NAA+PROBE: NOT DETECTED
MCH RBC QN AUTO: 28.5 PG (ref 26–34)
MCHC RBC AUTO-ENTMCNC: 30.1 G/DL (ref 30–36.5)
MCV RBC AUTO: 94.6 FL (ref 80–99)
MONOCYTES # BLD: 1 K/UL (ref 0–1)
MONOCYTES NFR BLD: 24 % (ref 5–13)
NEUTS SEG # BLD: 1.7 K/UL (ref 1.8–8)
NEUTS SEG NFR BLD: 39 % (ref 32–75)
NRBC # BLD: 0 K/UL (ref 0–0.01)
NRBC BLD-RTO: 0 PER 100 WBC
PLATELET # BLD AUTO: 447 K/UL (ref 150–400)
PMV BLD AUTO: 10 FL (ref 8.9–12.9)
POTASSIUM SERPL-SCNC: 4.1 MMOL/L (ref 3.5–5.1)
PROCALCITONIN SERPL-MCNC: <0.05 NG/ML
RBC # BLD AUTO: 3.16 M/UL (ref 3.8–5.2)
RBC MORPH BLD: ABNORMAL
RSV RNA SPEC QL NAA+PROBE: NOT DETECTED
RV+EV RNA SPEC QL NAA+PROBE: NOT DETECTED
SARS-COV-2 RNA RESP QL NAA+PROBE: NOT DETECTED
SODIUM SERPL-SCNC: 139 MMOL/L (ref 136–145)
WBC # BLD AUTO: 4.2 K/UL (ref 3.6–11)

## 2024-12-20 PROCEDURE — 6360000002 HC RX W HCPCS: Performed by: EMERGENCY MEDICINE

## 2024-12-20 PROCEDURE — 80048 BASIC METABOLIC PNL TOTAL CA: CPT

## 2024-12-20 PROCEDURE — 36415 COLL VENOUS BLD VENIPUNCTURE: CPT

## 2024-12-20 PROCEDURE — 97165 OT EVAL LOW COMPLEX 30 MIN: CPT

## 2024-12-20 PROCEDURE — 0202U NFCT DS 22 TRGT SARS-COV-2: CPT

## 2024-12-20 PROCEDURE — 2500000003 HC RX 250 WO HCPCS: Performed by: FAMILY MEDICINE

## 2024-12-20 PROCEDURE — 96372 THER/PROPH/DIAG INJ SC/IM: CPT

## 2024-12-20 PROCEDURE — 97535 SELF CARE MNGMENT TRAINING: CPT

## 2024-12-20 PROCEDURE — 6370000000 HC RX 637 (ALT 250 FOR IP): Performed by: HOSPITALIST

## 2024-12-20 PROCEDURE — 6370000000 HC RX 637 (ALT 250 FOR IP): Performed by: FAMILY MEDICINE

## 2024-12-20 PROCEDURE — 97161 PT EVAL LOW COMPLEX 20 MIN: CPT

## 2024-12-20 PROCEDURE — 87070 CULTURE OTHR SPECIMN AEROBIC: CPT

## 2024-12-20 PROCEDURE — 2580000003 HC RX 258: Performed by: FAMILY MEDICINE

## 2024-12-20 PROCEDURE — 84145 PROCALCITONIN (PCT): CPT

## 2024-12-20 PROCEDURE — 87205 SMEAR GRAM STAIN: CPT

## 2024-12-20 PROCEDURE — 2060000000 HC ICU INTERMEDIATE R&B

## 2024-12-20 PROCEDURE — 97116 GAIT TRAINING THERAPY: CPT

## 2024-12-20 PROCEDURE — 85025 COMPLETE CBC W/AUTO DIFF WBC: CPT

## 2024-12-20 PROCEDURE — 6360000002 HC RX W HCPCS: Performed by: FAMILY MEDICINE

## 2024-12-20 RX ORDER — ALPRAZOLAM 1 MG/1
0.5 TABLET ORAL 3 TIMES DAILY PRN
Status: DISCONTINUED | OUTPATIENT
Start: 2024-12-20 | End: 2024-12-20

## 2024-12-20 RX ORDER — ACETAMINOPHEN 650 MG/1
650 SUPPOSITORY RECTAL EVERY 6 HOURS PRN
Status: DISCONTINUED | OUTPATIENT
Start: 2024-12-20 | End: 2024-12-22 | Stop reason: HOSPADM

## 2024-12-20 RX ORDER — SODIUM CHLORIDE 0.9 % (FLUSH) 0.9 %
5-40 SYRINGE (ML) INJECTION EVERY 12 HOURS SCHEDULED
Status: DISCONTINUED | OUTPATIENT
Start: 2024-12-20 | End: 2024-12-22 | Stop reason: HOSPADM

## 2024-12-20 RX ORDER — SODIUM CHLORIDE 9 MG/ML
INJECTION, SOLUTION INTRAVENOUS PRN
Status: DISCONTINUED | OUTPATIENT
Start: 2024-12-20 | End: 2024-12-22 | Stop reason: HOSPADM

## 2024-12-20 RX ORDER — AMLODIPINE BESYLATE 5 MG/1
5 TABLET ORAL DAILY
Status: DISCONTINUED | OUTPATIENT
Start: 2024-12-20 | End: 2024-12-22 | Stop reason: HOSPADM

## 2024-12-20 RX ORDER — ESCITALOPRAM OXALATE 10 MG/1
20 TABLET ORAL DAILY
Status: DISCONTINUED | OUTPATIENT
Start: 2024-12-20 | End: 2024-12-22 | Stop reason: HOSPADM

## 2024-12-20 RX ORDER — ACETAMINOPHEN 325 MG/1
650 TABLET ORAL EVERY 6 HOURS PRN
Status: DISCONTINUED | OUTPATIENT
Start: 2024-12-20 | End: 2024-12-22 | Stop reason: HOSPADM

## 2024-12-20 RX ORDER — POLYETHYLENE GLYCOL 3350 17 G/17G
17 POWDER, FOR SOLUTION ORAL DAILY PRN
Status: DISCONTINUED | OUTPATIENT
Start: 2024-12-20 | End: 2024-12-22 | Stop reason: HOSPADM

## 2024-12-20 RX ORDER — ACYCLOVIR 800 MG/1
400 TABLET ORAL 2 TIMES DAILY
Status: DISCONTINUED | OUTPATIENT
Start: 2024-12-20 | End: 2024-12-22 | Stop reason: HOSPADM

## 2024-12-20 RX ORDER — TRAMADOL HYDROCHLORIDE 50 MG/1
50 TABLET ORAL EVERY 6 HOURS PRN
Status: DISCONTINUED | OUTPATIENT
Start: 2024-12-20 | End: 2024-12-22 | Stop reason: HOSPADM

## 2024-12-20 RX ORDER — ESCITALOPRAM OXALATE 20 MG/1
20 TABLET ORAL DAILY
COMMUNITY
Start: 2024-10-31

## 2024-12-20 RX ORDER — ROSUVASTATIN CALCIUM 10 MG/1
5 TABLET, COATED ORAL NIGHTLY
Status: DISCONTINUED | OUTPATIENT
Start: 2024-12-20 | End: 2024-12-22 | Stop reason: HOSPADM

## 2024-12-20 RX ORDER — ONDANSETRON 2 MG/ML
4 INJECTION INTRAMUSCULAR; INTRAVENOUS EVERY 6 HOURS PRN
Status: DISCONTINUED | OUTPATIENT
Start: 2024-12-20 | End: 2024-12-22 | Stop reason: HOSPADM

## 2024-12-20 RX ORDER — ONDANSETRON 4 MG/1
4 TABLET, ORALLY DISINTEGRATING ORAL EVERY 8 HOURS PRN
Status: DISCONTINUED | OUTPATIENT
Start: 2024-12-20 | End: 2024-12-22 | Stop reason: HOSPADM

## 2024-12-20 RX ORDER — CLONAZEPAM 0.5 MG/1
0.5 TABLET ORAL EVERY 8 HOURS
Status: DISCONTINUED | OUTPATIENT
Start: 2024-12-20 | End: 2024-12-22 | Stop reason: HOSPADM

## 2024-12-20 RX ORDER — BUPROPION HYDROCHLORIDE 150 MG/1
300 TABLET, EXTENDED RELEASE ORAL DAILY
Status: DISCONTINUED | OUTPATIENT
Start: 2024-12-20 | End: 2024-12-22 | Stop reason: HOSPADM

## 2024-12-20 RX ORDER — ALBUTEROL SULFATE 0.83 MG/ML
2.5 SOLUTION RESPIRATORY (INHALATION) EVERY 6 HOURS PRN
Status: DISCONTINUED | OUTPATIENT
Start: 2024-12-20 | End: 2024-12-22 | Stop reason: HOSPADM

## 2024-12-20 RX ORDER — SODIUM CHLORIDE 0.9 % (FLUSH) 0.9 %
5-40 SYRINGE (ML) INJECTION PRN
Status: DISCONTINUED | OUTPATIENT
Start: 2024-12-20 | End: 2024-12-22 | Stop reason: HOSPADM

## 2024-12-20 RX ORDER — ARIPIPRAZOLE 5 MG/1
5 TABLET ORAL DAILY
Status: DISCONTINUED | OUTPATIENT
Start: 2024-12-20 | End: 2024-12-22 | Stop reason: HOSPADM

## 2024-12-20 RX ORDER — HYDROCODONE BITARTRATE AND ACETAMINOPHEN 5; 325 MG/1; MG/1
1 TABLET ORAL EVERY 6 HOURS PRN
Status: DISCONTINUED | OUTPATIENT
Start: 2024-12-20 | End: 2024-12-22 | Stop reason: HOSPADM

## 2024-12-20 RX ORDER — MONTELUKAST SODIUM 10 MG/1
10 TABLET ORAL DAILY
Status: DISCONTINUED | OUTPATIENT
Start: 2024-12-20 | End: 2024-12-22 | Stop reason: HOSPADM

## 2024-12-20 RX ADMIN — SODIUM CHLORIDE, PRESERVATIVE FREE 10 ML: 5 INJECTION INTRAVENOUS at 20:50

## 2024-12-20 RX ADMIN — ROSUVASTATIN 5 MG: 10 TABLET, FILM COATED ORAL at 20:47

## 2024-12-20 RX ADMIN — WATER 1000 MG: 1 INJECTION INTRAMUSCULAR; INTRAVENOUS; SUBCUTANEOUS at 06:19

## 2024-12-20 RX ADMIN — ENOXAPARIN SODIUM 70 MG: 100 INJECTION SUBCUTANEOUS at 20:48

## 2024-12-20 RX ADMIN — ACYCLOVIR 400 MG: 800 TABLET ORAL at 10:00

## 2024-12-20 RX ADMIN — CLONAZEPAM 0.5 MG: 0.5 TABLET ORAL at 06:19

## 2024-12-20 RX ADMIN — AZITHROMYCIN MONOHYDRATE 500 MG: 500 INJECTION, POWDER, LYOPHILIZED, FOR SOLUTION INTRAVENOUS at 06:22

## 2024-12-20 RX ADMIN — ENOXAPARIN SODIUM 70 MG: 100 INJECTION SUBCUTANEOUS at 00:00

## 2024-12-20 RX ADMIN — ENOXAPARIN SODIUM 70 MG: 100 INJECTION SUBCUTANEOUS at 10:03

## 2024-12-20 RX ADMIN — AMLODIPINE BESYLATE 5 MG: 5 TABLET ORAL at 10:01

## 2024-12-20 RX ADMIN — ACYCLOVIR 400 MG: 800 TABLET ORAL at 20:47

## 2024-12-20 RX ADMIN — HYDROCODONE BITARTRATE AND ACETAMINOPHEN 1 TABLET: 5; 325 TABLET ORAL at 19:53

## 2024-12-20 RX ADMIN — ARIPIPRAZOLE 5 MG: 5 TABLET ORAL at 09:59

## 2024-12-20 RX ADMIN — MONTELUKAST 10 MG: 10 TABLET, FILM COATED ORAL at 10:02

## 2024-12-20 RX ADMIN — ESCITALOPRAM OXALATE 20 MG: 10 TABLET ORAL at 16:24

## 2024-12-20 RX ADMIN — CLONAZEPAM 0.5 MG: 0.5 TABLET ORAL at 20:48

## 2024-12-20 RX ADMIN — SODIUM CHLORIDE, PRESERVATIVE FREE 10 ML: 5 INJECTION INTRAVENOUS at 10:04

## 2024-12-20 RX ADMIN — CLONAZEPAM 0.5 MG: 0.5 TABLET ORAL at 16:24

## 2024-12-20 RX ADMIN — BUPROPION HYDROCHLORIDE 300 MG: 150 TABLET, FILM COATED, EXTENDED RELEASE ORAL at 10:02

## 2024-12-20 ASSESSMENT — PAIN SCALES - GENERAL
PAINLEVEL_OUTOF10: 9
PAINLEVEL_OUTOF10: 6

## 2024-12-20 ASSESSMENT — PAIN SCALES - WONG BAKER: WONGBAKER_NUMERICALRESPONSE: HURTS EVEN MORE

## 2024-12-20 ASSESSMENT — PAIN DESCRIPTION - LOCATION: LOCATION: CHEST

## 2024-12-20 ASSESSMENT — PAIN DESCRIPTION - ORIENTATION: ORIENTATION: RIGHT

## 2024-12-20 ASSESSMENT — PAIN DESCRIPTION - DESCRIPTORS: DESCRIPTORS: ACHING;SORE

## 2024-12-20 NOTE — H&P
History and Physical    Date of Service:  12/19/2024  Primary Care Provider: Marycruz Vazquez MD  Source of information: patient, electronic medical record    Chief Complaint: Pulmonary Embolism      History of Presenting Illness:   Candida Weaver is a 69 y.o. female with a pmhx smoldering myeloma, dyslipidemia, who presents at the request of her oncologist for chest pain.    In the ED, she was hypoxic to 87% on RA with improvement to 96% on 2Lnc.  Labs showed Hgb 10.4. CTA thorax showed small pulmonary emboli in the right middle lobe with no evidence of heart strain.  RLL airspace disease with effusion present.    In the ED, she received full dose lovenox.        REVIEW OF SYSTEMS:  A comprehensive review of systems was negative except for that written in the History of Present Illness.     Past Medical History:   Diagnosis Date    Anxiety 6/4/2009    Blood dyscrasia     followed by Dr. Jessica Gonzalez 09/12    Disc disorder 6/4/2009    High cholesterol     Iron (Fe) deficiency anemia 6/4/2009    Multinodular goiter     Neuropathy associated with cancer (HCC)     Osteopenia 6/4/2009    Seasonal allergic rhinitis 6/4/2009    Vertigo       Past Surgical History:   Procedure Laterality Date    CATARACT REMOVAL      COLONOSCOPY N/A 9/22/2020    COLONOSCOPY     :- performed by Danilo Garcia MD at [facility] ENDOSCOPY    CT BIOPSY BONE MARROW  3/6/2024    CT BONE MARROW BIOPSY 3/6/2024 [facility] RAD CT    CT BIOPSY BONE MARROW  12/3/2024    CT BONE MARROW BIOPSY 12/3/2024    FLEXIBLE SIGMOIDOSCOPY N/A 9/15/2020    SIGMOIDOSCOPY FLEXIBLE performed by Danilo Garcia MD at [facility] ENDOSCOPY    IR PORT PLACEMENT > 5 YEARS  4/19/2024    IR PORT PLACEMENT EQUAL OR GREATER THAN 5 YEARS 4/19/2024 [facility] RAD ANGIO IR    ORTHOPEDIC SURGERY      left foot     Prior to Admission medications    Medication Sig Start Date End Date Taking? Authorizing Provider   apixaban starter pack (ELIQUIS) 5 MG TBPK tablet Take 1 tablet by mouth See Admin

## 2024-12-20 NOTE — ED NOTES
ED TO INPATIENT SBAR HANDOFF    Patient Name: Candida Weaver   :  1955  69 y.o.   MRN:  [id number]  ED Room #:  ER10/10     Situation  Code Status: Full Code   Allergies: Penicillins, Ibandronic acid, and Oxycodone-acetaminophen  Weight: Patient Vitals for the past 96 hrs (Last 3 readings):   Weight   24 2041 66 kg (145 lb 8.1 oz)       Arrived from: home    Chief Complaint:   Chief Complaint   Patient presents with    Pulmonary Embolism       Hospital Problem/Diagnosis:  Principal Problem:    Acute hypoxic respiratory failure  Resolved Problems:    * No resolved hospital problems. *      Mobility: limited transfer mobility   ED Fall Risk: Presents to emergency department  because of falls (Syncope, seizure, or loss of consciousness): No, Age > 70: No, Altered Mental Status, Intoxication with alcohol or substance confusion (Disorientation, impaired judgment, poor safety awaremess, or inability to follow instructions): No, Impaired Mobility: Ambulates or transfers with assistive devices or assistance; Unable to ambulate or transer.: No, Nursing Judgement: No   Fell in ED or prior to admission: no   Restraints: no     Sitter: no   Family/Caregiver Present: no    Neet to know social/safety information: none    Background  History:   Past Medical History:   Diagnosis Date    Anxiety 2009    Blood dyscrasia     followed by Dr. Lopez    Concussion     Disc disorder 2009    High cholesterol     Iron (Fe) deficiency anemia 2009    Multinodular goiter     Neuropathy associated with cancer (HCC)     Osteopenia 2009    Seasonal allergic rhinitis 2009    Vertigo        Assessment    Abnormal Assessment Findings: none  Imaging:   No orders to display     Abnormal labs:   Abnormal Labs Reviewed   CBC WITH AUTO DIFFERENTIAL - Abnormal; Notable for the following components:       Result Value    RBC 3.62 (*)     Hemoglobin 10.4 (*)     Hematocrit 33.2 (*)     RDW 15.0 (*)     Platelets 518 (*)

## 2024-12-20 NOTE — ED NOTES
8:39 PM  I have evaluated the patient as the Provider in Rapid Medical Evaluation (RME). I have reviewed her vital signs and the triage nurse assessment. I have talked with the patient and any available family and advised that I am the provider in triage and have ordered the appropriate study to initiate their work up based on the clinical presentation during my assessment. I have advised that the patient will be accommodated in the Main ED as soon as possible. I have also requested to contact the triage nurse or myself immediately if the patient experiences any changes in their condition during this brief waiting period.    Hx multiple myeloma.  Saw Dr. Arboleda - [facility] - concerned about possible PE for sharp R sided CP.  Outpatient CT done today + for PE.  92% on RA.      IMPRESSION:     1. The study is positive for small pulmonary emboli in the right middle lobe.  There is no evidence of right heart strain.     2. Right lower lobe airspace disease and right effusion noted. Infection is not  entirely excluded.        PATITO Segura Lindsay H, PA  12/19/24 2041

## 2024-12-20 NOTE — ED PROVIDER NOTES
[facility] EMERGENCY DEP  EMERGENCY DEPARTMENT ENCOUNTER      Pt Name: Candida Weaver  MRN: [id number]  Birthdate 1955  Date of evaluation: 12/19/2024  Provider: Samara Brewer MD    CHIEF COMPLAINT       Chief Complaint   Patient presents with    Pulmonary Embolism         HISTORY OF PRESENT ILLNESS   (Location/Symptom, Timing/Onset, Context/Setting, Quality, Duration, Modifying Factors, Severity)  Note limiting factors.   Patient is a 69-year-old with a history of multiple myeloma who comes into the emergency department with shortness of breath.  She had an outpatient CTA that demonstrated a pulmonary embolism.  She has not had any fevers or chills and she denies cough.    The history is provided by the patient.         Review of External Medical Records:     Nursing Notes were reviewed.    REVIEW OF SYSTEMS    (2-9 systems for level 4, 10 or more for level 5)     Review of Systems    Except as noted above the remainder of the review of systems was reviewed and negative.       PAST MEDICAL HISTORY     Past Medical History:   Diagnosis Date    Anxiety 6/4/2009    Blood dyscrasia     followed by Dr. Jessica Gonzalez 09/12    Disc disorder 6/4/2009    High cholesterol     Iron (Fe) deficiency anemia 6/4/2009    Multinodular goiter     Neuropathy associated with cancer (HCC)     Osteopenia 6/4/2009    Seasonal allergic rhinitis 6/4/2009    Vertigo          SURGICAL HISTORY       Past Surgical History:   Procedure Laterality Date    CATARACT REMOVAL      COLONOSCOPY N/A 9/22/2020    COLONOSCOPY     :- performed by Danilo Garcia MD at [facility] ENDOSCOPY    CT BIOPSY BONE MARROW  3/6/2024    CT BONE MARROW BIOPSY 3/6/2024 [facility] RAD CT    CT BIOPSY BONE MARROW  12/3/2024    CT BONE MARROW BIOPSY 12/3/2024    FLEXIBLE SIGMOIDOSCOPY N/A 9/15/2020    SIGMOIDOSCOPY FLEXIBLE performed by Danilo Garcia MD at [facility] ENDOSCOPY    IR PORT PLACEMENT > 5 YEARS  4/19/2024    IR PORT PLACEMENT EQUAL OR GREATER THAN 5 YEARS 4/19/2024 [facility]

## 2024-12-20 NOTE — PLAN OF CARE
Problem: Occupational Therapy - Adult  Goal: By Discharge: Performs self-care activities at highest level of function for planned discharge setting.  See evaluation for individualized goals.  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 No DME used at baseline.     Occupational Therapy Goals:  Initiated 12/20/2024  1.  Patient will perform grooming in standing independently within 7 day(s).  2.  Patient will perform bathing using most appropriate DME independently within 7 day(s).  3.  Patient will perform lower body dressing independently within 7 day(s).  4.  Patient will perform toilet transfers independently within 7 day(s).  5.  Patient will perform all aspects of toileting independently within 7 day(s).  6.  Patient will participate in upper extremity therapeutic exercise/activities with Supervision for 5 minutes within 7 day(s).    7.  Patient will utilize energy conservation techniques during functional activities with verbal cues within 7 day(s).    Outcome: Progressing    OCCUPATIONAL THERAPY EVALUATION    Patient: Candida Weaver (69 y.o. female)  Date: 12/20/2024  Primary Diagnosis: Acute hypoxic respiratory failure [J96.01]         Precautions:                    ASSESSMENT :  The patient is limited by decreased functional mobility, independence in ADLs, high-level IADLs, strength, body mechanics, activity tolerance, endurance, balance, posture, increased pain levels s/p admission for acute hypoxic respiratory failure. Patient with PMHx of multiple myeloma. Noted chest CT revealing R PE. 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 No DME used at baseline. Today, patient received in bed and agreeable to therapy. Patient cleared for mobility by RN. Patient with CGA for bed mobility to come to sitting EOB. Patient with SBA to transfer to BS and for

## 2024-12-20 NOTE — PLAN OF CARE
Problem: Physical Therapy - Adult  Goal: By Discharge: Performs mobility at highest level of function for planned discharge setting.  See evaluation for individualized goals.  Description: FUNCTIONAL STATUS PRIOR TO ADMISSION: Patient was independent and active without use of DME.    HOME SUPPORT PRIOR TO ADMISSION: The patient's son will now live with her.    Physical Therapy Goals  Initiated 12/20/2024  1.  Patient will move from supine to sit and sit to supine in bed with modified independence within 7 day(s).    2.  Patient will perform sit to stand with modified independence within 7 day(s).  3.  Patient will transfer from bed to chair and chair to bed with modified independence using the least restrictive device within 7 day(s).  4.  Patient will ambulate with supervision/set-up for 300 feet with the least restrictive device within 7 day(s).   5.  Patient will ascend/descend 10 stairs with one handrail(s) with supervision/set-up within 7 day(s).    Outcome: Progressing   PHYSICAL THERAPY EVALUATION    Patient: Candida Weaver (69 y.o. female)  Date: 12/20/2024  Primary Diagnosis: Acute hypoxic respiratory failure [J96.01]       Precautions:                        ASSESSMENT :   DEFICITS/IMPAIRMENTS:   The patient is limited by decreased functional mobility, strength, body mechanics, activity tolerance, endurance, coordination, balance     Based on the impairments listed above patient presents below baseline for mobility. Pt admitted for PE and received Lovenox, received in bed on 2 L O2. Pt is currently mobilizing with supervision/set-up, able to ambulate within room while on 2 L O2. Pt required standing rest break due to symptoms of SOB., SpO2 dropped momentarily to 87% then recovered to 90% with rest break. Pt has good support from son, recommend pt to continue mobilizing with nursing to prevent deconditioning.       Patient will benefit from skilled intervention to address the above

## 2024-12-20 NOTE — CONSULTS
Discussed with Dr. Kelly  Agree with management for suspected PNA  Will need indefinite eliquis.    Follow up in office next  week     Nany Arboleda MD, MS [facility]

## 2024-12-20 NOTE — TELEPHONE ENCOUNTER
[facility]  [phone]    Heme/Onc On Call Note  I received a call from radiologist reviewing patient's CTA Chest. It is positive for a small PE. No right heart strain. Some question of possible PNA as well.    I called the patient to discuss. She reports she is very symptomatic with significant ANDRES and chest pain. She is feeling miserable, crying on the phone during our discussion. I recommended that she go to the ED for urgent evaluation and to get started on anticoagulation, given her symptoms. However, she declined, she very much wants to avoid the ED. I sent a prescription for apixaban starter pack to her pharmacy and she agrees to start it this evening. We discussed that if she cannot fill it this evening she should go to the ED. We also discussed going to the ED if her symptoms worsen.

## 2024-12-21 LAB
ANION GAP SERPL CALC-SCNC: 3 MMOL/L (ref 2–12)
BACTERIA SPEC CULT: NORMAL
BACTERIA SPEC CULT: NORMAL
BASOPHILS # BLD: 0.1 K/UL (ref 0–0.1)
BASOPHILS NFR BLD: 4 % (ref 0–1)
BUN SERPL-MCNC: 9 MG/DL (ref 6–20)
BUN/CREAT SERPL: 18 (ref 12–20)
CALCIUM SERPL-MCNC: 8.5 MG/DL (ref 8.5–10.1)
CHLORIDE SERPL-SCNC: 111 MMOL/L (ref 97–108)
CO2 SERPL-SCNC: 25 MMOL/L (ref 21–32)
COMMENT:: NORMAL
COMMENT:: NORMAL
CREAT SERPL-MCNC: 0.51 MG/DL (ref 0.55–1.02)
DIFFERENTIAL METHOD BLD: ABNORMAL
EOSINOPHIL # BLD: 0.1 K/UL (ref 0–0.4)
EOSINOPHIL NFR BLD: 5 % (ref 0–7)
ERYTHROCYTE [DISTWIDTH] IN BLOOD BY AUTOMATED COUNT: 14.7 % (ref 11.5–14.5)
GLUCOSE SERPL-MCNC: 93 MG/DL (ref 65–100)
HCT VFR BLD AUTO: 31.3 % (ref 35–47)
HGB BLD-MCNC: 9.4 G/DL (ref 11.5–16)
IMM GRANULOCYTES # BLD AUTO: 0 K/UL (ref 0–0.04)
IMM GRANULOCYTES NFR BLD AUTO: 0 % (ref 0–0.5)
LYMPHOCYTES # BLD: 1 K/UL (ref 0.8–3.5)
LYMPHOCYTES NFR BLD: 33 % (ref 12–49)
MCH RBC QN AUTO: 28.4 PG (ref 26–34)
MCHC RBC AUTO-ENTMCNC: 30 G/DL (ref 30–36.5)
MCV RBC AUTO: 94.6 FL (ref 80–99)
MONOCYTES # BLD: 0.5 K/UL (ref 0–1)
MONOCYTES NFR BLD: 18 % (ref 5–13)
NEUTS SEG # BLD: 1.2 K/UL (ref 1.8–8)
NEUTS SEG NFR BLD: 40 % (ref 32–75)
NRBC # BLD: 0 K/UL (ref 0–0.01)
NRBC BLD-RTO: 0 PER 100 WBC
PLATELET # BLD AUTO: 477 K/UL (ref 150–400)
PMV BLD AUTO: 10.2 FL (ref 8.9–12.9)
POTASSIUM SERPL-SCNC: 4.3 MMOL/L (ref 3.5–5.1)
RBC # BLD AUTO: 3.31 M/UL (ref 3.8–5.2)
RBC MORPH BLD: ABNORMAL
SERVICE CMNT-IMP: NORMAL
SODIUM SERPL-SCNC: 139 MMOL/L (ref 136–145)
SPECIMEN HOLD: NORMAL
SPECIMEN HOLD: NORMAL
WBC # BLD AUTO: 2.9 K/UL (ref 3.6–11)

## 2024-12-21 PROCEDURE — 2500000003 HC RX 250 WO HCPCS: Performed by: FAMILY MEDICINE

## 2024-12-21 PROCEDURE — 85025 COMPLETE CBC W/AUTO DIFF WBC: CPT

## 2024-12-21 PROCEDURE — 2060000000 HC ICU INTERMEDIATE R&B

## 2024-12-21 PROCEDURE — 2580000003 HC RX 258: Performed by: FAMILY MEDICINE

## 2024-12-21 PROCEDURE — 6370000000 HC RX 637 (ALT 250 FOR IP): Performed by: HOSPITALIST

## 2024-12-21 PROCEDURE — 80048 BASIC METABOLIC PNL TOTAL CA: CPT

## 2024-12-21 PROCEDURE — 36415 COLL VENOUS BLD VENIPUNCTURE: CPT

## 2024-12-21 PROCEDURE — 2700000000 HC OXYGEN THERAPY PER DAY

## 2024-12-21 PROCEDURE — 6360000002 HC RX W HCPCS: Performed by: FAMILY MEDICINE

## 2024-12-21 PROCEDURE — 6360000002 HC RX W HCPCS: Performed by: EMERGENCY MEDICINE

## 2024-12-21 PROCEDURE — 6370000000 HC RX 637 (ALT 250 FOR IP): Performed by: FAMILY MEDICINE

## 2024-12-21 RX ADMIN — ARIPIPRAZOLE 5 MG: 5 TABLET ORAL at 08:42

## 2024-12-21 RX ADMIN — ACYCLOVIR 400 MG: 800 TABLET ORAL at 20:20

## 2024-12-21 RX ADMIN — HYDROCODONE BITARTRATE AND ACETAMINOPHEN 1 TABLET: 5; 325 TABLET ORAL at 05:46

## 2024-12-21 RX ADMIN — HYDROCODONE BITARTRATE AND ACETAMINOPHEN 1 TABLET: 5; 325 TABLET ORAL at 20:24

## 2024-12-21 RX ADMIN — SODIUM CHLORIDE, PRESERVATIVE FREE 10 ML: 5 INJECTION INTRAVENOUS at 20:21

## 2024-12-21 RX ADMIN — HYDROCODONE BITARTRATE AND ACETAMINOPHEN 1 TABLET: 5; 325 TABLET ORAL at 13:06

## 2024-12-21 RX ADMIN — SODIUM CHLORIDE, PRESERVATIVE FREE 10 ML: 5 INJECTION INTRAVENOUS at 08:43

## 2024-12-21 RX ADMIN — BUPROPION HYDROCHLORIDE 300 MG: 150 TABLET, FILM COATED, EXTENDED RELEASE ORAL at 08:42

## 2024-12-21 RX ADMIN — ENOXAPARIN SODIUM 70 MG: 100 INJECTION SUBCUTANEOUS at 20:21

## 2024-12-21 RX ADMIN — ENOXAPARIN SODIUM 70 MG: 100 INJECTION SUBCUTANEOUS at 08:42

## 2024-12-21 RX ADMIN — ESCITALOPRAM OXALATE 20 MG: 10 TABLET ORAL at 08:42

## 2024-12-21 RX ADMIN — AZITHROMYCIN MONOHYDRATE 500 MG: 500 INJECTION, POWDER, LYOPHILIZED, FOR SOLUTION INTRAVENOUS at 05:43

## 2024-12-21 RX ADMIN — WATER 1000 MG: 1 INJECTION INTRAMUSCULAR; INTRAVENOUS; SUBCUTANEOUS at 05:30

## 2024-12-21 RX ADMIN — CLONAZEPAM 0.5 MG: 0.5 TABLET ORAL at 05:30

## 2024-12-21 RX ADMIN — MONTELUKAST 10 MG: 10 TABLET, FILM COATED ORAL at 08:42

## 2024-12-21 RX ADMIN — AMLODIPINE BESYLATE 5 MG: 5 TABLET ORAL at 08:42

## 2024-12-21 RX ADMIN — ACYCLOVIR 400 MG: 800 TABLET ORAL at 08:42

## 2024-12-21 RX ADMIN — CLONAZEPAM 0.5 MG: 0.5 TABLET ORAL at 20:24

## 2024-12-21 RX ADMIN — ROSUVASTATIN 5 MG: 10 TABLET, FILM COATED ORAL at 20:20

## 2024-12-21 RX ADMIN — CLONAZEPAM 0.5 MG: 0.5 TABLET ORAL at 14:31

## 2024-12-21 ASSESSMENT — PAIN DESCRIPTION - LOCATION
LOCATION: CHEST
LOCATION: CHEST

## 2024-12-21 ASSESSMENT — PAIN SCALES - GENERAL
PAINLEVEL_OUTOF10: 8
PAINLEVEL_OUTOF10: 5
PAINLEVEL_OUTOF10: 5

## 2024-12-21 ASSESSMENT — PAIN DESCRIPTION - ORIENTATION: ORIENTATION: RIGHT

## 2024-12-21 ASSESSMENT — PAIN SCALES - WONG BAKER: WONGBAKER_NUMERICALRESPONSE: HURTS A LITTLE BIT

## 2024-12-21 ASSESSMENT — PAIN DESCRIPTION - DESCRIPTORS: DESCRIPTORS: ACHING;SORE

## 2024-12-21 NOTE — PLAN OF CARE
Problem: Occupational Therapy - Adult  Goal: By Discharge: Performs self-care activities at highest level of function for planned discharge setting.  See evaluation for individualized goals.  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 No DME used at baseline.     Occupational Therapy Goals:  Initiated 12/20/2024  1.  Patient will perform grooming in standing independently within 7 day(s).  2.  Patient will perform bathing using most appropriate DME independently within 7 day(s).  3.  Patient will perform lower body dressing independently within 7 day(s).  4.  Patient will perform toilet transfers independently within 7 day(s).  5.  Patient will perform all aspects of toileting independently within 7 day(s).  6.  Patient will participate in upper extremity therapeutic exercise/activities with Supervision for 5 minutes within 7 day(s).    7.  Patient will utilize energy conservation techniques during functional activities with verbal cues within 7 day(s).  12/20/2024 1145 by Arely Lara, OT  Outcome: Progressing     Problem: Physical Therapy - Adult  Goal: By Discharge: Performs mobility at highest level of function for planned discharge setting.  See evaluation for individualized goals.  Description: FUNCTIONAL STATUS PRIOR TO ADMISSION: Patient was independent and active without use of DME.    HOME SUPPORT PRIOR TO ADMISSION: The patient's son will now live with her.    Physical Therapy Goals  Initiated 12/20/2024  1.  Patient will move from supine to sit and sit to supine in bed with modified independence within 7 day(s).    2.  Patient will perform sit to stand with modified independence within 7 day(s).  3.  Patient will transfer from bed to chair and chair to bed with modified independence using the least restrictive device within 7 day(s).  4.  Patient will ambulate with supervision/set-up for 300

## 2024-12-22 VITALS
SYSTOLIC BLOOD PRESSURE: 147 MMHG | OXYGEN SATURATION: 96 % | HEIGHT: 64 IN | WEIGHT: 145.5 LBS | RESPIRATION RATE: 18 BRPM | TEMPERATURE: 98.2 F | DIASTOLIC BLOOD PRESSURE: 75 MMHG | BODY MASS INDEX: 24.84 KG/M2 | HEART RATE: 61 BPM

## 2024-12-22 LAB
ALBUMIN SERPL ELPH-MCNC: 3.3 G/DL (ref 2.9–4.4)
ALBUMIN/GLOB SERPL: 1.6 (ref 0.7–1.7)
ALPHA1 GLOB SERPL ELPH-MCNC: 0.3 G/DL (ref 0–0.4)
ALPHA2 GLOB SERPL ELPH-MCNC: 0.7 G/DL (ref 0.4–1)
ANION GAP SERPL CALC-SCNC: 4 MMOL/L (ref 2–12)
B-GLOBULIN SERPL ELPH-MCNC: 0.9 G/DL (ref 0.7–1.3)
BACTERIA SPEC CULT: NORMAL
BASOPHILS # BLD: 0.1 K/UL (ref 0–0.1)
BASOPHILS NFR BLD: 3 % (ref 0–1)
BUN SERPL-MCNC: 7 MG/DL (ref 6–20)
BUN/CREAT SERPL: 13 (ref 12–20)
CALCIUM SERPL-MCNC: 8.7 MG/DL (ref 8.5–10.1)
CHLORIDE SERPL-SCNC: 114 MMOL/L (ref 97–108)
CO2 SERPL-SCNC: 24 MMOL/L (ref 21–32)
CREAT SERPL-MCNC: 0.52 MG/DL (ref 0.55–1.02)
DIFFERENTIAL METHOD BLD: ABNORMAL
EOSINOPHIL # BLD: 0.1 K/UL (ref 0–0.4)
EOSINOPHIL NFR BLD: 3 % (ref 0–7)
ERYTHROCYTE [DISTWIDTH] IN BLOOD BY AUTOMATED COUNT: 14.3 % (ref 11.5–14.5)
GAMMA GLOB SERPL ELPH-MCNC: 0.2 G/DL (ref 0.4–1.8)
GLOBULIN SER-MCNC: 2.2 G/DL (ref 2.2–3.9)
GLUCOSE SERPL-MCNC: 96 MG/DL (ref 65–100)
GRAM STN SPEC: NORMAL
HCT VFR BLD AUTO: 34.3 % (ref 35–47)
HGB BLD-MCNC: 10.2 G/DL (ref 11.5–16)
IGA SERPL-MCNC: 37 MG/DL (ref 64–422)
IGG SERPL-MCNC: 223 MG/DL (ref 586–1602)
IGM SERPL-MCNC: 13 MG/DL (ref 26–217)
IMM GRANULOCYTES # BLD AUTO: 0 K/UL (ref 0–0.04)
IMM GRANULOCYTES NFR BLD AUTO: 0 % (ref 0–0.5)
INTERPRETATION SERPL IEP-IMP: ABNORMAL
KAPPA LC FREE SER-MCNC: 54.9 MG/L (ref 3.3–19.4)
KAPPA LC FREE/LAMBDA FREE SER: 9.8 (ref 0.26–1.65)
LAMBDA LC FREE SERPL-MCNC: 5.6 MG/L (ref 5.7–26.3)
LYMPHOCYTES # BLD: 1 K/UL (ref 0.8–3.5)
LYMPHOCYTES NFR BLD: 28 % (ref 12–49)
M PROTEIN SERPL ELPH-MCNC: ABNORMAL G/DL
MCH RBC QN AUTO: 28 PG (ref 26–34)
MCHC RBC AUTO-ENTMCNC: 29.7 G/DL (ref 30–36.5)
MCV RBC AUTO: 94.2 FL (ref 80–99)
MONOCYTES # BLD: 0.5 K/UL (ref 0–1)
MONOCYTES NFR BLD: 13 % (ref 5–13)
NEUTS SEG # BLD: 1.8 K/UL (ref 1.8–8)
NEUTS SEG NFR BLD: 53 % (ref 32–75)
NRBC # BLD: 0 K/UL (ref 0–0.01)
NRBC BLD-RTO: 0 PER 100 WBC
PLATELET # BLD AUTO: 526 K/UL (ref 150–400)
PMV BLD AUTO: 9.9 FL (ref 8.9–12.9)
POTASSIUM SERPL-SCNC: 4.4 MMOL/L (ref 3.5–5.1)
PROT SERPL-MCNC: 5.5 G/DL (ref 6–8.5)
RBC # BLD AUTO: 3.64 M/UL (ref 3.8–5.2)
RBC MORPH BLD: ABNORMAL
SERVICE CMNT-IMP: NORMAL
SODIUM SERPL-SCNC: 142 MMOL/L (ref 136–145)
WBC # BLD AUTO: 3.5 K/UL (ref 3.6–11)

## 2024-12-22 PROCEDURE — 2580000003 HC RX 258: Performed by: FAMILY MEDICINE

## 2024-12-22 PROCEDURE — 6370000000 HC RX 637 (ALT 250 FOR IP): Performed by: FAMILY MEDICINE

## 2024-12-22 PROCEDURE — 6360000002 HC RX W HCPCS: Performed by: FAMILY MEDICINE

## 2024-12-22 PROCEDURE — 85025 COMPLETE CBC W/AUTO DIFF WBC: CPT

## 2024-12-22 PROCEDURE — 6360000002 HC RX W HCPCS: Performed by: EMERGENCY MEDICINE

## 2024-12-22 PROCEDURE — 80048 BASIC METABOLIC PNL TOTAL CA: CPT

## 2024-12-22 PROCEDURE — 6370000000 HC RX 637 (ALT 250 FOR IP): Performed by: HOSPITALIST

## 2024-12-22 PROCEDURE — 2500000003 HC RX 250 WO HCPCS: Performed by: FAMILY MEDICINE

## 2024-12-22 RX ORDER — ARIPIPRAZOLE 5 MG/1
5 TABLET ORAL DAILY
COMMUNITY
Start: 2024-11-15

## 2024-12-22 RX ORDER — DOXYCYCLINE HYCLATE 100 MG
100 TABLET ORAL 2 TIMES DAILY
Qty: 14 TABLET | Refills: 0 | Status: SHIPPED | OUTPATIENT
Start: 2024-12-22 | End: 2024-12-29

## 2024-12-22 RX ORDER — HYDROCODONE BITARTRATE AND ACETAMINOPHEN 5; 325 MG/1; MG/1
1 TABLET ORAL EVERY 6 HOURS PRN
Qty: 12 TABLET | Refills: 0 | Status: SHIPPED | OUTPATIENT
Start: 2024-12-22 | End: 2024-12-25

## 2024-12-22 RX ORDER — BUPROPION HYDROCHLORIDE 300 MG/1
300 TABLET ORAL DAILY
COMMUNITY
Start: 2024-06-17

## 2024-12-22 RX ADMIN — WATER 1000 MG: 1 INJECTION INTRAMUSCULAR; INTRAVENOUS; SUBCUTANEOUS at 05:43

## 2024-12-22 RX ADMIN — ENOXAPARIN SODIUM 70 MG: 100 INJECTION SUBCUTANEOUS at 08:43

## 2024-12-22 RX ADMIN — AZITHROMYCIN MONOHYDRATE 500 MG: 500 INJECTION, POWDER, LYOPHILIZED, FOR SOLUTION INTRAVENOUS at 05:50

## 2024-12-22 RX ADMIN — SODIUM CHLORIDE, PRESERVATIVE FREE 10 ML: 5 INJECTION INTRAVENOUS at 08:43

## 2024-12-22 RX ADMIN — AMLODIPINE BESYLATE 5 MG: 5 TABLET ORAL at 08:43

## 2024-12-22 RX ADMIN — MONTELUKAST 10 MG: 10 TABLET, FILM COATED ORAL at 08:43

## 2024-12-22 RX ADMIN — CLONAZEPAM 0.5 MG: 0.5 TABLET ORAL at 05:44

## 2024-12-22 RX ADMIN — ESCITALOPRAM OXALATE 20 MG: 10 TABLET ORAL at 08:43

## 2024-12-22 RX ADMIN — BUPROPION HYDROCHLORIDE 300 MG: 150 TABLET, FILM COATED, EXTENDED RELEASE ORAL at 08:43

## 2024-12-22 RX ADMIN — ARIPIPRAZOLE 5 MG: 5 TABLET ORAL at 08:43

## 2024-12-22 RX ADMIN — ACYCLOVIR 400 MG: 800 TABLET ORAL at 08:43

## 2024-12-22 ASSESSMENT — PAIN SCALES - GENERAL: PAINLEVEL_OUTOF10: 3

## 2024-12-22 NOTE — DISCHARGE INSTRUCTIONS
Discharge Instructions       PATIENT ID: Candida Weaver  MRN: [id number]   YOB: 1955    DATE OF ADMISSION: 12/19/2024   DATE OF DISCHARGE: 12/22/2024    PRIMARY CARE PROVIDER: Marycruz Vazquez     ATTENDING PHYSICIAN: Marisol Kelly MD   DISCHARGING PROVIDER: Marisol Kelly MD    To contact this individual call [phone] and ask the  to page.   If unavailable ask to be transferred to the Adult Hospitalist Department.  Candida Weaver thank you for the opportunity to allow us to care for you during this hospitalization.  Below is a summary of the condition/conditions you were admitted and treated for, discharge care plan and follow-up recommendations.    DISCHARGE DIAGNOSES   Acute hypoxic respiratory failure due to the acute pulmonary embolism and pneumonia.Respiratory failure and hypoxia resolved.  Acute pulmonary embolism  -Continue on Apixaban(Eliquis),make sure you get refill from Dr Arboleda or your PCP  Possible right lower lobe pneumonia: finish the prescribed antibiotics.             CONSULTATIONS:Oncology    PROCEDURES/SURGERIES:* No surgery found *    PENDING TEST RESULTS:   At the time of discharge the following test results are still pending: none    FOLLOW UP APPOINTMENTS:   Oncology  PCP    ADDITIONAL CARE RECOMMENDATIONS:     DIET: regular diet      ACTIVITY: activity as tolerated          DISCHARGE MEDICATIONS:   See Medication Reconciliation Form    It is important that you take the medication exactly as they are prescribed.   Keep your medication in the bottles provided by the pharmacist and keep a list of the medication names, dosages, and times to be taken in your wallet.   Do not take other medications without consulting your doctor.       NOTIFY YOUR PHYSICIAN FOR ANY OF THE FOLLOWING:   Fever over 101 degrees for 24 hours.   Chest pain, shortness of breath, fever, chills, nausea, vomiting, diarrhea, change in mentation, falling, weakness, bleeding. Severe pain or

## 2024-12-22 NOTE — PROGRESS NOTES
[facility]                                                                                          Hospitalist Progress Note  Marisol Kelly MD  Office Phone: [phone]        Date of Service:  2024  NAME:  Candida Weaver  :  1955  MRN:  [id number]       Admission Summary:   Candida Weaver is a 69 y.o. female with a pmhx smoldering myeloma, dyslipidemia, came to the ED at the request of her oncologist for chest pain.     In the ED, she was hypoxic to 87% on RA with improvement to 96% on 2Lnc.  Labs showed Hgb 10.4. CTA thorax showed small pulmonary emboli in the right middle lobe with no evidence of heart strain.  RLL airspace disease with effusion present.            Interval history / Subjective:   Complains of right lower lobe and lateral pleuritic chest pain, location of the pneumonia and PE  On room air, SpO2 92-93%?  Accuracy given thick nail polish.  I have asked RN to check oxygen from the earlobes  RN later worked patient on the hallway, patient satting upper 90s on room air    Assessment & Plan:     Acute hypoxic respiratory failure  Acute pulmonary embolism  RLL air space disease with pleural effusion ,pneumonia vs atelectasis.Afebrile.WBC normal  -CTA thorax showed small pulmonary emboli in the right middle lobe with no evidence of heart strain.  RLL airspace disease with effusion present.  -Continue therapeutic Lovenox, discharged on apixaban  -Wean down oxygen ,currently spo2 100% on 2 lpm  -Continue azithromycin and ceftrixone  -RVP negative.  Sputum culture and  -continue singulair, and prn albuterol  -Heme-onc recommendation appreciated, anticoagulation with Eliquis indefinitely  -Patient listed oxycodone allergy but tolerating hydrocodone     #multiple myeloma  #anemia  -on palliative chemo with pomalyst and dexamethosone, and zometa >will confirm with oncology where in her monthly treatment she is  -stable chronic normocytic anemia Hgb 
4 Eyes Skin Assessment     NAME:  Candida Weaver  YOB: 1955  MEDICAL RECORD NUMBER:  [id number]    The patient is being assessed for  Admission    I agree that at least one RN has performed a thorough Head to Toe Skin Assessment on the patient. ALL assessment sites listed below have been assessed.      Areas assessed by both nurses:    Head, Face, Ears, Shoulders, Back, Chest, Arms, Elbows, Hands, Sacrum. Buttock, Coccyx, Ischium, and Legs. Feet and Heels        Does the Patient have a Wound? No noted wound(s)       Steven Prevention initiated by RN: No  Wound Care Orders initiated by RN: No    Pressure Injury (Stage 3,4, Unstageable, DTI, NWPT, and Complex wounds) if present, place Wound referral order by RN under : No    New Ostomies, if present place, Ostomy referral order under : No     Nurse 1 eSignature: Electronically signed by Aida Scales RN on 12/21/24 at 1:40 AM EST    **SHARE this note so that the co-signing nurse can place an eSignature**    Nurse 2 eSignature: Electronically signed by Chrissy Yo RN on 12/21/24 at 1:41 AM EST   
Ms. Weaver is feeling well, denies shortness of breath or pain today.  Upon walking the room, on room air.  Ready to go.  Dr. Viveros had sent a prescription for Eliquis on 19th however patient was admitted the same day therefore she has not picked it up.  The [facility] the Eliquis was sent with close today.  Patient also uses [email or web address] I called to check with the pharmacist and they have the Eliquis.  Electronic prescription sent, patient called the pharmacy, medications available and co-pays$0.  She will follow-up with her hematology oncology.  
Orders received, chart reviewed and patient evaluated by occupational therapy. Pending progression with skilled acute occupational therapy, recommend:    Continue to assess pending progress    Recommend with nursing patient to complete as able in order to maintain strength, endurance and independence: OOB to chair 3x/day, ADLs with 1 assist and performing toileting with 1 assist to/from BS. Thank you for your assistance.     Full evaluation to follow.     
Patient at rest 97% on room air. Patient walked in the hallway and oxygen improved to 99% and did not drop below 95% on room air.   
Patient is high Fall Risk 87%, please consult Physical Therapy  to complete 6 minute walk for home oxygen evaluation. Sakina RT.  
acyclovir (ZOVIRAX) 400 MG tablet Take 1 tablet by mouth 2 times daily    ANUCORT-HC 25 MG suppository UNWRAP AND INSERT 1 SUPPOSITORY RECTALLY TWICE DAILY AS NEEDED FOR HEMORRHOIDS    spironolactone (ALDACTONE) 25 MG tablet TAKE 1 TABLET BY MOUTH DAILY    baclofen (LIORESAL) 10 MG tablet TAKE 1 TABLET BY MOUTH EVERY NIGHT    ondansetron (ZOFRAN-ODT) 8 MG TBDP disintegrating tablet Place 1 tablet under the tongue every 8 hours as needed for Nausea or Vomiting    Lancets (ONETOUCH DELICA PLUS ZDULIU24B) MISC USE AS DIRECTED TO TEST ONCE  DAILY    montelukast (SINGULAIR) 10 MG tablet TAKE 1 TABLET BY MOUTH DAILY    rosuvastatin (CRESTOR) 5 MG tablet TAKE 1 TABLET BY MOUTH AT NIGHT    metFORMIN (GLUCOPHAGE-XR) 500 MG extended release tablet TAKE 1 TABLET BY MOUTH 3 TIMES  DAILY    dexamethasone (DECADRON) 4 MG tablet Take 5 tablets by mouth every 7 days    fluticasone-salmeterol (ADVAIR) 250-50 MCG/ACT AEPB diskus inhaler Inhale 1 puff into the lungs in the morning and 1 puff in the evening.    aspirin 81 MG EC tablet Take by mouth daily    Calcium Carbonate-Vitamin D 600-3.125 MG-MCG TABS Take by mouth    clonazePAM (KLONOPIN) 0.5 MG tablet Take by mouth 3 times daily.    zolpidem (AMBIEN) 10 MG tablet Take by mouth nightly as needed for Sleep.     ______________________________________________________________________  EXPECTED LENGTH OF STAY: 3  ACTUAL LENGTH OF STAY:          0                 Marisol Kelly MD

## 2024-12-22 NOTE — DISCHARGE SUMMARY
Discharge Summary       PATIENT ID: Candida Weaver  MRN: [id number]   YOB: 1955    DATE OF ADMISSION: 12/19/2024  9:34 PM    DATE OF DISCHARGE: 12/22/24     PRIMARY CARE PROVIDER: Marycruz Vazquez MD     ATTENDING PHYSICIAN: Marisol Kelly MD    DISCHARGING PROVIDER: Marisol Kelly MD    To contact this individual call [phone] and ask the  to page.  If unavailable ask to be transferred the Adult Hospitalist Department.    CONSULTATIONS: IP CONSULT TO ONCOLOGY    PROCEDURES/SURGERIES: * No surgery found *     ADMITTING DIAGNOSES & HOSPITAL COURSE:   Admission Summary:   Candida Weaver is a 69 y.o. female with a pmhx smoldering myeloma, dyslipidemia, came to the ED at the request of her oncologist for chest pain.     In the ED, she was hypoxic to 87% on RA with improvement to 96% on 2Lnc.  Labs showed Hgb 10.4. CTA thorax showed small pulmonary emboli in the right middle lobe with no evidence of heart strain.  RLL airspace disease with effusion present.           DISCHARGE DIAGNOSES / PLAN:    Acute hypoxic respiratory failure resolved, has been on room air for x 2 days  Acute pulmonary embolism, discharged on Eliquis, oncology recommending indefinite anticoagulation.  RLL air space disease with pleural effusion ,pneumonia vs atelectasis.Afebrile.WBC normal.  Discharged to complete doxycycline       #multiple myeloma  #anemia  -on palliative chemo with pomalyst and dexamethosone, and zometa >will confirm with oncology where in her monthly treatment she is  -stable chronic normocytic anemia Hgb 10.4 (b/l 9-10)        #dyslipidemia  -continue home crescotr     Depression, significant anxiety  -continue home wellbutrin, lexapro, and abilify and klonopin          PENDING TEST RESULTS:   At the time of discharge the following test results are still pending: None    FOLLOW UP APPOINTMENTS:    Follow-up Information    None           ADDITIONAL CARE RECOMMENDATIONS:     DIET: regular

## 2024-12-22 NOTE — CARE COORDINATION
Care Management Initial Assessment       RUR: 15% Moderate Risk  Readmission? No  1st IM letter given? Yes - 12/20/2024  1st  letter given: No       12/22/24 1145   Service Assessment   Patient Orientation Alert and Oriented   Cognition Alert   History Provided By Patient   Primary Caregiver Self   Support Systems Children;Friends/Neighbors   Patient's Healthcare Decision Maker is: Legal Next of Kin   PCP Verified by CM Yes   Last Visit to PCP Within last 3 months   Prior Functional Level Independent in ADLs/IADLs   Current Functional Level Independent in ADLs/IADLs   Can patient return to prior living arrangement Yes   Ability to make needs known: Good   Family able to assist with home care needs: Yes   Would you like for me to discuss the discharge plan with any other family members/significant others, and if so, who? Yes   Financial Resources Medicare   Social/Functional History   Lives With Alone   Type of Home House   Home Layout Multi-level   Home Access Level entry   Bathroom Shower/Tub Tub/Shower unit   Bathroom Toilet Handicap height   Bathroom Equipment None   Bathroom Accessibility Walker accessible   Home Equipment None   Receives Help From Family   Prior Level of Assist for ADLs Independent   Prior Level of Assist for Homemaking Independent   Homemaking Responsibilities Yes   Ambulation Assistance Independent   Prior Level of Assist for Transfers Independent   Active  Yes   Mode of Transportation Car   Education Associates Degree   Occupation Full time employment   Discharge Planning   Type of Residence House   Living Arrangements Children   Current Services Prior To Admission None   Potential Assistance Needed N/A   DME Ordered? No   Potential Assistance Purchasing Medications No   Type of Home Care Services None   Patient expects to be discharged to: House   One/Two Story Residence Two story, live on first floor   History of falls? 1   Services At/After Discharge   Transition of Care

## 2024-12-24 ENCOUNTER — HOSPITAL ENCOUNTER (OUTPATIENT)
Facility: HOSPITAL | Age: 69
Setting detail: INFUSION SERIES
Discharge: HOME | End: 2024-12-24
Payer: MEDICARE

## 2024-12-24 ENCOUNTER — HOSPITAL ENCOUNTER (OUTPATIENT)
Facility: HOSPITAL | Age: 69
Setting detail: INFUSION SERIES
End: 2024-12-24

## 2024-12-24 DIAGNOSIS — C90.00 MULTIPLE MYELOMA NOT HAVING ACHIEVED REMISSION (HCC): ICD-10-CM

## 2024-12-24 DIAGNOSIS — Z11.59 ENCOUNTER FOR SCREENING FOR OTHER VIRAL DISEASES: ICD-10-CM

## 2024-12-24 LAB
ALBUMIN SERPL-MCNC: 3.5 G/DL (ref 3.5–5)
ALBUMIN/GLOB SERPL: 1.4 (ref 1.1–2.2)
ALP SERPL-CCNC: 97 U/L (ref 45–117)
ALT SERPL-CCNC: 37 U/L (ref 12–78)
ANION GAP SERPL CALC-SCNC: 2 MMOL/L (ref 2–12)
AST SERPL-CCNC: 39 U/L (ref 15–37)
BASOPHILS # BLD: 0.2 K/UL (ref 0–0.1)
BASOPHILS NFR BLD: 6 % (ref 0–1)
BILIRUB SERPL-MCNC: 0.2 MG/DL (ref 0.2–1)
BUN SERPL-MCNC: 15 MG/DL (ref 6–20)
BUN/CREAT SERPL: 24 (ref 12–20)
CALCIUM SERPL-MCNC: 9.3 MG/DL (ref 8.5–10.1)
CHLORIDE SERPL-SCNC: 111 MMOL/L (ref 97–108)
CO2 SERPL-SCNC: 25 MMOL/L (ref 21–32)
CREAT SERPL-MCNC: 0.62 MG/DL (ref 0.55–1.02)
DIFFERENTIAL METHOD BLD: ABNORMAL
EOSINOPHIL # BLD: 0.1 K/UL (ref 0–0.4)
EOSINOPHIL NFR BLD: 2 % (ref 0–7)
ERYTHROCYTE [DISTWIDTH] IN BLOOD BY AUTOMATED COUNT: 14.5 % (ref 11.5–14.5)
GLOBULIN SER CALC-MCNC: 2.5 G/DL (ref 2–4)
GLUCOSE SERPL-MCNC: 95 MG/DL (ref 65–100)
HCT VFR BLD AUTO: 35.5 % (ref 35–47)
HGB BLD-MCNC: 10.8 G/DL (ref 11.5–16)
IMM GRANULOCYTES # BLD AUTO: 0 K/UL (ref 0–0.04)
IMM GRANULOCYTES NFR BLD AUTO: 0 % (ref 0–0.5)
LYMPHOCYTES # BLD: 0.9 K/UL (ref 0.8–3.5)
LYMPHOCYTES NFR BLD: 22 % (ref 12–49)
MCH RBC QN AUTO: 28.5 PG (ref 26–34)
MCHC RBC AUTO-ENTMCNC: 30.4 G/DL (ref 30–36.5)
MCV RBC AUTO: 93.7 FL (ref 80–99)
MONOCYTES # BLD: 0.6 K/UL (ref 0–1)
MONOCYTES NFR BLD: 14 % (ref 5–13)
NEUTS SEG # BLD: 2.3 K/UL (ref 1.8–8)
NEUTS SEG NFR BLD: 56 % (ref 32–75)
NRBC # BLD: 0 K/UL (ref 0–0.01)
NRBC BLD-RTO: 0 PER 100 WBC
PLATELET # BLD AUTO: 552 K/UL (ref 150–400)
PMV BLD AUTO: 10.7 FL (ref 8.9–12.9)
POTASSIUM SERPL-SCNC: 4.9 MMOL/L (ref 3.5–5.1)
PROT SERPL-MCNC: 6 G/DL (ref 6.4–8.2)
RBC # BLD AUTO: 3.79 M/UL (ref 3.8–5.2)
RBC MORPH BLD: ABNORMAL
SODIUM SERPL-SCNC: 138 MMOL/L (ref 136–145)
WBC # BLD AUTO: 4.1 K/UL (ref 3.6–11)
WBC MORPH BLD: ABNORMAL

## 2024-12-24 PROCEDURE — 82784 ASSAY IGA/IGD/IGG/IGM EACH: CPT

## 2024-12-24 PROCEDURE — 83521 IG LIGHT CHAINS FREE EACH: CPT

## 2024-12-24 PROCEDURE — 85025 COMPLETE CBC W/AUTO DIFF WBC: CPT

## 2024-12-24 PROCEDURE — 86334 IMMUNOFIX E-PHORESIS SERUM: CPT

## 2024-12-24 PROCEDURE — 80053 COMPREHEN METABOLIC PANEL: CPT

## 2024-12-24 PROCEDURE — 36415 COLL VENOUS BLD VENIPUNCTURE: CPT

## 2024-12-24 PROCEDURE — 84165 PROTEIN E-PHORESIS SERUM: CPT

## 2024-12-24 NOTE — PROGRESS NOTES
[facility] Progress Note    Date: 2024    Name: Candida Weaver    MRN: [id number]         : 1955      Pt arrived ambulatory and in no distress, for pre-treatment lab visit.  Labs drawn via right AC, patient tolerated well.        Departed [facility] ambulatory and in no distress.    Future Appointments   Date Time Provider Department Center   2024 11:30 AM RUTH CHEMO CHAIR 4 [facility]   2024  3:00 PM PEDS LAB CHAIR 1 [facility]   2025 11:00 AM RUTH CHEMO CHAIR 3 [facility]   1/3/2025  4:00 PM [facility] CT ER 3 [facility]   1/15/2025  9:30 AM PEDS LAB CHAIR 1 [facility]   2025 10:30 AM RUTH CHEMO CHAIR 2 [facility]   2025 10:45 AM Ashley Costa APRN - NP [facility] AMB   2025  9:30 AM PEDS LAB CHAIR 1 [facility]   2025  9:00 AM RUTH CHEMO CHAIR 5 [facility]   2025  9:30 AM PEDS LAB CHAIR 1 [facility]   2025 10:00 AM RUTH CHEMO CHAIR 1 [facility]       Ann Moss RN  2024

## 2024-12-26 ENCOUNTER — HOSPITAL ENCOUNTER (OUTPATIENT)
Facility: HOSPITAL | Age: 69
Setting detail: INFUSION SERIES
Discharge: HOME | End: 2024-12-26
Payer: MEDICARE

## 2024-12-26 VITALS
BODY MASS INDEX: 23.89 KG/M2 | HEART RATE: 68 BPM | DIASTOLIC BLOOD PRESSURE: 56 MMHG | SYSTOLIC BLOOD PRESSURE: 124 MMHG | TEMPERATURE: 98 F | HEIGHT: 65 IN | OXYGEN SATURATION: 97 % | WEIGHT: 143.4 LBS | RESPIRATION RATE: 18 BRPM

## 2024-12-26 DIAGNOSIS — Z11.59 ENCOUNTER FOR SCREENING FOR OTHER VIRAL DISEASES: Primary | ICD-10-CM

## 2024-12-26 DIAGNOSIS — C90.00 MULTIPLE MYELOMA NOT HAVING ACHIEVED REMISSION (HCC): ICD-10-CM

## 2024-12-26 PROCEDURE — 2580000003 HC RX 258: Performed by: INTERNAL MEDICINE

## 2024-12-26 PROCEDURE — 96413 CHEMO IV INFUSION 1 HR: CPT

## 2024-12-26 PROCEDURE — 96367 TX/PROPH/DG ADDL SEQ IV INF: CPT

## 2024-12-26 PROCEDURE — 96375 TX/PRO/DX INJ NEW DRUG ADDON: CPT

## 2024-12-26 PROCEDURE — 6360000002 HC RX W HCPCS: Performed by: INTERNAL MEDICINE

## 2024-12-26 PROCEDURE — 6360000002 HC RX W HCPCS

## 2024-12-26 RX ORDER — ACETAMINOPHEN 325 MG/1
650 TABLET ORAL
OUTPATIENT
Start: 2024-12-29

## 2024-12-26 RX ORDER — SODIUM CHLORIDE 9 MG/ML
5-250 INJECTION, SOLUTION INTRAVENOUS PRN
OUTPATIENT
Start: 2024-12-29

## 2024-12-26 RX ORDER — HYDROCORTISONE SODIUM SUCCINATE 100 MG/2ML
100 INJECTION INTRAMUSCULAR; INTRAVENOUS
OUTPATIENT
Start: 2024-12-29

## 2024-12-26 RX ORDER — SODIUM CHLORIDE 0.9 % (FLUSH) 0.9 %
5-40 SYRINGE (ML) INJECTION PRN
OUTPATIENT
Start: 2024-12-29

## 2024-12-26 RX ORDER — ONDANSETRON 2 MG/ML
8 INJECTION INTRAMUSCULAR; INTRAVENOUS ONCE
Status: COMPLETED | OUTPATIENT
Start: 2024-12-26 | End: 2024-12-26

## 2024-12-26 RX ORDER — ONDANSETRON 2 MG/ML
8 INJECTION INTRAMUSCULAR; INTRAVENOUS
OUTPATIENT
Start: 2024-12-29

## 2024-12-26 RX ORDER — HEPARIN 100 UNIT/ML
500 SYRINGE INTRAVENOUS PRN
OUTPATIENT
Start: 2024-12-29

## 2024-12-26 RX ORDER — ZOLEDRONIC ACID 0.04 MG/ML
4 INJECTION, SOLUTION INTRAVENOUS ONCE
Status: COMPLETED | OUTPATIENT
Start: 2024-12-26 | End: 2024-12-26

## 2024-12-26 RX ORDER — DEXAMETHASONE 4 MG/1
20 TABLET ORAL ONCE
Status: COMPLETED | OUTPATIENT
Start: 2024-12-26 | End: 2024-12-26

## 2024-12-26 RX ORDER — DIPHENHYDRAMINE HYDROCHLORIDE 50 MG/ML
50 INJECTION INTRAMUSCULAR; INTRAVENOUS
OUTPATIENT
Start: 2024-12-29

## 2024-12-26 RX ORDER — ZOLEDRONIC ACID 0.04 MG/ML
4 INJECTION, SOLUTION INTRAVENOUS ONCE
OUTPATIENT
Start: 2024-12-29 | End: 2024-12-29

## 2024-12-26 RX ORDER — DEXTROSE MONOHYDRATE 50 MG/ML
5-250 INJECTION, SOLUTION INTRAVENOUS PRN
Status: DISCONTINUED | OUTPATIENT
Start: 2024-12-26 | End: 2024-12-27 | Stop reason: HOSPADM

## 2024-12-26 RX ORDER — SODIUM CHLORIDE 9 MG/ML
INJECTION, SOLUTION INTRAVENOUS CONTINUOUS
OUTPATIENT
Start: 2024-12-29

## 2024-12-26 RX ORDER — ALBUTEROL SULFATE 90 UG/1
4 INHALANT RESPIRATORY (INHALATION) PRN
OUTPATIENT
Start: 2024-12-29

## 2024-12-26 RX ORDER — EPINEPHRINE 1 MG/ML
0.3 INJECTION, SOLUTION INTRAMUSCULAR; SUBCUTANEOUS PRN
OUTPATIENT
Start: 2024-12-29

## 2024-12-26 RX ADMIN — DEXTROSE MONOHYDRATE 25 ML/HR: 50 INJECTION, SOLUTION INTRAVENOUS at 12:37

## 2024-12-26 RX ADMIN — ONDANSETRON 8 MG: 2 INJECTION, SOLUTION INTRAMUSCULAR; INTRAVENOUS at 12:15

## 2024-12-26 RX ADMIN — CARFILZOMIB 127.4 MG: 10 INJECTION, POWDER, LYOPHILIZED, FOR SOLUTION INTRAVENOUS at 13:01

## 2024-12-26 RX ADMIN — ZOLEDRONIC ACID 4 MG: 0.04 INJECTION, SOLUTION INTRAVENOUS at 12:14

## 2024-12-26 RX ADMIN — DEXAMETHASONE 20 MG: 4 TABLET ORAL at 12:15

## 2024-12-26 ASSESSMENT — PAIN SCALES - GENERAL: PAINLEVEL_OUTOF10: 6

## 2024-12-26 NOTE — PROGRESS NOTES
[facility] Chemotherapy Progress Note  Date: 2024  Name: Candida Weaver  MRN: [id number]       : 1955    Pt admit to [facility] for C10 D1 Kyprolis/Zometa  Assessment and port access completed by Ann ERAZO RN     Port with positive blood return. Lab results were obtained and reviewed from , criteria met for treatment.     Ms. Weaver's vitals were reviewed.  Patient Vitals for the past 12 hrs:   Temp Pulse Resp BP SpO2   24 1131 98 °F (36.7 °C) 68 18 (!) 124/56 97 %       .      Pre-medications  were administered as ordered and chemotherapy was initiated.  Medications Administered         carfilzomib (KYPROLIS) 127.4 mg in dextrose 5 % 100 mL chemo IVPB Admin Date  2024 Action  New Bag Dose  127.4 mg Rate  347.4 mL/hr Route  IntraVENous Documented By  Ann Morrison RN        dexAMETHasone (DECADRON) tablet 20 mg Admin Date  2024 Action  Given Dose  20 mg Rate   Route  Oral Documented By  Ann Morrison RN        dextrose 5 % solution Admin Date  2024 Action  New Bag Dose  25 mL/hr Rate  25 mL/hr Route  IntraVENous Documented By  Ann Morrison RN        ondansetron (ZOFRAN) injection 8 mg Admin Date  2024 Action  Given Dose  8 mg Rate   Route  IntraVENous Documented By  Ann Morrison RN        zoledronic acid (ZOMETA) 4 mg/100 mL infusion Admin Date  2024 Action  New Bag Dose  4 mg Rate  300 mL/hr Route  IntraVENous Documented By  Ann Morrison RN            Prior to chemotherapy/immunotherapy administration the following were verified with a second chemotherapy/immunotherapy certified nurse: Patient name, Patient  or CSN, Drug name, Drug dose, Infusion/drug volume, Rate of administration, Route of administration, Expiration dates/times, Appearance and physical integrity of the drug(s)    Please see MAR for specific drug names and time of administration.    Patient was monitored appropriately. Tolerated infusion well without issue.    Port maintained

## 2024-12-31 ENCOUNTER — HOSPITAL ENCOUNTER (OUTPATIENT)
Facility: HOSPITAL | Age: 69
Setting detail: INFUSION SERIES
Discharge: HOME | End: 2024-12-31
Payer: MEDICARE

## 2024-12-31 DIAGNOSIS — C90.00 MULTIPLE MYELOMA NOT HAVING ACHIEVED REMISSION (HCC): ICD-10-CM

## 2024-12-31 DIAGNOSIS — Z11.59 ENCOUNTER FOR SCREENING FOR OTHER VIRAL DISEASES: ICD-10-CM

## 2024-12-31 LAB
ALBUMIN SERPL ELPH-MCNC: 3.5 G/DL (ref 2.9–4.4)
ALBUMIN SERPL-MCNC: 3.5 G/DL (ref 3.5–5)
ALBUMIN/GLOB SERPL: 1.2 (ref 1.1–2.2)
ALBUMIN/GLOB SERPL: 1.6 (ref 0.7–1.7)
ALP SERPL-CCNC: 87 U/L (ref 45–117)
ALPHA1 GLOB SERPL ELPH-MCNC: 0.3 G/DL (ref 0–0.4)
ALPHA2 GLOB SERPL ELPH-MCNC: 0.7 G/DL (ref 0.4–1)
ALT SERPL-CCNC: 30 U/L (ref 12–78)
ANION GAP SERPL CALC-SCNC: 3 MMOL/L (ref 2–12)
AST SERPL-CCNC: 16 U/L (ref 15–37)
B-GLOBULIN SERPL ELPH-MCNC: 0.9 G/DL (ref 0.7–1.3)
BASOPHILS # BLD: 0 K/UL (ref 0–0.1)
BASOPHILS NFR BLD: 1 % (ref 0–1)
BILIRUB SERPL-MCNC: 0.4 MG/DL (ref 0.2–1)
BUN SERPL-MCNC: 12 MG/DL (ref 6–20)
BUN/CREAT SERPL: 18 (ref 12–20)
CALCIUM SERPL-MCNC: 9 MG/DL (ref 8.5–10.1)
CHLORIDE SERPL-SCNC: 111 MMOL/L (ref 97–108)
CO2 SERPL-SCNC: 24 MMOL/L (ref 21–32)
CREAT SERPL-MCNC: 0.65 MG/DL (ref 0.55–1.02)
DIFFERENTIAL METHOD BLD: ABNORMAL
EOSINOPHIL # BLD: 0.1 K/UL (ref 0–0.4)
EOSINOPHIL NFR BLD: 5 % (ref 0–7)
ERYTHROCYTE [DISTWIDTH] IN BLOOD BY AUTOMATED COUNT: 14.6 % (ref 11.5–14.5)
GAMMA GLOB SERPL ELPH-MCNC: 0.3 G/DL (ref 0.4–1.8)
GLOBULIN SER CALC-MCNC: 2.9 G/DL (ref 2–4)
GLOBULIN SER-MCNC: 2.2 G/DL (ref 2.2–3.9)
GLUCOSE SERPL-MCNC: 91 MG/DL (ref 65–100)
HCT VFR BLD AUTO: 35 % (ref 35–47)
HGB BLD-MCNC: 10.7 G/DL (ref 11.5–16)
IGA SERPL-MCNC: 49 MG/DL (ref 64–422)
IGG SERPL-MCNC: 242 MG/DL (ref 586–1602)
IGM SERPL-MCNC: 18 MG/DL (ref 26–217)
IMM GRANULOCYTES # BLD AUTO: 0 K/UL (ref 0–0.04)
IMM GRANULOCYTES NFR BLD AUTO: 1 % (ref 0–0.5)
INTERPRETATION SERPL IEP-IMP: ABNORMAL
KAPPA LC FREE SER-MCNC: 70.6 MG/L (ref 3.3–19.4)
KAPPA LC FREE/LAMBDA FREE SER: 20.76 (ref 0.26–1.65)
LAMBDA LC FREE SERPL-MCNC: 3.4 MG/L (ref 5.7–26.3)
LYMPHOCYTES # BLD: 1 K/UL (ref 0.8–3.5)
LYMPHOCYTES NFR BLD: 32 % (ref 12–49)
M PROTEIN SERPL ELPH-MCNC: ABNORMAL G/DL
MCH RBC QN AUTO: 28.3 PG (ref 26–34)
MCHC RBC AUTO-ENTMCNC: 30.6 G/DL (ref 30–36.5)
MCV RBC AUTO: 92.6 FL (ref 80–99)
MONOCYTES # BLD: 0.5 K/UL (ref 0–1)
MONOCYTES NFR BLD: 16 % (ref 5–13)
NEUTS SEG # BLD: 1.4 K/UL (ref 1.8–8)
NEUTS SEG NFR BLD: 45 % (ref 32–75)
NRBC # BLD: 0.04 K/UL (ref 0–0.01)
NRBC BLD-RTO: 1.3 PER 100 WBC
PLATELET # BLD AUTO: 149 K/UL (ref 150–400)
PMV BLD AUTO: 12 FL (ref 8.9–12.9)
POTASSIUM SERPL-SCNC: 3.8 MMOL/L (ref 3.5–5.1)
PROT SERPL-MCNC: 5.7 G/DL (ref 6–8.5)
PROT SERPL-MCNC: 6.4 G/DL (ref 6.4–8.2)
RBC # BLD AUTO: 3.78 M/UL (ref 3.8–5.2)
SODIUM SERPL-SCNC: 138 MMOL/L (ref 136–145)
WBC # BLD AUTO: 3.1 K/UL (ref 3.6–11)

## 2024-12-31 PROCEDURE — 36415 COLL VENOUS BLD VENIPUNCTURE: CPT

## 2024-12-31 PROCEDURE — 80053 COMPREHEN METABOLIC PANEL: CPT

## 2024-12-31 PROCEDURE — 85025 COMPLETE CBC W/AUTO DIFF WBC: CPT

## 2024-12-31 ASSESSMENT — PAIN SCALES - GENERAL: PAINLEVEL_OUTOF10: 0

## 2024-12-31 NOTE — PROGRESS NOTES
[facility] Peds/Adult Note                       Date: 2024    Name: Candida Weaver    MRN: [id number]         : 1955    1500 Patient arrives for Labs without acute problems. .    Patient tolerated procedure well and was discharged without incident.  Patient is aware of next [facility] appointment on 25.    Labs drawn in Rt AC by writer.   Specimens to lab for processing.    Lab results were obtained and reviewed.  No results found for this or any previous visit (from the past 12 hour(s)).    Ms. Weaver tolerated the treatment and had no complaints.    [facility]. Discharge Instructions provided to patient, patient verbalized understanding.     Future Appointments   Date Time Provider Department Center   2025 11:00 AM RUTH CHEMO CHAIR 3 [facility]   1/3/2025  4:00 PM [facility] CT ER 3 [facility]   1/15/2025  9:30 AM PEDS LAB CHAIR 1 [facility]   2025 10:30 AM RUTH CHEMO CHAIR 2 [facility]   2025 10:45 AM Ashley Costa APRN - NP [facility] BS AMB   2025  9:30 AM PEDS LAB CHAIR 1 [facility]   2025  9:00 AM RUTH CHEMO CHAIR 5 [facility]   2025  9:30 AM PEDS LAB CHAIR 1 [facility]   2025 10:00 AM RUTH CHEMO CHAIR 1 [facility]       Yudith Franklin RN  2024  3:06 PM

## 2025-01-02 ENCOUNTER — APPOINTMENT (OUTPATIENT)
Facility: HOSPITAL | Age: 70
End: 2025-01-02
Payer: MEDICARE

## 2025-01-02 ENCOUNTER — HOSPITAL ENCOUNTER (OUTPATIENT)
Facility: HOSPITAL | Age: 70
Setting detail: INFUSION SERIES
Discharge: HOME | End: 2025-01-02
Payer: MEDICARE

## 2025-01-02 VITALS
DIASTOLIC BLOOD PRESSURE: 52 MMHG | HEART RATE: 67 BPM | BODY MASS INDEX: 23.66 KG/M2 | HEIGHT: 65 IN | OXYGEN SATURATION: 98 % | SYSTOLIC BLOOD PRESSURE: 125 MMHG | WEIGHT: 142 LBS | TEMPERATURE: 98.5 F | RESPIRATION RATE: 18 BRPM

## 2025-01-02 DIAGNOSIS — C90.00 MULTIPLE MYELOMA NOT HAVING ACHIEVED REMISSION (HCC): ICD-10-CM

## 2025-01-02 DIAGNOSIS — M51.369 LUMBAR DEGENERATIVE DISC DISEASE: ICD-10-CM

## 2025-01-02 DIAGNOSIS — Z11.59 ENCOUNTER FOR SCREENING FOR OTHER VIRAL DISEASES: Primary | ICD-10-CM

## 2025-01-02 PROCEDURE — 6360000002 HC RX W HCPCS: Performed by: INTERNAL MEDICINE

## 2025-01-02 PROCEDURE — 96413 CHEMO IV INFUSION 1 HR: CPT

## 2025-01-02 PROCEDURE — 96375 TX/PRO/DX INJ NEW DRUG ADDON: CPT

## 2025-01-02 PROCEDURE — 2580000003 HC RX 258: Performed by: INTERNAL MEDICINE

## 2025-01-02 RX ORDER — ONDANSETRON 2 MG/ML
8 INJECTION INTRAMUSCULAR; INTRAVENOUS ONCE
Status: COMPLETED | OUTPATIENT
Start: 2025-01-02 | End: 2025-01-02

## 2025-01-02 RX ORDER — DEXTROSE MONOHYDRATE 50 MG/ML
5-250 INJECTION, SOLUTION INTRAVENOUS PRN
Status: DISCONTINUED | OUTPATIENT
Start: 2025-01-02 | End: 2025-01-03 | Stop reason: HOSPADM

## 2025-01-02 RX ORDER — DEXAMETHASONE 4 MG/1
20 TABLET ORAL ONCE
Status: COMPLETED | OUTPATIENT
Start: 2025-01-02 | End: 2025-01-02

## 2025-01-02 RX ADMIN — DEXAMETHASONE 20 MG: 4 TABLET ORAL at 11:17

## 2025-01-02 RX ADMIN — ONDANSETRON 8 MG: 2 INJECTION, SOLUTION INTRAMUSCULAR; INTRAVENOUS at 11:17

## 2025-01-02 RX ADMIN — CARFILZOMIB 127.4 MG: 10 INJECTION, POWDER, LYOPHILIZED, FOR SOLUTION INTRAVENOUS at 12:25

## 2025-01-02 NOTE — PROGRESS NOTES
[facility] Chemo Progress Note    Date: 2025    Name: Candida Weaver    MRN: [id number]         : 1955    Ms. Weaver arrived ambulatory and in no distress for cycle 10 day 8 of Kyprolis.      Assessment was completed and documented in flowsheets. No acute concerns at this time. Poty accessed without difficulty, labs drawn and processed.    Follow Up: Proceed with treatment      Ms. Weaver's vitals were reviewed.  Patient Vitals for the past 12 hrs:   Temp Pulse Resp BP SpO2   25 1300 -- 67 -- (!) 125/52 --   25 1059 98.5 °F (36.9 °C) 65 18 112/70 98 %       Lab results were obtained and reviewed.  Labs within parameter for treatment from 24.     Pre-medications  were administered as ordered and chemotherapy was initiated.  Medications Administered         carfilzomib (KYPROLIS) 127.4 mg in dextrose 5 % 100 mL chemo IVPB Admin Date  2025 Action  New Bag Dose  127.4 mg Rate  347.4 mL/hr Route  IntraVENous Documented By  Andreia Mina, SABRINA        dexAMETHasone (DECADRON) tablet 20 mg Admin Date  2025 Action  Given Dose  20 mg Rate   Route  Oral Documented By  Andreia Mina RN        ondansetron (ZOFRAN) injection 8 mg Admin Date  2025 Action  Given Dose  8 mg Rate   Route  IntraVENous Documented By  Andreia Mina, RN            Two nurses verified prior to administering:  Drug name, Drug dose, Infusion volume or drug volume when prepared in a syringe, Rate of administration, Route of administration, Expiration dates and/or times, Appearance and physical integrity of the drugs, Rate set on infusion pump, when used, and Sequencing of drug administration.  Medication education reinforced and patient verbalized understanding.    Ms. Weaver tolerated treatment well, port flushed and de-accessed per protocol. [facility]. Patient aware of next appointment.     Future Appointments   Date Time Provider Department

## 2025-01-03 ENCOUNTER — HOSPITAL ENCOUNTER (OUTPATIENT)
Facility: HOSPITAL | Age: 70
Discharge: HOME | End: 2025-01-03
Payer: MEDICARE

## 2025-01-03 DIAGNOSIS — R07.9 CHEST PAIN, UNSPECIFIED TYPE: ICD-10-CM

## 2025-01-03 DIAGNOSIS — R06.02 SHORTNESS OF BREATH: ICD-10-CM

## 2025-01-03 PROCEDURE — 6360000004 HC RX CONTRAST MEDICATION: Performed by: STUDENT IN AN ORGANIZED HEALTH CARE EDUCATION/TRAINING PROGRAM

## 2025-01-03 PROCEDURE — 71275 CT ANGIOGRAPHY CHEST: CPT

## 2025-01-03 RX ORDER — IOPAMIDOL 755 MG/ML
100 INJECTION, SOLUTION INTRAVASCULAR
Status: COMPLETED | OUTPATIENT
Start: 2025-01-03 | End: 2025-01-03

## 2025-01-03 RX ADMIN — IOPAMIDOL 80 ML: 755 INJECTION, SOLUTION INTRAVENOUS at 16:31

## 2025-01-06 RX ORDER — BACLOFEN 10 MG/1
10 TABLET ORAL NIGHTLY
Qty: 30 TABLET | Refills: 0 | OUTPATIENT
Start: 2025-01-06

## 2025-01-09 DIAGNOSIS — C90.00 MULTIPLE MYELOMA NOT HAVING ACHIEVED REMISSION (HCC): ICD-10-CM

## 2025-01-09 DIAGNOSIS — M51.369 LUMBAR DEGENERATIVE DISC DISEASE: ICD-10-CM

## 2025-01-09 DIAGNOSIS — E78.2 MIXED HYPERLIPIDEMIA: ICD-10-CM

## 2025-01-09 RX ORDER — BACLOFEN 10 MG/1
10 TABLET ORAL NIGHTLY
Qty: 30 TABLET | Refills: 0 | Status: SHIPPED | OUTPATIENT
Start: 2025-01-09

## 2025-01-10 ENCOUNTER — TELEPHONE (OUTPATIENT)
Age: 70
End: 2025-01-10

## 2025-01-10 DIAGNOSIS — E11.9 TYPE 2 DIABETES MELLITUS WITHOUT COMPLICATION, WITHOUT LONG-TERM CURRENT USE OF INSULIN (HCC): ICD-10-CM

## 2025-01-10 DIAGNOSIS — C90.00 MULTIPLE MYELOMA NOT HAVING ACHIEVED REMISSION (HCC): ICD-10-CM

## 2025-01-10 RX ORDER — POMALIDOMIDE 4 MG/1
CAPSULE ORAL
Qty: 21 CAPSULE | Refills: 0 | OUTPATIENT
Start: 2025-01-10

## 2025-01-10 NOTE — TELEPHONE ENCOUNTER
Pt called stating she ran out of her Pomalyst yesterday; states she called for refill and the pharmacy said they would reach out to office. Call back number is [phone].

## 2025-01-10 NOTE — TELEPHONE ENCOUNTER
Oral Chemotherapy      Candida Weaver is a  69 y.o.female  diagnosed with multiple myeloma . Ms. Weaver is being treated with KPd.      Medication name: pomalidomide  Regimen: KPd  Dose:   4 mg (increase dose for 9/26/24)  Frequency: daily  Administration schedule: for 21 days followed by 7 days off every 28 days  Ordering provider: Nany Arboleda MD  Start date: 12/26/24 (Cycle 10)    Call to Ms. Weaver regarding her Pomalyst - last supply dispensed on 12/11/24 and Cycle delayed to start on 12/26 if she is out of Pomalyst then she has taken the full 21 days. Told Ms. Weaver to not take any more Pomalyst until she sees us in office.       REMS auth # [id number]  Prescription sent to pharmacy for processing.        Rose Pa, PharmD, BCPS, BCOP      For Pharmacy Admin Tracking Only    Program: Medical Group  CPA in place:  Yes  Recommendation Provided To: Patient/Caregiver: 2 via Telephone  Intervention Detail: Refill(s) Provided  Intervention Accepted By: Patient/Caregiver: 2    Time Spent (min): 15

## 2025-01-10 NOTE — TELEPHONE ENCOUNTER
HIPAA x2  SW pt to let her know that I forwarded the information to our pharmacist to see about getting this refilled. Pt verbalized understanding and was appreciative of my call.

## 2025-01-10 NOTE — TELEPHONE ENCOUNTER
I'm not sure, there was no mention in the last office note whether metformin should be continued or not. Patient reported not taking metformin on 12/20/24. I would wait for Dr. Vazquez to return.

## 2025-01-13 DIAGNOSIS — C90.00 MULTIPLE MYELOMA NOT HAVING ACHIEVED REMISSION (HCC): Primary | ICD-10-CM

## 2025-01-13 DIAGNOSIS — Z11.59 ENCOUNTER FOR SCREENING FOR OTHER VIRAL DISEASES: ICD-10-CM

## 2025-01-13 RX ORDER — DEXTROSE MONOHYDRATE 50 MG/ML
5-250 INJECTION, SOLUTION INTRAVENOUS PRN
Status: CANCELLED | OUTPATIENT
Start: 2025-01-16

## 2025-01-13 RX ORDER — ALBUTEROL SULFATE 90 UG/1
4 INHALANT RESPIRATORY (INHALATION) PRN
Status: CANCELLED | OUTPATIENT
Start: 2025-01-16

## 2025-01-13 RX ORDER — DEXAMETHASONE 4 MG/1
20 TABLET ORAL ONCE
Status: CANCELLED
Start: 2025-01-16 | End: 2025-01-16

## 2025-01-13 RX ORDER — ONDANSETRON 2 MG/ML
8 INJECTION INTRAMUSCULAR; INTRAVENOUS
Status: CANCELLED | OUTPATIENT
Start: 2025-01-16

## 2025-01-13 RX ORDER — SODIUM CHLORIDE 0.9 % (FLUSH) 0.9 %
5-40 SYRINGE (ML) INJECTION PRN
Status: CANCELLED | OUTPATIENT
Start: 2025-01-16

## 2025-01-13 RX ORDER — HYDROCORTISONE SODIUM SUCCINATE 100 MG/2ML
100 INJECTION INTRAMUSCULAR; INTRAVENOUS
Status: CANCELLED | OUTPATIENT
Start: 2025-01-16

## 2025-01-13 RX ORDER — DIPHENHYDRAMINE HYDROCHLORIDE 50 MG/ML
50 INJECTION INTRAMUSCULAR; INTRAVENOUS
Status: CANCELLED | OUTPATIENT
Start: 2025-01-16

## 2025-01-13 RX ORDER — SODIUM CHLORIDE 9 MG/ML
5-250 INJECTION, SOLUTION INTRAVENOUS PRN
Status: CANCELLED | OUTPATIENT
Start: 2025-01-16

## 2025-01-13 RX ORDER — EPINEPHRINE 1 MG/ML
0.3 INJECTION, SOLUTION, CONCENTRATE INTRAVENOUS PRN
Status: CANCELLED | OUTPATIENT
Start: 2025-01-16

## 2025-01-13 RX ORDER — HEPARIN 100 UNIT/ML
500 SYRINGE INTRAVENOUS PRN
Status: CANCELLED | OUTPATIENT
Start: 2025-01-16

## 2025-01-13 RX ORDER — ONDANSETRON 2 MG/ML
8 INJECTION INTRAMUSCULAR; INTRAVENOUS ONCE
Status: CANCELLED
Start: 2025-01-16 | End: 2025-01-16

## 2025-01-13 RX ORDER — ACETAMINOPHEN 325 MG/1
650 TABLET ORAL
Status: CANCELLED | OUTPATIENT
Start: 2025-01-16

## 2025-01-13 RX ORDER — SODIUM CHLORIDE 9 MG/ML
INJECTION, SOLUTION INTRAVENOUS CONTINUOUS
Status: CANCELLED | OUTPATIENT
Start: 2025-01-16

## 2025-01-14 DIAGNOSIS — C90.00 MULTIPLE MYELOMA NOT HAVING ACHIEVED REMISSION (HCC): Primary | ICD-10-CM

## 2025-01-14 RX ORDER — ROSUVASTATIN CALCIUM 5 MG/1
5 TABLET, COATED ORAL NIGHTLY
Qty: 30 TABLET | Refills: 0 | Status: SHIPPED | OUTPATIENT
Start: 2025-01-14

## 2025-01-14 RX ORDER — ACETAMINOPHEN 325 MG/1
650 TABLET ORAL
OUTPATIENT
Start: 2025-01-14

## 2025-01-14 RX ORDER — ALBUTEROL SULFATE 90 UG/1
4 INHALANT RESPIRATORY (INHALATION) PRN
OUTPATIENT
Start: 2025-01-14

## 2025-01-14 RX ORDER — HEPARIN 100 UNIT/ML
500 SYRINGE INTRAVENOUS PRN
OUTPATIENT
Start: 2025-01-14

## 2025-01-14 RX ORDER — DIPHENHYDRAMINE HYDROCHLORIDE 50 MG/ML
50 INJECTION INTRAMUSCULAR; INTRAVENOUS
OUTPATIENT
Start: 2025-01-14

## 2025-01-14 RX ORDER — ZOLEDRONIC ACID 0.04 MG/ML
4 INJECTION, SOLUTION INTRAVENOUS ONCE
Status: CANCELLED | OUTPATIENT
Start: 2025-01-14 | End: 2025-01-14

## 2025-01-14 RX ORDER — METFORMIN HYDROCHLORIDE 500 MG/1
500 TABLET, EXTENDED RELEASE ORAL 3 TIMES DAILY
Qty: 90 TABLET | Refills: 0 | Status: SHIPPED | OUTPATIENT
Start: 2025-01-14

## 2025-01-14 RX ORDER — SODIUM CHLORIDE 9 MG/ML
5-250 INJECTION, SOLUTION INTRAVENOUS PRN
Status: CANCELLED | OUTPATIENT
Start: 2025-01-14

## 2025-01-14 RX ORDER — SODIUM CHLORIDE 0.9 % (FLUSH) 0.9 %
5-40 SYRINGE (ML) INJECTION PRN
OUTPATIENT
Start: 2025-01-14

## 2025-01-14 RX ORDER — EPINEPHRINE 1 MG/ML
0.3 INJECTION, SOLUTION, CONCENTRATE INTRAVENOUS PRN
OUTPATIENT
Start: 2025-01-14

## 2025-01-14 RX ORDER — HYDROCORTISONE SODIUM SUCCINATE 100 MG/2ML
100 INJECTION INTRAMUSCULAR; INTRAVENOUS
OUTPATIENT
Start: 2025-01-14

## 2025-01-14 RX ORDER — SODIUM CHLORIDE 9 MG/ML
5-250 INJECTION, SOLUTION INTRAVENOUS PRN
OUTPATIENT
Start: 2025-01-14

## 2025-01-14 RX ORDER — ONDANSETRON 2 MG/ML
8 INJECTION INTRAMUSCULAR; INTRAVENOUS
OUTPATIENT
Start: 2025-01-14

## 2025-01-14 RX ORDER — SODIUM CHLORIDE 9 MG/ML
INJECTION, SOLUTION INTRAVENOUS CONTINUOUS
OUTPATIENT
Start: 2025-01-14

## 2025-01-15 ENCOUNTER — HOSPITAL ENCOUNTER (OUTPATIENT)
Facility: HOSPITAL | Age: 70
Setting detail: INFUSION SERIES
Discharge: HOME | End: 2025-01-15
Payer: MEDICARE

## 2025-01-15 VITALS
OXYGEN SATURATION: 98 % | DIASTOLIC BLOOD PRESSURE: 66 MMHG | SYSTOLIC BLOOD PRESSURE: 114 MMHG | TEMPERATURE: 97.8 F | HEART RATE: 57 BPM | RESPIRATION RATE: 18 BRPM

## 2025-01-15 DIAGNOSIS — Z11.59 ENCOUNTER FOR SCREENING FOR OTHER VIRAL DISEASES: ICD-10-CM

## 2025-01-15 DIAGNOSIS — C90.00 MULTIPLE MYELOMA NOT HAVING ACHIEVED REMISSION (HCC): ICD-10-CM

## 2025-01-15 LAB
ALBUMIN SERPL-MCNC: 3.4 G/DL (ref 3.5–5)
ALBUMIN/GLOB SERPL: 1.5 (ref 1.1–2.2)
ALP SERPL-CCNC: 67 U/L (ref 45–117)
ALT SERPL-CCNC: 24 U/L (ref 12–78)
ANION GAP SERPL CALC-SCNC: 4 MMOL/L (ref 2–12)
AST SERPL-CCNC: 13 U/L (ref 15–37)
BASOPHILS # BLD: 0.13 K/UL (ref 0–0.1)
BASOPHILS NFR BLD: 4.6 % (ref 0–1)
BILIRUB SERPL-MCNC: 0.3 MG/DL (ref 0.2–1)
BUN SERPL-MCNC: 13 MG/DL (ref 6–20)
BUN/CREAT SERPL: 22 (ref 12–20)
CALCIUM SERPL-MCNC: 8.7 MG/DL (ref 8.5–10.1)
CHLORIDE SERPL-SCNC: 111 MMOL/L (ref 97–108)
CO2 SERPL-SCNC: 23 MMOL/L (ref 21–32)
CREAT SERPL-MCNC: 0.59 MG/DL (ref 0.55–1.02)
DIFFERENTIAL METHOD BLD: ABNORMAL
EOSINOPHIL # BLD: 0.19 K/UL (ref 0–0.4)
EOSINOPHIL NFR BLD: 6.7 % (ref 0–7)
ERYTHROCYTE [DISTWIDTH] IN BLOOD BY AUTOMATED COUNT: 15.1 % (ref 11.5–14.5)
GLOBULIN SER CALC-MCNC: 2.3 G/DL (ref 2–4)
GLUCOSE SERPL-MCNC: 91 MG/DL (ref 65–100)
HCT VFR BLD AUTO: 32.9 % (ref 35–47)
HGB BLD-MCNC: 10.1 G/DL (ref 11.5–16)
IMM GRANULOCYTES # BLD AUTO: 0.01 K/UL (ref 0–0.04)
IMM GRANULOCYTES NFR BLD AUTO: 0.4 % (ref 0–0.5)
LYMPHOCYTES # BLD: 0.9 K/UL (ref 0.8–3.5)
LYMPHOCYTES NFR BLD: 31.2 % (ref 12–49)
MCH RBC QN AUTO: 28.1 PG (ref 26–34)
MCHC RBC AUTO-ENTMCNC: 30.7 G/DL (ref 30–36.5)
MCV RBC AUTO: 91.6 FL (ref 80–99)
MONOCYTES # BLD: 0.43 K/UL (ref 0–1)
MONOCYTES NFR BLD: 14.7 % (ref 5–13)
NEUTS SEG # BLD: 1.24 K/UL (ref 1.8–8)
NEUTS SEG NFR BLD: 42.4 % (ref 32–75)
NRBC # BLD: 0 K/UL (ref 0–0.01)
NRBC BLD-RTO: 0 PER 100 WBC
PLATELET # BLD AUTO: 356 K/UL (ref 150–400)
PMV BLD AUTO: 10.3 FL (ref 8.9–12.9)
POTASSIUM SERPL-SCNC: 4.3 MMOL/L (ref 3.5–5.1)
PROT SERPL-MCNC: 5.7 G/DL (ref 6.4–8.2)
RBC # BLD AUTO: 3.59 M/UL (ref 3.8–5.2)
RBC MORPH BLD: ABNORMAL
SODIUM SERPL-SCNC: 138 MMOL/L (ref 136–145)
WBC # BLD AUTO: 2.9 K/UL (ref 3.6–11)

## 2025-01-15 PROCEDURE — 36415 COLL VENOUS BLD VENIPUNCTURE: CPT

## 2025-01-15 PROCEDURE — 85025 COMPLETE CBC W/AUTO DIFF WBC: CPT

## 2025-01-15 PROCEDURE — 84165 PROTEIN E-PHORESIS SERUM: CPT

## 2025-01-15 PROCEDURE — 86334 IMMUNOFIX E-PHORESIS SERUM: CPT

## 2025-01-15 PROCEDURE — 82784 ASSAY IGA/IGD/IGG/IGM EACH: CPT

## 2025-01-15 PROCEDURE — 80053 COMPREHEN METABOLIC PANEL: CPT

## 2025-01-15 PROCEDURE — 83521 IG LIGHT CHAINS FREE EACH: CPT

## 2025-01-15 ASSESSMENT — PAIN SCALES - GENERAL: PAINLEVEL_OUTOF10: 0

## 2025-01-15 ASSESSMENT — PAIN SCALES - WONG BAKER: WONGBAKER_NUMERICALRESPONSE: NO HURT

## 2025-01-15 NOTE — PROGRESS NOTES
[facility] Lab visit:     0930: Patient arrived ambulatory and in no distress.  Labs drawn from R hand.  Departed [facility] ambulatory and in no distress.     Visit Vitals:  Patient Vitals for the past 12 hrs:   Temp Pulse Resp BP SpO2   01/15/25 0930 97.8 °F (36.6 °C) 57 18 114/66 98 %       Labs: See CC for pending results.

## 2025-01-16 ENCOUNTER — OFFICE VISIT (OUTPATIENT)
Age: 70
End: 2025-01-16
Payer: MEDICARE

## 2025-01-16 ENCOUNTER — HOSPITAL ENCOUNTER (OUTPATIENT)
Facility: HOSPITAL | Age: 70
Setting detail: INFUSION SERIES
Discharge: HOME | End: 2025-01-16
Payer: MEDICARE

## 2025-01-16 ENCOUNTER — CLINICAL DOCUMENTATION (OUTPATIENT)
Age: 70
End: 2025-01-16

## 2025-01-16 VITALS
SYSTOLIC BLOOD PRESSURE: 92 MMHG | WEIGHT: 140 LBS | RESPIRATION RATE: 18 BRPM | DIASTOLIC BLOOD PRESSURE: 59 MMHG | BODY MASS INDEX: 23.3 KG/M2 | TEMPERATURE: 97.6 F | OXYGEN SATURATION: 96 % | HEART RATE: 62 BPM

## 2025-01-16 VITALS
SYSTOLIC BLOOD PRESSURE: 101 MMHG | BODY MASS INDEX: 23.32 KG/M2 | DIASTOLIC BLOOD PRESSURE: 60 MMHG | HEIGHT: 65 IN | WEIGHT: 140 LBS

## 2025-01-16 DIAGNOSIS — Z11.59 ENCOUNTER FOR SCREENING FOR OTHER VIRAL DISEASES: Primary | ICD-10-CM

## 2025-01-16 DIAGNOSIS — C90.00 MULTIPLE MYELOMA NOT HAVING ACHIEVED REMISSION (HCC): ICD-10-CM

## 2025-01-16 DIAGNOSIS — C90.00 MULTIPLE MYELOMA NOT HAVING ACHIEVED REMISSION (HCC): Primary | ICD-10-CM

## 2025-01-16 PROCEDURE — 96375 TX/PRO/DX INJ NEW DRUG ADDON: CPT

## 2025-01-16 PROCEDURE — 1159F MED LIST DOCD IN RCRD: CPT

## 2025-01-16 PROCEDURE — 1126F AMNT PAIN NOTED NONE PRSNT: CPT

## 2025-01-16 PROCEDURE — 1123F ACP DISCUSS/DSCN MKR DOCD: CPT

## 2025-01-16 PROCEDURE — 99215 OFFICE O/P EST HI 40 MIN: CPT

## 2025-01-16 PROCEDURE — 6360000002 HC RX W HCPCS

## 2025-01-16 PROCEDURE — G8427 DOCREV CUR MEDS BY ELIG CLIN: HCPCS

## 2025-01-16 PROCEDURE — 96409 CHEMO IV PUSH SNGL DRUG: CPT

## 2025-01-16 PROCEDURE — 1036F TOBACCO NON-USER: CPT

## 2025-01-16 PROCEDURE — 1090F PRES/ABSN URINE INCON ASSESS: CPT

## 2025-01-16 PROCEDURE — G8420 CALC BMI NORM PARAMETERS: HCPCS

## 2025-01-16 PROCEDURE — G8399 PT W/DXA RESULTS DOCUMENT: HCPCS

## 2025-01-16 PROCEDURE — 96413 CHEMO IV INFUSION 1 HR: CPT

## 2025-01-16 PROCEDURE — 1111F DSCHRG MED/CURRENT MED MERGE: CPT

## 2025-01-16 PROCEDURE — 3017F COLORECTAL CA SCREEN DOC REV: CPT

## 2025-01-16 PROCEDURE — 1160F RVW MEDS BY RX/DR IN RCRD: CPT

## 2025-01-16 PROCEDURE — 2580000003 HC RX 258

## 2025-01-16 RX ORDER — ZOLEDRONIC ACID 0.04 MG/ML
4 INJECTION, SOLUTION INTRAVENOUS ONCE
OUTPATIENT
Start: 2025-01-16 | End: 2025-01-16

## 2025-01-16 RX ORDER — ONDANSETRON 2 MG/ML
8 INJECTION INTRAMUSCULAR; INTRAVENOUS
Status: CANCELLED | OUTPATIENT
Start: 2025-01-23

## 2025-01-16 RX ORDER — SODIUM CHLORIDE 9 MG/ML
INJECTION, SOLUTION INTRAVENOUS CONTINUOUS
Status: CANCELLED | OUTPATIENT
Start: 2025-01-23

## 2025-01-16 RX ORDER — HYDROCORTISONE SODIUM SUCCINATE 100 MG/2ML
100 INJECTION INTRAMUSCULAR; INTRAVENOUS
Status: CANCELLED | OUTPATIENT
Start: 2025-01-23

## 2025-01-16 RX ORDER — HEPARIN 100 UNIT/ML
500 SYRINGE INTRAVENOUS PRN
Status: CANCELLED | OUTPATIENT
Start: 2025-01-23

## 2025-01-16 RX ORDER — DIPHENHYDRAMINE HYDROCHLORIDE 50 MG/ML
50 INJECTION INTRAMUSCULAR; INTRAVENOUS
Status: CANCELLED | OUTPATIENT
Start: 2025-01-23

## 2025-01-16 RX ORDER — DEXAMETHASONE 4 MG/1
20 TABLET ORAL ONCE
Status: COMPLETED | OUTPATIENT
Start: 2025-01-16 | End: 2025-01-16

## 2025-01-16 RX ORDER — ACETAMINOPHEN 325 MG/1
650 TABLET ORAL
Status: CANCELLED | OUTPATIENT
Start: 2025-01-23

## 2025-01-16 RX ORDER — SODIUM CHLORIDE 0.9 % (FLUSH) 0.9 %
5-40 SYRINGE (ML) INJECTION PRN
Status: CANCELLED | OUTPATIENT
Start: 2025-01-23

## 2025-01-16 RX ORDER — ONDANSETRON 2 MG/ML
8 INJECTION INTRAMUSCULAR; INTRAVENOUS ONCE
Status: COMPLETED | OUTPATIENT
Start: 2025-01-16 | End: 2025-01-16

## 2025-01-16 RX ORDER — SODIUM CHLORIDE 9 MG/ML
5-250 INJECTION, SOLUTION INTRAVENOUS PRN
Status: CANCELLED | OUTPATIENT
Start: 2025-01-23

## 2025-01-16 RX ORDER — EPINEPHRINE 1 MG/ML
0.3 INJECTION, SOLUTION INTRAMUSCULAR; SUBCUTANEOUS PRN
Status: CANCELLED | OUTPATIENT
Start: 2025-01-23

## 2025-01-16 RX ORDER — SODIUM CHLORIDE 9 MG/ML
5-250 INJECTION, SOLUTION INTRAVENOUS PRN
OUTPATIENT
Start: 2025-01-16

## 2025-01-16 RX ORDER — ALBUTEROL SULFATE 90 UG/1
4 INHALANT RESPIRATORY (INHALATION) PRN
Status: CANCELLED | OUTPATIENT
Start: 2025-01-23

## 2025-01-16 RX ADMIN — ONDANSETRON 8 MG: 2 INJECTION, SOLUTION INTRAMUSCULAR; INTRAVENOUS at 11:29

## 2025-01-16 RX ADMIN — DEXAMETHASONE 20 MG: 4 TABLET ORAL at 11:35

## 2025-01-16 RX ADMIN — CARFILZOMIB 127.4 MG: 10 INJECTION, POWDER, LYOPHILIZED, FOR SOLUTION INTRAVENOUS at 12:14

## 2025-01-16 ASSESSMENT — PAIN SCALES - WONG BAKER: WONGBAKER_NUMERICALRESPONSE: NO HURT

## 2025-01-16 ASSESSMENT — PAIN SCALES - GENERAL: PAINLEVEL_OUTOF10: 0

## 2025-01-16 NOTE — PROGRESS NOTES
Oral Chemotherapy      Candida Weaver is a  69 y.o.female  diagnosed with multiple myeloma . Ms. Weaver is being treated with KPd.      Medication name: pomalidomide  Regimen: KPd  Dose:   3 mg  Frequency: daily  Administration schedule: for 21 days followed by 7 days off every 28 days  Ordering provider: Nany Arboleda MD  Start date: 1/16/25 (Cycle 16)      Lab Results   Component Value Date    WBC 2.9 (L) 01/15/2025    HGB 10.1 (L) 01/15/2025    HCT 32.9 (L) 01/15/2025    MCV 91.6 01/15/2025     01/15/2025    LYMPHOPCT 31.2 01/15/2025    RBC 3.59 (L) 01/15/2025    MCH 28.1 01/15/2025    MCHC 30.7 01/15/2025    RDW 15.1 (H) 01/15/2025       Lab Results   Component Value Date    NEUTROABS 1.24 (L) 01/15/2025             Expected follow up date: Labs/office visit each treatment. Next cycle start on 2/13/25     Patient provided with an Oral Chemotherapy Journal.              Rose Pa, PharmD, BCPS, BCOP      For Pharmacy Admin Tracking Only    Program: Medical Group  CPA in place:  Yes  Recommendation Provided To: Patient/Caregiver: 1 via In person    Intervention Accepted By: Patient/Caregiver: 1    Time Spent (min): 20

## 2025-01-16 NOTE — PROGRESS NOTES
Candida Weaver is a 69 y.o. female    Chief Complaint   Patient presents with    Follow-up      Multiple Myeloma        1. Have you been to the ER, urgent care clinic since your last visit?  Hospitalized since your last visit? Yes [facility]     2. Have you seen or consulted any other health care providers outside of the [facility] since your last visit?  Include any pap smears or colon screening. No      
 variable and cKappa.     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   abnormal chromosome 13 (1R1G, 78%, normal <%). An atypical fusion signal pattern was detected with the 11;14 probe set (59%).  The concurrent IGH probes confirmed the IGH gene rearrangement.  Counts for the remaining probes were within the normal reference range.       BM Bx 12./2024    Bone marrow, left posterior iliac crest; CT-guided aspiration, touch imprint, clot section and biopsy:  - Limited bone marrow biopsy sample and aspirate material with apparent crush and fibrosis without overt evidence of multiple myeloma in the marrow with <1% plasma cells (see Comment).              BM BX 3/2024  Bone marrow core biopsy:  The decalcified core biopsy is adequate for   evaluation. The marrow is hypercellular, averaging 90%, with a diffuse and   interstitial infiltrate of neoplastic plasma cells with focal associated   tumor necrosis. Immunohistochemical stain  highlights plasma cells   that comprise 70% of the total nucleated marrow elements. Megakaryocytes   are adequate in number, focally clustered, and have minimal atypia. The   myeloid and erythroid precursors are relatively few and mature without   overt dyspoiesis.     CT 11/2021  Radiology report(s) reviewed above.     1. 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  2. Incidentals as full reading    PET CT 9/2023   IMPRESSION:  Foci of increased tracer activity left lateral eighth rib and T3 spinous process. Inflammatory appearing changes in the right shoulder.    External records reviewed.   Records reviewed and summarized above.  Pathology report(s) reviewed above.    MRI Lumbar spine 1/2024    IMPRESSION:     1. New evidence of diffuse myeloma in the bone marrow since 2018. No pathologic  fracture.  2. L3-L4 increased moderate central spinal canal and left femoral stenosis.  Other

## 2025-01-16 NOTE — PROGRESS NOTES
"Subjective:       Patient ID: Anna Flaherty is a 29 y.o. female.    Vitals:  height is 5' 5" (1.651 m) and weight is 68 kg (150 lb). Her temperature is 99.9 °F (37.7 °C). Her blood pressure is 110/70 and her pulse is 120 (abnormal). Her respiration is 18 and oxygen saturation is 98%.     Chief Complaint: Sinus Problem (2 days)    Ambulatory with c/o sore throat, fever, ear aches, chest congestion and cough for several weeks.     Sinus Problem   This is a new problem. The current episode started in the past 7 days. The problem is unchanged. There has been no fever. Associated symptoms include congestion, coughing, ear pain, headaches, sinus pressure and sneezing. Pertinent negatives include no chills, diaphoresis, shortness of breath or sore throat. Past treatments include nothing. The treatment provided mild relief.       Constitution: Negative for chills, sweating, fatigue and fever.   HENT: Positive for ear pain, congestion, postnasal drip and sinus pressure. Negative for sinus pain, sore throat and voice change.    Neck: Negative for painful lymph nodes.   Eyes: Negative for eye redness.   Respiratory: Positive for cough. Negative for chest tightness, sputum production, bloody sputum, COPD, shortness of breath, stridor, wheezing and asthma.    Gastrointestinal: Negative for nausea and vomiting.   Musculoskeletal: Negative for muscle ache.   Skin: Negative for rash.   Allergic/Immunologic: Positive for sneezing. Negative for seasonal allergies and asthma.   Neurological: Positive for headaches.   Hematologic/Lymphatic: Negative for swollen lymph nodes.       Objective:      Physical Exam   Constitutional: She is oriented to person, place, and time. She appears well-developed and well-nourished. She is cooperative.  Non-toxic appearance. She does not have a sickly appearance. She does not appear ill. No distress.   HENT:   Head: Normocephalic and atraumatic.   Right Ear: Hearing, tympanic membrane, external ear and " [facility] Short Note                       Date: 2025    Name: Candida Weaver    MRN: [id number]         : 1955      1030 Pt admit to [facility] for Kyprolis C11D! ambulatory in stable condition. Assessment completed. No new concerns voiced.      Ms. Weaver's vitals were reviewed prior to and after treatment.   Patient Vitals for the past 12 hrs:   BP   25 1245 101/60         Lab results were reviewed.      Medications given:   Medications Administered         carfilzomib (KYPROLIS) 127.4 mg in dextrose 5 % 100 mL chemo IVPB Admin Date  2025 Action  New Bag Dose  127.4 mg Rate  347.4 mL/hr Route  IntraVENous Documented By  Jazmyne Dyer, SABRINA        dexAMETHasone (DECADRON) tablet 20 mg Admin Date  2025 Action  Given Dose  20 mg Rate   Route  Oral Documented By  Jazmyne Dyer, SABRINA        ondansetron (ZOFRAN) injection 8 mg Admin Date  2025 Action  Given Dose  8 mg Rate   Route  IntraVENous Documented By  Jazmyne Dyer, SABRINA            Port accessed with positive blood return. Port flushed, and de-accessed per protocol.     Ms. Weaver tolerated the infusion, and had no complaints.    [facility]. Weaver [facility] condition. Pt aware of next appt    Future Appointments   Date Time Provider Department Center   2025  9:30 AM PEDS LAB CHAIR 1 [facility]   2025  9:00 AM RUTH CHEMO CHAIR 5 [facility]   2025  9:30 AM PEDS LAB CHAIR 1 [facility]   2025 10:00 AM RUTH CHEMO CHAIR 1 [facility]   2025  3:00 PM PEDS LAB CHAIR 1 [facility]   2025 10:30 AM RUTH CHEMO CHAIR 2 [facility]   2025 10:45 AM Ashley Costa APRN - NP [facility] BS AMB   2025  9:30 AM PEDS LAB CHAIR 1 [facility]   2025 10:30 AM RUTH CHEMO CHAIR 2 [facility]   2025  9:30 AM PEDS LAB CHAIR 1 [facility]   2025  9:00 AM RUTH CHEMO CHAIR 5 RAJNI [facility]   3/12/2025  9:30 AM PEDS LAB CHAIR 1 OBEY [facility]   3/13/2025   ear canal normal.   Left Ear: Hearing, tympanic membrane, external ear and ear canal normal.   Nose: Nose normal. No mucosal edema, rhinorrhea or nasal deformity. No epistaxis. Right sinus exhibits no maxillary sinus tenderness and no frontal sinus tenderness. Left sinus exhibits no maxillary sinus tenderness and no frontal sinus tenderness.   Mouth/Throat: Uvula is midline, oropharynx is clear and moist and mucous membranes are normal. No trismus in the jaw. Normal dentition. No uvula swelling. No oropharyngeal exudate, posterior oropharyngeal edema or posterior oropharyngeal erythema.   Eyes: Conjunctivae and lids are normal. No scleral icterus.   Neck: Trachea normal, full passive range of motion without pain and phonation normal. Neck supple. No neck rigidity. No edema and no erythema present.   Cardiovascular: Normal rate, regular rhythm, normal heart sounds, intact distal pulses and normal pulses.   Pulmonary/Chest: Effort normal and breath sounds normal. No respiratory distress. She has no decreased breath sounds. She has no rhonchi.   Abdominal: Normal appearance.   Musculoskeletal: Normal range of motion. She exhibits no edema or deformity.   Neurological: She is alert and oriented to person, place, and time. She exhibits normal muscle tone. Coordination normal.   Skin: Skin is warm, dry, intact, not diaphoretic and not pale.   Psychiatric: She has a normal mood and affect. Her speech is normal and behavior is normal. Judgment and thought content normal. Cognition and memory are normal.   Nursing note and vitals reviewed.        Assessment:       1. Fever, unspecified fever cause    2. Bacterial URI        Plan:         Fever, unspecified fever cause  -     POCT Influenza A/B    Bacterial URI  -     amoxicillin-clavulanate 875-125mg (AUGMENTIN) 875-125 mg per tablet; Take 1 tablet by mouth every 12 (twelve) hours.  Dispense: 10 tablet; Refill: 0      Patient Instructions   OVER THE COUNTER  RECOMMENDATIONS/SUGGESTIONS.    Make sure to stay well hydrated.    Use Nasal Saline to mechanically move any post nasal drip from your eustachian tube or from the back of your throat.    Use warm salt water gargles to ease your throat pain. Warm salt water gargles as needed for sore throat-  1/2 tsp salt to 1 cup warm water, gargle as desired.    Use an antihistamine such as Claritin, Zyrtec or Allegra to dry you out.     Use pseudoephedrine (behind the counter) to decongest. Pseudoephedrine  30 mg up to 240 mg /day. It can raise your blood pressure and give you palpitations.    Use mucinex (guaifenisin) to break up mucous up to 2400mg/day to loosen any mucous.   The mucinex DM pill has a cough suppressant that can be sedating. It can be used at night to stop the tickle at the back of your throat.  You can use Mucinex D (it has guaifenesin and a high dose of pseudoephedrine) in the mornings to help decongest.      Use Afrin in each nare for no longer than 3 days, as it is addictive. It can also dry out your mucous membranes and cause elevated blood pressure. This is especially useful if you are flying.    Use Flonase 1-2 sprays/nostril per day. It is a local acting steroid nasal spray, if you develop a bloody nose, stop using the medication immediately.    Sometimes Nyquil at night is beneficial to help you get some rest, however it is sedating and it does have an antihistamine, and tylenol.    Honey is a natural cough suppressant that can be used.    Tylenol up to 4,000 mg a day is safe for short periods and can be used for body aches, pain, and fever. However in high doses and prolonged use it can cause liver irritation.    Ibuprofen is a non-steroidal anti-inflammatory that can be used for body aches, pain, and fever.However it can also cause stomach irritation if over used.

## 2025-01-22 ENCOUNTER — HOSPITAL ENCOUNTER (OUTPATIENT)
Facility: HOSPITAL | Age: 70
Setting detail: INFUSION SERIES
Discharge: HOME | End: 2025-01-22
Payer: MEDICARE

## 2025-01-22 VITALS
RESPIRATION RATE: 18 BRPM | TEMPERATURE: 97.5 F | SYSTOLIC BLOOD PRESSURE: 117 MMHG | DIASTOLIC BLOOD PRESSURE: 52 MMHG | HEART RATE: 71 BPM | OXYGEN SATURATION: 97 %

## 2025-01-22 DIAGNOSIS — C90.00 MULTIPLE MYELOMA NOT HAVING ACHIEVED REMISSION (HCC): ICD-10-CM

## 2025-01-22 DIAGNOSIS — Z11.59 ENCOUNTER FOR SCREENING FOR OTHER VIRAL DISEASES: ICD-10-CM

## 2025-01-22 LAB
ALBUMIN SERPL ELPH-MCNC: 3.6 G/DL (ref 2.9–4.4)
ALBUMIN SERPL-MCNC: 3.2 G/DL (ref 3.5–5)
ALBUMIN/GLOB SERPL: 1.2 (ref 1.1–2.2)
ALBUMIN/GLOB SERPL: 2.2 (ref 0.7–1.7)
ALP SERPL-CCNC: 68 U/L (ref 45–117)
ALPHA1 GLOB SERPL ELPH-MCNC: 0.2 G/DL (ref 0–0.4)
ALPHA2 GLOB SERPL ELPH-MCNC: 0.5 G/DL (ref 0.4–1)
ALT SERPL-CCNC: 22 U/L (ref 12–78)
ANION GAP SERPL CALC-SCNC: 5 MMOL/L (ref 2–12)
AST SERPL-CCNC: 8 U/L (ref 15–37)
B-GLOBULIN SERPL ELPH-MCNC: 0.7 G/DL (ref 0.7–1.3)
BASOPHILS # BLD: 0.02 K/UL (ref 0–0.1)
BASOPHILS NFR BLD: 0.9 % (ref 0–1)
BILIRUB SERPL-MCNC: 0.4 MG/DL (ref 0.2–1)
BUN SERPL-MCNC: 13 MG/DL (ref 6–20)
BUN/CREAT SERPL: 17 (ref 12–20)
CALCIUM SERPL-MCNC: 8.5 MG/DL (ref 8.5–10.1)
CHLORIDE SERPL-SCNC: 113 MMOL/L (ref 97–108)
CO2 SERPL-SCNC: 20 MMOL/L (ref 21–32)
CREAT SERPL-MCNC: 0.75 MG/DL (ref 0.55–1.02)
DIFFERENTIAL METHOD BLD: ABNORMAL
EOSINOPHIL # BLD: 0.18 K/UL (ref 0–0.4)
EOSINOPHIL NFR BLD: 7.7 % (ref 0–7)
ERYTHROCYTE [DISTWIDTH] IN BLOOD BY AUTOMATED COUNT: 15.1 % (ref 11.5–14.5)
GAMMA GLOB SERPL ELPH-MCNC: 0.2 G/DL (ref 0.4–1.8)
GLOBULIN SER CALC-MCNC: 2.6 G/DL (ref 2–4)
GLOBULIN SER-MCNC: 1.7 G/DL (ref 2.2–3.9)
GLUCOSE SERPL-MCNC: 112 MG/DL (ref 65–100)
HCT VFR BLD AUTO: 32.8 % (ref 35–47)
HGB BLD-MCNC: 10.3 G/DL (ref 11.5–16)
IGA SERPL-MCNC: 20 MG/DL (ref 64–422)
IGG SERPL-MCNC: 230 MG/DL (ref 586–1602)
IGM SERPL-MCNC: 10 MG/DL (ref 26–217)
IMM GRANULOCYTES # BLD AUTO: 0.01 K/UL (ref 0–0.04)
IMM GRANULOCYTES NFR BLD AUTO: 0.4 % (ref 0–0.5)
INTERPRETATION SERPL IEP-IMP: ABNORMAL
KAPPA LC FREE SER-MCNC: 58.9 MG/L (ref 3.3–19.4)
KAPPA LC FREE/LAMBDA FREE SER: 31 (ref 0.26–1.65)
LAMBDA LC FREE SERPL-MCNC: 1.9 MG/L (ref 5.7–26.3)
LYMPHOCYTES # BLD: 0.69 K/UL (ref 0.8–3.5)
LYMPHOCYTES NFR BLD: 30 % (ref 12–49)
M PROTEIN SERPL ELPH-MCNC: ABNORMAL G/DL
MCH RBC QN AUTO: 28.5 PG (ref 26–34)
MCHC RBC AUTO-ENTMCNC: 31.4 G/DL (ref 30–36.5)
MCV RBC AUTO: 90.9 FL (ref 80–99)
MONOCYTES # BLD: 0.65 K/UL (ref 0–1)
MONOCYTES NFR BLD: 28.3 % (ref 5–13)
NEUTS SEG # BLD: 0.75 K/UL (ref 1.8–8)
NEUTS SEG NFR BLD: 32.7 % (ref 32–75)
NRBC # BLD: 0.03 K/UL (ref 0–0.01)
NRBC BLD-RTO: 1.3 PER 100 WBC
PLATELET # BLD AUTO: 117 K/UL (ref 150–400)
PMV BLD AUTO: 12.1 FL (ref 8.9–12.9)
POTASSIUM SERPL-SCNC: 4.4 MMOL/L (ref 3.5–5.1)
PROT SERPL-MCNC: 5.3 G/DL (ref 6–8.5)
PROT SERPL-MCNC: 5.8 G/DL (ref 6.4–8.2)
RBC # BLD AUTO: 3.61 M/UL (ref 3.8–5.2)
RBC MORPH BLD: ABNORMAL
SODIUM SERPL-SCNC: 138 MMOL/L (ref 136–145)
WBC # BLD AUTO: 2.3 K/UL (ref 3.6–11)
WBC MORPH BLD: ABNORMAL

## 2025-01-22 PROCEDURE — 36415 COLL VENOUS BLD VENIPUNCTURE: CPT

## 2025-01-22 PROCEDURE — 85025 COMPLETE CBC W/AUTO DIFF WBC: CPT

## 2025-01-22 PROCEDURE — 80053 COMPREHEN METABOLIC PANEL: CPT

## 2025-01-22 ASSESSMENT — PAIN SCALES - GENERAL: PAINLEVEL_OUTOF10: 0

## 2025-01-22 NOTE — PROGRESS NOTES
[facility] Lab visit:     0930: Patient arrived ambulatory and in no distress.  Labs drawn from L hand. Departed [facility] ambulatory and in no distress.     Visit Vitals:  Patient Vitals for the past 12 hrs:   Temp Pulse Resp BP SpO2   01/22/25 0938 97.5 °F (36.4 °C) 71 18 (!) 117/52 97 %       Labs: See CC for pending results.  Recent Results (from the past 12 hour(s))   CBC With Auto Differential    Collection Time: 01/22/25  9:41 AM   Result Value Ref Range    WBC 2.3 (L) 3.6 - 11.0 K/uL    RBC 3.61 (L) 3.80 - 5.20 M/uL    Hemoglobin 10.3 (L) 11.5 - 16.0 g/dL    Hematocrit 32.8 (L) 35.0 - 47.0 %    MCV 90.9 80.0 - 99.0 FL    MCH 28.5 26.0 - 34.0 PG    MCHC 31.4 30.0 - 36.5 g/dL    RDW 15.1 (H) 11.5 - 14.5 %    Platelets 117 (L) 150 - 400 K/uL    MPV 12.1 8.9 - 12.9 FL    Nucleated RBCs 1.3 (H) 0  WBC    nRBC 0.03 (H) 0.00 - 0.01 K/uL    Neutrophils % PENDING %    Lymphocytes % PENDING %    Monocytes % PENDING %    Eosinophils % PENDING %    Basophils % PENDING %    Immature Granulocytes % PENDING %    Neutrophils Absolute PENDING K/UL    Lymphocytes Absolute PENDING K/UL    Monocytes Absolute PENDING K/UL    Eosinophils Absolute PENDING K/UL    Basophils Absolute PENDING K/UL    Immature Granulocytes Absolute PENDING K/UL    Differential Type PENDING

## 2025-01-23 ENCOUNTER — HOSPITAL ENCOUNTER (OUTPATIENT)
Facility: HOSPITAL | Age: 70
Setting detail: INFUSION SERIES
Discharge: HOME | End: 2025-01-23
Payer: MEDICARE

## 2025-01-23 VITALS
DIASTOLIC BLOOD PRESSURE: 73 MMHG | SYSTOLIC BLOOD PRESSURE: 132 MMHG | HEART RATE: 63 BPM | RESPIRATION RATE: 16 BRPM | HEIGHT: 65 IN | TEMPERATURE: 97.6 F | OXYGEN SATURATION: 98 % | WEIGHT: 140 LBS | BODY MASS INDEX: 23.32 KG/M2

## 2025-01-23 DIAGNOSIS — Z11.59 ENCOUNTER FOR SCREENING FOR OTHER VIRAL DISEASES: ICD-10-CM

## 2025-01-23 DIAGNOSIS — C90.00 MULTIPLE MYELOMA NOT HAVING ACHIEVED REMISSION (HCC): Primary | ICD-10-CM

## 2025-01-23 LAB
BASOPHILS # BLD: 0.03 K/UL (ref 0–0.1)
BASOPHILS NFR BLD: 1.4 % (ref 0–1)
DIFFERENTIAL METHOD BLD: ABNORMAL
EOSINOPHIL # BLD: 0.2 K/UL (ref 0–0.4)
EOSINOPHIL NFR BLD: 9.4 % (ref 0–7)
ERYTHROCYTE [DISTWIDTH] IN BLOOD BY AUTOMATED COUNT: 15.3 % (ref 11.5–14.5)
HCT VFR BLD AUTO: 32.2 % (ref 35–47)
HGB BLD-MCNC: 10 G/DL (ref 11.5–16)
IMM GRANULOCYTES # BLD AUTO: 0.01 K/UL (ref 0–0.04)
IMM GRANULOCYTES NFR BLD AUTO: 0.5 % (ref 0–0.5)
LYMPHOCYTES # BLD: 0.55 K/UL (ref 0.8–3.5)
LYMPHOCYTES NFR BLD: 26.3 % (ref 12–49)
MCH RBC QN AUTO: 28.5 PG (ref 26–34)
MCHC RBC AUTO-ENTMCNC: 31.1 G/DL (ref 30–36.5)
MCV RBC AUTO: 91.7 FL (ref 80–99)
MONOCYTES # BLD: 0.58 K/UL (ref 0–1)
MONOCYTES NFR BLD: 27.7 % (ref 5–13)
NEUTS SEG # BLD: 0.73 K/UL (ref 1.8–8)
NEUTS SEG NFR BLD: 34.7 % (ref 32–75)
NRBC # BLD: 0.02 K/UL (ref 0–0.01)
NRBC BLD-RTO: 0.9 PER 100 WBC
PLATELET # BLD AUTO: 131 K/UL (ref 150–400)
PMV BLD AUTO: 12.7 FL (ref 8.9–12.9)
RBC # BLD AUTO: 3.51 M/UL (ref 3.8–5.2)
RBC MORPH BLD: ABNORMAL
RBC MORPH BLD: ABNORMAL
WBC # BLD AUTO: 2.1 K/UL (ref 3.6–11)

## 2025-01-23 PROCEDURE — 6360000002 HC RX W HCPCS: Performed by: INTERNAL MEDICINE

## 2025-01-23 PROCEDURE — 85025 COMPLETE CBC W/AUTO DIFF WBC: CPT

## 2025-01-23 PROCEDURE — 96374 THER/PROPH/DIAG INJ IV PUSH: CPT

## 2025-01-23 PROCEDURE — 36415 COLL VENOUS BLD VENIPUNCTURE: CPT

## 2025-01-23 RX ORDER — DEXTROSE MONOHYDRATE 50 MG/ML
5-250 INJECTION, SOLUTION INTRAVENOUS PRN
Status: DISCONTINUED | OUTPATIENT
Start: 2025-01-23 | End: 2025-01-23

## 2025-01-23 RX ORDER — DEXTROSE MONOHYDRATE 50 MG/ML
5-250 INJECTION, SOLUTION INTRAVENOUS PRN
Status: CANCELLED | OUTPATIENT
Start: 2025-01-30

## 2025-01-23 RX ORDER — ACETAMINOPHEN 325 MG/1
650 TABLET ORAL
Status: CANCELLED | OUTPATIENT
Start: 2025-01-30

## 2025-01-23 RX ORDER — DEXAMETHASONE 4 MG/1
20 TABLET ORAL ONCE
Status: DISCONTINUED | OUTPATIENT
Start: 2025-01-23 | End: 2025-01-23

## 2025-01-23 RX ORDER — ONDANSETRON 2 MG/ML
8 INJECTION INTRAMUSCULAR; INTRAVENOUS ONCE
Status: COMPLETED | OUTPATIENT
Start: 2025-01-23 | End: 2025-01-23

## 2025-01-23 RX ORDER — ALBUTEROL SULFATE 90 UG/1
4 INHALANT RESPIRATORY (INHALATION) PRN
Status: CANCELLED | OUTPATIENT
Start: 2025-01-30

## 2025-01-23 RX ORDER — DIPHENHYDRAMINE HYDROCHLORIDE 50 MG/ML
50 INJECTION INTRAMUSCULAR; INTRAVENOUS
Status: CANCELLED | OUTPATIENT
Start: 2025-01-30

## 2025-01-23 RX ORDER — EPINEPHRINE 1 MG/ML
0.3 INJECTION, SOLUTION INTRAMUSCULAR; SUBCUTANEOUS PRN
Status: CANCELLED | OUTPATIENT
Start: 2025-01-30

## 2025-01-23 RX ORDER — SODIUM CHLORIDE 9 MG/ML
INJECTION, SOLUTION INTRAVENOUS CONTINUOUS
Status: CANCELLED | OUTPATIENT
Start: 2025-01-30

## 2025-01-23 RX ORDER — ONDANSETRON 2 MG/ML
8 INJECTION INTRAMUSCULAR; INTRAVENOUS
Status: CANCELLED | OUTPATIENT
Start: 2025-01-30

## 2025-01-23 RX ORDER — SODIUM CHLORIDE 9 MG/ML
5-250 INJECTION, SOLUTION INTRAVENOUS PRN
Status: CANCELLED | OUTPATIENT
Start: 2025-01-30

## 2025-01-23 RX ORDER — HYDROCORTISONE SODIUM SUCCINATE 100 MG/2ML
100 INJECTION INTRAMUSCULAR; INTRAVENOUS
Status: CANCELLED | OUTPATIENT
Start: 2025-01-30

## 2025-01-23 RX ORDER — HEPARIN 100 UNIT/ML
500 SYRINGE INTRAVENOUS PRN
Status: CANCELLED | OUTPATIENT
Start: 2025-01-30

## 2025-01-23 RX ORDER — SODIUM CHLORIDE 0.9 % (FLUSH) 0.9 %
5-40 SYRINGE (ML) INJECTION PRN
Status: CANCELLED | OUTPATIENT
Start: 2025-01-30

## 2025-01-23 RX ADMIN — ONDANSETRON 8 MG: 2 INJECTION, SOLUTION INTRAMUSCULAR; INTRAVENOUS at 10:58

## 2025-01-23 NOTE — PROGRESS NOTES
[facility] Chemo Progress Note    Date: 2025    Name: Candida Weaver    MRN: [id number]         : 1955    Ms. Weaver arrived ambulatory and in no distress for cycle 8 day 1 of Kyprolis.      Assessment was completed and documented in flowsheets. Port accessed without difficulty, positive blood return present.  Ms. Weaver's vitals were reviewed.  Patient Vitals for the past 12 hrs:   Temp Pulse Resp BP SpO2   25 0900 97.6 °F (36.4 °C) 63 16 132/73 98 %         Lab results were reviewed 25 and ANC below treatment parameters. Repeat CBC sent 2025 per JON Costa.  Labs NOT within parameter for treatment.   Follow Up: HOLD treatment      Recent Results (from the past 12 hour(s))   CBC with Auto Differential    Collection Time: 25  9:42 AM   Result Value Ref Range    WBC 2.1 (L) 3.6 - 11.0 K/uL    RBC 3.51 (L) 3.80 - 5.20 M/uL    Hemoglobin 10.0 (L) 11.5 - 16.0 g/dL    Hematocrit 32.2 (L) 35.0 - 47.0 %    MCV 91.7 80.0 - 99.0 FL    MCH 28.5 26.0 - 34.0 PG    MCHC 31.1 30.0 - 36.5 g/dL    RDW 15.3 (H) 11.5 - 14.5 %    Platelets 131 (L) 150 - 400 K/uL    MPV 12.7 8.9 - 12.9 FL    Nucleated RBCs 0.9 (H) 0  WBC    nRBC 0.02 (H) 0.00 - 0.01 K/uL    Neutrophils % 34.7 32.0 - 75.0 %    Lymphocytes % 26.3 12.0 - 49.0 %    Monocytes % 27.7 (H) 5.0 - 13.0 %    Eosinophils % 9.4 (H) 0.0 - 7.0 %    Basophils % 1.4 (H) 0.0 - 1.0 %    Immature Granulocytes % 0.5 0.0 - 0.5 %    Neutrophils Absolute 0.73 (L) 1.80 - 8.00 K/UL    Lymphocytes Absolute 0.55 (L) 0.80 - 3.50 K/UL    Monocytes Absolute 0.58 0.00 - 1.00 K/UL    Eosinophils Absolute 0.20 0.00 - 0.40 K/UL    Basophils Absolute 0.03 0.00 - 0.10 K/UL    Immature Granulocytes Absolute 0.01 0.00 - 0.04 K/UL    Differential Type SMEAR SCANNED      RBC Comment ANISOCYTOSIS  1+        RBC Comment OVALOCYTES  PRESENT         Per Igor, NP hold Kyprolis today and patient informed to hold Pomalyst this week (OV next week). Patient verbalized

## 2025-01-29 ENCOUNTER — HOSPITAL ENCOUNTER (OUTPATIENT)
Facility: HOSPITAL | Age: 70
Setting detail: INFUSION SERIES
Discharge: HOME | End: 2025-01-29
Payer: MEDICARE

## 2025-01-29 VITALS
RESPIRATION RATE: 18 BRPM | OXYGEN SATURATION: 97 % | SYSTOLIC BLOOD PRESSURE: 108 MMHG | HEART RATE: 63 BPM | TEMPERATURE: 97.8 F | DIASTOLIC BLOOD PRESSURE: 66 MMHG

## 2025-01-29 DIAGNOSIS — Z11.59 ENCOUNTER FOR SCREENING FOR OTHER VIRAL DISEASES: ICD-10-CM

## 2025-01-29 DIAGNOSIS — C90.00 MULTIPLE MYELOMA NOT HAVING ACHIEVED REMISSION (HCC): ICD-10-CM

## 2025-01-29 LAB
ALBUMIN SERPL-MCNC: 3.3 G/DL (ref 3.5–5)
ALBUMIN/GLOB SERPL: 1.3 (ref 1.1–2.2)
ALP SERPL-CCNC: 61 U/L (ref 45–117)
ALT SERPL-CCNC: 24 U/L (ref 12–78)
ANION GAP SERPL CALC-SCNC: 5 MMOL/L (ref 2–12)
AST SERPL-CCNC: 12 U/L (ref 15–37)
BASOPHILS # BLD: 0.12 K/UL (ref 0–0.1)
BASOPHILS NFR BLD: 4.1 % (ref 0–1)
BILIRUB SERPL-MCNC: 0.4 MG/DL (ref 0.2–1)
BUN SERPL-MCNC: 13 MG/DL (ref 6–20)
BUN/CREAT SERPL: 17 (ref 12–20)
CALCIUM SERPL-MCNC: 8.8 MG/DL (ref 8.5–10.1)
CHLORIDE SERPL-SCNC: 111 MMOL/L (ref 97–108)
CO2 SERPL-SCNC: 22 MMOL/L (ref 21–32)
CREAT SERPL-MCNC: 0.77 MG/DL (ref 0.55–1.02)
DIFFERENTIAL METHOD BLD: ABNORMAL
EOSINOPHIL # BLD: 0.11 K/UL (ref 0–0.4)
EOSINOPHIL NFR BLD: 3.7 % (ref 0–7)
ERYTHROCYTE [DISTWIDTH] IN BLOOD BY AUTOMATED COUNT: 15.9 % (ref 11.5–14.5)
GLOBULIN SER CALC-MCNC: 2.6 G/DL (ref 2–4)
GLUCOSE SERPL-MCNC: 124 MG/DL (ref 65–100)
HCT VFR BLD AUTO: 34 % (ref 35–47)
HGB BLD-MCNC: 10.6 G/DL (ref 11.5–16)
IMM GRANULOCYTES # BLD AUTO: 0.01 K/UL (ref 0–0.04)
IMM GRANULOCYTES NFR BLD AUTO: 0.3 % (ref 0–0.5)
LYMPHOCYTES # BLD: 0.81 K/UL (ref 0.8–3.5)
LYMPHOCYTES NFR BLD: 27.1 % (ref 12–49)
MCH RBC QN AUTO: 29.2 PG (ref 26–34)
MCHC RBC AUTO-ENTMCNC: 31.2 G/DL (ref 30–36.5)
MCV RBC AUTO: 93.7 FL (ref 80–99)
MONOCYTES # BLD: 0.35 K/UL (ref 0–1)
MONOCYTES NFR BLD: 11.5 % (ref 5–13)
NEUTS SEG # BLD: 1.6 K/UL (ref 1.8–8)
NEUTS SEG NFR BLD: 53.3 % (ref 32–75)
NRBC # BLD: 0 K/UL (ref 0–0.01)
NRBC BLD-RTO: 0 PER 100 WBC
PLATELET # BLD AUTO: 329 K/UL (ref 150–400)
PMV BLD AUTO: 11.8 FL (ref 8.9–12.9)
POTASSIUM SERPL-SCNC: 3.9 MMOL/L (ref 3.5–5.1)
PROT SERPL-MCNC: 5.9 G/DL (ref 6.4–8.2)
RBC # BLD AUTO: 3.63 M/UL (ref 3.8–5.2)
RBC MORPH BLD: ABNORMAL
SODIUM SERPL-SCNC: 138 MMOL/L (ref 136–145)
WBC # BLD AUTO: 3 K/UL (ref 3.6–11)
WBC MORPH BLD: ABNORMAL

## 2025-01-29 PROCEDURE — 36415 COLL VENOUS BLD VENIPUNCTURE: CPT

## 2025-01-29 PROCEDURE — 80053 COMPREHEN METABOLIC PANEL: CPT

## 2025-01-29 PROCEDURE — 85025 COMPLETE CBC W/AUTO DIFF WBC: CPT

## 2025-01-29 ASSESSMENT — PAIN SCALES - WONG BAKER: WONGBAKER_NUMERICALRESPONSE: NO HURT

## 2025-01-29 ASSESSMENT — PAIN SCALES - GENERAL: PAINLEVEL_OUTOF10: 0

## 2025-01-29 NOTE — PROGRESS NOTES
[facility] Lab visit:     0940: Patient arrived ambulatory and in no distress.  Labs drawn from L hand. Departed [facility] ambulatory and in no distress.     Visit Vitals:  Patient Vitals for the past 12 hrs:   Temp Pulse Resp BP SpO2   01/29/25 0930 97.8 °F (36.6 °C) 63 18 108/66 97 %       Labs: See CC for pending results.

## 2025-01-30 ENCOUNTER — HOSPITAL ENCOUNTER (OUTPATIENT)
Facility: HOSPITAL | Age: 70
Setting detail: INFUSION SERIES
Discharge: HOME | End: 2025-01-30
Payer: MEDICARE

## 2025-01-30 VITALS
BODY MASS INDEX: 23.86 KG/M2 | HEART RATE: 69 BPM | HEIGHT: 65 IN | OXYGEN SATURATION: 98 % | WEIGHT: 143.2 LBS | TEMPERATURE: 97.5 F | SYSTOLIC BLOOD PRESSURE: 142 MMHG | DIASTOLIC BLOOD PRESSURE: 73 MMHG | RESPIRATION RATE: 16 BRPM

## 2025-01-30 DIAGNOSIS — C90.00 MULTIPLE MYELOMA NOT HAVING ACHIEVED REMISSION (HCC): Primary | ICD-10-CM

## 2025-01-30 DIAGNOSIS — Z11.59 ENCOUNTER FOR SCREENING FOR OTHER VIRAL DISEASES: ICD-10-CM

## 2025-01-30 PROCEDURE — 2580000003 HC RX 258: Performed by: INTERNAL MEDICINE

## 2025-01-30 PROCEDURE — 96375 TX/PRO/DX INJ NEW DRUG ADDON: CPT

## 2025-01-30 PROCEDURE — 6360000002 HC RX W HCPCS: Performed by: INTERNAL MEDICINE

## 2025-01-30 PROCEDURE — 96413 CHEMO IV INFUSION 1 HR: CPT

## 2025-01-30 RX ORDER — ONDANSETRON 2 MG/ML
8 INJECTION INTRAMUSCULAR; INTRAVENOUS ONCE
Status: COMPLETED | OUTPATIENT
Start: 2025-01-30 | End: 2025-01-30

## 2025-01-30 RX ORDER — DEXAMETHASONE 4 MG/1
20 TABLET ORAL ONCE
Status: COMPLETED | OUTPATIENT
Start: 2025-01-30 | End: 2025-01-30

## 2025-01-30 RX ADMIN — ONDANSETRON 8 MG: 2 INJECTION INTRAMUSCULAR; INTRAVENOUS at 11:59

## 2025-01-30 RX ADMIN — DEXAMETHASONE 20 MG: 4 TABLET ORAL at 11:57

## 2025-01-30 RX ADMIN — CARFILZOMIB 127.4 MG: 10 INJECTION, POWDER, LYOPHILIZED, FOR SOLUTION INTRAVENOUS at 11:51

## 2025-01-30 ASSESSMENT — PAIN SCALES - WONG BAKER: WONGBAKER_NUMERICALRESPONSE: NO HURT

## 2025-01-30 ASSESSMENT — PAIN SCALES - GENERAL: PAINLEVEL_OUTOF10: 0

## 2025-01-30 NOTE — PROGRESS NOTES
\[facility]\"" Progress Note    Ms. Weaver Arrived ambulatory and in no distress for cycle 11 day 15 of Kyprolis regimen.  Assessment was completed, no acute issues at this time, no new complaints voiced.  Port accessed without difficulty, labs drawn and processed.          Ms. Weaver's vitals were reviewed.  Patient Vitals for the past 12 hrs:   Temp Pulse Resp BP SpO2   01/30/25 0930 97.5 °F (36.4 °C) 69 16 (!) 142/73 98 %         Lab results were obtained and reviewed.      Pre-medications  were administered as ordered and chemotherapy was initiated.  Medications Administered         carfilzomib (KYPROLIS) 127.4 mg in dextrose 5 % 100 mL chemo IVPB Admin Date  01/30/2025 Action  New Bag Dose  127.4 mg Rate  347.4 mL/hr Route  IntraVENous Documented By  Rachelle Grayson, SABRINA        dexAMETHasone (DECADRON) tablet 20 mg Admin Date  01/30/2025 Action  Given Dose  20 mg Rate   Route  Oral Documented By  Rachelle Grayson, SABRINA        ondansetron (ZOFRAN) injection 8 mg Admin Date  01/30/2025 Action  Given Dose  8 mg Rate   Route  IntraVENous Documented By  Rachelle Grayson, SABRINA           Blood return maintained throughout infusion.    Two nurses verified prior to administering: Drug name, Drug dose, Infusion volume or drug volume when prepared in a syringe, Rate of administration, Route of administration, Expiration dates and/or times, Appearance and physical integrity of the drugs, Rate set on infusion pump, when used, and Sequencing of drug administration.    Ms. Weaver tolerated treatment well, port flushed and de accessed, [facility].     Future Appointments   Date Time Provider Department Center   2/12/2025  3:00 PM PEDS LAB CHAIR 1 [facility]   2/13/2025 10:30 AM [facility] CHEMO CHAIR 2 [facility]   2/13/2025 10:45 AM Ashley Costa APRN - NP [facility]   2/19/2025  9:30 AM PEDS LAB CHAIR 1 [facility]   2/20/2025 10:30 [facility]

## 2025-01-31 DIAGNOSIS — E11.9 TYPE 2 DIABETES MELLITUS WITHOUT COMPLICATION, WITHOUT LONG-TERM CURRENT USE OF INSULIN (HCC): ICD-10-CM

## 2025-01-31 DIAGNOSIS — I26.99 OTHER ACUTE PULMONARY EMBOLISM WITHOUT ACUTE COR PULMONALE (HCC): Primary | ICD-10-CM

## 2025-01-31 DIAGNOSIS — C90.00 MULTIPLE MYELOMA NOT HAVING ACHIEVED REMISSION (HCC): ICD-10-CM

## 2025-01-31 DIAGNOSIS — E78.2 MIXED HYPERLIPIDEMIA: ICD-10-CM

## 2025-01-31 NOTE — TELEPHONE ENCOUNTER
HIPAA x2  SW pt to let her know that she is to continue taking both her Eliquis and her Pomalyst and that Ashley Costa NP will send in refills. Pt verbalized understanding and was appreciative of my call.

## 2025-01-31 NOTE — TELEPHONE ENCOUNTER
Pt called stating she's finished with her 30day Eliquis pack: Does she need to continue and if so, refill needs to be sent to [facility] on file. Also wants to know if she should continue with Pomalyst. Call back number is [phone].

## 2025-02-01 RX ORDER — ROSUVASTATIN CALCIUM 5 MG/1
5 TABLET, COATED ORAL NIGHTLY
Qty: 30 TABLET | Refills: 2 | Status: SHIPPED | OUTPATIENT
Start: 2025-02-01

## 2025-02-01 RX ORDER — METFORMIN HYDROCHLORIDE 500 MG/1
500 TABLET, EXTENDED RELEASE ORAL 3 TIMES DAILY
Qty: 90 TABLET | Refills: 0 | Status: SHIPPED | OUTPATIENT
Start: 2025-02-01

## 2025-02-03 RX ORDER — POMALIDOMIDE 4 MG/1
4 CAPSULE ORAL DAILY
Qty: 21 CAPSULE | Refills: 0 | Status: ACTIVE | OUTPATIENT
Start: 2025-02-03

## 2025-02-03 NOTE — TELEPHONE ENCOUNTER
Oral Chemotherapy      Candida Weaver is a  69 y.o.female  diagnosed with multiple myeloma . Ms. Weaver is being treated with KPd.      Medication name: pomalidomide  Regimen: KPd  Dose:  4 mg (increase dose for 9/26/24)  Frequency: daily  Administration schedule: for 21 days followed by 7 days off every 28 days  Ordering provider: Nany Arboleda MD  Start date: 1/16/25 (Cycle 11)      REMS auth # [id number]  Prescription sent to pharmacy for processing.         Rose Pa, WOOD, BCOP, BCPS    For Pharmacy Admin Tracking Only    Program: Medical Group  CPA in place:  Yes  Recommendation Provided To: Patient/Caregiver: 1 via Telephone  Intervention Detail: Refill(s) Provided  Intervention Accepted By: Patient/Caregiver: 1    Time Spent (min): 10

## 2025-02-04 DIAGNOSIS — E11.9 TYPE 2 DIABETES MELLITUS WITHOUT COMPLICATION, WITHOUT LONG-TERM CURRENT USE OF INSULIN (HCC): ICD-10-CM

## 2025-02-04 DIAGNOSIS — C90.00 MULTIPLE MYELOMA NOT HAVING ACHIEVED REMISSION (HCC): ICD-10-CM

## 2025-02-04 RX ORDER — POMALIDOMIDE 4 MG/1
CAPSULE ORAL
Qty: 21 CAPSULE | Refills: 0 | OUTPATIENT
Start: 2025-02-04

## 2025-02-05 RX ORDER — DIPHENHYDRAMINE HYDROCHLORIDE 50 MG/ML
50 INJECTION INTRAMUSCULAR; INTRAVENOUS
Status: CANCELLED | OUTPATIENT
Start: 2025-02-13

## 2025-02-05 RX ORDER — SODIUM CHLORIDE 9 MG/ML
INJECTION, SOLUTION INTRAVENOUS CONTINUOUS
Status: CANCELLED | OUTPATIENT
Start: 2025-02-13

## 2025-02-05 RX ORDER — ONDANSETRON 2 MG/ML
8 INJECTION INTRAMUSCULAR; INTRAVENOUS
Status: CANCELLED | OUTPATIENT
Start: 2025-02-20

## 2025-02-05 RX ORDER — ONDANSETRON 2 MG/ML
8 INJECTION INTRAMUSCULAR; INTRAVENOUS
OUTPATIENT
Start: 2025-02-27

## 2025-02-05 RX ORDER — DEXAMETHASONE 4 MG/1
20 TABLET ORAL ONCE
Status: CANCELLED
Start: 2025-02-20 | End: 2025-02-20

## 2025-02-05 RX ORDER — HYDROCORTISONE SODIUM SUCCINATE 100 MG/2ML
100 INJECTION INTRAMUSCULAR; INTRAVENOUS
OUTPATIENT
Start: 2025-02-27

## 2025-02-05 RX ORDER — ACETAMINOPHEN 325 MG/1
650 TABLET ORAL
OUTPATIENT
Start: 2025-02-27

## 2025-02-05 RX ORDER — HEPARIN 100 UNIT/ML
500 SYRINGE INTRAVENOUS PRN
Status: CANCELLED | OUTPATIENT
Start: 2025-02-20

## 2025-02-05 RX ORDER — ONDANSETRON 2 MG/ML
8 INJECTION INTRAMUSCULAR; INTRAVENOUS ONCE
Start: 2025-02-27 | End: 2025-02-27

## 2025-02-05 RX ORDER — SODIUM CHLORIDE 0.9 % (FLUSH) 0.9 %
5-40 SYRINGE (ML) INJECTION PRN
OUTPATIENT
Start: 2025-02-27

## 2025-02-05 RX ORDER — ALBUTEROL SULFATE 90 UG/1
4 INHALANT RESPIRATORY (INHALATION) PRN
Status: CANCELLED | OUTPATIENT
Start: 2025-02-13

## 2025-02-05 RX ORDER — ACETAMINOPHEN 325 MG/1
650 TABLET ORAL
Status: CANCELLED | OUTPATIENT
Start: 2025-02-13

## 2025-02-05 RX ORDER — ALBUTEROL SULFATE 90 UG/1
4 INHALANT RESPIRATORY (INHALATION) PRN
OUTPATIENT
Start: 2025-02-27

## 2025-02-05 RX ORDER — SODIUM CHLORIDE 9 MG/ML
5-250 INJECTION, SOLUTION INTRAVENOUS PRN
Status: CANCELLED | OUTPATIENT
Start: 2025-02-20

## 2025-02-05 RX ORDER — DIPHENHYDRAMINE HYDROCHLORIDE 50 MG/ML
50 INJECTION INTRAMUSCULAR; INTRAVENOUS
Status: CANCELLED | OUTPATIENT
Start: 2025-02-20

## 2025-02-05 RX ORDER — ONDANSETRON 2 MG/ML
8 INJECTION INTRAMUSCULAR; INTRAVENOUS ONCE
Status: CANCELLED
Start: 2025-02-20 | End: 2025-02-20

## 2025-02-05 RX ORDER — DEXTROSE MONOHYDRATE 50 MG/ML
5-250 INJECTION, SOLUTION INTRAVENOUS PRN
OUTPATIENT
Start: 2025-02-27

## 2025-02-05 RX ORDER — ACETAMINOPHEN 325 MG/1
650 TABLET ORAL
Status: CANCELLED | OUTPATIENT
Start: 2025-02-20

## 2025-02-05 RX ORDER — DIPHENHYDRAMINE HYDROCHLORIDE 50 MG/ML
50 INJECTION INTRAMUSCULAR; INTRAVENOUS
OUTPATIENT
Start: 2025-02-27

## 2025-02-05 RX ORDER — SODIUM CHLORIDE 0.9 % (FLUSH) 0.9 %
5-40 SYRINGE (ML) INJECTION PRN
Status: CANCELLED | OUTPATIENT
Start: 2025-02-20

## 2025-02-05 RX ORDER — DEXTROSE MONOHYDRATE 50 MG/ML
5-250 INJECTION, SOLUTION INTRAVENOUS PRN
Status: CANCELLED | OUTPATIENT
Start: 2025-02-20

## 2025-02-05 RX ORDER — ALBUTEROL SULFATE 90 UG/1
4 INHALANT RESPIRATORY (INHALATION) PRN
Status: CANCELLED | OUTPATIENT
Start: 2025-02-20

## 2025-02-05 RX ORDER — HYDROCORTISONE SODIUM SUCCINATE 100 MG/2ML
100 INJECTION INTRAMUSCULAR; INTRAVENOUS
Status: CANCELLED | OUTPATIENT
Start: 2025-02-20

## 2025-02-05 RX ORDER — DEXAMETHASONE 4 MG/1
20 TABLET ORAL ONCE
Start: 2025-02-27 | End: 2025-02-27

## 2025-02-05 RX ORDER — SODIUM CHLORIDE 9 MG/ML
INJECTION, SOLUTION INTRAVENOUS CONTINUOUS
Status: CANCELLED | OUTPATIENT
Start: 2025-02-20

## 2025-02-05 RX ORDER — HYDROCORTISONE SODIUM SUCCINATE 100 MG/2ML
100 INJECTION INTRAMUSCULAR; INTRAVENOUS
Status: CANCELLED | OUTPATIENT
Start: 2025-02-13

## 2025-02-05 RX ORDER — SODIUM CHLORIDE 9 MG/ML
5-250 INJECTION, SOLUTION INTRAVENOUS PRN
OUTPATIENT
Start: 2025-02-27

## 2025-02-05 RX ORDER — EPINEPHRINE 1 MG/ML
0.3 INJECTION, SOLUTION INTRAMUSCULAR; SUBCUTANEOUS PRN
OUTPATIENT
Start: 2025-02-27

## 2025-02-05 RX ORDER — EPINEPHRINE 1 MG/ML
0.3 INJECTION, SOLUTION INTRAMUSCULAR; SUBCUTANEOUS PRN
Status: CANCELLED | OUTPATIENT
Start: 2025-02-13

## 2025-02-05 RX ORDER — SODIUM CHLORIDE 9 MG/ML
INJECTION, SOLUTION INTRAVENOUS CONTINUOUS
OUTPATIENT
Start: 2025-02-27

## 2025-02-05 RX ORDER — ONDANSETRON 2 MG/ML
8 INJECTION INTRAMUSCULAR; INTRAVENOUS
Status: CANCELLED | OUTPATIENT
Start: 2025-02-13

## 2025-02-05 RX ORDER — EPINEPHRINE 1 MG/ML
0.3 INJECTION, SOLUTION INTRAMUSCULAR; SUBCUTANEOUS PRN
Status: CANCELLED | OUTPATIENT
Start: 2025-02-20

## 2025-02-05 RX ORDER — HEPARIN 100 UNIT/ML
500 SYRINGE INTRAVENOUS PRN
OUTPATIENT
Start: 2025-02-27

## 2025-02-06 ENCOUNTER — TELEPHONE (OUTPATIENT)
Age: 70
End: 2025-02-06

## 2025-02-06 DIAGNOSIS — C90.00 MULTIPLE MYELOMA NOT HAVING ACHIEVED REMISSION (HCC): ICD-10-CM

## 2025-02-06 RX ORDER — POMALIDOMIDE 4 MG/1
CAPSULE ORAL
Qty: 21 CAPSULE | Refills: 0 | OUTPATIENT
Start: 2025-02-06

## 2025-02-06 NOTE — TELEPHONE ENCOUNTER
[facility] left  requesting new prescription and [employer] for pt's Pomalyst 4mg. Call back number is [phone] ext [phone] or fax [phone]

## 2025-02-10 DIAGNOSIS — C90.00 MULTIPLE MYELOMA NOT HAVING ACHIEVED REMISSION (HCC): ICD-10-CM

## 2025-02-10 RX ORDER — BLOOD SUGAR DIAGNOSTIC
STRIP MISCELLANEOUS
Qty: 100 STRIP | Refills: 1 | Status: SHIPPED | OUTPATIENT
Start: 2025-02-10

## 2025-02-10 RX ORDER — POMALIDOMIDE 4 MG/1
4 CAPSULE ORAL DAILY
Qty: 21 CAPSULE | Refills: 0 | Status: ACTIVE | OUTPATIENT
Start: 2025-02-10

## 2025-02-10 NOTE — TELEPHONE ENCOUNTER
Oral Chemotherapy      Candida Weaver is a  69 y.o.female  diagnosed with multiple myeloma . Ms. Weaver is being treated with KPd.      Medication name: pomalidomide  Regimen: KPd  Dose:  4 mg (increase dose for 9/26/24)  Frequency: daily  Administration schedule: for 21 days followed by 7 days off every 28 days  Ordering provider: Nany Arboleda MD  Start date: 1/16/25 (Cycle 11)      REMS auth # [id number]  Prescription sent to pharmacy for processing.         Rose Pa, PHARMD, BCOP, BCPS        For Pharmacy Admin Tracking Only    Program: Medical Group  CPA in place:  Yes  Recommendation Provided To: Patient/Caregiver: 1 via Telephone  Intervention Detail: Refill(s) Provided  Intervention Accepted By: Patient/Caregiver: 1    Time Spent (min): 10

## 2025-02-11 NOTE — PROGRESS NOTES
[facility] [place] [facility]  [address], [address] [place] [id number]  W: [phone]  F: [phone]    Reason for Visit:   Candida Weaver is a 69 y.o. female who is seen for Multiple Myeloma     Treatment History:   10/25/2023: Kayli VRD, Rev lite  3/2024: Kyprolis Pomalyst and Dexamethasone    History of Present Illness:   Patient is a 69 y.o. female who was dx with Smoldering Myeloma in 2017 and has been seeing [facility] Dr. Lopez. A BM bx at that time showed 40% plasma cells. No end organ damage. Noted to have worsening anemia 7/5/2023 with a Hb of 9.6 g/dl. This led to further evaluation that included a gammopathy eval that showed an M spike of 0.3 g/dl. She had a Kappa LC level of 1593 g/L with a K:L ratio of 157. Sent for evaluation now. She was in the ER June 2023 with some SOB. Had a CT head and this showed lytic lesions. She states that she has some carpal tunnel. BM bx showed Myeloma. She is on Kayli VRD. She had minimal response and was switched to Kyprolis and Pomalyst and Dexamethasone.    Here for follow up.   Tolerating Eliquis well. She has a runny nose. No SOB, cough, no extremity swelling, diarrhea, skin rash.     Presents with her son, Robert.     Review of systems was obtained and pertinent findings reviewed above. Past medical history, social history, family history, medications, and allergies are located in the electronic medical record.    Physical Exam:   BP (!) 125/56   Pulse 63   Temp 97.9 °F (36.6 °C)   Resp 18   Wt 67.3 kg (148 lb 5.9 oz)   SpO2 97%   BMI 24.69 kg/m²    Physical Exam  Constitutional: No acute distress, non-toxic appearance, and non-diaphoretic.  HENT: Normocephalic and atraumatic head.    Eyes: Normal conjunctivae, anicteric sclerae.  Cardiovascular: No pitting edema.  Pulmonary: Normal respiratory effort. No chest wall tenderness   Abdominal: No abdominal distention.  Skin: No jaundice. No rash. No petechiae.   Neurological: Alert and

## 2025-02-12 ENCOUNTER — HOSPITAL ENCOUNTER (OUTPATIENT)
Facility: HOSPITAL | Age: 70
Setting detail: INFUSION SERIES
Discharge: HOME | End: 2025-02-12
Payer: MEDICARE

## 2025-02-12 VITALS
TEMPERATURE: 97.3 F | DIASTOLIC BLOOD PRESSURE: 50 MMHG | SYSTOLIC BLOOD PRESSURE: 116 MMHG | HEART RATE: 61 BPM | OXYGEN SATURATION: 98 % | RESPIRATION RATE: 18 BRPM

## 2025-02-12 DIAGNOSIS — Z11.59 ENCOUNTER FOR SCREENING FOR OTHER VIRAL DISEASES: ICD-10-CM

## 2025-02-12 DIAGNOSIS — C90.00 MULTIPLE MYELOMA NOT HAVING ACHIEVED REMISSION (HCC): ICD-10-CM

## 2025-02-12 LAB
ALBUMIN SERPL-MCNC: 3.4 G/DL (ref 3.5–5)
ALBUMIN/GLOB SERPL: 1.3 (ref 1.1–2.2)
ALP SERPL-CCNC: 82 U/L (ref 45–117)
ALT SERPL-CCNC: 21 U/L (ref 12–78)
ANION GAP SERPL CALC-SCNC: 6 MMOL/L (ref 2–12)
AST SERPL-CCNC: 11 U/L (ref 15–37)
BASOPHILS # BLD: 0.18 K/UL (ref 0–0.1)
BASOPHILS NFR BLD: 6 % (ref 0–1)
BILIRUB SERPL-MCNC: 0.3 MG/DL (ref 0.2–1)
BUN SERPL-MCNC: 12 MG/DL (ref 6–20)
BUN/CREAT SERPL: 20 (ref 12–20)
CALCIUM SERPL-MCNC: 8.8 MG/DL (ref 8.5–10.1)
CHLORIDE SERPL-SCNC: 109 MMOL/L (ref 97–108)
CO2 SERPL-SCNC: 23 MMOL/L (ref 21–32)
CREAT SERPL-MCNC: 0.61 MG/DL (ref 0.55–1.02)
DIFFERENTIAL METHOD BLD: ABNORMAL
EOSINOPHIL # BLD: 0.27 K/UL (ref 0–0.4)
EOSINOPHIL NFR BLD: 9 % (ref 0–7)
ERYTHROCYTE [DISTWIDTH] IN BLOOD BY AUTOMATED COUNT: 16.1 % (ref 11.5–14.5)
GLOBULIN SER CALC-MCNC: 2.7 G/DL (ref 2–4)
GLUCOSE SERPL-MCNC: 97 MG/DL (ref 65–100)
HCT VFR BLD AUTO: 34.9 % (ref 35–47)
HGB BLD-MCNC: 10.7 G/DL (ref 11.5–16)
IMM GRANULOCYTES # BLD AUTO: 0 K/UL
IMM GRANULOCYTES NFR BLD AUTO: 0 %
LYMPHOCYTES # BLD: 1.05 K/UL (ref 0.8–3.5)
LYMPHOCYTES NFR BLD: 35 % (ref 12–49)
MCH RBC QN AUTO: 29.1 PG (ref 26–34)
MCHC RBC AUTO-ENTMCNC: 30.7 G/DL (ref 30–36.5)
MCV RBC AUTO: 94.8 FL (ref 80–99)
MONOCYTES # BLD: 0.27 K/UL (ref 0–1)
MONOCYTES NFR BLD: 9 % (ref 5–13)
NEUTS SEG # BLD: 1.23 K/UL (ref 1.8–8)
NEUTS SEG NFR BLD: 41 % (ref 32–75)
NRBC # BLD: 0 K/UL (ref 0–0.01)
NRBC BLD-RTO: 0 PER 100 WBC
PLATELET # BLD AUTO: 344 K/UL (ref 150–400)
PMV BLD AUTO: 11.6 FL (ref 8.9–12.9)
POTASSIUM SERPL-SCNC: 4 MMOL/L (ref 3.5–5.1)
PROT SERPL-MCNC: 6.1 G/DL (ref 6.4–8.2)
RBC # BLD AUTO: 3.68 M/UL (ref 3.8–5.2)
RBC MORPH BLD: ABNORMAL
SODIUM SERPL-SCNC: 138 MMOL/L (ref 136–145)
WBC # BLD AUTO: 3 K/UL (ref 3.6–11)

## 2025-02-12 PROCEDURE — 82784 ASSAY IGA/IGD/IGG/IGM EACH: CPT

## 2025-02-12 PROCEDURE — 84165 PROTEIN E-PHORESIS SERUM: CPT

## 2025-02-12 PROCEDURE — 86334 IMMUNOFIX E-PHORESIS SERUM: CPT

## 2025-02-12 PROCEDURE — 83521 IG LIGHT CHAINS FREE EACH: CPT

## 2025-02-12 PROCEDURE — 36415 COLL VENOUS BLD VENIPUNCTURE: CPT

## 2025-02-12 PROCEDURE — 80053 COMPREHEN METABOLIC PANEL: CPT

## 2025-02-12 PROCEDURE — 85025 COMPLETE CBC W/AUTO DIFF WBC: CPT

## 2025-02-12 ASSESSMENT — PAIN SCALES - WONG BAKER: WONGBAKER_NUMERICALRESPONSE: NO HURT

## 2025-02-12 ASSESSMENT — PAIN SCALES - GENERAL: PAINLEVEL_OUTOF10: 0

## 2025-02-12 NOTE — PROGRESS NOTES
[facility] Lab visit:     1100: Patient arrived ambulatory and in no distress.  Pre Treatment Labs drawn from L hand. Departed [facility] ambulatory and in no distress.     Visit Vitals:  Patient Vitals for the past 12 hrs:   Temp Pulse Resp BP SpO2   02/12/25 1113 97.3 °F (36.3 °C) 61 18 (!) 116/50 98 %       Labs: See CC for pending results.

## 2025-02-13 ENCOUNTER — HOSPITAL ENCOUNTER (OUTPATIENT)
Facility: HOSPITAL | Age: 70
Setting detail: INFUSION SERIES
Discharge: HOME | End: 2025-02-13
Payer: MEDICARE

## 2025-02-13 ENCOUNTER — CLINICAL DOCUMENTATION (OUTPATIENT)
Age: 70
End: 2025-02-13

## 2025-02-13 ENCOUNTER — OFFICE VISIT (OUTPATIENT)
Age: 70
End: 2025-02-13
Payer: MEDICARE

## 2025-02-13 VITALS
DIASTOLIC BLOOD PRESSURE: 52 MMHG | RESPIRATION RATE: 18 BRPM | OXYGEN SATURATION: 97 % | WEIGHT: 148.4 LBS | BODY MASS INDEX: 24.72 KG/M2 | SYSTOLIC BLOOD PRESSURE: 128 MMHG | TEMPERATURE: 97.9 F | HEIGHT: 65 IN | HEART RATE: 62 BPM

## 2025-02-13 VITALS
HEART RATE: 63 BPM | TEMPERATURE: 97.9 F | RESPIRATION RATE: 18 BRPM | DIASTOLIC BLOOD PRESSURE: 56 MMHG | OXYGEN SATURATION: 97 % | BODY MASS INDEX: 24.69 KG/M2 | SYSTOLIC BLOOD PRESSURE: 125 MMHG | WEIGHT: 148.37 LBS

## 2025-02-13 DIAGNOSIS — I26.99 OTHER ACUTE PULMONARY EMBOLISM WITHOUT ACUTE COR PULMONALE (HCC): Primary | ICD-10-CM

## 2025-02-13 DIAGNOSIS — Z11.59 ENCOUNTER FOR SCREENING FOR OTHER VIRAL DISEASES: ICD-10-CM

## 2025-02-13 DIAGNOSIS — C90.00 MULTIPLE MYELOMA NOT HAVING ACHIEVED REMISSION (HCC): Primary | ICD-10-CM

## 2025-02-13 PROCEDURE — 3017F COLORECTAL CA SCREEN DOC REV: CPT

## 2025-02-13 PROCEDURE — 2580000003 HC RX 258: Performed by: INTERNAL MEDICINE

## 2025-02-13 PROCEDURE — 1036F TOBACCO NON-USER: CPT

## 2025-02-13 PROCEDURE — 1159F MED LIST DOCD IN RCRD: CPT

## 2025-02-13 PROCEDURE — 1123F ACP DISCUSS/DSCN MKR DOCD: CPT

## 2025-02-13 PROCEDURE — 6360000002 HC RX W HCPCS: Performed by: INTERNAL MEDICINE

## 2025-02-13 PROCEDURE — G8420 CALC BMI NORM PARAMETERS: HCPCS

## 2025-02-13 PROCEDURE — 99215 OFFICE O/P EST HI 40 MIN: CPT

## 2025-02-13 PROCEDURE — 1160F RVW MEDS BY RX/DR IN RCRD: CPT

## 2025-02-13 PROCEDURE — G8427 DOCREV CUR MEDS BY ELIG CLIN: HCPCS

## 2025-02-13 PROCEDURE — G8399 PT W/DXA RESULTS DOCUMENT: HCPCS

## 2025-02-13 PROCEDURE — 1090F PRES/ABSN URINE INCON ASSESS: CPT

## 2025-02-13 PROCEDURE — 96375 TX/PRO/DX INJ NEW DRUG ADDON: CPT

## 2025-02-13 PROCEDURE — 96413 CHEMO IV INFUSION 1 HR: CPT

## 2025-02-13 RX ORDER — DEXTROSE MONOHYDRATE 50 MG/ML
5-250 INJECTION, SOLUTION INTRAVENOUS PRN
Status: DISCONTINUED | OUTPATIENT
Start: 2025-02-13 | End: 2025-02-14 | Stop reason: HOSPADM

## 2025-02-13 RX ORDER — SODIUM CHLORIDE 9 MG/ML
5-250 INJECTION, SOLUTION INTRAVENOUS PRN
Status: DISCONTINUED | OUTPATIENT
Start: 2025-02-13 | End: 2025-02-14 | Stop reason: HOSPADM

## 2025-02-13 RX ORDER — ONDANSETRON 2 MG/ML
8 INJECTION INTRAMUSCULAR; INTRAVENOUS ONCE
Status: COMPLETED | OUTPATIENT
Start: 2025-02-13 | End: 2025-02-13

## 2025-02-13 RX ORDER — HEPARIN 100 UNIT/ML
500 SYRINGE INTRAVENOUS PRN
Status: DISCONTINUED | OUTPATIENT
Start: 2025-02-13 | End: 2025-02-14 | Stop reason: HOSPADM

## 2025-02-13 RX ORDER — DEXAMETHASONE 4 MG/1
20 TABLET ORAL ONCE
Status: COMPLETED | OUTPATIENT
Start: 2025-02-13 | End: 2025-02-13

## 2025-02-13 RX ORDER — SODIUM CHLORIDE 0.9 % (FLUSH) 0.9 %
5-40 SYRINGE (ML) INJECTION PRN
Status: DISCONTINUED | OUTPATIENT
Start: 2025-02-13 | End: 2025-02-14 | Stop reason: HOSPADM

## 2025-02-13 RX ADMIN — DEXTROSE 100 ML/HR: 5 SOLUTION INTRAVENOUS at 11:58

## 2025-02-13 RX ADMIN — CARFILZOMIB 127.4 MG: 10 INJECTION, POWDER, LYOPHILIZED, FOR SOLUTION INTRAVENOUS at 12:18

## 2025-02-13 RX ADMIN — ONDANSETRON 8 MG: 2 INJECTION INTRAMUSCULAR; INTRAVENOUS at 11:58

## 2025-02-13 RX ADMIN — DEXAMETHASONE 20 MG: 4 TABLET ORAL at 11:55

## 2025-02-13 ASSESSMENT — PAIN SCALES - GENERAL: PAINLEVEL_OUTOF10: 0

## 2025-02-13 NOTE — PROGRESS NOTES
Candida Weaver is a 69 y.o. female    Chief Complaint   Patient presents with    Follow-up     Multiple myeloma not having achieved remission (HCC)     1. Have you been to the ER, urgent care clinic since your last visit?  Hospitalized since your last visit?No    2. Have you seen or consulted any other health care providers outside of the [facility] since your last visit?  Include any pap smears or colon screening. No

## 2025-02-13 NOTE — PROGRESS NOTES
Oral Chemotherapy      Candida Weaver is a  69 y.o.female  diagnosed with multiple myeloma . Ms. Weaver is being treated with KPd.      Medication name: pomalidomide  Regimen: KPd  Dose:   3 mg  Frequency: daily  Administration schedule: for 21 days followed by 7 days off every 28 days  Ordering provider: Nany Arboleda MD  Start date: 2/13/25 (Cycle 12)      Lab Results   Component Value Date    WBC 3.0 (L) 02/12/2025    HGB 10.7 (L) 02/12/2025    HCT 34.9 (L) 02/12/2025    MCV 94.8 02/12/2025     02/12/2025    LYMPHOPCT 35 02/12/2025    RBC 3.68 (L) 02/12/2025    MCH 29.1 02/12/2025    MCHC 30.7 02/12/2025    RDW 16.1 (H) 02/12/2025       Lab Results   Component Value Date    NEUTROABS 1.23 (L) 02/12/2025             Expected follow up date: Labs/office visit each treatment. Next cycle start on 3/13/25     Patient provided with an Oral Chemotherapy Journal.              Rose Pa, PharmD, BCPS, BCOP      For Pharmacy Admin Tracking Only    Program: Medical Group  CPA in place:  Yes  Recommendation Provided To: Patient/Caregiver: 1 via In person    Intervention Accepted By: Patient/Caregiver: 1    Time Spent (min): 15

## 2025-02-13 NOTE — PROGRESS NOTES
[facility] Progress Note    10:30 Ms. Weaver Arrived ambulatory and in no distress for KYPROLIS regimen.  Assessment was completed, no acute issues at this time, no new complaints voiced.  Port accessed without difficulty, labs drawn and processed.    Ms. Weaver's vitals were reviewed.  Patient Vitals for the past 12 hrs:   Temp Pulse Resp BP SpO2   02/13/25 1248 -- 62 -- (!) 128/52 --   02/13/25 1010 97.9 °F (36.6 °C) 63 18 (!) 125/56 97 %         Lab results were obtained and reviewed.  Pre-medications  were administered as ordered and chemotherapy was initiated.  Medications Administered         carfilzomib (KYPROLIS) 127.4 mg in dextrose 5 % 100 mL chemo IVPB Admin Date  02/13/2025 Action  New Bag Dose  127.4 mg Rate  347.4 mL/hr Route  IntraVENous Documented By  Ruby Tabor RN        dexAMETHasone (DECADRON) tablet 20 mg Admin Date  02/13/2025 Action  Given Dose  20 mg Rate   Route  Oral Documented By  Ruby Tabor RN        dextrose 5 % solution Admin Date  02/13/2025 Action  New Bag Dose  100 mL/hr Rate  100 mL/hr Route  IntraVENous Documented By  Ruby Tabor RN        ondansetron (ZOFRAN) injection 8 mg Admin Date  02/13/2025 Action  Given Dose  8 mg Rate   Route  IntraVENous Documented By  Ruby Tabor, SABRINA            Two nurses verified prior to administering: Drug name, Drug dose, Infusion volume or drug volume when prepared in a syringe, Rate of administration, Route of administration, Expiration dates and/or times, Appearance and physical integrity of the drugs, Rate set on infusion pump, when used, and Sequencing of drug administration.    Ms. Weaver tolerated treatment well. Port maintained blood return throughout entire treatment. Port flushed and de accessed, [facility] in stable condition at 1310.     Future Appointments   Date Time Provider Department Center   2/19/2025  9:30 AM PEDS LAB CHAIR 1 [facility]   2/20/2025 10:30 AM RUTH CHEMO  CHAIR 2 [facility]   2/26/2025  9:30 AM PEDS LAB CHAIR 1 [facility]   2/27/2025  9:00 AM RUTH CHEMO CHAIR 5 [facility]   3/12/2025  9:30 AM PEDS LAB CHAIR 1 [facility]   3/13/2025  9:00 AM RUTH CHEMO CHAIR 5 [facility]   3/13/2025  9:30 AM Nany Arboleda MD [facility] AMB   3/19/2025  9:30 AM PEDS LAB CHAIR 1 [facility]   3/20/2025 10:00 AM RUTH CHEMO CHAIR 1 [facility]   3/26/2025  9:30 AM PEDS LAB CHAIR 1 [facility]   3/27/2025 10:00 AM RUTH CHEMO CHAIR 1 [facility]         Ruby Tabor RN  February 13, 2025    \

## 2025-02-16 LAB
ALBUMIN SERPL ELPH-MCNC: 3.6 G/DL (ref 2.9–4.4)
ALBUMIN/GLOB SERPL: 2.2 (ref 0.7–1.7)
ALPHA1 GLOB SERPL ELPH-MCNC: 0.2 G/DL (ref 0–0.4)
ALPHA2 GLOB SERPL ELPH-MCNC: 0.5 G/DL (ref 0.4–1)
B-GLOBULIN SERPL ELPH-MCNC: 0.7 G/DL (ref 0.7–1.3)
GAMMA GLOB SERPL ELPH-MCNC: 0.2 G/DL (ref 0.4–1.8)
GLOBULIN SER-MCNC: 1.7 G/DL (ref 2.2–3.9)
IGA SERPL-MCNC: 12 MG/DL (ref 64–422)
IGG SERPL-MCNC: 221 MG/DL (ref 586–1602)
IGM SERPL-MCNC: 8 MG/DL (ref 26–217)
INTERPRETATION SERPL IEP-IMP: ABNORMAL
KAPPA LC FREE SER-MCNC: 66 MG/L (ref 3.3–19.4)
KAPPA LC FREE/LAMBDA FREE SER: 34.74 (ref 0.26–1.65)
LAMBDA LC FREE SERPL-MCNC: 1.9 MG/L (ref 5.7–26.3)
M PROTEIN SERPL ELPH-MCNC: ABNORMAL G/DL
PROT SERPL-MCNC: 5.3 G/DL (ref 6–8.5)

## 2025-02-18 ENCOUNTER — TELEPHONE (OUTPATIENT)
Age: 70
End: 2025-02-18

## 2025-02-18 NOTE — TELEPHONE ENCOUNTER
HIPAA x2  RC to pt to let her know that I called yesterday to find out if she received her Pomalyst yet. Pt stated she rec'd her new script Friday. Pt was appreciative of my call.

## 2025-02-18 NOTE — TELEPHONE ENCOUNTER
Pt stated she missed a call from nurse Jasso and stated that something about a policy was mentioned and she's not sure what that means and would like a call back

## 2025-02-19 ENCOUNTER — HOSPITAL ENCOUNTER (OUTPATIENT)
Facility: HOSPITAL | Age: 70
Setting detail: INFUSION SERIES
End: 2025-02-19

## 2025-02-20 ENCOUNTER — HOSPITAL ENCOUNTER (OUTPATIENT)
Facility: HOSPITAL | Age: 70
Setting detail: INFUSION SERIES
Discharge: HOME | End: 2025-02-20
Payer: MEDICARE

## 2025-02-20 VITALS
HEIGHT: 65 IN | TEMPERATURE: 97.6 F | RESPIRATION RATE: 18 BRPM | BODY MASS INDEX: 24.72 KG/M2 | WEIGHT: 148.4 LBS | DIASTOLIC BLOOD PRESSURE: 55 MMHG | SYSTOLIC BLOOD PRESSURE: 132 MMHG | HEART RATE: 70 BPM

## 2025-02-20 DIAGNOSIS — E11.9 TYPE 2 DIABETES MELLITUS WITHOUT COMPLICATION, WITHOUT LONG-TERM CURRENT USE OF INSULIN (HCC): ICD-10-CM

## 2025-02-20 DIAGNOSIS — Z11.59 ENCOUNTER FOR SCREENING FOR OTHER VIRAL DISEASES: ICD-10-CM

## 2025-02-20 DIAGNOSIS — C90.00 MULTIPLE MYELOMA NOT HAVING ACHIEVED REMISSION (HCC): Primary | ICD-10-CM

## 2025-02-20 LAB
ALBUMIN SERPL-MCNC: 3.2 G/DL (ref 3.5–5)
ALBUMIN/GLOB SERPL: 1.5 (ref 1.1–2.2)
ALP SERPL-CCNC: 71 U/L (ref 45–117)
ALT SERPL-CCNC: 30 U/L (ref 12–78)
ANION GAP SERPL CALC-SCNC: 7 MMOL/L (ref 2–12)
AST SERPL-CCNC: 10 U/L (ref 15–37)
BASOPHILS # BLD: 0.09 K/UL (ref 0–0.1)
BASOPHILS NFR BLD: 2.8 % (ref 0–1)
BILIRUB SERPL-MCNC: 0.3 MG/DL (ref 0.2–1)
BUN SERPL-MCNC: 15 MG/DL (ref 6–20)
BUN/CREAT SERPL: 21 (ref 12–20)
CALCIUM SERPL-MCNC: 8.8 MG/DL (ref 8.5–10.1)
CHLORIDE SERPL-SCNC: 110 MMOL/L (ref 97–108)
CO2 SERPL-SCNC: 25 MMOL/L (ref 21–32)
CREAT SERPL-MCNC: 0.7 MG/DL (ref 0.55–1.02)
DIFFERENTIAL METHOD BLD: ABNORMAL
EOSINOPHIL # BLD: 0.15 K/UL (ref 0–0.4)
EOSINOPHIL NFR BLD: 4.7 % (ref 0–7)
ERYTHROCYTE [DISTWIDTH] IN BLOOD BY AUTOMATED COUNT: 16.6 % (ref 11.5–14.5)
GLOBULIN SER CALC-MCNC: 2.2 G/DL (ref 2–4)
GLUCOSE SERPL-MCNC: 92 MG/DL (ref 65–100)
HCT VFR BLD AUTO: 32.6 % (ref 35–47)
HGB BLD-MCNC: 10.1 G/DL (ref 11.5–16)
IMM GRANULOCYTES # BLD AUTO: 0.01 K/UL (ref 0–0.04)
IMM GRANULOCYTES NFR BLD AUTO: 0.3 % (ref 0–0.5)
LYMPHOCYTES # BLD: 0.77 K/UL (ref 0.8–3.5)
LYMPHOCYTES NFR BLD: 24 % (ref 12–49)
MCH RBC QN AUTO: 29 PG (ref 26–34)
MCHC RBC AUTO-ENTMCNC: 31 G/DL (ref 30–36.5)
MCV RBC AUTO: 93.7 FL (ref 80–99)
MONOCYTES # BLD: 0.85 K/UL (ref 0–1)
MONOCYTES NFR BLD: 26.5 % (ref 5–13)
NEUTS SEG # BLD: 1.33 K/UL (ref 1.8–8)
NEUTS SEG NFR BLD: 41.7 % (ref 32–75)
NRBC # BLD: 0 K/UL (ref 0–0.01)
NRBC BLD-RTO: 0 PER 100 WBC
PLATELET # BLD AUTO: 148 K/UL (ref 150–400)
PMV BLD AUTO: 13 FL (ref 8.9–12.9)
POTASSIUM SERPL-SCNC: 4.4 MMOL/L (ref 3.5–5.1)
PROT SERPL-MCNC: 5.4 G/DL (ref 6.4–8.2)
RBC # BLD AUTO: 3.48 M/UL (ref 3.8–5.2)
RBC MORPH BLD: ABNORMAL
RBC MORPH BLD: ABNORMAL
SODIUM SERPL-SCNC: 142 MMOL/L (ref 136–145)
WBC # BLD AUTO: 3.2 K/UL (ref 3.6–11)

## 2025-02-20 PROCEDURE — 36415 COLL VENOUS BLD VENIPUNCTURE: CPT

## 2025-02-20 PROCEDURE — 2580000003 HC RX 258: Performed by: INTERNAL MEDICINE

## 2025-02-20 PROCEDURE — 96413 CHEMO IV INFUSION 1 HR: CPT

## 2025-02-20 PROCEDURE — 85025 COMPLETE CBC W/AUTO DIFF WBC: CPT

## 2025-02-20 PROCEDURE — 6360000002 HC RX W HCPCS: Performed by: INTERNAL MEDICINE

## 2025-02-20 PROCEDURE — 80053 COMPREHEN METABOLIC PANEL: CPT

## 2025-02-20 PROCEDURE — 96375 TX/PRO/DX INJ NEW DRUG ADDON: CPT

## 2025-02-20 RX ORDER — DEXTROSE MONOHYDRATE 50 MG/ML
5-250 INJECTION, SOLUTION INTRAVENOUS PRN
Status: DISCONTINUED | OUTPATIENT
Start: 2025-02-20 | End: 2025-02-21 | Stop reason: HOSPADM

## 2025-02-20 RX ORDER — ONDANSETRON 2 MG/ML
8 INJECTION INTRAMUSCULAR; INTRAVENOUS ONCE
Status: COMPLETED | OUTPATIENT
Start: 2025-02-20 | End: 2025-02-20

## 2025-02-20 RX ORDER — DEXAMETHASONE 4 MG/1
20 TABLET ORAL ONCE
Status: COMPLETED | OUTPATIENT
Start: 2025-02-20 | End: 2025-02-20

## 2025-02-20 RX ADMIN — DEXTROSE 25 ML/HR: 5 SOLUTION INTRAVENOUS at 12:37

## 2025-02-20 RX ADMIN — DEXAMETHASONE 20 MG: 4 TABLET ORAL at 11:21

## 2025-02-20 RX ADMIN — CARFILZOMIB 127.4 MG: 10 INJECTION, POWDER, LYOPHILIZED, FOR SOLUTION INTRAVENOUS at 12:39

## 2025-02-20 RX ADMIN — ONDANSETRON 8 MG: 2 INJECTION, SOLUTION INTRAMUSCULAR; INTRAVENOUS at 11:22

## 2025-02-20 NOTE — PROGRESS NOTES
[facility] Chemotherapy Progress Note    Date: 2025    Name: Candida Weaver    MRN: [id number]         : 1955      0900 Pt admit to [facility] for Kyprolis ambulatory in stable condition. Assessment completed. No new concerns voiced. Port with positive blood return. Labs sent for processing.         Ms. Weaver's vitals were reviewed.  Patient Vitals for the past 12 hrs:   Temp Pulse Resp BP   25 0930 97.6 °F (36.4 °C) 66 18 (!) 155/61         Lab results were obtained and reviewed.  Recent Results (from the past 12 hour(s))   Comprehensive metabolic panel    Collection Time: 25  9:54 AM   Result Value Ref Range    Sodium 142 136 - 145 mmol/L    Potassium 4.4 3.5 - 5.1 mmol/L    Chloride 110 (H) 97 - 108 mmol/L    CO2 25 21 - 32 mmol/L    Anion Gap 7 2 - 12 mmol/L    Glucose 92 65 - 100 mg/dL    BUN 15 6 - 20 MG/DL    Creatinine 0.70 0.55 - 1.02 MG/DL    BUN/Creatinine Ratio 21 (H) 12 - 20      Est, Glom Filt Rate >90 >60 ml/min/1.73m2    Calcium 8.8 8.5 - 10.1 MG/DL    Total Bilirubin 0.3 0.2 - 1.0 MG/DL    ALT 30 12 - 78 U/L    AST 10 (L) 15 - 37 U/L    Alk Phosphatase 71 45 - 117 U/L    Total Protein 5.4 (L) 6.4 - 8.2 g/dL    Albumin 3.2 (L) 3.5 - 5.0 g/dL    Globulin 2.2 2.0 - 4.0 g/dL    Albumin/Globulin Ratio 1.5 1.1 - 2.2     CBC With Auto Differential    Collection Time: 25  9:54 AM   Result Value Ref Range    WBC 3.2 (L) 3.6 - 11.0 K/uL    RBC 3.48 (L) 3.80 - 5.20 M/uL    Hemoglobin 10.1 (L) 11.5 - 16.0 g/dL    Hematocrit 32.6 (L) 35.0 - 47.0 %    MCV 93.7 80.0 - 99.0 FL    MCH 29.0 26.0 - 34.0 PG    MCHC 31.0 30.0 - 36.5 g/dL    RDW 16.6 (H) 11.5 - 14.5 %    Platelets 148 (L) 150 - 400 K/uL    MPV 13.0 (H) 8.9 - 12.9 FL    Nucleated RBCs 0.0 0  WBC    nRBC 0.00 0.00 - 0.01 K/uL    Neutrophils % 41.7 32.0 - 75.0 %    Lymphocytes % 24.0 12.0 - 49.0 %    Monocytes % 26.5 (H) 5.0 - 13.0 %    Eosinophils % 4.7 0.0 - 7.0 %    Basophils % 2.8 (H) 0.0 - 1.0 %    Immature

## 2025-02-21 RX ORDER — METFORMIN HYDROCHLORIDE 500 MG/1
500 TABLET, EXTENDED RELEASE ORAL 3 TIMES DAILY
Qty: 270 TABLET | Refills: 1 | Status: SHIPPED | OUTPATIENT
Start: 2025-02-21

## 2025-02-26 ENCOUNTER — HOSPITAL ENCOUNTER (OUTPATIENT)
Facility: HOSPITAL | Age: 70
Setting detail: INFUSION SERIES
Discharge: HOME | End: 2025-02-26
Payer: MEDICARE

## 2025-02-26 DIAGNOSIS — C90.00 MULTIPLE MYELOMA NOT HAVING ACHIEVED REMISSION (HCC): ICD-10-CM

## 2025-02-26 DIAGNOSIS — Z11.59 ENCOUNTER FOR SCREENING FOR OTHER VIRAL DISEASES: ICD-10-CM

## 2025-02-26 LAB
ALBUMIN SERPL-MCNC: 3.2 G/DL (ref 3.5–5)
ALBUMIN/GLOB SERPL: 1.2 (ref 1.1–2.2)
ALP SERPL-CCNC: 61 U/L (ref 45–117)
ALT SERPL-CCNC: 37 U/L (ref 12–78)
ANION GAP SERPL CALC-SCNC: 6 MMOL/L (ref 2–12)
AST SERPL-CCNC: 12 U/L (ref 15–37)
BASOPHILS # BLD: 0 K/UL (ref 0–0.1)
BASOPHILS NFR BLD: 0 % (ref 0–1)
BILIRUB SERPL-MCNC: 0.3 MG/DL (ref 0.2–1)
BUN SERPL-MCNC: 13 MG/DL (ref 6–20)
BUN/CREAT SERPL: 17 (ref 12–20)
CALCIUM SERPL-MCNC: 8.8 MG/DL (ref 8.5–10.1)
CHLORIDE SERPL-SCNC: 112 MMOL/L (ref 97–108)
CO2 SERPL-SCNC: 22 MMOL/L (ref 21–32)
CREAT SERPL-MCNC: 0.76 MG/DL (ref 0.55–1.02)
DIFFERENTIAL METHOD BLD: ABNORMAL
EOSINOPHIL # BLD: 0.24 K/UL (ref 0–0.4)
EOSINOPHIL NFR BLD: 9 % (ref 0–7)
ERYTHROCYTE [DISTWIDTH] IN BLOOD BY AUTOMATED COUNT: 16.6 % (ref 11.5–14.5)
GLOBULIN SER CALC-MCNC: 2.7 G/DL (ref 2–4)
GLUCOSE SERPL-MCNC: 79 MG/DL (ref 65–100)
HCT VFR BLD AUTO: 33.6 % (ref 35–47)
HGB BLD-MCNC: 10.4 G/DL (ref 11.5–16)
IMM GRANULOCYTES # BLD AUTO: 0 K/UL
IMM GRANULOCYTES NFR BLD AUTO: 0 %
LYMPHOCYTES # BLD: 0.84 K/UL (ref 0.8–3.5)
LYMPHOCYTES NFR BLD: 31 % (ref 12–49)
MCH RBC QN AUTO: 28.7 PG (ref 26–34)
MCHC RBC AUTO-ENTMCNC: 31 G/DL (ref 30–36.5)
MCV RBC AUTO: 92.8 FL (ref 80–99)
MONOCYTES # BLD: 0.11 K/UL (ref 0–1)
MONOCYTES NFR BLD: 4 % (ref 5–13)
NEUTS SEG # BLD: 1.51 K/UL (ref 1.8–8)
NEUTS SEG NFR BLD: 56 % (ref 32–75)
NRBC # BLD: 0.02 K/UL (ref 0–0.01)
NRBC BLD-RTO: 0.7 PER 100 WBC
PLATELET # BLD AUTO: 149 K/UL (ref 150–400)
PMV BLD AUTO: 12.7 FL (ref 8.9–12.9)
POTASSIUM SERPL-SCNC: 4.1 MMOL/L (ref 3.5–5.1)
PROT SERPL-MCNC: 5.9 G/DL (ref 6.4–8.2)
RBC # BLD AUTO: 3.62 M/UL (ref 3.8–5.2)
RBC MORPH BLD: ABNORMAL
SODIUM SERPL-SCNC: 140 MMOL/L (ref 136–145)
WBC # BLD AUTO: 2.7 K/UL (ref 3.6–11)
WBC MORPH BLD: ABNORMAL

## 2025-02-26 PROCEDURE — 36415 COLL VENOUS BLD VENIPUNCTURE: CPT

## 2025-02-26 PROCEDURE — 85025 COMPLETE CBC W/AUTO DIFF WBC: CPT

## 2025-02-26 PROCEDURE — 80053 COMPREHEN METABOLIC PANEL: CPT

## 2025-02-26 NOTE — PROGRESS NOTES
[facility] Lab visit:     0930: Patient arrived ambulatory and in no distress.  Labs drawn per Latonya Law RN. Departed [facility] ambulatory and in no distress.      Labs: See epic for pending results.  No results found for this or any previous visit (from the past 12 hour(s)).

## 2025-02-27 ENCOUNTER — HOSPITAL ENCOUNTER (OUTPATIENT)
Facility: HOSPITAL | Age: 70
Setting detail: INFUSION SERIES
Discharge: HOME | End: 2025-02-27
Payer: MEDICARE

## 2025-02-27 VITALS
TEMPERATURE: 97.8 F | DIASTOLIC BLOOD PRESSURE: 69 MMHG | WEIGHT: 144.8 LBS | SYSTOLIC BLOOD PRESSURE: 137 MMHG | HEIGHT: 65 IN | HEART RATE: 64 BPM | BODY MASS INDEX: 24.12 KG/M2 | RESPIRATION RATE: 18 BRPM

## 2025-02-27 DIAGNOSIS — C90.00 MULTIPLE MYELOMA NOT HAVING ACHIEVED REMISSION (HCC): ICD-10-CM

## 2025-02-27 DIAGNOSIS — Z11.59 ENCOUNTER FOR SCREENING FOR OTHER VIRAL DISEASES: Primary | ICD-10-CM

## 2025-02-27 PROCEDURE — 2580000003 HC RX 258: Performed by: INTERNAL MEDICINE

## 2025-02-27 PROCEDURE — 2500000003 HC RX 250 WO HCPCS: Performed by: INTERNAL MEDICINE

## 2025-02-27 PROCEDURE — 96375 TX/PRO/DX INJ NEW DRUG ADDON: CPT

## 2025-02-27 PROCEDURE — 6360000002 HC RX W HCPCS: Performed by: INTERNAL MEDICINE

## 2025-02-27 PROCEDURE — 96413 CHEMO IV INFUSION 1 HR: CPT

## 2025-02-27 RX ORDER — ONDANSETRON 2 MG/ML
8 INJECTION INTRAMUSCULAR; INTRAVENOUS ONCE
Status: COMPLETED | OUTPATIENT
Start: 2025-02-27 | End: 2025-02-27

## 2025-02-27 RX ORDER — SODIUM CHLORIDE 9 MG/ML
5-250 INJECTION, SOLUTION INTRAVENOUS PRN
Status: DISCONTINUED | OUTPATIENT
Start: 2025-02-27 | End: 2025-02-28 | Stop reason: HOSPADM

## 2025-02-27 RX ORDER — SODIUM CHLORIDE 0.9 % (FLUSH) 0.9 %
5-40 SYRINGE (ML) INJECTION PRN
Status: DISCONTINUED | OUTPATIENT
Start: 2025-02-27 | End: 2025-02-28 | Stop reason: HOSPADM

## 2025-02-27 RX ORDER — DEXTROSE MONOHYDRATE 50 MG/ML
5-250 INJECTION, SOLUTION INTRAVENOUS PRN
Status: DISCONTINUED | OUTPATIENT
Start: 2025-02-27 | End: 2025-02-28 | Stop reason: HOSPADM

## 2025-02-27 RX ORDER — DEXAMETHASONE 4 MG/1
20 TABLET ORAL ONCE
Status: COMPLETED | OUTPATIENT
Start: 2025-02-27 | End: 2025-02-27

## 2025-02-27 RX ORDER — HEPARIN 100 UNIT/ML
500 SYRINGE INTRAVENOUS PRN
Status: DISCONTINUED | OUTPATIENT
Start: 2025-02-27 | End: 2025-02-28 | Stop reason: HOSPADM

## 2025-02-27 RX ADMIN — CARFILZOMIB 127.4 MG: 10 INJECTION, POWDER, LYOPHILIZED, FOR SOLUTION INTRAVENOUS at 10:08

## 2025-02-27 RX ADMIN — DEXAMETHASONE 20 MG: 4 TABLET ORAL at 08:59

## 2025-02-27 RX ADMIN — DEXTROSE 50 ML/HR: 5 SOLUTION INTRAVENOUS at 10:04

## 2025-02-27 RX ADMIN — ONDANSETRON 8 MG: 2 INJECTION, SOLUTION INTRAMUSCULAR; INTRAVENOUS at 08:49

## 2025-02-27 RX ADMIN — SODIUM CHLORIDE, PRESERVATIVE FREE 20 ML: 5 INJECTION INTRAVENOUS at 10:45

## 2025-02-27 ASSESSMENT — PAIN SCALES - GENERAL: PAINLEVEL_OUTOF10: 0

## 2025-02-27 NOTE — PROGRESS NOTES
[facility] Chemotherapy/Immunotherapy Progress Note    Date: 2025  Name: Candida Weaver  MRN: [id number]       : 1955    Pt arrived ambulatory and in no acute distress to [facility] for Kyprolis   Denies flu-like symptoms. Arrives unaccompanied to [facility] appointment. Patient does not have an office visit with provider scheduled for today.     Chest port accessed with positive blood return. Labs drawn 25 and patient meets treatment parameters.     Ms. Weaver's vitals were reviewed.  Patient Vitals for the past 12 hrs:   Temp Pulse Resp BP   25 1045 -- 64 18 137/69   25 0830 97.8 °F (36.6 °C) 60 18 (!) 138/57     Pre-medications were administered as ordered and chemotherapy was initiated. Please see MAR for specific drug names and time of administration.  Medications Administered         carfilzomib (KYPROLIS) 127.4 mg in dextrose 5 % 100 mL chemo IVPB Admin Date  2025 Action  New Bag Dose  127.4 mg Rate  347.4 mL/hr Route  IntraVENous Documented By  Dacia Mccollum, SABRINA        dexAMETHasone (DECADRON) tablet 20 mg Admin Date  2025 Action  Given Dose  20 mg Rate   Route  Oral Documented By  Dacia Mccollum RN        dextrose 5 % solution Admin Date  2025 Action  New Bag Dose  50 mL/hr Rate  50 mL/hr Route  IntraVENous Documented By  Dacia Mccollum, SABRINA        ondansetron (ZOFRAN) injection 8 mg Admin Date  2025 Action  Given Dose  8 mg Rate   Route  IntraVENous Documented By  Dacia Mccollum RN        sodium chloride flush 0.9 % injection 5-40 mL Admin Date  2025 Action  Given Dose  20 mL Rate   Route  IntraVENous Documented By  Dacia Mccollum, RN          Prior to chemotherapy/immunotherapy administration the following were verified with a second chemotherapy/immunotherapy certified nurse: Patient name, Patient  or CSN, Drug name, Drug dose, Infusion/drug volume, Rate of administration, Route of administration, Expiration dates/times, Appearance and physical

## 2025-03-05 RX ORDER — SODIUM CHLORIDE 9 MG/ML
INJECTION, SOLUTION INTRAVENOUS CONTINUOUS
Status: CANCELLED | OUTPATIENT
Start: 2025-03-13

## 2025-03-05 RX ORDER — SODIUM CHLORIDE 9 MG/ML
5-250 INJECTION, SOLUTION INTRAVENOUS PRN
Status: CANCELLED | OUTPATIENT
Start: 2025-03-13

## 2025-03-05 RX ORDER — SODIUM CHLORIDE 0.9 % (FLUSH) 0.9 %
5-40 SYRINGE (ML) INJECTION PRN
Status: CANCELLED | OUTPATIENT
Start: 2025-03-13

## 2025-03-05 RX ORDER — ONDANSETRON 2 MG/ML
8 INJECTION INTRAMUSCULAR; INTRAVENOUS
Status: CANCELLED | OUTPATIENT
Start: 2025-03-13

## 2025-03-05 RX ORDER — EPINEPHRINE 1 MG/ML
0.3 INJECTION, SOLUTION INTRAMUSCULAR; SUBCUTANEOUS PRN
Status: CANCELLED | OUTPATIENT
Start: 2025-03-13

## 2025-03-05 RX ORDER — DIPHENHYDRAMINE HYDROCHLORIDE 50 MG/ML
50 INJECTION, SOLUTION INTRAMUSCULAR; INTRAVENOUS
Status: CANCELLED | OUTPATIENT
Start: 2025-03-13

## 2025-03-05 RX ORDER — HYDROCORTISONE SODIUM SUCCINATE 100 MG/2ML
100 INJECTION INTRAMUSCULAR; INTRAVENOUS
Status: CANCELLED | OUTPATIENT
Start: 2025-03-13

## 2025-03-05 RX ORDER — ACETAMINOPHEN 325 MG/1
650 TABLET ORAL
Status: CANCELLED | OUTPATIENT
Start: 2025-03-13

## 2025-03-05 RX ORDER — HEPARIN 100 UNIT/ML
500 SYRINGE INTRAVENOUS PRN
Status: CANCELLED | OUTPATIENT
Start: 2025-03-13

## 2025-03-05 RX ORDER — ALBUTEROL SULFATE 90 UG/1
4 INHALANT RESPIRATORY (INHALATION) PRN
Status: CANCELLED | OUTPATIENT
Start: 2025-03-13

## 2025-03-06 DIAGNOSIS — C90.00 MULTIPLE MYELOMA NOT HAVING ACHIEVED REMISSION (HCC): ICD-10-CM

## 2025-03-06 RX ORDER — POMALIDOMIDE 4 MG/1
4 CAPSULE ORAL DAILY
Qty: 21 CAPSULE | Refills: 0 | Status: ACTIVE | OUTPATIENT
Start: 2025-03-06

## 2025-03-06 NOTE — TELEPHONE ENCOUNTER
Oral Chemotherapy      Candida Weaver is a  69 y.o.female  diagnosed with multiple myeloma . Ms. Weaver is being treated with KPd.      Medication name: pomalidomide  Regimen: KPd  Dose:  4 mg (increase dose for 9/26/24)  Frequency: daily  Administration schedule: for 21 days followed by 7 days off every 28 days  Ordering provider: Nany Arboleda MD  Start date: 2/13/25 (Cycle 12)      REMS auth # [id number]  Prescription sent to pharmacy for processing.         Rose Pa, PHARMD, BCOP, BCPS        For Pharmacy Admin Tracking Only    Program: Medical Group  CPA in place:  Yes  Recommendation Provided To: Patient/Caregiver: 1 via Telephone  Intervention Detail: Refill(s) Provided  Intervention Accepted By: Patient/Caregiver: 1    Time Spent (min): 10

## 2025-03-10 ENCOUNTER — TELEPHONE (OUTPATIENT)
Dept: PRIMARY CARE CLINIC | Facility: CLINIC | Age: 70
End: 2025-03-10

## 2025-03-10 ENCOUNTER — TELEPHONE (OUTPATIENT)
Age: 70
End: 2025-03-10

## 2025-03-10 DIAGNOSIS — L60.0 INGROWN NAIL OF GREAT TOE: Primary | ICD-10-CM

## 2025-03-10 NOTE — PROGRESS NOTES
[facility] [place] at [facility]  [address], [address] [place] [address]  W: [phone]  F: [phone]    Reason for Visit:   Candida Weaver is a 70 y.o. female who is seen for Multiple Myeloma     Treatment History:   10/25/2023: Kayli VRD, Rev lite  3/2024: Kyprolis Pomalyst and Dexamethasone    History of Present Illness:   Patient is a 70 y.o. female who was dx with Smoldering Myeloma in 2017 and has been seeing [facility] Dr. Lopez. A BM bx at that time showed 40% plasma cells. No end organ damage. Noted to have worsening anemia 7/5/2023 with a Hb of 9.6 g/dl. This led to further evaluation that included a gammopathy eval that showed an M spike of 0.3 g/dl. She had a Kappa LC level of 1593 g/L with a K:L ratio of 157. Sent for evaluation now. She was in the ER June 2023 with some SOB. Had a CT head and this showed lytic lesions. She states that she has some carpal tunnel. BM bx showed Myeloma. She is on Kayli VRD. She had minimal response and was switched to Kyprolis and Pomalyst and Dexamethasone.    Here for follow up.   She is not feeling well today.   She had felt imbalanced and weak for the last few days. She has significant fatigue that lasts for the entire week after chemo.   Noticed headaches that started last week.   Reports fever, chills, sweats that lasts for 2 days after last chemo. No signs of URI.   Intermittent nausea.   No SOB, cough, nasal congestion, swelling, diarrhea/constipation, skin rash.       Presents with her son, Robert.     Review of systems was obtained and pertinent findings reviewed above. Past medical history, social history, family history, medications, and allergies are located in the electronic medical record.    Physical Exam:   /66   Pulse 65   Temp 97.5 °F (36.4 °C)   Resp 18   Wt 65.3 kg (144 lb)   BMI 23.96 kg/m²    Physical Exam  Constitutional: No acute distress, non-toxic appearance, and non-diaphoretic. Fatigued   HENT: Normocephalic and

## 2025-03-10 NOTE — TELEPHONE ENCOUNTER
LVM for pt letting her know that she needs to schedule an appt for CTA chest and to call Central Scheduling at [phone]. Will f/u

## 2025-03-10 NOTE — TELEPHONE ENCOUNTER
Spoke to pt and she would like a referral to a podiatrist, she has an ingrown toenail that is hurting. I told her I will ask Dr. Vazquez and call her back. She said  ok, thank you.

## 2025-03-12 ENCOUNTER — HOSPITAL ENCOUNTER (OUTPATIENT)
Facility: HOSPITAL | Age: 70
Setting detail: INFUSION SERIES
Discharge: HOME | End: 2025-03-12
Payer: MEDICARE

## 2025-03-12 VITALS — RESPIRATION RATE: 20 BRPM | DIASTOLIC BLOOD PRESSURE: 57 MMHG | SYSTOLIC BLOOD PRESSURE: 123 MMHG | HEART RATE: 65 BPM

## 2025-03-12 DIAGNOSIS — C90.00 MULTIPLE MYELOMA NOT HAVING ACHIEVED REMISSION (HCC): ICD-10-CM

## 2025-03-12 DIAGNOSIS — Z11.59 ENCOUNTER FOR SCREENING FOR OTHER VIRAL DISEASES: ICD-10-CM

## 2025-03-12 LAB
ALBUMIN SERPL-MCNC: 3.4 G/DL (ref 3.5–5)
ALBUMIN/GLOB SERPL: 1.3 (ref 1.1–2.2)
ALP SERPL-CCNC: 59 U/L (ref 45–117)
ALT SERPL-CCNC: 29 U/L (ref 12–78)
ANION GAP SERPL CALC-SCNC: 6 MMOL/L (ref 2–12)
AST SERPL-CCNC: 12 U/L (ref 15–37)
BASOPHILS # BLD: 0.11 K/UL (ref 0–0.1)
BASOPHILS NFR BLD: 5 % (ref 0–1)
BILIRUB SERPL-MCNC: 0.5 MG/DL (ref 0.2–1)
BUN SERPL-MCNC: 22 MG/DL (ref 6–20)
BUN/CREAT SERPL: 33 (ref 12–20)
CALCIUM SERPL-MCNC: 9.2 MG/DL (ref 8.5–10.1)
CHLORIDE SERPL-SCNC: 109 MMOL/L (ref 97–108)
CO2 SERPL-SCNC: 24 MMOL/L (ref 21–32)
CREAT SERPL-MCNC: 0.66 MG/DL (ref 0.55–1.02)
DIFFERENTIAL METHOD BLD: ABNORMAL
EOSINOPHIL # BLD: 0.11 K/UL (ref 0–0.4)
EOSINOPHIL NFR BLD: 5 % (ref 0–7)
ERYTHROCYTE [DISTWIDTH] IN BLOOD BY AUTOMATED COUNT: 16.5 % (ref 11.5–14.5)
GLOBULIN SER CALC-MCNC: 2.6 G/DL (ref 2–4)
GLUCOSE SERPL-MCNC: 65 MG/DL (ref 65–100)
HCT VFR BLD AUTO: 36 % (ref 35–47)
HGB BLD-MCNC: 10.7 G/DL (ref 11.5–16)
IMM GRANULOCYTES # BLD AUTO: 0 K/UL
IMM GRANULOCYTES NFR BLD AUTO: 0 %
LYMPHOCYTES # BLD: 0.66 K/UL (ref 0.8–3.5)
LYMPHOCYTES NFR BLD: 30 % (ref 12–49)
MCH RBC QN AUTO: 28.4 PG (ref 26–34)
MCHC RBC AUTO-ENTMCNC: 29.7 G/DL (ref 30–36.5)
MCV RBC AUTO: 95.5 FL (ref 80–99)
MONOCYTES # BLD: 0.37 K/UL (ref 0–1)
MONOCYTES NFR BLD: 17 % (ref 5–13)
NEUTS BAND NFR BLD MANUAL: 1 % (ref 0–6)
NEUTS SEG # BLD: 0.95 K/UL (ref 1.8–8)
NEUTS SEG NFR BLD: 42 % (ref 32–75)
NRBC # BLD: 0 K/UL (ref 0–0.01)
NRBC BLD-RTO: 0 PER 100 WBC
PLATELET # BLD AUTO: 310 K/UL (ref 150–400)
PMV BLD AUTO: 12.1 FL (ref 8.9–12.9)
POTASSIUM SERPL-SCNC: 3.9 MMOL/L (ref 3.5–5.1)
PROT SERPL-MCNC: 6 G/DL (ref 6.4–8.2)
RBC # BLD AUTO: 3.77 M/UL (ref 3.8–5.2)
RBC MORPH BLD: ABNORMAL
SODIUM SERPL-SCNC: 139 MMOL/L (ref 136–145)
WBC # BLD AUTO: 2.2 K/UL (ref 3.6–11)
WBC MORPH BLD: ABNORMAL

## 2025-03-12 PROCEDURE — 82784 ASSAY IGA/IGD/IGG/IGM EACH: CPT

## 2025-03-12 PROCEDURE — 80053 COMPREHEN METABOLIC PANEL: CPT

## 2025-03-12 PROCEDURE — 86334 IMMUNOFIX E-PHORESIS SERUM: CPT

## 2025-03-12 PROCEDURE — 85025 COMPLETE CBC W/AUTO DIFF WBC: CPT

## 2025-03-12 PROCEDURE — 83521 IG LIGHT CHAINS FREE EACH: CPT

## 2025-03-12 PROCEDURE — 84165 PROTEIN E-PHORESIS SERUM: CPT

## 2025-03-12 NOTE — PROGRESS NOTES
[facility] Lab visit:     0930: Patient arrived ambulatory and in no distress.  Labs drawn per Latonya Law RN. Departed [facility] ambulatory and in no distress.     Visit Vitals:  Patient Vitals for the past 12 hrs:   Pulse Resp BP   03/12/25 0942 65 20 (!) 123/57       Labs: See CC for pending results.  No results found for this or any previous visit (from the past 12 hours).     no

## 2025-03-13 ENCOUNTER — HOSPITAL ENCOUNTER (OUTPATIENT)
Facility: HOSPITAL | Age: 70
Setting detail: INFUSION SERIES
Discharge: HOME | End: 2025-03-13
Payer: MEDICARE

## 2025-03-13 ENCOUNTER — OFFICE VISIT (OUTPATIENT)
Age: 70
End: 2025-03-13
Payer: MEDICARE

## 2025-03-13 ENCOUNTER — TELEPHONE (OUTPATIENT)
Age: 70
End: 2025-03-13

## 2025-03-13 VITALS
BODY MASS INDEX: 23.99 KG/M2 | HEIGHT: 65 IN | RESPIRATION RATE: 18 BRPM | WEIGHT: 144 LBS | HEART RATE: 62 BPM | SYSTOLIC BLOOD PRESSURE: 136 MMHG | DIASTOLIC BLOOD PRESSURE: 58 MMHG | TEMPERATURE: 97.5 F

## 2025-03-13 VITALS
HEART RATE: 65 BPM | WEIGHT: 144 LBS | BODY MASS INDEX: 23.96 KG/M2 | RESPIRATION RATE: 18 BRPM | TEMPERATURE: 97.5 F | SYSTOLIC BLOOD PRESSURE: 130 MMHG | DIASTOLIC BLOOD PRESSURE: 66 MMHG

## 2025-03-13 DIAGNOSIS — C90.00 MULTIPLE MYELOMA NOT HAVING ACHIEVED REMISSION (HCC): Primary | ICD-10-CM

## 2025-03-13 DIAGNOSIS — Z11.59 ENCOUNTER FOR SCREENING FOR OTHER VIRAL DISEASES: ICD-10-CM

## 2025-03-13 DIAGNOSIS — I50.20 SYSTOLIC CONGESTIVE HEART FAILURE, UNSPECIFIED HF CHRONICITY (HCC): ICD-10-CM

## 2025-03-13 LAB
BASOPHILS # BLD: 0.15 K/UL (ref 0–0.1)
BASOPHILS NFR BLD: 6.5 % (ref 0–1)
DIFFERENTIAL METHOD BLD: ABNORMAL
EOSINOPHIL # BLD: 0.12 K/UL (ref 0–0.4)
EOSINOPHIL NFR BLD: 5.2 % (ref 0–7)
ERYTHROCYTE [DISTWIDTH] IN BLOOD BY AUTOMATED COUNT: 16.2 % (ref 11.5–14.5)
HCT VFR BLD AUTO: 34.3 % (ref 35–47)
HGB BLD-MCNC: 10.5 G/DL (ref 11.5–16)
IMM GRANULOCYTES # BLD AUTO: 0.01 K/UL (ref 0–0.04)
IMM GRANULOCYTES NFR BLD AUTO: 0.4 % (ref 0–0.5)
LYMPHOCYTES # BLD: 0.73 K/UL (ref 0.8–3.5)
LYMPHOCYTES NFR BLD: 31.9 % (ref 12–49)
MCH RBC QN AUTO: 28.8 PG (ref 26–34)
MCHC RBC AUTO-ENTMCNC: 30.6 G/DL (ref 30–36.5)
MCV RBC AUTO: 94.2 FL (ref 80–99)
MONOCYTES # BLD: 0.41 K/UL (ref 0–1)
MONOCYTES NFR BLD: 17.7 % (ref 5–13)
NEUTS SEG # BLD: 0.88 K/UL (ref 1.8–8)
NEUTS SEG NFR BLD: 38.3 % (ref 32–75)
NRBC # BLD: 0 K/UL (ref 0–0.01)
NRBC BLD-RTO: 0 PER 100 WBC
PLATELET # BLD AUTO: 260 K/UL (ref 150–400)
PMV BLD AUTO: 12.9 FL (ref 8.9–12.9)
RBC # BLD AUTO: 3.64 M/UL (ref 3.8–5.2)
RBC MORPH BLD: ABNORMAL
WBC # BLD AUTO: 2.3 K/UL (ref 3.6–11)

## 2025-03-13 PROCEDURE — 96413 CHEMO IV INFUSION 1 HR: CPT

## 2025-03-13 PROCEDURE — 1123F ACP DISCUSS/DSCN MKR DOCD: CPT

## 2025-03-13 PROCEDURE — 1036F TOBACCO NON-USER: CPT

## 2025-03-13 PROCEDURE — 99215 OFFICE O/P EST HI 40 MIN: CPT

## 2025-03-13 PROCEDURE — 85025 COMPLETE CBC W/AUTO DIFF WBC: CPT

## 2025-03-13 PROCEDURE — G8399 PT W/DXA RESULTS DOCUMENT: HCPCS

## 2025-03-13 PROCEDURE — 1090F PRES/ABSN URINE INCON ASSESS: CPT

## 2025-03-13 PROCEDURE — 1159F MED LIST DOCD IN RCRD: CPT

## 2025-03-13 PROCEDURE — 6360000002 HC RX W HCPCS

## 2025-03-13 PROCEDURE — G8427 DOCREV CUR MEDS BY ELIG CLIN: HCPCS

## 2025-03-13 PROCEDURE — 1160F RVW MEDS BY RX/DR IN RCRD: CPT

## 2025-03-13 PROCEDURE — 3017F COLORECTAL CA SCREEN DOC REV: CPT

## 2025-03-13 PROCEDURE — 6360000002 HC RX W HCPCS: Performed by: INTERNAL MEDICINE

## 2025-03-13 PROCEDURE — 2580000003 HC RX 258

## 2025-03-13 PROCEDURE — 96375 TX/PRO/DX INJ NEW DRUG ADDON: CPT

## 2025-03-13 PROCEDURE — 2580000003 HC RX 258: Performed by: INTERNAL MEDICINE

## 2025-03-13 PROCEDURE — 1125F AMNT PAIN NOTED PAIN PRSNT: CPT

## 2025-03-13 PROCEDURE — 96361 HYDRATE IV INFUSION ADD-ON: CPT

## 2025-03-13 PROCEDURE — G8420 CALC BMI NORM PARAMETERS: HCPCS

## 2025-03-13 RX ORDER — 0.9 % SODIUM CHLORIDE 0.9 %
500 INTRAVENOUS SOLUTION INTRAVENOUS ONCE
Status: CANCELLED
Start: 2025-03-13

## 2025-03-13 RX ORDER — HYDROCORTISONE SODIUM SUCCINATE 100 MG/2ML
100 INJECTION INTRAMUSCULAR; INTRAVENOUS
Status: CANCELLED | OUTPATIENT
Start: 2025-03-20

## 2025-03-13 RX ORDER — ONDANSETRON 2 MG/ML
8 INJECTION INTRAMUSCULAR; INTRAVENOUS
Status: CANCELLED | OUTPATIENT
Start: 2025-03-20

## 2025-03-13 RX ORDER — DEXAMETHASONE 4 MG/1
20 TABLET ORAL ONCE
Status: COMPLETED | OUTPATIENT
Start: 2025-03-13 | End: 2025-03-13

## 2025-03-13 RX ORDER — SODIUM CHLORIDE 0.9 % (FLUSH) 0.9 %
5-40 SYRINGE (ML) INJECTION PRN
Status: CANCELLED | OUTPATIENT
Start: 2025-03-20

## 2025-03-13 RX ORDER — SODIUM CHLORIDE 9 MG/ML
5-250 INJECTION, SOLUTION INTRAVENOUS PRN
Status: CANCELLED | OUTPATIENT
Start: 2025-03-20

## 2025-03-13 RX ORDER — SODIUM CHLORIDE 9 MG/ML
INJECTION, SOLUTION INTRAVENOUS CONTINUOUS
Status: CANCELLED | OUTPATIENT
Start: 2025-03-20

## 2025-03-13 RX ORDER — EPINEPHRINE 1 MG/ML
0.3 INJECTION, SOLUTION INTRAMUSCULAR; SUBCUTANEOUS PRN
Status: CANCELLED | OUTPATIENT
Start: 2025-03-20

## 2025-03-13 RX ORDER — HEPARIN 100 UNIT/ML
500 SYRINGE INTRAVENOUS PRN
Status: CANCELLED | OUTPATIENT
Start: 2025-03-20

## 2025-03-13 RX ORDER — DEXTROSE MONOHYDRATE 50 MG/ML
5-250 INJECTION, SOLUTION INTRAVENOUS PRN
Status: DISCONTINUED | OUTPATIENT
Start: 2025-03-13 | End: 2025-03-14 | Stop reason: HOSPADM

## 2025-03-13 RX ORDER — 0.9 % SODIUM CHLORIDE 0.9 %
500 INTRAVENOUS SOLUTION INTRAVENOUS ONCE
Status: COMPLETED | OUTPATIENT
Start: 2025-03-13 | End: 2025-03-13

## 2025-03-13 RX ORDER — DIPHENHYDRAMINE HYDROCHLORIDE 50 MG/ML
50 INJECTION, SOLUTION INTRAMUSCULAR; INTRAVENOUS
Status: CANCELLED | OUTPATIENT
Start: 2025-03-20

## 2025-03-13 RX ORDER — ALBUTEROL SULFATE 90 UG/1
4 INHALANT RESPIRATORY (INHALATION) PRN
Status: CANCELLED | OUTPATIENT
Start: 2025-03-20

## 2025-03-13 RX ORDER — ACETAMINOPHEN 325 MG/1
650 TABLET ORAL
Status: CANCELLED | OUTPATIENT
Start: 2025-03-20

## 2025-03-13 RX ORDER — ONDANSETRON 2 MG/ML
8 INJECTION INTRAMUSCULAR; INTRAVENOUS ONCE
Status: COMPLETED | OUTPATIENT
Start: 2025-03-13 | End: 2025-03-13

## 2025-03-13 RX ADMIN — DEXAMETHASONE 20 MG: 4 TABLET ORAL at 11:08

## 2025-03-13 RX ADMIN — ONDANSETRON 8 MG: 2 INJECTION, SOLUTION INTRAMUSCULAR; INTRAVENOUS at 11:07

## 2025-03-13 RX ADMIN — DEXTROSE 50 ML/HR: 5 SOLUTION INTRAVENOUS at 11:56

## 2025-03-13 RX ADMIN — CARFILZOMIB 102 MG: 10 INJECTION, POWDER, LYOPHILIZED, FOR SOLUTION INTRAVENOUS at 12:03

## 2025-03-13 RX ADMIN — SODIUM CHLORIDE 500 ML: 9 INJECTION, SOLUTION INTRAVENOUS at 10:52

## 2025-03-13 NOTE — TELEPHONE ENCOUNTER
HIPAA x2  SW HEATH Duron to schedule pt CTA Chest pt is scheduled for 3/26/25 4:00 pm arrival for 4:30 pm appt.

## 2025-03-13 NOTE — PROGRESS NOTES
[facility] Chemotherapy Progress Note    Date: 2025    Name: Candida Weaver    MRN: [id number]         : 1955      0900 Pt admit to [facility] for Kyprolis ambulatory in stable condition. Assessment completed. No new concerns voiced. Port with positive blood return. Labs sent for processing.        Ms. Weaver's vitals were reviewed.  Patient Vitals for the past 12 hrs:   Temp Pulse Resp BP   25 1230 -- 62 -- (!) 136/58   25 0830 97.5 °F (36.4 °C) 65 18 130/66         Lab results were obtained and reviewed.  Recent Results (from the past 12 hours)   CBC with Auto Differential    Collection Time: 25  8:47 AM   Result Value Ref Range    WBC 2.3 (L) 3.6 - 11.0 K/uL    RBC 3.64 (L) 3.80 - 5.20 M/uL    Hemoglobin 10.5 (L) 11.5 - 16.0 g/dL    Hematocrit 34.3 (L) 35.0 - 47.0 %    MCV 94.2 80.0 - 99.0 FL    MCH 28.8 26.0 - 34.0 PG    MCHC 30.6 30.0 - 36.5 g/dL    RDW 16.2 (H) 11.5 - 14.5 %    Platelets 260 150 - 400 K/uL    MPV 12.9 8.9 - 12.9 FL    Nucleated RBCs 0.0 0  WBC    nRBC 0.00 0.00 - 0.01 K/uL    Neutrophils % 38.3 32.0 - 75.0 %    Lymphocytes % 31.9 12.0 - 49.0 %    Monocytes % 17.7 (H) 5.0 - 13.0 %    Eosinophils % 5.2 0.0 - 7.0 %    Basophils % 6.5 (H) 0.0 - 1.0 %    Immature Granulocytes % 0.4 0.0 - 0.5 %    Neutrophils Absolute 0.88 (L) 1.80 - 8.00 K/UL    Lymphocytes Absolute 0.73 (L) 0.80 - 3.50 K/UL    Monocytes Absolute 0.41 0.00 - 1.00 K/UL    Eosinophils Absolute 0.12 0.00 - 0.40 K/UL    Basophils Absolute 0.15 (H) 0.00 - 0.10 K/UL    Immature Granulocytes Absolute 0.01 0.00 - 0.04 K/UL    Differential Type SMEAR SCANNED      RBC Comment ANISOCYTOSIS  1+           Pre-medications  were administered as ordered and chemotherapy was initiated.  Medications Administered         carfilzomib (KYPROLIS) 102 mg in dextrose 5 % 100 mL chemo IVPB Admin Date  2025 Action  New Bag Dose  102 mg Rate  322 mL/hr Route  IntraVENous Documented By  Nicole Villegas, RN

## 2025-03-13 NOTE — PROGRESS NOTES
Candida Weaver is a 70 y.o. female    Chief Complaint   Patient presents with    Follow-up     Multiple Myeloma       1. Have you been to the ER, urgent care clinic since your last visit?  Hospitalized since your last visit?No    2. Have you seen or consulted any other health care providers outside of the [facility] since your last visit?  Include any pap smears or colon screening. No

## 2025-03-17 LAB
ALBUMIN SERPL ELPH-MCNC: 3.7 G/DL (ref 2.9–4.4)
ALBUMIN/GLOB SERPL: 2.4 (ref 0.7–1.7)
ALPHA1 GLOB SERPL ELPH-MCNC: 0.2 G/DL (ref 0–0.4)
ALPHA2 GLOB SERPL ELPH-MCNC: 0.5 G/DL (ref 0.4–1)
B-GLOBULIN SERPL ELPH-MCNC: 0.7 G/DL (ref 0.7–1.3)
GAMMA GLOB SERPL ELPH-MCNC: 0.2 G/DL (ref 0.4–1.8)
GLOBULIN SER-MCNC: 1.6 G/DL (ref 2.2–3.9)
IGA SERPL-MCNC: 9 MG/DL (ref 64–422)
IGG SERPL-MCNC: 205 MG/DL (ref 586–1602)
IGM SERPL-MCNC: 6 MG/DL (ref 26–217)
INTERPRETATION SERPL IEP-IMP: ABNORMAL
KAPPA LC FREE SER-MCNC: 59.6 MG/L (ref 3.3–19.4)
KAPPA LC FREE/LAMBDA FREE SER: 33.11 (ref 0.26–1.65)
LAMBDA LC FREE SERPL-MCNC: 1.8 MG/L (ref 5.7–26.3)
M PROTEIN SERPL ELPH-MCNC: ABNORMAL G/DL
PROT SERPL-MCNC: 5.3 G/DL (ref 6–8.5)

## 2025-03-19 ENCOUNTER — HOSPITAL ENCOUNTER (OUTPATIENT)
Facility: HOSPITAL | Age: 70
Setting detail: INFUSION SERIES
Discharge: HOME | End: 2025-03-19
Payer: MEDICARE

## 2025-03-19 VITALS
OXYGEN SATURATION: 98 % | RESPIRATION RATE: 18 BRPM | HEART RATE: 64 BPM | DIASTOLIC BLOOD PRESSURE: 81 MMHG | TEMPERATURE: 98.1 F | SYSTOLIC BLOOD PRESSURE: 146 MMHG

## 2025-03-19 DIAGNOSIS — Z11.59 ENCOUNTER FOR SCREENING FOR OTHER VIRAL DISEASES: ICD-10-CM

## 2025-03-19 DIAGNOSIS — C90.00 MULTIPLE MYELOMA NOT HAVING ACHIEVED REMISSION (HCC): ICD-10-CM

## 2025-03-19 LAB
ALBUMIN SERPL-MCNC: 3.3 G/DL (ref 3.5–5)
ALBUMIN/GLOB SERPL: 1.6 (ref 1.1–2.2)
ALP SERPL-CCNC: 92 U/L (ref 45–117)
ALT SERPL-CCNC: 28 U/L (ref 12–78)
ANION GAP SERPL CALC-SCNC: 5 MMOL/L (ref 2–12)
AST SERPL-CCNC: 14 U/L (ref 15–37)
BASOPHILS # BLD: 0.06 K/UL (ref 0–0.1)
BASOPHILS NFR BLD: 1.8 % (ref 0–1)
BILIRUB SERPL-MCNC: 0.4 MG/DL (ref 0.2–1)
BUN SERPL-MCNC: 21 MG/DL (ref 6–20)
BUN/CREAT SERPL: 40 (ref 12–20)
CALCIUM SERPL-MCNC: 8.8 MG/DL (ref 8.5–10.1)
CHLORIDE SERPL-SCNC: 110 MMOL/L (ref 97–108)
CO2 SERPL-SCNC: 23 MMOL/L (ref 21–32)
CREAT SERPL-MCNC: 0.53 MG/DL (ref 0.55–1.02)
DIFFERENTIAL METHOD BLD: ABNORMAL
EOSINOPHIL # BLD: 0.18 K/UL (ref 0–0.4)
EOSINOPHIL NFR BLD: 5.4 % (ref 0–7)
ERYTHROCYTE [DISTWIDTH] IN BLOOD BY AUTOMATED COUNT: 15.9 % (ref 11.5–14.5)
GLOBULIN SER CALC-MCNC: 2.1 G/DL (ref 2–4)
GLUCOSE SERPL-MCNC: 96 MG/DL (ref 65–100)
HCT VFR BLD AUTO: 34.4 % (ref 35–47)
HGB BLD-MCNC: 10.8 G/DL (ref 11.5–16)
IMM GRANULOCYTES # BLD AUTO: 0.03 K/UL (ref 0–0.04)
IMM GRANULOCYTES NFR BLD AUTO: 0.9 % (ref 0–0.5)
LYMPHOCYTES # BLD: 0.86 K/UL (ref 0.8–3.5)
LYMPHOCYTES NFR BLD: 25.3 % (ref 12–49)
MCH RBC QN AUTO: 29.1 PG (ref 26–34)
MCHC RBC AUTO-ENTMCNC: 31.4 G/DL (ref 30–36.5)
MCV RBC AUTO: 92.7 FL (ref 80–99)
MONOCYTES # BLD: 0.76 K/UL (ref 0–1)
MONOCYTES NFR BLD: 22.3 % (ref 5–13)
NEUTS SEG # BLD: 1.51 K/UL (ref 1.8–8)
NEUTS SEG NFR BLD: 44.3 % (ref 32–75)
NRBC # BLD: 0 K/UL (ref 0–0.01)
NRBC BLD-RTO: 0 PER 100 WBC
PLATELET # BLD AUTO: 164 K/UL (ref 150–400)
PMV BLD AUTO: 12.8 FL (ref 8.9–12.9)
POTASSIUM SERPL-SCNC: 4.6 MMOL/L (ref 3.5–5.1)
PROT SERPL-MCNC: 5.4 G/DL (ref 6.4–8.2)
RBC # BLD AUTO: 3.71 M/UL (ref 3.8–5.2)
RBC MORPH BLD: ABNORMAL
SODIUM SERPL-SCNC: 138 MMOL/L (ref 136–145)
WBC # BLD AUTO: 3.4 K/UL (ref 3.6–11)

## 2025-03-19 PROCEDURE — 85025 COMPLETE CBC W/AUTO DIFF WBC: CPT

## 2025-03-19 PROCEDURE — 80053 COMPREHEN METABOLIC PANEL: CPT

## 2025-03-19 ASSESSMENT — PAIN DESCRIPTION - FREQUENCY: FREQUENCY: CONTINUOUS

## 2025-03-19 ASSESSMENT — PAIN DESCRIPTION - PAIN TYPE: TYPE: CHRONIC PAIN

## 2025-03-19 ASSESSMENT — PAIN DESCRIPTION - ONSET: ONSET: ON-GOING

## 2025-03-19 ASSESSMENT — PAIN DESCRIPTION - LOCATION: LOCATION: HAND;LEG

## 2025-03-19 ASSESSMENT — PAIN SCALES - GENERAL: PAINLEVEL_OUTOF10: 6

## 2025-03-19 ASSESSMENT — PAIN SCALES - WONG BAKER: WONGBAKER_NUMERICALRESPONSE: NO HURT

## 2025-03-19 ASSESSMENT — PAIN - FUNCTIONAL ASSESSMENT: PAIN_FUNCTIONAL_ASSESSMENT: PREVENTS OR INTERFERES SOME ACTIVE ACTIVITIES AND ADLS

## 2025-03-19 ASSESSMENT — PAIN DESCRIPTION - ORIENTATION: ORIENTATION: RIGHT;LEFT

## 2025-03-19 ASSESSMENT — PAIN DESCRIPTION - DESCRIPTORS: DESCRIPTORS: ACHING

## 2025-03-19 NOTE — PROGRESS NOTES
[facility] Lab visit:     0930: Patient arrived ambulatory and in no distress.    Pre Treatment Labs drawn from R AC.  Departed [facility] ambulatory and in no distress.     Of note- patient had complaints of bilateral leg weakness. Stated she is unable to do her daily walking outside due to feeling unsteady. Denies any recent falls. Educated on sitting down when starting to feel unsteady- she verbalized an understanding. Also complains of neuropathy in hands that is \"worse than normal\". Due for treatment 3/20/25. Encouraged to notify MD if anything worsens as well as nursing staff at tomorrows appointment.     Visit Vitals:  Patient Vitals for the past 12 hrs:   Temp Pulse Resp BP SpO2   03/19/25 0930 98.1 °F (36.7 °C) 64 18 (!) 146/81 98 %       Labs: See CC for pending results.

## 2025-03-20 ENCOUNTER — HOSPITAL ENCOUNTER (OUTPATIENT)
Facility: HOSPITAL | Age: 70
Setting detail: INFUSION SERIES
Discharge: HOME | End: 2025-03-20
Payer: MEDICARE

## 2025-03-20 VITALS
SYSTOLIC BLOOD PRESSURE: 124 MMHG | BODY MASS INDEX: 23.99 KG/M2 | TEMPERATURE: 97.7 F | RESPIRATION RATE: 18 BRPM | DIASTOLIC BLOOD PRESSURE: 65 MMHG | HEART RATE: 67 BPM | HEIGHT: 65 IN | WEIGHT: 144 LBS | OXYGEN SATURATION: 99 %

## 2025-03-20 DIAGNOSIS — Z11.59 ENCOUNTER FOR SCREENING FOR OTHER VIRAL DISEASES: ICD-10-CM

## 2025-03-20 DIAGNOSIS — C90.00 MULTIPLE MYELOMA NOT HAVING ACHIEVED REMISSION (HCC): Primary | ICD-10-CM

## 2025-03-20 PROCEDURE — 96375 TX/PRO/DX INJ NEW DRUG ADDON: CPT

## 2025-03-20 PROCEDURE — 6360000002 HC RX W HCPCS: Performed by: INTERNAL MEDICINE

## 2025-03-20 PROCEDURE — 96367 TX/PROPH/DG ADDL SEQ IV INF: CPT

## 2025-03-20 PROCEDURE — 6360000002 HC RX W HCPCS

## 2025-03-20 PROCEDURE — 96413 CHEMO IV INFUSION 1 HR: CPT

## 2025-03-20 PROCEDURE — 2580000003 HC RX 258: Performed by: INTERNAL MEDICINE

## 2025-03-20 RX ORDER — HYDROCORTISONE SODIUM SUCCINATE 100 MG/2ML
100 INJECTION INTRAMUSCULAR; INTRAVENOUS
OUTPATIENT
Start: 2025-04-10

## 2025-03-20 RX ORDER — SODIUM CHLORIDE 0.9 % (FLUSH) 0.9 %
5-40 SYRINGE (ML) INJECTION PRN
OUTPATIENT
Start: 2025-04-10

## 2025-03-20 RX ORDER — SODIUM CHLORIDE 9 MG/ML
INJECTION, SOLUTION INTRAVENOUS CONTINUOUS
OUTPATIENT
Start: 2025-04-10

## 2025-03-20 RX ORDER — SODIUM CHLORIDE 9 MG/ML
5-250 INJECTION, SOLUTION INTRAVENOUS PRN
Status: DISCONTINUED | OUTPATIENT
Start: 2025-03-20 | End: 2025-03-21 | Stop reason: HOSPADM

## 2025-03-20 RX ORDER — ACETAMINOPHEN 325 MG/1
650 TABLET ORAL
Status: CANCELLED | OUTPATIENT
Start: 2025-03-27

## 2025-03-20 RX ORDER — DIPHENHYDRAMINE HYDROCHLORIDE 50 MG/ML
50 INJECTION, SOLUTION INTRAMUSCULAR; INTRAVENOUS
OUTPATIENT
Start: 2025-04-10

## 2025-03-20 RX ORDER — EPINEPHRINE 1 MG/ML
0.3 INJECTION, SOLUTION INTRAMUSCULAR; SUBCUTANEOUS PRN
OUTPATIENT
Start: 2025-04-10

## 2025-03-20 RX ORDER — ACETAMINOPHEN 325 MG/1
650 TABLET ORAL
OUTPATIENT
Start: 2025-04-10

## 2025-03-20 RX ORDER — ALBUTEROL SULFATE 90 UG/1
4 INHALANT RESPIRATORY (INHALATION) PRN
Status: CANCELLED | OUTPATIENT
Start: 2025-03-27

## 2025-03-20 RX ORDER — SODIUM CHLORIDE 9 MG/ML
INJECTION, SOLUTION INTRAVENOUS CONTINUOUS
Status: CANCELLED | OUTPATIENT
Start: 2025-03-27

## 2025-03-20 RX ORDER — ZOLEDRONIC ACID 0.04 MG/ML
4 INJECTION, SOLUTION INTRAVENOUS ONCE
Status: COMPLETED | OUTPATIENT
Start: 2025-03-20 | End: 2025-03-20

## 2025-03-20 RX ORDER — DEXAMETHASONE 4 MG/1
20 TABLET ORAL ONCE
Status: COMPLETED | OUTPATIENT
Start: 2025-03-20 | End: 2025-03-20

## 2025-03-20 RX ORDER — DEXTROSE MONOHYDRATE 50 MG/ML
5-250 INJECTION, SOLUTION INTRAVENOUS PRN
Status: DISCONTINUED | OUTPATIENT
Start: 2025-03-20 | End: 2025-03-21 | Stop reason: HOSPADM

## 2025-03-20 RX ORDER — EPINEPHRINE 1 MG/ML
0.3 INJECTION, SOLUTION INTRAMUSCULAR; SUBCUTANEOUS PRN
Status: CANCELLED | OUTPATIENT
Start: 2025-03-27

## 2025-03-20 RX ORDER — ALBUTEROL SULFATE 90 UG/1
4 INHALANT RESPIRATORY (INHALATION) PRN
OUTPATIENT
Start: 2025-04-10

## 2025-03-20 RX ORDER — SODIUM CHLORIDE 9 MG/ML
5-250 INJECTION, SOLUTION INTRAVENOUS PRN
OUTPATIENT
Start: 2025-04-10

## 2025-03-20 RX ORDER — ONDANSETRON 2 MG/ML
8 INJECTION INTRAMUSCULAR; INTRAVENOUS ONCE
Status: COMPLETED | OUTPATIENT
Start: 2025-03-20 | End: 2025-03-20

## 2025-03-20 RX ORDER — ONDANSETRON 2 MG/ML
8 INJECTION INTRAMUSCULAR; INTRAVENOUS
OUTPATIENT
Start: 2025-04-10

## 2025-03-20 RX ORDER — DIPHENHYDRAMINE HYDROCHLORIDE 50 MG/ML
50 INJECTION, SOLUTION INTRAMUSCULAR; INTRAVENOUS
Status: CANCELLED | OUTPATIENT
Start: 2025-03-27

## 2025-03-20 RX ORDER — HYDROCORTISONE SODIUM SUCCINATE 100 MG/2ML
100 INJECTION INTRAMUSCULAR; INTRAVENOUS
Status: CANCELLED | OUTPATIENT
Start: 2025-03-27

## 2025-03-20 RX ORDER — HEPARIN 100 UNIT/ML
500 SYRINGE INTRAVENOUS PRN
OUTPATIENT
Start: 2025-04-10

## 2025-03-20 RX ORDER — ONDANSETRON 2 MG/ML
8 INJECTION INTRAMUSCULAR; INTRAVENOUS
Status: CANCELLED | OUTPATIENT
Start: 2025-03-27

## 2025-03-20 RX ORDER — ZOLEDRONIC ACID 0.04 MG/ML
4 INJECTION, SOLUTION INTRAVENOUS ONCE
OUTPATIENT
Start: 2025-04-10 | End: 2025-04-10

## 2025-03-20 RX ADMIN — ZOLEDRONIC ACID 4 MG: 0.04 INJECTION, SOLUTION INTRAVENOUS at 10:31

## 2025-03-20 RX ADMIN — DEXAMETHASONE 20 MG: 4 TABLET ORAL at 10:27

## 2025-03-20 RX ADMIN — ONDANSETRON 8 MG: 2 INJECTION INTRAMUSCULAR; INTRAVENOUS at 10:28

## 2025-03-20 RX ADMIN — CARFILZOMIB 102 MG: 60 INJECTION, POWDER, LYOPHILIZED, FOR SOLUTION INTRAVENOUS at 11:40

## 2025-03-20 RX ADMIN — DEXTROSE 50 ML/HR: 5 SOLUTION INTRAVENOUS at 10:20

## 2025-03-20 ASSESSMENT — PAIN DESCRIPTION - DESCRIPTORS: DESCRIPTORS: ACHING

## 2025-03-20 ASSESSMENT — PAIN SCALES - WONG BAKER: WONGBAKER_NUMERICALRESPONSE: NO HURT

## 2025-03-20 ASSESSMENT — PAIN DESCRIPTION - ORIENTATION: ORIENTATION: RIGHT

## 2025-03-20 ASSESSMENT — PAIN SCALES - GENERAL: PAINLEVEL_OUTOF10: 8

## 2025-03-20 ASSESSMENT — PAIN DESCRIPTION - LOCATION: LOCATION: SHOULDER

## 2025-03-20 NOTE — PROGRESS NOTES
[facility] Chemo Progress Note    Date: 2025    Name: Candida Weaver    MRN: [id number]         : 1955    Ms. Weaver arrived ambulatory and in no distress for cycle 13 day 1 of KYPROLIS + ZOMETA.      Assessment was completed and documented in flowsheets. No acute concerns at this time. Port accessed without difficulty, labs drawn and processed.    Lab results were obtained 3/19/25 and reviewed.    Follow Up: Proceed with treatment  Ms. Weaver's vitals were reviewed.  Patient Vitals for the past 12 hrs:   Temp Pulse Resp BP SpO2   25 1011 97.7 °F (36.5 °C) 67 18 124/65 99 %       Pre-medications  were administered as ordered and chemotherapy was initiated.  Medications Administered         carfilzomib (KYPROLIS) 102 mg in dextrose 5 % 100 mL chemo IVPB Admin Date  2025 Action  New Bag Dose  102 mg Rate  322 mL/hr Route  IntraVENous Documented By  Andreia Mnia, RN        dexAMETHasone (DECADRON) tablet 20 mg Admin Date  2025 Action  Given Dose  20 mg Rate   Route  Oral Documented By  Andreia Mina RN        dextrose 5 % solution Admin Date  2025 Action  New Bag Dose  50 mL/hr Rate  50 mL/hr Route  IntraVENous Documented By  Andreia Mina RN        ondansetron (ZOFRAN) injection 8 mg Admin Date  2025 Action  Given Dose  8 mg Rate   Route  IntraVENous Documented By  Andreia Mina, RN        zoledronic acid (ZOMETA) 4 mg/100 mL infusion Admin Date  2025 Action  New Bag Dose  4 mg Rate  300 mL/hr Route  IntraVENous Documented By  Andreia Mina, RN            Two nurses verified prior to administering:  Drug name, Drug dose, Infusion volume or drug volume when prepared in a syringe, Rate of administration, Route of administration, Expiration dates and/or times, Appearance and physical integrity of the drugs, Rate set on infusion pump, when used, and Sequencing of drug administration.  Medication education reinforced and patient verbalized understanding.    Ms. Weaver  tolerated treatment well, port flushed and de-accessed per protocol. [facility]. Patient aware of next appointment.     Future Appointments   Date Time Provider Department Center   3/26/2025  9:30 AM PEDS LAB CHAIR 1 [facility]   3/26/2025  4:30 PM [facility] CT ER 3 [facility]   3/27/2025 10:00 AM [facility] CHEMO CHAIR 1 [facility]         JACQUES MANCIA RN  March 20, 2025

## 2025-03-24 ENCOUNTER — TELEPHONE (OUTPATIENT)
Age: 70
End: 2025-03-24

## 2025-03-24 NOTE — TELEPHONE ENCOUNTER
HIPAA x3  SW pt to find out how she's been feeling with recent dose reduction of her chemo. Pt states she's feeling much better. Pt states she was able to to get up and move around more. Pt states she thought her \"body was getting used to it\". Pt wanted to know if she can have her Zometa monthly instead of every 3 months. I let her know I would check with Dr. Arboleda/Ashley Costa NP. Pt was also concerned about her BUN/CRT and I let her know that I would on this also. Pt verbalized understanding and was appreciative of my call.

## 2025-03-26 ENCOUNTER — HOSPITAL ENCOUNTER (OUTPATIENT)
Facility: HOSPITAL | Age: 70
Discharge: HOME | End: 2025-03-29
Payer: MEDICARE

## 2025-03-26 ENCOUNTER — HOSPITAL ENCOUNTER (OUTPATIENT)
Facility: HOSPITAL | Age: 70
Setting detail: INFUSION SERIES
Discharge: HOME | End: 2025-03-26
Payer: MEDICARE

## 2025-03-26 DIAGNOSIS — C90.00 MULTIPLE MYELOMA NOT HAVING ACHIEVED REMISSION (HCC): ICD-10-CM

## 2025-03-26 DIAGNOSIS — I26.99 OTHER ACUTE PULMONARY EMBOLISM WITHOUT ACUTE COR PULMONALE: ICD-10-CM

## 2025-03-26 DIAGNOSIS — Z11.59 ENCOUNTER FOR SCREENING FOR OTHER VIRAL DISEASES: ICD-10-CM

## 2025-03-26 LAB
ALBUMIN SERPL-MCNC: 3.4 G/DL (ref 3.5–5)
ALBUMIN/GLOB SERPL: 1.3 (ref 1.1–2.2)
ALP SERPL-CCNC: 73 U/L (ref 45–117)
ALT SERPL-CCNC: 27 U/L (ref 12–78)
ANION GAP SERPL CALC-SCNC: 3 MMOL/L (ref 2–12)
AST SERPL-CCNC: 14 U/L (ref 15–37)
BASOPHILS # BLD: 0.05 K/UL (ref 0–0.1)
BASOPHILS NFR BLD: 1.3 % (ref 0–1)
BILIRUB SERPL-MCNC: 0.4 MG/DL (ref 0.2–1)
BUN SERPL-MCNC: 16 MG/DL (ref 6–20)
BUN/CREAT SERPL: 24 (ref 12–20)
CALCIUM SERPL-MCNC: 9 MG/DL (ref 8.5–10.1)
CHLORIDE SERPL-SCNC: 108 MMOL/L (ref 97–108)
CO2 SERPL-SCNC: 25 MMOL/L (ref 21–32)
CREAT SERPL-MCNC: 0.67 MG/DL (ref 0.55–1.02)
DIFFERENTIAL METHOD BLD: ABNORMAL
EOSINOPHIL # BLD: 0.21 K/UL (ref 0–0.4)
EOSINOPHIL NFR BLD: 5.5 % (ref 0–7)
ERYTHROCYTE [DISTWIDTH] IN BLOOD BY AUTOMATED COUNT: 15.5 % (ref 11.5–14.5)
GLOBULIN SER CALC-MCNC: 2.6 G/DL (ref 2–4)
GLUCOSE SERPL-MCNC: 88 MG/DL (ref 65–100)
HCT VFR BLD AUTO: 35.5 % (ref 35–47)
HGB BLD-MCNC: 10.8 G/DL (ref 11.5–16)
IMM GRANULOCYTES # BLD AUTO: 0.02 K/UL (ref 0–0.04)
IMM GRANULOCYTES NFR BLD AUTO: 0.5 % (ref 0–0.5)
LYMPHOCYTES # BLD: 0.68 K/UL (ref 0.8–3.5)
LYMPHOCYTES NFR BLD: 17.4 % (ref 12–49)
MCH RBC QN AUTO: 28.9 PG (ref 26–34)
MCHC RBC AUTO-ENTMCNC: 30.4 G/DL (ref 30–36.5)
MCV RBC AUTO: 94.9 FL (ref 80–99)
MONOCYTES # BLD: 0.7 K/UL (ref 0–1)
MONOCYTES NFR BLD: 17.9 % (ref 5–13)
NEUTS SEG # BLD: 2.24 K/UL (ref 1.8–8)
NEUTS SEG NFR BLD: 57.4 % (ref 32–75)
NRBC # BLD: 0.03 K/UL (ref 0–0.01)
NRBC BLD-RTO: 0.8 PER 100 WBC
PLATELET # BLD AUTO: 183 K/UL (ref 150–400)
PMV BLD AUTO: 13 FL (ref 8.9–12.9)
POTASSIUM SERPL-SCNC: 4.2 MMOL/L (ref 3.5–5.1)
PROT SERPL-MCNC: 6 G/DL (ref 6.4–8.2)
RBC # BLD AUTO: 3.74 M/UL (ref 3.8–5.2)
RBC MORPH BLD: ABNORMAL
RBC MORPH BLD: ABNORMAL
SODIUM SERPL-SCNC: 136 MMOL/L (ref 136–145)
WBC # BLD AUTO: 3.9 K/UL (ref 3.6–11)

## 2025-03-26 PROCEDURE — 85025 COMPLETE CBC W/AUTO DIFF WBC: CPT

## 2025-03-26 PROCEDURE — 71275 CT ANGIOGRAPHY CHEST: CPT

## 2025-03-26 PROCEDURE — 80053 COMPREHEN METABOLIC PANEL: CPT

## 2025-03-26 PROCEDURE — 6360000004 HC RX CONTRAST MEDICATION: Performed by: RADIOLOGY

## 2025-03-26 RX ORDER — IOPAMIDOL 755 MG/ML
100 INJECTION, SOLUTION INTRAVASCULAR
Status: DISCONTINUED | OUTPATIENT
Start: 2025-03-26 | End: 2025-03-26

## 2025-03-26 RX ADMIN — IOHEXOL 100 ML: 350 INJECTION, SOLUTION INTRAVENOUS at 16:58

## 2025-03-26 NOTE — PROGRESS NOTES
[facility] Lab visit:     0940: Patient arrived ambulatory and in no distress.  Labs drawn per Latonya Law RN. Departed [facility] ambulatory and in no distress.     Labs: See epic for pending results.  No results found for this or any previous visit (from the past 12 hours).

## 2025-03-27 ENCOUNTER — HOSPITAL ENCOUNTER (OUTPATIENT)
Facility: HOSPITAL | Age: 70
Setting detail: INFUSION SERIES
Discharge: HOME | End: 2025-03-27
Payer: MEDICARE

## 2025-03-27 VITALS
WEIGHT: 144.6 LBS | DIASTOLIC BLOOD PRESSURE: 54 MMHG | HEIGHT: 65 IN | TEMPERATURE: 97.7 F | BODY MASS INDEX: 24.09 KG/M2 | OXYGEN SATURATION: 99 % | SYSTOLIC BLOOD PRESSURE: 121 MMHG | HEART RATE: 63 BPM

## 2025-03-27 DIAGNOSIS — Z11.59 ENCOUNTER FOR SCREENING FOR OTHER VIRAL DISEASES: ICD-10-CM

## 2025-03-27 DIAGNOSIS — C90.00 MULTIPLE MYELOMA NOT HAVING ACHIEVED REMISSION (HCC): Primary | ICD-10-CM

## 2025-03-27 PROCEDURE — 6360000002 HC RX W HCPCS: Performed by: INTERNAL MEDICINE

## 2025-03-27 PROCEDURE — 96413 CHEMO IV INFUSION 1 HR: CPT

## 2025-03-27 PROCEDURE — 2500000003 HC RX 250 WO HCPCS: Performed by: INTERNAL MEDICINE

## 2025-03-27 PROCEDURE — 2580000003 HC RX 258: Performed by: INTERNAL MEDICINE

## 2025-03-27 PROCEDURE — 96375 TX/PRO/DX INJ NEW DRUG ADDON: CPT

## 2025-03-27 RX ORDER — SODIUM CHLORIDE 0.9 % (FLUSH) 0.9 %
5-40 SYRINGE (ML) INJECTION PRN
Status: DISCONTINUED | OUTPATIENT
Start: 2025-03-27 | End: 2025-03-28 | Stop reason: HOSPADM

## 2025-03-27 RX ORDER — SODIUM CHLORIDE 9 MG/ML
5-250 INJECTION, SOLUTION INTRAVENOUS PRN
Status: DISCONTINUED | OUTPATIENT
Start: 2025-03-27 | End: 2025-03-28 | Stop reason: HOSPADM

## 2025-03-27 RX ORDER — DEXAMETHASONE 4 MG/1
20 TABLET ORAL ONCE
Status: COMPLETED | OUTPATIENT
Start: 2025-03-27 | End: 2025-03-27

## 2025-03-27 RX ORDER — DEXTROSE MONOHYDRATE 50 MG/ML
5-250 INJECTION, SOLUTION INTRAVENOUS PRN
Status: DISCONTINUED | OUTPATIENT
Start: 2025-03-27 | End: 2025-03-28 | Stop reason: HOSPADM

## 2025-03-27 RX ORDER — HEPARIN 100 UNIT/ML
500 SYRINGE INTRAVENOUS PRN
Status: DISCONTINUED | OUTPATIENT
Start: 2025-03-27 | End: 2025-03-28 | Stop reason: HOSPADM

## 2025-03-27 RX ORDER — ONDANSETRON 2 MG/ML
8 INJECTION INTRAMUSCULAR; INTRAVENOUS ONCE
Status: COMPLETED | OUTPATIENT
Start: 2025-03-27 | End: 2025-03-27

## 2025-03-27 RX ADMIN — SODIUM CHLORIDE, PRESERVATIVE FREE 20 ML: 5 INJECTION INTRAVENOUS at 11:30

## 2025-03-27 RX ADMIN — DEXTROSE 50 ML/HR: 5 SOLUTION INTRAVENOUS at 10:12

## 2025-03-27 RX ADMIN — CARFILZOMIB 102 MG: 10 INJECTION, POWDER, LYOPHILIZED, FOR SOLUTION INTRAVENOUS at 10:57

## 2025-03-27 RX ADMIN — DEXAMETHASONE 20 MG: 4 TABLET ORAL at 10:12

## 2025-03-27 RX ADMIN — ONDANSETRON 8 MG: 2 INJECTION, SOLUTION INTRAMUSCULAR; INTRAVENOUS at 10:13

## 2025-03-27 ASSESSMENT — PAIN DESCRIPTION - FREQUENCY: FREQUENCY: CONTINUOUS

## 2025-03-27 ASSESSMENT — PAIN DESCRIPTION - LOCATION: LOCATION: SHOULDER;HAND

## 2025-03-27 ASSESSMENT — PAIN SCALES - WONG BAKER: WONGBAKER_NUMERICALRESPONSE: NO HURT

## 2025-03-27 ASSESSMENT — PAIN DESCRIPTION - PAIN TYPE: TYPE: CHRONIC PAIN

## 2025-03-27 ASSESSMENT — PAIN DESCRIPTION - ONSET: ONSET: ON-GOING

## 2025-03-27 ASSESSMENT — PAIN SCALES - GENERAL: PAINLEVEL_OUTOF10: 6

## 2025-03-27 NOTE — PROGRESS NOTES
[facility] Chemotherapy Progress Note    Date: 2025    Name: Candida Weaver    MRN: [id number]         : 1955       Pt admit to [facility] for C13 D15 Kyprolis ambulatory in stable condition. Assessment completed. No new concerns voiced. Port with positive blood return. Labs sent for processing.(3/26/25)                    Ms. Weaver's vitals were reviewed.  Patient Vitals for the past 12 hrs:   Temp Pulse BP SpO2   25 1115 -- 63 (!) 121/54 --   25 0939 97.7 °F (36.5 °C) 64 124/62 99 %         Lab results were obtained and reviewed.  No results found for this or any previous visit (from the past 12 hours).    Pre-medications  were administered as ordered and chemotherapy was initiated.  Medications Administered         carfilzomib (KYPROLIS) 102 mg in dextrose 5 % 100 mL chemo IVPB Admin Date  2025 Action  New Bag Dose  102 mg Rate  322 mL/hr Route  IntraVENous Documented By  Debra Fragoso RN        dexAMETHasone (DECADRON) tablet 20 mg Admin Date  2025 Action  Given Dose  20 mg Rate   Route  Oral Documented By  Debra Fragoso, RN        dextrose 5 % solution Admin Date  2025 Action  New Bag Dose  50 mL/hr Rate  50 mL/hr Route  IntraVENous Documented By  Debra Fragoso, SABRINA        ondansetron (ZOFRAN) injection 8 mg Admin Date  2025 Action  Given Dose  8 mg Rate   Route  IntraVENous Documented By  Debra Fragoso, RN        sodium chloride flush 0.9 % injection 5-40 mL Admin Date  2025 Action  Given Dose  20 mL Rate   Route  IntraVENous Documented By  Debra Fragoso, SABRINA            Two nurses verified prior to administering:Drug name, Drug doseInfusion volume or drug volume when prepared in a syringe, Rate of administration, Route of administration, Expiration dates and/or times, Appearance and physical integrity of the drugs, Rate set on infusion pump, when used, Sequencing of drug administration.       Pt tolerated treatment well. Port maintained positive  blood return throughout treatment. Flushed,  and de-accessed per protocol. D/c home ambulatory in no distress. Pt aware of next appointment scheduled for 4/10/25.    Future Appointments   Date Time Provider Department Center   4/9/2025  9:30 AM PEDS LAB CHAIR 1 [facility]   4/10/2025 10:30 AM [facility] CHEMO CHAIR 2 [facility]   4/10/2025 10:45 AM Ashley Costa APRN - NP [facility]   4/16/2025  9:30 AM PEDS LAB CHAIR 1 [facility]   4/17/2025  1:30 PM RUTH CHEMO CHAIR 1 [facility]   4/23/2025  9:30 AM PEDS LAB CHAIR 1 [facility]   4/24/2025  9:00 AM [facility]UTH CHEMO CHAIR 5 [facility]   5/7/2025  9:30 AM PEDS LAB CHAIR 1 [facility]   5/8/2025 11:00 AM RUTH CHEMO CHAIR 3 [facility]   5/14/2025  9:30 AM PEDS LAB CHAIR 1 [facility]   5/15/2025 10:00 AM RUTH CHEMO CHAIR 1 [facility]   5/21/2025  9:30 AM PEDS LAB CHAIR 1 [facility]   5/22/2025 11:30 AM RUTH CHEMO CHAIR 4 [facility]   6/4/2025  9:30 AM PEDS LAB CHAIR 1 [facility]   6/5/2025 10:00 AM RUTH CHEMO CHAIR 1 [facility]   6/11/2025  9:30 AM PEDS LAB CHAIR 1 [facility]   6/12/2025 11:00 AM RUTH CHEMO CHAIR 3 [facility]   6/18/2025  9:30 AM PEDS LAB CHAIR 1 [facility]   6/19/2025 10:00 AM RUTH CHEMO CHAIR 1 [facility]         MÓNICA KIMBALL RN  March 27, 2025

## 2025-03-28 ENCOUNTER — TELEPHONE (OUTPATIENT)
Age: 70
End: 2025-03-28

## 2025-03-28 ENCOUNTER — TRANSCRIBE ORDERS (OUTPATIENT)
Facility: HOSPITAL | Age: 70
End: 2025-03-28

## 2025-03-28 DIAGNOSIS — Z12.31 OTHER SCREENING MAMMOGRAM: Primary | ICD-10-CM

## 2025-03-28 NOTE — TELEPHONE ENCOUNTER
HIPAA x3  Called pt to let her know she still needs to schedule her ECHO and that she needs to call Central Scheduling at [phone] to get this scheduled.     Pt stated a few years ago she had some lung problems and developed scar tissue. I let her know I will ask Dr. Arboleda/Ashley Costa,JON to review her recent CTA and will let her know if any concerns we'll call and let her know. Pt verbalized understanding and was appreciative of my call.

## 2025-03-28 NOTE — TELEPHONE ENCOUNTER
Lm with patient to let her know I adjusted the time for her appointment on 4/17/25 per her request, gave her office number to call back with any questions.

## 2025-03-31 DIAGNOSIS — C90.00 MULTIPLE MYELOMA NOT HAVING ACHIEVED REMISSION (HCC): ICD-10-CM

## 2025-03-31 RX ORDER — POMALIDOMIDE 4 MG/1
4 CAPSULE ORAL DAILY
Qty: 21 CAPSULE | Refills: 0 | Status: ACTIVE | OUTPATIENT
Start: 2025-03-31

## 2025-03-31 NOTE — TELEPHONE ENCOUNTER
Oral Chemotherapy      Candida Weaver is a  69 y.o.female  diagnosed with multiple myeloma . Ms. Weaver is being treated with KPd.      Medication name: pomalidomide  Regimen: KPd  Dose:  4 mg (increase dose for 9/26/24)  Frequency: daily  Administration schedule: for 21 days followed by 7 days off every 28 days  Ordering provider: Nany Arboleda MD  Start date: 3/13/25 (Cycle 12)      REMS auth # [id number]  Prescription sent to pharmacy for processing.         Rose Pa, WOOD, BCOP, BCPS    For Pharmacy Admin Tracking Only    Program: Medical Group  CPA in place:  Yes  Recommendation Provided To: Patient/Caregiver: 1 via Telephone  Intervention Detail: Refill(s) Provided  Intervention Accepted By: Patient/Caregiver: 1    Time Spent (min): 10

## 2025-04-02 RX ORDER — DEXTROSE MONOHYDRATE 50 MG/ML
5-250 INJECTION, SOLUTION INTRAVENOUS PRN
OUTPATIENT
Start: 2025-04-24

## 2025-04-02 RX ORDER — ACETAMINOPHEN 325 MG/1
650 TABLET ORAL
Status: CANCELLED | OUTPATIENT
Start: 2025-04-17

## 2025-04-02 RX ORDER — DEXAMETHASONE 4 MG/1
20 TABLET ORAL ONCE
Start: 2025-04-24 | End: 2025-04-24

## 2025-04-02 RX ORDER — SODIUM CHLORIDE 9 MG/ML
5-250 INJECTION, SOLUTION INTRAVENOUS PRN
Status: CANCELLED | OUTPATIENT
Start: 2025-04-10

## 2025-04-02 RX ORDER — 0.9 % SODIUM CHLORIDE 0.9 %
500 INTRAVENOUS SOLUTION INTRAVENOUS ONCE
Status: CANCELLED | OUTPATIENT
Start: 2025-04-10 | End: 2025-04-10

## 2025-04-02 RX ORDER — HYDROCORTISONE SODIUM SUCCINATE 100 MG/2ML
100 INJECTION INTRAMUSCULAR; INTRAVENOUS
OUTPATIENT
Start: 2025-04-24

## 2025-04-02 RX ORDER — ALBUTEROL SULFATE 90 UG/1
4 INHALANT RESPIRATORY (INHALATION) PRN
OUTPATIENT
Start: 2025-04-24

## 2025-04-02 RX ORDER — ACETAMINOPHEN 325 MG/1
650 TABLET ORAL
Status: CANCELLED | OUTPATIENT
Start: 2025-04-10

## 2025-04-02 RX ORDER — SODIUM CHLORIDE 0.9 % (FLUSH) 0.9 %
5-40 SYRINGE (ML) INJECTION PRN
Status: CANCELLED | OUTPATIENT
Start: 2025-04-17

## 2025-04-02 RX ORDER — ALBUTEROL SULFATE 90 UG/1
4 INHALANT RESPIRATORY (INHALATION) PRN
Status: CANCELLED | OUTPATIENT
Start: 2025-04-17

## 2025-04-02 RX ORDER — SODIUM CHLORIDE 9 MG/ML
5-250 INJECTION, SOLUTION INTRAVENOUS PRN
Status: CANCELLED | OUTPATIENT
Start: 2025-04-17

## 2025-04-02 RX ORDER — HEPARIN 100 UNIT/ML
500 SYRINGE INTRAVENOUS PRN
OUTPATIENT
Start: 2025-04-24

## 2025-04-02 RX ORDER — DEXAMETHASONE 4 MG/1
20 TABLET ORAL ONCE
Status: CANCELLED | OUTPATIENT
Start: 2025-04-10 | End: 2025-04-10

## 2025-04-02 RX ORDER — DEXTROSE MONOHYDRATE 50 MG/ML
5-250 INJECTION, SOLUTION INTRAVENOUS PRN
Status: CANCELLED | OUTPATIENT
Start: 2025-04-10

## 2025-04-02 RX ORDER — SODIUM CHLORIDE 0.9 % (FLUSH) 0.9 %
5-40 SYRINGE (ML) INJECTION PRN
OUTPATIENT
Start: 2025-04-24

## 2025-04-02 RX ORDER — HYDROCORTISONE SODIUM SUCCINATE 100 MG/2ML
100 INJECTION INTRAMUSCULAR; INTRAVENOUS
Status: CANCELLED | OUTPATIENT
Start: 2025-04-17

## 2025-04-02 RX ORDER — HEPARIN 100 UNIT/ML
500 SYRINGE INTRAVENOUS PRN
Status: CANCELLED | OUTPATIENT
Start: 2025-04-10

## 2025-04-02 RX ORDER — ONDANSETRON 2 MG/ML
8 INJECTION INTRAMUSCULAR; INTRAVENOUS
Status: CANCELLED | OUTPATIENT
Start: 2025-04-17

## 2025-04-02 RX ORDER — DEXTROSE MONOHYDRATE 50 MG/ML
5-250 INJECTION, SOLUTION INTRAVENOUS PRN
Status: CANCELLED | OUTPATIENT
Start: 2025-04-17

## 2025-04-02 RX ORDER — DEXAMETHASONE 4 MG/1
20 TABLET ORAL ONCE
Status: CANCELLED | OUTPATIENT
Start: 2025-04-17 | End: 2025-04-17

## 2025-04-02 RX ORDER — ONDANSETRON 2 MG/ML
8 INJECTION INTRAMUSCULAR; INTRAVENOUS
OUTPATIENT
Start: 2025-04-24

## 2025-04-02 RX ORDER — ACETAMINOPHEN 325 MG/1
650 TABLET ORAL
OUTPATIENT
Start: 2025-04-24

## 2025-04-02 RX ORDER — HYDROCORTISONE SODIUM SUCCINATE 100 MG/2ML
100 INJECTION INTRAMUSCULAR; INTRAVENOUS
Status: CANCELLED | OUTPATIENT
Start: 2025-04-10

## 2025-04-02 RX ORDER — SODIUM CHLORIDE 9 MG/ML
INJECTION, SOLUTION INTRAVENOUS CONTINUOUS
Status: CANCELLED | OUTPATIENT
Start: 2025-04-10

## 2025-04-02 RX ORDER — EPINEPHRINE 1 MG/ML
0.3 INJECTION, SOLUTION INTRAMUSCULAR; SUBCUTANEOUS PRN
OUTPATIENT
Start: 2025-04-24

## 2025-04-02 RX ORDER — EPINEPHRINE 1 MG/ML
0.3 INJECTION, SOLUTION INTRAMUSCULAR; SUBCUTANEOUS PRN
Status: CANCELLED | OUTPATIENT
Start: 2025-04-10

## 2025-04-02 RX ORDER — SODIUM CHLORIDE 0.9 % (FLUSH) 0.9 %
5-40 SYRINGE (ML) INJECTION PRN
Status: CANCELLED | OUTPATIENT
Start: 2025-04-10

## 2025-04-02 RX ORDER — ONDANSETRON 2 MG/ML
8 INJECTION INTRAMUSCULAR; INTRAVENOUS ONCE
Status: CANCELLED | OUTPATIENT
Start: 2025-04-10 | End: 2025-04-10

## 2025-04-02 RX ORDER — DIPHENHYDRAMINE HYDROCHLORIDE 50 MG/ML
50 INJECTION, SOLUTION INTRAMUSCULAR; INTRAVENOUS
Status: CANCELLED | OUTPATIENT
Start: 2025-04-10

## 2025-04-02 RX ORDER — HEPARIN 100 UNIT/ML
500 SYRINGE INTRAVENOUS PRN
Status: CANCELLED | OUTPATIENT
Start: 2025-04-17

## 2025-04-02 RX ORDER — ONDANSETRON 2 MG/ML
8 INJECTION INTRAMUSCULAR; INTRAVENOUS ONCE
Status: CANCELLED | OUTPATIENT
Start: 2025-04-17 | End: 2025-04-17

## 2025-04-02 RX ORDER — EPINEPHRINE 1 MG/ML
0.3 INJECTION, SOLUTION INTRAMUSCULAR; SUBCUTANEOUS PRN
Status: CANCELLED | OUTPATIENT
Start: 2025-04-17

## 2025-04-02 RX ORDER — ONDANSETRON 2 MG/ML
8 INJECTION INTRAMUSCULAR; INTRAVENOUS
Status: CANCELLED | OUTPATIENT
Start: 2025-04-10

## 2025-04-02 RX ORDER — SODIUM CHLORIDE 9 MG/ML
INJECTION, SOLUTION INTRAVENOUS CONTINUOUS
Status: CANCELLED | OUTPATIENT
Start: 2025-04-17

## 2025-04-02 RX ORDER — DIPHENHYDRAMINE HYDROCHLORIDE 50 MG/ML
50 INJECTION, SOLUTION INTRAMUSCULAR; INTRAVENOUS
OUTPATIENT
Start: 2025-04-24

## 2025-04-02 RX ORDER — ALBUTEROL SULFATE 90 UG/1
4 INHALANT RESPIRATORY (INHALATION) PRN
Status: CANCELLED | OUTPATIENT
Start: 2025-04-10

## 2025-04-02 RX ORDER — SODIUM CHLORIDE 9 MG/ML
INJECTION, SOLUTION INTRAVENOUS CONTINUOUS
OUTPATIENT
Start: 2025-04-24

## 2025-04-02 RX ORDER — DIPHENHYDRAMINE HYDROCHLORIDE 50 MG/ML
50 INJECTION, SOLUTION INTRAMUSCULAR; INTRAVENOUS
Status: CANCELLED | OUTPATIENT
Start: 2025-04-17

## 2025-04-02 RX ORDER — ONDANSETRON 2 MG/ML
8 INJECTION INTRAMUSCULAR; INTRAVENOUS ONCE
Start: 2025-04-24 | End: 2025-04-24

## 2025-04-02 RX ORDER — SODIUM CHLORIDE 9 MG/ML
5-250 INJECTION, SOLUTION INTRAVENOUS PRN
OUTPATIENT
Start: 2025-04-24

## 2025-04-03 DIAGNOSIS — E78.2 MIXED HYPERLIPIDEMIA: ICD-10-CM

## 2025-04-04 RX ORDER — ROSUVASTATIN CALCIUM 5 MG/1
5 TABLET, COATED ORAL NIGHTLY
Qty: 30 TABLET | Refills: 0 | Status: SHIPPED | OUTPATIENT
Start: 2025-04-04

## 2025-04-09 ENCOUNTER — HOSPITAL ENCOUNTER (OUTPATIENT)
Facility: HOSPITAL | Age: 70
Setting detail: INFUSION SERIES
Discharge: HOME | End: 2025-04-09
Payer: MEDICARE

## 2025-04-09 DIAGNOSIS — C90.00 MULTIPLE MYELOMA NOT HAVING ACHIEVED REMISSION (HCC): ICD-10-CM

## 2025-04-09 DIAGNOSIS — Z11.59 ENCOUNTER FOR SCREENING FOR OTHER VIRAL DISEASES: ICD-10-CM

## 2025-04-09 LAB
ALBUMIN SERPL-MCNC: 3.6 G/DL (ref 3.5–5)
ALBUMIN/GLOB SERPL: 1.4 (ref 1.1–2.2)
ALP SERPL-CCNC: 69 U/L (ref 45–117)
ALT SERPL-CCNC: 26 U/L (ref 12–78)
ANION GAP SERPL CALC-SCNC: 3 MMOL/L (ref 2–12)
AST SERPL-CCNC: 14 U/L (ref 15–37)
BASOPHILS # BLD: 0.12 K/UL (ref 0–0.1)
BASOPHILS NFR BLD: 3.9 % (ref 0–1)
BILIRUB SERPL-MCNC: 0.6 MG/DL (ref 0.2–1)
BUN SERPL-MCNC: 12 MG/DL (ref 6–20)
BUN/CREAT SERPL: 16 (ref 12–20)
CALCIUM SERPL-MCNC: 9.2 MG/DL (ref 8.5–10.1)
CHLORIDE SERPL-SCNC: 107 MMOL/L (ref 97–108)
CO2 SERPL-SCNC: 27 MMOL/L (ref 21–32)
CREAT SERPL-MCNC: 0.74 MG/DL (ref 0.55–1.02)
DIFFERENTIAL METHOD BLD: ABNORMAL
EOSINOPHIL # BLD: 0.16 K/UL (ref 0–0.4)
EOSINOPHIL NFR BLD: 5.2 % (ref 0–7)
ERYTHROCYTE [DISTWIDTH] IN BLOOD BY AUTOMATED COUNT: 15.7 % (ref 11.5–14.5)
GLOBULIN SER CALC-MCNC: 2.5 G/DL (ref 2–4)
GLUCOSE SERPL-MCNC: 88 MG/DL (ref 65–100)
HCT VFR BLD AUTO: 36.9 % (ref 35–47)
HGB BLD-MCNC: 11.3 G/DL (ref 11.5–16)
IMM GRANULOCYTES # BLD AUTO: 0.01 K/UL (ref 0–0.04)
IMM GRANULOCYTES NFR BLD AUTO: 0.3 % (ref 0–0.5)
LYMPHOCYTES # BLD: 0.83 K/UL (ref 0.8–3.5)
LYMPHOCYTES NFR BLD: 26.8 % (ref 12–49)
MCH RBC QN AUTO: 29 PG (ref 26–34)
MCHC RBC AUTO-ENTMCNC: 30.6 G/DL (ref 30–36.5)
MCV RBC AUTO: 94.9 FL (ref 80–99)
MONOCYTES # BLD: 0.51 K/UL (ref 0–1)
MONOCYTES NFR BLD: 16.5 % (ref 5–13)
NEUTS SEG # BLD: 1.47 K/UL (ref 1.8–8)
NEUTS SEG NFR BLD: 47.3 % (ref 32–75)
NRBC # BLD: 0 K/UL (ref 0–0.01)
NRBC BLD-RTO: 0 PER 100 WBC
PLATELET # BLD AUTO: 355 K/UL (ref 150–400)
PMV BLD AUTO: 11.1 FL (ref 8.9–12.9)
POTASSIUM SERPL-SCNC: 4.4 MMOL/L (ref 3.5–5.1)
PROT SERPL-MCNC: 6.1 G/DL (ref 6.4–8.2)
RBC # BLD AUTO: 3.89 M/UL (ref 3.8–5.2)
RBC MORPH BLD: ABNORMAL
SODIUM SERPL-SCNC: 137 MMOL/L (ref 136–145)
WBC # BLD AUTO: 3.1 K/UL (ref 3.6–11)
WBC MORPH BLD: ABNORMAL

## 2025-04-09 PROCEDURE — 80053 COMPREHEN METABOLIC PANEL: CPT

## 2025-04-09 PROCEDURE — 83521 IG LIGHT CHAINS FREE EACH: CPT

## 2025-04-09 PROCEDURE — 85025 COMPLETE CBC W/AUTO DIFF WBC: CPT

## 2025-04-09 PROCEDURE — 84165 PROTEIN E-PHORESIS SERUM: CPT

## 2025-04-09 PROCEDURE — 82784 ASSAY IGA/IGD/IGG/IGM EACH: CPT

## 2025-04-09 PROCEDURE — 86334 IMMUNOFIX E-PHORESIS SERUM: CPT

## 2025-04-09 NOTE — PROGRESS NOTES
[facility] Lab visit:     0930: Patient arrived ambulatory and in no distress.  Labs drawn per Latonya Law RN. Departed [facility] ambulatory and in no distress.     Labs: See epic for pending results.  No results found for this or any previous visit (from the past 12 hours).

## 2025-04-10 ENCOUNTER — HOSPITAL ENCOUNTER (OUTPATIENT)
Facility: HOSPITAL | Age: 70
Setting detail: INFUSION SERIES
End: 2025-04-10
Payer: MEDICARE

## 2025-04-10 ENCOUNTER — HOSPITAL ENCOUNTER (OUTPATIENT)
Facility: HOSPITAL | Age: 70
Setting detail: INFUSION SERIES
Discharge: HOME | End: 2025-04-10
Payer: MEDICARE

## 2025-04-10 ENCOUNTER — CLINICAL DOCUMENTATION (OUTPATIENT)
Age: 70
End: 2025-04-10

## 2025-04-10 ENCOUNTER — OFFICE VISIT (OUTPATIENT)
Age: 70
End: 2025-04-10
Payer: MEDICARE

## 2025-04-10 VITALS
SYSTOLIC BLOOD PRESSURE: 117 MMHG | DIASTOLIC BLOOD PRESSURE: 64 MMHG | OXYGEN SATURATION: 98 % | HEIGHT: 65 IN | RESPIRATION RATE: 17 BRPM | WEIGHT: 147 LBS | BODY MASS INDEX: 24.49 KG/M2 | HEART RATE: 64 BPM | TEMPERATURE: 98.7 F

## 2025-04-10 VITALS
OXYGEN SATURATION: 98 % | HEART RATE: 64 BPM | SYSTOLIC BLOOD PRESSURE: 117 MMHG | TEMPERATURE: 98.7 F | BODY MASS INDEX: 24.46 KG/M2 | RESPIRATION RATE: 17 BRPM | DIASTOLIC BLOOD PRESSURE: 64 MMHG | WEIGHT: 147 LBS

## 2025-04-10 VITALS — DIASTOLIC BLOOD PRESSURE: 64 MMHG | HEART RATE: 64 BPM | SYSTOLIC BLOOD PRESSURE: 118 MMHG

## 2025-04-10 DIAGNOSIS — C90.00 MULTIPLE MYELOMA NOT HAVING ACHIEVED REMISSION (HCC): ICD-10-CM

## 2025-04-10 DIAGNOSIS — Z11.59 ENCOUNTER FOR SCREENING FOR OTHER VIRAL DISEASES: Primary | ICD-10-CM

## 2025-04-10 DIAGNOSIS — Z11.59 ENCOUNTER FOR SCREENING FOR OTHER VIRAL DISEASES: ICD-10-CM

## 2025-04-10 DIAGNOSIS — C90.00 MULTIPLE MYELOMA NOT HAVING ACHIEVED REMISSION (HCC): Primary | ICD-10-CM

## 2025-04-10 PROCEDURE — 96413 CHEMO IV INFUSION 1 HR: CPT

## 2025-04-10 PROCEDURE — 1090F PRES/ABSN URINE INCON ASSESS: CPT

## 2025-04-10 PROCEDURE — G8427 DOCREV CUR MEDS BY ELIG CLIN: HCPCS

## 2025-04-10 PROCEDURE — 2580000003 HC RX 258: Performed by: INTERNAL MEDICINE

## 2025-04-10 PROCEDURE — 99215 OFFICE O/P EST HI 40 MIN: CPT

## 2025-04-10 PROCEDURE — 96375 TX/PRO/DX INJ NEW DRUG ADDON: CPT

## 2025-04-10 PROCEDURE — 1125F AMNT PAIN NOTED PAIN PRSNT: CPT

## 2025-04-10 PROCEDURE — 3017F COLORECTAL CA SCREEN DOC REV: CPT

## 2025-04-10 PROCEDURE — 6360000002 HC RX W HCPCS

## 2025-04-10 PROCEDURE — 96361 HYDRATE IV INFUSION ADD-ON: CPT

## 2025-04-10 PROCEDURE — 6360000002 HC RX W HCPCS: Performed by: INTERNAL MEDICINE

## 2025-04-10 PROCEDURE — G8399 PT W/DXA RESULTS DOCUMENT: HCPCS

## 2025-04-10 PROCEDURE — G8420 CALC BMI NORM PARAMETERS: HCPCS

## 2025-04-10 PROCEDURE — 2580000003 HC RX 258

## 2025-04-10 PROCEDURE — 1159F MED LIST DOCD IN RCRD: CPT

## 2025-04-10 PROCEDURE — 1160F RVW MEDS BY RX/DR IN RCRD: CPT

## 2025-04-10 PROCEDURE — 1036F TOBACCO NON-USER: CPT

## 2025-04-10 PROCEDURE — 1123F ACP DISCUSS/DSCN MKR DOCD: CPT

## 2025-04-10 RX ORDER — DEXTROSE MONOHYDRATE 50 MG/ML
5-250 INJECTION, SOLUTION INTRAVENOUS PRN
Status: DISCONTINUED | OUTPATIENT
Start: 2025-04-10 | End: 2025-04-11 | Stop reason: HOSPADM

## 2025-04-10 RX ORDER — SODIUM CHLORIDE 9 MG/ML
5-250 INJECTION, SOLUTION INTRAVENOUS PRN
Status: DISCONTINUED | OUTPATIENT
Start: 2025-04-10 | End: 2025-04-11 | Stop reason: HOSPADM

## 2025-04-10 RX ORDER — SODIUM CHLORIDE 0.9 % (FLUSH) 0.9 %
5-40 SYRINGE (ML) INJECTION PRN
Status: DISCONTINUED | OUTPATIENT
Start: 2025-04-10 | End: 2025-04-11 | Stop reason: HOSPADM

## 2025-04-10 RX ORDER — DEXAMETHASONE 4 MG/1
20 TABLET ORAL ONCE
Status: COMPLETED | OUTPATIENT
Start: 2025-04-10 | End: 2025-04-10

## 2025-04-10 RX ORDER — ONDANSETRON 2 MG/ML
8 INJECTION INTRAMUSCULAR; INTRAVENOUS ONCE
Status: COMPLETED | OUTPATIENT
Start: 2025-04-10 | End: 2025-04-10

## 2025-04-10 RX ORDER — HEPARIN 100 UNIT/ML
500 SYRINGE INTRAVENOUS PRN
Status: DISCONTINUED | OUTPATIENT
Start: 2025-04-10 | End: 2025-04-11 | Stop reason: HOSPADM

## 2025-04-10 RX ORDER — 0.9 % SODIUM CHLORIDE 0.9 %
500 INTRAVENOUS SOLUTION INTRAVENOUS ONCE
Status: COMPLETED | OUTPATIENT
Start: 2025-04-10 | End: 2025-04-10

## 2025-04-10 RX ADMIN — DEXAMETHASONE 20 MG: 4 TABLET ORAL at 12:10

## 2025-04-10 RX ADMIN — ONDANSETRON 8 MG: 2 INJECTION, SOLUTION INTRAMUSCULAR; INTRAVENOUS at 12:11

## 2025-04-10 RX ADMIN — DEXTROSE 50 ML/HR: 5 SOLUTION INTRAVENOUS at 13:08

## 2025-04-10 RX ADMIN — SODIUM CHLORIDE 500 ML: 9 INJECTION, SOLUTION INTRAVENOUS at 12:06

## 2025-04-10 RX ADMIN — CARFILZOMIB 127.4 MG: 10 INJECTION, POWDER, LYOPHILIZED, FOR SOLUTION INTRAVENOUS at 13:10

## 2025-04-10 ASSESSMENT — PAIN DESCRIPTION - PAIN TYPE: TYPE: CHRONIC PAIN

## 2025-04-10 ASSESSMENT — PAIN DESCRIPTION - ORIENTATION: ORIENTATION: RIGHT

## 2025-04-10 ASSESSMENT — PAIN DESCRIPTION - LOCATION: LOCATION: HAND;SHOULDER

## 2025-04-10 ASSESSMENT — PAIN SCALES - GENERAL: PAINLEVEL_OUTOF10: 6

## 2025-04-10 NOTE — PROGRESS NOTES
[facility] Progress Note    10:30 Ms. Weaver Arrived ambulatory and in no distress for KYPROLIS C14D1 regimen.  Assessment was completed, no acute issues at this time, no new complaints voiced.  Port accessed without difficulty, labs drawn and processed.    Ms. Weaver's vitals were reviewed.  Patient Vitals for the past 12 hrs:   Pulse BP   04/10/25 1337 64 118/64         Lab results were obtained and reviewed.  Pre-medications  were administered as ordered and chemotherapy was initiated.  Medications Administered         carfilzomib (KYPROLIS) 127.4 mg in dextrose 5 % 100 mL chemo IVPB Admin Date  04/10/2025 Action  New Bag Dose  127.4 mg Rate  347.4 mL/hr Route  IntraVENous Documented By  Ruby Tabor RN        dexAMETHasone (DECADRON) tablet 20 mg Admin Date  04/10/2025 Action  Given Dose  20 mg Rate   Route  Oral Documented By  Ruby Tabor RN        dextrose 5 % solution Admin Date  04/10/2025 Action  New Bag Dose  50 mL/hr Rate  50 mL/hr Route  IntraVENous Documented By  Ruby Tabor RN        ondansetron (ZOFRAN) injection 8 mg Admin Date  04/10/2025 Action  Given Dose  8 mg Rate   Route  IntraVENous Documented By  Ruby Tabor RN        sodium chloride 0.9 % bolus 500 mL Admin Date  04/10/2025 Action  New Bag Dose  500 mL Rate  491.8 mL/hr Route  IntraVENous Documented By  Ruby Tabor RN        sodium chloride 0.9 % bolus 500 mL Admin Date  04/10/2025 Action  Rate/Dose Verify Dose   Rate  500 mL/hr Route  IntraVENous Documented By  Ruby Tabor RN            Two nurses verified prior to administering: Drug name, Drug dose, Infusion volume or drug volume when prepared in a syringe, Rate of administration, Route of administration, Expiration dates and/or times, Appearance and physical integrity of the drugs, Rate set on infusion pump, when used, and Sequencing of drug administration.    Ms. Weaver tolerated treatment well. Port maintained blood return throughout entire treatment. Port  Pt comes in from home after running into chair last noc hitting knee. Pt states 10/10 right knee pain and states she in unable to bear weight.    flushed and de accessed, [facility] in [facility] condition at 1400.     Future Appointments   Date Time Provider Department Center   4/16/2025  9:30 AM LAB CHAIR [facility]   4/17/2025 10:30 AM RUTH CHEMO CHAIR 5 [facility]   4/23/2025  9:30 AM LAB CHAIR [facility]   4/23/2025 10:15 AM SM KAREN 1 [facility]   4/24/2025  9:00 AM RUTH CHEMO CHAIR 5 [facility]   5/7/2025  9:30 AM LAB CHAIR [facility]   5/8/2025 11:00 AM RUTH CHEMO CHAIR 3 [facility]   5/8/2025 11:15 AM Nany Arboleda MD [facility] BS AMB   5/14/2025  9:30 AM LAB CHAIR [facility]   5/14/2025 10:30 AM SM ECHO LAB 1 [facility]IC [facility]   5/15/2025 10:00 AM RUTH CHEMO CHAIR 1 [facility]   5/21/2025  9:30 AM LAB CHAIR [facility]   5/22/2025 11:30 AM RUTH CHEMO CHAIR 4 [facility]   6/4/2025  9:30 AM LAB CHAIR [facility]   6/5/2025 10:00 AM RUTH CHEMO CHAIR 1 [facility]   6/11/2025  9:30 AM LAB CHAIR [facility]   6/12/2025 11:00 AM RUTH CHEMO CHAIR 3 [facility]   6/18/2025  9:30 AM LAB CHAIR [facility]   6/19/2025 10:00 AM RUTH CHEMO CHAIR 4 [facility]         Ruby Tabor RN  April 10, 2025

## 2025-04-10 NOTE — PROGRESS NOTES
Candida Weaver is a 70 y.o. female    Chief Complaint   Patient presents with    Follow-up     Multiple Myeloma        1. Have you been to the ER, urgent care clinic since your last visit?  Hospitalized since your last visit?No    2. Have you seen or consulted any other health care providers outside of the [facility] since your last visit?  Include any pap smears or colon screening. No      
focal associated   tumor necrosis. Immunohistochemical stain  highlights plasma cells   that comprise 70% of the total nucleated marrow elements. Megakaryocytes   are adequate in number, focally clustered, and have minimal atypia. The   myeloid and erythroid precursors are relatively few and mature without   overt dyspoiesis.     CT 11/2021  Radiology report(s) reviewed above.     1. 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  2. Incidentals as full reading    PET CT 9/2023   IMPRESSION:  Foci of increased tracer activity left lateral eighth rib and T3 spinous process. Inflammatory appearing changes in the right shoulder.    External records reviewed.   Records reviewed and summarized above.  Pathology report(s) reviewed above.    MRI Lumbar spine 1/2024  IMPRESSION:  1. New evidence of diffuse myeloma in the bone marrow since 2018. No pathologic  fracture.  2. L3-L4 increased moderate central spinal canal and left femoral stenosis.  Other levels are described above.    Brain MRI 5/2024  IMPRESSION:  1. Extensive multiple myeloma diffusely involving the visualized osseous  structures.  2. Otherwise unremarkable brain MRI.     PET CT (@[facility]) 1/2025  1.  Focal  increased tracer activity noted at the medial aspect of the sternum.   2.  Extensive lytic osseous lesions as described above, compatible with provided history of myeloma.     CTA Chest 3/2025  IMPRESSION:  There is no pulmonary embolism.  There is no aortic aneurysm.  Cardiomegaly with enlarged main pulmonary artery suggesting pulmonary  hypertension.        Assessment:   1) Multiple Myeloma  Dx with SMM 2017  Now with a K: L ratio of > 100 and new anemia which is consistent with a definition of active myeloma   PET with 2 suspicious lesions  BM 95% plasma cells  FISH  del(13q) and t(11;14)  Standard Risk  Intent of treatment: palliative   Started palliative

## 2025-04-10 NOTE — PROGRESS NOTES
Oral Chemotherapy      Candida Weaver is a  69 y.o.female  diagnosed with multiple myeloma . Ms. Weaver is being treated with KPd.      Medication name: pomalidomide  Regimen: KPd  Dose:   3 mg  Frequency: daily  Administration schedule: for 21 days followed by 7 days off every 28 days  Ordering provider: Nany Arboleda MD  Start date: 4/10/25 (Cycle 14)      Lab Results   Component Value Date    WBC 3.1 (L) 04/09/2025    HGB 11.3 (L) 04/09/2025    HCT 36.9 04/09/2025    MCV 94.9 04/09/2025     04/09/2025    LYMPHOPCT 26.8 04/09/2025    RBC 3.89 04/09/2025    MCH 29.0 04/09/2025    MCHC 30.6 04/09/2025    RDW 15.7 (H) 04/09/2025       Lab Results   Component Value Date    NEUTROABS 1.47 (L) 04/09/2025             Expected follow up date: Labs/office visit each treatment. Next cycle start on 5/8/25     Patient provided with an Oral Chemotherapy Journal.              Rose aP, PharmD, BCPS, BCOP      For Pharmacy Admin Tracking Only    Program: Medical Group  CPA in place:  Yes  Recommendation Provided To: Patient/Caregiver: 1 via In person    Intervention Accepted By: Patient/Caregiver: 1    Time Spent (min): 15

## 2025-04-13 LAB
ALBUMIN SERPL ELPH-MCNC: 3.7 G/DL (ref 2.9–4.4)
ALBUMIN/GLOB SERPL: 2.2 (ref 0.7–1.7)
ALPHA1 GLOB SERPL ELPH-MCNC: 0.2 G/DL (ref 0–0.4)
ALPHA2 GLOB SERPL ELPH-MCNC: 0.5 G/DL (ref 0.4–1)
B-GLOBULIN SERPL ELPH-MCNC: 0.8 G/DL (ref 0.7–1.3)
GAMMA GLOB SERPL ELPH-MCNC: 0.2 G/DL (ref 0.4–1.8)
GLOBULIN SER-MCNC: 1.7 G/DL (ref 2.2–3.9)
IGA SERPL-MCNC: 10 MG/DL (ref 64–422)
IGG SERPL-MCNC: 216 MG/DL (ref 586–1602)
IGM SERPL-MCNC: 13 MG/DL (ref 26–217)
INTERPRETATION SERPL IEP-IMP: ABNORMAL
KAPPA LC FREE SER-MCNC: 51.3 MG/L (ref 3.3–19.4)
KAPPA LC FREE/LAMBDA FREE SER: 32.06 (ref 0.26–1.65)
LAMBDA LC FREE SERPL-MCNC: 1.6 MG/L (ref 5.7–26.3)
M PROTEIN SERPL ELPH-MCNC: ABNORMAL G/DL
PROT SERPL-MCNC: 5.4 G/DL (ref 6–8.5)

## 2025-04-16 ENCOUNTER — HOSPITAL ENCOUNTER (OUTPATIENT)
Facility: HOSPITAL | Age: 70
Setting detail: INFUSION SERIES
Discharge: HOME | End: 2025-04-16
Payer: MEDICARE

## 2025-04-16 VITALS
TEMPERATURE: 98.7 F | RESPIRATION RATE: 18 BRPM | SYSTOLIC BLOOD PRESSURE: 111 MMHG | HEART RATE: 73 BPM | DIASTOLIC BLOOD PRESSURE: 68 MMHG | OXYGEN SATURATION: 98 %

## 2025-04-16 DIAGNOSIS — Z11.59 ENCOUNTER FOR SCREENING FOR OTHER VIRAL DISEASES: ICD-10-CM

## 2025-04-16 DIAGNOSIS — C90.00 MULTIPLE MYELOMA NOT HAVING ACHIEVED REMISSION (HCC): ICD-10-CM

## 2025-04-16 LAB
ALBUMIN SERPL-MCNC: 3.4 G/DL (ref 3.5–5)
ALBUMIN/GLOB SERPL: 1.4 (ref 1.1–2.2)
ALP SERPL-CCNC: 72 U/L (ref 45–117)
ALT SERPL-CCNC: 50 U/L (ref 12–78)
ANION GAP SERPL CALC-SCNC: 4 MMOL/L (ref 2–12)
AST SERPL-CCNC: 23 U/L (ref 15–37)
BASOPHILS # BLD: 0.03 K/UL (ref 0–0.1)
BASOPHILS NFR BLD: 1 % (ref 0–1)
BILIRUB SERPL-MCNC: 0.5 MG/DL (ref 0.2–1)
BUN SERPL-MCNC: 12 MG/DL (ref 6–20)
BUN/CREAT SERPL: 16 (ref 12–20)
CALCIUM SERPL-MCNC: 8.8 MG/DL (ref 8.5–10.1)
CHLORIDE SERPL-SCNC: 115 MMOL/L (ref 97–108)
CO2 SERPL-SCNC: 22 MMOL/L (ref 21–32)
CREAT SERPL-MCNC: 0.75 MG/DL (ref 0.55–1.02)
DIFFERENTIAL METHOD BLD: ABNORMAL
EOSINOPHIL # BLD: 0.1 K/UL (ref 0–0.4)
EOSINOPHIL NFR BLD: 4 % (ref 0–7)
ERYTHROCYTE [DISTWIDTH] IN BLOOD BY AUTOMATED COUNT: 14.9 % (ref 11.5–14.5)
GLOBULIN SER CALC-MCNC: 2.5 G/DL (ref 2–4)
GLUCOSE SERPL-MCNC: 119 MG/DL (ref 65–100)
HCT VFR BLD AUTO: 35.1 % (ref 35–47)
HGB BLD-MCNC: 11.1 G/DL (ref 11.5–16)
IMM GRANULOCYTES # BLD AUTO: 0.03 K/UL (ref 0–0.04)
IMM GRANULOCYTES NFR BLD AUTO: 1 % (ref 0–0.5)
LYMPHOCYTES # BLD: 0.65 K/UL (ref 0.8–3.5)
LYMPHOCYTES NFR BLD: 25 % (ref 12–49)
MCH RBC QN AUTO: 29.1 PG (ref 26–34)
MCHC RBC AUTO-ENTMCNC: 31.6 G/DL (ref 30–36.5)
MCV RBC AUTO: 92.1 FL (ref 80–99)
MONOCYTES # BLD: 0.93 K/UL (ref 0–1)
MONOCYTES NFR BLD: 36 % (ref 5–13)
NEUTS SEG # BLD: 0.86 K/UL (ref 1.8–8)
NEUTS SEG NFR BLD: 33 % (ref 32–75)
NRBC # BLD: 0.04 K/UL (ref 0–0.01)
NRBC BLD-RTO: 1.6 PER 100 WBC
PATH REV BLD -IMP: ABNORMAL
PLATELET # BLD AUTO: 139 K/UL (ref 150–400)
PMV BLD AUTO: 12.5 FL (ref 8.9–12.9)
POTASSIUM SERPL-SCNC: 3.9 MMOL/L (ref 3.5–5.1)
PROT SERPL-MCNC: 5.9 G/DL (ref 6.4–8.2)
RBC # BLD AUTO: 3.81 M/UL (ref 3.8–5.2)
RBC MORPH BLD: ABNORMAL
SODIUM SERPL-SCNC: 141 MMOL/L (ref 136–145)
WBC # BLD AUTO: 2.6 K/UL (ref 3.6–11)

## 2025-04-16 PROCEDURE — 80053 COMPREHEN METABOLIC PANEL: CPT

## 2025-04-16 PROCEDURE — 85025 COMPLETE CBC W/AUTO DIFF WBC: CPT

## 2025-04-16 ASSESSMENT — PAIN DESCRIPTION - ORIENTATION: ORIENTATION: RIGHT

## 2025-04-16 ASSESSMENT — PAIN SCALES - GENERAL: PAINLEVEL_OUTOF10: 7

## 2025-04-16 ASSESSMENT — PAIN DESCRIPTION - PAIN TYPE: TYPE: CHRONIC PAIN

## 2025-04-16 ASSESSMENT — PAIN DESCRIPTION - LOCATION: LOCATION: HAND;SHOULDER

## 2025-04-16 NOTE — PROGRESS NOTES
[facility] Peds/Adult Note                       Date: 2025    Name: Candida Weaver    MRN: [id number]         : 1955    0940 Patient arrives for Pre-Treatment Labs without acute problems. Please see Epic for complete assessment and education provided.    Vital signs stable throughout and prior to discharge. Patient tolerated procedure well and was discharged without incident.  Patient is aware of next [facility] appointment on 2025.  Appointment card give to the Patient.       Ms. Weaver's vitals were reviewed prior to and after treatment.   Patient Vitals for the past 12 hrs:   Temp Pulse Resp BP SpO2   25 0940 98.7 °F (37.1 °C) 73 18 111/68 98 %         Lab results were obtained and reviewed.  Labs Pending, please see [facility] for results.        Ms. Weaver tolerated the lab draw, and had no complaints.    Ms. Weaver [facility] in [facility] condition.     Future Appointments   Date Time Provider Department Center   2025 10:30 AM RUTH SO CHAIR 5 [facility]   2025  9:30 AM LAB CHAIR [facility]   2025 10:15 AM SM KAREN 1 [facility]   2025  9:00 AM RUTH SO CHAIR 5 [facility]   2025  9:30 AM LAB CHAIR [facility]   2025 11:00 AM RUTH SO CHAIR 3 [facility]   2025 11:15 AM Nany Arboleda MD [facility] BS AMB   2025  9:30 AM LAB CHAIR [facility]   2025 10:30 AM [facility] ECHO LAB 1 [facility]   5/15/2025 10:00 AM RUTH SO CHAIR 1 [facility]   2025  9:30 AM LAB CHAIR [facility]   2025 11:30 AM RUTH SO CHAIR 4 [facility]   2025  9:30 AM LAB CHAIR [facility]   2025 10:00 AM RUTH SO CHAIR 1 [facility]   2025  9:30 AM LAB CHAIR [facility]   2025 11:00 AM RUTH WISE CHAIR 3 [facility]   2025  9:30 AM LAB CHAIR OBEY [facility]   2025 10:00 AM RUTH WISE CHAIR 4 [facility]       ANDRIA KILLIAN RN  2025  10:28 AM

## 2025-04-17 ENCOUNTER — OFFICE VISIT (OUTPATIENT)
Age: 70
End: 2025-04-17
Payer: MEDICARE

## 2025-04-17 ENCOUNTER — APPOINTMENT (OUTPATIENT)
Facility: HOSPITAL | Age: 70
End: 2025-04-17
Payer: MEDICARE

## 2025-04-17 ENCOUNTER — HOSPITAL ENCOUNTER (OUTPATIENT)
Facility: HOSPITAL | Age: 70
Setting detail: INFUSION SERIES
Discharge: HOME | End: 2025-04-17
Payer: MEDICARE

## 2025-04-17 VITALS
WEIGHT: 143.8 LBS | DIASTOLIC BLOOD PRESSURE: 61 MMHG | HEART RATE: 65 BPM | TEMPERATURE: 97.3 F | BODY MASS INDEX: 23.96 KG/M2 | SYSTOLIC BLOOD PRESSURE: 128 MMHG | HEIGHT: 65 IN | RESPIRATION RATE: 16 BRPM

## 2025-04-17 DIAGNOSIS — C90.00 MULTIPLE MYELOMA NOT HAVING ACHIEVED REMISSION (HCC): Primary | ICD-10-CM

## 2025-04-17 DIAGNOSIS — Z11.59 ENCOUNTER FOR SCREENING FOR OTHER VIRAL DISEASES: Primary | ICD-10-CM

## 2025-04-17 DIAGNOSIS — C90.00 MULTIPLE MYELOMA NOT HAVING ACHIEVED REMISSION (HCC): ICD-10-CM

## 2025-04-17 LAB
BASOPHILS # BLD: 0.03 K/UL (ref 0–0.1)
BASOPHILS NFR BLD: 1.5 % (ref 0–1)
DIFFERENTIAL METHOD BLD: ABNORMAL
EOSINOPHIL # BLD: 0.07 K/UL (ref 0–0.4)
EOSINOPHIL NFR BLD: 3.4 % (ref 0–7)
ERYTHROCYTE [DISTWIDTH] IN BLOOD BY AUTOMATED COUNT: 15.2 % (ref 11.5–14.5)
HCT VFR BLD AUTO: 32.8 % (ref 35–47)
HGB BLD-MCNC: 10.5 G/DL (ref 11.5–16)
IMM GRANULOCYTES # BLD AUTO: 0.01 K/UL (ref 0–0.04)
IMM GRANULOCYTES NFR BLD AUTO: 0.5 % (ref 0–0.5)
LYMPHOCYTES # BLD: 0.61 K/UL (ref 0.8–3.5)
LYMPHOCYTES NFR BLD: 29.1 % (ref 12–49)
MCH RBC QN AUTO: 29.7 PG (ref 26–34)
MCHC RBC AUTO-ENTMCNC: 32 G/DL (ref 30–36.5)
MCV RBC AUTO: 92.7 FL (ref 80–99)
MONOCYTES # BLD: 0.7 K/UL (ref 0–1)
MONOCYTES NFR BLD: 33 % (ref 5–13)
NEUTS SEG # BLD: 0.68 K/UL (ref 1.8–8)
NEUTS SEG NFR BLD: 32.5 % (ref 32–75)
NRBC # BLD: 0.03 K/UL (ref 0–0.01)
NRBC BLD-RTO: 1.5 PER 100 WBC
PLATELET # BLD AUTO: 127 K/UL (ref 150–400)
PMV BLD AUTO: 12.7 FL (ref 8.9–12.9)
RBC # BLD AUTO: 3.54 M/UL (ref 3.8–5.2)
RBC MORPH BLD: ABNORMAL
WBC # BLD AUTO: 2.1 K/UL (ref 3.6–11)

## 2025-04-17 PROCEDURE — G8427 DOCREV CUR MEDS BY ELIG CLIN: HCPCS

## 2025-04-17 PROCEDURE — 99215 OFFICE O/P EST HI 40 MIN: CPT

## 2025-04-17 PROCEDURE — 1159F MED LIST DOCD IN RCRD: CPT

## 2025-04-17 PROCEDURE — G8399 PT W/DXA RESULTS DOCUMENT: HCPCS

## 2025-04-17 PROCEDURE — 2500000003 HC RX 250 WO HCPCS: Performed by: INTERNAL MEDICINE

## 2025-04-17 PROCEDURE — 85025 COMPLETE CBC W/AUTO DIFF WBC: CPT

## 2025-04-17 PROCEDURE — 1090F PRES/ABSN URINE INCON ASSESS: CPT

## 2025-04-17 PROCEDURE — 1123F ACP DISCUSS/DSCN MKR DOCD: CPT

## 2025-04-17 PROCEDURE — 36591 DRAW BLOOD OFF VENOUS DEVICE: CPT

## 2025-04-17 PROCEDURE — 3017F COLORECTAL CA SCREEN DOC REV: CPT

## 2025-04-17 PROCEDURE — 1160F RVW MEDS BY RX/DR IN RCRD: CPT

## 2025-04-17 PROCEDURE — 1036F TOBACCO NON-USER: CPT

## 2025-04-17 PROCEDURE — G8420 CALC BMI NORM PARAMETERS: HCPCS

## 2025-04-17 RX ORDER — DEXAMETHASONE 4 MG/1
20 TABLET ORAL ONCE
Status: DISCONTINUED | OUTPATIENT
Start: 2025-04-17 | End: 2025-04-17

## 2025-04-17 RX ORDER — ONDANSETRON 2 MG/ML
8 INJECTION INTRAMUSCULAR; INTRAVENOUS ONCE
Status: DISCONTINUED | OUTPATIENT
Start: 2025-04-17 | End: 2025-04-17

## 2025-04-17 RX ORDER — SODIUM CHLORIDE 9 MG/ML
5-250 INJECTION, SOLUTION INTRAVENOUS PRN
Status: DISCONTINUED | OUTPATIENT
Start: 2025-04-17 | End: 2025-04-18 | Stop reason: HOSPADM

## 2025-04-17 RX ORDER — SODIUM CHLORIDE 0.9 % (FLUSH) 0.9 %
5-40 SYRINGE (ML) INJECTION PRN
Status: DISCONTINUED | OUTPATIENT
Start: 2025-04-17 | End: 2025-04-18 | Stop reason: HOSPADM

## 2025-04-17 RX ORDER — DEXTROSE MONOHYDRATE 50 MG/ML
5-250 INJECTION, SOLUTION INTRAVENOUS PRN
Status: DISCONTINUED | OUTPATIENT
Start: 2025-04-17 | End: 2025-04-18 | Stop reason: HOSPADM

## 2025-04-17 RX ORDER — HEPARIN 100 UNIT/ML
500 SYRINGE INTRAVENOUS PRN
Status: DISCONTINUED | OUTPATIENT
Start: 2025-04-17 | End: 2025-04-18 | Stop reason: HOSPADM

## 2025-04-17 RX ADMIN — SODIUM CHLORIDE, PRESERVATIVE FREE 20 ML: 5 INJECTION INTRAVENOUS at 12:45

## 2025-04-17 ASSESSMENT — PAIN SCALES - GENERAL: PAINLEVEL_OUTOF10: 7

## 2025-04-17 ASSESSMENT — PAIN DESCRIPTION - PAIN TYPE: TYPE: CHRONIC PAIN

## 2025-04-17 ASSESSMENT — PAIN DESCRIPTION - LOCATION: LOCATION: HAND;SHOULDER

## 2025-04-17 ASSESSMENT — PAIN DESCRIPTION - ORIENTATION: ORIENTATION: RIGHT

## 2025-04-17 NOTE — PROGRESS NOTES
09:40 AM Not Observed Not Observed g/dL Final   05/01/2024 09:49 AM Not Observed Not Observed g/dL Final   04/03/2024 09:49 AM 0.1 (H) Not Observed g/dL Final   03/14/2024 09:39 AM 0.1 (H) Not Observed g/dL Final   03/01/2024 10:48 AM 0.1 (H) Not Observed g/dL Final   02/14/2024 09:38 AM 0.1 (H) Not Observed g/dL Final   02/09/2024 10:26 AM 0.1 (H) Not Observed g/dL Final   01/17/2024 01:23 PM 0.1 (H) Not Observed g/dL Final   01/05/2024 11:26 AM 0.1 (H) Not Observed g/dL Final   12/29/2023 10:33 AM 0.1 (H) Not Observed g/dL Final   12/06/2023 09:56 AM Not Observed Not Observed g/dL Final   09/06/2023 10:30 AM 78.3 (H) Not Observed % Final   09/06/2023 09:46 AM Not Observed Not Observed g/dL Final   07/05/2023 04:30 PM Not Observed Not Observed g/dL Final       Serum Free Light Chains  Free Kappa Light Chains   Date/Time Value Ref Range Status   04/09/2025 09:45 AM 51.3 (H) 3.3 - 19.4 mg/L Final   03/12/2025 09:35 AM 59.6 (H) 3.3 - 19.4 mg/L Final   02/12/2025 11:16 AM 66.0 (H) 3.3 - 19.4 mg/L Final   01/15/2025 09:38 AM 58.9 (H) 3.3 - 19.4 mg/L Final   12/24/2024 10:40 AM 70.6 (H) 3.3 - 19.4 mg/L Final   12/18/2024 09:41 AM 54.9 (H) 3.3 - 19.4 mg/L Final   11/20/2024 09:48 AM 76.5 (H) 3.3 - 19.4 mg/L Final   10/23/2024 09:54 AM 86.5 (H) 3.3 - 19.4 mg/L Final   09/18/2024 09:48 AM 97.4 (H) 3.3 - 19.4 mg/L Final   08/21/2024 09:55 .8 (H) 3.3 - 19.4 mg/L Final   07/31/2024 09:49 .5 (H) 3.3 - 19.4 mg/L Final   07/02/2024 09:42 .2 (H) 3.3 - 19.4 mg/L Final   06/05/2024 09:58 .7 (H) 3.3 - 19.4 mg/L Final   05/29/2024 09:40 .6 (H) 3.3 - 19.4 mg/L Final   05/01/2024 09:49 .1 (H) 3.3 - 19.4 mg/L Final   04/03/2024 09:49 AM 1,213.4 (H) 3.3 - 19.4 mg/L Final   03/14/2024 09:39 AM 1,159.9 (H) 3.3 - 19.4 mg/L Final   03/01/2024 10:48 AM 1,141.4 (H) 3.3 - 19.4 mg/L Final   02/14/2024 09:38 AM 1,176.9 (H) 3.3 - 19.4 mg/L Final   02/09/2024 10:26 AM 1,198.4 (H) 3.3 - 19.4 mg/L Final   01/17/2024

## 2025-04-17 NOTE — PLAN OF CARE
[facility] Visit Notes                 Date: 2025  Name: Candida Weaver  MRN: [id number]       : 1955    Pt admit to [facility] for Kyprolis   Patient is ambulatory and in stable condition.  Upon assessment, Ms. Weaver is tearful and reports she hasn't been sleeping well for a few weeks and is overwhelmed/stressed. Denies depression or suicidal ideation. Provider notified and patient went up to see provider prior to treatment.     Chest port accessed with positive blood return.   Labs drawn 25 and not within treatment parameters. CBC redrawn per provider request and does not meet treatment parameters as well.   Provider notified. Treatment held. Port flushed with NS prior to being de-accessed. Bandaid and Gauze Applied to site.     Patient aware of upcoming appointment. Patient declined post- monitoring time. Education provided.     Ms. Weaver's vitals were reviewed prior to treatment.   Patient Vitals for the past 12 hrs:   Temp Pulse Resp BP   25 1030 97.3 °F (36.3 °C) 65 16 128/61     Medications Administered         sodium chloride flush 0.9 % injection 5-40 mL Admin Date  2025 Action  Given Dose  20 mL Route  IntraVENous Documented By  Dacia Mccollum, RN          Future Appointments   Date Time Provider Department Center   2025  9:30 AM LAB CHAIR BREMONINF [facility]   2025 10:15 AM [facility] KAREN 1 SMHRMAM [facility]   2025  9:00 AM RUTH SO CHAIR 5 BREMOSINF [facility]   2025  9:30 AM LAB CHAIR BREMONINF [facility]   2025 11:00 AM RUTH LAB CHAIR 1 BREMOSINF [facility]   2025 11:15 AM Nany Arboleda MD [facility] BS AMB   2025 11:45 AM RUTH SO CHAIR 2 BREMOSINF [facility]   2025  9:30 AM LAB CHAIR BREMONINF [facility]   2025 10:30 AM [facility] ECHO LAB 1 SMHNIC [facility]   5/15/2025 10:00 AM RUTH SO CHAIR 1 BREMOSINF [facility]   2025  9:30 AM LAB CHAIR BREMONINF [facility]   2025 11:30 AM RUTH WISE CHAIR 4 RAJNI [facility]   2025  9:30 AM LAB CHAIR OBEY [facility]   2025 10:00 AM RUTH WISE CHAIR 1 RAJNI [facility]   2025  9:30  AM LAB CHAIR OBEY [facility]   6/12/2025 11:00 AM RUTH SO CHAIR 3 BREMOSINF [facility]   6[facility]18[facility]20[facility]   6/19/2025 10:00 AM [facility] SO CHAIR 4 [facility]     Dacia Mccollum RN  April 17, 2025    Problem: Pain  Goal: Verbalizes/displays adequate comfort level or baseline comfort level  Outcome: Progressing     Problem: Safety - Adult  Goal: Free from fall injury  Outcome: Progressing

## 2025-04-23 ENCOUNTER — HOSPITAL ENCOUNTER (OUTPATIENT)
Facility: HOSPITAL | Age: 70
Discharge: HOME | End: 2025-04-26
Attending: INTERNAL MEDICINE
Payer: MEDICARE

## 2025-04-23 ENCOUNTER — HOSPITAL ENCOUNTER (OUTPATIENT)
Facility: HOSPITAL | Age: 70
Setting detail: INFUSION SERIES
Discharge: HOME | End: 2025-04-23
Payer: MEDICARE

## 2025-04-23 ENCOUNTER — TELEPHONE (OUTPATIENT)
Age: 70
End: 2025-04-23

## 2025-04-23 VITALS
HEART RATE: 65 BPM | OXYGEN SATURATION: 97 % | DIASTOLIC BLOOD PRESSURE: 57 MMHG | SYSTOLIC BLOOD PRESSURE: 101 MMHG | TEMPERATURE: 98 F | RESPIRATION RATE: 18 BRPM

## 2025-04-23 VITALS — HEIGHT: 65 IN | WEIGHT: 147 LBS | BODY MASS INDEX: 24.49 KG/M2

## 2025-04-23 DIAGNOSIS — C90.00 MULTIPLE MYELOMA NOT HAVING ACHIEVED REMISSION (HCC): ICD-10-CM

## 2025-04-23 DIAGNOSIS — Z03.89 ENCOUNTER FOR OBSERVATION FOR OTHER SUSPECTED DISEASES AND CONDITIONS RULED OUT: ICD-10-CM

## 2025-04-23 DIAGNOSIS — N63.23 LUMP IN LOWER OUTER QUADRANT OF LEFT BREAST: ICD-10-CM

## 2025-04-23 DIAGNOSIS — Z11.59 ENCOUNTER FOR SCREENING FOR OTHER VIRAL DISEASES: ICD-10-CM

## 2025-04-23 DIAGNOSIS — Z00.00 ROUTINE HEALTH MAINTENANCE: Primary | ICD-10-CM

## 2025-04-23 DIAGNOSIS — Z12.31 OTHER SCREENING MAMMOGRAM: ICD-10-CM

## 2025-04-23 LAB
ALBUMIN SERPL-MCNC: 3.2 G/DL (ref 3.5–5)
ALBUMIN/GLOB SERPL: 1.3 (ref 1.1–2.2)
ALP SERPL-CCNC: 66 U/L (ref 45–117)
ALT SERPL-CCNC: 27 U/L (ref 12–78)
ANION GAP SERPL CALC-SCNC: 5 MMOL/L (ref 2–12)
AST SERPL-CCNC: 11 U/L (ref 15–37)
BASOPHILS # BLD: 0.21 K/UL (ref 0–0.1)
BASOPHILS NFR BLD: 8 % (ref 0–1)
BILIRUB SERPL-MCNC: 0.4 MG/DL (ref 0.2–1)
BUN SERPL-MCNC: 18 MG/DL (ref 6–20)
BUN/CREAT SERPL: 26 (ref 12–20)
CALCIUM SERPL-MCNC: 8.9 MG/DL (ref 8.5–10.1)
CHLORIDE SERPL-SCNC: 112 MMOL/L (ref 97–108)
CO2 SERPL-SCNC: 22 MMOL/L (ref 21–32)
CREAT SERPL-MCNC: 0.68 MG/DL (ref 0.55–1.02)
DIFFERENTIAL METHOD BLD: ABNORMAL
EOSINOPHIL # BLD: 0.1 K/UL (ref 0–0.4)
EOSINOPHIL NFR BLD: 4 % (ref 0–7)
ERYTHROCYTE [DISTWIDTH] IN BLOOD BY AUTOMATED COUNT: 14.7 % (ref 11.5–14.5)
GLOBULIN SER CALC-MCNC: 2.5 G/DL (ref 2–4)
GLUCOSE SERPL-MCNC: 130 MG/DL (ref 65–100)
HCT VFR BLD AUTO: 32.8 % (ref 35–47)
HGB BLD-MCNC: 10.3 G/DL (ref 11.5–16)
IMM GRANULOCYTES # BLD AUTO: 0 K/UL
IMM GRANULOCYTES NFR BLD AUTO: 0 %
LYMPHOCYTES # BLD: 0.75 K/UL (ref 0.8–3.5)
LYMPHOCYTES NFR BLD: 29 % (ref 12–49)
MCH RBC QN AUTO: 29.5 PG (ref 26–34)
MCHC RBC AUTO-ENTMCNC: 31.4 G/DL (ref 30–36.5)
MCV RBC AUTO: 94 FL (ref 80–99)
MONOCYTES # BLD: 0.26 K/UL (ref 0–1)
MONOCYTES NFR BLD: 10 % (ref 5–13)
NEUTS SEG # BLD: 1.28 K/UL (ref 1.8–8)
NEUTS SEG NFR BLD: 49 % (ref 32–75)
NRBC # BLD: 0 K/UL (ref 0–0.01)
NRBC BLD-RTO: 0 PER 100 WBC
PLATELET # BLD AUTO: 273 K/UL (ref 150–400)
PMV BLD AUTO: 11.3 FL (ref 8.9–12.9)
POTASSIUM SERPL-SCNC: 3.8 MMOL/L (ref 3.5–5.1)
PROT SERPL-MCNC: 5.7 G/DL (ref 6.4–8.2)
RBC # BLD AUTO: 3.49 M/UL (ref 3.8–5.2)
RBC MORPH BLD: ABNORMAL
SODIUM SERPL-SCNC: 139 MMOL/L (ref 136–145)
WBC # BLD AUTO: 2.6 K/UL (ref 3.6–11)
WBC MORPH BLD: ABNORMAL

## 2025-04-23 PROCEDURE — G0279 TOMOSYNTHESIS, MAMMO: HCPCS

## 2025-04-23 PROCEDURE — 80053 COMPREHEN METABOLIC PANEL: CPT

## 2025-04-23 PROCEDURE — 85025 COMPLETE CBC W/AUTO DIFF WBC: CPT

## 2025-04-23 PROCEDURE — 76642 ULTRASOUND BREAST LIMITED: CPT

## 2025-04-23 ASSESSMENT — PAIN DESCRIPTION - ORIENTATION: ORIENTATION: RIGHT

## 2025-04-23 ASSESSMENT — PAIN DESCRIPTION - PAIN TYPE: TYPE: CHRONIC PAIN

## 2025-04-23 ASSESSMENT — PAIN DESCRIPTION - LOCATION: LOCATION: HAND;SHOULDER

## 2025-04-23 ASSESSMENT — PAIN SCALES - GENERAL: PAINLEVEL_OUTOF10: 6

## 2025-04-23 NOTE — PROGRESS NOTES
\[facility]\"" Lab visit:     0935: Patient arrived ambulatory and in no distress.  Labs drawn from R AC. Departed \[facility]\"" ambulatory and in no distress.     Visit Vitals:  Patient Vitals for the past 12 hrs:   Temp Pulse Resp BP SpO2   04/23/25 0944 98 °F (36.7 °C) 65 18 (!) 101/57 97 %       Labs: See CC for pending results.  Recent Results (from the past 12 hours)   Comprehensive metabolic panel    Collection Time: 04/23/25  9:47 AM   Result Value Ref Range    Sodium 139 136 - 145 mmol/L    Potassium 3.8 3.5 - 5.1 mmol/L    Chloride 112 (H) 97 - 108 mmol/L    CO2 22 21 - 32 mmol/L    Anion Gap 5 2 - 12 mmol/L    Glucose 130 (H) 65 - 100 mg/dL    BUN 18 6 - 20 MG/DL    Creatinine 0.68 0.55 - 1.02 MG/DL    BUN/Creatinine Ratio 26 (H) 12 - 20      Est, Glom Filt Rate >90 >60 ml/min/1.73m2    Calcium 8.9 8.5 - 10.1 MG/DL    Total Bilirubin 0.4 0.2 - 1.0 MG/DL    ALT 27 12 - 78 U/L    AST 11 (L) 15 - 37 U/L    Alk Phosphatase 66 45 - 117 U/L    Total Protein 5.7 (L) 6.4 - 8.2 g/dL    Albumin 3.2 (L) 3.5 - 5.0 g/dL    Globulin 2.5 2.0 - 4.0 g/dL    Albumin/Globulin Ratio 1.3 1.1 - 2.2     CBC With Auto Differential    Collection Time: 04/23/25  9:47 AM   Result Value Ref Range    WBC 2.6 (L) 3.6 - 11.0 K/uL    RBC 3.49 (L) 3.80 - 5.20 M/uL    Hemoglobin 10.3 (L) 11.5 - 16.0 g/dL    Hematocrit 32.8 (L) 35.0 - 47.0 %    MCV 94.0 80.0 - 99.0 FL    MCH 29.5 26.0 - 34.0 PG    MCHC 31.4 30.0 - 36.5 g/dL    RDW 14.7 (H) 11.5 - 14.5 %    Platelets 273 150 - 400 K/uL    MPV 11.3 8.9 - 12.9 FL    Nucleated RBCs 0.0 0  WBC    nRBC 0.00 0.00 - 0.01 K/uL    Neutrophils % 49 32 - 75 %    Lymphocytes % 29 12 - 49 %    Monocytes % 10 5 - 13 %    Eosinophils % 4 0 - 7 %    Basophils % 8 (H) 0 - 1 %    Immature Granulocytes % 0 %    Neutrophils Absolute 1.28 (L) 1.8 - 8.0 K/UL    Lymphocytes Absolute 0.75 (L) 0.8 - 3.5 K/UL    Monocytes Absolute 0.26 0.0 - 1.0 K/UL    Eosinophils Absolute 0.10 0.0 - 0.4 K/UL    Basophils Absolute 0.21 (H) 0.0

## 2025-04-23 NOTE — TELEPHONE ENCOUNTER
Fredi moon in the [facility] called to request order for diagnostic mammogram for pt who's currently in clinic; states pt feels a lump in her breast. Call back number is [phone] and fax is [phone].

## 2025-04-24 ENCOUNTER — HOSPITAL ENCOUNTER (OUTPATIENT)
Facility: HOSPITAL | Age: 70
Setting detail: INFUSION SERIES
Discharge: HOME | End: 2025-04-24
Payer: MEDICARE

## 2025-04-24 VITALS
TEMPERATURE: 98 F | DIASTOLIC BLOOD PRESSURE: 74 MMHG | BODY MASS INDEX: 23.59 KG/M2 | HEIGHT: 65 IN | RESPIRATION RATE: 17 BRPM | HEART RATE: 55 BPM | SYSTOLIC BLOOD PRESSURE: 145 MMHG | OXYGEN SATURATION: 98 % | WEIGHT: 141.6 LBS

## 2025-04-24 DIAGNOSIS — C90.00 MULTIPLE MYELOMA NOT HAVING ACHIEVED REMISSION (HCC): ICD-10-CM

## 2025-04-24 DIAGNOSIS — Z11.59 ENCOUNTER FOR SCREENING FOR OTHER VIRAL DISEASES: Primary | ICD-10-CM

## 2025-04-24 PROCEDURE — 2580000003 HC RX 258: Performed by: INTERNAL MEDICINE

## 2025-04-24 PROCEDURE — 6360000002 HC RX W HCPCS: Performed by: INTERNAL MEDICINE

## 2025-04-24 PROCEDURE — 96413 CHEMO IV INFUSION 1 HR: CPT

## 2025-04-24 PROCEDURE — 2500000003 HC RX 250 WO HCPCS: Performed by: INTERNAL MEDICINE

## 2025-04-24 PROCEDURE — 6360000002 HC RX W HCPCS

## 2025-04-24 PROCEDURE — 2580000003 HC RX 258

## 2025-04-24 PROCEDURE — 96375 TX/PRO/DX INJ NEW DRUG ADDON: CPT

## 2025-04-24 RX ORDER — SODIUM CHLORIDE 0.9 % (FLUSH) 0.9 %
5-40 SYRINGE (ML) INJECTION PRN
Status: DISCONTINUED | OUTPATIENT
Start: 2025-04-24 | End: 2025-04-25 | Stop reason: HOSPADM

## 2025-04-24 RX ORDER — DEXAMETHASONE 4 MG/1
20 TABLET ORAL ONCE
Status: COMPLETED | OUTPATIENT
Start: 2025-04-24 | End: 2025-04-24

## 2025-04-24 RX ORDER — SODIUM CHLORIDE 9 MG/ML
5-250 INJECTION, SOLUTION INTRAVENOUS PRN
Status: DISCONTINUED | OUTPATIENT
Start: 2025-04-24 | End: 2025-04-25 | Stop reason: HOSPADM

## 2025-04-24 RX ORDER — DEXTROSE MONOHYDRATE 50 MG/ML
5-250 INJECTION, SOLUTION INTRAVENOUS PRN
Status: DISCONTINUED | OUTPATIENT
Start: 2025-04-24 | End: 2025-04-25 | Stop reason: HOSPADM

## 2025-04-24 RX ORDER — ONDANSETRON 2 MG/ML
8 INJECTION INTRAMUSCULAR; INTRAVENOUS ONCE
Status: COMPLETED | OUTPATIENT
Start: 2025-04-24 | End: 2025-04-24

## 2025-04-24 RX ORDER — HEPARIN 100 UNIT/ML
500 SYRINGE INTRAVENOUS PRN
Status: DISCONTINUED | OUTPATIENT
Start: 2025-04-24 | End: 2025-04-25 | Stop reason: HOSPADM

## 2025-04-24 RX ADMIN — SODIUM CHLORIDE, PRESERVATIVE FREE 20 ML: 5 INJECTION INTRAVENOUS at 11:34

## 2025-04-24 RX ADMIN — CARFILZOMIB 102 MG: 10 INJECTION, POWDER, LYOPHILIZED, FOR SOLUTION INTRAVENOUS at 11:02

## 2025-04-24 RX ADMIN — DEXTROSE 50 ML/HR: 5 SOLUTION INTRAVENOUS at 09:31

## 2025-04-24 RX ADMIN — DEXAMETHASONE 20 MG: 4 TABLET ORAL at 09:34

## 2025-04-24 RX ADMIN — ONDANSETRON 8 MG: 2 INJECTION, SOLUTION INTRAMUSCULAR; INTRAVENOUS at 09:32

## 2025-04-24 ASSESSMENT — PAIN SCALES - GENERAL: PAINLEVEL_OUTOF10: 6

## 2025-04-24 ASSESSMENT — PAIN DESCRIPTION - PAIN TYPE: TYPE: CHRONIC PAIN

## 2025-04-24 ASSESSMENT — PAIN DESCRIPTION - ORIENTATION: ORIENTATION: RIGHT

## 2025-04-24 ASSESSMENT — PAIN DESCRIPTION - LOCATION: LOCATION: HAND;SHOULDER

## 2025-04-24 NOTE — PROGRESS NOTES
[facility] Chemotherapy Progress Note    Date: 2025    Name: Candida Weaver    MRN: [id number]         : 1955       Pt admit to [facility] for C14D15 Kyprolis(dose reduced to 56mg/meters squared) ambulatory in stable condition. Assessment completed. No new concerns voiced.(Patient expressed concerns that she tolerates 56 per meters squared better than 70 mgs,per meters squared, Providers adjusted her dose) Port with positive blood return. Labs sent for processing.            Travel History   Travel since 25    No documented travel since 25                     No data to display                  Ms. Weaver's vitals were reviewed.  Patient Vitals for the past 12 hrs:   Temp Pulse Resp BP SpO2   25 1130 -- 55 -- (!) 145/74 --   25 0845 98 °F (36.7 °C) 61 17 (!) 142/62 98 %         Lab results were obtained and reviewed.  No results found for this or any previous visit (from the past 12 hours).    Pre-medications  were administered as ordered and chemotherapy was initiated.  Medications Administered         carfilzomib (KYPROLIS) 102 mg in dextrose 5 % 100 mL chemo IVPB Admin Date  2025 Action  New Bag Dose  102 mg Rate  322 mL/hr Route  IntraVENous Documented By  Debra Fragoso, SABRINA        dexAMETHasone (DECADRON) tablet 20 mg Admin Date  2025 Action  Given Dose  20 mg Rate   Route  Oral Documented By  Debra Fragoso, RN        dextrose 5 % solution Admin Date  2025 Action  New Bag Dose  50 mL/hr Rate  50 mL/hr Route  IntraVENous Documented By  Debra Fragoso, SABRINA        ondansetron (ZOFRAN) injection 8 mg Admin Date  2025 Action  Given Dose  8 mg Rate   Route  IntraVENous Documented By  Debra Fragoso, RN        sodium chloride flush 0.9 % injection 5-40 mL Admin Date  2025 Action  Given Dose  20 mL Rate   Route  IntraVENous Documented By  Debra Fragoso, RN            Two nurses verified prior to administering:Drug name, Drug doseInfusion volume or

## 2025-04-25 DIAGNOSIS — E78.2 MIXED HYPERLIPIDEMIA: ICD-10-CM

## 2025-04-26 RX ORDER — ROSUVASTATIN CALCIUM 5 MG/1
5 TABLET, COATED ORAL NIGHTLY
Qty: 30 TABLET | Refills: 11 | Status: SHIPPED | OUTPATIENT
Start: 2025-04-26

## 2025-04-28 DIAGNOSIS — C90.00 MULTIPLE MYELOMA NOT HAVING ACHIEVED REMISSION (HCC): ICD-10-CM

## 2025-04-28 RX ORDER — POMALIDOMIDE 4 MG/1
4 CAPSULE ORAL DAILY
Qty: 21 CAPSULE | Refills: 0 | Status: ACTIVE | OUTPATIENT
Start: 2025-04-28

## 2025-04-28 NOTE — TELEPHONE ENCOUNTER
Oral Chemotherapy      Candida Weaver is a  69 y.o.female  diagnosed with multiple myeloma . Ms. Weaver is being treated with KPd.      Medication name: pomalidomide  Regimen: KPd  Dose:  4 mg (increase dose for 9/26/24)  Frequency: daily  Administration schedule: for 21 days followed by 7 days off every 28 days  Ordering provider: Nany Arboleda MD  Start date: 4/10/25 (Cycle 14)      REMS auth # [id number]  Prescription sent to pharmacy for processing.         Rose Pa, PHARMD, BCOP, BCPS      For Pharmacy Admin Tracking Only    Program: Medical Group  CPA in place:  Yes  Recommendation Provided To: Patient/Caregiver: 1 via Telephone  Intervention Detail: Refill(s) Provided  Intervention Accepted By: Patient/Caregiver: 1    Time Spent (min): 10

## 2025-04-30 RX ORDER — ACETAMINOPHEN 325 MG/1
650 TABLET ORAL
Status: CANCELLED | OUTPATIENT
Start: 2025-05-08

## 2025-04-30 RX ORDER — HYDROCORTISONE SODIUM SUCCINATE 100 MG/2ML
100 INJECTION INTRAMUSCULAR; INTRAVENOUS
Status: CANCELLED | OUTPATIENT
Start: 2025-05-08

## 2025-04-30 RX ORDER — SODIUM CHLORIDE 9 MG/ML
INJECTION, SOLUTION INTRAVENOUS CONTINUOUS
Status: CANCELLED | OUTPATIENT
Start: 2025-05-08

## 2025-04-30 RX ORDER — DEXAMETHASONE 4 MG/1
20 TABLET ORAL ONCE
Status: CANCELLED | OUTPATIENT
Start: 2025-05-08 | End: 2025-05-08

## 2025-04-30 RX ORDER — EPINEPHRINE 1 MG/ML
0.3 INJECTION, SOLUTION INTRAMUSCULAR; SUBCUTANEOUS PRN
Status: CANCELLED | OUTPATIENT
Start: 2025-05-08

## 2025-04-30 RX ORDER — DIPHENHYDRAMINE HYDROCHLORIDE 50 MG/ML
50 INJECTION, SOLUTION INTRAMUSCULAR; INTRAVENOUS
Status: CANCELLED | OUTPATIENT
Start: 2025-05-08

## 2025-04-30 RX ORDER — ALBUTEROL SULFATE 90 UG/1
4 INHALANT RESPIRATORY (INHALATION) PRN
Status: CANCELLED | OUTPATIENT
Start: 2025-05-08

## 2025-04-30 RX ORDER — ONDANSETRON 2 MG/ML
8 INJECTION INTRAMUSCULAR; INTRAVENOUS
Status: CANCELLED | OUTPATIENT
Start: 2025-05-08

## 2025-05-05 RX ORDER — DEXAMETHASONE 4 MG/1
8 TABLET ORAL ONCE
Status: CANCELLED | OUTPATIENT
Start: 2025-05-08 | End: 2025-05-08

## 2025-05-07 ENCOUNTER — HOSPITAL ENCOUNTER (OUTPATIENT)
Facility: HOSPITAL | Age: 70
Setting detail: INFUSION SERIES
Discharge: HOME | End: 2025-05-07
Payer: MEDICARE

## 2025-05-07 VITALS — HEART RATE: 66 BPM | SYSTOLIC BLOOD PRESSURE: 114 MMHG | DIASTOLIC BLOOD PRESSURE: 71 MMHG

## 2025-05-07 DIAGNOSIS — Z11.59 ENCOUNTER FOR SCREENING FOR OTHER VIRAL DISEASES: ICD-10-CM

## 2025-05-07 DIAGNOSIS — C90.00 MULTIPLE MYELOMA NOT HAVING ACHIEVED REMISSION (HCC): ICD-10-CM

## 2025-05-07 LAB
ALBUMIN SERPL-MCNC: 3.4 G/DL (ref 3.5–5)
ALBUMIN/GLOB SERPL: 1.3 (ref 1.1–2.2)
ALP SERPL-CCNC: 98 U/L (ref 45–117)
ALT SERPL-CCNC: 34 U/L (ref 12–78)
ANION GAP SERPL CALC-SCNC: 4 MMOL/L (ref 2–12)
AST SERPL-CCNC: 18 U/L (ref 15–37)
BASOPHILS # BLD: 0.13 K/UL (ref 0–0.1)
BASOPHILS NFR BLD: 4.1 % (ref 0–1)
BILIRUB SERPL-MCNC: 0.4 MG/DL (ref 0.2–1)
BUN SERPL-MCNC: 18 MG/DL (ref 6–20)
BUN/CREAT SERPL: 28 (ref 12–20)
CALCIUM SERPL-MCNC: 8.7 MG/DL (ref 8.5–10.1)
CHLORIDE SERPL-SCNC: 110 MMOL/L (ref 97–108)
CO2 SERPL-SCNC: 23 MMOL/L (ref 21–32)
CREAT SERPL-MCNC: 0.65 MG/DL (ref 0.55–1.02)
DIFFERENTIAL METHOD BLD: ABNORMAL
EOSINOPHIL # BLD: 0.12 K/UL (ref 0–0.4)
EOSINOPHIL NFR BLD: 3.8 % (ref 0–7)
ERYTHROCYTE [DISTWIDTH] IN BLOOD BY AUTOMATED COUNT: 13.8 % (ref 11.5–14.5)
GLOBULIN SER CALC-MCNC: 2.7 G/DL (ref 2–4)
GLUCOSE SERPL-MCNC: 107 MG/DL (ref 65–100)
HCT VFR BLD AUTO: 34.7 % (ref 35–47)
HGB BLD-MCNC: 10.9 G/DL (ref 11.5–16)
IMM GRANULOCYTES # BLD AUTO: 0.01 K/UL (ref 0–0.04)
IMM GRANULOCYTES NFR BLD AUTO: 0.3 % (ref 0–0.5)
LYMPHOCYTES # BLD: 0.99 K/UL (ref 0.8–3.5)
LYMPHOCYTES NFR BLD: 30.8 % (ref 12–49)
MCH RBC QN AUTO: 29.7 PG (ref 26–34)
MCHC RBC AUTO-ENTMCNC: 31.4 G/DL (ref 30–36.5)
MCV RBC AUTO: 94.6 FL (ref 80–99)
MONOCYTES # BLD: 0.61 K/UL (ref 0–1)
MONOCYTES NFR BLD: 19 % (ref 5–13)
NEUTS SEG # BLD: 1.34 K/UL (ref 1.8–8)
NEUTS SEG NFR BLD: 42 % (ref 32–75)
NRBC # BLD: 0 K/UL (ref 0–0.01)
NRBC BLD-RTO: 0 PER 100 WBC
PLATELET # BLD AUTO: 267 K/UL (ref 150–400)
PMV BLD AUTO: 11.2 FL (ref 8.9–12.9)
POTASSIUM SERPL-SCNC: 4 MMOL/L (ref 3.5–5.1)
PROT SERPL-MCNC: 6.1 G/DL (ref 6.4–8.2)
RBC # BLD AUTO: 3.67 M/UL (ref 3.8–5.2)
RBC MORPH BLD: ABNORMAL
SODIUM SERPL-SCNC: 137 MMOL/L (ref 136–145)
WBC # BLD AUTO: 3.2 K/UL (ref 3.6–11)
WBC MORPH BLD: ABNORMAL

## 2025-05-07 PROCEDURE — 82784 ASSAY IGA/IGD/IGG/IGM EACH: CPT

## 2025-05-07 PROCEDURE — 85025 COMPLETE CBC W/AUTO DIFF WBC: CPT

## 2025-05-07 PROCEDURE — 80053 COMPREHEN METABOLIC PANEL: CPT

## 2025-05-07 PROCEDURE — 86334 IMMUNOFIX E-PHORESIS SERUM: CPT

## 2025-05-07 PROCEDURE — 83521 IG LIGHT CHAINS FREE EACH: CPT

## 2025-05-07 PROCEDURE — 84165 PROTEIN E-PHORESIS SERUM: CPT

## 2025-05-07 NOTE — PROGRESS NOTES
Pt arrived to [facility] for labs in stable condition. Labs drawn peripherally from the RAC and sent for processing.     Please see pending results in EPIC.    Pt tolerated treatment well. D/Cd from [facility] in no distress.    Future Appointments   Date Time Provider Department Center   5/8/2025 11:00 AM RUTH LAB CHAIR 1 BREMOSINF [facility]   5/8/2025 11:15 AM Nany Arboleda MD [facility] BS AMB   5/8/2025 11:45 AM RUTH SO CHAIR 2 BREMOSINF [facility]   5/14/2025  9:30 AM LAB CHAIR BREMONINF [facility]   5/14/2025 10:30 AM [facility] ECHO LAB 1 SMHNIC [facility]   5/15/2025 10:00 AM RUTH SO CHAIR 1 BREMOSINF [facility]   5/21/2025  9:30 AM LAB CHAIR BREMONINF [facility]   5/22/2025 11:30 AM RUTH SO CHAIR 4 BREMOSINF [facility]   6/4/2025  9:30 AM LAB CHAIR BREMONINF [facility]   6/5/2025 10:00 AM RUTH SO CHAIR 1 BREMOSINF [facility]   6/11/2025  9:30 AM LAB CHAIR BREMONINF [facility]   6/12/2025 11:00 AM RUTH SO CHAIR 3 BREMOSINF [facility]   6/18/2025  9:30 AM LAB CHAIR BREMONINF [facility]   6/19/2025 10:00 AM RUTH SO CHAIR 4 BREMOSINF [facility]

## 2025-05-08 ENCOUNTER — APPOINTMENT (OUTPATIENT)
Facility: HOSPITAL | Age: 70
End: 2025-05-08
Payer: MEDICARE

## 2025-05-08 ENCOUNTER — HOSPITAL ENCOUNTER (OUTPATIENT)
Facility: HOSPITAL | Age: 70
Setting detail: INFUSION SERIES
Discharge: HOME | End: 2025-05-08
Payer: MEDICARE

## 2025-05-08 ENCOUNTER — CLINICAL DOCUMENTATION (OUTPATIENT)
Age: 70
End: 2025-05-08

## 2025-05-08 ENCOUNTER — OFFICE VISIT (OUTPATIENT)
Age: 70
End: 2025-05-08
Payer: MEDICARE

## 2025-05-08 VITALS — HEART RATE: 73 BPM | DIASTOLIC BLOOD PRESSURE: 50 MMHG | SYSTOLIC BLOOD PRESSURE: 111 MMHG

## 2025-05-08 VITALS
HEART RATE: 73 BPM | SYSTOLIC BLOOD PRESSURE: 114 MMHG | TEMPERATURE: 97.6 F | DIASTOLIC BLOOD PRESSURE: 61 MMHG | BODY MASS INDEX: 24.13 KG/M2 | RESPIRATION RATE: 16 BRPM | WEIGHT: 145 LBS | OXYGEN SATURATION: 98 %

## 2025-05-08 VITALS
DIASTOLIC BLOOD PRESSURE: 61 MMHG | SYSTOLIC BLOOD PRESSURE: 114 MMHG | BODY MASS INDEX: 24.29 KG/M2 | HEART RATE: 73 BPM | TEMPERATURE: 97.6 F | HEIGHT: 65 IN | OXYGEN SATURATION: 98 % | RESPIRATION RATE: 16 BRPM | WEIGHT: 145.8 LBS

## 2025-05-08 DIAGNOSIS — C90.00 MULTIPLE MYELOMA NOT HAVING ACHIEVED REMISSION (HCC): Primary | ICD-10-CM

## 2025-05-08 DIAGNOSIS — Z11.59 ENCOUNTER FOR SCREENING FOR OTHER VIRAL DISEASES: ICD-10-CM

## 2025-05-08 PROCEDURE — 1036F TOBACCO NON-USER: CPT

## 2025-05-08 PROCEDURE — G8427 DOCREV CUR MEDS BY ELIG CLIN: HCPCS

## 2025-05-08 PROCEDURE — G8399 PT W/DXA RESULTS DOCUMENT: HCPCS

## 2025-05-08 PROCEDURE — 1159F MED LIST DOCD IN RCRD: CPT

## 2025-05-08 PROCEDURE — 2580000003 HC RX 258: Performed by: INTERNAL MEDICINE

## 2025-05-08 PROCEDURE — 99215 OFFICE O/P EST HI 40 MIN: CPT

## 2025-05-08 PROCEDURE — 1090F PRES/ABSN URINE INCON ASSESS: CPT

## 2025-05-08 PROCEDURE — 96361 HYDRATE IV INFUSION ADD-ON: CPT

## 2025-05-08 PROCEDURE — 1123F ACP DISCUSS/DSCN MKR DOCD: CPT

## 2025-05-08 PROCEDURE — 96375 TX/PRO/DX INJ NEW DRUG ADDON: CPT

## 2025-05-08 PROCEDURE — 96413 CHEMO IV INFUSION 1 HR: CPT

## 2025-05-08 PROCEDURE — G8420 CALC BMI NORM PARAMETERS: HCPCS

## 2025-05-08 PROCEDURE — 6360000002 HC RX W HCPCS

## 2025-05-08 PROCEDURE — 1160F RVW MEDS BY RX/DR IN RCRD: CPT

## 2025-05-08 PROCEDURE — 3017F COLORECTAL CA SCREEN DOC REV: CPT

## 2025-05-08 PROCEDURE — 6360000002 HC RX W HCPCS: Performed by: INTERNAL MEDICINE

## 2025-05-08 PROCEDURE — 1125F AMNT PAIN NOTED PAIN PRSNT: CPT

## 2025-05-08 RX ORDER — HYDROCORTISONE SODIUM SUCCINATE 100 MG/2ML
100 INJECTION INTRAMUSCULAR; INTRAVENOUS
Status: CANCELLED | OUTPATIENT
Start: 2025-05-15

## 2025-05-08 RX ORDER — SODIUM CHLORIDE 9 MG/ML
5-250 INJECTION, SOLUTION INTRAVENOUS PRN
Status: CANCELLED | OUTPATIENT
Start: 2025-05-15

## 2025-05-08 RX ORDER — SODIUM CHLORIDE 9 MG/ML
5-250 INJECTION, SOLUTION INTRAVENOUS PRN
Status: DISCONTINUED | OUTPATIENT
Start: 2025-05-08 | End: 2025-05-09 | Stop reason: HOSPADM

## 2025-05-08 RX ORDER — EPINEPHRINE 1 MG/ML
0.3 INJECTION, SOLUTION INTRAMUSCULAR; SUBCUTANEOUS PRN
Status: CANCELLED | OUTPATIENT
Start: 2025-05-15

## 2025-05-08 RX ORDER — ACETAMINOPHEN 325 MG/1
650 TABLET ORAL
Status: CANCELLED | OUTPATIENT
Start: 2025-05-15

## 2025-05-08 RX ORDER — ALBUTEROL SULFATE 90 UG/1
4 INHALANT RESPIRATORY (INHALATION) PRN
Status: CANCELLED | OUTPATIENT
Start: 2025-05-15

## 2025-05-08 RX ORDER — HEPARIN 100 UNIT/ML
500 SYRINGE INTRAVENOUS PRN
Status: DISCONTINUED | OUTPATIENT
Start: 2025-05-08 | End: 2025-05-09 | Stop reason: HOSPADM

## 2025-05-08 RX ORDER — ONDANSETRON 2 MG/ML
8 INJECTION INTRAMUSCULAR; INTRAVENOUS ONCE
Status: COMPLETED | OUTPATIENT
Start: 2025-05-08 | End: 2025-05-08

## 2025-05-08 RX ORDER — SODIUM CHLORIDE 0.9 % (FLUSH) 0.9 %
5-40 SYRINGE (ML) INJECTION PRN
Status: DISCONTINUED | OUTPATIENT
Start: 2025-05-08 | End: 2025-05-09 | Stop reason: HOSPADM

## 2025-05-08 RX ORDER — SODIUM CHLORIDE 9 MG/ML
INJECTION, SOLUTION INTRAVENOUS CONTINUOUS
Status: CANCELLED | OUTPATIENT
Start: 2025-05-15

## 2025-05-08 RX ORDER — ONDANSETRON 2 MG/ML
8 INJECTION INTRAMUSCULAR; INTRAVENOUS
Status: CANCELLED | OUTPATIENT
Start: 2025-05-15

## 2025-05-08 RX ORDER — DIPHENHYDRAMINE HYDROCHLORIDE 50 MG/ML
50 INJECTION, SOLUTION INTRAMUSCULAR; INTRAVENOUS
Status: CANCELLED | OUTPATIENT
Start: 2025-05-15

## 2025-05-08 RX ORDER — 0.9 % SODIUM CHLORIDE 0.9 %
500 INTRAVENOUS SOLUTION INTRAVENOUS ONCE
Status: COMPLETED | OUTPATIENT
Start: 2025-05-08 | End: 2025-05-08

## 2025-05-08 RX ORDER — DEXAMETHASONE 4 MG/1
8 TABLET ORAL ONCE
Status: COMPLETED | OUTPATIENT
Start: 2025-05-08 | End: 2025-05-08

## 2025-05-08 RX ORDER — DEXTROSE MONOHYDRATE 50 MG/ML
5-250 INJECTION, SOLUTION INTRAVENOUS PRN
Status: DISCONTINUED | OUTPATIENT
Start: 2025-05-08 | End: 2025-05-09 | Stop reason: HOSPADM

## 2025-05-08 RX ORDER — HEPARIN 100 UNIT/ML
500 SYRINGE INTRAVENOUS PRN
Status: CANCELLED | OUTPATIENT
Start: 2025-05-15

## 2025-05-08 RX ADMIN — DEXTROSE 50 ML/HR: 5 SOLUTION INTRAVENOUS at 12:38

## 2025-05-08 RX ADMIN — CARFILZOMIB 102 MG: 10 INJECTION, POWDER, LYOPHILIZED, FOR SOLUTION INTRAVENOUS at 13:18

## 2025-05-08 RX ADMIN — ONDANSETRON 8 MG: 2 INJECTION, SOLUTION INTRAMUSCULAR; INTRAVENOUS at 12:42

## 2025-05-08 RX ADMIN — SODIUM CHLORIDE 500 ML: 9 INJECTION, SOLUTION INTRAVENOUS at 12:41

## 2025-05-08 RX ADMIN — DEXAMETHASONE 8 MG: 4 TABLET ORAL at 12:38

## 2025-05-08 ASSESSMENT — PAIN DESCRIPTION - LOCATION: LOCATION: HAND

## 2025-05-08 ASSESSMENT — PAIN SCALES - GENERAL: PAINLEVEL_OUTOF10: 5

## 2025-05-08 ASSESSMENT — PAIN DESCRIPTION - ORIENTATION: ORIENTATION: RIGHT

## 2025-05-08 ASSESSMENT — PAIN DESCRIPTION - DESCRIPTORS: DESCRIPTORS: PINS AND NEEDLES

## 2025-05-08 NOTE — PROGRESS NOTES
Candida Weaver is a 70 y.o. female    Chief Complaint   Patient presents with    Follow-up     Multiple Myeloma       1. Have you been to the ER, urgent care clinic since your last visit?  Hospitalized since your last visit?No    2. Have you seen or consulted any other health care providers outside of the [facility] since your last visit?  Include any pap smears or colon screening. Yes, RICHIE Onofre    
2017  Now with a K: L ratio of > 100 and new anemia which is consistent with a definition of active myeloma   PET with 2 suspicious lesions  BM 95% plasma cells  FISH  del(13q) and t(11;14)  Standard Risk  Intent of treatment: palliative   Started palliative Kayli VRD as induction,  Revlimid lite. Started 10/25/23.  Did not have much of a response serologically by C3  Reintroduced Revlimid C4D1 at 10 mg every oether day  Consistently taking dose mod DVRD since 12/29/2023  BM on 3/2024 was reviewed and still shows 70% clonal plasma cells, Kappa has dropped < 49%  This is consistent with minimal response per IMWG   -high risk disease with refractoriness to multiple drug classes  Switched to Kyprolis pomalyst and dexamethasone 4/2024    She does need a VGPR for ASCT.   At the moment she is almost there, BM bx 12/2024 with < 1% Plasma cells , none detected on flow.   PET completed with [facility]     She remains on Kyprolis Pomalyst today is C15D8  She has had increasing fatigue, dehydration, grade 2 neutropenia which is why doses were reduced back to 56 mg/m² for Kyprolis    Pomalyst d/c 4 weeks prior to SCT  Kyprolis d/c 2 weeks prior to SCT    2) Microcystic serous cystadenoma in the tail of the pancreas  Being followed by Dr. Espinoza    3) Anemia  History of iron deficiency.   Secondary to myeloma.   Transfuse if < 7 g/dl    4) CHF  Last ECHO was reviewed and had a normal EF. Follows with cardiology.   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  Will consider getting an ultrasound of the spleen in a subsequent visit.  We will monitor counts.     6)Neutropenia  Has been dose limiting   GCSF as scheduled to keep ANC > 500   Resolved     7) PE  On Eliquis   CTA 3/2025 reviewed and resolved PE    8) Neuropathy  Stable on Cymbalta 60mg  Gr 1    9) Encounter for management of HR disease  Treatments are palliative. 
embolism.  There is no aortic aneurysm.  Cardiomegaly with enlarged main pulmonary artery suggesting pulmonary  hypertension.        Assessment:   1) Multiple Myeloma  Dx with [facility] 2017  Now with a K: L ratio of > 100 and new anemia which is consistent with a definition of active myeloma   PET with 2 suspicious lesions  BM 95% plasma cells  FISH  del(13q) and t(11;14)  Standard Risk  Intent of treatment: palliative   Started palliative Kayli VRD as induction,  Revlimid lite. Started 10/25/23.  Did not have much of a response serologically by C3  Reintroduced Revlimid C4D1 at 10 mg every oether day  Consistently taking dose mod DVRD since 12/29/2023  BM on 3/2024 was reviewed and still shows 70% clonal plasma cells, Kappa has dropped < 49%  This is consistent with minimal response per IMWG   -high risk disease with refractoriness to multiple drug classes  Switched to Kyprolis pomalyst and dexamethasone 4/2024    She does need a VGPR for ASCT.   At the moment she is almost there, BM bx 12/2024 with < 1% Plasma cells , none detected on flow.   PET completed [facility]     She remains on Kyprolis Pomalyst today is C15D1  She has had increasing fatigue, dehydration, grade 2 neutropenia which is why doses were reduced back to 56 mg/m² for Kyprolis.    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 Espinoza    3) Anemia  History of iron deficiency.   Secondary to myeloma.   Transfuse if < 7 g/dl    4) CHF  Last ECHO was reviewed and had a normal EF. Follows with cardiology.   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  Will consider getting an ultrasound of the spleen in a subsequent visit.  We will monitor counts.     6)Neutropenia  Has been dose limiting   GCSF as scheduled to keep

## 2025-05-08 NOTE — PROGRESS NOTES
Oral Chemotherapy      Candida Weaver is a  69 y.o.female  diagnosed with multiple myeloma . Ms. Weaver is being treated with KPd.      Medication name: pomalidomide  Regimen: KPd  Dose:   3 mg  Frequency: daily  Administration schedule: for 21 days followed by 7 days off every 28 days  Ordering provider: Nany Arboleda MD  Start date: 5/8/25 (Cycle 15)      Lab Results   Component Value Date    WBC 3.2 (L) 05/07/2025    HGB 10.9 (L) 05/07/2025    HCT 34.7 (L) 05/07/2025    MCV 94.6 05/07/2025     05/07/2025    LYMPHOPCT 30.8 05/07/2025    RBC 3.67 (L) 05/07/2025    MCH 29.7 05/07/2025    MCHC 31.4 05/07/2025    RDW 13.8 05/07/2025       Lab Results   Component Value Date    NEUTROABS 1.34 (L) 05/07/2025             Expected follow up date: Labs/office visit each treatment. Next cycle start on 6/5/25     Patient provided with an Oral Chemotherapy Journal.              Rose Pa, PharmD, BCPS, BCOP        For Pharmacy Admin Tracking Only    Program: Medical Group  CPA in place:  Yes  Recommendation Provided To: Patient/Caregiver: 1 via In person      Intervention Accepted By: Patient/Caregiver: 1    Time Spent (min): 15

## 2025-05-08 NOTE — PROGRESS NOTES
[facility] Chemotherapy Progress Note    Date: May 8, 2025    Name: Candida Weaver    MRN: [id number]         : 1955       Pt admit to [facility] for C15 D1 Kyprolis ambulatory in stable condition. Assessment completed. No new concerns voiced. Port with positive blood return. Labs Done earlier.                    No data to display                  Ms. Weaver's vitals were reviewed.  Patient Vitals for the past 12 hrs:   Pulse BP   25 1343 73 (!) 111/50         Lab results were obtained and reviewed.  No results found for this or any previous visit (from the past 12 hours).    Pre-medications  were administered as ordered and chemotherapy was initiated.  Medications Administered         carfilzomib (KYPROLIS) 102 mg in dextrose 5 % 100 mL chemo IVPB Admin Date  2025 Action  New Bag Dose  102 mg Rate  322 mL/hr Route  IntraVENous Documented By  Debra Fragoso, SABRINA        dexAMETHasone (DECADRON) tablet 8 mg Admin Date  2025 Action  Given Dose  8 mg Rate   Route  Oral Documented By  Debra Fragoso, RN        dextrose 5 % solution Admin Date  2025 Action  New Bag Dose  50 mL/hr Rate  50 mL/hr Route  IntraVENous Documented By  Debra Fragoso, SABRINA        ondansetron (ZOFRAN) injection 8 mg Admin Date  2025 Action  Given Dose  8 mg Rate   Route  IntraVENous Documented By  Debra Fragoso, RN        sodium chloride 0.9 % bolus 500 mL Admin Date  2025 Action  New Bag Dose  500 mL Rate  491.8 mL/hr Route  IntraVENous Documented By  Debra Fragoso, SABRINA            Two nurses verified prior to administering:Drug name, Drug doseInfusion volume or drug volume when prepared in a syringe, Rate of administration, Route of administration, Expiration dates and/or times, Appearance and physical integrity of the drugs, Rate set on infusion pump, when used, Sequencing of drug administration.       Pt tolerated treatment well. Port maintained positive blood return throughout treatment. Flushed, and

## 2025-05-09 LAB
ALBUMIN SERPL ELPH-MCNC: 3.4 G/DL (ref 2.9–4.4)
ALBUMIN/GLOB SERPL: 1.7 (ref 0.7–1.7)
ALPHA1 GLOB SERPL ELPH-MCNC: 0.3 G/DL (ref 0–0.4)
ALPHA2 GLOB SERPL ELPH-MCNC: 0.7 G/DL (ref 0.4–1)
B-GLOBULIN SERPL ELPH-MCNC: 0.9 G/DL (ref 0.7–1.3)
GAMMA GLOB SERPL ELPH-MCNC: 0.2 G/DL (ref 0.4–1.8)
GLOBULIN SER-MCNC: 2.1 G/DL (ref 2.2–3.9)
IGA SERPL-MCNC: 10 MG/DL (ref 64–422)
IGG SERPL-MCNC: 197 MG/DL (ref 586–1602)
IGM SERPL-MCNC: 7 MG/DL (ref 26–217)
INTERPRETATION SERPL IEP-IMP: ABNORMAL
KAPPA LC FREE SER-MCNC: 55.8 MG/L (ref 3.3–19.4)
KAPPA LC FREE/LAMBDA FREE SER: 31 (ref 0.26–1.65)
LAMBDA LC FREE SERPL-MCNC: 1.8 MG/L (ref 5.7–26.3)
M PROTEIN SERPL ELPH-MCNC: ABNORMAL G/DL
PROT SERPL-MCNC: 5.5 G/DL (ref 6–8.5)

## 2025-05-12 RX ORDER — LANCETS 33 GAUGE
EACH MISCELLANEOUS
Qty: 100 EACH | Refills: 3 | Status: SHIPPED | OUTPATIENT
Start: 2025-05-12

## 2025-05-14 ENCOUNTER — HOSPITAL ENCOUNTER (OUTPATIENT)
Facility: HOSPITAL | Age: 70
Discharge: HOME | End: 2025-05-16
Payer: MEDICARE

## 2025-05-14 ENCOUNTER — HOSPITAL ENCOUNTER (OUTPATIENT)
Facility: HOSPITAL | Age: 70
Setting detail: INFUSION SERIES
Discharge: HOME | End: 2025-05-14
Payer: MEDICARE

## 2025-05-14 VITALS
HEART RATE: 68 BPM | OXYGEN SATURATION: 97 % | DIASTOLIC BLOOD PRESSURE: 67 MMHG | SYSTOLIC BLOOD PRESSURE: 122 MMHG | RESPIRATION RATE: 18 BRPM | TEMPERATURE: 97.7 F

## 2025-05-14 VITALS — DIASTOLIC BLOOD PRESSURE: 68 MMHG | SYSTOLIC BLOOD PRESSURE: 132 MMHG

## 2025-05-14 DIAGNOSIS — C90.00 MULTIPLE MYELOMA NOT HAVING ACHIEVED REMISSION (HCC): ICD-10-CM

## 2025-05-14 DIAGNOSIS — I50.20 SYSTOLIC CONGESTIVE HEART FAILURE, UNSPECIFIED HF CHRONICITY (HCC): ICD-10-CM

## 2025-05-14 DIAGNOSIS — Z11.59 ENCOUNTER FOR SCREENING FOR OTHER VIRAL DISEASES: ICD-10-CM

## 2025-05-14 LAB
ALBUMIN SERPL-MCNC: 3.3 G/DL (ref 3.5–5)
ALBUMIN/GLOB SERPL: 1.3 (ref 1.1–2.2)
ALP SERPL-CCNC: 70 U/L (ref 45–117)
ALT SERPL-CCNC: 37 U/L (ref 12–78)
ANION GAP SERPL CALC-SCNC: 6 MMOL/L (ref 2–12)
AST SERPL-CCNC: 13 U/L (ref 15–37)
BASOPHILS # BLD: 0 K/UL (ref 0–0.1)
BASOPHILS NFR BLD: 0 % (ref 0–1)
BILIRUB SERPL-MCNC: 0.5 MG/DL (ref 0.2–1)
BUN SERPL-MCNC: 24 MG/DL (ref 6–20)
BUN/CREAT SERPL: 29 (ref 12–20)
CALCIUM SERPL-MCNC: 9.2 MG/DL (ref 8.5–10.1)
CHLORIDE SERPL-SCNC: 112 MMOL/L (ref 97–108)
CO2 SERPL-SCNC: 22 MMOL/L (ref 21–32)
CREAT SERPL-MCNC: 0.82 MG/DL (ref 0.55–1.02)
DIFFERENTIAL METHOD BLD: ABNORMAL
ECHO AO ROOT DIAM: 3.5 CM
ECHO AV AREA PEAK VELOCITY: 1.8 CM2
ECHO AV PEAK GRADIENT: 10 MMHG
ECHO AV PEAK VELOCITY: 1.5 M/S
ECHO AV VELOCITY RATIO: 0.73
ECHO EST RA PRESSURE: 3 MMHG
ECHO LA DIAMETER: 3.8 CM
ECHO LA TO AORTIC ROOT RATIO: 1.09
ECHO LA VOL A-L A2C: 79 ML (ref 22–52)
ECHO LA VOL A-L A4C: 63 ML (ref 22–52)
ECHO LA VOL BP: 69 ML (ref 22–52)
ECHO LA VOL MOD A2C: 77 ML (ref 22–52)
ECHO LA VOL MOD A4C: 58 ML (ref 22–52)
ECHO LA VOLUME AREA LENGTH: 72 ML
ECHO LV E' LATERAL VELOCITY: 8.6 CM/S
ECHO LV E' SEPTAL VELOCITY: 6.01 CM/S
ECHO LV EF PHYSICIAN: 55 %
ECHO LV FRACTIONAL SHORTENING: 24 % (ref 28–44)
ECHO LV GLOBAL LONGITUDINAL STRAIN (GLS): -15.2 %
ECHO LV INTERNAL DIMENSION DIASTOLIC: 4.2 CM (ref 3.9–5.3)
ECHO LV INTERNAL DIMENSION SYSTOLIC: 3.2 CM
ECHO LV IVSD: 1.1 CM (ref 0.6–0.9)
ECHO LV MASS 2D: 157.1 G (ref 67–162)
ECHO LV POSTERIOR WALL DIASTOLIC: 1.1 CM (ref 0.6–0.9)
ECHO LV RELATIVE WALL THICKNESS RATIO: 0.52
ECHO LVOT AREA: 2.5 CM2
ECHO LVOT DIAM: 1.8 CM
ECHO LVOT PEAK GRADIENT: 4 MMHG
ECHO LVOT PEAK VELOCITY: 1.1 M/S
ECHO MV A VELOCITY: 0.56 M/S
ECHO MV AREA PHT: 3.8 CM2
ECHO MV E DECELERATION TIME (DT): 197.3 MS
ECHO MV E VELOCITY: 0.52 M/S
ECHO MV E/A RATIO: 0.93
ECHO MV E/E' LATERAL: 6.05
ECHO MV E/E' RATIO (AVERAGED): 7.35
ECHO MV E/E' SEPTAL: 8.65
ECHO MV MAX VELOCITY: 0.7 M/S
ECHO MV MEAN GRADIENT: 1 MMHG
ECHO MV MEAN VELOCITY: 0.4 M/S
ECHO MV PEAK GRADIENT: 2 MMHG
ECHO MV PRESSURE HALF TIME (PHT): 57.2 MS
ECHO MV VTI: 22.3 CM
ECHO RA VOLUME: 41 ML
ECHO RA VOLUME: 42 ML
ECHO RIGHT VENTRICULAR SYSTOLIC PRESSURE (RVSP): 38 MMHG
ECHO RV FREE WALL PEAK S': 8.4 CM/S
ECHO RV TAPSE: 2.2 CM (ref 1.7–?)
ECHO TV REGURGITANT MAX VELOCITY: 2.97 M/S
ECHO TV REGURGITANT PEAK GRADIENT: 35 MMHG
EOSINOPHIL # BLD: 0.14 K/UL (ref 0–0.4)
EOSINOPHIL NFR BLD: 3 % (ref 0–7)
ERYTHROCYTE [DISTWIDTH] IN BLOOD BY AUTOMATED COUNT: 14.7 % (ref 11.5–14.5)
GLOBULIN SER CALC-MCNC: 2.5 G/DL (ref 2–4)
GLUCOSE SERPL-MCNC: 85 MG/DL (ref 65–100)
HCT VFR BLD AUTO: 34.7 % (ref 35–47)
HGB BLD-MCNC: 11 G/DL (ref 11.5–16)
IMM GRANULOCYTES # BLD AUTO: 0 K/UL
IMM GRANULOCYTES NFR BLD AUTO: 0 %
LYMPHOCYTES # BLD: 1.79 K/UL (ref 0.8–3.5)
LYMPHOCYTES NFR BLD: 38 % (ref 12–49)
MCH RBC QN AUTO: 30 PG (ref 26–34)
MCHC RBC AUTO-ENTMCNC: 31.7 G/DL (ref 30–36.5)
MCV RBC AUTO: 94.6 FL (ref 80–99)
MONOCYTES # BLD: 0.89 K/UL (ref 0–1)
MONOCYTES NFR BLD: 19 % (ref 5–13)
MYELOCYTES NFR BLD MANUAL: 1 %
NEUTS SEG # BLD: 1.83 K/UL (ref 1.8–8)
NEUTS SEG NFR BLD: 39 % (ref 32–75)
NRBC # BLD: 0.04 K/UL (ref 0–0.01)
NRBC BLD-RTO: 0.9 PER 100 WBC
PLATELET # BLD AUTO: 184 K/UL (ref 150–400)
PMV BLD AUTO: 12.3 FL (ref 8.9–12.9)
POTASSIUM SERPL-SCNC: 3.9 MMOL/L (ref 3.5–5.1)
PROT SERPL-MCNC: 5.8 G/DL (ref 6.4–8.2)
RBC # BLD AUTO: 3.67 M/UL (ref 3.8–5.2)
RBC MORPH BLD: ABNORMAL
RBC MORPH BLD: ABNORMAL
SODIUM SERPL-SCNC: 140 MMOL/L (ref 136–145)
WBC # BLD AUTO: 4.7 K/UL (ref 3.6–11)
WBC MORPH BLD: ABNORMAL

## 2025-05-14 PROCEDURE — 85025 COMPLETE CBC W/AUTO DIFF WBC: CPT

## 2025-05-14 PROCEDURE — 93306 TTE W/DOPPLER COMPLETE: CPT

## 2025-05-14 PROCEDURE — 80053 COMPREHEN METABOLIC PANEL: CPT

## 2025-05-14 ASSESSMENT — PAIN DESCRIPTION - ORIENTATION: ORIENTATION: RIGHT

## 2025-05-14 ASSESSMENT — PAIN DESCRIPTION - DESCRIPTORS: DESCRIPTORS: PINS AND NEEDLES;TINGLING

## 2025-05-14 ASSESSMENT — PAIN DESCRIPTION - LOCATION: LOCATION: HAND

## 2025-05-14 ASSESSMENT — PAIN SCALES - GENERAL: PAINLEVEL_OUTOF10: 5

## 2025-05-14 NOTE — PROGRESS NOTES
\[facility]\"" Peds/Adult Note                       Date: May 14, 2025    Name: Candida Weaver    MRN: [id number]         : 1955    0930 Patient arrives for PreTx Labs without acute problems.     Vital signs stable throughout and prior to discharge. Patient tolerated procedure well and was discharged without incident.  Patient is aware of next \[facility]\"" appointment on 5/15/25.         Ms. Weaver's vitals were reviewed prior to and after treatment.   Patient Vitals for the past 12 hrs:   Temp Pulse Resp BP SpO2   25 0947 -- 68 18 122/67 --   25 0930 97.7 °F (36.5 °C) 76 18 (!) 155/70 97 %     Labs drawn in right AC by writer.   Specimens to lab for processing.      Lab results were obtained and reviewed.  No results found for this or any previous visit (from the past 12 hours).      Ms. Weaver tolerated the treatment and had no complaints.    [facility]. Discharge Instructions provided to patient, patient verbalized understanding.     Future Appointments   Date Time Provider Department Center   2025 10:30 AM [facility] ECHO LAB 1 SMHNIC [facility]   5/15/2025 10:00 AM RUTH SO CHAIR 1 BREMOSINF [facility]   2025  9:30 AM LAB CHAIR BREMONINF [facility]   2025 11:30 AM URTH SO CHAIR 4 BREMOSINF [facility]   2025  9:30 AM LAB CHAIR BREMONINF [facility]   2025 10:00 AM RUTH SO CHAIR 5 BREMOSINF [facility]   2025  9:30 AM LAB CHAIR BREMONINF [facility]   2025  9:45 AM Nany Arboleda MD [facility] BS AMB   2025 11:00 AM RUTH SO CHAIR 3 BREMOSINF [facility]   2025  9:30 AM LAB CHAIR BREMONINF [facility]   2025 10:00 AM RUTH SO CHAIR 4 BREMOSINF [facility]       Yudith Franklin RN  May 14, 2025  9:53 AM

## 2025-05-15 ENCOUNTER — HOSPITAL ENCOUNTER (OUTPATIENT)
Facility: HOSPITAL | Age: 70
Setting detail: INFUSION SERIES
Discharge: HOME | End: 2025-05-15
Payer: MEDICARE

## 2025-05-15 VITALS
DIASTOLIC BLOOD PRESSURE: 78 MMHG | SYSTOLIC BLOOD PRESSURE: 121 MMHG | RESPIRATION RATE: 18 BRPM | HEART RATE: 70 BPM | TEMPERATURE: 97.4 F | WEIGHT: 152 LBS | BODY MASS INDEX: 25.33 KG/M2 | OXYGEN SATURATION: 97 % | HEIGHT: 65 IN

## 2025-05-15 DIAGNOSIS — Z11.59 ENCOUNTER FOR SCREENING FOR OTHER VIRAL DISEASES: ICD-10-CM

## 2025-05-15 DIAGNOSIS — C90.00 MULTIPLE MYELOMA NOT HAVING ACHIEVED REMISSION (HCC): Primary | ICD-10-CM

## 2025-05-15 PROCEDURE — 6360000002 HC RX W HCPCS: Performed by: INTERNAL MEDICINE

## 2025-05-15 PROCEDURE — 2580000003 HC RX 258: Performed by: INTERNAL MEDICINE

## 2025-05-15 PROCEDURE — 96413 CHEMO IV INFUSION 1 HR: CPT

## 2025-05-15 PROCEDURE — 96375 TX/PRO/DX INJ NEW DRUG ADDON: CPT

## 2025-05-15 RX ORDER — DEXAMETHASONE 4 MG/1
8 TABLET ORAL ONCE
Status: COMPLETED | OUTPATIENT
Start: 2025-05-15 | End: 2025-05-15

## 2025-05-15 RX ORDER — HEPARIN 100 UNIT/ML
500 SYRINGE INTRAVENOUS PRN
Status: CANCELLED | OUTPATIENT
Start: 2025-05-22

## 2025-05-15 RX ORDER — ALBUTEROL SULFATE 90 UG/1
4 INHALANT RESPIRATORY (INHALATION) PRN
Status: CANCELLED | OUTPATIENT
Start: 2025-05-22

## 2025-05-15 RX ORDER — SODIUM CHLORIDE 9 MG/ML
INJECTION, SOLUTION INTRAVENOUS CONTINUOUS
Status: CANCELLED | OUTPATIENT
Start: 2025-05-22

## 2025-05-15 RX ORDER — HYDROCORTISONE SODIUM SUCCINATE 100 MG/2ML
100 INJECTION INTRAMUSCULAR; INTRAVENOUS
Status: CANCELLED | OUTPATIENT
Start: 2025-05-22

## 2025-05-15 RX ORDER — SODIUM CHLORIDE 9 MG/ML
5-250 INJECTION, SOLUTION INTRAVENOUS PRN
Status: CANCELLED | OUTPATIENT
Start: 2025-05-22

## 2025-05-15 RX ORDER — EPINEPHRINE 1 MG/ML
0.3 INJECTION, SOLUTION INTRAMUSCULAR; SUBCUTANEOUS PRN
Status: CANCELLED | OUTPATIENT
Start: 2025-05-22

## 2025-05-15 RX ORDER — ACETAMINOPHEN 325 MG/1
650 TABLET ORAL
Status: CANCELLED | OUTPATIENT
Start: 2025-05-22

## 2025-05-15 RX ORDER — DIPHENHYDRAMINE HYDROCHLORIDE 50 MG/ML
50 INJECTION, SOLUTION INTRAMUSCULAR; INTRAVENOUS
Status: CANCELLED | OUTPATIENT
Start: 2025-05-22

## 2025-05-15 RX ORDER — ONDANSETRON 2 MG/ML
8 INJECTION INTRAMUSCULAR; INTRAVENOUS ONCE
Status: COMPLETED | OUTPATIENT
Start: 2025-05-15 | End: 2025-05-15

## 2025-05-15 RX ORDER — SODIUM CHLORIDE 0.9 % (FLUSH) 0.9 %
5-40 SYRINGE (ML) INJECTION PRN
Status: DISCONTINUED | OUTPATIENT
Start: 2025-05-15 | End: 2025-05-16 | Stop reason: HOSPADM

## 2025-05-15 RX ORDER — DEXTROSE MONOHYDRATE 50 MG/ML
5-250 INJECTION, SOLUTION INTRAVENOUS PRN
Status: DISCONTINUED | OUTPATIENT
Start: 2025-05-15 | End: 2025-05-16 | Stop reason: HOSPADM

## 2025-05-15 RX ORDER — ONDANSETRON 2 MG/ML
8 INJECTION INTRAMUSCULAR; INTRAVENOUS
Status: CANCELLED | OUTPATIENT
Start: 2025-05-22

## 2025-05-15 RX ADMIN — DEXTROSE 25 ML/HR: 5 SOLUTION INTRAVENOUS at 10:12

## 2025-05-15 RX ADMIN — ONDANSETRON 8 MG: 2 INJECTION, SOLUTION INTRAMUSCULAR; INTRAVENOUS at 10:13

## 2025-05-15 RX ADMIN — DEXAMETHASONE 8 MG: 4 TABLET ORAL at 10:11

## 2025-05-15 RX ADMIN — CARFILZOMIB 102 MG: 10 INJECTION, POWDER, LYOPHILIZED, FOR SOLUTION INTRAVENOUS at 11:06

## 2025-05-15 NOTE — PROGRESS NOTES
[facility] Chemotherapy Progress Note    Date: May 15, 2025    Name: Candida Weaver    MRN: [id number]         : 1955      1000 Pt admit to [facility] Kyprolis ambulatory in stable condition. Assessment completed. No new concerns voiced. Port with positive blood return. Labs sent for processing.       Ms. Weaver's vitals were reviewed.  Patient Vitals for the past 12 hrs:   Temp Pulse Resp BP SpO2   05/15/25 0945 97.4 °F (36.3 °C) 70 18 121/78 97 %         Lab results were obtained and reviewed.      Pre-medications  were administered as ordered and chemotherapy was initiated.  Medications Administered         carfilzomib (KYPROLIS) 102 mg in dextrose 5 % 100 mL chemo IVPB Admin Date  05/15/2025 Action  New Bag Dose  102 mg Rate  322 mL/hr Route  IntraVENous Documented By  Nicole Villegas, RN        dexAMETHasone (DECADRON) tablet 8 mg Admin Date  05/15/2025 Action  Given Dose  8 mg Rate   Route  Oral Documented By  Nicole Villegas, RN        dextrose 5 % solution Admin Date  05/15/2025 Action  New Bag Dose  25 mL/hr Rate  25 mL/hr Route  IntraVENous Documented By  Nicole Villegas, RN        ondansetron (ZOFRAN) injection 8 mg Admin Date  05/15/2025 Action  Given Dose  8 mg Rate   Route  IntraVENous Documented By  Nicole Villegas, RN            Two nurses verified prior to administering:Drug name, Drug doseInfusion volume or drug volume when prepared in a syringe, Rate of administration, Route of administration, Expiration dates and/or times, Appearance and physical integrity of the drugs, Rate set on infusion pump, when used, Sequencing of drug administration.       Pt tolerated treatment well. Port maintained positive blood return throughout treatment. Flushed, heparinized and de-accessed per protocol. D/c home ambulatory in no distress. Pt aware of next appointment scheduled.    Future Appointments   Date Time Provider Department Center   2025  9:30 AM LAB CHAIR OBEY [facility]   2025 11:30 AM RUTH WISE

## 2025-05-21 ENCOUNTER — HOSPITAL ENCOUNTER (OUTPATIENT)
Facility: HOSPITAL | Age: 70
Setting detail: INFUSION SERIES
Discharge: HOME | End: 2025-05-21
Payer: MEDICARE

## 2025-05-21 VITALS
HEART RATE: 67 BPM | SYSTOLIC BLOOD PRESSURE: 114 MMHG | OXYGEN SATURATION: 99 % | TEMPERATURE: 98.8 F | RESPIRATION RATE: 18 BRPM | DIASTOLIC BLOOD PRESSURE: 73 MMHG

## 2025-05-21 DIAGNOSIS — Z11.59 ENCOUNTER FOR SCREENING FOR OTHER VIRAL DISEASES: ICD-10-CM

## 2025-05-21 DIAGNOSIS — C90.00 MULTIPLE MYELOMA NOT HAVING ACHIEVED REMISSION (HCC): ICD-10-CM

## 2025-05-21 LAB
ALBUMIN SERPL-MCNC: 3.2 G/DL (ref 3.5–5)
ALBUMIN/GLOB SERPL: 1.3 (ref 1.1–2.2)
ALP SERPL-CCNC: 68 U/L (ref 45–117)
ALT SERPL-CCNC: 35 U/L (ref 12–78)
ANION GAP SERPL CALC-SCNC: 6 MMOL/L (ref 2–12)
AST SERPL-CCNC: 4 U/L (ref 15–37)
BASOPHILS # BLD: 0.15 K/UL (ref 0–0.1)
BASOPHILS NFR BLD: 3 % (ref 0–1)
BILIRUB SERPL-MCNC: 0.4 MG/DL (ref 0.2–1)
BUN SERPL-MCNC: 14 MG/DL (ref 6–20)
BUN/CREAT SERPL: 23 (ref 12–20)
CALCIUM SERPL-MCNC: 9 MG/DL (ref 8.5–10.1)
CHLORIDE SERPL-SCNC: 111 MMOL/L (ref 97–108)
CO2 SERPL-SCNC: 23 MMOL/L (ref 21–32)
CREAT SERPL-MCNC: 0.6 MG/DL (ref 0.55–1.02)
DIFFERENTIAL METHOD BLD: ABNORMAL
EOSINOPHIL # BLD: 0.15 K/UL (ref 0–0.4)
EOSINOPHIL NFR BLD: 3 % (ref 0–7)
ERYTHROCYTE [DISTWIDTH] IN BLOOD BY AUTOMATED COUNT: 14.5 % (ref 11.5–14.5)
GLOBULIN SER CALC-MCNC: 2.5 G/DL (ref 2–4)
GLUCOSE SERPL-MCNC: 101 MG/DL (ref 65–100)
HCT VFR BLD AUTO: 31.6 % (ref 35–47)
HGB BLD-MCNC: 10.4 G/DL (ref 11.5–16)
IMM GRANULOCYTES # BLD AUTO: 0 K/UL
IMM GRANULOCYTES NFR BLD AUTO: 0 %
LYMPHOCYTES # BLD: 1 K/UL (ref 0.8–3.5)
LYMPHOCYTES NFR BLD: 20 % (ref 12–49)
MCH RBC QN AUTO: 30.5 PG (ref 26–34)
MCHC RBC AUTO-ENTMCNC: 32.9 G/DL (ref 30–36.5)
MCV RBC AUTO: 92.7 FL (ref 80–99)
MONOCYTES # BLD: 0.9 K/UL (ref 0–1)
MONOCYTES NFR BLD: 18 % (ref 5–13)
NEUTS SEG # BLD: 2.8 K/UL (ref 1.8–8)
NEUTS SEG NFR BLD: 56 % (ref 32–75)
NRBC # BLD: 0.02 K/UL (ref 0–0.01)
NRBC BLD-RTO: 0.4 PER 100 WBC
PLATELET # BLD AUTO: 232 K/UL (ref 150–400)
PMV BLD AUTO: 12.3 FL (ref 8.9–12.9)
POTASSIUM SERPL-SCNC: 3.9 MMOL/L (ref 3.5–5.1)
PROT SERPL-MCNC: 5.7 G/DL (ref 6.4–8.2)
RBC # BLD AUTO: 3.41 M/UL (ref 3.8–5.2)
RBC MORPH BLD: ABNORMAL
SODIUM SERPL-SCNC: 140 MMOL/L (ref 136–145)
WBC # BLD AUTO: 5 K/UL (ref 3.6–11)
WBC MORPH BLD: ABNORMAL

## 2025-05-21 PROCEDURE — 85025 COMPLETE CBC W/AUTO DIFF WBC: CPT

## 2025-05-21 PROCEDURE — 80053 COMPREHEN METABOLIC PANEL: CPT

## 2025-05-21 ASSESSMENT — PAIN SCALES - GENERAL: PAINLEVEL_OUTOF10: 6

## 2025-05-21 ASSESSMENT — PAIN DESCRIPTION - ORIENTATION: ORIENTATION: RIGHT

## 2025-05-21 ASSESSMENT — PAIN DESCRIPTION - LOCATION: LOCATION: HAND

## 2025-05-21 ASSESSMENT — PAIN DESCRIPTION - PAIN TYPE: TYPE: NEUROPATHIC PAIN

## 2025-05-21 ASSESSMENT — PAIN DESCRIPTION - DESCRIPTORS: DESCRIPTORS: TIGHTNESS;NUMBNESS

## 2025-05-21 NOTE — PROGRESS NOTES
[facility] Peds/Adult Note                       Date: May 21, 2025    Name: Candida Weaver    MRN: [id number]         : 1955    0935 Patient arrives for Pre-Treatment Labs without acute problems. Please see Epic for complete assessment and education provided.    Vital signs stable throughout and prior to discharge. Patient tolerated procedure well and was discharged without incident.  Patient is aware of next [facility] appointment on 2025.  Appointment card give to the Patient.       Ms. Weaver's vitals were reviewed prior to and after treatment.   Patient Vitals for the past 12 hrs:   Temp Pulse Resp BP SpO2   25 0935 98.8 °F (37.1 °C) 67 18 114/73 99 %     Lab results were obtained and reviewed.  Recent Results (from the past 12 hours)   CBC With Auto Differential    Collection Time: 25  9:44 AM   Result Value Ref Range    WBC 5.0 3.6 - 11.0 K/uL    RBC 3.41 (L) 3.80 - 5.20 M/uL    Hemoglobin 10.4 (L) 11.5 - 16.0 g/dL    Hematocrit 31.6 (L) 35.0 - 47.0 %    MCV 92.7 80.0 - 99.0 FL    MCH 30.5 26.0 - 34.0 PG    MCHC 32.9 30.0 - 36.5 g/dL    RDW 14.5 11.5 - 14.5 %    Platelets 232 150 - 400 K/uL    MPV 12.3 8.9 - 12.9 FL    Nucleated RBCs 0.4 (H) 0  WBC    nRBC 0.02 (H) 0.00 - 0.01 K/uL    Neutrophils % 56 32 - 75 %    Lymphocytes % 20 12 - 49 %    Monocytes % 18 (H) 5 - 13 %    Eosinophils % 3 0 - 7 %    Basophils % 3 (H) 0 - 1 %    Immature Granulocytes % 0 %    Neutrophils Absolute 2.80 1.8 - 8.0 K/UL    Lymphocytes Absolute 1.00 0.8 - 3.5 K/UL    Monocytes Absolute 0.90 0.0 - 1.0 K/UL    Eosinophils Absolute 0.15 0.0 - 0.4 K/UL    Basophils Absolute 0.15 (H) 0.0 - 0.1 K/UL    Immature Granulocytes Absolute 0.00 K/UL    Differential Type MANUAL      RBC Comment ANISOCYTOSIS  1+        WBC Comment REACTIVE LYMPHS     Comprehensive metabolic panel    Collection Time: 25  9:44 AM   Result Value Ref Range    Sodium 140 136 - 145 mmol/L    Potassium 3.9 3.5 - 5.1 mmol/L    Chloride 111 (H)

## 2025-05-22 ENCOUNTER — HOSPITAL ENCOUNTER (OUTPATIENT)
Facility: HOSPITAL | Age: 70
Setting detail: INFUSION SERIES
Discharge: HOME | End: 2025-05-22
Payer: MEDICARE

## 2025-05-22 VITALS
WEIGHT: 149 LBS | HEART RATE: 61 BPM | SYSTOLIC BLOOD PRESSURE: 126 MMHG | BODY MASS INDEX: 24.83 KG/M2 | DIASTOLIC BLOOD PRESSURE: 48 MMHG | HEIGHT: 65 IN | RESPIRATION RATE: 18 BRPM | TEMPERATURE: 97.9 F

## 2025-05-22 DIAGNOSIS — Z11.59 ENCOUNTER FOR SCREENING FOR OTHER VIRAL DISEASES: ICD-10-CM

## 2025-05-22 DIAGNOSIS — C90.00 MULTIPLE MYELOMA NOT HAVING ACHIEVED REMISSION (HCC): Primary | ICD-10-CM

## 2025-05-22 PROCEDURE — 96375 TX/PRO/DX INJ NEW DRUG ADDON: CPT

## 2025-05-22 PROCEDURE — 96413 CHEMO IV INFUSION 1 HR: CPT

## 2025-05-22 PROCEDURE — 6360000002 HC RX W HCPCS: Performed by: INTERNAL MEDICINE

## 2025-05-22 PROCEDURE — 2580000003 HC RX 258: Performed by: INTERNAL MEDICINE

## 2025-05-22 RX ORDER — SODIUM CHLORIDE 0.9 % (FLUSH) 0.9 %
5-40 SYRINGE (ML) INJECTION PRN
Status: DISCONTINUED | OUTPATIENT
Start: 2025-05-22 | End: 2025-05-23 | Stop reason: HOSPADM

## 2025-05-22 RX ORDER — DEXTROSE MONOHYDRATE 50 MG/ML
5-250 INJECTION, SOLUTION INTRAVENOUS PRN
Status: DISCONTINUED | OUTPATIENT
Start: 2025-05-22 | End: 2025-05-23 | Stop reason: HOSPADM

## 2025-05-22 RX ORDER — ONDANSETRON 2 MG/ML
8 INJECTION INTRAMUSCULAR; INTRAVENOUS ONCE
Status: COMPLETED | OUTPATIENT
Start: 2025-05-22 | End: 2025-05-22

## 2025-05-22 RX ORDER — DEXAMETHASONE 4 MG/1
8 TABLET ORAL ONCE
Status: COMPLETED | OUTPATIENT
Start: 2025-05-22 | End: 2025-05-22

## 2025-05-22 RX ADMIN — DEXAMETHASONE 8 MG: 4 TABLET ORAL at 12:15

## 2025-05-22 RX ADMIN — CARFILZOMIB 102 MG: 10 INJECTION, POWDER, LYOPHILIZED, FOR SOLUTION INTRAVENOUS at 12:57

## 2025-05-22 RX ADMIN — ONDANSETRON 8 MG: 2 INJECTION, SOLUTION INTRAMUSCULAR; INTRAVENOUS at 12:15

## 2025-05-22 RX ADMIN — DEXTROSE 25 ML/HR: 5 SOLUTION INTRAVENOUS at 12:01

## 2025-05-22 ASSESSMENT — PAIN DESCRIPTION - ORIENTATION: ORIENTATION: RIGHT

## 2025-05-22 ASSESSMENT — PAIN SCALES - GENERAL: PAINLEVEL_OUTOF10: 6

## 2025-05-22 ASSESSMENT — PAIN DESCRIPTION - DESCRIPTORS: DESCRIPTORS: TINGLING;NUMBNESS

## 2025-05-22 ASSESSMENT — PAIN DESCRIPTION - PAIN TYPE: TYPE: NEUROPATHIC PAIN

## 2025-05-22 ASSESSMENT — PAIN DESCRIPTION - LOCATION: LOCATION: HAND

## 2025-05-22 NOTE — PROGRESS NOTES
[facility] Short Note                       Date: May 22, 2025    Name: Candida Weaver    MRN: [id number]         : 1955      Pt admit to [facility] for Kyprolis ambulatory in stable condition. Assessment completed and documented in flowsheets by assessment RN. No acute concerns at this time.  Please review pending lab results in CC.      Ms. Weaver's vitals were reviewed prior to and after treatment.   Patient Vitals for the past 12 hrs:   Temp Pulse Resp BP   25 1330 -- 61 -- (!) 126/48   25 1145 97.9 °F (36.6 °C) 66 18 (!) 120/56         Lab results were obtained and reviewed. Labs within parameter for treatment. Labs done 25.  No results found for this or any previous visit (from the past 12 hours).    Medications given:  Medications Administered         carfilzomib (KYPROLIS) 102 mg in dextrose 5 % 100 mL chemo IVPB Admin Date  2025 Action  New Bag Dose  102 mg Rate  322 mL/hr Route  IntraVENous Documented By  Aishwarya Gimenez RN        dexAMETHasone (DECADRON) tablet 8 mg Admin Date  2025 Action  Given Dose  8 mg Rate   Route  Oral Documented By  Aishwarya Gimenez RN        dextrose 5 % solution Admin Date  2025 Action  New Bag Dose  25 mL/hr Rate  25 mL/hr Route  IntraVENous Documented By  Aishwarya Gimenez RN        ondansetron (ZOFRAN) injection 8 mg Admin Date  2025 Action  Given Dose  8 mg Rate   Route  IntraVENous Documented By  Aishwarya Gimenez RN            Port accessed with positive blood return. Port flushed and de-accessed per protocol.     Two nurses verified prior to administering: Drug name, Drug dose, Infusion volume or drug volume when prepared in a syringe, Rate of administration, Route of administration, Expiration dates and/or times, Appearance and physical integrity of the drugs, Rate set on infusion pump, when used, and Sequencing of drug administration.    Ms. Weaver tolerated the infusion, and had no complaints.    Ms. Weaver was discharged from

## 2025-05-27 DIAGNOSIS — C90.00 MULTIPLE MYELOMA NOT HAVING ACHIEVED REMISSION (HCC): ICD-10-CM

## 2025-05-27 RX ORDER — POMALIDOMIDE 4 MG/1
4 CAPSULE ORAL DAILY
Qty: 21 CAPSULE | Refills: 0 | Status: ACTIVE | OUTPATIENT
Start: 2025-05-27

## 2025-05-27 NOTE — TELEPHONE ENCOUNTER
Oral Chemotherapy      Candida Weaver is a  69 y.o.female  diagnosed with multiple myeloma . Ms. Weaver is being treated with KPd.      Medication name: pomalidomide  Regimen: KPd  Dose:  4 mg (increase dose for 9/26/24)  Frequency: daily  Administration schedule: for 21 days followed by 7 days off every 28 days  Ordering provider: Nany Arboleda MD  Start date: 5/8/25 (Cycle 15)      REMS auth # [id number]  Prescription sent to pharmacy for processing.         Rose Pa, WOOD, BCOP, BCPS    For Pharmacy Admin Tracking Only    Program: Medical Group  CPA in place:  Yes  Recommendation Provided To: Patient/Caregiver: 1 via Telephone  Intervention Detail: Refill(s) Provided  Intervention Accepted By: Patient/Caregiver: 1    Time Spent (min): 10

## 2025-06-05 ENCOUNTER — TELEPHONE (OUTPATIENT)
Age: 70
End: 2025-06-05

## 2025-06-05 NOTE — TELEPHONE ENCOUNTER
Oral Cancer therapy     Candida Weaver is a  70 y.o.female  diagnosed with multiple myeloma . Ms. Weaver is being treated with KPd.      Medication name: pomalidomide  Regimen: KPd  Dose:  4 mg (increase dose for 9/26/24)  Frequency: daily  Administration schedule: for 21 days followed by 7 days off every 28 days  Ordering provider: Nany Arboleda MD  Start date: 5/8/25 (Cycle 15)    Call to Ms. Weaver - re-cancelling [facility] appt today 6/5/25. Today was the scheduled start of Cycle 16. Discussed not to start her cycle of Pomalyst today - she stated that she took her dose this morning already. She is aware and understands not to take anymore until after follow-up with Dr. Arboleda on 6/11/25 -office visit at 9:45. We need to await results of bone marrow biopsy and potential plan for transplant.       Ms. Weaver verbalized understanding of the information presented and all of the patient's questions were answered.     Rose Pa, PHARMD, BCOP, BCPS        For Pharmacy Admin Tracking Only    Program: Medical Group  CPA in place:  Yes  Recommendation Provided To: Patient/Caregiver: 1 via Telephone    Intervention Accepted By: Patient/Caregiver: 1    Time Spent (min): 15

## 2025-06-11 ENCOUNTER — HOSPITAL ENCOUNTER (OUTPATIENT)
Facility: HOSPITAL | Age: 70
Setting detail: INFUSION SERIES
Discharge: HOME | End: 2025-06-11
Payer: MEDICARE

## 2025-06-11 ENCOUNTER — OFFICE VISIT (OUTPATIENT)
Age: 70
End: 2025-06-11
Payer: MEDICARE

## 2025-06-11 VITALS
HEART RATE: 59 BPM | WEIGHT: 152 LBS | RESPIRATION RATE: 18 BRPM | TEMPERATURE: 97.4 F | DIASTOLIC BLOOD PRESSURE: 59 MMHG | BODY MASS INDEX: 25.29 KG/M2 | OXYGEN SATURATION: 98 % | SYSTOLIC BLOOD PRESSURE: 128 MMHG

## 2025-06-11 VITALS
HEART RATE: 59 BPM | DIASTOLIC BLOOD PRESSURE: 59 MMHG | SYSTOLIC BLOOD PRESSURE: 128 MMHG | TEMPERATURE: 97.4 F | OXYGEN SATURATION: 98 % | RESPIRATION RATE: 18 BRPM

## 2025-06-11 VITALS — BODY MASS INDEX: 25.33 KG/M2 | HEIGHT: 65 IN | WEIGHT: 152 LBS

## 2025-06-11 DIAGNOSIS — C90.00 MULTIPLE MYELOMA NOT HAVING ACHIEVED REMISSION (HCC): ICD-10-CM

## 2025-06-11 DIAGNOSIS — C90.00 MULTIPLE MYELOMA NOT HAVING ACHIEVED REMISSION (HCC): Primary | ICD-10-CM

## 2025-06-11 DIAGNOSIS — Z11.59 ENCOUNTER FOR SCREENING FOR OTHER VIRAL DISEASES: Primary | ICD-10-CM

## 2025-06-11 DIAGNOSIS — Z11.59 ENCOUNTER FOR SCREENING FOR OTHER VIRAL DISEASES: ICD-10-CM

## 2025-06-11 LAB
ALBUMIN SERPL-MCNC: 3.3 G/DL (ref 3.5–5)
ALBUMIN/GLOB SERPL: 1.4 (ref 1.1–2.2)
ALP SERPL-CCNC: 79 U/L (ref 45–117)
ALT SERPL-CCNC: 30 U/L (ref 12–78)
ANION GAP SERPL CALC-SCNC: 5 MMOL/L (ref 2–12)
AST SERPL-CCNC: 14 U/L (ref 15–37)
BASOPHILS # BLD: 0.13 K/UL (ref 0–0.1)
BASOPHILS NFR BLD: 4.5 % (ref 0–1)
BILIRUB SERPL-MCNC: 0.4 MG/DL (ref 0.2–1)
BUN SERPL-MCNC: 18 MG/DL (ref 6–20)
BUN/CREAT SERPL: 32 (ref 12–20)
CALCIUM SERPL-MCNC: 8.9 MG/DL (ref 8.5–10.1)
CHLORIDE SERPL-SCNC: 110 MMOL/L (ref 97–108)
CO2 SERPL-SCNC: 22 MMOL/L (ref 21–32)
CREAT SERPL-MCNC: 0.56 MG/DL (ref 0.55–1.02)
DIFFERENTIAL METHOD BLD: ABNORMAL
EOSINOPHIL # BLD: 0.07 K/UL (ref 0–0.4)
EOSINOPHIL NFR BLD: 2.4 % (ref 0–7)
ERYTHROCYTE [DISTWIDTH] IN BLOOD BY AUTOMATED COUNT: 13.6 % (ref 11.5–14.5)
GLOBULIN SER CALC-MCNC: 2.4 G/DL (ref 2–4)
GLUCOSE SERPL-MCNC: 114 MG/DL (ref 65–100)
HCT VFR BLD AUTO: 34.6 % (ref 35–47)
HGB BLD-MCNC: 10.5 G/DL (ref 11.5–16)
IMM GRANULOCYTES # BLD AUTO: 0 K/UL (ref 0–0.04)
IMM GRANULOCYTES NFR BLD AUTO: 0 % (ref 0–0.5)
LYMPHOCYTES # BLD: 1.01 K/UL (ref 0.8–3.5)
LYMPHOCYTES NFR BLD: 34.9 % (ref 12–49)
MCH RBC QN AUTO: 29.9 PG (ref 26–34)
MCHC RBC AUTO-ENTMCNC: 30.3 G/DL (ref 30–36.5)
MCV RBC AUTO: 98.6 FL (ref 80–99)
MONOCYTES # BLD: 0.5 K/UL (ref 0–1)
MONOCYTES NFR BLD: 17.3 % (ref 5–13)
NEUTS SEG # BLD: 1.19 K/UL (ref 1.8–8)
NEUTS SEG NFR BLD: 40.9 % (ref 32–75)
NRBC # BLD: 0 K/UL (ref 0–0.01)
NRBC BLD-RTO: 0 PER 100 WBC
PLATELET # BLD AUTO: 233 K/UL (ref 150–400)
PMV BLD AUTO: 11.2 FL (ref 8.9–12.9)
POTASSIUM SERPL-SCNC: 4.2 MMOL/L (ref 3.5–5.1)
PROT SERPL-MCNC: 5.7 G/DL (ref 6.4–8.2)
RBC # BLD AUTO: 3.51 M/UL (ref 3.8–5.2)
RBC MORPH BLD: ABNORMAL
SODIUM SERPL-SCNC: 137 MMOL/L (ref 136–145)
WBC # BLD AUTO: 2.9 K/UL (ref 3.6–11)
WBC MORPH BLD: ABNORMAL

## 2025-06-11 PROCEDURE — 2580000003 HC RX 258: Performed by: INTERNAL MEDICINE

## 2025-06-11 PROCEDURE — 84165 PROTEIN E-PHORESIS SERUM: CPT

## 2025-06-11 PROCEDURE — 83521 IG LIGHT CHAINS FREE EACH: CPT

## 2025-06-11 PROCEDURE — 1036F TOBACCO NON-USER: CPT

## 2025-06-11 PROCEDURE — 96367 TX/PROPH/DG ADDL SEQ IV INF: CPT

## 2025-06-11 PROCEDURE — 1123F ACP DISCUSS/DSCN MKR DOCD: CPT

## 2025-06-11 PROCEDURE — 96375 TX/PRO/DX INJ NEW DRUG ADDON: CPT

## 2025-06-11 PROCEDURE — 80053 COMPREHEN METABOLIC PANEL: CPT

## 2025-06-11 PROCEDURE — G8427 DOCREV CUR MEDS BY ELIG CLIN: HCPCS

## 2025-06-11 PROCEDURE — 6360000002 HC RX W HCPCS

## 2025-06-11 PROCEDURE — 99215 OFFICE O/P EST HI 40 MIN: CPT

## 2025-06-11 PROCEDURE — 86334 IMMUNOFIX E-PHORESIS SERUM: CPT

## 2025-06-11 PROCEDURE — G8399 PT W/DXA RESULTS DOCUMENT: HCPCS

## 2025-06-11 PROCEDURE — 85025 COMPLETE CBC W/AUTO DIFF WBC: CPT

## 2025-06-11 PROCEDURE — 3017F COLORECTAL CA SCREEN DOC REV: CPT

## 2025-06-11 PROCEDURE — 1159F MED LIST DOCD IN RCRD: CPT

## 2025-06-11 PROCEDURE — 96413 CHEMO IV INFUSION 1 HR: CPT

## 2025-06-11 PROCEDURE — G8419 CALC BMI OUT NRM PARAM NOF/U: HCPCS

## 2025-06-11 PROCEDURE — 1125F AMNT PAIN NOTED PAIN PRSNT: CPT

## 2025-06-11 PROCEDURE — 1090F PRES/ABSN URINE INCON ASSESS: CPT

## 2025-06-11 PROCEDURE — 6360000002 HC RX W HCPCS: Performed by: INTERNAL MEDICINE

## 2025-06-11 PROCEDURE — 1160F RVW MEDS BY RX/DR IN RCRD: CPT

## 2025-06-11 PROCEDURE — 82784 ASSAY IGA/IGD/IGG/IGM EACH: CPT

## 2025-06-11 RX ORDER — SODIUM CHLORIDE 9 MG/ML
INJECTION, SOLUTION INTRAVENOUS CONTINUOUS
Status: CANCELLED | OUTPATIENT
Start: 2025-06-19

## 2025-06-11 RX ORDER — EPINEPHRINE 1 MG/ML
0.3 INJECTION, SOLUTION INTRAMUSCULAR; SUBCUTANEOUS PRN
OUTPATIENT
Start: 2025-09-03

## 2025-06-11 RX ORDER — SODIUM CHLORIDE 9 MG/ML
5-250 INJECTION, SOLUTION INTRAVENOUS PRN
OUTPATIENT
Start: 2025-09-03

## 2025-06-11 RX ORDER — DEXAMETHASONE 4 MG/1
8 TABLET ORAL ONCE
Status: COMPLETED | OUTPATIENT
Start: 2025-06-11 | End: 2025-06-11

## 2025-06-11 RX ORDER — ONDANSETRON 2 MG/ML
8 INJECTION INTRAMUSCULAR; INTRAVENOUS
OUTPATIENT
Start: 2025-09-03

## 2025-06-11 RX ORDER — 0.9 % SODIUM CHLORIDE 0.9 %
500 INTRAVENOUS SOLUTION INTRAVENOUS ONCE
Status: COMPLETED | OUTPATIENT
Start: 2025-06-11 | End: 2025-06-11

## 2025-06-11 RX ORDER — DIPHENHYDRAMINE HYDROCHLORIDE 50 MG/ML
50 INJECTION, SOLUTION INTRAMUSCULAR; INTRAVENOUS
Status: CANCELLED | OUTPATIENT
Start: 2025-06-19

## 2025-06-11 RX ORDER — ONDANSETRON 2 MG/ML
8 INJECTION INTRAMUSCULAR; INTRAVENOUS ONCE
Status: COMPLETED | OUTPATIENT
Start: 2025-06-11 | End: 2025-06-11

## 2025-06-11 RX ORDER — ALBUTEROL SULFATE 90 UG/1
4 INHALANT RESPIRATORY (INHALATION) PRN
OUTPATIENT
Start: 2025-09-03

## 2025-06-11 RX ORDER — SODIUM CHLORIDE 0.9 % (FLUSH) 0.9 %
5-40 SYRINGE (ML) INJECTION PRN
OUTPATIENT
Start: 2025-09-03

## 2025-06-11 RX ORDER — SODIUM CHLORIDE 0.9 % (FLUSH) 0.9 %
5-40 SYRINGE (ML) INJECTION PRN
Status: DISCONTINUED | OUTPATIENT
Start: 2025-06-11 | End: 2025-06-12 | Stop reason: HOSPADM

## 2025-06-11 RX ORDER — ZOLEDRONIC ACID 0.04 MG/ML
4 INJECTION, SOLUTION INTRAVENOUS ONCE
Status: COMPLETED | OUTPATIENT
Start: 2025-06-11 | End: 2025-06-11

## 2025-06-11 RX ORDER — SODIUM CHLORIDE 9 MG/ML
5-250 INJECTION, SOLUTION INTRAVENOUS PRN
Status: DISCONTINUED | OUTPATIENT
Start: 2025-06-11 | End: 2025-06-12 | Stop reason: HOSPADM

## 2025-06-11 RX ORDER — ACETAMINOPHEN 325 MG/1
650 TABLET ORAL
OUTPATIENT
Start: 2025-09-03

## 2025-06-11 RX ORDER — DEXTROSE MONOHYDRATE 50 MG/ML
5-250 INJECTION, SOLUTION INTRAVENOUS PRN
Status: DISCONTINUED | OUTPATIENT
Start: 2025-06-11 | End: 2025-06-12 | Stop reason: HOSPADM

## 2025-06-11 RX ORDER — ZOLEDRONIC ACID 0.04 MG/ML
4 INJECTION, SOLUTION INTRAVENOUS ONCE
OUTPATIENT
Start: 2025-09-03 | End: 2025-09-03

## 2025-06-11 RX ORDER — EPINEPHRINE 1 MG/ML
0.3 INJECTION, SOLUTION INTRAMUSCULAR; SUBCUTANEOUS PRN
Status: CANCELLED | OUTPATIENT
Start: 2025-06-19

## 2025-06-11 RX ORDER — ONDANSETRON 2 MG/ML
8 INJECTION INTRAMUSCULAR; INTRAVENOUS
Status: CANCELLED | OUTPATIENT
Start: 2025-06-19

## 2025-06-11 RX ORDER — HYDROCORTISONE SODIUM SUCCINATE 100 MG/2ML
100 INJECTION INTRAMUSCULAR; INTRAVENOUS
Status: CANCELLED | OUTPATIENT
Start: 2025-06-19

## 2025-06-11 RX ORDER — DIPHENHYDRAMINE HYDROCHLORIDE 50 MG/ML
50 INJECTION, SOLUTION INTRAMUSCULAR; INTRAVENOUS
OUTPATIENT
Start: 2025-09-03

## 2025-06-11 RX ORDER — HEPARIN 100 UNIT/ML
500 SYRINGE INTRAVENOUS PRN
OUTPATIENT
Start: 2025-09-03

## 2025-06-11 RX ORDER — SODIUM CHLORIDE 9 MG/ML
INJECTION, SOLUTION INTRAVENOUS CONTINUOUS
OUTPATIENT
Start: 2025-09-03

## 2025-06-11 RX ORDER — ACETAMINOPHEN 325 MG/1
650 TABLET ORAL
Status: CANCELLED | OUTPATIENT
Start: 2025-06-19

## 2025-06-11 RX ORDER — ALBUTEROL SULFATE 90 UG/1
4 INHALANT RESPIRATORY (INHALATION) PRN
Status: CANCELLED | OUTPATIENT
Start: 2025-06-19

## 2025-06-11 RX ORDER — HYDROCORTISONE SODIUM SUCCINATE 100 MG/2ML
100 INJECTION INTRAMUSCULAR; INTRAVENOUS
OUTPATIENT
Start: 2025-09-03

## 2025-06-11 RX ADMIN — ONDANSETRON 8 MG: 2 INJECTION, SOLUTION INTRAMUSCULAR; INTRAVENOUS at 11:50

## 2025-06-11 RX ADMIN — ZOLEDRONIC ACID 4 MG: 0.04 INJECTION, SOLUTION INTRAVENOUS at 11:59

## 2025-06-11 RX ADMIN — DEXTROSE 75 ML/HR: 5 SOLUTION INTRAVENOUS at 12:53

## 2025-06-11 RX ADMIN — DEXAMETHASONE 8 MG: 4 TABLET ORAL at 11:57

## 2025-06-11 RX ADMIN — SODIUM CHLORIDE 500 ML: 9 INJECTION, SOLUTION INTRAVENOUS at 11:50

## 2025-06-11 RX ADMIN — CARFILZOMIB 102 MG: 10 INJECTION, POWDER, LYOPHILIZED, FOR SOLUTION INTRAVENOUS at 13:03

## 2025-06-11 ASSESSMENT — PAIN DESCRIPTION - DESCRIPTORS: DESCRIPTORS: ACHING;TINGLING;NUMBNESS

## 2025-06-11 ASSESSMENT — PAIN SCALES - WONG BAKER: WONGBAKER_NUMERICALRESPONSE: NO HURT

## 2025-06-11 ASSESSMENT — PAIN DESCRIPTION - PAIN TYPE: TYPE: NEUROPATHIC PAIN

## 2025-06-11 ASSESSMENT — PAIN DESCRIPTION - LOCATION: LOCATION: HAND;BACK

## 2025-06-11 ASSESSMENT — PAIN DESCRIPTION - ORIENTATION: ORIENTATION: LOWER;RIGHT

## 2025-06-11 ASSESSMENT — PAIN SCALES - GENERAL: PAINLEVEL_OUTOF10: 5

## 2025-06-11 NOTE — PROGRESS NOTES
Candida Weaver is a 70 y.o. female    Chief Complaint   Patient presents with    Follow-up      Multiple Myeloma        1. Have you been to the ER, urgent care clinic since your last visit?  Hospitalized since your last visit?No    2. Have you seen or consulted any other health care providers outside of the [facility] since your last visit?  Include any pap smears or colon screening. Yes, [facility]      
mg/L Final   04/03/2024 09:49 AM 1,213.4 (H) 3.3 - 19.4 mg/L Final   03/14/2024 09:39 AM 1,159.9 (H) 3.3 - 19.4 mg/L Final   03/01/2024 10:48 AM 1,141.4 (H) 3.3 - 19.4 mg/L Final   02/14/2024 09:38 AM 1,176.9 (H) 3.3 - 19.4 mg/L Final   02/09/2024 10:26 AM 1,198.4 (H) 3.3 - 19.4 mg/L Final   01/17/2024 01:23 PM 1,193.6 (H) 3.3 - 19.4 mg/L Final   01/05/2024 11:26 AM 1,319.3 (H) 3.3 - 19.4 mg/L Final   12/29/2023 10:33 AM 1,378.9 (H) 3.3 - 19.4 mg/L Final   12/06/2023 09:56 AM 2,178.2 (H) 3.3 - 19.4 mg/L Final   09/06/2023 09:46 AM 1,378.7 (H) 3.3 - 19.4 mg/L Final   07/05/2023 04:30 PM 1,593.1 (H) 3.3 - 19.4 mg/L Final     Free Lambda Light Chains   Date/Time Value Ref Range Status   05/07/2025 09:37 AM 1.8 (L) 5.7 - 26.3 mg/L Final   04/09/2025 09:45 AM 1.6 (L) 5.7 - 26.3 mg/L Final   03/12/2025 09:35 AM 1.8 (L) 5.7 - 26.3 mg/L Final   02/12/2025 11:16 AM 1.9 (L) 5.7 - 26.3 mg/L Final   01/15/2025 09:38 AM 1.9 (L) 5.7 - 26.3 mg/L Final   12/24/2024 10:40 AM 3.4 (L) 5.7 - 26.3 mg/L Final   12/18/2024 09:41 AM 5.6 (L) 5.7 - 26.3 mg/L Final   11/20/2024 09:48 AM 2.8 (L) 5.7 - 26.3 mg/L Final   10/23/2024 09:54 AM 2.3 (L) 5.7 - 26.3 mg/L Final   09/18/2024 09:48 AM 1.9 (L) 5.7 - 26.3 mg/L Final   08/21/2024 09:55 AM 6.8 5.7 - 26.3 mg/L Final   07/31/2024 09:49 AM 2.3 (L) 5.7 - 26.3 mg/L Final   07/02/2024 09:42 AM 1.7 (L) 5.7 - 26.3 mg/L Final   06/05/2024 09:58 AM 3.4 (L) 5.7 - 26.3 mg/L Final   05/29/2024 09:40 AM 2.7 (L) 5.7 - 26.3 mg/L Final   05/01/2024 09:49 AM 2.4 (L) 5.7 - 26.3 mg/L Final   04/03/2024 09:49 AM 2.1 (L) 5.7 - 26.3 mg/L Final   03/14/2024 09:39 AM 2.2 (L) 5.7 - 26.3 mg/L Final   03/01/2024 10:48 AM 2.4 (L) 5.7 - 26.3 mg/L Final   02/14/2024 09:38 AM 2.3 (L) 5.7 - 26.3 mg/L Final   02/09/2024 10:26 AM 2.2 (L) 5.7 - 26.3 mg/L Final   01/17/2024 01:23 PM <1.5 (L) 5.7 - 26.3 mg/L Final   01/05/2024 11:26 AM 2.9 (L) 5.7 - 26.3 mg/L Final   12/29/2023 10:33 AM 1.5 (L) 5.7 - 26.3 mg/L Final   12/06/2023

## 2025-06-11 NOTE — PROGRESS NOTES
[facility] Chemotherapy Progress Note    Date: 2025    Name: Candida Weaver    MRN: [id number]         : 1955      0930 Pt admit to [facility] for Kyprolis and Zometa ambulatory in stable condition. Assessment completed. No new concerns voiced. Port with positive blood return. Labs sent for processing.         Ms. Weaver's vitals were reviewed.        Lab results were obtained and reviewed.  Recent Results (from the past 12 hours)   CBC With Auto Differential    Collection Time: 25  8:37 AM   Result Value Ref Range    WBC 2.9 (L) 3.6 - 11.0 K/uL    RBC 3.51 (L) 3.80 - 5.20 M/uL    Hemoglobin 10.5 (L) 11.5 - 16.0 g/dL    Hematocrit 34.6 (L) 35.0 - 47.0 %    MCV 98.6 80.0 - 99.0 FL    MCH 29.9 26.0 - 34.0 PG    MCHC 30.3 30.0 - 36.5 g/dL    RDW 13.6 11.5 - 14.5 %    Platelets 233 150 - 400 K/uL    MPV 11.2 8.9 - 12.9 FL    Nucleated RBCs 0.0 0  WBC    nRBC 0.00 0.00 - 0.01 K/uL    Neutrophils % 40.9 32.0 - 75.0 %    Lymphocytes % 34.9 12.0 - 49.0 %    Monocytes % 17.3 (H) 5.0 - 13.0 %    Eosinophils % 2.4 0.0 - 7.0 %    Basophils % 4.5 (H) 0.0 - 1.0 %    Immature Granulocytes % 0.0 0.0 - 0.5 %    Neutrophils Absolute 1.19 (L) 1.80 - 8.00 K/UL    Lymphocytes Absolute 1.01 0.80 - 3.50 K/UL    Monocytes Absolute 0.50 0.00 - 1.00 K/UL    Eosinophils Absolute 0.07 0.00 - 0.40 K/UL    Basophils Absolute 0.13 (H) 0.00 - 0.10 K/UL    Immature Granulocytes Absolute 0.00 0.00 - 0.04 K/UL    Differential Type SMEAR SCANNED      RBC Comment MACROCYTOSIS  1+        WBC Comment REACTIVE LYMPHS     Comprehensive metabolic panel    Collection Time: 25  8:37 AM   Result Value Ref Range    Sodium 137 136 - 145 mmol/L    Potassium 4.2 3.5 - 5.1 mmol/L    Chloride 110 (H) 97 - 108 mmol/L    CO2 22 21 - 32 mmol/L    Anion Gap 5 2 - 12 mmol/L    Glucose 114 (H) 65 - 100 mg/dL    BUN 18 6 - 20 MG/DL    Creatinine 0.56 0.55 - 1.02 MG/DL    BUN/Creatinine Ratio 32 (H) 12 - 20      Est, Glom Filt Rate >90 >60

## 2025-06-11 NOTE — PROGRESS NOTES
[facility] P[facility]Adult [facility]                       Date: 2025    Name: Candida Weaver    MRN: [id number]         : 1955    0830 Patient arrives for Pre-Treatment Labs without acute problems. Please see Epic for complete assessment and education provided.    Vital signs stable throughout and prior to discharge. Patient tolerated procedure well and was discharged without incident.  Patient is aware of next [facility] appointment on 2025.  Appointment card give to the Patient.       Ms. Weaver's vitals were reviewed prior to and after treatment.   Patient Vitals for the past 12 hrs:   Temp Pulse Resp BP SpO2   25 0830 97.4 °F (36.3 °C) 59 18 (!) 128/59 98 %         Lab results were obtained and reviewed.  Labs Pending, please see [facility] for results.        Ms. Weaver tolerated the lab draw without difficulty.    [facility].     Future Appointments   Date Time Provider Department Center   2025  9:45 AM Ashley Costa APRN - NP [facility] AMB   2025 11:00 AM RUTH SO CHAIR 3 [facility]   2025  9:30 AM LAB CHAIR BRE[facility]   2025 10:00 AM RUTH SO CHAIR 4 [facility]   2025  9:30 AM LAB CHAIR [facility]   2025 10:00 AM RUTH SO CHAIR 10 [facility]   2025  9:30 AM LAB CHAIR [facility]   7/3/2025 10:00 AM RUTH SO CHAIR 4 [facility]   2025  9:30 AM LAB CHAIR [facility]   7/10/2025 10:00 AM SS CHEMO CHAIR 5 [facility]   2025  9:30 AM LAB CHAIR [facility]   2025 10:00 AM SS CHEMO CHAIR 5 [facility]       ANDRIA KILLIAN RN  2025  9:08 AM

## 2025-06-12 ENCOUNTER — HOSPITAL ENCOUNTER (OUTPATIENT)
Facility: HOSPITAL | Age: 70
Setting detail: INFUSION SERIES
End: 2025-06-12
Payer: MEDICARE

## 2025-06-12 DIAGNOSIS — C90.00 MULTIPLE MYELOMA NOT HAVING ACHIEVED REMISSION (HCC): ICD-10-CM

## 2025-06-12 DIAGNOSIS — I10 PRIMARY HYPERTENSION: ICD-10-CM

## 2025-06-12 DIAGNOSIS — Z11.59 ENCOUNTER FOR SCREENING FOR OTHER VIRAL DISEASES: Primary | ICD-10-CM

## 2025-06-12 RX ORDER — SPIRONOLACTONE 25 MG/1
25 TABLET ORAL DAILY
Qty: 90 TABLET | Refills: 0 | Status: SHIPPED | OUTPATIENT
Start: 2025-06-12

## 2025-06-15 LAB
ALBUMIN SERPL ELPH-MCNC: 3.4 G/DL (ref 2.9–4.4)
ALBUMIN/GLOB SERPL: 1.8 (ref 0.7–1.7)
ALPHA1 GLOB SERPL ELPH-MCNC: 0.2 G/DL (ref 0–0.4)
ALPHA2 GLOB SERPL ELPH-MCNC: 0.6 G/DL (ref 0.4–1)
B-GLOBULIN SERPL ELPH-MCNC: 0.9 G/DL (ref 0.7–1.3)
GAMMA GLOB SERPL ELPH-MCNC: 0.2 G/DL (ref 0.4–1.8)
GLOBULIN SER-MCNC: 2 G/DL (ref 2.2–3.9)
IGA SERPL-MCNC: 19 MG/DL (ref 64–422)
IGG SERPL-MCNC: 180 MG/DL (ref 586–1602)
IGM SERPL-MCNC: 7 MG/DL (ref 26–217)
INTERPRETATION SERPL IEP-IMP: ABNORMAL
KAPPA LC FREE SER-MCNC: 66.8 MG/L (ref 3.3–19.4)
KAPPA LC FREE/LAMBDA FREE SER: 22.27 (ref 0.26–1.65)
LAMBDA LC FREE SERPL-MCNC: 3 MG/L (ref 5.7–26.3)
M PROTEIN SERPL ELPH-MCNC: ABNORMAL G/DL
PROT SERPL-MCNC: 5.4 G/DL (ref 6–8.5)

## 2025-06-18 ENCOUNTER — HOSPITAL ENCOUNTER (OUTPATIENT)
Facility: HOSPITAL | Age: 70
Setting detail: INFUSION SERIES
Discharge: HOME | End: 2025-06-18
Payer: MEDICARE

## 2025-06-18 ENCOUNTER — HOSPITAL ENCOUNTER (OUTPATIENT)
Facility: HOSPITAL | Age: 70
Setting detail: INFUSION SERIES
End: 2025-06-18
Payer: MEDICARE

## 2025-06-18 ENCOUNTER — TELEPHONE (OUTPATIENT)
Age: 70
End: 2025-06-18

## 2025-06-18 ENCOUNTER — CLINICAL DOCUMENTATION (OUTPATIENT)
Age: 70
End: 2025-06-18

## 2025-06-18 VITALS
TEMPERATURE: 97.8 F | RESPIRATION RATE: 18 BRPM | DIASTOLIC BLOOD PRESSURE: 63 MMHG | HEART RATE: 53 BPM | SYSTOLIC BLOOD PRESSURE: 147 MMHG | OXYGEN SATURATION: 98 %

## 2025-06-18 DIAGNOSIS — Z11.59 ENCOUNTER FOR SCREENING FOR OTHER VIRAL DISEASES: ICD-10-CM

## 2025-06-18 DIAGNOSIS — C90.00 MULTIPLE MYELOMA NOT HAVING ACHIEVED REMISSION (HCC): ICD-10-CM

## 2025-06-18 LAB
ALBUMIN SERPL-MCNC: 3.5 G/DL (ref 3.5–5)
ALBUMIN/GLOB SERPL: 1.4 (ref 1.1–2.2)
ALP SERPL-CCNC: 65 U/L (ref 45–117)
ALT SERPL-CCNC: 26 U/L (ref 12–78)
ANION GAP SERPL CALC-SCNC: 4 MMOL/L (ref 2–12)
AST SERPL-CCNC: 12 U/L (ref 15–37)
BASOPHILS # BLD: 0.05 K/UL (ref 0–0.1)
BASOPHILS NFR BLD: 1.8 % (ref 0–1)
BILIRUB SERPL-MCNC: 0.4 MG/DL (ref 0.2–1)
BUN SERPL-MCNC: 15 MG/DL (ref 6–20)
BUN/CREAT SERPL: 25 (ref 12–20)
CALCIUM SERPL-MCNC: 9.4 MG/DL (ref 8.5–10.1)
CHLORIDE SERPL-SCNC: 109 MMOL/L (ref 97–108)
CO2 SERPL-SCNC: 26 MMOL/L (ref 21–32)
CREAT SERPL-MCNC: 0.6 MG/DL (ref 0.55–1.02)
DIFFERENTIAL METHOD BLD: ABNORMAL
EOSINOPHIL # BLD: 0.06 K/UL (ref 0–0.4)
EOSINOPHIL NFR BLD: 2.1 % (ref 0–7)
ERYTHROCYTE [DISTWIDTH] IN BLOOD BY AUTOMATED COUNT: 13.4 % (ref 11.5–14.5)
GLOBULIN SER CALC-MCNC: 2.5 G/DL (ref 2–4)
GLUCOSE SERPL-MCNC: 96 MG/DL (ref 65–100)
HCT VFR BLD AUTO: 35.3 % (ref 35–47)
HGB BLD-MCNC: 11 G/DL (ref 11.5–16)
IMM GRANULOCYTES # BLD AUTO: 0.02 K/UL (ref 0–0.04)
IMM GRANULOCYTES NFR BLD AUTO: 0.7 % (ref 0–0.5)
LYMPHOCYTES # BLD: 0.92 K/UL (ref 0.8–3.5)
LYMPHOCYTES NFR BLD: 32.3 % (ref 12–49)
MCH RBC QN AUTO: 29.6 PG (ref 26–34)
MCHC RBC AUTO-ENTMCNC: 31.2 G/DL (ref 30–36.5)
MCV RBC AUTO: 94.9 FL (ref 80–99)
MONOCYTES # BLD: 0.54 K/UL (ref 0–1)
MONOCYTES NFR BLD: 18.9 % (ref 5–13)
NEUTS SEG # BLD: 1.26 K/UL (ref 1.8–8)
NEUTS SEG NFR BLD: 44.2 % (ref 32–75)
NRBC # BLD: 0.02 K/UL (ref 0–0.01)
NRBC BLD-RTO: 0.7 PER 100 WBC
PLATELET # BLD AUTO: 215 K/UL (ref 150–400)
PMV BLD AUTO: 12.3 FL (ref 8.9–12.9)
POTASSIUM SERPL-SCNC: 3.9 MMOL/L (ref 3.5–5.1)
PROT SERPL-MCNC: 6 G/DL (ref 6.4–8.2)
RBC # BLD AUTO: 3.72 M/UL (ref 3.8–5.2)
SODIUM SERPL-SCNC: 139 MMOL/L (ref 136–145)
WBC # BLD AUTO: 2.9 K/UL (ref 3.6–11)

## 2025-06-18 PROCEDURE — 80053 COMPREHEN METABOLIC PANEL: CPT

## 2025-06-18 PROCEDURE — 85025 COMPLETE CBC W/AUTO DIFF WBC: CPT

## 2025-06-18 ASSESSMENT — PAIN DESCRIPTION - PAIN TYPE: TYPE: NEUROPATHIC PAIN;CHRONIC PAIN

## 2025-06-18 ASSESSMENT — PAIN SCALES - WONG BAKER: WONGBAKER_NUMERICALRESPONSE: NO HURT

## 2025-06-18 ASSESSMENT — PAIN DESCRIPTION - DESCRIPTORS: DESCRIPTORS: ACHING;TINGLING;DISCOMFORT

## 2025-06-18 ASSESSMENT — PAIN SCALES - GENERAL: PAINLEVEL_OUTOF10: 7

## 2025-06-18 ASSESSMENT — PAIN DESCRIPTION - ORIENTATION: ORIENTATION: LOWER;RIGHT

## 2025-06-18 ASSESSMENT — PAIN DESCRIPTION - LOCATION: LOCATION: HAND;HIP;BACK

## 2025-06-18 NOTE — PROGRESS NOTES
Spoke with Dr. Coleman at [facility]  15% plasma cells on marrow  PET pending    Need to switch to Isatuximab+ Venetoclax and Dexamethasone  Can get current treatment until new treatment approved  No need to see tomorrow but Lou to consent in [facility]  Sulaiman please call patient

## 2025-06-18 NOTE — PROGRESS NOTES
[facility] Peds/Adult Note                       Date: 2025    Name: Candida Weaver    MRN: [id number]         : 1955    1300 Patient arrives for Pre-Treatment Labs without acute problems. Please see Epic for complete assessment and education provided.    Vital signs stable throughout and prior to discharge. Patient tolerated procedure well and was discharged without incident.  Patient is aware of next [facility] appointment on 2025.  Appointment card give to the Patient.       Ms. Weaver's vitals were reviewed prior to and after treatment.   Patient Vitals for the past 12 hrs:   Temp Pulse Resp BP SpO2   25 1300 97.8 °F (36.6 °C) 53 18 (!) 147/63 98 %         Lab results were obtained and reviewed.  Recent Results (from the past 12 hours)   CBC With Auto Differential    Collection Time: 25  1:06 PM   Result Value Ref Range    WBC 2.9 (L) 3.6 - 11.0 K/uL    RBC 3.72 (L) 3.80 - 5.20 M/uL    Hemoglobin 11.0 (L) 11.5 - 16.0 g/dL    Hematocrit 35.3 35.0 - 47.0 %    MCV 94.9 80.0 - 99.0 FL    MCH 29.6 26.0 - 34.0 PG    MCHC 31.2 30.0 - 36.5 g/dL    RDW 13.4 11.5 - 14.5 %    Platelets 215 150 - 400 K/uL    MPV 12.3 8.9 - 12.9 FL    Nucleated RBCs 0.7 (H) 0  WBC    nRBC 0.02 (H) 0.00 - 0.01 K/uL    Neutrophils % 44.2 32.0 - 75.0 %    Lymphocytes % 32.3 12.0 - 49.0 %    Monocytes % 18.9 (H) 5.0 - 13.0 %    Eosinophils % 2.1 0.0 - 7.0 %    Basophils % 1.8 (H) 0.0 - 1.0 %    Immature Granulocytes % 0.7 (H) 0.0 - 0.5 %    Neutrophils Absolute 1.26 (L) 1.80 - 8.00 K/UL    Lymphocytes Absolute 0.92 0.80 - 3.50 K/UL    Monocytes Absolute 0.54 0.00 - 1.00 K/UL    Eosinophils Absolute 0.06 0.00 - 0.40 K/UL    Basophils Absolute 0.05 0.00 - 0.10 K/UL    Immature Granulocytes Absolute 0.02 0.00 - 0.04 K/UL    Differential Type AUTOMATED     Comprehensive metabolic panel    Collection Time: 25  1:06 PM   Result Value Ref Range    Sodium 139 136 - 145 mmol/L    Potassium 3.9 3.5 - 5.1 mmol/L

## 2025-06-18 NOTE — TELEPHONE ENCOUNTER
[facility] [place] [facility] [place]   [phone]    06/18/25 3:28 PM EDT -  Called patient to review her recent visit with Dr. Coleman at [facility], in addition to Dr. Arboleda’s recommendations.  We discussed her bone marrow biopsy showed 15% plasma cells and noted a recent increase in kappa light chains per the 6/11 gammopathy panel. Dr. Dos Santos has advised against stem cell transplant given the setting of progressive disease.    We discussed continuing her current treatment regimen while awaiting full insurance authorization for the new treatment plan: Isatuximab, Venetoclax, and Dexamethasone. Lou will see her tomorrow in the hospitals to discuss this new regimen and obtain consent. I also offered to visit her in hospitals for a courtesy check-in, which she appreciated.    Candida expressed full understanding of the next steps in her treatment plan and voiced gratitude for the call. She had no further questions or concerns at this time.      Signed By: KIERA Swanson NP

## 2025-06-19 ENCOUNTER — CLINICAL DOCUMENTATION (OUTPATIENT)
Age: 70
End: 2025-06-19

## 2025-06-19 ENCOUNTER — HOSPITAL ENCOUNTER (OUTPATIENT)
Facility: HOSPITAL | Age: 70
Setting detail: INFUSION SERIES
Discharge: HOME | End: 2025-06-19
Payer: MEDICARE

## 2025-06-19 VITALS
HEART RATE: 61 BPM | RESPIRATION RATE: 18 BRPM | DIASTOLIC BLOOD PRESSURE: 59 MMHG | WEIGHT: 152.8 LBS | HEIGHT: 65 IN | SYSTOLIC BLOOD PRESSURE: 141 MMHG | BODY MASS INDEX: 25.46 KG/M2 | TEMPERATURE: 97.9 F | OXYGEN SATURATION: 97 %

## 2025-06-19 DIAGNOSIS — Z11.59 ENCOUNTER FOR SCREENING FOR OTHER VIRAL DISEASES: ICD-10-CM

## 2025-06-19 DIAGNOSIS — C90.00 MULTIPLE MYELOMA NOT HAVING ACHIEVED REMISSION (HCC): Primary | ICD-10-CM

## 2025-06-19 PROCEDURE — 96413 CHEMO IV INFUSION 1 HR: CPT

## 2025-06-19 PROCEDURE — 96375 TX/PRO/DX INJ NEW DRUG ADDON: CPT

## 2025-06-19 PROCEDURE — 6360000002 HC RX W HCPCS: Performed by: INTERNAL MEDICINE

## 2025-06-19 PROCEDURE — 2580000003 HC RX 258: Performed by: INTERNAL MEDICINE

## 2025-06-19 PROCEDURE — 2500000003 HC RX 250 WO HCPCS: Performed by: INTERNAL MEDICINE

## 2025-06-19 RX ORDER — DEXTROSE MONOHYDRATE 50 MG/ML
5-250 INJECTION, SOLUTION INTRAVENOUS PRN
Status: DISCONTINUED | OUTPATIENT
Start: 2025-06-19 | End: 2025-06-20 | Stop reason: HOSPADM

## 2025-06-19 RX ORDER — SODIUM CHLORIDE 9 MG/ML
5-250 INJECTION, SOLUTION INTRAVENOUS PRN
Status: CANCELLED | OUTPATIENT
Start: 2025-06-26

## 2025-06-19 RX ORDER — ONDANSETRON 2 MG/ML
8 INJECTION INTRAMUSCULAR; INTRAVENOUS ONCE
Status: COMPLETED | OUTPATIENT
Start: 2025-06-19 | End: 2025-06-19

## 2025-06-19 RX ORDER — SODIUM CHLORIDE 0.9 % (FLUSH) 0.9 %
5-40 SYRINGE (ML) INJECTION PRN
Status: DISCONTINUED | OUTPATIENT
Start: 2025-06-19 | End: 2025-06-20 | Stop reason: HOSPADM

## 2025-06-19 RX ORDER — EPINEPHRINE 1 MG/ML
0.3 INJECTION, SOLUTION INTRAMUSCULAR; SUBCUTANEOUS PRN
Status: CANCELLED | OUTPATIENT
Start: 2025-06-26

## 2025-06-19 RX ORDER — ALBUTEROL SULFATE 90 UG/1
4 INHALANT RESPIRATORY (INHALATION) PRN
Status: CANCELLED | OUTPATIENT
Start: 2025-06-26

## 2025-06-19 RX ORDER — ONDANSETRON 2 MG/ML
8 INJECTION INTRAMUSCULAR; INTRAVENOUS
Status: CANCELLED | OUTPATIENT
Start: 2025-06-26

## 2025-06-19 RX ORDER — SODIUM CHLORIDE 9 MG/ML
5-250 INJECTION, SOLUTION INTRAVENOUS PRN
Status: DISCONTINUED | OUTPATIENT
Start: 2025-06-19 | End: 2025-06-20 | Stop reason: HOSPADM

## 2025-06-19 RX ORDER — HEPARIN 100 UNIT/ML
500 SYRINGE INTRAVENOUS PRN
Status: DISCONTINUED | OUTPATIENT
Start: 2025-06-19 | End: 2025-06-20 | Stop reason: HOSPADM

## 2025-06-19 RX ORDER — M-VIT,TX,IRON,MINS/CALC/FOLIC 27MG-0.4MG
1 TABLET ORAL DAILY
COMMUNITY

## 2025-06-19 RX ORDER — HYDROCORTISONE SODIUM SUCCINATE 100 MG/2ML
100 INJECTION INTRAMUSCULAR; INTRAVENOUS
Status: CANCELLED | OUTPATIENT
Start: 2025-06-26

## 2025-06-19 RX ORDER — SODIUM CHLORIDE 9 MG/ML
INJECTION, SOLUTION INTRAVENOUS CONTINUOUS
Status: CANCELLED | OUTPATIENT
Start: 2025-06-26

## 2025-06-19 RX ORDER — HEPARIN 100 UNIT/ML
500 SYRINGE INTRAVENOUS PRN
Status: CANCELLED | OUTPATIENT
Start: 2025-06-26

## 2025-06-19 RX ORDER — DEXAMETHASONE 4 MG/1
8 TABLET ORAL ONCE
Status: COMPLETED | OUTPATIENT
Start: 2025-06-19 | End: 2025-06-19

## 2025-06-19 RX ORDER — DIPHENHYDRAMINE HYDROCHLORIDE 50 MG/ML
50 INJECTION, SOLUTION INTRAMUSCULAR; INTRAVENOUS
Status: CANCELLED | OUTPATIENT
Start: 2025-06-26

## 2025-06-19 RX ORDER — ACETAMINOPHEN 325 MG/1
650 TABLET ORAL
Status: CANCELLED | OUTPATIENT
Start: 2025-06-26

## 2025-06-19 RX ORDER — SODIUM CHLORIDE 0.9 % (FLUSH) 0.9 %
5-40 SYRINGE (ML) INJECTION PRN
Status: CANCELLED | OUTPATIENT
Start: 2025-06-26

## 2025-06-19 RX ADMIN — SODIUM CHLORIDE, PRESERVATIVE FREE 20 ML: 5 INJECTION INTRAVENOUS at 11:45

## 2025-06-19 RX ADMIN — ONDANSETRON 8 MG: 2 INJECTION, SOLUTION INTRAMUSCULAR; INTRAVENOUS at 10:18

## 2025-06-19 RX ADMIN — DEXAMETHASONE 8 MG: 4 TABLET ORAL at 10:17

## 2025-06-19 RX ADMIN — SODIUM CHLORIDE, PRESERVATIVE FREE 10 ML: 5 INJECTION INTRAVENOUS at 10:13

## 2025-06-19 RX ADMIN — DEXTROSE 50 ML/HR: 5 SOLUTION INTRAVENOUS at 10:13

## 2025-06-19 RX ADMIN — CARFILZOMIB 102 MG: 10 INJECTION, POWDER, LYOPHILIZED, FOR SOLUTION INTRAVENOUS at 11:08

## 2025-06-19 ASSESSMENT — PAIN SCALES - GENERAL: PAINLEVEL_OUTOF10: 5

## 2025-06-19 ASSESSMENT — PAIN DESCRIPTION - ORIENTATION: ORIENTATION: RIGHT;LEFT

## 2025-06-19 ASSESSMENT — PAIN DESCRIPTION - LOCATION: LOCATION: FINGER (COMMENT WHICH ONE)

## 2025-06-19 ASSESSMENT — PAIN DESCRIPTION - PAIN TYPE: TYPE: NEUROPATHIC PAIN

## 2025-06-19 ASSESSMENT — PAIN SCALES - WONG BAKER: WONGBAKER_NUMERICALRESPONSE: NO HURT

## 2025-06-19 NOTE — PROGRESS NOTES
Oral Cancer therapy     Candida Weaver is a  70 y.o.female  diagnosed with multiple myeloma . Ms. Weaver is being treated with KPd.      Medication name: pomalidomide  Regimen: KPd  Dose:  4 mg (increase dose for 9/26/24)  Frequency: daily  Administration schedule: for 21 days followed by 7 days off every 28 days  Ordering provider: Nany Arboleda MD  Start date: 6/12/25 (Cycle 16)    Pomalidomide held for the 1st week due to potential bone marrow transplant - adding on to start on Day 8 (6/19/25) will take for 14 days only for this cycle.     Lab Results   Component Value Date    WBC 2.9 (L) 06/18/2025    HGB 11.0 (L) 06/18/2025    HCT 35.3 06/18/2025    MCV 94.9 06/18/2025     06/18/2025    LYMPHOPCT 32.3 06/18/2025    RBC 3.72 (L) 06/18/2025    MCH 29.6 06/18/2025    MCHC 31.2 06/18/2025    RDW 13.4 06/18/2025     Lab Results   Component Value Date    NEUTROABS 1.26 (L) 06/18/2025           Expected follow up date: Labs/office visit each treatment. Next cycle start on 7/10/25 (with new treatment)     Patient provided with an Oral Chemotherapy Journal.     Ms. Weaver is changing  treatment to isatuximab, venetoclax and dexamethasone. Education provided and consent obtained.    Medication name:venetoclax   Regimen: isatuximab, venetoclax and dexamethasone  Dose:  400 mg   Frequency: daily    Ordering provider: Nany Arboleda MD  Start date: 7/10/25 (pending)     Prescription for venetoclax 400 mg sent to pharmacy for processing.     Side effects of oncology treatment reviewed included s/s infection, anemia, appetite changes, thrombocytopenia, fatigue, hair loss/alopecia, bone pain, skin and nail changes, diarrhea/constipation and infusion reactions.    Patient given ways to manage these side effects and when to contact office.     [facility] [place] Handout of medications provided to patient. Ms. Weaver verbalized understanding of the information presented and all of the patient's questions were

## 2025-06-19 NOTE — PROGRESS NOTES
[facility] Chemotherapy/Immunotherapy Progress Note    Date: 2025  Name: Candida Weaver  MRN: [id number]       : 1955    Pt arrived ambulatory and in no acute distress to [facility] for Kyprolis-Cycle 16, Day 8   Denies flu-like symptoms. Arrives unaccompanied to scheduled [facility] appointment.    Right chest port accessed with positive blood return. Labs drawn 25. Patient meets treatment parameters.     Ms. Weaver's vitals were reviewed.  Patient Vitals for the past 12 hrs:   Temp Pulse Resp BP SpO2   25 1145 -- 61 18 (!) 141/59 --   25 0957 97.9 °F (36.6 °C) 65 16 138/60 97 %     Pre-medications were administered as ordered and chemotherapy was initiated. Please see MAR for specific drug names and time of administration.  Medications Administered         carfilzomib (KYPROLIS) 102 mg in dextrose 5 % 100 mL chemo IVPB Admin Date  2025 Action  New Bag Dose  102 mg Rate  322 mL/hr Route  IntraVENous Documented By  Dacia Mccollum RN        dexAMETHasone (DECADRON) tablet 8 mg Admin Date  2025 Action  Given Dose  8 mg Rate   Route  Oral Documented By  Dacia Mccollum RN        dextrose 5 % solution Admin Date  2025 Action  New Bag Dose  50 mL/hr Rate  50 mL/hr Route  IntraVENous Documented By  Dacia Mccollum RN        ondansetron (ZOFRAN) injection 8 mg Admin Date  2025 Action  Given Dose  8 mg Rate   Route  IntraVENous Documented By  Dacia Mccollum RN        sodium chloride flush 0.9 % injection 5-40 mL Admin Date  2025 Action  Given Dose  10 mL Rate   Route  IntraVENous Documented By  Dacia Mccollum RN        sodium chloride flush 0.9 % injection 5-40 mL Admin Date  2025 Action  Given Dose  20 mL Rate   Route  IntraVENous Documented By  Dacia Mccollum RN          Pharmacist, chang Blake by to discuss next treatment regimen education with patient. Patient to reach out to Dr. Arboleda's group for any questions or concerns.     Prior to chemotherapy/immunotherapy

## 2025-06-25 ENCOUNTER — HOSPITAL ENCOUNTER (OUTPATIENT)
Facility: HOSPITAL | Age: 70
Setting detail: INFUSION SERIES
Discharge: HOME | End: 2025-06-25
Payer: MEDICARE

## 2025-06-25 VITALS
RESPIRATION RATE: 18 BRPM | HEART RATE: 69 BPM | OXYGEN SATURATION: 97 % | DIASTOLIC BLOOD PRESSURE: 59 MMHG | TEMPERATURE: 98.6 F | SYSTOLIC BLOOD PRESSURE: 126 MMHG

## 2025-06-25 DIAGNOSIS — C90.00 MULTIPLE MYELOMA NOT HAVING ACHIEVED REMISSION (HCC): ICD-10-CM

## 2025-06-25 DIAGNOSIS — Z11.59 ENCOUNTER FOR SCREENING FOR OTHER VIRAL DISEASES: ICD-10-CM

## 2025-06-25 DIAGNOSIS — J45.40 MODERATE PERSISTENT ASTHMA WITHOUT COMPLICATION: ICD-10-CM

## 2025-06-25 LAB
ALBUMIN SERPL-MCNC: 3.2 G/DL (ref 3.5–5)
ALBUMIN/GLOB SERPL: 1.2 (ref 1.1–2.2)
ALP SERPL-CCNC: 68 U/L (ref 45–117)
ALT SERPL-CCNC: 24 U/L (ref 12–78)
ANION GAP SERPL CALC-SCNC: 7 MMOL/L (ref 2–12)
AST SERPL-CCNC: 5 U/L (ref 15–37)
BASOPHILS # BLD: 0.04 K/UL (ref 0–0.1)
BASOPHILS NFR BLD: 1.1 % (ref 0–1)
BILIRUB SERPL-MCNC: 0.4 MG/DL (ref 0.2–1)
BUN SERPL-MCNC: 18 MG/DL (ref 6–20)
BUN/CREAT SERPL: 26 (ref 12–20)
CALCIUM SERPL-MCNC: 9.3 MG/DL (ref 8.5–10.1)
CHLORIDE SERPL-SCNC: 110 MMOL/L (ref 97–108)
CO2 SERPL-SCNC: 21 MMOL/L (ref 21–32)
CREAT SERPL-MCNC: 0.7 MG/DL (ref 0.55–1.02)
DIFFERENTIAL METHOD BLD: ABNORMAL
EOSINOPHIL # BLD: 0.13 K/UL (ref 0–0.4)
EOSINOPHIL NFR BLD: 3.6 % (ref 0–7)
ERYTHROCYTE [DISTWIDTH] IN BLOOD BY AUTOMATED COUNT: 13.2 % (ref 11.5–14.5)
GLOBULIN SER CALC-MCNC: 2.7 G/DL (ref 2–4)
GLUCOSE SERPL-MCNC: 90 MG/DL (ref 65–100)
HCT VFR BLD AUTO: 33.9 % (ref 35–47)
HGB BLD-MCNC: 11 G/DL (ref 11.5–16)
IMM GRANULOCYTES # BLD AUTO: 0.04 K/UL (ref 0–0.04)
IMM GRANULOCYTES NFR BLD AUTO: 1.1 % (ref 0–0.5)
LYMPHOCYTES # BLD: 0.91 K/UL (ref 0.8–3.5)
LYMPHOCYTES NFR BLD: 25.1 % (ref 12–49)
MCH RBC QN AUTO: 29.6 PG (ref 26–34)
MCHC RBC AUTO-ENTMCNC: 32.4 G/DL (ref 30–36.5)
MCV RBC AUTO: 91.1 FL (ref 80–99)
MONOCYTES # BLD: 0.54 K/UL (ref 0–1)
MONOCYTES NFR BLD: 14.9 % (ref 5–13)
NEUTS SEG # BLD: 1.97 K/UL (ref 1.8–8)
NEUTS SEG NFR BLD: 54.2 % (ref 32–75)
NRBC # BLD: 0.03 K/UL (ref 0–0.01)
NRBC BLD-RTO: 0.8 PER 100 WBC
PLATELET # BLD AUTO: 167 K/UL (ref 150–400)
PMV BLD AUTO: 12.4 FL (ref 8.9–12.9)
POTASSIUM SERPL-SCNC: 4.3 MMOL/L (ref 3.5–5.1)
PROT SERPL-MCNC: 5.9 G/DL (ref 6.4–8.2)
RBC # BLD AUTO: 3.72 M/UL (ref 3.8–5.2)
SODIUM SERPL-SCNC: 138 MMOL/L (ref 136–145)
WBC # BLD AUTO: 3.6 K/UL (ref 3.6–11)

## 2025-06-25 PROCEDURE — 80053 COMPREHEN METABOLIC PANEL: CPT

## 2025-06-25 PROCEDURE — 85025 COMPLETE CBC W/AUTO DIFF WBC: CPT

## 2025-06-25 RX ORDER — POMALIDOMIDE 4 MG/1
CAPSULE ORAL
Qty: 21 CAPSULE | Refills: 0 | OUTPATIENT
Start: 2025-06-25

## 2025-06-25 ASSESSMENT — PAIN DESCRIPTION - DESCRIPTORS: DESCRIPTORS: ACHING

## 2025-06-25 ASSESSMENT — PAIN SCALES - WONG BAKER: WONGBAKER_NUMERICALRESPONSE: NO HURT

## 2025-06-25 ASSESSMENT — PAIN SCALES - GENERAL: PAINLEVEL_OUTOF10: 7

## 2025-06-25 ASSESSMENT — PAIN DESCRIPTION - LOCATION: LOCATION: BACK

## 2025-06-25 ASSESSMENT — PAIN DESCRIPTION - PAIN TYPE: TYPE: CHRONIC PAIN

## 2025-06-25 ASSESSMENT — PAIN DESCRIPTION - ORIENTATION: ORIENTATION: LOWER

## 2025-06-25 NOTE — PROGRESS NOTES
[facility] Peds/Adult Note                       Date: 2025    Name: Candida Weaver    MRN: [id number]         : 1955    0940 Patient arrives for Pre-Treatment Labs without acute problems. Please see Epic for complete assessment and education provided.    Vital signs stable throughout and prior to discharge. Patient tolerated procedure well and was discharged without incident.  Patient is aware of next [facility] appointment on 2025.  Appointment card give to the Patient.       Ms. Weaver's vitals were reviewed prior to and after treatment.   Patient Vitals for the past 12 hrs:   Temp Pulse Resp BP SpO2   25 0940 98.6 °F (37 °C) 69 18 (!) 126/59 97 %         Lab results were obtained and reviewed.  Labs Pending, please see EPIC for results.    Recent Results (from the past 12 hours)   CBC With Auto Differential    Collection Time: 25  9:46 AM   Result Value Ref Range    WBC 3.6 3.6 - 11.0 K/uL    RBC 3.72 (L) 3.80 - 5.20 M/uL    Hemoglobin 11.0 (L) 11.5 - 16.0 g/dL    Hematocrit 33.9 (L) 35.0 - 47.0 %    MCV 91.1 80.0 - 99.0 FL    MCH 29.6 26.0 - 34.0 PG    MCHC 32.4 30.0 - 36.5 g/dL    RDW 13.2 11.5 - 14.5 %    Platelets 167 150 - 400 K/uL    MPV 12.4 8.9 - 12.9 FL    Nucleated RBCs 0.8 (H) 0  WBC    nRBC 0.03 (H) 0.00 - 0.01 K/uL    Neutrophils % 54.2 32.0 - 75.0 %    Lymphocytes % 25.1 12.0 - 49.0 %    Monocytes % 14.9 (H) 5.0 - 13.0 %    Eosinophils % 3.6 0.0 - 7.0 %    Basophils % 1.1 (H) 0.0 - 1.0 %    Immature Granulocytes % 1.1 (H) 0.0 - 0.5 %    Neutrophils Absolute 1.97 1.80 - 8.00 K/UL    Lymphocytes Absolute 0.91 0.80 - 3.50 K/UL    Monocytes Absolute 0.54 0.00 - 1.00 K/UL    Eosinophils Absolute 0.13 0.00 - 0.40 K/UL    Basophils Absolute 0.04 0.00 - 0.10 K/UL    Immature Granulocytes Absolute 0.04 0.00 - 0.04 K/UL    Differential Type AUTOMATED           Ms. Weaver tolerated the Lab draw without difficulty.    [facility]. Weaver [facility] in

## 2025-06-26 ENCOUNTER — TELEPHONE (OUTPATIENT)
Age: 70
End: 2025-06-26

## 2025-06-26 ENCOUNTER — HOSPITAL ENCOUNTER (OUTPATIENT)
Facility: HOSPITAL | Age: 70
Setting detail: INFUSION SERIES
Discharge: HOME | End: 2025-06-26
Payer: MEDICARE

## 2025-06-26 ENCOUNTER — CLINICAL DOCUMENTATION (OUTPATIENT)
Age: 70
End: 2025-06-26

## 2025-06-26 VITALS
WEIGHT: 153 LBS | HEART RATE: 68 BPM | RESPIRATION RATE: 16 BRPM | BODY MASS INDEX: 25.46 KG/M2 | TEMPERATURE: 97.6 F | DIASTOLIC BLOOD PRESSURE: 64 MMHG | SYSTOLIC BLOOD PRESSURE: 118 MMHG

## 2025-06-26 DIAGNOSIS — Z11.59 ENCOUNTER FOR SCREENING FOR OTHER VIRAL DISEASES: ICD-10-CM

## 2025-06-26 DIAGNOSIS — C90.00 MULTIPLE MYELOMA NOT HAVING ACHIEVED REMISSION (HCC): Primary | ICD-10-CM

## 2025-06-26 PROCEDURE — 6360000002 HC RX W HCPCS: Performed by: INTERNAL MEDICINE

## 2025-06-26 PROCEDURE — 2580000003 HC RX 258: Performed by: INTERNAL MEDICINE

## 2025-06-26 PROCEDURE — 96375 TX/PRO/DX INJ NEW DRUG ADDON: CPT

## 2025-06-26 PROCEDURE — 96413 CHEMO IV INFUSION 1 HR: CPT

## 2025-06-26 RX ORDER — MONTELUKAST SODIUM 10 MG/1
10 TABLET ORAL DAILY
Qty: 30 TABLET | Refills: 3 | Status: SHIPPED | OUTPATIENT
Start: 2025-06-26

## 2025-06-26 RX ORDER — ONDANSETRON 2 MG/ML
8 INJECTION INTRAMUSCULAR; INTRAVENOUS ONCE
Status: COMPLETED | OUTPATIENT
Start: 2025-06-26 | End: 2025-06-26

## 2025-06-26 RX ORDER — DEXAMETHASONE 4 MG/1
20 TABLET ORAL
Qty: 30 TABLET | Refills: 6 | Status: SHIPPED | OUTPATIENT
Start: 2025-06-26

## 2025-06-26 RX ORDER — DEXTROSE MONOHYDRATE 50 MG/ML
5-250 INJECTION, SOLUTION INTRAVENOUS PRN
Status: DISCONTINUED | OUTPATIENT
Start: 2025-06-26 | End: 2025-06-27 | Stop reason: HOSPADM

## 2025-06-26 RX ORDER — DEXAMETHASONE 4 MG/1
8 TABLET ORAL ONCE
Status: COMPLETED | OUTPATIENT
Start: 2025-06-26 | End: 2025-06-26

## 2025-06-26 RX ADMIN — DEXAMETHASONE 8 MG: 4 TABLET ORAL at 10:16

## 2025-06-26 RX ADMIN — DEXTROSE 50 ML/HR: 5 SOLUTION INTRAVENOUS at 10:57

## 2025-06-26 RX ADMIN — ONDANSETRON 8 MG: 2 INJECTION, SOLUTION INTRAMUSCULAR; INTRAVENOUS at 10:16

## 2025-06-26 RX ADMIN — CARFILZOMIB 102 MG: 10 INJECTION, POWDER, LYOPHILIZED, FOR SOLUTION INTRAVENOUS at 11:00

## 2025-06-26 ASSESSMENT — PAIN DESCRIPTION - LOCATION: LOCATION: BACK

## 2025-06-26 ASSESSMENT — PAIN SCALES - GENERAL: PAINLEVEL_OUTOF10: 7

## 2025-06-26 ASSESSMENT — PAIN DESCRIPTION - DESCRIPTORS: DESCRIPTORS: ACHING

## 2025-06-26 ASSESSMENT — PAIN DESCRIPTION - ORIENTATION: ORIENTATION: LOWER

## 2025-06-26 NOTE — PROGRESS NOTES
[facility] - Chemotherapy Progress Note    0945 Pt admit to [facility] for Kyprolis/C16D15 ambulatory in stable condition. Assessment completed. No new concerns voiced. Port accessed with positive blood return. Labs drawn prior to today's visit.  Line flushed, D5 infusing.      Temp 97.6 °F (36.4 °C) (Temporal)   Resp 16   Wt 69.4 kg (153 lb)   BMI 25.46 kg/m²     Medications Administered         carfilzomib (KYPROLIS) 102 mg in dextrose 5 % 100 mL chemo IVPB Admin Date  06/26/2025 Action  New Bag Dose  102 mg Rate  322 mL/hr Route  IntraVENous Documented By  Brenda Zavala RN        dexAMETHasone (DECADRON) tablet 8 mg Admin Date  06/26/2025 Action  Given Dose  8 mg Rate   Route  Oral Documented By  Brenda Zavala RN        dextrose 5 % solution Admin Date  06/26/2025 Action  New Bag Dose  50 mL/hr Rate  50 mL/hr Route  IntraVENous Documented By  Brenda Zavala RN        ondansetron (ZOFRAN) injection 8 mg Admin Date  06/26/2025 Action  Given Dose  8 mg Rate   Route  IntraVENous Documented By  Brenda Zavala, SABRINA                Two nurses verified prior to administering:, Drug name, Drug dose, Infusion volume or drug volume when prepared in a syringe, Rate of administration, Route of administration, Expiration dates and/or times, Appearance and physical integrity of the drugs, Rate set on infusion pump, when used, Sequencing of drug administration         1200 Pt tolerated treatment well. Port maintained positive blood return throughout treatment, flushed with positive blood return at conclusion and de-accessed per protocol. D/c home ambulatory in no distress. Pt aware of next [facility] appointment scheduled for 07/10/2025.

## 2025-06-26 NOTE — TELEPHONE ENCOUNTER
2150  Called pt and left a  with the following info:    Dr. Arboleda would like her to stop her Pomalyst.  Dr. Arboleda wants her to start her new treatment as soon as possible so we have her scheduled next Thrusday for new treatment.   Her oral medication is approved, still waiting on IV medication to be authorized.   If this is not authorized by 7/3, then we will delay treatment until 7/10   We will provide her with a new updated calendar with her new chemotherapy pills on 7/3 when she comes.     Christine was unable to get her an 8am appt on 7/3 due to the holiday, but all other appts are early in AM.   [facility] sent as well with these updates.

## 2025-06-27 DIAGNOSIS — C90.00 MULTIPLE MYELOMA NOT HAVING ACHIEVED REMISSION (HCC): ICD-10-CM

## 2025-06-27 NOTE — PROGRESS NOTES
[facility] [place] at [facility]   [address] [place] [address]   W: [phone]  F: [phone]      Medical Nutrition Therapy  Nutrition Encounter:    Referral received. Met with patient and explained that RD is available to address nutrition throughout the spectrum of care.  She was reading about the new chemo and wonders if she has to stop making her smoothies.  She read the medication can cause diarrhea and that she needs to do low fiber. Explained that she can continue with her current eating habits and to listen to her body.  Her side effects may be different and her tolerance to fiber may be different than others.  She is appreciative of information and encouraged her to reach out for any additional questions or concerns.           Signed By: MAURICE GARCIA RD

## 2025-06-30 RX ORDER — POMALIDOMIDE 4 MG/1
CAPSULE ORAL
Qty: 21 CAPSULE | Refills: 0 | OUTPATIENT
Start: 2025-06-30

## 2025-07-01 ENCOUNTER — TELEPHONE (OUTPATIENT)
Age: 70
End: 2025-07-01

## 2025-07-01 DIAGNOSIS — Z11.59 ENCOUNTER FOR SCREENING FOR OTHER VIRAL DISEASES: ICD-10-CM

## 2025-07-01 DIAGNOSIS — C90.00 MULTIPLE MYELOMA NOT HAVING ACHIEVED REMISSION (HCC): Primary | ICD-10-CM

## 2025-07-01 RX ORDER — ALBUTEROL SULFATE 90 UG/1
4 INHALANT RESPIRATORY (INHALATION) PRN
OUTPATIENT
Start: 2025-07-10

## 2025-07-01 RX ORDER — SODIUM CHLORIDE 0.9 % (FLUSH) 0.9 %
5-40 SYRINGE (ML) INJECTION PRN
OUTPATIENT
Start: 2025-07-10

## 2025-07-01 RX ORDER — HEPARIN 100 UNIT/ML
500 SYRINGE INTRAVENOUS PRN
Status: CANCELLED | OUTPATIENT
Start: 2025-07-03

## 2025-07-01 RX ORDER — SODIUM CHLORIDE 9 MG/ML
INJECTION, SOLUTION INTRAVENOUS CONTINUOUS
OUTPATIENT
Start: 2025-07-24

## 2025-07-01 RX ORDER — HYDROCORTISONE SODIUM SUCCINATE 100 MG/2ML
100 INJECTION INTRAMUSCULAR; INTRAVENOUS
OUTPATIENT
Start: 2025-07-24

## 2025-07-01 RX ORDER — ALBUTEROL SULFATE 90 UG/1
4 INHALANT RESPIRATORY (INHALATION) PRN
Status: CANCELLED | OUTPATIENT
Start: 2025-07-03

## 2025-07-01 RX ORDER — SODIUM CHLORIDE 9 MG/ML
5-250 INJECTION, SOLUTION INTRAVENOUS PRN
OUTPATIENT
Start: 2025-07-24

## 2025-07-01 RX ORDER — SODIUM CHLORIDE 0.9 % (FLUSH) 0.9 %
5-40 SYRINGE (ML) INJECTION PRN
OUTPATIENT
Start: 2025-07-17

## 2025-07-01 RX ORDER — DIPHENHYDRAMINE HYDROCHLORIDE 50 MG/ML
50 INJECTION, SOLUTION INTRAMUSCULAR; INTRAVENOUS
Status: CANCELLED | OUTPATIENT
Start: 2025-07-03

## 2025-07-01 RX ORDER — EPINEPHRINE 1 MG/ML
0.3 INJECTION, SOLUTION, CONCENTRATE INTRAVENOUS PRN
OUTPATIENT
Start: 2025-07-24

## 2025-07-01 RX ORDER — ACETAMINOPHEN 325 MG/1
650 TABLET ORAL ONCE
OUTPATIENT
Start: 2025-07-24 | End: 2025-07-24

## 2025-07-01 RX ORDER — SODIUM CHLORIDE 9 MG/ML
5-250 INJECTION, SOLUTION INTRAVENOUS PRN
OUTPATIENT
Start: 2025-07-10

## 2025-07-01 RX ORDER — SODIUM CHLORIDE 9 MG/ML
INJECTION, SOLUTION INTRAVENOUS CONTINUOUS
OUTPATIENT
Start: 2025-07-17

## 2025-07-01 RX ORDER — DIPHENHYDRAMINE HYDROCHLORIDE 50 MG/ML
50 INJECTION, SOLUTION INTRAMUSCULAR; INTRAVENOUS
OUTPATIENT
Start: 2025-07-24

## 2025-07-01 RX ORDER — MEPERIDINE HYDROCHLORIDE 25 MG/ML
12.5 INJECTION INTRAMUSCULAR; INTRAVENOUS; SUBCUTANEOUS PRN
OUTPATIENT
Start: 2025-07-10

## 2025-07-01 RX ORDER — DEXAMETHASONE 4 MG/1
40 TABLET ORAL ONCE
Status: CANCELLED
Start: 2025-07-03 | End: 2025-07-03

## 2025-07-01 RX ORDER — EPINEPHRINE 1 MG/ML
0.3 INJECTION, SOLUTION, CONCENTRATE INTRAVENOUS PRN
OUTPATIENT
Start: 2025-07-10

## 2025-07-01 RX ORDER — EPINEPHRINE 1 MG/ML
0.3 INJECTION, SOLUTION, CONCENTRATE INTRAVENOUS PRN
Status: CANCELLED | OUTPATIENT
Start: 2025-07-03

## 2025-07-01 RX ORDER — SODIUM CHLORIDE 9 MG/ML
INJECTION, SOLUTION INTRAVENOUS CONTINUOUS
Status: CANCELLED | OUTPATIENT
Start: 2025-07-03

## 2025-07-01 RX ORDER — HEPARIN 100 UNIT/ML
500 SYRINGE INTRAVENOUS PRN
OUTPATIENT
Start: 2025-07-10

## 2025-07-01 RX ORDER — DIPHENHYDRAMINE HYDROCHLORIDE 50 MG/ML
25 INJECTION, SOLUTION INTRAMUSCULAR; INTRAVENOUS ONCE
OUTPATIENT
Start: 2025-07-17 | End: 2025-07-17

## 2025-07-01 RX ORDER — ALBUTEROL SULFATE 90 UG/1
4 INHALANT RESPIRATORY (INHALATION) PRN
OUTPATIENT
Start: 2025-07-24

## 2025-07-01 RX ORDER — HYDROCORTISONE SODIUM SUCCINATE 100 MG/2ML
100 INJECTION INTRAMUSCULAR; INTRAVENOUS
Status: CANCELLED | OUTPATIENT
Start: 2025-07-03

## 2025-07-01 RX ORDER — HEPARIN 100 UNIT/ML
500 SYRINGE INTRAVENOUS PRN
OUTPATIENT
Start: 2025-07-17

## 2025-07-01 RX ORDER — ACETAMINOPHEN 325 MG/1
650 TABLET ORAL ONCE
OUTPATIENT
Start: 2025-07-17 | End: 2025-07-17

## 2025-07-01 RX ORDER — DIPHENHYDRAMINE HYDROCHLORIDE 50 MG/ML
25 INJECTION, SOLUTION INTRAMUSCULAR; INTRAVENOUS ONCE
Status: CANCELLED | OUTPATIENT
Start: 2025-07-03 | End: 2025-07-03

## 2025-07-01 RX ORDER — DIPHENHYDRAMINE HYDROCHLORIDE 50 MG/ML
25 INJECTION, SOLUTION INTRAMUSCULAR; INTRAVENOUS ONCE
OUTPATIENT
Start: 2025-07-24 | End: 2025-07-24

## 2025-07-01 RX ORDER — ONDANSETRON 2 MG/ML
8 INJECTION INTRAMUSCULAR; INTRAVENOUS
OUTPATIENT
Start: 2025-07-10

## 2025-07-01 RX ORDER — ONDANSETRON 2 MG/ML
8 INJECTION INTRAMUSCULAR; INTRAVENOUS
OUTPATIENT
Start: 2025-07-17

## 2025-07-01 RX ORDER — HEPARIN 100 UNIT/ML
500 SYRINGE INTRAVENOUS PRN
OUTPATIENT
Start: 2025-07-24

## 2025-07-01 RX ORDER — HYDROCORTISONE SODIUM SUCCINATE 100 MG/2ML
100 INJECTION INTRAMUSCULAR; INTRAVENOUS
OUTPATIENT
Start: 2025-07-17

## 2025-07-01 RX ORDER — DEXAMETHASONE 4 MG/1
40 TABLET ORAL ONCE
Start: 2025-07-17 | End: 2025-07-17

## 2025-07-01 RX ORDER — MEPERIDINE HYDROCHLORIDE 25 MG/ML
12.5 INJECTION INTRAMUSCULAR; INTRAVENOUS; SUBCUTANEOUS PRN
OUTPATIENT
Start: 2025-07-17

## 2025-07-01 RX ORDER — DIPHENHYDRAMINE HYDROCHLORIDE 50 MG/ML
50 INJECTION, SOLUTION INTRAMUSCULAR; INTRAVENOUS
OUTPATIENT
Start: 2025-07-17

## 2025-07-01 RX ORDER — ACETAMINOPHEN 325 MG/1
650 TABLET ORAL ONCE
Status: CANCELLED | OUTPATIENT
Start: 2025-07-03 | End: 2025-07-03

## 2025-07-01 RX ORDER — DEXAMETHASONE 4 MG/1
40 TABLET ORAL ONCE
Start: 2025-07-24 | End: 2025-07-24

## 2025-07-01 RX ORDER — SODIUM CHLORIDE 9 MG/ML
5-250 INJECTION, SOLUTION INTRAVENOUS PRN
OUTPATIENT
Start: 2025-07-17

## 2025-07-01 RX ORDER — MEPERIDINE HYDROCHLORIDE 25 MG/ML
12.5 INJECTION INTRAMUSCULAR; INTRAVENOUS; SUBCUTANEOUS PRN
OUTPATIENT
Start: 2025-07-24

## 2025-07-01 RX ORDER — EPINEPHRINE 1 MG/ML
0.3 INJECTION, SOLUTION, CONCENTRATE INTRAVENOUS PRN
OUTPATIENT
Start: 2025-07-17

## 2025-07-01 RX ORDER — DEXAMETHASONE 4 MG/1
40 TABLET ORAL ONCE
Start: 2025-07-10 | End: 2025-07-10

## 2025-07-01 RX ORDER — ACETAMINOPHEN 325 MG/1
650 TABLET ORAL
OUTPATIENT
Start: 2025-07-17

## 2025-07-01 RX ORDER — ACETAMINOPHEN 325 MG/1
650 TABLET ORAL
OUTPATIENT
Start: 2025-07-24

## 2025-07-01 RX ORDER — SODIUM CHLORIDE 9 MG/ML
5-250 INJECTION, SOLUTION INTRAVENOUS PRN
Status: CANCELLED | OUTPATIENT
Start: 2025-07-03

## 2025-07-01 RX ORDER — SODIUM CHLORIDE 0.9 % (FLUSH) 0.9 %
5-40 SYRINGE (ML) INJECTION PRN
Status: CANCELLED | OUTPATIENT
Start: 2025-07-03

## 2025-07-01 RX ORDER — SODIUM CHLORIDE 0.9 % (FLUSH) 0.9 %
5-40 SYRINGE (ML) INJECTION PRN
OUTPATIENT
Start: 2025-07-24

## 2025-07-01 RX ORDER — ALBUTEROL SULFATE 90 UG/1
4 INHALANT RESPIRATORY (INHALATION) PRN
OUTPATIENT
Start: 2025-07-17

## 2025-07-01 RX ORDER — ACETAMINOPHEN 325 MG/1
650 TABLET ORAL
Status: CANCELLED | OUTPATIENT
Start: 2025-07-03

## 2025-07-01 RX ORDER — DIPHENHYDRAMINE HYDROCHLORIDE 50 MG/ML
50 INJECTION, SOLUTION INTRAMUSCULAR; INTRAVENOUS
OUTPATIENT
Start: 2025-07-10

## 2025-07-01 RX ORDER — ACETAMINOPHEN 325 MG/1
650 TABLET ORAL ONCE
OUTPATIENT
Start: 2025-07-10 | End: 2025-07-10

## 2025-07-01 RX ORDER — SODIUM CHLORIDE 9 MG/ML
INJECTION, SOLUTION INTRAVENOUS CONTINUOUS
OUTPATIENT
Start: 2025-07-10

## 2025-07-01 RX ORDER — MEPERIDINE HYDROCHLORIDE 25 MG/ML
12.5 INJECTION INTRAMUSCULAR; INTRAVENOUS; SUBCUTANEOUS PRN
Status: CANCELLED | OUTPATIENT
Start: 2025-07-03

## 2025-07-01 RX ORDER — ONDANSETRON 2 MG/ML
8 INJECTION INTRAMUSCULAR; INTRAVENOUS
OUTPATIENT
Start: 2025-07-24

## 2025-07-01 RX ORDER — DIPHENHYDRAMINE HYDROCHLORIDE 50 MG/ML
25 INJECTION, SOLUTION INTRAMUSCULAR; INTRAVENOUS ONCE
OUTPATIENT
Start: 2025-07-10 | End: 2025-07-10

## 2025-07-01 RX ORDER — ACETAMINOPHEN 325 MG/1
650 TABLET ORAL
OUTPATIENT
Start: 2025-07-10

## 2025-07-01 RX ORDER — HYDROCORTISONE SODIUM SUCCINATE 100 MG/2ML
100 INJECTION INTRAMUSCULAR; INTRAVENOUS
OUTPATIENT
Start: 2025-07-10

## 2025-07-01 RX ORDER — ONDANSETRON 2 MG/ML
8 INJECTION INTRAMUSCULAR; INTRAVENOUS
Status: CANCELLED | OUTPATIENT
Start: 2025-07-03

## 2025-07-01 NOTE — TELEPHONE ENCOUNTER
Patient called and stated that she wanted to know if she should still go to her lab appt tomorrow for her treatment on Thursday since she is changing treatments. Patient also stated that she wanted to know if the venclexta medication will be coming from her local pharmacy or come through the infusion. Requested a call back to discuss these questions.     # [phone]

## 2025-07-01 NOTE — TELEPHONE ENCOUNTER
HIPAA x 3  SW pt to let her know that she does need to keep her appt for Thursday to be able to start her new trx. I also let her know she will need to call [facility] to set up shipment/pickup. I provided her the phone number [phone] for [employer]. Pt voiced understanding and was appreciative of my call.

## 2025-07-02 ENCOUNTER — HOSPITAL ENCOUNTER (OUTPATIENT)
Facility: HOSPITAL | Age: 70
Setting detail: INFUSION SERIES
Discharge: HOME | End: 2025-07-02
Payer: MEDICARE

## 2025-07-02 ENCOUNTER — APPOINTMENT (OUTPATIENT)
Facility: HOSPITAL | Age: 70
End: 2025-07-02
Payer: MEDICARE

## 2025-07-02 VITALS
SYSTOLIC BLOOD PRESSURE: 164 MMHG | RESPIRATION RATE: 18 BRPM | TEMPERATURE: 98.8 F | HEART RATE: 82 BPM | DIASTOLIC BLOOD PRESSURE: 81 MMHG | OXYGEN SATURATION: 96 %

## 2025-07-02 DIAGNOSIS — Z11.59 ENCOUNTER FOR SCREENING FOR OTHER VIRAL DISEASES: ICD-10-CM

## 2025-07-02 DIAGNOSIS — C90.00 MULTIPLE MYELOMA NOT HAVING ACHIEVED REMISSION (HCC): ICD-10-CM

## 2025-07-02 LAB
ALBUMIN SERPL-MCNC: 3.4 G/DL (ref 3.5–5)
ALBUMIN/GLOB SERPL: 1.4 (ref 1.1–2.2)
ALP SERPL-CCNC: 63 U/L (ref 45–117)
ALT SERPL-CCNC: 34 U/L (ref 12–78)
ANION GAP SERPL CALC-SCNC: 4 MMOL/L (ref 2–12)
AST SERPL-CCNC: 12 U/L (ref 15–37)
BASOPHILS # BLD: 0.03 K/UL (ref 0–0.1)
BASOPHILS NFR BLD: 0.8 % (ref 0–1)
BILIRUB SERPL-MCNC: 0.5 MG/DL (ref 0.2–1)
BUN SERPL-MCNC: 15 MG/DL (ref 6–20)
BUN/CREAT SERPL: 21 (ref 12–20)
CALCIUM SERPL-MCNC: 9 MG/DL (ref 8.5–10.1)
CHLORIDE SERPL-SCNC: 113 MMOL/L (ref 97–108)
CO2 SERPL-SCNC: 21 MMOL/L (ref 21–32)
CREAT SERPL-MCNC: 0.71 MG/DL (ref 0.55–1.02)
DIFFERENTIAL METHOD BLD: ABNORMAL
EOSINOPHIL # BLD: 0.1 K/UL (ref 0–0.4)
EOSINOPHIL NFR BLD: 2.6 % (ref 0–7)
ERYTHROCYTE [DISTWIDTH] IN BLOOD BY AUTOMATED COUNT: 13.2 % (ref 11.5–14.5)
GLOBULIN SER CALC-MCNC: 2.5 G/DL (ref 2–4)
GLUCOSE SERPL-MCNC: 100 MG/DL (ref 65–100)
HCT VFR BLD AUTO: 36.9 % (ref 35–47)
HGB BLD-MCNC: 11.6 G/DL (ref 11.5–16)
IMM GRANULOCYTES # BLD AUTO: 0.02 K/UL (ref 0–0.04)
IMM GRANULOCYTES NFR BLD AUTO: 0.5 % (ref 0–0.5)
LYMPHOCYTES # BLD: 0.92 K/UL (ref 0.8–3.5)
LYMPHOCYTES NFR BLD: 24 % (ref 12–49)
MCH RBC QN AUTO: 30.1 PG (ref 26–34)
MCHC RBC AUTO-ENTMCNC: 31.4 G/DL (ref 30–36.5)
MCV RBC AUTO: 95.8 FL (ref 80–99)
MONOCYTES # BLD: 0.71 K/UL (ref 0–1)
MONOCYTES NFR BLD: 18.5 % (ref 5–13)
NEUTS SEG # BLD: 2.06 K/UL (ref 1.8–8)
NEUTS SEG NFR BLD: 53.6 % (ref 32–75)
NRBC # BLD: 0.05 K/UL (ref 0–0.01)
NRBC BLD-RTO: 1.3 PER 100 WBC
PLATELET # BLD AUTO: 170 K/UL (ref 150–400)
PMV BLD AUTO: 12.6 FL (ref 8.9–12.9)
POTASSIUM SERPL-SCNC: 4.1 MMOL/L (ref 3.5–5.1)
PROT SERPL-MCNC: 5.9 G/DL (ref 6.4–8.2)
RBC # BLD AUTO: 3.85 M/UL (ref 3.8–5.2)
SODIUM SERPL-SCNC: 138 MMOL/L (ref 136–145)
WBC # BLD AUTO: 3.8 K/UL (ref 3.6–11)

## 2025-07-02 PROCEDURE — 80053 COMPREHEN METABOLIC PANEL: CPT

## 2025-07-02 PROCEDURE — 85025 COMPLETE CBC W/AUTO DIFF WBC: CPT

## 2025-07-02 ASSESSMENT — PAIN SCALES - WONG BAKER: WONGBAKER_NUMERICALRESPONSE: NO HURT

## 2025-07-02 ASSESSMENT — PAIN SCALES - GENERAL: PAINLEVEL_OUTOF10: 5

## 2025-07-02 ASSESSMENT — PAIN DESCRIPTION - LOCATION: LOCATION: BACK;HAND

## 2025-07-02 ASSESSMENT — PAIN DESCRIPTION - PAIN TYPE: TYPE: CHRONIC PAIN;NEUROPATHIC PAIN

## 2025-07-02 ASSESSMENT — PAIN DESCRIPTION - ORIENTATION: ORIENTATION: LOWER;RIGHT

## 2025-07-02 ASSESSMENT — PAIN DESCRIPTION - DESCRIPTORS: DESCRIPTORS: ACHING

## 2025-07-02 NOTE — PROGRESS NOTES
[facility] Peds/Adult Note                       Date: 2025    Name: Candida Weaver    MRN: [id number]         : 1955    0835 Patient arrives for Pre-Treatment Labs without acute problems. Please see Epic for complete assessment and education provided.    Vital signs stable throughout and prior to discharge. Patient tolerated procedure well and was discharged without incident.  Patient is aware of next [facility] appointment on 2025.  Appointment card give to the Patient.       Ms. Weaver's vitals were reviewed prior to and after treatment.   Patient Vitals for the past 12 hrs:   Temp Pulse Resp BP SpO2   25 0835 98.8 °F (37.1 °C) 82 18 (!) 164/81 96 %         Lab results were obtained and reviewed.  Recent Results (from the past 12 hours)   CBC With Auto Differential    Collection Time: 25  8:42 AM   Result Value Ref Range    WBC 3.8 3.6 - 11.0 K/uL    RBC 3.85 3.80 - 5.20 M/uL    Hemoglobin 11.6 11.5 - 16.0 g/dL    Hematocrit 36.9 35.0 - 47.0 %    MCV 95.8 80.0 - 99.0 FL    MCH 30.1 26.0 - 34.0 PG    MCHC 31.4 30.0 - 36.5 g/dL    RDW 13.2 11.5 - 14.5 %    Platelets 170 150 - 400 K/uL    MPV 12.6 8.9 - 12.9 FL    Nucleated RBCs 1.3 (H) 0  WBC    nRBC 0.05 (H) 0.00 - 0.01 K/uL    Neutrophils % 53.6 32.0 - 75.0 %    Lymphocytes % 24.0 12.0 - 49.0 %    Monocytes % 18.5 (H) 5.0 - 13.0 %    Eosinophils % 2.6 0.0 - 7.0 %    Basophils % 0.8 0.0 - 1.0 %    Immature Granulocytes % 0.5 0.0 - 0.5 %    Neutrophils Absolute 2.06 1.80 - 8.00 K/UL    Lymphocytes Absolute 0.92 0.80 - 3.50 K/UL    Monocytes Absolute 0.71 0.00 - 1.00 K/UL    Eosinophils Absolute 0.10 0.00 - 0.40 K/UL    Basophils Absolute 0.03 0.00 - 0.10 K/UL    Immature Granulocytes Absolute 0.02 0.00 - 0.04 K/UL    Differential Type AUTOMATED     Comprehensive metabolic panel    Collection Time: 25  8:42 AM   Result Value Ref Range    Sodium 138 136 - 145 mmol/L    Potassium 4.1 3.5 - 5.1 mmol/L    Chloride 113 (H) 97 - 108

## 2025-07-03 ENCOUNTER — HOSPITAL ENCOUNTER (OUTPATIENT)
Facility: HOSPITAL | Age: 70
Setting detail: INFUSION SERIES
Discharge: HOME | End: 2025-07-03
Payer: MEDICARE

## 2025-07-03 VITALS
DIASTOLIC BLOOD PRESSURE: 67 MMHG | WEIGHT: 149.8 LBS | HEART RATE: 77 BPM | RESPIRATION RATE: 18 BRPM | BODY MASS INDEX: 24.93 KG/M2 | SYSTOLIC BLOOD PRESSURE: 148 MMHG | TEMPERATURE: 98 F

## 2025-07-03 DIAGNOSIS — Z11.59 ENCOUNTER FOR SCREENING FOR OTHER VIRAL DISEASES: ICD-10-CM

## 2025-07-03 DIAGNOSIS — C90.00 MULTIPLE MYELOMA NOT HAVING ACHIEVED REMISSION (HCC): Primary | ICD-10-CM

## 2025-07-03 PROCEDURE — 96413 CHEMO IV INFUSION 1 HR: CPT

## 2025-07-03 PROCEDURE — 96375 TX/PRO/DX INJ NEW DRUG ADDON: CPT

## 2025-07-03 PROCEDURE — 2500000003 HC RX 250 WO HCPCS: Performed by: INTERNAL MEDICINE

## 2025-07-03 PROCEDURE — 2580000003 HC RX 258: Performed by: INTERNAL MEDICINE

## 2025-07-03 PROCEDURE — 6360000002 HC RX W HCPCS: Performed by: INTERNAL MEDICINE

## 2025-07-03 PROCEDURE — 6370000000 HC RX 637 (ALT 250 FOR IP): Performed by: INTERNAL MEDICINE

## 2025-07-03 PROCEDURE — 96415 CHEMO IV INFUSION ADDL HR: CPT

## 2025-07-03 RX ORDER — DIPHENHYDRAMINE HYDROCHLORIDE 50 MG/ML
50 INJECTION, SOLUTION INTRAMUSCULAR; INTRAVENOUS
Status: DISCONTINUED | OUTPATIENT
Start: 2025-07-03 | End: 2025-07-04 | Stop reason: HOSPADM

## 2025-07-03 RX ORDER — ACETAMINOPHEN 325 MG/1
650 TABLET ORAL
Status: DISCONTINUED | OUTPATIENT
Start: 2025-07-03 | End: 2025-07-04 | Stop reason: HOSPADM

## 2025-07-03 RX ORDER — SODIUM CHLORIDE 9 MG/ML
5-250 INJECTION, SOLUTION INTRAVENOUS PRN
Status: DISCONTINUED | OUTPATIENT
Start: 2025-07-03 | End: 2025-07-04 | Stop reason: HOSPADM

## 2025-07-03 RX ORDER — ONDANSETRON 2 MG/ML
8 INJECTION INTRAMUSCULAR; INTRAVENOUS
Status: DISCONTINUED | OUTPATIENT
Start: 2025-07-03 | End: 2025-07-04 | Stop reason: HOSPADM

## 2025-07-03 RX ORDER — HYDROCORTISONE SODIUM SUCCINATE 100 MG/2ML
100 INJECTION INTRAMUSCULAR; INTRAVENOUS
Status: DISCONTINUED | OUTPATIENT
Start: 2025-07-03 | End: 2025-07-04 | Stop reason: HOSPADM

## 2025-07-03 RX ORDER — ACETAMINOPHEN 325 MG/1
650 TABLET ORAL ONCE
Status: COMPLETED | OUTPATIENT
Start: 2025-07-03 | End: 2025-07-03

## 2025-07-03 RX ORDER — ALBUTEROL SULFATE 90 UG/1
4 INHALANT RESPIRATORY (INHALATION) PRN
Status: DISCONTINUED | OUTPATIENT
Start: 2025-07-03 | End: 2025-07-04 | Stop reason: HOSPADM

## 2025-07-03 RX ORDER — SODIUM CHLORIDE 0.9 % (FLUSH) 0.9 %
5-40 SYRINGE (ML) INJECTION PRN
Status: DISCONTINUED | OUTPATIENT
Start: 2025-07-03 | End: 2025-07-04 | Stop reason: HOSPADM

## 2025-07-03 RX ORDER — DIPHENHYDRAMINE HYDROCHLORIDE 50 MG/ML
25 INJECTION, SOLUTION INTRAMUSCULAR; INTRAVENOUS ONCE
Status: COMPLETED | OUTPATIENT
Start: 2025-07-03 | End: 2025-07-03

## 2025-07-03 RX ORDER — HEPARIN 100 UNIT/ML
500 SYRINGE INTRAVENOUS PRN
Status: DISCONTINUED | OUTPATIENT
Start: 2025-07-03 | End: 2025-07-04 | Stop reason: HOSPADM

## 2025-07-03 RX ORDER — EPINEPHRINE 1 MG/ML
0.3 INJECTION, SOLUTION INTRAMUSCULAR; SUBCUTANEOUS PRN
Status: DISCONTINUED | OUTPATIENT
Start: 2025-07-03 | End: 2025-07-04 | Stop reason: HOSPADM

## 2025-07-03 RX ORDER — DEXAMETHASONE 4 MG/1
40 TABLET ORAL ONCE
Status: COMPLETED | OUTPATIENT
Start: 2025-07-03 | End: 2025-07-03

## 2025-07-03 RX ORDER — MEPERIDINE HYDROCHLORIDE 25 MG/ML
12.5 INJECTION INTRAMUSCULAR; INTRAVENOUS; SUBCUTANEOUS PRN
Status: DISCONTINUED | OUTPATIENT
Start: 2025-07-03 | End: 2025-07-04 | Stop reason: HOSPADM

## 2025-07-03 RX ORDER — SODIUM CHLORIDE 9 MG/ML
INJECTION, SOLUTION INTRAVENOUS CONTINUOUS
Status: DISCONTINUED | OUTPATIENT
Start: 2025-07-03 | End: 2025-07-04 | Stop reason: HOSPADM

## 2025-07-03 RX ADMIN — FAMOTIDINE 20 MG: 10 INJECTION, SOLUTION INTRAVENOUS at 10:13

## 2025-07-03 RX ADMIN — DIPHENHYDRAMINE HYDROCHLORIDE 25 MG: 50 INJECTION INTRAMUSCULAR; INTRAVENOUS at 10:12

## 2025-07-03 RX ADMIN — SODIUM CHLORIDE 100 ML/HR: 9 INJECTION, SOLUTION INTRAVENOUS at 10:06

## 2025-07-03 RX ADMIN — ACETAMINOPHEN 650 MG: 325 TABLET ORAL at 10:10

## 2025-07-03 RX ADMIN — DEXAMETHASONE 40 MG: 4 TABLET ORAL at 10:11

## 2025-07-03 RX ADMIN — SODIUM CHLORIDE 689 MG: 0.9 INJECTION, SOLUTION INTRAVENOUS at 10:49

## 2025-07-03 ASSESSMENT — PAIN SCALES - GENERAL: PAINLEVEL_OUTOF10: 6

## 2025-07-03 ASSESSMENT — PAIN DESCRIPTION - LOCATION: LOCATION: BACK

## 2025-07-03 NOTE — PROGRESS NOTES
[facility]    10:00 Ms. Weaver Arrived ambulatory and in no distress for SARCLISA C1D1 regimen.  Assessment was completed, no acute issues at this time, no new complaints voiced.  Port accessed without difficulty, labs drawn and processed.      Ms. Weaver's vitals were reviewed.  Patient Vitals for the past 12 hrs:   Temp Pulse Resp BP   07/03/25 1416 98 °F (36.7 °C) 77 18 (!) 148/67   07/03/25 1349 98.7 °F (37.1 °C) 74 18 (!) 144/66   07/03/25 1320 97.9 °F (36.6 °C) 68 18 136/66   07/03/25 1250 97.8 °F (36.6 °C) 68 18 (!) 151/63   07/03/25 1220 97.9 °F (36.6 °C) 64 18 (!) 146/61   07/03/25 1150 97.7 °F (36.5 °C) 63 18 (!) 145/51   07/03/25 1135 97.7 °F (36.5 °C) 62 18 (!) 118/55   07/03/25 1120 97.7 °F (36.5 °C) 60 18 (!) 139/48   07/03/25 1105 97.9 °F (36.6 °C) 62 18 (!) 155/55   07/03/25 0954 97.3 °F (36.3 °C) 69 18 (!) 122/53         Lab results were obtained and reviewed.    Pre-medications  were administered as ordered and chemotherapy was initiated.  Medications Administered         0.9 % sodium chloride infusion Admin Date  07/03/2025 Action  New Bag Dose  100 mL/hr Rate  100 mL/hr Route  IntraVENous Documented By  Ruby Tabor RN        0.9 % sodium chloride infusion Admin Date  07/03/2025 Action  Rate/Dose Verify Dose   Rate  100 mL/hr Route  IntraVENous Documented By  Ruby Tabor RN        0.9 % sodium chloride infusion Admin Date  07/03/2025 Action  Rate/Dose Change Dose   Rate  25 mL/hr Route  IntraVENous Documented By  Ruby Tabor RN        0.9 % sodium chloride infusion Admin Date  07/03/2025 Action  Rate/Dose Change Dose   Rate  150 mL/hr Route  IntraVENous Documented By  Ruby Tabor, SABRINA        acetaminophen (TYLENOL) tablet 650 mg Admin Date  07/03/2025 Action  Given Dose  650 mg Rate   Route  Oral Documented By  Ruby Tabor RN        dexAMETHasone (DECADRON) tablet 40 mg Admin Date  07/03/2025 Action  Given Dose  40 mg Rate   Route  Oral Documented By  Ruby Tabor  SABRINA INGRAM        diphenhydrAMINE (BENADRYL) injection 25 mg Admin Date  07/03/2025 Action  Given Dose  25 mg Rate   Route  IntraVENous Documented By  Ruby Tabor RN        famotidine (PEPCID) 20 MG/2ML 20 mg in sodium chloride (PF) 0.9 % 10 mL injection Admin Date  07/03/2025 Action  Given Dose  20 mg Rate   Route  IntraVENous Documented By  Ruby Tabor RN        isatuximab-irfc (SARCLISA) 689 mg in sodium chloride 0.9 % 275 mL infusion Admin Date  07/03/2025 Action  New Bag Dose  689 mg Rate  25 mL/hr Route  IntraVENous Documented By  Ruby Tabor RN        isatuximab-irfc (SARCLISA) 689 mg in sodium chloride 0.9 % 275 mL infusion Admin Date  07/03/2025 Action  Rate/Dose Verify Dose   Rate  50 mL/hr Route  IntraVENous Documented By  Ruby Tabor RN        isatuximab-irfc (SARCLISA) 689 mg in sodium chloride 0.9 % 275 mL infusion Admin Date  07/03/2025 Action  Rate/Dose Change Dose   Rate  75 mL/hr Route  IntraVENous Documented By  Ruby Tabor RN        isatuximab-irfc (SARCLISA) 689 mg in sodium chloride 0.9 % 275 mL infusion Admin Date  07/03/2025 Action  Rate/Dose Change Dose   Rate  100 mL/hr Route  IntraVENous Documented By  Ruby Tabor RN        isatuximab-irfc (SARCLISA) 689 mg in sodium chloride 0.9 % 275 mL infusion Admin Date  07/03/2025 Action  Rate/Dose Change Dose   Rate  125 mL/hr Route  IntraVENous Documented By  Ruby Tabor RN        isatuximab-irfc (SARCLISA) 689 mg in sodium chloride 0.9 % 275 mL infusion Admin Date  07/03/2025 Action  Rate/Dose Change Dose   Rate  150 mL/hr Route  IntraVENous Documented By  Ruby Tabor RN            Two nurses verified prior to administering: Drug name, Drug dose, Infusion volume or drug volume when prepared in a syringe, Rate of administration, Route of administration, Expiration dates and/or times, Appearance and physical integrity of the drugs, Rate set on infusion pump, when used, and Sequencing of drug administration.    Ms.

## 2025-07-07 ENCOUNTER — TELEPHONE (OUTPATIENT)
Age: 70
End: 2025-07-07

## 2025-07-07 NOTE — TELEPHONE ENCOUNTER
HIPAA x 3  SW pt about her PET/CTA scan results. Pt wants to know where the cancer is/spreading? And will the new chemo drugs especially for this type of cancer. Pt let me know that she had a PET scan done last week at [facility] and is very nervous about the results. I let her know that I will message Dr. Arboleda/Ashley CostaNP will call her back. Pt voiced understanding and was appreciative of my call.

## 2025-07-10 ENCOUNTER — HOSPITAL ENCOUNTER (OUTPATIENT)
Facility: HOSPITAL | Age: 70
Setting detail: INFUSION SERIES
Discharge: HOME | End: 2025-07-10
Payer: MEDICARE

## 2025-07-10 ENCOUNTER — APPOINTMENT (OUTPATIENT)
Facility: HOSPITAL | Age: 70
End: 2025-07-10
Payer: MEDICARE

## 2025-07-10 ENCOUNTER — OFFICE VISIT (OUTPATIENT)
Age: 70
End: 2025-07-10
Payer: MEDICARE

## 2025-07-10 VITALS
RESPIRATION RATE: 16 BRPM | DIASTOLIC BLOOD PRESSURE: 56 MMHG | SYSTOLIC BLOOD PRESSURE: 134 MMHG | TEMPERATURE: 97.8 F | HEART RATE: 55 BPM

## 2025-07-10 VITALS
HEIGHT: 65 IN | DIASTOLIC BLOOD PRESSURE: 53 MMHG | SYSTOLIC BLOOD PRESSURE: 126 MMHG | OXYGEN SATURATION: 97 % | RESPIRATION RATE: 18 BRPM | HEART RATE: 76 BPM | WEIGHT: 153 LBS | TEMPERATURE: 98.7 F | BODY MASS INDEX: 25.49 KG/M2

## 2025-07-10 VITALS
WEIGHT: 153 LBS | OXYGEN SATURATION: 97 % | SYSTOLIC BLOOD PRESSURE: 126 MMHG | DIASTOLIC BLOOD PRESSURE: 53 MMHG | BODY MASS INDEX: 25.46 KG/M2 | HEART RATE: 76 BPM | TEMPERATURE: 98.7 F | RESPIRATION RATE: 18 BRPM

## 2025-07-10 DIAGNOSIS — Z11.59 ENCOUNTER FOR SCREENING FOR OTHER VIRAL DISEASES: ICD-10-CM

## 2025-07-10 DIAGNOSIS — C90.00 MULTIPLE MYELOMA NOT HAVING ACHIEVED REMISSION (HCC): ICD-10-CM

## 2025-07-10 DIAGNOSIS — C90.00 MULTIPLE MYELOMA NOT HAVING ACHIEVED REMISSION (HCC): Primary | ICD-10-CM

## 2025-07-10 DIAGNOSIS — Z11.59 ENCOUNTER FOR SCREENING FOR OTHER VIRAL DISEASES: Primary | ICD-10-CM

## 2025-07-10 LAB
ALBUMIN SERPL-MCNC: 3.3 G/DL (ref 3.5–5)
ALBUMIN/GLOB SERPL: 1.3 (ref 1.1–2.2)
ALP SERPL-CCNC: 70 U/L (ref 45–117)
ALT SERPL-CCNC: 30 U/L (ref 12–78)
ANION GAP SERPL CALC-SCNC: 6 MMOL/L (ref 2–12)
AST SERPL-CCNC: 15 U/L (ref 15–37)
BASO+EOS+MONOS # BLD AUTO: 0.3 K/UL (ref 0.2–1.2)
BASO+EOS+MONOS NFR BLD AUTO: 16 % (ref 3.2–16.9)
BILIRUB SERPL-MCNC: 0.2 MG/DL (ref 0.2–1)
BUN SERPL-MCNC: 15 MG/DL (ref 6–20)
BUN/CREAT SERPL: 21 (ref 12–20)
CALCIUM SERPL-MCNC: 9.1 MG/DL (ref 8.5–10.1)
CHLORIDE SERPL-SCNC: 113 MMOL/L (ref 97–108)
CO2 SERPL-SCNC: 23 MMOL/L (ref 21–32)
COMMENT:: NORMAL
CREAT SERPL-MCNC: 0.72 MG/DL (ref 0.55–1.02)
DIFFERENTIAL METHOD BLD: ABNORMAL
ERYTHROCYTE [DISTWIDTH] IN BLOOD BY AUTOMATED COUNT: 14 % (ref 11.8–15.8)
GLOBULIN SER CALC-MCNC: 2.5 G/DL (ref 2–4)
GLUCOSE SERPL-MCNC: 134 MG/DL (ref 65–100)
HCT VFR BLD AUTO: 34.8 % (ref 35–47)
HGB BLD-MCNC: 11.3 G/DL (ref 11.5–16)
LYMPHOCYTES # BLD: 0.5 K/UL (ref 0.8–3.5)
LYMPHOCYTES NFR BLD: 27.1 % (ref 12–49)
MCH RBC QN AUTO: 30.3 PG (ref 26–34)
MCHC RBC AUTO-ENTMCNC: 32.5 G/DL (ref 30–36.5)
MCV RBC AUTO: 93.3 FL (ref 80–99)
NEUTS SEG # BLD: 1.2 K/UL (ref 1.8–8)
NEUTS SEG NFR BLD: 56.9 % (ref 32–75)
PLATELET # BLD AUTO: 193 K/UL (ref 150–400)
POTASSIUM SERPL-SCNC: 4.2 MMOL/L (ref 3.5–5.1)
PROT SERPL-MCNC: 5.8 G/DL (ref 6.4–8.2)
RBC # BLD AUTO: 3.73 M/UL (ref 3.8–5.2)
SODIUM SERPL-SCNC: 142 MMOL/L (ref 136–145)
SPECIMEN HOLD: NORMAL
WBC # BLD AUTO: 2 K/UL (ref 3.6–11)

## 2025-07-10 PROCEDURE — 1159F MED LIST DOCD IN RCRD: CPT

## 2025-07-10 PROCEDURE — 86334 IMMUNOFIX E-PHORESIS SERUM: CPT

## 2025-07-10 PROCEDURE — G8399 PT W/DXA RESULTS DOCUMENT: HCPCS

## 2025-07-10 PROCEDURE — 1160F RVW MEDS BY RX/DR IN RCRD: CPT

## 2025-07-10 PROCEDURE — 1090F PRES/ABSN URINE INCON ASSESS: CPT

## 2025-07-10 PROCEDURE — 6370000000 HC RX 637 (ALT 250 FOR IP): Performed by: INTERNAL MEDICINE

## 2025-07-10 PROCEDURE — 6360000002 HC RX W HCPCS: Performed by: INTERNAL MEDICINE

## 2025-07-10 PROCEDURE — 1123F ACP DISCUSS/DSCN MKR DOCD: CPT

## 2025-07-10 PROCEDURE — G8419 CALC BMI OUT NRM PARAM NOF/U: HCPCS

## 2025-07-10 PROCEDURE — 2580000003 HC RX 258: Performed by: INTERNAL MEDICINE

## 2025-07-10 PROCEDURE — 99215 OFFICE O/P EST HI 40 MIN: CPT

## 2025-07-10 PROCEDURE — 96375 TX/PRO/DX INJ NEW DRUG ADDON: CPT

## 2025-07-10 PROCEDURE — 3017F COLORECTAL CA SCREEN DOC REV: CPT

## 2025-07-10 PROCEDURE — 80053 COMPREHEN METABOLIC PANEL: CPT

## 2025-07-10 PROCEDURE — 85025 COMPLETE CBC W/AUTO DIFF WBC: CPT

## 2025-07-10 PROCEDURE — 82784 ASSAY IGA/IGD/IGG/IGM EACH: CPT

## 2025-07-10 PROCEDURE — 96415 CHEMO IV INFUSION ADDL HR: CPT

## 2025-07-10 PROCEDURE — 1125F AMNT PAIN NOTED PAIN PRSNT: CPT

## 2025-07-10 PROCEDURE — 84165 PROTEIN E-PHORESIS SERUM: CPT

## 2025-07-10 PROCEDURE — 96413 CHEMO IV INFUSION 1 HR: CPT

## 2025-07-10 PROCEDURE — 2500000003 HC RX 250 WO HCPCS: Performed by: INTERNAL MEDICINE

## 2025-07-10 PROCEDURE — G8427 DOCREV CUR MEDS BY ELIG CLIN: HCPCS

## 2025-07-10 PROCEDURE — 1036F TOBACCO NON-USER: CPT

## 2025-07-10 PROCEDURE — 83521 IG LIGHT CHAINS FREE EACH: CPT

## 2025-07-10 RX ORDER — DEXAMETHASONE 4 MG/1
40 TABLET ORAL ONCE
Status: COMPLETED | OUTPATIENT
Start: 2025-07-10 | End: 2025-07-10

## 2025-07-10 RX ORDER — DIPHENHYDRAMINE HYDROCHLORIDE 50 MG/ML
25 INJECTION, SOLUTION INTRAMUSCULAR; INTRAVENOUS ONCE
Status: COMPLETED | OUTPATIENT
Start: 2025-07-10 | End: 2025-07-10

## 2025-07-10 RX ORDER — ACETAMINOPHEN 325 MG/1
650 TABLET ORAL ONCE
Status: COMPLETED | OUTPATIENT
Start: 2025-07-10 | End: 2025-07-10

## 2025-07-10 RX ORDER — SODIUM CHLORIDE 9 MG/ML
5-250 INJECTION, SOLUTION INTRAVENOUS PRN
Status: DISCONTINUED | OUTPATIENT
Start: 2025-07-10 | End: 2025-07-11 | Stop reason: HOSPADM

## 2025-07-10 RX ORDER — SODIUM CHLORIDE 0.9 % (FLUSH) 0.9 %
5-40 SYRINGE (ML) INJECTION PRN
Status: DISCONTINUED | OUTPATIENT
Start: 2025-07-10 | End: 2025-07-11 | Stop reason: HOSPADM

## 2025-07-10 RX ADMIN — FAMOTIDINE 20 MG: 10 INJECTION, SOLUTION INTRAVENOUS at 09:09

## 2025-07-10 RX ADMIN — SODIUM CHLORIDE 689 MG: 0.9 INJECTION, SOLUTION INTRAVENOUS at 09:48

## 2025-07-10 RX ADMIN — ACETAMINOPHEN 650 MG: 325 TABLET ORAL at 09:00

## 2025-07-10 RX ADMIN — SODIUM CHLORIDE 50 ML/HR: 0.9 INJECTION, SOLUTION INTRAVENOUS at 09:08

## 2025-07-10 RX ADMIN — DIPHENHYDRAMINE HYDROCHLORIDE 25 MG: 50 INJECTION INTRAMUSCULAR; INTRAVENOUS at 09:10

## 2025-07-10 RX ADMIN — DEXAMETHASONE 40 MG: 4 TABLET ORAL at 09:01

## 2025-07-10 ASSESSMENT — PAIN DESCRIPTION - PAIN TYPE: TYPE: CHRONIC PAIN

## 2025-07-10 ASSESSMENT — PAIN SCALES - GENERAL: PAINLEVEL_OUTOF10: 6

## 2025-07-10 ASSESSMENT — PAIN SCALES - WONG BAKER: WONGBAKER_NUMERICALRESPONSE: NO HURT

## 2025-07-10 ASSESSMENT — PAIN DESCRIPTION - LOCATION: LOCATION: BACK;HIP

## 2025-07-10 NOTE — PROGRESS NOTES
Health Maintenance Due   Topic Date Due   • COVID-19 Vaccine (1) Never done   • DTaP/Tdap/Td Vaccine (1 - Tdap) 08/10/1991   • Shingles Vaccine (1 of 2) Never done   • Medicare Wellness Visit  03/19/2020   • Influenza Vaccine (1) 09/01/2021       Patient is due for the topics as listed above and wishes to discuss with provider. Patient informed of above information.      Patient Care Team:  No Pcp as PCP - General  Mike Mullins MD as Pulmonary Medicine (Pulmonary Medicine)  Matthew Cantrell MD as Urologist (Urology)  Bud Wiggins MD (Internal Medicine- Interventional Cardiology)  Lorena Rodriguez RN as Care Transitions RN (Registered Nurse)           Neutrophils Absolute 1.20 (L) 1.8 - 8.0 K/UL    ABSOLUTE MIXED CELLS 0.3 0.2 - 1.2 K/uL    Lymphocytes Absolute 0.50 (L) 0.8 - 3.5 K/UL    Differential Type AUTOMATED     Extra Tubes Hold    Collection Time: 07/10/25  7:47 AM   Result Value Ref Range    Specimen HOld 1SST     Comment:        Add-on orders for these samples will be processed based on acceptable specimen integrity and analyte stability, which may vary by analyte.       Pre-medications  were administered as ordered and chemotherapy was initiated.  Medications Administered         0.9 % sodium chloride infusion Admin Date  07/10/2025 Action  New Bag Dose  50 mL/hr Rate  50 mL/hr Route  IntraVENous Documented By  Nicole Villegas RN        0.9 % sodium chloride infusion Admin Date  07/10/2025 Action  Rate/Dose Change Dose   Rate  200 mL/hr Route  IntraVENous Documented By  Nicole Villegas RN        acetaminophen (TYLENOL) tablet 650 mg Admin Date  07/10/2025 Action  Given Dose  650 mg Rate   Route  Oral Documented By  Nicole Villegas RN        dexAMETHasone (DECADRON) tablet 40 mg Admin Date  07/10/2025 Action  Given Dose  40 mg Rate   Route  Oral Documented By  Nicole Villegas RN        diphenhydrAMINE (BENADRYL) injection 25 mg Admin Date  07/10/2025 Action  Given Dose  25 mg Rate   Route  IntraVENous Documented By  Nicole Villegas RN        famotidine (PEPCID) 20 MG/2ML 20 mg in sodium chloride (PF) 0.9 % 10 mL injection Admin Date  07/10/2025 Action  Given Dose  20 mg Rate   Route  IntraVENous Documented By  Nicole Villegas RN        isatuximab-irfc (SARCLISA) 689 mg in sodium chloride 0.9 % 275 mL infusion Admin Date  07/10/2025 Action  New Bag Dose  689 mg Rate  50 mL/hr Route  IntraVENous Documented By  Nicole Villegas RN        isatuximab-irfc (SARCLISA) 689 mg in sodium chloride 0.9 % 275 mL infusion Admin Date  07/10/2025 Action  Rate/Dose Change Dose   Rate  100 mL/hr Route  IntraVENous Documented By  Nicole Villegas RN         isatuximab-irfc (SARCLISA) 689 mg in sodium chloride 0.9 % 275 mL infusion Admin Date  07/10/2025 Action  Rate/Dose Change Dose   Rate  150 mL/hr Route  IntraVENous Documented By  Nicole Villegas, SABRINA        isatuximab-irfc (SARCLISA) 689 mg in sodium chloride 0.9 % 275 mL infusion Admin Date  07/10/2025 Action  Rate/Dose Change Dose   Rate  200 mL/hr Route  IntraVENous Documented By  Nicole Villegas, RN            Two nurses verified prior to administering:Drug name, Drug doseInfusion volume or drug volume when prepared in a syringe, Rate of administration, Route of administration, Expiration dates and/or times, Appearance and physical integrity of the drugs, Rate set on infusion pump, when used, Sequencing of drug administration.       Pt tolerated treatment well. Port maintained positive blood return throughout treatment. Flushed, heparinized and de-accessed per protocol. D/c home ambulatory in no distress. Pt aware of next appointment scheduled.    Future Appointments   Date Time Provider Department Center   7/17/2025  8:30 AM [facility] LAB CHAIR 3 [facility]   7/17/2025  9:00 AM [facility] SO CHAIR 10 [facility]   7/24/2025  8:00 AM [facility] LAB CHAIR 2 [facility]   7/24/2025  8:30 AM [facility] SO CHAIR 1 [facility]   7/31/2025  8:30 AM [facility] LAB CHAIR 3 [facility]   7/31/2025  9:00 AM [facility] SO CHAIR 1 [facility]   8/14/2025  8:00 AM [facility] LAB CHAIR 3 [facility]   8/14/2025  8:15 AM Ashley Costa APRN - NP [facility]   8/14/2025 11:00 AM [facility] SO CHAIR 11 [facility]   8/28/2025  8:00 AM [facility] LAB CHAIR 2 [facility]   8/28/2025  8:30 AM [facility] SO CHAIR 1 [facility]   9/11/2025  8:00 AM [facility] LAB CHAIR 3 [facility]   9/11/2025  8:30 AM RUTH SO CHAIR 1 [facility]         Nicole Villegas RN  July 10, 2025

## 2025-07-10 NOTE — PROGRESS NOTES
Candida Weaver is a 70 y.o. female    Chief Complaint   Patient presents with    Follow-up     Multiple Myeloma        1. Have you been to the ER, urgent care clinic since your last visit?  Hospitalized since your last visit?No    2. Have you seen or consulted any other health care providers outside of the [facility] since your last visit?  Include any pap smears or colon screening. No      
atypia. The   myeloid and erythroid precursors are relatively few and mature without   overt dyspoiesis.     CT 11/2021  Radiology report(s) reviewed above.     1. 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  2. Incidentals as full reading    PET CT 9/2023   IMPRESSION:  Foci of increased tracer activity left lateral eighth rib and T3 spinous process. Inflammatory appearing changes in the right shoulder.    External records reviewed.   Records reviewed and summarized above.  Pathology report(s) reviewed above.    MRI Lumbar spine 1/2024  IMPRESSION:  1. New evidence of diffuse myeloma in the bone marrow since 2018. No pathologic  fracture.  2. L3-L4 increased moderate central spinal canal and left femoral stenosis.  Other levels are described above.    Brain MRI 5/2024  IMPRESSION:  1. Extensive multiple myeloma diffusely involving the visualized osseous  structures.  2. Otherwise unremarkable brain MRI.     PET CT (@[facility]) 1/2025  1.  Focal  increased tracer activity noted at the medial aspect of the sternum.   2.  Extensive lytic osseous lesions as described above, compatible with provided history of myeloma.     CTA Chest @ 3/2025  IMPRESSION:  There is no pulmonary embolism.  There is no aortic aneurysm.  Cardiomegaly with enlarged main pulmonary artery suggesting pulmonary  hypertension.    PET CT 7/2/2025  Impression      1.  Residual active disease in the sternum, decreased from prior examination.  2.  Redemonstrated extensive lytic osseous lesions in keeping with history of multiple myeloma.  3.  No evidence of new pathological hypermetabolic activity.    BMBX 6/2025  Bone marrow, left posterior iliac crest; CT-guided aspiration, touch imprint, clot section and biopsy:  - Residual multiple myeloma with associated fibrosis, 15% kappa-restricted plasma cells (see Comment).   - Variably hypercellular bone marrow with

## 2025-07-14 LAB
ALBUMIN SERPL ELPH-MCNC: 3.5 G/DL (ref 2.9–4.4)
ALBUMIN/GLOB SERPL: 2.2 (ref 0.7–1.7)
ALPHA1 GLOB SERPL ELPH-MCNC: 0.2 G/DL (ref 0–0.4)
ALPHA2 GLOB SERPL ELPH-MCNC: 0.5 G/DL (ref 0.4–1)
B-GLOBULIN SERPL ELPH-MCNC: 0.8 G/DL (ref 0.7–1.3)
GAMMA GLOB SERPL ELPH-MCNC: 0.1 G/DL (ref 0.4–1.8)
GLOBULIN SER-MCNC: 1.6 G/DL (ref 2.2–3.9)
IGA SERPL-MCNC: 6 MG/DL (ref 64–422)
IGG SERPL-MCNC: 165 MG/DL (ref 586–1602)
IGM SERPL-MCNC: 6 MG/DL (ref 26–217)
INTERPRETATION SERPL IEP-IMP: ABNORMAL
KAPPA LC FREE SER-MCNC: 2.2 MG/L (ref 3.3–19.4)
KAPPA LC FREE/LAMBDA FREE SER: >1.47 {RATIO} (ref 0.26–1.65)
LAMBDA LC FREE SERPL-MCNC: <1.5 MG/L (ref 5.7–26.3)
M PROTEIN SERPL ELPH-MCNC: ABNORMAL G/DL
PROT SERPL-MCNC: 5.1 G/DL (ref 6–8.5)

## 2025-07-17 ENCOUNTER — HOSPITAL ENCOUNTER (OUTPATIENT)
Facility: HOSPITAL | Age: 70
Setting detail: INFUSION SERIES
Discharge: HOME | End: 2025-07-17
Payer: MEDICARE

## 2025-07-17 ENCOUNTER — APPOINTMENT (OUTPATIENT)
Facility: HOSPITAL | Age: 70
End: 2025-07-17
Payer: MEDICARE

## 2025-07-17 VITALS
RESPIRATION RATE: 18 BRPM | DIASTOLIC BLOOD PRESSURE: 67 MMHG | TEMPERATURE: 97.8 F | OXYGEN SATURATION: 99 % | HEART RATE: 69 BPM | SYSTOLIC BLOOD PRESSURE: 138 MMHG

## 2025-07-17 VITALS
RESPIRATION RATE: 18 BRPM | WEIGHT: 152.8 LBS | OXYGEN SATURATION: 99 % | HEART RATE: 69 BPM | DIASTOLIC BLOOD PRESSURE: 72 MMHG | TEMPERATURE: 97.8 F | BODY MASS INDEX: 25.46 KG/M2 | HEIGHT: 65 IN | SYSTOLIC BLOOD PRESSURE: 129 MMHG

## 2025-07-17 DIAGNOSIS — C90.00 MULTIPLE MYELOMA NOT HAVING ACHIEVED REMISSION (HCC): Primary | ICD-10-CM

## 2025-07-17 DIAGNOSIS — C90.00 MULTIPLE MYELOMA NOT HAVING ACHIEVED REMISSION (HCC): ICD-10-CM

## 2025-07-17 DIAGNOSIS — Z11.59 ENCOUNTER FOR SCREENING FOR OTHER VIRAL DISEASES: Primary | ICD-10-CM

## 2025-07-17 DIAGNOSIS — Z11.59 ENCOUNTER FOR SCREENING FOR OTHER VIRAL DISEASES: ICD-10-CM

## 2025-07-17 LAB
ALBUMIN SERPL-MCNC: 3.3 G/DL (ref 3.5–5)
ALBUMIN/GLOB SERPL: 1.4 (ref 1.1–2.2)
ALP SERPL-CCNC: 82 U/L (ref 45–117)
ALT SERPL-CCNC: 27 U/L (ref 12–78)
ANION GAP SERPL CALC-SCNC: 4 MMOL/L (ref 2–12)
AST SERPL-CCNC: 13 U/L (ref 15–37)
BASO+EOS+MONOS # BLD AUTO: 0.3 K/UL (ref 0.2–1.2)
BASO+EOS+MONOS NFR BLD AUTO: 10 % (ref 3.2–16.9)
BILIRUB SERPL-MCNC: 0.5 MG/DL (ref 0.2–1)
BUN SERPL-MCNC: 17 MG/DL (ref 6–20)
BUN/CREAT SERPL: 26 (ref 12–20)
CALCIUM SERPL-MCNC: 8.8 MG/DL (ref 8.5–10.1)
CHLORIDE SERPL-SCNC: 109 MMOL/L (ref 97–108)
CO2 SERPL-SCNC: 25 MMOL/L (ref 21–32)
CREAT SERPL-MCNC: 0.66 MG/DL (ref 0.55–1.02)
DIFFERENTIAL METHOD BLD: ABNORMAL
ERYTHROCYTE [DISTWIDTH] IN BLOOD BY AUTOMATED COUNT: 14.3 % (ref 11.8–15.8)
GLOBULIN SER CALC-MCNC: 2.4 G/DL (ref 2–4)
GLUCOSE SERPL-MCNC: 127 MG/DL (ref 65–100)
HCT VFR BLD AUTO: 34.5 % (ref 35–47)
HGB BLD-MCNC: 11.2 G/DL (ref 11.5–16)
LYMPHOCYTES # BLD: 0.6 K/UL (ref 0.8–3.5)
LYMPHOCYTES NFR BLD: 19.3 % (ref 12–49)
MCH RBC QN AUTO: 30.5 PG (ref 26–34)
MCHC RBC AUTO-ENTMCNC: 32.5 G/DL (ref 30–36.5)
MCV RBC AUTO: 94 FL (ref 80–99)
NEUTS SEG # BLD: 2.1 K/UL (ref 1.8–8)
NEUTS SEG NFR BLD: 70.4 % (ref 32–75)
PLATELET # BLD AUTO: 232 K/UL (ref 150–400)
POTASSIUM SERPL-SCNC: 4 MMOL/L (ref 3.5–5.1)
PROT SERPL-MCNC: 5.7 G/DL (ref 6.4–8.2)
RBC # BLD AUTO: 3.67 M/UL (ref 3.8–5.2)
SODIUM SERPL-SCNC: 138 MMOL/L (ref 136–145)
WBC # BLD AUTO: 3 K/UL (ref 3.6–11)

## 2025-07-17 PROCEDURE — 2500000003 HC RX 250 WO HCPCS: Performed by: INTERNAL MEDICINE

## 2025-07-17 PROCEDURE — 2580000003 HC RX 258: Performed by: INTERNAL MEDICINE

## 2025-07-17 PROCEDURE — 96413 CHEMO IV INFUSION 1 HR: CPT

## 2025-07-17 PROCEDURE — 6360000002 HC RX W HCPCS: Performed by: INTERNAL MEDICINE

## 2025-07-17 PROCEDURE — 85025 COMPLETE CBC W/AUTO DIFF WBC: CPT

## 2025-07-17 PROCEDURE — 80053 COMPREHEN METABOLIC PANEL: CPT

## 2025-07-17 PROCEDURE — 6370000000 HC RX 637 (ALT 250 FOR IP): Performed by: INTERNAL MEDICINE

## 2025-07-17 PROCEDURE — 96375 TX/PRO/DX INJ NEW DRUG ADDON: CPT

## 2025-07-17 RX ORDER — DEXAMETHASONE 4 MG/1
40 TABLET ORAL ONCE
Status: COMPLETED | OUTPATIENT
Start: 2025-07-17 | End: 2025-07-17

## 2025-07-17 RX ORDER — ACETAMINOPHEN 325 MG/1
650 TABLET ORAL ONCE
Status: COMPLETED | OUTPATIENT
Start: 2025-07-17 | End: 2025-07-17

## 2025-07-17 RX ORDER — SODIUM CHLORIDE 9 MG/ML
5-250 INJECTION, SOLUTION INTRAVENOUS PRN
Status: DISCONTINUED | OUTPATIENT
Start: 2025-07-17 | End: 2025-07-18 | Stop reason: HOSPADM

## 2025-07-17 RX ORDER — DIPHENHYDRAMINE HYDROCHLORIDE 50 MG/ML
25 INJECTION, SOLUTION INTRAMUSCULAR; INTRAVENOUS ONCE
Status: COMPLETED | OUTPATIENT
Start: 2025-07-17 | End: 2025-07-17

## 2025-07-17 RX ORDER — SODIUM CHLORIDE 0.9 % (FLUSH) 0.9 %
5-40 SYRINGE (ML) INJECTION PRN
Status: DISCONTINUED | OUTPATIENT
Start: 2025-07-17 | End: 2025-07-18 | Stop reason: HOSPADM

## 2025-07-17 RX ADMIN — SODIUM CHLORIDE 689 MG: 0.9 INJECTION, SOLUTION INTRAVENOUS at 11:47

## 2025-07-17 RX ADMIN — SODIUM CHLORIDE 50 ML/HR: 0.9 INJECTION, SOLUTION INTRAVENOUS at 11:06

## 2025-07-17 RX ADMIN — ACETAMINOPHEN 650 MG: 325 TABLET ORAL at 11:00

## 2025-07-17 RX ADMIN — DEXAMETHASONE 40 MG: 4 TABLET ORAL at 11:00

## 2025-07-17 RX ADMIN — FAMOTIDINE 20 MG: 10 INJECTION, SOLUTION INTRAVENOUS at 11:06

## 2025-07-17 RX ADMIN — DIPHENHYDRAMINE HYDROCHLORIDE 25 MG: 50 INJECTION INTRAMUSCULAR; INTRAVENOUS at 11:10

## 2025-07-17 ASSESSMENT — PAIN SCALES - GENERAL: PAINLEVEL_OUTOF10: 6

## 2025-07-17 ASSESSMENT — PAIN DESCRIPTION - LOCATION: LOCATION: GENERALIZED

## 2025-07-17 ASSESSMENT — PAIN DESCRIPTION - DESCRIPTORS: DESCRIPTORS: SORE

## 2025-07-17 ASSESSMENT — PAIN SCALES - WONG BAKER: WONGBAKER_NUMERICALRESPONSE: NO HURT

## 2025-07-17 NOTE — PROGRESS NOTES
[facility] Chemotherapy Progress Note    Date: 2025    Name: Candida Weaver    MRN: [id number]         : 1955       Pt admit to [facility] for C1 D15 Sarclisa ambulatory in stable condition. Assessment completed. No new concerns voiced. Port with positive blood return. Labs sent for processing.                       No data to display                  Ms. Weaver's vitals were reviewed.  Patient Vitals for the past 12 hrs:   Temp Pulse Resp BP SpO2   25 1300 97.8 °F (36.6 °C) 69 18 138/67 --   25 1245 97.8 °F (36.6 °C) 64 18 (!) 145/68 --   25 1230 97.5 °F (36.4 °C) 61 17 134/65 --   25 1215 97.6 °F (36.4 °C) 64 16 135/69 --   25 1200 97.8 °F (36.6 °C) 56 18 136/62 99 %         Lab results were obtained and reviewed.  Recent Results (from the past 12 hours)   Comprehensive metabolic panel    Collection Time: 25  9:00 AM   Result Value Ref Range    Sodium 138 136 - 145 mmol/L    Potassium 4.0 3.5 - 5.1 mmol/L    Chloride 109 (H) 97 - 108 mmol/L    CO2 25 21 - 32 mmol/L    Anion Gap 4 2 - 12 mmol/L    Glucose 127 (H) 65 - 100 mg/dL    BUN 17 6 - 20 MG/DL    Creatinine 0.66 0.55 - 1.02 MG/DL    BUN/Creatinine Ratio 26 (H) 12 - 20      Est, Glom Filt Rate >90 >60 ml/min/1.73m2    Calcium 8.8 8.5 - 10.1 MG/DL    Total Bilirubin 0.5 0.2 - 1.0 MG/DL    ALT 27 12 - 78 U/L    AST 13 (L) 15 - 37 U/L    Alk Phosphatase 82 45 - 117 U/L    Total Protein 5.7 (L) 6.4 - 8.2 g/dL    Albumin 3.3 (L) 3.5 - 5.0 g/dL    Globulin 2.4 2.0 - 4.0 g/dL    Albumin/Globulin Ratio 1.4 1.1 - 2.2     CBC with Partial Differential    Collection Time: 25  9:02 AM   Result Value Ref Range    WBC 3.0 (L) 3.6 - 11.0 K/uL    RBC 3.67 (L) 3.80 - 5.20 M/uL    Hemoglobin 11.2 (L) 11.5 - 16.0 g/dL    Hematocrit 34.5 (L) 35.0 - 47.0 %    MCV 94.0 80.0 - 99.0 FL    MCH 30.5 26.0 - 34.0 PG    MCHC 32.5 30.0 - 36.5 g/dL    RDW 14.3 11.8 - 15.8 %    Platelets 232 150 - 400 K/uL    Neutrophils % 70.4 32 - 75 %

## 2025-07-18 ENCOUNTER — TELEPHONE (OUTPATIENT)
Age: 70
End: 2025-07-18

## 2025-07-18 DIAGNOSIS — E11.9 TYPE 2 DIABETES MELLITUS WITHOUT COMPLICATION, WITHOUT LONG-TERM CURRENT USE OF INSULIN (HCC): ICD-10-CM

## 2025-07-18 RX ORDER — METFORMIN HYDROCHLORIDE 500 MG/1
500 TABLET, EXTENDED RELEASE ORAL 3 TIMES DAILY
Qty: 270 TABLET | Refills: 0 | Status: SHIPPED | OUTPATIENT
Start: 2025-07-18

## 2025-07-18 NOTE — TELEPHONE ENCOUNTER
1102  R/t call and left vm that JON Hilario reviewed recent labs and they are stable/no concerns.  Pt is to contact office with any further questions or concerns.

## 2025-07-18 NOTE — TELEPHONE ENCOUNTER
Pt called in stating she would like to speak with the doctor regarding her recent test results. Please advise     CB# [phone]

## 2025-07-24 ENCOUNTER — HOSPITAL ENCOUNTER (OUTPATIENT)
Facility: HOSPITAL | Age: 70
Setting detail: INFUSION SERIES
Discharge: HOME | End: 2025-07-24
Payer: MEDICARE

## 2025-07-24 ENCOUNTER — CLINICAL DOCUMENTATION (OUTPATIENT)
Age: 70
End: 2025-07-24

## 2025-07-24 VITALS
SYSTOLIC BLOOD PRESSURE: 130 MMHG | OXYGEN SATURATION: 98 % | HEIGHT: 65 IN | WEIGHT: 152.6 LBS | TEMPERATURE: 97.7 F | BODY MASS INDEX: 25.43 KG/M2 | HEART RATE: 67 BPM | RESPIRATION RATE: 16 BRPM | DIASTOLIC BLOOD PRESSURE: 71 MMHG

## 2025-07-24 VITALS
TEMPERATURE: 97.3 F | DIASTOLIC BLOOD PRESSURE: 76 MMHG | RESPIRATION RATE: 16 BRPM | SYSTOLIC BLOOD PRESSURE: 148 MMHG | HEART RATE: 71 BPM

## 2025-07-24 DIAGNOSIS — C90.00 MULTIPLE MYELOMA NOT HAVING ACHIEVED REMISSION (HCC): Primary | ICD-10-CM

## 2025-07-24 DIAGNOSIS — C90.00 MULTIPLE MYELOMA NOT HAVING ACHIEVED REMISSION (HCC): ICD-10-CM

## 2025-07-24 DIAGNOSIS — Z11.59 ENCOUNTER FOR SCREENING FOR OTHER VIRAL DISEASES: ICD-10-CM

## 2025-07-24 DIAGNOSIS — Z11.59 ENCOUNTER FOR SCREENING FOR OTHER VIRAL DISEASES: Primary | ICD-10-CM

## 2025-07-24 LAB
ALBUMIN SERPL-MCNC: 3.3 G/DL (ref 3.5–5)
ALBUMIN/GLOB SERPL: 1.4 (ref 1.1–2.2)
ALP SERPL-CCNC: 96 U/L (ref 45–117)
ALT SERPL-CCNC: 28 U/L (ref 12–78)
ANION GAP SERPL CALC-SCNC: 5 MMOL/L (ref 2–12)
AST SERPL-CCNC: 13 U/L (ref 15–37)
BASOPHILS # BLD: 0.01 K/UL (ref 0–0.1)
BASOPHILS NFR BLD: 0.5 % (ref 0–1)
BILIRUB SERPL-MCNC: 0.4 MG/DL (ref 0.2–1)
BUN SERPL-MCNC: 16 MG/DL (ref 6–20)
BUN/CREAT SERPL: 26 (ref 12–20)
CALCIUM SERPL-MCNC: 8.9 MG/DL (ref 8.5–10.1)
CHLORIDE SERPL-SCNC: 113 MMOL/L (ref 97–108)
CO2 SERPL-SCNC: 22 MMOL/L (ref 21–32)
CREAT SERPL-MCNC: 0.62 MG/DL (ref 0.55–1.02)
DIFFERENTIAL METHOD BLD: ABNORMAL
EOSINOPHIL # BLD: 0 K/UL (ref 0–0.4)
EOSINOPHIL NFR BLD: 0 % (ref 0–7)
ERYTHROCYTE [DISTWIDTH] IN BLOOD BY AUTOMATED COUNT: 13.7 % (ref 11.5–14.5)
GLOBULIN SER CALC-MCNC: 2.4 G/DL (ref 2–4)
GLUCOSE SERPL-MCNC: 114 MG/DL (ref 65–100)
HCT VFR BLD AUTO: 36.4 % (ref 35–47)
HGB BLD-MCNC: 11.4 G/DL (ref 11.5–16)
IMM GRANULOCYTES # BLD AUTO: 0 K/UL (ref 0–0.04)
IMM GRANULOCYTES NFR BLD AUTO: 0 % (ref 0–0.5)
LYMPHOCYTES # BLD: 0.5 K/UL (ref 0.8–3.5)
LYMPHOCYTES NFR BLD: 25.1 % (ref 12–49)
MCH RBC QN AUTO: 30.2 PG (ref 26–34)
MCHC RBC AUTO-ENTMCNC: 31.3 G/DL (ref 30–36.5)
MCV RBC AUTO: 96.3 FL (ref 80–99)
MONOCYTES # BLD: 0.29 K/UL (ref 0–1)
MONOCYTES NFR BLD: 14.3 % (ref 5–13)
NEUTS SEG # BLD: 1.2 K/UL (ref 1.8–8)
NEUTS SEG NFR BLD: 60.1 % (ref 32–75)
NRBC # BLD: 0 K/UL (ref 0–0.01)
NRBC BLD-RTO: 0 PER 100 WBC
PLATELET # BLD AUTO: 194 K/UL (ref 150–400)
PMV BLD AUTO: 11.9 FL (ref 8.9–12.9)
POTASSIUM SERPL-SCNC: 4.2 MMOL/L (ref 3.5–5.1)
PROT SERPL-MCNC: 5.7 G/DL (ref 6.4–8.2)
RBC # BLD AUTO: 3.78 M/UL (ref 3.8–5.2)
RBC MORPH BLD: ABNORMAL
RBC MORPH BLD: ABNORMAL
SODIUM SERPL-SCNC: 140 MMOL/L (ref 136–145)
WBC # BLD AUTO: 2 K/UL (ref 3.6–11)

## 2025-07-24 PROCEDURE — 96413 CHEMO IV INFUSION 1 HR: CPT

## 2025-07-24 PROCEDURE — 2580000003 HC RX 258: Performed by: INTERNAL MEDICINE

## 2025-07-24 PROCEDURE — 2500000003 HC RX 250 WO HCPCS: Performed by: INTERNAL MEDICINE

## 2025-07-24 PROCEDURE — 6360000002 HC RX W HCPCS: Performed by: INTERNAL MEDICINE

## 2025-07-24 PROCEDURE — 6370000000 HC RX 637 (ALT 250 FOR IP): Performed by: INTERNAL MEDICINE

## 2025-07-24 PROCEDURE — 96375 TX/PRO/DX INJ NEW DRUG ADDON: CPT

## 2025-07-24 PROCEDURE — 80053 COMPREHEN METABOLIC PANEL: CPT

## 2025-07-24 PROCEDURE — 85025 COMPLETE CBC W/AUTO DIFF WBC: CPT

## 2025-07-24 PROCEDURE — 96415 CHEMO IV INFUSION ADDL HR: CPT

## 2025-07-24 RX ORDER — SODIUM CHLORIDE 0.9 % (FLUSH) 0.9 %
5-40 SYRINGE (ML) INJECTION PRN
Status: DISCONTINUED | OUTPATIENT
Start: 2025-07-24 | End: 2025-07-25 | Stop reason: HOSPADM

## 2025-07-24 RX ORDER — ONDANSETRON 2 MG/ML
8 INJECTION INTRAMUSCULAR; INTRAVENOUS
Status: DISCONTINUED | OUTPATIENT
Start: 2025-07-24 | End: 2025-07-25 | Stop reason: HOSPADM

## 2025-07-24 RX ORDER — ACETAMINOPHEN 325 MG/1
650 TABLET ORAL
Status: CANCELLED | OUTPATIENT
Start: 2025-07-31

## 2025-07-24 RX ORDER — SODIUM CHLORIDE 9 MG/ML
5-250 INJECTION, SOLUTION INTRAVENOUS PRN
Status: DISCONTINUED | OUTPATIENT
Start: 2025-07-24 | End: 2025-07-25 | Stop reason: HOSPADM

## 2025-07-24 RX ORDER — DEXAMETHASONE 4 MG/1
40 TABLET ORAL ONCE
Status: COMPLETED | OUTPATIENT
Start: 2025-07-24 | End: 2025-07-24

## 2025-07-24 RX ORDER — ALBUTEROL SULFATE 90 UG/1
4 INHALANT RESPIRATORY (INHALATION) PRN
Status: CANCELLED | OUTPATIENT
Start: 2025-07-31

## 2025-07-24 RX ORDER — ACETAMINOPHEN 325 MG/1
650 TABLET ORAL ONCE
Status: COMPLETED | OUTPATIENT
Start: 2025-07-24 | End: 2025-07-24

## 2025-07-24 RX ORDER — DIPHENHYDRAMINE HYDROCHLORIDE 50 MG/ML
25 INJECTION, SOLUTION INTRAMUSCULAR; INTRAVENOUS ONCE
Status: COMPLETED | OUTPATIENT
Start: 2025-07-24 | End: 2025-07-24

## 2025-07-24 RX ORDER — SODIUM CHLORIDE 9 MG/ML
INJECTION, SOLUTION INTRAVENOUS CONTINUOUS
Status: CANCELLED | OUTPATIENT
Start: 2025-07-31

## 2025-07-24 RX ORDER — SODIUM CHLORIDE 9 MG/ML
5-250 INJECTION, SOLUTION INTRAVENOUS PRN
Status: CANCELLED | OUTPATIENT
Start: 2025-07-31

## 2025-07-24 RX ORDER — ONDANSETRON 2 MG/ML
8 INJECTION INTRAMUSCULAR; INTRAVENOUS
Status: CANCELLED | OUTPATIENT
Start: 2025-07-31

## 2025-07-24 RX ORDER — HEPARIN 100 UNIT/ML
500 SYRINGE INTRAVENOUS PRN
Status: CANCELLED | OUTPATIENT
Start: 2025-07-31

## 2025-07-24 RX ORDER — HYDROCORTISONE SODIUM SUCCINATE 100 MG/2ML
100 INJECTION INTRAMUSCULAR; INTRAVENOUS
Status: CANCELLED | OUTPATIENT
Start: 2025-07-31

## 2025-07-24 RX ORDER — EPINEPHRINE 1 MG/ML
0.3 INJECTION, SOLUTION INTRAMUSCULAR; SUBCUTANEOUS PRN
Status: CANCELLED | OUTPATIENT
Start: 2025-07-31

## 2025-07-24 RX ORDER — HEPARIN 100 UNIT/ML
500 SYRINGE INTRAVENOUS PRN
Status: DISCONTINUED | OUTPATIENT
Start: 2025-07-24 | End: 2025-07-25 | Stop reason: HOSPADM

## 2025-07-24 RX ORDER — DIPHENHYDRAMINE HYDROCHLORIDE 50 MG/ML
50 INJECTION, SOLUTION INTRAMUSCULAR; INTRAVENOUS
Status: CANCELLED | OUTPATIENT
Start: 2025-07-31

## 2025-07-24 RX ADMIN — SODIUM CHLORIDE 689 MG: 9 INJECTION, SOLUTION INTRAVENOUS at 11:13

## 2025-07-24 RX ADMIN — ACETAMINOPHEN 650 MG: 325 TABLET ORAL at 10:12

## 2025-07-24 RX ADMIN — SODIUM CHLORIDE 50 ML/HR: 0.9 INJECTION, SOLUTION INTRAVENOUS at 10:16

## 2025-07-24 RX ADMIN — FAMOTIDINE 20 MG: 10 INJECTION, SOLUTION INTRAVENOUS at 10:16

## 2025-07-24 RX ADMIN — DIPHENHYDRAMINE HYDROCHLORIDE 25 MG: 50 INJECTION INTRAMUSCULAR; INTRAVENOUS at 10:20

## 2025-07-24 RX ADMIN — DEXAMETHASONE 40 MG: 4 TABLET ORAL at 10:12

## 2025-07-24 ASSESSMENT — PAIN SCALES - GENERAL
PAINLEVEL_OUTOF10: 6
PAINLEVEL_OUTOF10: 7

## 2025-07-24 ASSESSMENT — PAIN DESCRIPTION - LOCATION
LOCATION: HAND;FOOT
LOCATION: BACK

## 2025-07-24 ASSESSMENT — PAIN DESCRIPTION - DESCRIPTORS
DESCRIPTORS: NUMBNESS;TINGLING
DESCRIPTORS: SORE

## 2025-07-24 ASSESSMENT — PAIN DESCRIPTION - ORIENTATION: ORIENTATION: LOWER

## 2025-07-24 NOTE — PROGRESS NOTES
[facility] - Chemotherapy Progress Note    0800 Pt admit to [facility] for Sarclisa/C1D22 ambulatory in stable condition. Assessment completed by access RN.  Port accessed by access RN with positive blood return. Labs drawn per order and sent.  Line flushed, clamped, Curos Cap applied to end clave.    1015 Port flushed w/ + blood return    Medications Administered         0.9 % sodium chloride infusion Admin Date  07/24/2025 Action  New Bag Dose  50 mL/hr Rate  50 mL/hr Route  IntraVENous Documented By  Brenda Zavala RN        acetaminophen (TYLENOL) tablet 650 mg Admin Date  07/24/2025 Action  Given Dose  650 mg Rate   Route  Oral Documented By  Brenda Zavala RN        dexAMETHasone (DECADRON) tablet 40 mg Admin Date  07/24/2025 Action  Given Dose  40 mg Rate   Route  Oral Documented By  Brenda Zavala RN        diphenhydrAMINE (BENADRYL) injection 25 mg Admin Date  07/24/2025 Action  Given Dose  25 mg Rate   Route  IntraVENous Documented By  Brenda Zavala RN        famotidine (PEPCID) 20 MG/2ML 20 mg in sodium chloride (PF) 0.9 % 10 mL injection Admin Date  07/24/2025 Action  Given Dose  20 mg Rate   Route  IntraVENous Documented By  Brenda Zavala RN        isatuximab-irfc (SARCLISA) 689 mg in sodium chloride 0.9 % 275 mL infusion Admin Date  07/24/2025 Action  New Bag Dose  689 mg Rate  220 mL/hr Route  IntraVENous Documented By  Brenda Zavala RN                Two nurses verified prior to administering:, Drug name, Drug dose, Infusion volume or drug volume when prepared in a syringe, Rate of administration, Route of administration, Expiration dates and/or times, Appearance and physical integrity of the drugs, Rate set on infusion pump, when used, Sequencing of drug administration         1240 Pt tolerated treatment well. Port maintained positive blood return throughout treatment, flushed with positive blood return at conclusion and de-accessed per protocol. D/c home

## 2025-07-24 NOTE — PROGRESS NOTES
Oral Cancer therapy     Candida Weaver is a  70 y.o.female  diagnosed with multiple myeloma . Ms. Weaver is being treated with isatuximab venetoclax and dexamethasone.      Medication name:venetoclax   Regimen: isatuximab, venetoclax and dexamethasone  Dose:  400 mg   Frequency: daily    Ordering provider: Nany Arboleda MD  Start date: 7/10/25     Met with Ms. Weaver in [facility] today. Provided updated calendar for Cycle 2 of treatment. Reviewed need to take dexamethasone 40 mg on 8/7 and 8/21 for this cycle.     Lab Results   Component Value Date    WBC 2.0 (L) 07/24/2025    HGB 11.4 (L) 07/24/2025    HCT 36.4 07/24/2025    MCV 96.3 07/24/2025     07/24/2025    LYMPHOPCT 25.1 07/24/2025    RBC 3.78 (L) 07/24/2025    MCH 30.2 07/24/2025    MCHC 31.3 07/24/2025    RDW 13.7 07/24/2025     Lab Results   Component Value Date    NEUTROABS 1.20 (L) 07/24/2025           Expected follow up date: Labs/office visit each treatment. Next cycle start on 7/31/25     Patient provided with an Oral Chemotherapy Journal.       .Ms. Weaver verbalized understanding of the information presented and all of the patient's questions were answered.        Rose Pa, WOOD, BCOP, BCPS    For Pharmacy Admin Tracking Only    Program: Medical Group  CPA in place:  Yes  Recommendation Provided To: Patient/Caregiver: 1 via In person    Intervention Accepted By: Patient/Caregiver: 1    Time Spent (min): 15

## 2025-07-31 ENCOUNTER — HOSPITAL ENCOUNTER (OUTPATIENT)
Facility: HOSPITAL | Age: 70
Setting detail: INFUSION SERIES
Discharge: HOME | End: 2025-07-31
Payer: MEDICARE

## 2025-07-31 VITALS
HEIGHT: 65 IN | BODY MASS INDEX: 25.69 KG/M2 | HEART RATE: 70 BPM | OXYGEN SATURATION: 99 % | RESPIRATION RATE: 16 BRPM | DIASTOLIC BLOOD PRESSURE: 69 MMHG | TEMPERATURE: 97.3 F | WEIGHT: 154.2 LBS | SYSTOLIC BLOOD PRESSURE: 120 MMHG

## 2025-07-31 VITALS — SYSTOLIC BLOOD PRESSURE: 132 MMHG | RESPIRATION RATE: 16 BRPM | HEART RATE: 67 BPM | DIASTOLIC BLOOD PRESSURE: 60 MMHG

## 2025-07-31 DIAGNOSIS — Z11.59 ENCOUNTER FOR SCREENING FOR OTHER VIRAL DISEASES: ICD-10-CM

## 2025-07-31 DIAGNOSIS — C90.00 MULTIPLE MYELOMA NOT HAVING ACHIEVED REMISSION (HCC): Primary | ICD-10-CM

## 2025-07-31 LAB
ALBUMIN SERPL-MCNC: 3.6 G/DL (ref 3.5–5.2)
ALBUMIN/GLOB SERPL: 1.7 (ref 1.1–2.2)
ALP SERPL-CCNC: 109 U/L (ref 35–104)
ALT SERPL-CCNC: 21 U/L (ref 10–35)
ANION GAP SERPL CALC-SCNC: 10 MMOL/L (ref 2–14)
AST SERPL-CCNC: 17 U/L (ref 10–35)
BASOPHILS # BLD: 0 K/UL (ref 0–0.1)
BASOPHILS NFR BLD: 0 % (ref 0–1)
BILIRUB SERPL-MCNC: 0.4 MG/DL (ref 0–1.2)
BUN SERPL-MCNC: 17 MG/DL (ref 8–23)
BUN/CREAT SERPL: 26 (ref 12–20)
CALCIUM SERPL-MCNC: 9.4 MG/DL (ref 8.8–10.2)
CHLORIDE SERPL-SCNC: 108 MMOL/L (ref 98–107)
CO2 SERPL-SCNC: 24 MMOL/L (ref 20–29)
CREAT SERPL-MCNC: 0.67 MG/DL (ref 0.6–1)
DIFFERENTIAL METHOD BLD: ABNORMAL
EOSINOPHIL # BLD: 0 K/UL (ref 0–0.4)
EOSINOPHIL NFR BLD: 0 % (ref 0–7)
ERYTHROCYTE [DISTWIDTH] IN BLOOD BY AUTOMATED COUNT: 13.9 % (ref 11.5–14.5)
GLOBULIN SER CALC-MCNC: 2.1 G/DL (ref 2–4)
GLUCOSE SERPL-MCNC: 88 MG/DL (ref 65–100)
HCT VFR BLD AUTO: 37.4 % (ref 35–47)
HGB BLD-MCNC: 11.7 G/DL (ref 11.5–16)
IMM GRANULOCYTES # BLD AUTO: 0 K/UL (ref 0–0.04)
IMM GRANULOCYTES NFR BLD AUTO: 0 % (ref 0–0.5)
LYMPHOCYTES # BLD: 0.54 K/UL (ref 0.8–3.5)
LYMPHOCYTES NFR BLD: 20.1 % (ref 12–49)
MCH RBC QN AUTO: 29.8 PG (ref 26–34)
MCHC RBC AUTO-ENTMCNC: 31.3 G/DL (ref 30–36.5)
MCV RBC AUTO: 95.2 FL (ref 80–99)
MONOCYTES # BLD: 0.35 K/UL (ref 0–1)
MONOCYTES NFR BLD: 12.8 % (ref 5–13)
NEUTS SEG # BLD: 1.81 K/UL (ref 1.8–8)
NEUTS SEG NFR BLD: 67.1 % (ref 32–75)
NRBC # BLD: 0 K/UL (ref 0–0.01)
NRBC BLD-RTO: 0 PER 100 WBC
PLATELET # BLD AUTO: 170 K/UL (ref 150–400)
PMV BLD AUTO: 11.5 FL (ref 8.9–12.9)
POTASSIUM SERPL-SCNC: 3.9 MMOL/L (ref 3.5–5.1)
PROT SERPL-MCNC: 5.7 G/DL (ref 6.4–8.3)
RBC # BLD AUTO: 3.93 M/UL (ref 3.8–5.2)
RBC MORPH BLD: ABNORMAL
SODIUM SERPL-SCNC: 141 MMOL/L (ref 136–145)
WBC # BLD AUTO: 2.7 K/UL (ref 3.6–11)

## 2025-07-31 PROCEDURE — 36593 DECLOT VASCULAR DEVICE: CPT

## 2025-07-31 PROCEDURE — 84165 PROTEIN E-PHORESIS SERUM: CPT

## 2025-07-31 PROCEDURE — 82784 ASSAY IGA/IGD/IGG/IGM EACH: CPT

## 2025-07-31 PROCEDURE — 83521 IG LIGHT CHAINS FREE EACH: CPT

## 2025-07-31 PROCEDURE — 2580000003 HC RX 258: Performed by: INTERNAL MEDICINE

## 2025-07-31 PROCEDURE — 96375 TX/PRO/DX INJ NEW DRUG ADDON: CPT

## 2025-07-31 PROCEDURE — 6370000000 HC RX 637 (ALT 250 FOR IP): Performed by: INTERNAL MEDICINE

## 2025-07-31 PROCEDURE — 86334 IMMUNOFIX E-PHORESIS SERUM: CPT

## 2025-07-31 PROCEDURE — 96417 CHEMO IV INFUS EACH ADDL SEQ: CPT

## 2025-07-31 PROCEDURE — 6360000002 HC RX W HCPCS: Performed by: INTERNAL MEDICINE

## 2025-07-31 PROCEDURE — 2500000003 HC RX 250 WO HCPCS: Performed by: INTERNAL MEDICINE

## 2025-07-31 PROCEDURE — 80053 COMPREHEN METABOLIC PANEL: CPT

## 2025-07-31 PROCEDURE — 85025 COMPLETE CBC W/AUTO DIFF WBC: CPT

## 2025-07-31 PROCEDURE — 96413 CHEMO IV INFUSION 1 HR: CPT

## 2025-07-31 RX ORDER — DIPHENHYDRAMINE HYDROCHLORIDE 50 MG/ML
25 INJECTION, SOLUTION INTRAMUSCULAR; INTRAVENOUS ONCE
Status: COMPLETED | OUTPATIENT
Start: 2025-07-31 | End: 2025-07-31

## 2025-07-31 RX ORDER — SODIUM CHLORIDE 9 MG/ML
5-250 INJECTION, SOLUTION INTRAVENOUS PRN
Status: DISCONTINUED | OUTPATIENT
Start: 2025-07-31 | End: 2025-08-01 | Stop reason: HOSPADM

## 2025-07-31 RX ORDER — DEXAMETHASONE 4 MG/1
40 TABLET ORAL ONCE
Status: COMPLETED | OUTPATIENT
Start: 2025-07-31 | End: 2025-07-31

## 2025-07-31 RX ORDER — SODIUM CHLORIDE 0.9 % (FLUSH) 0.9 %
5-40 SYRINGE (ML) INJECTION PRN
Status: DISCONTINUED | OUTPATIENT
Start: 2025-07-31 | End: 2025-08-01 | Stop reason: HOSPADM

## 2025-07-31 RX ORDER — ACETAMINOPHEN 325 MG/1
650 TABLET ORAL ONCE
Status: COMPLETED | OUTPATIENT
Start: 2025-07-31 | End: 2025-07-31

## 2025-07-31 RX ADMIN — DEXAMETHASONE 40 MG: 4 TABLET ORAL at 10:40

## 2025-07-31 RX ADMIN — DIPHENHYDRAMINE HYDROCHLORIDE 25 MG: 50 INJECTION INTRAMUSCULAR; INTRAVENOUS at 10:46

## 2025-07-31 RX ADMIN — FAMOTIDINE 20 MG: 10 INJECTION, SOLUTION INTRAVENOUS at 10:42

## 2025-07-31 RX ADMIN — WATER 2 MG: 1 INJECTION INTRAMUSCULAR; INTRAVENOUS; SUBCUTANEOUS at 08:57

## 2025-07-31 RX ADMIN — ACETAMINOPHEN 650 MG: 325 TABLET ORAL at 10:39

## 2025-07-31 RX ADMIN — SODIUM CHLORIDE 689 MG: 0.9 INJECTION, SOLUTION INTRAVENOUS at 11:17

## 2025-07-31 RX ADMIN — SODIUM CHLORIDE 25 ML/HR: 0.9 INJECTION, SOLUTION INTRAVENOUS at 10:39

## 2025-07-31 ASSESSMENT — PAIN SCALES - GENERAL: PAINLEVEL_OUTOF10: 0

## 2025-07-31 ASSESSMENT — PAIN SCALES - WONG BAKER: WONGBAKER_NUMERICALRESPONSE: NO HURT

## 2025-07-31 NOTE — PROGRESS NOTES
[facility] Progress Note    Ms. Weaver Arrived ambulatory and in no distress for cycle 2 day 1 of Sarclisa regimen.  Assessment was completed, no acute issues at this time, no new complaints voiced.  Port accessed without difficulty, labs drawn and processed.          Ms. Weaver's vitals were reviewed.  Patient Vitals for the past 12 hrs:   Pulse Resp BP   07/31/25 1230 67 16 132/60   07/31/25 1200 63 16 (!) 146/49   07/31/25 1145 63 16 129/62   07/31/25 1130 65 16 (!) 133/44         Lab results were obtained and reviewed.  Recent Results (from the past 12 hours)   CBC With Auto Differential    Collection Time: 07/31/25  8:44 AM   Result Value Ref Range    WBC 2.7 (L) 3.6 - 11.0 K/uL    RBC 3.93 3.80 - 5.20 M/uL    Hemoglobin 11.7 11.5 - 16.0 g/dL    Hematocrit 37.4 35.0 - 47.0 %    MCV 95.2 80.0 - 99.0 FL    MCH 29.8 26.0 - 34.0 PG    MCHC 31.3 30.0 - 36.5 g/dL    RDW 13.9 11.5 - 14.5 %    Platelets 170 150 - 400 K/uL    MPV 11.5 8.9 - 12.9 FL    Nucleated RBCs 0.0 0  WBC    nRBC 0.00 0.00 - 0.01 K/uL    Neutrophils % 67.1 32.0 - 75.0 %    Lymphocytes % 20.1 12.0 - 49.0 %    Monocytes % 12.8 5.0 - 13.0 %    Eosinophils % 0.0 0.0 - 7.0 %    Basophils % 0.0 0.0 - 1.0 %    Immature Granulocytes % 0.0 0.0 - 0.5 %    Neutrophils Absolute 1.81 1.80 - 8.00 K/UL    Lymphocytes Absolute 0.54 (L) 0.80 - 3.50 K/UL    Monocytes Absolute 0.35 0.00 - 1.00 K/UL    Eosinophils Absolute 0.00 0.00 - 0.40 K/UL    Basophils Absolute 0.00 0.00 - 0.10 K/UL    Immature Granulocytes Absolute 0.00 0.00 - 0.04 K/UL    Differential Type SMEAR SCANNED      RBC Comment NORMOCYTIC, NORMOCHROMIC     Comprehensive metabolic panel    Collection Time: 07/31/25  8:44 AM   Result Value Ref Range    Sodium 141 136 - 145 mmol/L    Potassium 3.9 3.5 - 5.1 mmol/L    Chloride 108 (H) 98 - 107 mmol/L    CO2 24 20 - 29 mmol/L    Anion Gap 10 2 - 14 mmol/L    Glucose 88 65 - 100 mg/dL    BUN 17 8 - 23 MG/DL    Creatinine 0.67 0.60 - 1.00 MG/DL

## 2025-08-04 LAB
ALBUMIN SERPL ELPH-MCNC: 3.7 G/DL (ref 2.9–4.4)
ALBUMIN/GLOB SERPL: 2.2 (ref 0.7–1.7)
ALPHA1 GLOB SERPL ELPH-MCNC: 0.2 G/DL (ref 0–0.4)
ALPHA2 GLOB SERPL ELPH-MCNC: 0.5 G/DL (ref 0.4–1)
B-GLOBULIN SERPL ELPH-MCNC: 0.9 G/DL (ref 0.7–1.3)
GAMMA GLOB SERPL ELPH-MCNC: 0.1 G/DL (ref 0.4–1.8)
GLOBULIN SER-MCNC: 1.7 G/DL (ref 2.2–3.9)
IGA SERPL-MCNC: <5 MG/DL (ref 64–422)
IGG SERPL-MCNC: 222 MG/DL (ref 586–1602)
IGM SERPL-MCNC: 6 MG/DL (ref 26–217)
INTERPRETATION SERPL IEP-IMP: ABNORMAL
KAPPA LC FREE SER-MCNC: 0.8 MG/L (ref 3.3–19.4)
KAPPA LC FREE/LAMBDA FREE SER: >0.53 {RATIO} (ref 0.26–1.65)
LAMBDA LC FREE SERPL-MCNC: <1.5 MG/L (ref 5.7–26.3)
M PROTEIN SERPL ELPH-MCNC: 0.1 G/DL
PROT SERPL-MCNC: 5.4 G/DL (ref 6–8.5)

## 2025-08-07 RX ORDER — DEXAMETHASONE 4 MG/1
40 TABLET ORAL ONCE
Status: CANCELLED
Start: 2025-08-14 | End: 2025-08-14

## 2025-08-07 RX ORDER — EPINEPHRINE 1 MG/ML
0.3 INJECTION, SOLUTION INTRAMUSCULAR; SUBCUTANEOUS PRN
Status: CANCELLED | OUTPATIENT
Start: 2025-08-14

## 2025-08-07 RX ORDER — DIPHENHYDRAMINE HYDROCHLORIDE 50 MG/ML
50 INJECTION, SOLUTION INTRAMUSCULAR; INTRAVENOUS
Status: CANCELLED | OUTPATIENT
Start: 2025-08-14

## 2025-08-07 RX ORDER — ACETAMINOPHEN 325 MG/1
650 TABLET ORAL
Status: CANCELLED | OUTPATIENT
Start: 2025-08-14

## 2025-08-07 RX ORDER — SODIUM CHLORIDE 9 MG/ML
INJECTION, SOLUTION INTRAVENOUS CONTINUOUS
Status: CANCELLED | OUTPATIENT
Start: 2025-08-14

## 2025-08-07 RX ORDER — HYDROCORTISONE SODIUM SUCCINATE 100 MG/2ML
100 INJECTION INTRAMUSCULAR; INTRAVENOUS
Status: CANCELLED | OUTPATIENT
Start: 2025-08-14

## 2025-08-07 RX ORDER — ALBUTEROL SULFATE 90 UG/1
4 INHALANT RESPIRATORY (INHALATION) PRN
Status: CANCELLED | OUTPATIENT
Start: 2025-08-14

## 2025-08-07 RX ORDER — ONDANSETRON 2 MG/ML
8 INJECTION INTRAMUSCULAR; INTRAVENOUS
Status: CANCELLED | OUTPATIENT
Start: 2025-08-14

## 2025-08-14 ENCOUNTER — HOSPITAL ENCOUNTER (OUTPATIENT)
Facility: HOSPITAL | Age: 70
Setting detail: INFUSION SERIES
Discharge: HOME | End: 2025-08-14
Payer: MEDICARE

## 2025-08-14 ENCOUNTER — OFFICE VISIT (OUTPATIENT)
Age: 70
End: 2025-08-14
Payer: MEDICARE

## 2025-08-14 VITALS
SYSTOLIC BLOOD PRESSURE: 137 MMHG | OXYGEN SATURATION: 97 % | TEMPERATURE: 98.4 F | HEART RATE: 89 BPM | WEIGHT: 154 LBS | BODY MASS INDEX: 25.63 KG/M2 | DIASTOLIC BLOOD PRESSURE: 65 MMHG | RESPIRATION RATE: 16 BRPM

## 2025-08-14 VITALS
HEART RATE: 89 BPM | HEIGHT: 65 IN | SYSTOLIC BLOOD PRESSURE: 137 MMHG | DIASTOLIC BLOOD PRESSURE: 65 MMHG | OXYGEN SATURATION: 97 % | WEIGHT: 154.2 LBS | BODY MASS INDEX: 25.69 KG/M2 | RESPIRATION RATE: 16 BRPM | TEMPERATURE: 98.4 F

## 2025-08-14 VITALS
TEMPERATURE: 98.2 F | DIASTOLIC BLOOD PRESSURE: 61 MMHG | SYSTOLIC BLOOD PRESSURE: 143 MMHG | RESPIRATION RATE: 16 BRPM | HEART RATE: 71 BPM

## 2025-08-14 DIAGNOSIS — C90.00 MULTIPLE MYELOMA NOT HAVING ACHIEVED REMISSION (HCC): Primary | ICD-10-CM

## 2025-08-14 DIAGNOSIS — Z11.59 ENCOUNTER FOR SCREENING FOR OTHER VIRAL DISEASES: ICD-10-CM

## 2025-08-14 LAB
ALBUMIN SERPL-MCNC: 3.5 G/DL (ref 3.5–5.2)
ALBUMIN/GLOB SERPL: 2 (ref 1.1–2.2)
ALP SERPL-CCNC: 90 U/L (ref 35–104)
ALT SERPL-CCNC: 19 U/L (ref 10–35)
ANION GAP SERPL CALC-SCNC: 10 MMOL/L (ref 2–14)
AST SERPL-CCNC: 14 U/L (ref 10–35)
BASO+EOS+MONOS # BLD AUTO: 0.3 K/UL (ref 0.2–1.2)
BASO+EOS+MONOS NFR BLD AUTO: 12 % (ref 3.2–16.9)
BILIRUB SERPL-MCNC: 0.5 MG/DL (ref 0–1.2)
BUN SERPL-MCNC: 17 MG/DL (ref 8–23)
BUN/CREAT SERPL: 30 (ref 12–20)
CALCIUM SERPL-MCNC: 9.1 MG/DL (ref 8.8–10.2)
CHLORIDE SERPL-SCNC: 103 MMOL/L (ref 98–107)
CO2 SERPL-SCNC: 24 MMOL/L (ref 20–29)
CREAT SERPL-MCNC: 0.55 MG/DL (ref 0.6–1)
DIFFERENTIAL METHOD BLD: ABNORMAL
ERYTHROCYTE [DISTWIDTH] IN BLOOD BY AUTOMATED COUNT: 14.9 % (ref 11.8–15.8)
GLOBULIN SER CALC-MCNC: 1.7 G/DL (ref 2–4)
GLUCOSE SERPL-MCNC: 105 MG/DL (ref 65–100)
HCT VFR BLD AUTO: 34.9 % (ref 35–47)
HGB BLD-MCNC: 11.4 G/DL (ref 11.5–16)
LYMPHOCYTES # BLD: 0.5 K/UL (ref 0.8–3.5)
LYMPHOCYTES NFR BLD: 19.6 % (ref 12–49)
MCH RBC QN AUTO: 30.8 PG (ref 26–34)
MCHC RBC AUTO-ENTMCNC: 32.7 G/DL (ref 30–36.5)
MCV RBC AUTO: 94.3 FL (ref 80–99)
NEUTS SEG # BLD: 1.9 K/UL (ref 1.8–8)
NEUTS SEG NFR BLD: 68.1 % (ref 32–75)
PLATELET # BLD AUTO: 174 K/UL (ref 150–400)
POTASSIUM SERPL-SCNC: 4.3 MMOL/L (ref 3.5–5.1)
PROT SERPL-MCNC: 5.3 G/DL (ref 6.4–8.3)
RBC # BLD AUTO: 3.7 M/UL (ref 3.8–5.2)
SODIUM SERPL-SCNC: 137 MMOL/L (ref 136–145)
WBC # BLD AUTO: 2.7 K/UL (ref 3.6–11)

## 2025-08-14 PROCEDURE — 2580000003 HC RX 258

## 2025-08-14 PROCEDURE — 3017F COLORECTAL CA SCREEN DOC REV: CPT

## 2025-08-14 PROCEDURE — 1036F TOBACCO NON-USER: CPT

## 2025-08-14 PROCEDURE — 99215 OFFICE O/P EST HI 40 MIN: CPT

## 2025-08-14 PROCEDURE — 80053 COMPREHEN METABOLIC PANEL: CPT

## 2025-08-14 PROCEDURE — 96375 TX/PRO/DX INJ NEW DRUG ADDON: CPT

## 2025-08-14 PROCEDURE — 6360000002 HC RX W HCPCS: Performed by: INTERNAL MEDICINE

## 2025-08-14 PROCEDURE — 2580000003 HC RX 258: Performed by: INTERNAL MEDICINE

## 2025-08-14 PROCEDURE — 1125F AMNT PAIN NOTED PAIN PRSNT: CPT

## 2025-08-14 PROCEDURE — 1090F PRES/ABSN URINE INCON ASSESS: CPT

## 2025-08-14 PROCEDURE — 1159F MED LIST DOCD IN RCRD: CPT

## 2025-08-14 PROCEDURE — 96413 CHEMO IV INFUSION 1 HR: CPT

## 2025-08-14 PROCEDURE — 6370000000 HC RX 637 (ALT 250 FOR IP): Performed by: INTERNAL MEDICINE

## 2025-08-14 PROCEDURE — G8399 PT W/DXA RESULTS DOCUMENT: HCPCS

## 2025-08-14 PROCEDURE — G8419 CALC BMI OUT NRM PARAM NOF/U: HCPCS

## 2025-08-14 PROCEDURE — 85025 COMPLETE CBC W/AUTO DIFF WBC: CPT

## 2025-08-14 PROCEDURE — G8427 DOCREV CUR MEDS BY ELIG CLIN: HCPCS

## 2025-08-14 PROCEDURE — 1123F ACP DISCUSS/DSCN MKR DOCD: CPT

## 2025-08-14 PROCEDURE — 96361 HYDRATE IV INFUSION ADD-ON: CPT

## 2025-08-14 PROCEDURE — 2500000003 HC RX 250 WO HCPCS: Performed by: INTERNAL MEDICINE

## 2025-08-14 PROCEDURE — 1160F RVW MEDS BY RX/DR IN RCRD: CPT

## 2025-08-14 RX ORDER — DIPHENHYDRAMINE HYDROCHLORIDE 50 MG/ML
25 INJECTION, SOLUTION INTRAMUSCULAR; INTRAVENOUS ONCE
Status: COMPLETED | OUTPATIENT
Start: 2025-08-14 | End: 2025-08-14

## 2025-08-14 RX ORDER — SODIUM CHLORIDE 0.9 % (FLUSH) 0.9 %
5-40 SYRINGE (ML) INJECTION PRN
Status: DISCONTINUED | OUTPATIENT
Start: 2025-08-14 | End: 2025-08-15 | Stop reason: HOSPADM

## 2025-08-14 RX ORDER — ACETAMINOPHEN 325 MG/1
650 TABLET ORAL ONCE
Status: COMPLETED | OUTPATIENT
Start: 2025-08-14 | End: 2025-08-14

## 2025-08-14 RX ORDER — SODIUM CHLORIDE 9 MG/ML
5-250 INJECTION, SOLUTION INTRAVENOUS PRN
Status: DISCONTINUED | OUTPATIENT
Start: 2025-08-14 | End: 2025-08-15 | Stop reason: HOSPADM

## 2025-08-14 RX ORDER — HEPARIN 100 UNIT/ML
500 SYRINGE INTRAVENOUS PRN
Status: DISCONTINUED | OUTPATIENT
Start: 2025-08-14 | End: 2025-08-15 | Stop reason: HOSPADM

## 2025-08-14 RX ORDER — 0.9 % SODIUM CHLORIDE 0.9 %
1000 INTRAVENOUS SOLUTION INTRAVENOUS ONCE
Status: CANCELLED
Start: 2025-08-14

## 2025-08-14 RX ORDER — 0.9 % SODIUM CHLORIDE 0.9 %
1000 INTRAVENOUS SOLUTION INTRAVENOUS ONCE
Status: COMPLETED | OUTPATIENT
Start: 2025-08-14 | End: 2025-08-14

## 2025-08-14 RX ADMIN — ACETAMINOPHEN 650 MG: 325 TABLET ORAL at 10:41

## 2025-08-14 RX ADMIN — SODIUM CHLORIDE 689 MG: 0.9 INJECTION, SOLUTION INTRAVENOUS at 11:18

## 2025-08-14 RX ADMIN — SODIUM CHLORIDE 1000 ML: 0.9 INJECTION, SOLUTION INTRAVENOUS at 10:38

## 2025-08-14 RX ADMIN — DIPHENHYDRAMINE HYDROCHLORIDE 25 MG: 50 INJECTION INTRAMUSCULAR; INTRAVENOUS at 10:47

## 2025-08-14 RX ADMIN — FAMOTIDINE 20 MG: 10 INJECTION, SOLUTION INTRAVENOUS at 10:44

## 2025-08-14 ASSESSMENT — PAIN SCALES - WONG BAKER: WONGBAKER_NUMERICALRESPONSE: NO HURT

## 2025-08-14 ASSESSMENT — PAIN DESCRIPTION - DESCRIPTORS
DESCRIPTORS: SORE
DESCRIPTORS_2: ACHING

## 2025-08-14 ASSESSMENT — PAIN DESCRIPTION - INTENSITY: RATING_2: 7

## 2025-08-14 ASSESSMENT — PAIN SCALES - GENERAL: PAINLEVEL_OUTOF10: 7

## 2025-08-14 ASSESSMENT — PAIN DESCRIPTION - LOCATION
LOCATION_2: BACK
LOCATION: THROAT

## 2025-08-14 ASSESSMENT — PAIN DESCRIPTION - ORIENTATION: ORIENTATION_2: LOWER

## 2025-08-18 ENCOUNTER — TELEPHONE (OUTPATIENT)
Dept: PRIMARY CARE CLINIC | Facility: CLINIC | Age: 70
End: 2025-08-18

## 2025-08-20 RX ORDER — DIPHENHYDRAMINE HYDROCHLORIDE 50 MG/ML
50 INJECTION, SOLUTION INTRAMUSCULAR; INTRAVENOUS
Status: CANCELLED | OUTPATIENT
Start: 2025-08-28

## 2025-08-20 RX ORDER — ONDANSETRON 2 MG/ML
8 INJECTION INTRAMUSCULAR; INTRAVENOUS
Status: CANCELLED | OUTPATIENT
Start: 2025-08-28

## 2025-08-20 RX ORDER — ALBUTEROL SULFATE 90 UG/1
4 INHALANT RESPIRATORY (INHALATION) PRN
Status: CANCELLED | OUTPATIENT
Start: 2025-08-28

## 2025-08-20 RX ORDER — EPINEPHRINE 1 MG/ML
0.3 INJECTION, SOLUTION INTRAMUSCULAR; SUBCUTANEOUS PRN
OUTPATIENT
Start: 2025-09-11

## 2025-08-20 RX ORDER — SODIUM CHLORIDE 0.9 % (FLUSH) 0.9 %
5-40 SYRINGE (ML) INJECTION PRN
OUTPATIENT
Start: 2025-09-11

## 2025-08-20 RX ORDER — HYDROCORTISONE SODIUM SUCCINATE 100 MG/2ML
100 INJECTION INTRAMUSCULAR; INTRAVENOUS
Status: CANCELLED | OUTPATIENT
Start: 2025-08-28

## 2025-08-20 RX ORDER — ALBUTEROL SULFATE 90 UG/1
4 INHALANT RESPIRATORY (INHALATION) PRN
OUTPATIENT
Start: 2025-09-11

## 2025-08-20 RX ORDER — ACETAMINOPHEN 325 MG/1
650 TABLET ORAL
OUTPATIENT
Start: 2025-09-11

## 2025-08-20 RX ORDER — DIPHENHYDRAMINE HYDROCHLORIDE 50 MG/ML
25 INJECTION, SOLUTION INTRAMUSCULAR; INTRAVENOUS ONCE
OUTPATIENT
Start: 2025-09-11 | End: 2025-09-11

## 2025-08-20 RX ORDER — DIPHENHYDRAMINE HYDROCHLORIDE 50 MG/ML
50 INJECTION, SOLUTION INTRAMUSCULAR; INTRAVENOUS
OUTPATIENT
Start: 2025-09-11

## 2025-08-20 RX ORDER — HEPARIN 100 UNIT/ML
500 SYRINGE INTRAVENOUS PRN
OUTPATIENT
Start: 2025-09-11

## 2025-08-20 RX ORDER — SODIUM CHLORIDE 9 MG/ML
INJECTION, SOLUTION INTRAVENOUS CONTINUOUS
OUTPATIENT
Start: 2025-09-11

## 2025-08-20 RX ORDER — SODIUM CHLORIDE 9 MG/ML
INJECTION, SOLUTION INTRAVENOUS CONTINUOUS
Status: CANCELLED | OUTPATIENT
Start: 2025-08-28

## 2025-08-20 RX ORDER — ACETAMINOPHEN 325 MG/1
650 TABLET ORAL
Status: CANCELLED | OUTPATIENT
Start: 2025-08-28

## 2025-08-20 RX ORDER — SODIUM CHLORIDE 9 MG/ML
5-250 INJECTION, SOLUTION INTRAVENOUS PRN
OUTPATIENT
Start: 2025-09-11

## 2025-08-20 RX ORDER — HYDROCORTISONE SODIUM SUCCINATE 100 MG/2ML
100 INJECTION INTRAMUSCULAR; INTRAVENOUS
OUTPATIENT
Start: 2025-09-11

## 2025-08-20 RX ORDER — EPINEPHRINE 1 MG/ML
0.3 INJECTION, SOLUTION INTRAMUSCULAR; SUBCUTANEOUS PRN
Status: CANCELLED | OUTPATIENT
Start: 2025-08-28

## 2025-08-20 RX ORDER — ONDANSETRON 2 MG/ML
8 INJECTION INTRAMUSCULAR; INTRAVENOUS
OUTPATIENT
Start: 2025-09-11

## 2025-08-20 RX ORDER — ACETAMINOPHEN 325 MG/1
650 TABLET ORAL ONCE
OUTPATIENT
Start: 2025-09-11 | End: 2025-09-11

## 2025-08-25 ENCOUNTER — TELEPHONE (OUTPATIENT)
Dept: PRIMARY CARE CLINIC | Facility: CLINIC | Age: 70
End: 2025-08-25

## 2025-08-27 ENCOUNTER — TELEPHONE (OUTPATIENT)
Age: 70
End: 2025-08-27

## 2025-08-27 ENCOUNTER — OFFICE VISIT (OUTPATIENT)
Dept: PRIMARY CARE CLINIC | Facility: CLINIC | Age: 70
End: 2025-08-27
Payer: MEDICARE

## 2025-08-27 VITALS
HEIGHT: 65 IN | RESPIRATION RATE: 18 BRPM | HEART RATE: 65 BPM | DIASTOLIC BLOOD PRESSURE: 70 MMHG | WEIGHT: 150.8 LBS | BODY MASS INDEX: 25.12 KG/M2 | SYSTOLIC BLOOD PRESSURE: 126 MMHG | OXYGEN SATURATION: 97 % | TEMPERATURE: 97 F

## 2025-08-27 DIAGNOSIS — G62.0 PERIPHERAL NEUROPATHY DUE TO CHEMOTHERAPY: ICD-10-CM

## 2025-08-27 DIAGNOSIS — T45.1X5A PERIPHERAL NEUROPATHY DUE TO CHEMOTHERAPY: ICD-10-CM

## 2025-08-27 DIAGNOSIS — Z12.11 COLON CANCER SCREENING: ICD-10-CM

## 2025-08-27 DIAGNOSIS — C90.00 MULTIPLE MYELOMA NOT HAVING ACHIEVED REMISSION (HCC): ICD-10-CM

## 2025-08-27 DIAGNOSIS — Z00.00 MEDICARE ANNUAL WELLNESS VISIT, SUBSEQUENT: Primary | ICD-10-CM

## 2025-08-27 DIAGNOSIS — I27.21 PULMONARY ARTERY HYPERTENSION (HCC): ICD-10-CM

## 2025-08-27 DIAGNOSIS — E11.9 TYPE 2 DIABETES MELLITUS WITHOUT COMPLICATION, WITHOUT LONG-TERM CURRENT USE OF INSULIN (HCC): ICD-10-CM

## 2025-08-27 DIAGNOSIS — I10 PRIMARY HYPERTENSION: ICD-10-CM

## 2025-08-27 DIAGNOSIS — F32.A ANXIETY AND DEPRESSION: ICD-10-CM

## 2025-08-27 DIAGNOSIS — F41.9 ANXIETY AND DEPRESSION: ICD-10-CM

## 2025-08-27 PROCEDURE — 1123F ACP DISCUSS/DSCN MKR DOCD: CPT | Performed by: INTERNAL MEDICINE

## 2025-08-27 PROCEDURE — G8399 PT W/DXA RESULTS DOCUMENT: HCPCS | Performed by: INTERNAL MEDICINE

## 2025-08-27 PROCEDURE — G8427 DOCREV CUR MEDS BY ELIG CLIN: HCPCS | Performed by: INTERNAL MEDICINE

## 2025-08-27 PROCEDURE — G8419 CALC BMI OUT NRM PARAM NOF/U: HCPCS | Performed by: INTERNAL MEDICINE

## 2025-08-27 PROCEDURE — 3078F DIAST BP <80 MM HG: CPT | Performed by: INTERNAL MEDICINE

## 2025-08-27 PROCEDURE — 1126F AMNT PAIN NOTED NONE PRSNT: CPT | Performed by: INTERNAL MEDICINE

## 2025-08-27 PROCEDURE — 3046F HEMOGLOBIN A1C LEVEL >9.0%: CPT | Performed by: INTERNAL MEDICINE

## 2025-08-27 PROCEDURE — 1159F MED LIST DOCD IN RCRD: CPT | Performed by: INTERNAL MEDICINE

## 2025-08-27 PROCEDURE — 99214 OFFICE O/P EST MOD 30 MIN: CPT | Performed by: INTERNAL MEDICINE

## 2025-08-27 PROCEDURE — 1090F PRES/ABSN URINE INCON ASSESS: CPT | Performed by: INTERNAL MEDICINE

## 2025-08-27 PROCEDURE — 2022F DILAT RTA XM EVC RTNOPTHY: CPT | Performed by: INTERNAL MEDICINE

## 2025-08-27 PROCEDURE — 1160F RVW MEDS BY RX/DR IN RCRD: CPT | Performed by: INTERNAL MEDICINE

## 2025-08-27 PROCEDURE — G0439 PPPS, SUBSEQ VISIT: HCPCS | Performed by: INTERNAL MEDICINE

## 2025-08-27 PROCEDURE — 3074F SYST BP LT 130 MM HG: CPT | Performed by: INTERNAL MEDICINE

## 2025-08-27 PROCEDURE — 1036F TOBACCO NON-USER: CPT | Performed by: INTERNAL MEDICINE

## 2025-08-27 PROCEDURE — 3017F COLORECTAL CA SCREEN DOC REV: CPT | Performed by: INTERNAL MEDICINE

## 2025-08-27 ASSESSMENT — ENCOUNTER SYMPTOMS
CHEST TIGHTNESS: 0
COLOR CHANGE: 0
SHORTNESS OF BREATH: 0
BACK PAIN: 0
COUGH: 0
EYE DISCHARGE: 0
CONSTIPATION: 0
RHINORRHEA: 0
SORE THROAT: 0
DIARRHEA: 0
ABDOMINAL PAIN: 0

## 2025-08-27 ASSESSMENT — PATIENT HEALTH QUESTIONNAIRE - PHQ9
SUM OF ALL RESPONSES TO PHQ QUESTIONS 1-9: 0
SUM OF ALL RESPONSES TO PHQ QUESTIONS 1-9: 0
1. LITTLE INTEREST OR PLEASURE IN DOING THINGS: NOT AT ALL
2. FEELING DOWN, DEPRESSED OR HOPELESS: NOT AT ALL
SUM OF ALL RESPONSES TO PHQ QUESTIONS 1-9: 0
SUM OF ALL RESPONSES TO PHQ QUESTIONS 1-9: 0

## 2025-08-28 ENCOUNTER — CLINICAL DOCUMENTATION (OUTPATIENT)
Age: 70
End: 2025-08-28

## 2025-08-28 ENCOUNTER — HOSPITAL ENCOUNTER (OUTPATIENT)
Facility: HOSPITAL | Age: 70
Setting detail: INFUSION SERIES
Discharge: HOME | End: 2025-08-28
Payer: MEDICARE

## 2025-08-28 VITALS
DIASTOLIC BLOOD PRESSURE: 65 MMHG | TEMPERATURE: 97.8 F | SYSTOLIC BLOOD PRESSURE: 149 MMHG | RESPIRATION RATE: 16 BRPM | HEART RATE: 60 BPM

## 2025-08-28 VITALS
OXYGEN SATURATION: 98 % | WEIGHT: 150 LBS | TEMPERATURE: 98 F | DIASTOLIC BLOOD PRESSURE: 80 MMHG | RESPIRATION RATE: 18 BRPM | HEIGHT: 65 IN | SYSTOLIC BLOOD PRESSURE: 152 MMHG | HEART RATE: 71 BPM | BODY MASS INDEX: 24.99 KG/M2

## 2025-08-28 DIAGNOSIS — Z11.59 ENCOUNTER FOR SCREENING FOR OTHER VIRAL DISEASES: ICD-10-CM

## 2025-08-28 DIAGNOSIS — E11.9 TYPE 2 DIABETES MELLITUS WITHOUT COMPLICATION, WITHOUT LONG-TERM CURRENT USE OF INSULIN (HCC): ICD-10-CM

## 2025-08-28 DIAGNOSIS — C90.00 MULTIPLE MYELOMA NOT HAVING ACHIEVED REMISSION (HCC): Primary | ICD-10-CM

## 2025-08-28 LAB
ALBUMIN SERPL-MCNC: 3.5 G/DL (ref 3.5–5.2)
ALBUMIN/GLOB SERPL: 1.4 (ref 1.1–2.2)
ALP SERPL-CCNC: 91 U/L (ref 35–104)
ALT SERPL-CCNC: 14 U/L (ref 10–35)
ANION GAP SERPL CALC-SCNC: 11 MMOL/L (ref 2–14)
AST SERPL-CCNC: 15 U/L (ref 10–35)
BASO+EOS+MONOS # BLD AUTO: 0.6 K/UL (ref 0.2–1.2)
BASO+EOS+MONOS NFR BLD AUTO: 14 % (ref 3.2–16.9)
BILIRUB SERPL-MCNC: 0.2 MG/DL (ref 0–1.2)
BUN SERPL-MCNC: 16 MG/DL (ref 8–23)
BUN/CREAT SERPL: 23 (ref 12–20)
CALCIUM SERPL-MCNC: 9.5 MG/DL (ref 8.8–10.2)
CHLORIDE SERPL-SCNC: 105 MMOL/L (ref 98–107)
CHOLEST SERPL-MCNC: 178 MG/DL (ref 0–200)
CO2 SERPL-SCNC: 22 MMOL/L (ref 20–29)
CREAT SERPL-MCNC: 0.67 MG/DL (ref 0.6–1)
CREAT UR-MCNC: 54.6 MG/DL (ref 28–217)
DIFFERENTIAL METHOD BLD: ABNORMAL
ERYTHROCYTE [DISTWIDTH] IN BLOOD BY AUTOMATED COUNT: 14.8 % (ref 11.8–15.8)
EST. AVERAGE GLUCOSE BLD GHB EST-MCNC: 116 MG/DL
GLOBULIN SER CALC-MCNC: 2.5 G/DL (ref 2–4)
GLUCOSE SERPL-MCNC: 107 MG/DL (ref 65–100)
HBA1C MFR BLD: 5.7 % (ref 4–5.6)
HCT VFR BLD AUTO: 34.8 % (ref 35–47)
HDLC SERPL-MCNC: 43 MG/DL (ref 40–60)
HDLC SERPL: 4.1 (ref 0–5)
HGB BLD-MCNC: 11.5 G/DL (ref 11.5–16)
LDLC SERPL CALC-MCNC: 114 MG/DL (ref 0–100)
LYMPHOCYTES # BLD: 0.8 K/UL (ref 0.8–3.5)
LYMPHOCYTES NFR BLD: 19.4 % (ref 12–49)
MCH RBC QN AUTO: 30.7 PG (ref 26–34)
MCHC RBC AUTO-ENTMCNC: 33 G/DL (ref 30–36.5)
MCV RBC AUTO: 92.8 FL (ref 80–99)
MICROALBUMIN UR-MCNC: 2.09 MG/DL
MICROALBUMIN/CREAT UR-RTO: 38 MG/G
NEUTS SEG # BLD: 2.6 K/UL (ref 1.8–8)
NEUTS SEG NFR BLD: 66.4 % (ref 32–75)
PLATELET # BLD AUTO: 251 K/UL (ref 150–400)
POTASSIUM SERPL-SCNC: 4.2 MMOL/L (ref 3.5–5.1)
PROT SERPL-MCNC: 6 G/DL (ref 6.4–8.3)
RBC # BLD AUTO: 3.75 M/UL (ref 3.8–5.2)
SODIUM SERPL-SCNC: 137 MMOL/L (ref 136–145)
TRIGL SERPL-MCNC: 106 MG/DL (ref 0–150)
VLDLC SERPL CALC-MCNC: 21 MG/DL
WBC # BLD AUTO: 4 K/UL (ref 3.6–11)

## 2025-08-28 PROCEDURE — 2500000003 HC RX 250 WO HCPCS: Performed by: INTERNAL MEDICINE

## 2025-08-28 PROCEDURE — 96375 TX/PRO/DX INJ NEW DRUG ADDON: CPT

## 2025-08-28 PROCEDURE — 6370000000 HC RX 637 (ALT 250 FOR IP): Performed by: INTERNAL MEDICINE

## 2025-08-28 PROCEDURE — 84165 PROTEIN E-PHORESIS SERUM: CPT

## 2025-08-28 PROCEDURE — 83521 IG LIGHT CHAINS FREE EACH: CPT

## 2025-08-28 PROCEDURE — 83036 HEMOGLOBIN GLYCOSYLATED A1C: CPT

## 2025-08-28 PROCEDURE — 82570 ASSAY OF URINE CREATININE: CPT

## 2025-08-28 PROCEDURE — 85025 COMPLETE CBC W/AUTO DIFF WBC: CPT

## 2025-08-28 PROCEDURE — 96402 CHEMO HORMON ANTINEOPL SQ/IM: CPT

## 2025-08-28 PROCEDURE — 96413 CHEMO IV INFUSION 1 HR: CPT

## 2025-08-28 PROCEDURE — 80053 COMPREHEN METABOLIC PANEL: CPT

## 2025-08-28 PROCEDURE — 2580000003 HC RX 258: Performed by: INTERNAL MEDICINE

## 2025-08-28 PROCEDURE — 82043 UR ALBUMIN QUANTITATIVE: CPT

## 2025-08-28 PROCEDURE — 82784 ASSAY IGA/IGD/IGG/IGM EACH: CPT

## 2025-08-28 PROCEDURE — 86334 IMMUNOFIX E-PHORESIS SERUM: CPT

## 2025-08-28 PROCEDURE — 80061 LIPID PANEL: CPT

## 2025-08-28 PROCEDURE — 6360000002 HC RX W HCPCS: Performed by: INTERNAL MEDICINE

## 2025-08-28 RX ORDER — ACETAMINOPHEN 325 MG/1
650 TABLET ORAL ONCE
Status: COMPLETED | OUTPATIENT
Start: 2025-08-28 | End: 2025-08-28

## 2025-08-28 RX ORDER — SODIUM CHLORIDE 0.9 % (FLUSH) 0.9 %
5-40 SYRINGE (ML) INJECTION PRN
Status: DISCONTINUED | OUTPATIENT
Start: 2025-08-28 | End: 2025-08-29 | Stop reason: HOSPADM

## 2025-08-28 RX ORDER — SODIUM CHLORIDE 9 MG/ML
5-250 INJECTION, SOLUTION INTRAVENOUS PRN
Status: DISCONTINUED | OUTPATIENT
Start: 2025-08-28 | End: 2025-08-29 | Stop reason: HOSPADM

## 2025-08-28 RX ORDER — DIPHENHYDRAMINE HYDROCHLORIDE 50 MG/ML
25 INJECTION, SOLUTION INTRAMUSCULAR; INTRAVENOUS ONCE
Status: COMPLETED | OUTPATIENT
Start: 2025-08-28 | End: 2025-08-28

## 2025-08-28 RX ORDER — HEPARIN 100 UNIT/ML
500 SYRINGE INTRAVENOUS PRN
Status: DISCONTINUED | OUTPATIENT
Start: 2025-08-28 | End: 2025-08-29 | Stop reason: HOSPADM

## 2025-08-28 RX ADMIN — DIPHENHYDRAMINE HYDROCHLORIDE 25 MG: 50 INJECTION INTRAMUSCULAR; INTRAVENOUS at 10:16

## 2025-08-28 RX ADMIN — ACETAMINOPHEN 650 MG: 325 TABLET ORAL at 10:14

## 2025-08-28 RX ADMIN — SODIUM CHLORIDE 689 MG: 9 INJECTION, SOLUTION INTRAVENOUS at 10:44

## 2025-08-28 RX ADMIN — FAMOTIDINE 20 MG: 10 INJECTION, SOLUTION INTRAVENOUS at 10:14

## 2025-08-28 RX ADMIN — SODIUM CHLORIDE 50 ML/HR: 0.9 INJECTION, SOLUTION INTRAVENOUS at 10:14

## 2025-08-29 ENCOUNTER — TELEPHONE (OUTPATIENT)
Age: 70
End: 2025-08-29

## 2025-09-02 LAB
ALBUMIN SERPL ELPH-MCNC: 3.3 G/DL (ref 2.9–4.4)
ALBUMIN/GLOB SERPL: 1.6 (ref 0.7–1.7)
ALPHA1 GLOB SERPL ELPH-MCNC: 0.3 G/DL (ref 0–0.4)
ALPHA2 GLOB SERPL ELPH-MCNC: 0.8 G/DL (ref 0.4–1)
B-GLOBULIN SERPL ELPH-MCNC: 0.9 G/DL (ref 0.7–1.3)
GAMMA GLOB SERPL ELPH-MCNC: 0.2 G/DL (ref 0.4–1.8)
GLOBULIN SER-MCNC: 2.2 G/DL (ref 2.2–3.9)
IGA SERPL-MCNC: <5 MG/DL (ref 64–422)
IGG SERPL-MCNC: 211 MG/DL (ref 586–1602)
IGM SERPL-MCNC: 7 MG/DL (ref 26–217)
INTERPRETATION SERPL IEP-IMP: ABNORMAL
KAPPA LC FREE SER-MCNC: 1.4 MG/L (ref 3.3–19.4)
KAPPA LC FREE/LAMBDA FREE SER: >0.93 {RATIO} (ref 0.26–1.65)
LAMBDA LC FREE SERPL-MCNC: <1.5 MG/L (ref 5.7–26.3)
M PROTEIN SERPL ELPH-MCNC: 0.1 G/DL
PROT SERPL-MCNC: 5.5 G/DL (ref 6–8.5)

## (undated) DEVICE — TUBING HYDR IRR --

## (undated) DEVICE — ENDOSCOPIC ULTRASOUND FINE NEEDLE BIOPSY (FNB) DEVICE: Brand: ACQUIRE